# Patient Record
Sex: FEMALE | Race: WHITE | NOT HISPANIC OR LATINO | Employment: OTHER | URBAN - METROPOLITAN AREA
[De-identification: names, ages, dates, MRNs, and addresses within clinical notes are randomized per-mention and may not be internally consistent; named-entity substitution may affect disease eponyms.]

---

## 2019-02-14 ENCOUNTER — HOSPITAL ENCOUNTER (OUTPATIENT)
Dept: RADIOLOGY | Facility: HOSPITAL | Age: 69
Discharge: HOME | End: 2019-02-14
Attending: ANESTHESIOLOGY
Payer: MEDICARE

## 2019-02-14 ENCOUNTER — TRANSCRIBE ORDERS (OUTPATIENT)
Dept: ADMINISTRATIVE | Facility: HOSPITAL | Age: 69
End: 2019-02-14

## 2019-02-14 DIAGNOSIS — G89.29 CHRONIC LEFT SHOULDER PAIN: Primary | ICD-10-CM

## 2019-02-14 DIAGNOSIS — M25.512 CHRONIC LEFT SHOULDER PAIN: ICD-10-CM

## 2019-02-14 DIAGNOSIS — M25.512 CHRONIC LEFT SHOULDER PAIN: Primary | ICD-10-CM

## 2019-02-14 DIAGNOSIS — G89.29 CHRONIC LEFT SHOULDER PAIN: ICD-10-CM

## 2019-02-14 PROCEDURE — 73030 X-RAY EXAM OF SHOULDER: CPT

## 2019-10-07 ENCOUNTER — EVALUATION (OUTPATIENT)
Dept: PHYSICAL THERAPY | Facility: CLINIC | Age: 69
End: 2019-10-07
Payer: MEDICARE

## 2019-10-07 DIAGNOSIS — R29.3 POSTURE ABNORMALITY: ICD-10-CM

## 2019-10-07 DIAGNOSIS — M48.02 SPINAL STENOSIS IN CERVICAL REGION: Primary | ICD-10-CM

## 2019-10-07 DIAGNOSIS — M54.2 CERVICALGIA: ICD-10-CM

## 2019-10-07 PROCEDURE — 97014 ELECTRIC STIMULATION THERAPY: CPT | Performed by: PHYSICAL THERAPIST

## 2019-10-07 PROCEDURE — 97535 SELF CARE MNGMENT TRAINING: CPT | Performed by: PHYSICAL THERAPIST

## 2019-10-07 PROCEDURE — 97163 PT EVAL HIGH COMPLEX 45 MIN: CPT | Performed by: PHYSICAL THERAPIST

## 2019-10-07 NOTE — PROGRESS NOTES
PT Evaluation     Today's date: 10/7/2019  Patient name: Polo Hook  : 1950  MRN: [id number]  Referring provider: Mars Larson MD  Dx:   Encounter Diagnosis     ICD-10-CM    1  Spinal stenosis in cervical region M48 02    2  Cervicalgia M54 2    3  Posture abnormality R29 3        Start Time: 1400  Stop Time: 1500  Total time in clinic (min): 60 minutes    Assessment  Assessment details: Polo Hook was seen for an initial PT evaluation today  Patient is a 78 y o  female with diagnosis of cervicalgia and past medical history significant for left RTC repairx2, olecranon fx, bilateral TKA  High complexity evaluation  due to number of participation restrictions, functional outcome measure of 66% limitation, and unstable clinical presentation  Findings today show significant restriction of thoracic and cervical mobility with tightness of cervical and superior shoulder musculature, limited cervical range of motion, suboccipital tightness, deep cervical flexor weakness, postural abnormality,and pain impacting patient's overall abiltiy to perform ADLs pain free  Skilled PT indicated to treat at this time to address postural mechanics, musculature tightness, and general mobility/stability of cervical spine to improve patient's quality of life  Impairments: abnormal muscle tone, abnormal or restricted ROM, abnormal movement, activity intolerance, impaired physical strength, lacks appropriate home exercise program, pain with function, scapular dyskinesis, poor posture  and poor body mechanics    Goals  STG (4 weeks)  1  Patient will display good seated posture with decreased forward head and retracted shoulders for 2 consecutive sessions with 50% VC    2  Patient will have 2/10 cervical pain at rest    3  Improve cervical rotation by 5 degrees bilaterally for increased ability to turn head while driving  LTG (8 weeks)  1   Patient will be able to sit in chair without high back for 30 minutes without significant increased pain  2  Cervical range of motion in side bending will improve by 10 degrees for increased ability to perform ADLs and sleep comfortably  3  Patient will be independent with home exercise program for continued maintenance post PT DC  Plan  Plan details: Reassess in 4 weeks  Patient would benefit from: skilled physical therapy  Planned modality interventions: traction, unattended electrical stimulation, cryotherapy and thermotherapy: hydrocollator packs  Other planned modality interventions: Graston technique  Planned therapy interventions: manual therapy, neuromuscular re-education, therapeutic exercise, therapeutic activities and home exercise program  Frequency: 2-3x week  Plan of Care beginning date: 10/7/2019  Plan of Care expiration date: 2019  Treatment plan discussed with: patient        Subjective Evaluation    History of Present Illness  Date of onset: 10/7/2016  Mechanism of injury: Kerri Perez is a 78 y o  female who presents to outpatient Physical Therapy today with complaints of neck pain that travels down both arms to elbow  States she had shoulder surgery approximately 1 year ago and had c/o left elbow pain post surgery, when examined by DO Dx with referral from neck  Has been seeing pain management for both cervical and low back       Pain  Current pain ratin  At best pain ratin  At worst pain rating: 10  Location: center of cervical spine down arms to elbows (HA daily beginning in occiput region)  Quality: sharp and radiating  Relieving factors: relaxation  Progression: worsening    Social Support    Employment status: not working (retired)  Hand dominance: right      Diagnostic Tests  X-ray: abnormal  Treatments  Previous treatment: injection treatment (6 weeks ago, helped for 2 weeks)  Patient Goals  Patient goals for therapy: decreased pain  Patient goal: lift        Objective     Concurrent Complaints  Positive for headaches, tinnitus (chronic) and visual change  Palpation   Left   Hypertonic in the cervical paraspinals, levator scapulae, scalenes, suboccipitals and upper trapezius  Tenderness of the cervical paraspinals, levator scapulae, scalenes, suboccipitals and upper trapezius  Trigger point to levator scapulae and suboccipitals  Right   Hypertonic in the cervical paraspinals, levator scapulae, scalenes, suboccipitals and upper trapezius  Tenderness of the cervical paraspinals, levator scapulae, scalenes, suboccipitals and upper trapezius  Trigger point to levator scapulae and suboccipitals  Tenderness   Cervical Spine   Tenderness in the spinous process, left scapula, left transverse process, right scapula and right transverse process  Neurological Testing     Sensation   Cervical/Thoracic   Left   Intact: light touch    Right   Intact: light touch    Active Range of Motion   Cervical/Thoracic Spine       Cervical    Flexion: 30 degrees  with pain  Extension: 14 degrees     with pain  Left lateral flexion: 25 degrees      Right lateral flexion: 10 degrees      Left rotation: 19 degrees with pain  Right rotation: 32 degrees    with pain    Joint Play     Hypomobile: C1, C2, C3, C4, C5, C6, C7, T1, T2, T3, T4 and T5     Pain: C1, C2, C3, C4, C5, C6, C7, T1, T2, T3, T4 and T5     Strength/Myotome Testing     Left Wrist/Hand      (2nd hand position)     Trial 1: 34    Trial 2: 34    Trial 3: 32    Right Wrist/Hand      (2nd hand position)     Trial 1: 32    Trial 2: 31    Trial 3: 25    Tests   Cervical   Positive repeated extension, repeated flexion and vertical compression  Negative alar ligament test and VBI  Lumbar   Positive vertical compression   General Comments:      Cervical/Thoracic Comments  Posture: Increased thoracic kyphosis with forward head and rounded shoulders  FOTO: 34% function, 48% predicted function     Neuro Exam:     Headaches   Patient reports headaches: Yes         Flowsheet Rows Most Recent Value   PT/OT G-Codes   Current Score  34   Projected Score  48             Precautions: none noted       Re-evaluation:11/7    Cervical flowsheet    Manual  10/7                       SOR performed       Cervical distraction performed       1st rib mob        T/S PA mobs        C/S PA and side glide mobs                IASTM            Exercise Diary  10/7                               Doorway stretch        C/S AROM        Chin tucks        Shoulder rolls Reviewed HEP       scap retraction Reviewed HEP       MTP/LTP        Serratus punch        Prone row        Prone I's         Prone T's (thumbs up and down)        Standing YTI        Lat pull down                                                    Modalities 10/7       estim premod bilateral C/S 10 min with MH

## 2019-10-07 NOTE — LETTER
2019    Trevor Lund, [address]/[employer] [place] [address]    Patient: June Campuzano   YOB: 1950   Date of Visit: 10/7/2019     Encounter Diagnosis     ICD-10-CM    1  Spinal stenosis in cervical region M48 02    2  Cervicalgia M54 2    3  Posture abnormality R29 3        Dear Dr Lino Cabezas: Thank you for your recent referral of June Campuzano  Please review the attached evaluation summary from Sharon's recent visit  Please verify that you agree with the plan of care by signing the attached order  If you have any questions or concerns, please do not hesitate to call  I sincerely appreciate the opportunity to share in the care of one of your patients and hope to have another opportunity to work with you in the near future  Sincerely,    Army Felix PT      Referring Provider:      I certify that I have read the below Plan of Care and certify the need for these services furnished under this plan of treatment while under my care  Trevor Lund MD  [address] [place] [address]  VIA Facsimile: [phone]          PT Evaluation     Today's date: 10/7/2019  Patient name: June Campuzano  : 1950  MRN: [id number]  Referring provider: Juliocesar Valenzuela MD  Dx:   Encounter Diagnosis     ICD-10-CM    1  Spinal stenosis in cervical region M48 02    2  Cervicalgia M54 2    3  Posture abnormality R29 3        Start Time: 1400  Stop Time: 1500  Total time in clinic (min): 60 minutes    Assessment  Assessment details: June Campuzano was seen for an initial PT evaluation today  Patient is a 78 y o  female with diagnosis of cervicalgia and past medical history significant for left RTC repairx2, olecranon fx, bilateral TKA  High complexity evaluation  due to number of participation restrictions, functional outcome measure of 66% limitation, and unstable clinical presentation   Findings today show significant restriction of thoracic and cervical mobility with tightness of cervical and superior shoulder musculature, limited cervical range of motion, suboccipital tightness, deep cervical flexor weakness, postural abnormality,and pain impacting patient's overall abiltiy to perform ADLs pain free  Skilled PT indicated to treat at this time to address postural mechanics, musculature tightness, and general mobility/stability of cervical spine to improve patient's quality of life  Impairments: abnormal muscle tone, abnormal or restricted ROM, abnormal movement, activity intolerance, impaired physical strength, lacks appropriate home exercise program, pain with function, scapular dyskinesis, poor posture  and poor body mechanics    Goals  STG (4 weeks)  1  Patient will display good seated posture with decreased forward head and retracted shoulders for 2 consecutive sessions with 50% VC    2  Patient will have 2/10 cervical pain at rest    3  Improve cervical rotation by 5 degrees bilaterally for increased ability to turn head while driving  LTG (8 weeks)  1  Patient will be able to sit in chair without high back for 30 minutes without significant increased pain  2  Cervical range of motion in side bending will improve by 10 degrees for increased ability to perform ADLs and sleep comfortably  3  Patient will be independent with home exercise program for continued maintenance post PT DC  Plan  Plan details: Reassess in 4 weeks  Patient would benefit from: skilled physical therapy  Planned modality interventions: traction, unattended electrical stimulation, cryotherapy and thermotherapy: hydrocollator packs  Other planned modality interventions: Graston technique  Planned therapy interventions: manual therapy, neuromuscular re-education, therapeutic exercise, therapeutic activities and home exercise program  Frequency: 2-3x week    Plan of Care beginning date: 10/7/2019  Plan of Care expiration date: 2019  Treatment plan discussed with: patient        Subjective Evaluation    History of Present Illness  Date of onset: 10/7/2016  Mechanism of injury: Karely Vega is a 78 y o  female who presents to outpatient Physical Therapy today with complaints of neck pain that travels down both arms to elbow  States she had shoulder surgery approximately 1 year ago and had c/o left elbow pain post surgery, when examined by DO Dx with referral from neck  Has been seeing pain management for both cervical and low back  Pain  Current pain ratin  At best pain ratin  At worst pain rating: 10  Location: center of cervical spine down arms to elbows (HA daily beginning in occiput region)  Quality: sharp and radiating  Relieving factors: relaxation  Progression: worsening    Social Support    Employment status: not working (retired)  Hand dominance: right      Diagnostic Tests  X-ray: abnormal  Treatments  Previous treatment: injection treatment (6 weeks ago, helped for 2 weeks)  Patient Goals  Patient goals for therapy: decreased pain  Patient goal: lift        Objective     Concurrent Complaints  Positive for headaches, tinnitus (chronic) and visual change  Palpation   Left   Hypertonic in the cervical paraspinals, levator scapulae, scalenes, suboccipitals and upper trapezius  Tenderness of the cervical paraspinals, levator scapulae, scalenes, suboccipitals and upper trapezius  Trigger point to levator scapulae and suboccipitals  Right   Hypertonic in the cervical paraspinals, levator scapulae, scalenes, suboccipitals and upper trapezius  Tenderness of the cervical paraspinals, levator scapulae, scalenes, suboccipitals and upper trapezius  Trigger point to levator scapulae and suboccipitals  Tenderness   Cervical Spine   Tenderness in the spinous process, left scapula, left transverse process, right scapula and right transverse process       Neurological Testing     Sensation Cervical/Thoracic   Left   Intact: light touch    Right   Intact: light touch    Active Range of Motion   Cervical/Thoracic Spine       Cervical    Flexion: 30 degrees  with pain  Extension: 14 degrees     with pain  Left lateral flexion: 25 degrees      Right lateral flexion: 10 degrees      Left rotation: 19 degrees with pain  Right rotation: 32 degrees    with pain    Joint Play     Hypomobile: C1, C2, C3, C4, C5, C6, C7, T1, T2, T3, T4 and T5     Pain: C1, C2, C3, C4, C5, C6, C7, T1, T2, T3, T4 and T5     Strength/Myotome Testing     Left Wrist/Hand      (2nd hand position)     Trial 1: 34    Trial 2: 34    Trial 3: 32    Right Wrist/Hand      (2nd hand position)     Trial 1: 32    Trial 2: 31    Trial 3: 25    Tests   Cervical   Positive repeated extension, repeated flexion and vertical compression  Negative alar ligament test and VBI  Lumbar   Positive vertical compression   General Comments:      Cervical/Thoracic Comments  Posture: Increased thoracic kyphosis with forward head and rounded shoulders  FOTO: 34% function, 48% predicted function     Neuro Exam:     Headaches   Patient reports headaches: Yes         Flowsheet Rows      Most Recent Value   PT/OT G-Codes   Current Score  34   Projected Score  48             Precautions: none noted       Re-evaluation:11/7    Cervical flowsheet    Manual  10/7                       SOR performed       Cervical distraction performed       1st rib mob        T/S PA mobs        C/S PA and side glide mobs                IASTM            Exercise Diary  10/7                               Doorway stretch        C/S AROM        Chin tucks        Shoulder rolls Reviewed HEP       scap retraction Reviewed HEP       MTP/LTP        Serratus punch        Prone row        Prone I's         Prone T's (thumbs up and down)        Standing YTI        Lat pull down                                                    Modalities 10/7       estim premod bilateral C/S 10 min with DUARTE

## 2019-10-10 ENCOUNTER — OFFICE VISIT (OUTPATIENT)
Dept: PHYSICAL THERAPY | Facility: CLINIC | Age: 69
End: 2019-10-10
Payer: MEDICARE

## 2019-10-10 DIAGNOSIS — M48.02 SPINAL STENOSIS IN CERVICAL REGION: Primary | ICD-10-CM

## 2019-10-10 DIAGNOSIS — R29.3 POSTURE ABNORMALITY: ICD-10-CM

## 2019-10-10 DIAGNOSIS — M54.2 CERVICALGIA: ICD-10-CM

## 2019-10-10 PROCEDURE — 97110 THERAPEUTIC EXERCISES: CPT | Performed by: PHYSICAL THERAPIST

## 2019-10-10 PROCEDURE — 97535 SELF CARE MNGMENT TRAINING: CPT | Performed by: PHYSICAL THERAPIST

## 2019-10-10 PROCEDURE — 97140 MANUAL THERAPY 1/> REGIONS: CPT | Performed by: PHYSICAL THERAPIST

## 2019-10-10 NOTE — PROGRESS NOTES
Daily Note     Today's date: 10/10/2019  Patient name: Sedrick Guzmán  : 1950  MRN: [id number]  Referring provider: Conchis Perkins MD  Dx:   Encounter Diagnosis     ICD-10-CM    1  Spinal stenosis in cervical region M48 02    2  Cervicalgia M54 2    3  Posture abnormality R29 3                   Subjective: The patient notes 7/10 pain which originates at her midline lower cervical spine and radiates down both arms to the level of her elbows  The patient also complains left frontal headache today  The patient notes correlation between neck pain and headache pain intensity  Objective: See treatment diary below      Assessment: Patient demonstrates pain with grade 1-2 P/A glides to C5-C7 and unable to tolerate at this area  The patient demonstrates relief of pain/headaches with cervical traction and passive flexion/rotation at the cervical spine  The patient notes reduction of headache and pain to 3/10 at the end of the session  The patient will benefit from continued PT to decrease pain/headaches and improve function  Plan: Continue per plan of care  Progress treatment as tolerated  Precautions: none noted       Re-evaluation:    Cervical flowsheet    Manual  10/7 10/10/19                      SOR performed Performed 1x2min      Cervical distraction performed 8x:15      1st rib mob        T/S PA mobs  performed      C/S PA and side glide mobs  P/A peformed p! IASTM            Exercise Diary  10/7 10/10/19                              Doorway stretch        C/S AROM        Chin tucks with O P   10x:05      Shoulder rolls Reviewed HEP Mirror x15      scap retraction Reviewed HEP Mirror x15      MTP/LTP  Attempted( UT firing)      Serratus punch        Prone row        Prone I's         Prone T's (thumbs up and down)        Standing YTI        Lat pull down        Thoracic extension  10x:05      UT stretch B/L  3x:30      Levator Stretch B/L with O P    With self O P  3x:30 SCM stretch B/L  3x:30                  Modalities 10/7 10/10/19      estim premod bilateral C/S 10 min with                                The patient was given a new home exercise plan with written handout, pictures, and verbal instruction  The patient accepts and understands the new home activities

## 2019-10-14 ENCOUNTER — OFFICE VISIT (OUTPATIENT)
Dept: PHYSICAL THERAPY | Facility: CLINIC | Age: 69
End: 2019-10-14
Payer: MEDICARE

## 2019-10-14 DIAGNOSIS — M54.2 CERVICALGIA: ICD-10-CM

## 2019-10-14 DIAGNOSIS — R29.3 POSTURE ABNORMALITY: ICD-10-CM

## 2019-10-14 DIAGNOSIS — M48.02 SPINAL STENOSIS IN CERVICAL REGION: Primary | ICD-10-CM

## 2019-10-14 PROCEDURE — 97110 THERAPEUTIC EXERCISES: CPT | Performed by: PHYSICAL THERAPIST

## 2019-10-14 PROCEDURE — 97014 ELECTRIC STIMULATION THERAPY: CPT | Performed by: PHYSICAL THERAPIST

## 2019-10-14 PROCEDURE — 97140 MANUAL THERAPY 1/> REGIONS: CPT | Performed by: PHYSICAL THERAPIST

## 2019-10-16 ENCOUNTER — APPOINTMENT (OUTPATIENT)
Dept: PHYSICAL THERAPY | Facility: CLINIC | Age: 69
End: 2019-10-16
Payer: MEDICARE

## 2019-10-17 ENCOUNTER — APPOINTMENT (OUTPATIENT)
Dept: PHYSICAL THERAPY | Facility: CLINIC | Age: 69
End: 2019-10-17
Payer: MEDICARE

## 2019-10-22 ENCOUNTER — APPOINTMENT (OUTPATIENT)
Dept: PHYSICAL THERAPY | Facility: CLINIC | Age: 69
End: 2019-10-22
Payer: MEDICARE

## 2019-10-24 ENCOUNTER — APPOINTMENT (OUTPATIENT)
Dept: PHYSICAL THERAPY | Facility: CLINIC | Age: 69
End: 2019-10-24
Payer: MEDICARE

## 2020-08-21 ENCOUNTER — TRANSCRIBE ORDERS (OUTPATIENT)
Dept: ADMINISTRATIVE | Facility: HOSPITAL | Age: 70
End: 2020-08-21

## 2020-08-21 DIAGNOSIS — R10.816 EPIGASTRIC ABDOMINAL TENDERNESS, REBOUND TENDERNESS PRESENCE NOT SPECIFIED: ICD-10-CM

## 2020-08-21 DIAGNOSIS — K21.9 GASTROESOPHAGEAL REFLUX DISEASE WITHOUT ESOPHAGITIS: ICD-10-CM

## 2020-08-21 DIAGNOSIS — R13.10 DYSPHAGIA, UNSPECIFIED TYPE: Primary | ICD-10-CM

## 2020-08-26 ENCOUNTER — HOSPITAL ENCOUNTER (OUTPATIENT)
Dept: RADIOLOGY | Facility: HOSPITAL | Age: 70
Discharge: HOME | End: 2020-08-26
Attending: PHYSICIAN ASSISTANT
Payer: MEDICARE

## 2020-08-26 DIAGNOSIS — R10.816 EPIGASTRIC ABDOMINAL TENDERNESS, REBOUND TENDERNESS PRESENCE NOT SPECIFIED: ICD-10-CM

## 2020-08-26 DIAGNOSIS — R13.10 DYSPHAGIA, UNSPECIFIED TYPE: ICD-10-CM

## 2020-08-26 DIAGNOSIS — K21.9 GASTROESOPHAGEAL REFLUX DISEASE WITHOUT ESOPHAGITIS: ICD-10-CM

## 2020-08-26 PROCEDURE — 74220 X-RAY XM ESOPHAGUS 1CNTRST: CPT

## 2020-09-15 ENCOUNTER — ANESTHESIA EVENT (OUTPATIENT)
Dept: GASTROENTEROLOGY | Facility: HOSPITAL | Age: 70
End: 2020-09-15

## 2020-09-16 ENCOUNTER — HOSPITAL ENCOUNTER (OUTPATIENT)
Dept: GASTROENTEROLOGY | Facility: HOSPITAL | Age: 70
Setting detail: OUTPATIENT SURGERY
Discharge: HOME | End: 2020-09-16
Attending: INTERNAL MEDICINE
Payer: MEDICARE

## 2020-09-16 ENCOUNTER — ANESTHESIA (OUTPATIENT)
Dept: GASTROENTEROLOGY | Facility: HOSPITAL | Age: 70
End: 2020-09-16

## 2020-09-16 VITALS
WEIGHT: 208 LBS | SYSTOLIC BLOOD PRESSURE: 125 MMHG | TEMPERATURE: 97.1 F | OXYGEN SATURATION: 95 % | HEART RATE: 107 BPM | HEIGHT: 66 IN | DIASTOLIC BLOOD PRESSURE: 83 MMHG | BODY MASS INDEX: 33.43 KG/M2 | RESPIRATION RATE: 18 BRPM

## 2020-09-16 VITALS — HEART RATE: 114 BPM

## 2020-09-16 DIAGNOSIS — R13.10 DYSPHAGIA, UNSPECIFIED: ICD-10-CM

## 2020-09-16 DIAGNOSIS — R10.816 EPIGASTRIC ABDOMINAL TENDERNESS: ICD-10-CM

## 2020-09-16 PROCEDURE — 88305 TISSUE EXAM BY PATHOLOGIST: CPT | Performed by: PATHOLOGY

## 2020-09-16 PROCEDURE — C1726 CATH, BAL DIL, NON-VASCULAR: HCPCS

## 2020-09-16 RX ORDER — DIPHENOXYLATE HYDROCHLORIDE AND ATROPINE SULFATE 2.5; .025 MG/1; MG/1
1 TABLET ORAL DAILY
COMMUNITY

## 2020-09-16 RX ORDER — OMEPRAZOLE 20 MG/1
20 CAPSULE, DELAYED RELEASE ORAL DAILY
COMMUNITY

## 2020-09-16 RX ORDER — PROPOFOL 10 MG/ML
INJECTION, EMULSION INTRAVENOUS AS NEEDED
Status: DISCONTINUED | OUTPATIENT
Start: 2020-09-16 | End: 2020-09-16

## 2020-09-16 RX ORDER — TEMAZEPAM 7.5 MG/1
15 CAPSULE ORAL
COMMUNITY

## 2020-09-16 RX ORDER — LIDOCAINE HYDROCHLORIDE 20 MG/ML
INJECTION, SOLUTION EPIDURAL; INFILTRATION; INTRACAUDAL; PERINEURAL AS NEEDED
Status: DISCONTINUED | OUTPATIENT
Start: 2020-09-16 | End: 2020-09-16

## 2020-09-16 RX ORDER — AMLODIPINE BESYLATE 10 MG/1
10 TABLET ORAL DAILY
COMMUNITY

## 2020-09-16 RX ORDER — CETIRIZINE HYDROCHLORIDE 10 MG/1
10 TABLET ORAL DAILY
COMMUNITY

## 2020-09-16 RX ORDER — GABAPENTIN 600 MG/1
1200 TABLET ORAL
COMMUNITY
End: 2022-07-15

## 2020-09-16 RX ORDER — PRAVASTATIN SODIUM 40 MG
40 TABLET ORAL DAILY
COMMUNITY

## 2020-09-16 RX ORDER — VENLAFAXINE HYDROCHLORIDE 150 MG/1
150 CAPSULE, EXTENDED RELEASE ORAL DAILY
COMMUNITY

## 2020-09-16 RX ORDER — SODIUM CHLORIDE, SODIUM LACTATE, POTASSIUM CHLORIDE, CALCIUM CHLORIDE 600; 310; 30; 20 MG/100ML; MG/100ML; MG/100ML; MG/100ML
125 INJECTION, SOLUTION INTRAVENOUS CONTINUOUS
Status: DISCONTINUED | OUTPATIENT
Start: 2020-09-16 | End: 2020-09-20 | Stop reason: HOSPADM

## 2020-09-16 RX ADMIN — LIDOCAINE HYDROCHLORIDE 100 MG: 20 INJECTION, SOLUTION EPIDURAL; INFILTRATION; INTRACAUDAL; PERINEURAL at 11:37

## 2020-09-16 RX ADMIN — PROPOFOL 150 MG: 10 INJECTION, EMULSION INTRAVENOUS at 11:37

## 2020-09-16 RX ADMIN — PROPOFOL 20 MG: 10 INJECTION, EMULSION INTRAVENOUS at 11:45

## 2020-09-16 RX ADMIN — PROPOFOL 30 MG: 10 INJECTION, EMULSION INTRAVENOUS at 11:42

## 2020-09-16 RX ADMIN — SODIUM CHLORIDE, SODIUM LACTATE, POTASSIUM CHLORIDE, AND CALCIUM CHLORIDE 125 ML/HR: .6; .31; .03; .02 INJECTION, SOLUTION INTRAVENOUS at 11:13

## 2020-09-16 NOTE — ANESTHESIA PREPROCEDURE EVALUATION
Procedure:  EGD    Relevant Problems   No relevant active problems      HTN  HLD  Anxiety/Depression  GERD  Physical Exam    Airway    Mallampati score: I  TM Distance: >3 FB  Neck ROM: full     Dental       Cardiovascular      Pulmonary      Other Findings        Anesthesia Plan  ASA Score- 2     Anesthesia Type- IV sedation with anesthesia with ASA Monitors  Additional Monitors:   Airway Plan:           Plan Factors-Exercise tolerance (METS): >4 METS  Chart reviewed  Patient summary reviewed  Induction- intravenous  Postoperative Plan-     Informed Consent- Anesthetic plan and risks discussed with patient  I personally reviewed this patient with the CRNA  Discussed and agreed on the Anesthesia Plan with the CRNA  Nazario Haji

## 2020-09-16 NOTE — DISCHARGE INSTRUCTIONS
Upper Endoscopy   WHAT YOU NEED TO KNOW:   An upper endoscopy is also called an upper gastrointestinal (GI) endoscopy, or an esophagogastroduodenoscopy (EGD)  You may feel bloated, gassy, or have some abdominal discomfort after your procedure  Your throat may be sore for 24 to 36 hours  You may burp or pass gas from air that is still inside your body  DISCHARGE INSTRUCTIONS:   Call 911 if:   · You have sudden chest pain or trouble breathing  Seek care immediately if:   · You feel dizzy or faint  · You have trouble swallowing  · You have severe throat pain  · Your bowel movements are very dark or black  · Your abdomen is hard and firm and you have severe pain  · You vomit blood  Contact your healthcare provider if:   · You feel full or bloated and cannot burp or pass gas  · You have not had a bowel movement for 3 days after your procedure  · You have neck pain  · You have a fever or chills  · You have nausea or are vomiting  · You have a rash or hives  · You have questions or concerns about your endoscopy  Relieve a sore throat:  Suck on throat lozenges or crushed ice  Gargle with a small amount of warm salt water  Mix 1 teaspoon of salt and 1 cup of warm water to make salt water  Relieve gas and discomfort from bloating:  Lie on your right side with a heating pad on your abdomen  Take short walks to help pass gas  Eat small meals until bloating is relieved  Rest after your procedure:  Do not drive or make important decisions until the day after your procedure  Return to your normal activity as directed  You can usually return to work the day after your procedure  Follow up with your healthcare provider as directed:  Write down your questions so you remember to ask them during your visits  © 2017 [employer] Information is for End User's use only and may not be sold, redistributed or otherwise used for commercial purposes   All illustrations and images included in CareNotes® are the copyrighted property of A [employer] M [employer]  or [employer]  The above information is an  only  It is not intended as medical advice for individual conditions or treatments  Talk to your doctor, nurse or pharmacist before following any medical regimen to see if it is safe and effective for you  Esophageal Dilation   WHAT YOU NEED TO KNOW:   Esophageal dilation is a procedure to widen a narrow part of your esophagus  Your healthcare provider will use a dilator (inflatable balloon or another tool that expands) to make the area wider  He may also do an endoscopy before or during your esophageal dilation  During an endoscopy, your healthcare provider will use a scope to see inside your esophagus  DISCHARGE INSTRUCTIONS:   Medicines:   · Medicines  may be given to decrease stomach acid that can irritate your esophagus  · Take your medicine as directed  Contact your healthcare provider if you think your medicine is not helping or if you have side effects  Tell him if you are allergic to any medicine  Keep a list of the medicines, vitamins, and herbs you take  Include the amounts, and when and why you take them  Bring the list or the pill bottles to follow-up visits  Carry your medicine list with you in case of an emergency  Follow up with your healthcare provider as directed:  Write down your questions so you remember to ask them during your visits  Nutrition:  You may eat foods you normally eat  Chew your food well  Eat soft foods if you still have problems swallowing  Soft foods include applesauce, bananas, cooked cereal, cottage cheese, eggs, pudding, and yogurt  Ask for more information on what types of food to eat  Contact your healthcare provider if:   · You have a fever  · You feel very full or bloated  · You have more problems swallowing food  · You have nausea or are vomiting      · You have questions or concerns about your condition or care  Seek care immediately or call 911 if:   · You vomit blood  · You are not able to swallow any food  · You have a fast heartbeat, chest pain, or sudden trouble breathing  · Your abdomen suddenly becomes tender and hard  © 2017 [employer] Information is for End User's use only and may not be sold, redistributed or otherwise used for commercial purposes  All illustrations and images included in CareNotes® are the copyrighted property of [employer] M [employer]  or [employer]  The above information is an  only  It is not intended as medical advice for individual conditions or treatments  Talk to your doctor, nurse or pharmacist before following any medical regimen to see if it is safe and effective for you

## 2020-09-16 NOTE — ANESTHESIA POSTPROCEDURE EVALUATION
Post-Op Assessment Note    CV Status:  Stable  Pain Score: 0    Pain management: adequate     Mental Status:  Alert and awake   Hydration Status:  Euvolemic   PONV Controlled:  Controlled   Airway Patency:  Patent      Post Op Vitals Reviewed: Yes      Staff: CRNA         No complications documented      BP     Temp     Pulse    Resp      SpO2

## 2020-09-16 NOTE — INTERVAL H&P NOTE
H&P reviewed  After examining the patient I find no changes in the patients condition since the H&P had been written      Vitals:    09/16/20 1100   BP: (!) 197/84   Pulse: (!) 118   Resp: 16   Temp: (!) 97 °F (36 1 °C)   SpO2: 96%

## 2021-01-29 ENCOUNTER — OFFICE VISIT (OUTPATIENT)
Dept: OBGYN CLINIC | Facility: CLINIC | Age: 71
End: 2021-01-29
Payer: MEDICARE

## 2021-01-29 VITALS — WEIGHT: 208 LBS | BODY MASS INDEX: 35.51 KG/M2 | HEIGHT: 64 IN

## 2021-01-29 DIAGNOSIS — Z96.653 TOTAL KNEE REPLACEMENT STATUS, BILATERAL: Primary | ICD-10-CM

## 2021-01-29 PROCEDURE — 99213 OFFICE O/P EST LOW 20 MIN: CPT | Performed by: ORTHOPAEDIC SURGERY

## 2021-01-29 NOTE — PROGRESS NOTES
Assessment/Plan:    No problem-specific Assessment & Plan notes found for this encounter  Diagnoses and all orders for this visit:    Total knee replacement status, bilateral  -     XR knee 3 vw right non injury; Future  -     XR knee 3 vw left non injury; Future           the patient is doing quite well  Continue home exercise program   Continue stretching  Return back in 12 months for re-evaluation with new x-rays of bilateral knees-three views each    Subjective:      Patient ID: Anna Hernandez is a 69 y o  female  HPI     patient is approximately 10 years status post bilateral total knee replacement  She offers no complaints of pain  She denies any numbness or tingling  She denies any fever chills  She is very pleased with her results  She is walking in the office without any assistive devices and without an antalgic gait    The following portions of the patient's history were reviewed and updated as appropriate: allergies, current medications, past family history, past medical history, past social history, past surgical history and problem list     Review of Systems   Constitutional: Negative for chills, fever and unexpected weight change  HENT: Negative for hearing loss, nosebleeds and sore throat  Eyes: Negative for pain, redness and visual disturbance  Respiratory: Negative for cough, shortness of breath and wheezing  Cardiovascular: Negative for chest pain, palpitations and leg swelling  Gastrointestinal: Negative for abdominal pain, nausea and vomiting  Endocrine: Negative for polydipsia and polyuria  Genitourinary: Negative for dysuria and hematuria  Musculoskeletal: Negative for arthralgias, back pain, gait problem, joint swelling, myalgias, neck pain and neck stiffness  As noted in HPI   Skin: Negative for rash and wound  Neurological: Negative for dizziness, numbness and headaches     Psychiatric/Behavioral: Negative for decreased concentration and suicidal ideas  The patient is not nervous/anxious  Objective:      Ht 5' 4" (1 626 m)   Wt 94 3 kg (208 lb)   BMI 35 70 kg/m²          Physical Exam       bilateral lower extremities are neurovascular intact  Toes are pink and mobile  Compartments are soft  Incisions are clean, dry, intact  Negative Homans  Range of motion knees bilaterally are from 0-125 degrees  There is no joint tenderness  Patella is tracking quite well  Collateral ligament function intact  Quadriceps strength intact    Neurologically intact distally     x-rays show well-seated bilateral total knee replacements without any loosening the prosthesis

## 2021-03-10 DIAGNOSIS — Z23 ENCOUNTER FOR IMMUNIZATION: ICD-10-CM

## 2021-03-16 ENCOUNTER — IMMUNIZATIONS (OUTPATIENT)
Dept: FAMILY MEDICINE CLINIC | Facility: HOSPITAL | Age: 71
End: 2021-03-16

## 2021-03-16 DIAGNOSIS — Z23 ENCOUNTER FOR IMMUNIZATION: Primary | ICD-10-CM

## 2021-03-16 PROCEDURE — 91301 SARS-COV-2 / COVID-19 MRNA VACCINE (MODERNA) 100 MCG: CPT

## 2021-03-16 PROCEDURE — 0011A SARS-COV-2 / COVID-19 MRNA VACCINE (MODERNA) 100 MCG: CPT

## 2021-04-15 ENCOUNTER — IMMUNIZATIONS (OUTPATIENT)
Dept: FAMILY MEDICINE CLINIC | Facility: HOSPITAL | Age: 71
End: 2021-04-15

## 2021-04-15 DIAGNOSIS — Z23 ENCOUNTER FOR IMMUNIZATION: Primary | ICD-10-CM

## 2021-04-15 PROCEDURE — 91301 SARS-COV-2 / COVID-19 MRNA VACCINE (MODERNA) 100 MCG: CPT

## 2021-04-15 PROCEDURE — 0012A SARS-COV-2 / COVID-19 MRNA VACCINE (MODERNA) 100 MCG: CPT

## 2021-06-08 ENCOUNTER — APPOINTMENT (OUTPATIENT)
Dept: RADIOLOGY | Facility: CLINIC | Age: 71
End: 2021-06-08
Payer: MEDICARE

## 2021-06-08 ENCOUNTER — OFFICE VISIT (OUTPATIENT)
Dept: OBGYN CLINIC | Facility: CLINIC | Age: 71
End: 2021-06-08
Payer: MEDICARE

## 2021-06-08 VITALS
SYSTOLIC BLOOD PRESSURE: 146 MMHG | HEART RATE: 97 BPM | DIASTOLIC BLOOD PRESSURE: 82 MMHG | BODY MASS INDEX: 35.51 KG/M2 | HEIGHT: 64 IN | WEIGHT: 208 LBS

## 2021-06-08 DIAGNOSIS — M25.512 LEFT SHOULDER PAIN, UNSPECIFIED CHRONICITY: ICD-10-CM

## 2021-06-08 DIAGNOSIS — M25.512 LEFT SHOULDER PAIN, UNSPECIFIED CHRONICITY: Primary | ICD-10-CM

## 2021-06-08 DIAGNOSIS — M19.012 PRIMARY OSTEOARTHRITIS OF LEFT SHOULDER: ICD-10-CM

## 2021-06-08 PROCEDURE — 73030 X-RAY EXAM OF SHOULDER: CPT

## 2021-06-08 PROCEDURE — 20610 DRAIN/INJ JOINT/BURSA W/O US: CPT | Performed by: ORTHOPAEDIC SURGERY

## 2021-06-08 PROCEDURE — 99213 OFFICE O/P EST LOW 20 MIN: CPT | Performed by: ORTHOPAEDIC SURGERY

## 2021-06-08 RX ORDER — METHYLPREDNISOLONE ACETATE 40 MG/ML
2 INJECTION, SUSPENSION INTRA-ARTICULAR; INTRALESIONAL; INTRAMUSCULAR; SOFT TISSUE
Status: COMPLETED | OUTPATIENT
Start: 2021-06-08 | End: 2021-06-08

## 2021-06-08 RX ORDER — BUPIVACAINE HYDROCHLORIDE 2.5 MG/ML
4 INJECTION, SOLUTION INFILTRATION; PERINEURAL
Status: COMPLETED | OUTPATIENT
Start: 2021-06-08 | End: 2021-06-08

## 2021-06-08 RX ADMIN — METHYLPREDNISOLONE ACETATE 2 ML: 40 INJECTION, SUSPENSION INTRA-ARTICULAR; INTRALESIONAL; INTRAMUSCULAR; SOFT TISSUE at 08:48

## 2021-06-08 RX ADMIN — BUPIVACAINE HYDROCHLORIDE 4 ML: 2.5 INJECTION, SOLUTION INFILTRATION; PERINEURAL at 08:48

## 2021-06-08 NOTE — PROGRESS NOTES
Assessment/Plan:    No problem-specific Assessment & Plan notes found for this encounter  Diagnoses and all orders for this visit:    Left shoulder pain, unspecified chronicity  -     XR shoulder 2+ vw left; Future    Primary osteoarthritis of left shoulder           the glenohumeral joint was injected with Depo-Medrol and Marcaine  She tolerated procedure quite well  Return back in 6 weeks for re-evaluation  She will continue home exercise program   Physical  therapy was discussed but declined by the patient    Subjective:      Patient ID: Luz Caban is a 69 y o  female  HPI     the patient is having pain along her left shoulder  She is status post rotator cuff repair from a least 5 years ago  She does have trouble moving her shoulder  She denies any numbness or tingling  She denies any fever chills  Her main complaint is stiffness and then pain  She denies at times pain in her neck, but this pain is different  The following portions of the patient's history were reviewed and updated as appropriate: allergies, current medications, past family history, past medical history, past social history, past surgical history and problem list     Review of Systems   Constitutional: Negative for chills, fever and unexpected weight change  HENT: Negative for hearing loss, nosebleeds and sore throat  Eyes: Negative for pain, redness and visual disturbance  Respiratory: Negative for cough, shortness of breath and wheezing  Cardiovascular: Negative for chest pain, palpitations and leg swelling  Gastrointestinal: Negative for abdominal pain, nausea and vomiting  Endocrine: Negative for polydipsia and polyuria  Genitourinary: Negative for dysuria and hematuria  Musculoskeletal: Positive for arthralgias and myalgias  Negative for back pain, gait problem, joint swelling, neck pain and neck stiffness  As noted in HPI   Skin: Negative for rash and wound     Neurological: Negative for dizziness, numbness and headaches  Psychiatric/Behavioral: Negative for decreased concentration and suicidal ideas  The patient is not nervous/anxious  Objective:      /82   Pulse 97   Ht 5' 4" (1 626 m)   Wt 94 3 kg (208 lb)   BMI 35 70 kg/m²          Physical Exam            Neck was soft and supple  There is a negative axial compression test her neck  Left upper extremity is neurovascular intact  Fingers are pink and mobile  Compartments are soft  There is limited range of motion along her left shoulder with 60 degrees of flexion and abduction  Internal rotation to the sacrum  There is no signs of impingement  No signs of instability  Glenohumeral crepitation is present  Rotator cuff strength testing is grossly intact  X-ray show no fractures or dislocations  There is no riding humeral head  There is arthritic changes with osteophyte formation along the inferior humeral head and glenoid region  Large joint arthrocentesis: L glenohumeral  Universal Protocol:  Consent: Verbal consent obtained  Risks and benefits: risks, benefits and alternatives were discussed  Consent given by: patient  Time out: Immediately prior to procedure a "time out" was called to verify the correct patient, procedure, equipment, support staff and site/side marked as required  Patient understanding: patient states understanding of the procedure being performed  Test results: test results available and properly labeled  Site marked: the operative site was marked  Radiology Images displayed and confirmed   If images not available, report reviewed: imaging studies available  Patient identity confirmed: verbally with patient    Supporting Documentation  Indications: pain   Procedure Details  Location: shoulder - L glenohumeral  Needle size: 22 G  Ultrasound guidance: no  Approach: posterior  Medications administered: 4 mL bupivacaine 0 25 %; 2 mL methylPREDNISolone acetate 40 mg/mL    Patient tolerance: patient tolerated the procedure well with no immediate complications  Dressing:  Sterile dressing applied

## 2021-07-20 ENCOUNTER — OFFICE VISIT (OUTPATIENT)
Dept: OBGYN CLINIC | Facility: CLINIC | Age: 71
End: 2021-07-20
Payer: MEDICARE

## 2021-07-20 VITALS
SYSTOLIC BLOOD PRESSURE: 150 MMHG | HEIGHT: 64 IN | WEIGHT: 211 LBS | BODY MASS INDEX: 36.02 KG/M2 | DIASTOLIC BLOOD PRESSURE: 90 MMHG | HEART RATE: 108 BPM

## 2021-07-20 DIAGNOSIS — M75.22 BICIPITAL TENDINITIS OF LEFT SHOULDER: Primary | ICD-10-CM

## 2021-07-20 DIAGNOSIS — M19.012 PRIMARY OSTEOARTHRITIS OF LEFT SHOULDER: ICD-10-CM

## 2021-07-20 PROCEDURE — 20610 DRAIN/INJ JOINT/BURSA W/O US: CPT | Performed by: ORTHOPAEDIC SURGERY

## 2021-07-20 PROCEDURE — 99213 OFFICE O/P EST LOW 20 MIN: CPT | Performed by: ORTHOPAEDIC SURGERY

## 2021-07-20 RX ORDER — METHYLPREDNISOLONE ACETATE 40 MG/ML
2 INJECTION, SUSPENSION INTRA-ARTICULAR; INTRALESIONAL; INTRAMUSCULAR; SOFT TISSUE
Status: COMPLETED | OUTPATIENT
Start: 2021-07-20 | End: 2021-07-20

## 2021-07-20 RX ORDER — BUPIVACAINE HYDROCHLORIDE 2.5 MG/ML
4 INJECTION, SOLUTION INFILTRATION; PERINEURAL
Status: COMPLETED | OUTPATIENT
Start: 2021-07-20 | End: 2021-07-20

## 2021-07-20 RX ADMIN — METHYLPREDNISOLONE ACETATE 2 ML: 40 INJECTION, SUSPENSION INTRA-ARTICULAR; INTRALESIONAL; INTRAMUSCULAR; SOFT TISSUE at 08:24

## 2021-07-20 RX ADMIN — BUPIVACAINE HYDROCHLORIDE 4 ML: 2.5 INJECTION, SOLUTION INFILTRATION; PERINEURAL at 08:24

## 2021-07-20 NOTE — PROGRESS NOTES
Assessment/Plan:    No problem-specific Assessment & Plan notes found for this encounter  Diagnoses and all orders for this visit:    Bicipital tendinitis of left shoulder    Primary osteoarthritis of left shoulder           the biceps tendon sheath was injected with Depo-Medrol and Marcaine  She tolerated procedure quite well  Return back in 2 months for re-evaluation  The intra-articular injection that was performed on last visit seems to be helping  Subjective:      Patient ID: Zhanna Vázquez is a 66 y o  female  HPI      The patient has a history of degenerative joint disease of her left shoulder  She states the pain is decreased since the intra-articular injection from last visit  She is having more anterior pain  She denies any numbness or tingling  She denies any fever chills  She is pleased with her results  The following portions of the patient's history were reviewed and updated as appropriate: allergies, current medications, past family history, past medical history, past social history, past surgical history and problem list     Review of Systems   Constitutional: Negative for chills, fever and unexpected weight change  HENT: Negative for hearing loss, nosebleeds and sore throat  Eyes: Negative for pain, redness and visual disturbance  Respiratory: Negative for cough, shortness of breath and wheezing  Cardiovascular: Negative for chest pain, palpitations and leg swelling  Gastrointestinal: Negative for abdominal pain, nausea and vomiting  Endocrine: Negative for polydipsia and polyuria  Genitourinary: Negative for dysuria and hematuria  Musculoskeletal: Positive for arthralgias and myalgias  Negative for back pain, gait problem, joint swelling, neck pain and neck stiffness  As noted in HPI   Skin: Negative for rash and wound  Neurological: Negative for dizziness, numbness and headaches     Psychiatric/Behavioral: Negative for decreased concentration and suicidal ideas  The patient is not nervous/anxious  Objective:      /90   Pulse (!) 108   Ht 5' 4" (1 626 m)   Wt 95 7 kg (211 lb)   BMI 36 22 kg/m²          Physical Exam          Neck was soft and supple  There is negative axial compression test her neck  Left upper extremity is neurovascular intact  Fingers are pink and mobile  Compartments are soft  Range of motion is dramatically improved to 140° of forward flexion and abduction  Internal rotation to L3-L4  Minimal crepitations present  Most her pain today is isolated along the biceps tendon sheath  Rotator cuff strength testing was grossly intact  Neurologically intact distally    Large joint arthrocentesis  Universal Protocol:  Consent: Verbal consent obtained  Risks and benefits: risks, benefits and alternatives were discussed  Consent given by: patient  Time out: Immediately prior to procedure a "time out" was called to verify the correct patient, procedure, equipment, support staff and site/side marked as required  Patient understanding: patient states understanding of the procedure being performed  Test results: test results available and properly labeled  Site marked: the operative site was marked  Radiology Images displayed and confirmed  If images not available, report reviewed: imaging studies available  Patient identity confirmed: verbally with patient    Supporting Documentation  Indications: pain   Procedure Details  Location: shoulder - Shoulder joint: Left biceps tendon sheath    Preparation: Patient was prepped and draped in the usual sterile fashion  Needle size: 22 G  Ultrasound guidance: no  Approach: anterior  Medications administered: 4 mL bupivacaine 0 25 %; 2 mL methylPREDNISolone acetate 40 mg/mL    Patient tolerance: patient tolerated the procedure well with no immediate complications  Dressing:  Sterile dressing applied

## 2021-08-29 NOTE — PROGRESS NOTES
PT Evaluation     Today's date: 2021  Patient name: Kayla Jackson  : 1950  MRN: [id number]  Referring provider: LOTUS Subramanian  Dx:   Encounter Diagnosis     ICD-10-CM    1  Cervical radiculitis  M54 12    2  Poor posture  R29 3                   Assessment  Assessment details: Kayla Jackson is a 66 y o  female with a history of anxiety, arthritis, depression, GERD, HTN, hyperlipidemia, BMI>30 that presents for a moderate complexity physical therapy initial evaluation  The patient demonstrates signs and symptoms consistent with cervical radiculitis, poor posture  During the examination the patient demonstrated decreased strength, decreased ROM, gait dysfunction, and pain  The patient's impairments are causing the following functional limitations: Difficulty lifting/carrying objects heavier than 10 pounds, difficulty reaching behind back, difficulty reaching above head, difficulty looking over head, difficulty turning is head to drive, difficulty with house work ie ) vacuuming, difficulty lying supine, and difficulty donning/doffing a shirt  The patient's clinical presentation is evolving due to a number of participation restrictions, significant medial history, and functional limitation (FOTO 32% function)  The patient will benefit from skilled PT services to address impairments, work towards goals, and restore PLOF            Impairments: abnormal or restricted ROM, activity intolerance, impaired balance, impaired physical strength, lacks appropriate home exercise program, pain with function and poor posture   Functional limitations: Difficulty lifting/carrying objects heavier than 10 pounds, difficulty reaching behind back, difficulty reaching above head, difficulty looking over head, difficulty turning is head to drive, difficulty with house work ie ) vacuuming, difficulty lying supine, and difficulty donning/doffing a shirt  Symptom irritability: moderateUnderstanding of Dx/Px/POC: fair   Prognosis: fair    Goals  STG: Achieve in 4-6 weeks  1  Patient's cervical/head pain at worst is no greater than 3/10 to reduce sleep disturbances  2   Patient's cervical ROM improve to "minimal restriction" all motions tested to allow for function ROM with ADL's and driving  3   Patient's shoulder/cervical MMT improve by 1/2-1 muscle grade to allow for completion of over head activities without difficulty  LTG: Achieve in 6-12 weeks  1  Patient tolerate ADL's and house work  without neck pain > 2/10 to achieve their personal therapy goal   2   Patient's strength at cervical spine and shoulders improve to WNL to allow completion of leisure activities  3    Patient to be independent with home exercise plan at time of discharge  4   Patient's FOTO score improve to > 52% to indicate a return to normal function  Plan  Plan details: RE-ASSESS 1X/MONTH  Patient would benefit from: skilled physical therapy  Planned modality interventions: TENS, cryotherapy, thermotherapy: hydrocollator packs, ultrasound and traction  Other planned modality interventions: IAS  Planned therapy interventions: joint mobilization, manual therapy, massage, neuromuscular re-education, patient education, postural training, self care, strengthening, stretching, therapeutic activities, therapeutic exercise, home exercise program, abdominal trunk stabilization, activity modification, balance and body mechanics training  Frequency: 1-3x/wk  Duration in weeks: 12  Plan of Care beginning date: 8/31/2021  Plan of Care expiration date: 11/30/2021  Treatment plan discussed with: PTA and patient        Subjective Evaluation    History of Present Illness  Date of onset: 8/3/2021  Mechanism of injury: Jess Homans is a 66 y o  female that presents to outpatient physical therapy with complaints of B/L neck pain, B/L arm pain to the elbows, low back pain, and headaches    The patient reports onset of this pain 3-4 weeks ago with no MELO   The patient initially saw orthopedics who felt the pain was neck related which prompted the patient to return to pain management  The patient consulted with with pain management who changed the neurontin dose and referred the patient to outpatient PT  The patient notes almost all activity provokes her B/L neck and shoulder pain  The patient 's pain is less when sitting at rest   The patient's main goal for physical therapy is to not have pain in the neck and the arms so that she can perform ADL's without pain  Patient has had a Hx of neck pain but never pain radiating down the arms  Patient notes she is taking increased neurontin: 600mg in AM, 300 mg in afternoon, 600 mg at night          Recurrent probem    Pain  Current pain ratin  At best pain ratin  At worst pain ratin  Location: B/L c-spine and B/L UE to level of elbows  Quality: sharp  Relieving factors: rest  Aggravating factors: lifting, keyboarding and overhead activity  Progression: worsening    Social Support  Steps to enter house: yes  Stairs in house: yes   Lives in: multiple-level home  Lives with: spouse    Employment status: not working  Hand dominance: right    Treatments  Previous treatment: medication  Current treatment: physical therapy  Patient Goals  Patient goals for therapy: decreased pain, increased motion, increased strength, independence with ADLs/IADLs and return to sport/leisure activities  Patient goal: improve ADL's without pain        Objective     Concurrent Complaints  Positive for night pain, disturbed sleep and headaches (daily)   Negative for dizziness, faints, nausea/motion sickness, tinnitus, trouble swallowing, difficulty breathing, shortness of breath, respiratory pain, visual change, history of cancer, history of trauma and infection    Additional Special Questions  Patient instructed to contact her physician if night pain worsens    Static Posture     Head  Tilted left     Shoulders  Rounded  Thoracic Spine  Hyperkyphosis  Lumbar Spine   Flattened  Palpation   Left   Tenderness of the cervical paraspinals, deltoid, middle trapezius, pectoralis major, sternocleidomastoid and upper trapezius  Right   Tenderness of the cervical paraspinals, deltoid, middle trapezius, pectoralis major, sternocleidomastoid and upper trapezius  Additional Palpation Details  Diffuse muscle tenderness B/L    Tenderness     Left Shoulder   Tenderness in the clavicle  Right Shoulder  Tenderness in the clavicle       Neurological Testing     Sensation   Cervical/Thoracic   Left   Intact: light touch    Right   Intact: light touch    Reflexes   Left   Ortega's reflex: negative    Right   Ortega's reflex: negative    Active Range of Motion   Cervical/Thoracic Spine       Cervical  Subcranial protraction:   Restriction level: moderate  Subcranial retraction:   Restriction level: maximal  Flexion:  Restriction level: moderate  Extension:  Restriction level: maximal  Left lateral flexion:  Restriction level: maximal  Right lateral flexion:  Restriction level maximal  Left rotation:  Restriction level: maximal  Right rotation:  Restriction level: moderate    Strength/Myotome Testing   Cervical Spine   Neck extension: 4-  Neck flexion: 3+    Left   Interossei strength (t1): 5  Neck lateral flexion (C3): 4-    Right   Interossei strength (t1): 5  Neck lateral flexion (C3): 4-    Left Shoulder     Planes of Motion   Flexion: 4   External rotation at 0°: 4+   Internal rotation at 0°: 5     Right Shoulder     Planes of Motion   Flexion: 4   External rotation at 0°: 4+   Internal rotation at 0°: 5     Left Elbow   Flexion: 4+  Extension: 4+    Right Elbow   Flexion: 4+  Extension: 4+    Left Wrist/Hand   Wrist extension: 4+  Wrist flexion: 4+  Thumb extension: 5    Right Wrist/Hand   Wrist extension: 4+  Wrist flexion: 4+  Thumb extension: 5    Tests   Cervical   Negative Sharp-Flavia test  Left   Negative Spurling's Test A  Right   Negative Spurling's Test A  Left Shoulder   Positive ULTT1  Right Shoulder   Positive ULTT1  Additional Tests Details  FOTO: 32% ( predicted 52%)    Neuro Exam:     Headaches   Patient reports headaches: Yes (daily)  Precautions: DEPRESSION/ANXIETY  RE: 9/28    Manual   8/31       Massage B/L upper trapezius         Manual C-spine Traction  *      Sub Occipital Release  *      Seated chin tuck with clinician O P                    Therapeutic Exercise 8/31       UBE stand ALT  *      Nu-step   S=9                Bridges        Corner Pec stretch                        Cervical snag extension and rotation  *                      Levator Scapulae stretch B/L          B/L Upper Trap stretch        Sit T-spine Extensions  *      Supine Pectoralis Minor stretch  *              Neuro Re-Ed        Abdominal Hollow  *      Kegels  *      Chin Tucks 10x no O P   x5 clin O P   x5 self O P  Chin tuck lifts  *      Towel roll education/ sleep positioning Done AD       Quad DLS        MTP/LTP/ antirotation        Chin tuck + cervical EXT 10x       Posture correction  *      Prone chin Tuck        Therapeutic Activity        Crate lifts        Chair squats        Crate carry            Modalities 8/31       MHP/CP UT PRN                                   The patient was given a new home exercise plan with written handout, pictures, and verbal instruction    The patient accepts and understands the new home activities

## 2021-08-31 ENCOUNTER — EVALUATION (OUTPATIENT)
Dept: PHYSICAL THERAPY | Facility: CLINIC | Age: 71
End: 2021-08-31
Payer: MEDICARE

## 2021-08-31 DIAGNOSIS — M54.12 CERVICAL RADICULITIS: Primary | ICD-10-CM

## 2021-08-31 DIAGNOSIS — R29.3 POOR POSTURE: ICD-10-CM

## 2021-08-31 PROCEDURE — 97162 PT EVAL MOD COMPLEX 30 MIN: CPT | Performed by: PHYSICAL THERAPIST

## 2021-08-31 PROCEDURE — 97112 NEUROMUSCULAR REEDUCATION: CPT | Performed by: PHYSICAL THERAPIST

## 2021-08-31 NOTE — LETTER
2021    [place] [place] [address]    Patient: Obdulio Herzog   YOB: 1950   Date of Visit: 2021     Encounter Diagnosis     ICD-10-CM    1  Cervical radiculitis  M54 12    2  Poor posture  R29 3        Dear Dr Smita Prabhakarore: Thank you for your recent referral of Obdulio Herzog  Please review the attached evaluation summary from Sharon's recent visit  Please verify that you agree with the plan of care by signing the attached order  If you have any questions or concerns, please do not hesitate to call  I sincerely appreciate the opportunity to share in the care of one of your patients and hope to have another opportunity to work with you in the near future  Sincerely,    Sneha Correa, PT      Referring Provider:      I certify that I have read the below Plan of Care and certify the need for these services furnished under this plan of treatment while under my care  LOTUS Coon  [address] [place] [address]  Via Fax: [phone]          PT Evaluation     Today's date: 2021  Patient name: Obdulio Herzog  : 1950  MRN: [id number]  Referring provider: LOTUS Hess  Dx:   Encounter Diagnosis     ICD-10-CM    1  Cervical radiculitis  M54 12    2  Poor posture  R29 3                   Assessment  Assessment details: Obdulio Herzog is a 66 y o  female with a history of anxiety, arthritis, depression, GERD, HTN, hyperlipidemia, BMI>30 that presents for a moderate complexity physical therapy initial evaluation  The patient demonstrates signs and symptoms consistent with cervical radiculitis, poor posture  During the examination the patient demonstrated decreased strength, decreased ROM, gait dysfunction, and pain    The patient's impairments are causing the following functional limitations: Difficulty lifting/carrying objects heavier than 10 pounds, difficulty reaching behind back, difficulty reaching above head, difficulty looking over head, difficulty turning is head to drive, difficulty with house work ie ) vacuuming, difficulty lying supine, and difficulty donning/doffing a shirt  The patient's clinical presentation is evolving due to a number of participation restrictions, significant medial history, and functional limitation (FOTO 32% function)  The patient will benefit from skilled PT services to address impairments, work towards goals, and restore PLOF  Impairments: abnormal or restricted ROM, activity intolerance, impaired balance, impaired physical strength, lacks appropriate home exercise program, pain with function and poor posture   Functional limitations: Difficulty lifting/carrying objects heavier than 10 pounds, difficulty reaching behind back, difficulty reaching above head, difficulty looking over head, difficulty turning is head to drive, difficulty with house work ie ) vacuuming, difficulty lying supine, and difficulty donning/doffing a shirt  Symptom irritability: moderateUnderstanding of Dx/Px/POC: fair   Prognosis: fair    Goals  STG: Achieve in 4-6 weeks  1  Patient's cervical/head pain at worst is no greater than 3/10 to reduce sleep disturbances  2   Patient's cervical ROM improve to "minimal restriction" all motions tested to allow for function ROM with ADL's and driving  3   Patient's shoulder/cervical MMT improve by 1/2-1 muscle grade to allow for completion of over head activities without difficulty  LTG: Achieve in 6-12 weeks  1  Patient tolerate ADL's and house work  without neck pain > 2/10 to achieve their personal therapy goal   2   Patient's strength at cervical spine and shoulders improve to WNL to allow completion of leisure activities  3    Patient to be independent with home exercise plan at time of discharge  4   Patient's FOTO score improve to > 52% to indicate a return to normal function          Plan  Plan details: RE-ASSESS 1X/MONTH  Patient would benefit from: skilled physical therapy  Planned modality interventions: TENS, cryotherapy, thermotherapy: hydrocollator packs, ultrasound and traction  Other planned modality interventions: IASTM  Planned therapy interventions: joint mobilization, manual therapy, massage, neuromuscular re-education, patient education, postural training, self care, strengthening, stretching, therapeutic activities, therapeutic exercise, home exercise program, abdominal trunk stabilization, activity modification, balance and body mechanics training  Frequency: 1-3x/wk  Duration in weeks: 12  Plan of Care beginning date: 2021  Plan of Care expiration date: 2021  Treatment plan discussed with: PTA and patient        Subjective Evaluation    History of Present Illness  Date of onset: 8/3/2021  Mechanism of injury: Curt Roman is a 66 y o  female that presents to outpatient physical therapy with complaints of B/L neck pain, B/L arm pain to the elbows, low back pain, and headaches  The patient reports onset of this pain 3-4 weeks ago with no MELO  The patient initially saw orthopedics who felt the pain was neck related which prompted the patient to return to pain management  The patient consulted with with pain management who changed the neurontin dose and referred the patient to outpatient PT  The patient notes almost all activity provokes her B/L neck and shoulder pain  The patient 's pain is less when sitting at rest   The patient's main goal for physical therapy is to not have pain in the neck and the arms so that she can perform ADL's without pain  Patient has had a Hx of neck pain but never pain radiating down the arms     Patient notes she is taking increased neurontin: 600mg in AM, 300 mg in afternoon, 600 mg at night          Recurrent probem    Pain  Current pain ratin  At best pain ratin  At worst pain ratin  Location: B/L c-spine and B/L UE to level of elbows  Quality: sharp  Relieving factors: rest  Aggravating factors: lifting, keyboarding and overhead activity  Progression: worsening    Social Support  Steps to enter house: yes  Stairs in house: yes   Lives in: multiple-level home  Lives with: spouse    Employment status: not working  Hand dominance: right    Treatments  Previous treatment: medication  Current treatment: physical therapy  Patient Goals  Patient goals for therapy: decreased pain, increased motion, increased strength, independence with ADLs/IADLs and return to sport/leisure activities  Patient goal: improve ADL's without pain        Objective     Concurrent Complaints  Positive for night pain, disturbed sleep and headaches (daily)  Negative for dizziness, faints, nausea/motion sickness, tinnitus, trouble swallowing, difficulty breathing, shortness of breath, respiratory pain, visual change, history of cancer, history of trauma and infection    Additional Special Questions  Patient instructed to contact her physician if night pain worsens    Static Posture     Head  Tilted left  Shoulders  Rounded  Thoracic Spine  Hyperkyphosis  Lumbar Spine   Flattened  Palpation   Left   Tenderness of the cervical paraspinals, deltoid, middle trapezius, pectoralis major, sternocleidomastoid and upper trapezius  Right   Tenderness of the cervical paraspinals, deltoid, middle trapezius, pectoralis major, sternocleidomastoid and upper trapezius  Additional Palpation Details  Diffuse muscle tenderness B/L    Tenderness     Left Shoulder   Tenderness in the clavicle  Right Shoulder  Tenderness in the clavicle       Neurological Testing     Sensation   Cervical/Thoracic   Left   Intact: light touch    Right   Intact: light touch    Reflexes   Left   Ortega's reflex: negative    Right   Ortega's reflex: negative    Active Range of Motion   Cervical/Thoracic Spine       Cervical  Subcranial protraction:   Restriction level: moderate  Subcranial retraction:   Restriction level: maximal  Flexion:  Restriction level: moderate  Extension:  Restriction level: maximal  Left lateral flexion:  Restriction level: maximal  Right lateral flexion:  Restriction level maximal  Left rotation:  Restriction level: maximal  Right rotation:  Restriction level: moderate    Strength/Myotome Testing   Cervical Spine   Neck extension: 4-  Neck flexion: 3+    Left   Interossei strength (t1): 5  Neck lateral flexion (C3): 4-    Right   Interossei strength (t1): 5  Neck lateral flexion (C3): 4-    Left Shoulder     Planes of Motion   Flexion: 4   External rotation at 0°: 4+   Internal rotation at 0°: 5     Right Shoulder     Planes of Motion   Flexion: 4   External rotation at 0°: 4+   Internal rotation at 0°: 5     Left Elbow   Flexion: 4+  Extension: 4+    Right Elbow   Flexion: 4+  Extension: 4+    Left Wrist/Hand   Wrist extension: 4+  Wrist flexion: 4+  Thumb extension: 5    Right Wrist/Hand   Wrist extension: 4+  Wrist flexion: 4+  Thumb extension: 5    Tests   Cervical   Negative Sharp-Flavia test      Left   Negative Spurling's Test A  Right   Negative Spurling's Test A  Left Shoulder   Positive ULTT1  Right Shoulder   Positive ULTT1  Additional Tests Details  FOTO: 32% ( predicted 52%)    Neuro Exam:     Headaches   Patient reports headaches: Yes (daily)                  Precautions: DEPRESSION/ANXIETY  RE: 9/28    Manual   8/31       Massage B/L upper trapezius         Manual C-spine Traction  *      Sub Occipital Release  *      Seated chin tuck with clinician O P                    Therapeutic Exercise 8/31       UBE stand ALT  *      Nu-step   S=9                Bridges        Corner Pec stretch                        Cervical snag extension and rotation  *                      Levator Scapulae stretch B/L          B/L Upper Trap stretch        Sit T-spine Extensions  *      Supine Pectoralis Minor stretch  *              Neuro Re-Ed        Abdominal Hollow  *      Kegels  *      Chin Tucks 10x no O P   x5 clin O P   x5 self O P  Chin tuck lifts  *      Towel roll education/ sleep positioning Done AD       Quad DLS        MTP/LTP/ antirotation        Chin tuck + cervical EXT 10x       Posture correction  *      Prone chin Tuck        Therapeutic Activity        Crate lifts        Chair squats        Crate carry            Modalities 8/31       MHP/CP UT PRN                                   The patient was given a new home exercise plan with written handout, pictures, and verbal instruction    The patient accepts and understands the new home activities

## 2021-09-08 ENCOUNTER — OFFICE VISIT (OUTPATIENT)
Dept: PHYSICAL THERAPY | Facility: CLINIC | Age: 71
End: 2021-09-08
Payer: MEDICARE

## 2021-09-08 DIAGNOSIS — R29.3 POOR POSTURE: ICD-10-CM

## 2021-09-08 DIAGNOSIS — M54.12 CERVICAL RADICULITIS: Primary | ICD-10-CM

## 2021-09-08 PROCEDURE — 97140 MANUAL THERAPY 1/> REGIONS: CPT

## 2021-09-08 PROCEDURE — 97110 THERAPEUTIC EXERCISES: CPT

## 2021-09-08 PROCEDURE — 97112 NEUROMUSCULAR REEDUCATION: CPT

## 2021-09-08 NOTE — PROGRESS NOTES
Daily Note     Today's date: 2021  Patient name: Ivy Williamson  : 1950  MRN: [id number]  Referring provider: LOTUS Arguelles  Dx:   Encounter Diagnosis     ICD-10-CM    1  Cervical radiculitis  M54 12    2  Poor posture  R29 3        Start Time: 941          Subjective: Patient states she was feeling very good and over did it yesterday  She was lifting and carrying things which made her sore  She was not able to sleep well  She rated her pain a 9/10 in the neck and shoulders today  Objective: See treatment diary below    Patient's home exercise program updated to include additional exercises  Handout issued and explained with demonstration  Patient accepts exercises  Assessment: Tolerated treatment well  Patient would benefit from continued PT for stretching and strengthening  Patient was able to add exercises to her program  She liked the new manual part of the therapy  She felt better by the end of the session and rated her pain a 4/10  She seemed to understand all education with new exercises  Plan: Continue per plan of care  Progress treatment as tolerated           Precautions: DEPRESSION/ANXIETY  RE:     Manual         Massage B/L upper trapezius         Manual C-spine Traction  *:15x6      Sub Occipital Release  *2x 1 min      Seated chin tuck with clinician O P                    Therapeutic Exercise       UBE stand ALT  *90/70 x2 min      Nu-step   S=9                Bridges        Corner Pec stretch                        Cervical snag extension and rotation  *x10 ea                      Levator Scapulae stretch B/L          B/L Upper Trap stretch        Sit T-spine Extensions  *:05x10      Supine Pectoralis Minor stretch  *:30x3              Neuro Re-Ed        Abdominal Hollow  *:05x10      Kegels  *:05x10      Chin Tucks 10x no O P   x5 clin O P   x5 self O P  x10 no OP  x10 self OP      Chin tuck lifts  *:03x5      Towel roll education/ sleep positioning Done AD       Quad DLS        MTP/LTP/ antirotation        Chin tuck + cervical EXT 10x       Posture correction  *:10x10      Prone chin Tuck        Therapeutic Activity        Crate lifts        Chair squats        Crate carry            Modalities 8/31 9/8      MHP/CP UT PRN

## 2021-09-10 ENCOUNTER — OFFICE VISIT (OUTPATIENT)
Dept: PHYSICAL THERAPY | Facility: CLINIC | Age: 71
End: 2021-09-10
Payer: MEDICARE

## 2021-09-10 DIAGNOSIS — R29.3 POOR POSTURE: ICD-10-CM

## 2021-09-10 DIAGNOSIS — M54.12 CERVICAL RADICULITIS: Primary | ICD-10-CM

## 2021-09-10 PROCEDURE — 97110 THERAPEUTIC EXERCISES: CPT | Performed by: PHYSICAL THERAPIST

## 2021-09-10 PROCEDURE — 97140 MANUAL THERAPY 1/> REGIONS: CPT | Performed by: PHYSICAL THERAPIST

## 2021-09-10 PROCEDURE — 97112 NEUROMUSCULAR REEDUCATION: CPT | Performed by: PHYSICAL THERAPIST

## 2021-09-14 ENCOUNTER — OFFICE VISIT (OUTPATIENT)
Dept: PHYSICAL THERAPY | Facility: CLINIC | Age: 71
End: 2021-09-14
Payer: MEDICARE

## 2021-09-14 DIAGNOSIS — M54.12 CERVICAL RADICULITIS: Primary | ICD-10-CM

## 2021-09-14 DIAGNOSIS — R29.3 POOR POSTURE: ICD-10-CM

## 2021-09-14 PROCEDURE — 97140 MANUAL THERAPY 1/> REGIONS: CPT

## 2021-09-14 PROCEDURE — 97110 THERAPEUTIC EXERCISES: CPT

## 2021-09-14 PROCEDURE — 97112 NEUROMUSCULAR REEDUCATION: CPT

## 2021-09-14 NOTE — PROGRESS NOTES
Daily Note     Today's date: 2021  Patient name: Merlinda Bush  : 1950  MRN: [id number]  Referring provider: LOTUS Harden  Dx:   Encounter Diagnosis     ICD-10-CM    1  Cervical radiculitis  M54 12    2  Poor posture  R29 3        Start Time: 831          Subjective: Patient states her neck is starting to feels "pretty good"  Her pain is a 3/10  She noticed her low back has been bothering her more which is a 6/10  She reports doing her exercises at home  Objective: See treatment diary below    Patient's home exercise program updated to include additional exercises  Handout issued and explained with demonstration  Patient accepts exercises  Assessment: Tolerated treatment well  Patient would benefit from continued PT for stretching and strengthening  Patient was able to add exercises to her program with little difficulty and discomfort  Patient seemed to understand all education given on new exercises  Patient felt "pretty good"  Her back was better and less painful and her neck had no pain by the end of the session  Plan: Continue per plan of care  Potential discharge next visit         Precautions: DEPRESSION/ANXIETY  RE:     Manual   8/31 9/8 9/10 9/14    Massage B/L upper trapezius         Manual C-spine Traction  *:15x6 :15 x10 :15x6    Sub Occipital Release  *2x 1 min 2x 1 min 2x1 min    Seated chin tuck with clinician O P                    Therapeutic Exercise 8/31 9/8 9/10 9/14    UBE stand ALT  *90/70 x2 min 90/70 x 4 mins 90/70 x4 min    Nu-step   S=9                Bridges    *:03x10    Corner Pec stretch    *doorway :30x3                    Cervical snag extension and rotation  *x10 ea 10x ea x10 ea                    Levator Scapulae stretch B/L      *:30x3    B/L Upper Trap stretch    *:30x3    Sit T-spine Extensions  *:05x10 10x:05 x10    Supine Pectoralis Minor stretch  *:30x3 4x:30 :30x3            Neuro Re-Ed        Abdominal Hollow  *:05x10 x15 x15 Zainab  *:05x10 x15 x15    Chin Tucks 10x no O P   x5 clin O P   x5 self O P  x10 no OP  x10 self OP 10x no O P   x10 self O P  x10 O  P      Chin tuck lifts  *:03x5 x5 x5    Towel roll education/ sleep positioning Done AD       Quad DLS        MTP/LTP/ antirotation    *MTP/LTP  Red x10 ea    Chin tuck + cervical EXT 10x  10x x10    Posture correction  *:10x10 10x:10 :10x10    Prone chin Tuck        Therapeutic Activity        Crate lifts        Chair squats        Crate carry            Modalities 8/31 9/8 9/10 9/14    MHP/CP UT PRN

## 2021-09-16 ENCOUNTER — OFFICE VISIT (OUTPATIENT)
Dept: PHYSICAL THERAPY | Facility: CLINIC | Age: 71
End: 2021-09-16
Payer: MEDICARE

## 2021-09-16 DIAGNOSIS — M54.12 CERVICAL RADICULITIS: Primary | ICD-10-CM

## 2021-09-16 DIAGNOSIS — R29.3 POOR POSTURE: ICD-10-CM

## 2021-09-16 PROCEDURE — 97112 NEUROMUSCULAR REEDUCATION: CPT | Performed by: PHYSICAL THERAPIST

## 2021-09-16 PROCEDURE — 97140 MANUAL THERAPY 1/> REGIONS: CPT | Performed by: PHYSICAL THERAPIST

## 2021-09-16 PROCEDURE — 97110 THERAPEUTIC EXERCISES: CPT | Performed by: PHYSICAL THERAPIST

## 2021-09-16 NOTE — PROGRESS NOTES
Daily Note     Today's date: 2021  Patient name: Vipin Barker  : 1950  MRN: [id number]  Referring provider: LOTUS Valdez  Dx:   Encounter Diagnosis     ICD-10-CM    1  Cervical radiculitis  M54 12    2  Poor posture  R29 3                   Subjective: The patient rates her pain a 3/10 midline lower cervical      Objective: See treatment diary below      Assessment: The patient tolerated all activities well today  The patient needed verbal cues to perform the exercises properly  There were no complaints of increased pain or problems after the session today  We progressed her chin tuck + EXT exercises to add the wiggle today  The patient will benefit from continued skilled physical therapy to progress towards achieving patient centered goals  Plan: Continue per plan of care  Progress treatment as tolerated  Precautions: DEPRESSION/ANXIETY  RE:     Manual   9/8 9/10 9/14 9/16   Massage B/L upper trapezius        Manual C-spine Traction  *:15x6 :15 x10 :15x6 :15 x 6   Sub Occipital Release  *2x 1 min 2x 1 min 2x1 min 2x1min   Seated chin tuck with clinician O P        Gentle c-spine mobs     Grade 2-3 lateral done AD       Therapeutic Exercise  9/8 9/10 9/14 9/16   UBE stand ALT  *90/70 x2 min 90/70 x 4 mins 90/70 x4 min    Nu-step   S=10  A=11     L2 x 4 mins           Bridges    *:03x10    Corner Pec stretch    *doorway :30x3                    Cervical snag extension and rotation  *x10 ea 10x ea x10 ea 10xea                   Levator Scapulae stretch B/L DC   *:30x3 1x:30p! DC   B/L Upper Trap stretch    *:30x3 1x:30   Sit T-spine Extensions  *:05x10 10x:05 x10 x10   Supine Pectoralis Minor stretch  *:30x3 4x:30 :30x3 :30 x3           Neuro Re-Ed        Abdominal Hollow  *:05x10 x15 x15 x15   Kegels  *:05x10 x15 x15 x15   Chin Tucks  x10 no OP  x10 self OP 10x no O P   x10 self O P  x10 O  P  x10 no O P   x10 w O P     Chin tuck lifts  *:03x5 x5 x5 NV   Towel roll education/ sleep positioning        Quad DLS        MTP/LTP/ antirotation    *MTP/LTP  Red x10 ea MTP/LTP RED x15 ea   Chin tuck + cervical EXT   10x x10 x10  x10 w/ wiggle   Posture correction  *:10x10 10x:10 :10x10 :10 x10   Prone chin Tuck        Therapeutic Activity        Crate lifts        Chair squats        Crate carry            Modalities 8/31 9/8 9/10 9/14    MHP/CP UT PRN

## 2021-09-21 ENCOUNTER — OFFICE VISIT (OUTPATIENT)
Dept: PHYSICAL THERAPY | Facility: CLINIC | Age: 71
End: 2021-09-21
Payer: MEDICARE

## 2021-09-21 DIAGNOSIS — M54.12 CERVICAL RADICULITIS: Primary | ICD-10-CM

## 2021-09-21 DIAGNOSIS — R29.3 POOR POSTURE: ICD-10-CM

## 2021-09-21 PROCEDURE — 97110 THERAPEUTIC EXERCISES: CPT

## 2021-09-21 PROCEDURE — 97140 MANUAL THERAPY 1/> REGIONS: CPT

## 2021-09-21 PROCEDURE — 97112 NEUROMUSCULAR REEDUCATION: CPT

## 2021-09-21 NOTE — PROGRESS NOTES
Daily Note     Today's date: 2021  Patient name: Gilma Coleman  : 1950  MRN: [id number]  Referring provider: LOTUS Stanley  Dx:   Encounter Diagnosis     ICD-10-CM    1  Cervical radiculitis  M54 12    2  Poor posture  R29 3        Start Time: 4763          Subjective: Patient states she feels pretty good today  Her neck pain is only a 1-2/10  Objective: See treatment diary below      Assessment: Tolerated treatment well  Patient would benefit from continued PT for stretching and strengthening  Patient was able to increase some of her exercises without difficulty or pain  Patient needed a few verbal cues to perform her exercises correctly  Patient felt good by the end of the session and without pain  Plan: Continue per plan of care  Progress treatment as tolerated  Precautions: DEPRESSION/ANXIETY  RE:     Manual  9/21  9/10 9/14 9/16   Massage B/L upper trapezius        Manual C-spine Traction :15x6  :15 x10 :15x6 :15 x 6   Sub Occipital Release 2x1 min  2x 1 min 2x1 min 2x1min   Seated chin tuck with clinician O P        Gentle c-spine mobs     Grade 2-3 lateral done AD       Therapeutic Exercise 9/21  9/10 9/14 9/16   UBE stand ALT   90/70 x 4 mins 90/70 x4 min    Nu-step   S=10  A=11 L2 x5 min    L2 x 4 mins           Bridges    *:03x10    Corner Pec stretch    *doorway :30x3                    Cervical snag extension and rotation x10 ea  10x ea x10 ea 10xea                   Levator Scapulae stretch B/L DC   *:30x3 1x:30p! DC   B/L Upper Trap stretch :30x1   *:30x3 1x:30   Sit T-spine Extensions x10  10x:05 x10 x10   Supine Pectoralis Minor stretch :30x3  4x:30 :30x3 :30 x3           Neuro Re-Ed        Abdominal Hollow x15  x15 x15 x15   Kegels x15  x15 x15 x15   Chin Tucks x10 no O P   x10 w O P   10x no O P   x10 self O P  x10 O  P  x10 no O P   x10 w O P     Chin tuck lifts x5  x5 x5 NV   Towel roll education/ sleep positioning        Quad DLS        MTP/LTP/ antirotation MTP/LTP RED x20 ea   *MTP/LTP  Red x10 ea MTP/LTP RED x15 ea   Chin tuck + cervical EXT x10  x10 w/ wiggle  10x x10 x10  x10 w/ wiggle   Posture correction :10x10  10x:10 :10x10 :10 x10   Prone chin Tuck        Therapeutic Activity        Crate lifts        Chair squats        Crate carry            Modalities   9/10 9/14    MHP/CP UT

## 2021-09-23 ENCOUNTER — OFFICE VISIT (OUTPATIENT)
Dept: PHYSICAL THERAPY | Facility: CLINIC | Age: 71
End: 2021-09-23
Payer: MEDICARE

## 2021-09-23 DIAGNOSIS — M54.12 CERVICAL RADICULITIS: Primary | ICD-10-CM

## 2021-09-23 DIAGNOSIS — R29.3 POOR POSTURE: ICD-10-CM

## 2021-09-23 PROCEDURE — 97112 NEUROMUSCULAR REEDUCATION: CPT

## 2021-09-23 PROCEDURE — 97110 THERAPEUTIC EXERCISES: CPT

## 2021-09-23 PROCEDURE — 97140 MANUAL THERAPY 1/> REGIONS: CPT

## 2021-09-23 NOTE — PROGRESS NOTES
Daily Note     Today's date: 2021  Patient name: Mehreen Mcmanus  : 1950  MRN: [id number]  Referring provider: LOTUS Chavez  Dx:   Encounter Diagnosis     ICD-10-CM    1  Cervical radiculitis  M54 12    2  Poor posture  R29 3        Start Time:           Subjective: Patient states she fell yesterday because she tripped over a box  She is a little sore today in the left hip and 4/10 pain in the left side of neck  Since she fell on her left side, she knew she would be sore  She did not go to the ER and she did not think she needed to go since she is feeling better today and she did not hit her head during the fall  She feels, except for the fall, she is doing good with her exercises at home and is thinking she will be done with therapy after next visit  Objective: See treatment diary below    Encouraged patient to continue with home exercises to keep neck losser  Assessment: Tolerated treatment well  Patient would benefit from continued PT for stretching and strengthening  Patient performed her exercises with little discomfort and no difficulty  Patient felt better by the end of the session and rated her pain a 1/10 in the left cervical area  Plan: Continue per plan of care  Progress note during next visit  Potential discharge next visit         Precautions: DEPRESSION/ANXIETY  RE:     Manual     Massage B/L upper trapezius        Manual C-spine Traction :15x6 :15x6  :15x6 :15 x 6   Sub Occipital Release 2x1 min 2x 1 min  2x1 min 2x1min   Seated chin tuck with clinician O P        Gentle c-spine mobs     Grade 2-3 lateral done AD       Therapeutic Exercise    UBE stand ALT    90/70 x4 min    Nu-step   S=10  A=11 L2 x5 min L2 x8 min   L2 x 4 mins           Bridges    *:03x10    Corner Pec stretch    *doorway :30x3                    Cervical snag extension and rotation x10 ea x10 ea  x10 ea 10xea                   Levator Scapulae stretch B/L DC   *:30x3 1x:30p! DC   B/L Upper Trap stretch :30x1 :30x1  *:30x3 1x:30   Sit T-spine Extensions x10 x10  x10 x10   Supine Pectoralis Minor stretch :30x3 :30x3  :30x3 :30 x3           Neuro Re-Ed        Abdominal Hollow x15 x15  x15 x15   Kegels x15 x15  x15 x15   Chin Tucks x10 no O P   x10 w O P  x10 no O P   x10 w O P   x10 O  P  x10 no O P   x10 w O P     Chin tuck lifts x5 x3  x5 NV   Towel roll education/ sleep positioning        Quad DLS        MTP/LTP/ antirotation MTP/LTP RED x20 ea MTP/LTP RED x20 ea  *MTP/LTP  Red x10 ea MTP/LTP RED x15 ea   Chin tuck + cervical EXT x10  x10 w/ wiggle x10  x3 w/ wiggle  x10 x10  x10 w/ wiggle   Posture correction :10x10 :10x10  :10x10 :10 x10   Prone chin Tuck        Therapeutic Activity        Crate lifts        Chair squats        Crate carry            Modalities    9/14    MHP/CP UT

## 2021-09-26 NOTE — PROGRESS NOTES
PT Re-Evaluation  and PT Discharge    Today's date: 2021  Patient name: Nighat Butler  : 1950  MRN: [id number]  Referring provider: LOTUS Edwards  Dx:   Encounter Diagnosis     ICD-10-CM    1  Cervical radiculitis  M54 12    2  Poor posture  R29 3                   Assessment  Assessment details: Nighat Butler is a 66 y o  female with a history of anxiety, arthritis, depression, GERD, HTN, hyperlipidemia, BMI>30 that presents for a physical therapy RE evaluation/ DISCHARGE  The patient attended 8 sessions of physical therapy  The patient demonstrates signs and symptoms consistent with cervical radiculitis, poor posture  During the examination the patient demonstrated improved core/postural strength, improved cervical ROM, improved activity tolerance, and decreased cervical pain  The patient has made functional gains since starting therapy  The patient is now able to perform most ADL's and house hold chores without cervical pain  The patient notes her headache frequency is down to 1x/wk versus daily headaches  The patient will be discharged from formal PT to an independent HEP - she has achieved most of her therapy goals at this time  Symptom irritability: moderateUnderstanding of Dx/Px/POC: fair   Prognosis: fair    Goals  STG: Achieve in 4-6 weeks  1  Patient's cervical/head pain at worst is no greater than 3/10 to reduce sleep disturbances  MET   2  Patient's cervical ROM improve to "minimal restriction" all motions tested to allow for function ROM with ADL's and driving  PARTIALLY MET   3  Patient's shoulder/cervical MMT improve by 1/2-1 muscle grade to allow for completion of over head activities without difficulty  MET     LTG: Achieve in 6-12 weeks  1  Patient tolerate ADL's and house work  without neck pain > 2/10 to achieve their personal therapy goal  MET   2    Patient's strength at cervical spine and shoulders improve to WNL to allow completion of leisure activities  PARTIALLY MET   3  Patient to be independent with home exercise plan at time of discharge  MET   4  Patient's FOTO score improve to > 52% to indicate a return to normal function  MET         Plan  Plan details: D/C PT TO AN INDEPENDENT HEP - PATIENT ACHIEVED HER GOALS OF THERAPY  Treatment plan discussed with: PTA and patient        Subjective Evaluation    History of Present Illness  Date of onset: 8/3/2021  Mechanism of injury: SUBJECTIVE: 2021: The patient reports significant improvement at her cervical spine  She notes when doing too much work at once does increase her cervical pain but she is able to manage this with performing her exercises  INJURY HISTORY: Douglas Parisi is a 66 y o  female that presents to outpatient physical therapy with complaints of B/L neck pain, B/L arm pain to the elbows, low back pain, and headaches  The patient reports onset of this pain 3-4 weeks ago with no MELO  The patient initially saw orthopedics who felt the pain was neck related which prompted the patient to return to pain management  The patient consulted with with pain management who changed the neurontin dose and referred the patient to outpatient PT  The patient notes almost all activity provokes her B/L neck and shoulder pain  The patient 's pain is less when sitting at rest   The patient's main goal for physical therapy is to not have pain in the neck and the arms so that she can perform ADL's without pain  Patient has had a Hx of neck pain but never pain radiating down the arms     Patient notes she is taking increased neurontin: 600mg in AM, 300 mg in afternoon, 600 mg at night          Recurrent probem    Pain  Current pain ratin  At best pain ratin  At worst pain ratin  Location: B/L c-spine and B/L L>R UE to level of shoulders  Quality: sharp and dull ache  Relieving factors: rest  Aggravating factors: lifting and overhead activity  Progression: improved    Social Support  Steps to enter house: yes  Stairs in house: yes   Lives in: multiple-level home  Lives with: spouse    Employment status: not working  Hand dominance: right    Patient Goals  Patient goal: improve ADL's without pain (MET)        Objective     Concurrent Complaints  Positive for night pain, disturbed sleep and headaches (1x/wk)  Negative for dizziness, faints, nausea/motion sickness, tinnitus, trouble swallowing, difficulty breathing, shortness of breath, respiratory pain, visual change, history of cancer, history of trauma and infection    Additional Special Questions  Patient instructed to contact her physician if night pain worsens -   9/28/2021 update - patient is still having sleep difficulty but now not neck related    Static Posture     Shoulders  Rounded  Thoracic Spine  Hyperkyphosis  Lumbar Spine   Flattened  Postural Observations  Seated posture: good  Standing posture: good  Correction of posture: makes symptoms better        Palpation   Left   Tenderness of the deltoid  Additional Palpation Details  Markedly decreased TTP    Tenderness     Left Shoulder   No tenderness in the clavicle  Right Shoulder  No tenderness in the clavicle       Additional Tenderness Details  (+) L acromion tenderness    Neurological Testing     Sensation   Cervical/Thoracic   Left   Intact: light touch    Right   Intact: light touch    Reflexes   Left   Ortega's reflex: negative    Right   Ortega's reflex: negative    Active Range of Motion   Cervical/Thoracic Spine       Cervical  Subcranial protraction:   Restriction level: minimal  Subcranial retraction:   Restriction level: moderate  Flexion:  Restriction level: minimal  Extension:  Restriction level: moderate  Left lateral flexion:  Restriction level: moderate  Right lateral flexion:  Restriction level moderate  Left rotation:  Restriction level: minimal  Right rotation:  Restriction level: minimal    Additional Active Range of Motion Details  SIGNIFICANT IMPROVEMENT    Strength/Myotome Testing   Cervical Spine   Neck extension: 4  Neck flexion: 4    Left   Interossei strength (t1): 5  Neck lateral flexion (C3): 4    Right   Interossei strength (t1): 5  Neck lateral flexion (C3): 4    Left Shoulder     Planes of Motion   Flexion: 4   External rotation at 0°: 4+   Internal rotation at 0°: 5     Right Shoulder     Planes of Motion   Flexion: 4   External rotation at 0°: 4+   Internal rotation at 0°: 5     Left Elbow   Flexion: 4+  Extension: 4+    Right Elbow   Flexion: 4+  Extension: 4+    Left Wrist/Hand   Wrist extension: 4+  Wrist flexion: 4+  Thumb extension: 5    Right Wrist/Hand   Wrist extension: 4+  Wrist flexion: 4+  Thumb extension: 5    Additional Strength Details  IMPROVED    Tests   Cervical   Negative Sharp-Flavia test      Left   Negative Spurling's Test A  Right   Negative Spurling's Test A  Left Shoulder   Negative ULTT1  Right Shoulder   Negative ULTT1  Additional Tests Details  FOTO: 53% ( predicted 52%)    Neuro Exam:     Headaches   Patient reports headaches: Yes (1x/wk)  Precautions: DEPRESSION/ANXIETY  RE: 10/27    Manual  9/21 9/23 9/28 9/14 9/16   Massage B/L upper trapezius        Manual C-spine Traction :15x6 :15x6 :15 x 8 :15x6 :15 x 6   Sub Occipital Release 2x1 min 2x 1 min 3x1 min 2x1 min 2x1min   Seated chin tuck with clinician O P        Gentle c-spine mobs     Grade 2-3 lateral done AD       Therapeutic Exercise 9/21 9/23 9/28 9/14 9/16   UBE stand ALT    90/70 x4 min    Nu-step   S=10  A=11 L2 x5 min L2 x8 min L2 x 15 mins  L2 x 4 mins           Bridges    *:03x10    Corner Pec stretch    *doorway :30x3                    Cervical snag extension and rotation x10 ea x10 ea  x10 ea 10xea                   Levator Scapulae stretch B/L DC   *:30x3 1x:30p!  DC   B/L Upper Trap stretch :30x1 :30x1 reviewed *:30x3 1x:30   Sit T-spine Extensions x10 x10 reviewed x10 x10   Supine Pectoralis Minor stretch :30x3 :30x3  :30x3 :30 x3           Neuro Re-Ed        Abdominal Hollow x15 x15 reviewed x15 x15   Kegels x15 x15  x15 x15   Chin Tucks x10 no O P   x10 w O P  x10 no O P   x10 w O P  X5 NO O P   X5 W/ CLIN O P    x10 O  P  x10 no O P   x10 w O P  Chin tuck lifts x5 x3  x5 NV   Towel roll education/ sleep positioning        Quad DLS        MTP/LTP/ antirotation MTP/LTP RED x20 ea MTP/LTP RED x20 ea REVIEWED *MTP/LTP  Red x10 ea MTP/LTP RED x15 ea   Chin tuck + cervical EXT x10  x10 w/ wiggle x10  x3 w/ wiggle X10 x10 x10  x10 w/ wiggle   Posture correction :10x10 :10x10 reviewed :10x10 :10 x10   Prone chin Tuck        Therapeutic Activity        Crate lifts        Chair squats        Crate carry            Modalities    9/14    MHP/CP UT                                      The patient was given a new home exercise plan with written handout, pictures, and verbal instruction    The patient accepts and understands the new home activities

## 2021-09-28 ENCOUNTER — EVALUATION (OUTPATIENT)
Dept: PHYSICAL THERAPY | Facility: CLINIC | Age: 71
End: 2021-09-28
Payer: MEDICARE

## 2021-09-28 DIAGNOSIS — M54.12 CERVICAL RADICULITIS: Primary | ICD-10-CM

## 2021-09-28 DIAGNOSIS — R29.3 POOR POSTURE: ICD-10-CM

## 2021-09-28 PROCEDURE — 97110 THERAPEUTIC EXERCISES: CPT | Performed by: PHYSICAL THERAPIST

## 2021-09-28 PROCEDURE — 97112 NEUROMUSCULAR REEDUCATION: CPT | Performed by: PHYSICAL THERAPIST

## 2021-09-28 PROCEDURE — 97140 MANUAL THERAPY 1/> REGIONS: CPT | Performed by: PHYSICAL THERAPIST

## 2021-10-12 ENCOUNTER — EVALUATION (OUTPATIENT)
Dept: PHYSICAL THERAPY | Facility: CLINIC | Age: 71
End: 2021-10-12
Payer: MEDICARE

## 2021-10-12 DIAGNOSIS — M54.50 CHRONIC LOW BACK PAIN, UNSPECIFIED BACK PAIN LATERALITY, UNSPECIFIED WHETHER SCIATICA PRESENT: Primary | ICD-10-CM

## 2021-10-12 DIAGNOSIS — M51.36 DDD (DEGENERATIVE DISC DISEASE), LUMBAR: ICD-10-CM

## 2021-10-12 DIAGNOSIS — G89.29 CHRONIC LOW BACK PAIN, UNSPECIFIED BACK PAIN LATERALITY, UNSPECIFIED WHETHER SCIATICA PRESENT: Primary | ICD-10-CM

## 2021-10-12 PROCEDURE — 97162 PT EVAL MOD COMPLEX 30 MIN: CPT | Performed by: PHYSICAL THERAPIST

## 2021-10-12 PROCEDURE — 97110 THERAPEUTIC EXERCISES: CPT | Performed by: PHYSICAL THERAPIST

## 2021-10-13 ENCOUNTER — HOSPITAL ENCOUNTER (OUTPATIENT)
Dept: MRI IMAGING | Facility: HOSPITAL | Age: 71
Discharge: HOME | End: 2021-10-13
Attending: ANESTHESIOLOGY
Payer: MEDICARE

## 2021-10-13 DIAGNOSIS — M54.50 LOW BACK PAIN: ICD-10-CM

## 2021-10-13 PROCEDURE — G1004 CDSM NDSC: HCPCS

## 2021-10-13 PROCEDURE — 72148 MRI LUMBAR SPINE W/O DYE: CPT

## 2021-10-14 ENCOUNTER — OFFICE VISIT (OUTPATIENT)
Dept: PHYSICAL THERAPY | Facility: CLINIC | Age: 71
End: 2021-10-14
Payer: MEDICARE

## 2021-10-14 DIAGNOSIS — M51.36 DDD (DEGENERATIVE DISC DISEASE), LUMBAR: ICD-10-CM

## 2021-10-14 DIAGNOSIS — M54.12 CERVICAL RADICULITIS: ICD-10-CM

## 2021-10-14 DIAGNOSIS — G89.29 CHRONIC LOW BACK PAIN, UNSPECIFIED BACK PAIN LATERALITY, UNSPECIFIED WHETHER SCIATICA PRESENT: Primary | ICD-10-CM

## 2021-10-14 DIAGNOSIS — R29.3 POOR POSTURE: ICD-10-CM

## 2021-10-14 DIAGNOSIS — M54.50 CHRONIC LOW BACK PAIN, UNSPECIFIED BACK PAIN LATERALITY, UNSPECIFIED WHETHER SCIATICA PRESENT: Primary | ICD-10-CM

## 2021-10-14 PROCEDURE — 97112 NEUROMUSCULAR REEDUCATION: CPT

## 2021-10-14 PROCEDURE — 97110 THERAPEUTIC EXERCISES: CPT

## 2021-10-20 ENCOUNTER — OFFICE VISIT (OUTPATIENT)
Dept: PHYSICAL THERAPY | Facility: CLINIC | Age: 71
End: 2021-10-20
Payer: MEDICARE

## 2021-10-20 DIAGNOSIS — M51.36 DDD (DEGENERATIVE DISC DISEASE), LUMBAR: ICD-10-CM

## 2021-10-20 DIAGNOSIS — G89.29 CHRONIC LOW BACK PAIN, UNSPECIFIED BACK PAIN LATERALITY, UNSPECIFIED WHETHER SCIATICA PRESENT: Primary | ICD-10-CM

## 2021-10-20 DIAGNOSIS — M54.50 CHRONIC LOW BACK PAIN, UNSPECIFIED BACK PAIN LATERALITY, UNSPECIFIED WHETHER SCIATICA PRESENT: Primary | ICD-10-CM

## 2021-10-20 PROCEDURE — 97112 NEUROMUSCULAR REEDUCATION: CPT | Performed by: PHYSICAL THERAPIST

## 2021-10-20 PROCEDURE — 97110 THERAPEUTIC EXERCISES: CPT | Performed by: PHYSICAL THERAPIST

## 2021-10-22 ENCOUNTER — OFFICE VISIT (OUTPATIENT)
Dept: PHYSICAL THERAPY | Facility: CLINIC | Age: 71
End: 2021-10-22
Payer: MEDICARE

## 2021-10-22 DIAGNOSIS — M54.50 CHRONIC LOW BACK PAIN, UNSPECIFIED BACK PAIN LATERALITY, UNSPECIFIED WHETHER SCIATICA PRESENT: Primary | ICD-10-CM

## 2021-10-22 DIAGNOSIS — M54.12 CERVICAL RADICULITIS: ICD-10-CM

## 2021-10-22 DIAGNOSIS — G89.29 CHRONIC LOW BACK PAIN, UNSPECIFIED BACK PAIN LATERALITY, UNSPECIFIED WHETHER SCIATICA PRESENT: Primary | ICD-10-CM

## 2021-10-22 DIAGNOSIS — R29.3 POOR POSTURE: ICD-10-CM

## 2021-10-22 DIAGNOSIS — M51.36 DDD (DEGENERATIVE DISC DISEASE), LUMBAR: ICD-10-CM

## 2021-10-22 PROCEDURE — 97112 NEUROMUSCULAR REEDUCATION: CPT

## 2021-10-22 PROCEDURE — 97530 THERAPEUTIC ACTIVITIES: CPT

## 2021-10-22 PROCEDURE — 97110 THERAPEUTIC EXERCISES: CPT

## 2021-10-26 ENCOUNTER — OFFICE VISIT (OUTPATIENT)
Dept: OBGYN CLINIC | Facility: CLINIC | Age: 71
End: 2021-10-26
Payer: MEDICARE

## 2021-10-26 VITALS
HEIGHT: 64 IN | WEIGHT: 211 LBS | BODY MASS INDEX: 36.02 KG/M2 | DIASTOLIC BLOOD PRESSURE: 80 MMHG | HEART RATE: 108 BPM | SYSTOLIC BLOOD PRESSURE: 130 MMHG

## 2021-10-26 DIAGNOSIS — M19.012 PRIMARY OSTEOARTHRITIS OF LEFT SHOULDER: Primary | ICD-10-CM

## 2021-10-26 PROCEDURE — 99213 OFFICE O/P EST LOW 20 MIN: CPT | Performed by: ORTHOPAEDIC SURGERY

## 2021-10-26 RX ORDER — TEMAZEPAM 15 MG/1
CAPSULE ORAL
COMMUNITY
Start: 2021-09-20 | End: 2022-07-15

## 2021-10-26 RX ORDER — OMEPRAZOLE 40 MG/1
CAPSULE, DELAYED RELEASE ORAL
COMMUNITY
Start: 2021-09-09 | End: 2022-07-15

## 2021-10-26 RX ORDER — GABAPENTIN 300 MG/1
CAPSULE ORAL
COMMUNITY
Start: 2021-09-30 | End: 2022-07-15

## 2021-10-27 ENCOUNTER — OFFICE VISIT (OUTPATIENT)
Dept: PHYSICAL THERAPY | Facility: CLINIC | Age: 71
End: 2021-10-27
Payer: MEDICARE

## 2021-10-27 DIAGNOSIS — M54.50 CHRONIC LOW BACK PAIN, UNSPECIFIED BACK PAIN LATERALITY, UNSPECIFIED WHETHER SCIATICA PRESENT: Primary | ICD-10-CM

## 2021-10-27 DIAGNOSIS — R29.3 POOR POSTURE: ICD-10-CM

## 2021-10-27 DIAGNOSIS — M54.12 CERVICAL RADICULITIS: ICD-10-CM

## 2021-10-27 DIAGNOSIS — M51.36 DDD (DEGENERATIVE DISC DISEASE), LUMBAR: ICD-10-CM

## 2021-10-27 DIAGNOSIS — G89.29 CHRONIC LOW BACK PAIN, UNSPECIFIED BACK PAIN LATERALITY, UNSPECIFIED WHETHER SCIATICA PRESENT: Primary | ICD-10-CM

## 2021-10-27 PROCEDURE — 97112 NEUROMUSCULAR REEDUCATION: CPT

## 2021-10-27 PROCEDURE — 97530 THERAPEUTIC ACTIVITIES: CPT

## 2021-10-27 PROCEDURE — 97110 THERAPEUTIC EXERCISES: CPT

## 2021-10-29 ENCOUNTER — OFFICE VISIT (OUTPATIENT)
Dept: PHYSICAL THERAPY | Facility: CLINIC | Age: 71
End: 2021-10-29
Payer: MEDICARE

## 2021-10-29 DIAGNOSIS — M51.36 DDD (DEGENERATIVE DISC DISEASE), LUMBAR: ICD-10-CM

## 2021-10-29 DIAGNOSIS — M54.50 CHRONIC LOW BACK PAIN, UNSPECIFIED BACK PAIN LATERALITY, UNSPECIFIED WHETHER SCIATICA PRESENT: Primary | ICD-10-CM

## 2021-10-29 DIAGNOSIS — G89.29 CHRONIC LOW BACK PAIN, UNSPECIFIED BACK PAIN LATERALITY, UNSPECIFIED WHETHER SCIATICA PRESENT: Primary | ICD-10-CM

## 2021-10-29 DIAGNOSIS — R29.3 POOR POSTURE: ICD-10-CM

## 2021-10-29 DIAGNOSIS — M54.12 CERVICAL RADICULITIS: ICD-10-CM

## 2021-10-29 PROCEDURE — 97116 GAIT TRAINING THERAPY: CPT

## 2021-10-29 PROCEDURE — 97110 THERAPEUTIC EXERCISES: CPT

## 2021-10-29 PROCEDURE — 97112 NEUROMUSCULAR REEDUCATION: CPT

## 2021-11-03 ENCOUNTER — OFFICE VISIT (OUTPATIENT)
Dept: PHYSICAL THERAPY | Facility: CLINIC | Age: 71
End: 2021-11-03
Payer: MEDICARE

## 2021-11-03 DIAGNOSIS — M54.50 CHRONIC LOW BACK PAIN, UNSPECIFIED BACK PAIN LATERALITY, UNSPECIFIED WHETHER SCIATICA PRESENT: Primary | ICD-10-CM

## 2021-11-03 DIAGNOSIS — G89.29 CHRONIC LOW BACK PAIN, UNSPECIFIED BACK PAIN LATERALITY, UNSPECIFIED WHETHER SCIATICA PRESENT: Primary | ICD-10-CM

## 2021-11-03 DIAGNOSIS — M51.36 DDD (DEGENERATIVE DISC DISEASE), LUMBAR: ICD-10-CM

## 2021-11-03 DIAGNOSIS — R29.3 POOR POSTURE: ICD-10-CM

## 2021-11-03 DIAGNOSIS — M54.12 CERVICAL RADICULITIS: ICD-10-CM

## 2021-11-03 PROCEDURE — 97110 THERAPEUTIC EXERCISES: CPT

## 2021-11-03 PROCEDURE — 97116 GAIT TRAINING THERAPY: CPT

## 2021-11-03 PROCEDURE — 97112 NEUROMUSCULAR REEDUCATION: CPT

## 2021-11-05 ENCOUNTER — OFFICE VISIT (OUTPATIENT)
Dept: PHYSICAL THERAPY | Facility: CLINIC | Age: 71
End: 2021-11-05
Payer: MEDICARE

## 2021-11-05 DIAGNOSIS — G89.29 CHRONIC LOW BACK PAIN, UNSPECIFIED BACK PAIN LATERALITY, UNSPECIFIED WHETHER SCIATICA PRESENT: Primary | ICD-10-CM

## 2021-11-05 DIAGNOSIS — M54.50 CHRONIC LOW BACK PAIN, UNSPECIFIED BACK PAIN LATERALITY, UNSPECIFIED WHETHER SCIATICA PRESENT: Primary | ICD-10-CM

## 2021-11-05 DIAGNOSIS — M51.36 DDD (DEGENERATIVE DISC DISEASE), LUMBAR: ICD-10-CM

## 2021-11-05 PROCEDURE — 97112 NEUROMUSCULAR REEDUCATION: CPT | Performed by: PHYSICAL THERAPIST

## 2021-11-05 PROCEDURE — 97110 THERAPEUTIC EXERCISES: CPT | Performed by: PHYSICAL THERAPIST

## 2021-11-10 ENCOUNTER — EVALUATION (OUTPATIENT)
Dept: PHYSICAL THERAPY | Facility: CLINIC | Age: 71
End: 2021-11-10
Payer: MEDICARE

## 2021-11-10 DIAGNOSIS — M51.36 DDD (DEGENERATIVE DISC DISEASE), LUMBAR: ICD-10-CM

## 2021-11-10 DIAGNOSIS — M54.50 CHRONIC LOW BACK PAIN, UNSPECIFIED BACK PAIN LATERALITY, UNSPECIFIED WHETHER SCIATICA PRESENT: Primary | ICD-10-CM

## 2021-11-10 DIAGNOSIS — G89.29 CHRONIC LOW BACK PAIN, UNSPECIFIED BACK PAIN LATERALITY, UNSPECIFIED WHETHER SCIATICA PRESENT: Primary | ICD-10-CM

## 2021-11-10 PROCEDURE — 97112 NEUROMUSCULAR REEDUCATION: CPT | Performed by: PHYSICAL THERAPIST

## 2021-11-10 PROCEDURE — 97110 THERAPEUTIC EXERCISES: CPT | Performed by: PHYSICAL THERAPIST

## 2021-11-12 ENCOUNTER — EVALUATION (OUTPATIENT)
Dept: PHYSICAL THERAPY | Facility: CLINIC | Age: 71
End: 2021-11-12
Payer: MEDICARE

## 2021-11-12 ENCOUNTER — APPOINTMENT (OUTPATIENT)
Dept: PHYSICAL THERAPY | Facility: CLINIC | Age: 71
End: 2021-11-12
Payer: MEDICARE

## 2021-11-12 DIAGNOSIS — M62.81 MUSCLE WEAKNESS: ICD-10-CM

## 2021-11-12 DIAGNOSIS — M25.512 CHRONIC LEFT SHOULDER PAIN: Primary | ICD-10-CM

## 2021-11-12 DIAGNOSIS — G89.29 CHRONIC LEFT SHOULDER PAIN: Primary | ICD-10-CM

## 2021-11-12 PROCEDURE — 97110 THERAPEUTIC EXERCISES: CPT | Performed by: PHYSICAL THERAPIST

## 2021-11-12 PROCEDURE — 97162 PT EVAL MOD COMPLEX 30 MIN: CPT | Performed by: PHYSICAL THERAPIST

## 2021-11-17 ENCOUNTER — OFFICE VISIT (OUTPATIENT)
Dept: PHYSICAL THERAPY | Facility: CLINIC | Age: 71
End: 2021-11-17
Payer: MEDICARE

## 2021-11-17 DIAGNOSIS — M62.81 MUSCLE WEAKNESS: ICD-10-CM

## 2021-11-17 DIAGNOSIS — M25.512 CHRONIC LEFT SHOULDER PAIN: Primary | ICD-10-CM

## 2021-11-17 DIAGNOSIS — G89.29 CHRONIC LEFT SHOULDER PAIN: Primary | ICD-10-CM

## 2021-11-17 PROCEDURE — 97140 MANUAL THERAPY 1/> REGIONS: CPT | Performed by: PHYSICAL THERAPIST

## 2021-11-17 PROCEDURE — 97110 THERAPEUTIC EXERCISES: CPT | Performed by: PHYSICAL THERAPIST

## 2021-11-17 PROCEDURE — 97112 NEUROMUSCULAR REEDUCATION: CPT | Performed by: PHYSICAL THERAPIST

## 2021-11-19 ENCOUNTER — OFFICE VISIT (OUTPATIENT)
Dept: PHYSICAL THERAPY | Facility: CLINIC | Age: 71
End: 2021-11-19
Payer: MEDICARE

## 2021-11-19 DIAGNOSIS — M25.512 CHRONIC LEFT SHOULDER PAIN: Primary | ICD-10-CM

## 2021-11-19 DIAGNOSIS — M62.81 MUSCLE WEAKNESS: ICD-10-CM

## 2021-11-19 DIAGNOSIS — G89.29 CHRONIC LEFT SHOULDER PAIN: Primary | ICD-10-CM

## 2021-11-19 PROCEDURE — 97140 MANUAL THERAPY 1/> REGIONS: CPT | Performed by: PHYSICAL THERAPIST

## 2021-11-19 PROCEDURE — 97110 THERAPEUTIC EXERCISES: CPT | Performed by: PHYSICAL THERAPIST

## 2021-11-19 PROCEDURE — 97112 NEUROMUSCULAR REEDUCATION: CPT | Performed by: PHYSICAL THERAPIST

## 2021-11-24 ENCOUNTER — OFFICE VISIT (OUTPATIENT)
Dept: PHYSICAL THERAPY | Facility: CLINIC | Age: 71
End: 2021-11-24
Payer: MEDICARE

## 2021-11-24 DIAGNOSIS — G89.29 CHRONIC LEFT SHOULDER PAIN: Primary | ICD-10-CM

## 2021-11-24 DIAGNOSIS — M62.81 MUSCLE WEAKNESS: ICD-10-CM

## 2021-11-24 DIAGNOSIS — M25.512 CHRONIC LEFT SHOULDER PAIN: Primary | ICD-10-CM

## 2021-11-24 PROCEDURE — 97110 THERAPEUTIC EXERCISES: CPT

## 2021-11-24 PROCEDURE — 97112 NEUROMUSCULAR REEDUCATION: CPT

## 2021-11-24 PROCEDURE — 97140 MANUAL THERAPY 1/> REGIONS: CPT

## 2021-11-26 ENCOUNTER — OFFICE VISIT (OUTPATIENT)
Dept: PHYSICAL THERAPY | Facility: CLINIC | Age: 71
End: 2021-11-26
Payer: MEDICARE

## 2021-11-26 DIAGNOSIS — M62.81 MUSCLE WEAKNESS: ICD-10-CM

## 2021-11-26 DIAGNOSIS — M25.512 CHRONIC LEFT SHOULDER PAIN: Primary | ICD-10-CM

## 2021-11-26 DIAGNOSIS — G89.29 CHRONIC LEFT SHOULDER PAIN: Primary | ICD-10-CM

## 2021-11-26 PROCEDURE — 97112 NEUROMUSCULAR REEDUCATION: CPT

## 2021-11-26 PROCEDURE — 97140 MANUAL THERAPY 1/> REGIONS: CPT

## 2021-11-26 PROCEDURE — 97110 THERAPEUTIC EXERCISES: CPT

## 2021-12-01 ENCOUNTER — OFFICE VISIT (OUTPATIENT)
Dept: PHYSICAL THERAPY | Facility: CLINIC | Age: 71
End: 2021-12-01
Payer: MEDICARE

## 2021-12-01 DIAGNOSIS — M25.512 CHRONIC LEFT SHOULDER PAIN: Primary | ICD-10-CM

## 2021-12-01 DIAGNOSIS — G89.29 CHRONIC LEFT SHOULDER PAIN: Primary | ICD-10-CM

## 2021-12-01 DIAGNOSIS — M62.81 MUSCLE WEAKNESS: ICD-10-CM

## 2021-12-01 PROCEDURE — 97110 THERAPEUTIC EXERCISES: CPT

## 2021-12-01 PROCEDURE — 97112 NEUROMUSCULAR REEDUCATION: CPT

## 2021-12-03 ENCOUNTER — APPOINTMENT (OUTPATIENT)
Dept: PHYSICAL THERAPY | Facility: CLINIC | Age: 71
End: 2021-12-03
Payer: MEDICARE

## 2021-12-08 ENCOUNTER — EVALUATION (OUTPATIENT)
Dept: PHYSICAL THERAPY | Facility: CLINIC | Age: 71
End: 2021-12-08
Payer: MEDICARE

## 2021-12-08 DIAGNOSIS — G89.29 CHRONIC LEFT SHOULDER PAIN: Primary | ICD-10-CM

## 2021-12-08 DIAGNOSIS — M62.81 MUSCLE WEAKNESS: ICD-10-CM

## 2021-12-08 DIAGNOSIS — M25.512 CHRONIC LEFT SHOULDER PAIN: Primary | ICD-10-CM

## 2021-12-08 PROCEDURE — 97110 THERAPEUTIC EXERCISES: CPT | Performed by: PHYSICAL THERAPIST

## 2021-12-10 ENCOUNTER — APPOINTMENT (OUTPATIENT)
Dept: PHYSICAL THERAPY | Facility: CLINIC | Age: 71
End: 2021-12-10
Payer: MEDICARE

## 2021-12-28 ENCOUNTER — IMMUNIZATIONS (OUTPATIENT)
Dept: FAMILY MEDICINE CLINIC | Facility: HOSPITAL | Age: 71
End: 2021-12-28

## 2021-12-28 DIAGNOSIS — Z23 ENCOUNTER FOR IMMUNIZATION: Primary | ICD-10-CM

## 2021-12-28 PROCEDURE — 0064A COVID-19 MODERNA VACC 0.25 ML BOOSTER: CPT

## 2021-12-28 PROCEDURE — 91306 COVID-19 MODERNA VACC 0.25 ML BOOSTER: CPT

## 2022-01-14 ENCOUNTER — OFFICE VISIT (OUTPATIENT)
Dept: OBGYN CLINIC | Facility: CLINIC | Age: 72
End: 2022-01-14
Payer: MEDICARE

## 2022-01-14 VITALS
HEART RATE: 111 BPM | HEIGHT: 64 IN | SYSTOLIC BLOOD PRESSURE: 136 MMHG | BODY MASS INDEX: 36.02 KG/M2 | WEIGHT: 211 LBS | DIASTOLIC BLOOD PRESSURE: 83 MMHG

## 2022-01-14 DIAGNOSIS — Z98.890 HISTORY OF OPEN REDUCTION AND INTERNAL FIXATION (ORIF) PROCEDURE: ICD-10-CM

## 2022-01-14 DIAGNOSIS — M77.11 LATERAL EPICONDYLITIS OF RIGHT ELBOW: ICD-10-CM

## 2022-01-14 DIAGNOSIS — M77.01 MEDIAL EPICONDYLITIS OF RIGHT ELBOW: Primary | ICD-10-CM

## 2022-01-14 PROCEDURE — 99213 OFFICE O/P EST LOW 20 MIN: CPT | Performed by: ORTHOPAEDIC SURGERY

## 2022-01-14 PROCEDURE — 20551 NJX 1 TENDON ORIGIN/INSJ: CPT | Performed by: ORTHOPAEDIC SURGERY

## 2022-01-14 RX ORDER — BUPIVACAINE HYDROCHLORIDE 2.5 MG/ML
1 INJECTION, SOLUTION EPIDURAL; INFILTRATION; INTRACAUDAL
Status: COMPLETED | OUTPATIENT
Start: 2022-01-14 | End: 2022-01-14

## 2022-01-14 RX ORDER — BETAMETHASONE SODIUM PHOSPHATE AND BETAMETHASONE ACETATE 3; 3 MG/ML; MG/ML
6 INJECTION, SUSPENSION INTRA-ARTICULAR; INTRALESIONAL; INTRAMUSCULAR; SOFT TISSUE
Status: COMPLETED | OUTPATIENT
Start: 2022-01-14 | End: 2022-01-14

## 2022-01-14 RX ADMIN — BUPIVACAINE HYDROCHLORIDE 1 ML: 2.5 INJECTION, SOLUTION EPIDURAL; INFILTRATION; INTRACAUDAL at 08:45

## 2022-01-14 RX ADMIN — BETAMETHASONE SODIUM PHOSPHATE AND BETAMETHASONE ACETATE 6 MG: 3; 3 INJECTION, SUSPENSION INTRA-ARTICULAR; INTRALESIONAL; INTRAMUSCULAR; SOFT TISSUE at 08:45

## 2022-01-14 NOTE — PROGRESS NOTES
Assessment:   Diagnosis ICD-10-CM Associated Orders   1  Pain in right elbow  M25 521 XR elbow 3+ vw right       Plan:  · Explained to the patient that she has a physical examination consistent with medial lateral epicondylitis of the right elbow  The path anatomy and natural history of this diagnosis was explained to the patient in detail the office  · Patient was provided with cortisone injections into her medial and lateral epicondyles today in the office  She tolerated these injections well  They are documented appropriately below  To do next visit:  Return in about 6 weeks (around 2/25/2022) for Recheck of right elbow  The above stated was discussed in layman's terms and the patient expressed understanding  All questions were answered to the patient's satisfaction  Under aseptic technique, the medial and lateral epicondyles were both injected with Celestone and Marcaine  She tolerated both procedures quite well  Return back in 6 weeks for re-evaluation  If her condition changes, she will not hesitate to let us know  Physical examination shows point tenderness along these regions  There is no major tenderness along the hardware region  X-rays performed showed the hardware to be in anatomic alignment without any loosening or any evidence of any posttraumatic arthritis    Scribe Attestation    I,:  Gabbi Bran am acting as a scribe while in the presence of the attending physician :       I,:  Og Sanchez, DO personally performed the services described in this documentation    as scribed in my presence :             Subjective:   Erin Russo is a 66 y o  female who presents today due to increasing right elbow pain  Patient has a history of an ORIF due to an olecranon fracture about 7 years ago  Today, patient is noting both medial and lateral pain about the elbow with intermittent numbness and tingling radiating to her hand and all of her fingers   Pain is worse with repetitive lifting and carrying  No fevers or chills  Review of systems negative unless otherwise specified in HPI  Review of Systems   Constitutional: Negative for chills, fever and unexpected weight change  HENT: Negative for hearing loss, nosebleeds and sore throat  Eyes: Negative for pain, redness and visual disturbance  Respiratory: Negative for cough, shortness of breath and wheezing  Cardiovascular: Negative for chest pain, palpitations and leg swelling  Gastrointestinal: Negative for abdominal distention, nausea and vomiting  Endocrine: Negative for polydipsia and polyuria  Genitourinary: Negative for dysuria and hematuria  Skin: Negative for rash and wound  Neurological: Positive for numbness  Negative for dizziness and headaches  Psychiatric/Behavioral: Negative for decreased concentration and suicidal ideas  Past Medical History:   Diagnosis Date    Anxiety     Arthritis     Depression     GERD (gastroesophageal reflux disease)     Hiatal hernia     Hyperlipidemia     Hypertension        Past Surgical History:   Procedure Laterality Date    APPENDECTOMY      CHOLECYSTECTOMY      FRACTURE SURGERY Right     arm with plate and pins    HAND SURGERY Bilateral     HYSTERECTOMY      JOINT REPLACEMENT Bilateral     SHOULDER ARTHROSCOPY Left        History reviewed  No pertinent family history  Social History     Occupational History    Not on file   Tobacco Use    Smoking status: Former Smoker     Types: Cigarettes    Smokeless tobacco: Never Used   Vaping Use    Vaping Use: Never used   Substance and Sexual Activity    Alcohol use:  Yes     Alcohol/week: 1 0 standard drink     Types: 1 Glasses of wine per week    Drug use: Never    Sexual activity: Not on file         Current Outpatient Medications:     amLODIPine (NORVASC) 10 mg tablet, Take 10 mg by mouth daily, Disp: , Rfl:     cetirizine (ZyrTEC) 10 mg tablet, Take 10 mg by mouth daily, Disp: , Rfl:    gabapentin (NEURONTIN) 300 mg capsule, , Disp: , Rfl:     gabapentin (NEURONTIN) 600 MG tablet, Take 600 mg by mouth daily at bedtime, Disp: , Rfl:     multivitamin (THERAGRAN) TABS, Take 1 tablet by mouth daily, Disp: , Rfl:     omeprazole (PriLOSEC) 20 mg delayed release capsule, Take 20 mg by mouth daily 2 in am 2 in pm, Disp: , Rfl:     pravastatin (PRAVACHOL) 40 mg tablet, Take 40 mg by mouth daily One at bedtime, Disp: , Rfl:     temazepam (RESTORIL) 15 mg capsule, , Disp: , Rfl:     venlafaxine (EFFEXOR-XR) 150 mg 24 hr capsule, Take 150 mg by mouth daily, Disp: , Rfl:     omeprazole (PriLOSEC) 40 MG capsule, , Disp: , Rfl:     temazepam (RESTORIL) 7 5 mg capsule, Take 15 mg by mouth daily at bedtime as needed for sleep (Patient not taking: Reported on 10/26/2021), Disp: , Rfl:     No Known Allergies         Vitals:    01/14/22 0821   BP: 136/83   Pulse: (!) 111       Objective:                    Right Elbow Exam     Tenderness   The patient is experiencing tenderness in the lateral epicondyle and medial epicondyle  Range of Motion   The patient has normal right elbow ROM  Muscle Strength   Pronation:  5/5     Tests   Varus: negative  Valgus: negative    Other   Erythema: absent  Sensation: normal  Pulse: present            Diagnostics, reviewed and taken today if performed as documented: The attending physician has personally reviewed the pertinent films in PACS and interpretation is as follows:    X Ray Right Elbow 1/14/2022: Intact orthopedic hardware from previous ORIF without evidence of loosening  No other acute osseous abnormalities     Procedures, if performed today:    Hand/upper extremity injection: R elbow  Universal Protocol:  Consent: Verbal consent obtained    Risks and benefits: risks, benefits and alternatives were discussed  Consent given by: patient    Supporting Documentation  Indications: diagnostic, pain and therapeutic   Procedure Details  Condition:lateral epicondylitis Site: R elbow   Preparation: Patient was prepped and draped in the usual sterile fashion  Needle size: 25 G  Ultrasound guidance: no  Approach: lateral  Medications administered: 1 mL bupivacaine (PF) 0 25 %; 6 mg betamethasone acetate-betamethasone sodium phosphate 6 (3-3) mg/mL    Patient tolerance: patient tolerated the procedure well with no immediate complications  Dressing:  Sterile dressing applied     Hand/upper extremity injection: R elbow  Universal Protocol:  Consent: Verbal consent obtained  Risks and benefits: risks, benefits and alternatives were discussed  Consent given by: patient    Supporting Documentation  Indications: diagnostic, pain and therapeutic   Procedure Details  Condition:medial epicondylitis Site: R elbow   Preparation: Patient was prepped and draped in the usual sterile fashion  Needle size: 25 G  Ultrasound guidance: no  Approach: medial  Medications administered: 1 mL bupivacaine (PF) 0 25 %; 6 mg betamethasone acetate-betamethasone sodium phosphate 6 (3-3) mg/mL    Patient tolerance: patient tolerated the procedure well with no immediate complications  Dressing:  Sterile dressing applied           Portions of the record may have been created with voice recognition software  Occasional wrong word or "sound a like" substitutions may have occurred due to the inherent limitations of voice recognition software  Read the chart carefully and recognize, using context, where substitutions have occurred

## 2022-01-28 ENCOUNTER — OFFICE VISIT (OUTPATIENT)
Dept: OBGYN CLINIC | Facility: CLINIC | Age: 72
End: 2022-01-28
Payer: MEDICARE

## 2022-01-28 VITALS
DIASTOLIC BLOOD PRESSURE: 85 MMHG | SYSTOLIC BLOOD PRESSURE: 127 MMHG | HEIGHT: 64 IN | BODY MASS INDEX: 36.02 KG/M2 | WEIGHT: 211 LBS | HEART RATE: 103 BPM

## 2022-01-28 DIAGNOSIS — Z96.653 HX OF TOTAL KNEE REPLACEMENT, BILATERAL: Primary | ICD-10-CM

## 2022-01-28 PROCEDURE — 99213 OFFICE O/P EST LOW 20 MIN: CPT | Performed by: ORTHOPAEDIC SURGERY

## 2022-01-28 NOTE — PROGRESS NOTES
Assessment/Plan:   Diagnoses and all orders for this visit:    Hx of total knee replacement, bilateral  -     XR knee 3 vw left non injury; Future  -     XR knee 3 vw right non injury; Future    Discussed with patient that today's physical exam is essentially benign  Her x-rays demonstrate stable total knee replacements  She can continue activities as tolerated without limitations or restrictions  Should any questions or concerns arise, she can contact the office to schedule appointment  Otherwise she will be seen in 1 year for her annual evaluation and repeat x-rays of the bilateral knees  Patient expresses understanding and is in agreement with treatment plan  The patient is doing quite well from her bilateral total knee replacements  Physical exam shows full strength full motion  Incisions are clean, dry, intact  Negative Homans  X-rays show a well-seated bilateral total knee replacements without any loosening  The patient will continue home exercise program   Continue stretching  Follow up in 1 year for re-evaluation with new x-rays of bilateral knees test three views each  If her condition changes, she will not hesitate to let us know    Subjective:   Patient ID: Jasiel Vera Gary  1950     HPI  Patient is a 66 y o  female who presents for annual evaluation of bilateral knee replacement of 10+ years  Patient states that she is very satisfied with the function of her knees  She denies any pain, or recent bruising, swelling, numbness, tingling, feelings of instability, or mechanical symptoms       The following portions of the patient's history were reviewed and updated as appropriate:  Past medical history, past surgical history, Family history, social history, current medications and allergies    Past Medical History:   Diagnosis Date    Anxiety     Arthritis     Depression     GERD (gastroesophageal reflux disease)     Hiatal hernia     Hyperlipidemia     Hypertension        Past Surgical History:   Procedure Laterality Date    APPENDECTOMY      CHOLECYSTECTOMY      FRACTURE SURGERY Right     arm with plate and pins    HAND SURGERY Bilateral     HYSTERECTOMY      JOINT REPLACEMENT Bilateral     SHOULDER ARTHROSCOPY Left        History reviewed  No pertinent family history  Social History     Socioeconomic History    Marital status: /Civil Union     Spouse name: None    Number of children: None    Years of education: None    Highest education level: None   Occupational History    None   Tobacco Use    Smoking status: Former Smoker     Types: Cigarettes    Smokeless tobacco: Never Used   Vaping Use    Vaping Use: Never used   Substance and Sexual Activity    Alcohol use:  Yes     Alcohol/week: 1 0 standard drink     Types: 1 Glasses of wine per week    Drug use: Never    Sexual activity: None   Other Topics Concern    None   Social History Narrative    None     Social Determinants of Health     Financial Resource Strain: Not on file   Food Insecurity: Not on file   Transportation Needs: Not on file   Physical Activity: Not on file   Stress: Not on file   Social Connections: Not on file   Intimate Partner Violence: Not on file   Housing Stability: Not on file         Current Outpatient Medications:     amLODIPine (NORVASC) 10 mg tablet, Take 10 mg by mouth daily, Disp: , Rfl:     cetirizine (ZyrTEC) 10 mg tablet, Take 10 mg by mouth daily, Disp: , Rfl:     gabapentin (NEURONTIN) 300 mg capsule, , Disp: , Rfl:     gabapentin (NEURONTIN) 600 MG tablet, Take 600 mg by mouth daily at bedtime, Disp: , Rfl:     multivitamin (THERAGRAN) TABS, Take 1 tablet by mouth daily, Disp: , Rfl:     omeprazole (PriLOSEC) 20 mg delayed release capsule, Take 20 mg by mouth daily 2 in am 2 in pm, Disp: , Rfl:     pravastatin (PRAVACHOL) 40 mg tablet, Take 40 mg by mouth daily One at bedtime, Disp: , Rfl:     temazepam (RESTORIL) 15 mg capsule, , Disp: , Rfl:     venlafaxine (EFFEXOR-XR) 150 mg 24 hr capsule, Take 150 mg by mouth daily, Disp: , Rfl:     omeprazole (PriLOSEC) 40 MG capsule, , Disp: , Rfl:     temazepam (RESTORIL) 7 5 mg capsule, Take 15 mg by mouth daily at bedtime as needed for sleep (Patient not taking: Reported on 10/26/2021), Disp: , Rfl:     No Known Allergies    Review of Systems   Constitutional: Negative for chills, fever and unexpected weight change  HENT: Negative for hearing loss, nosebleeds and sore throat  Eyes: Negative for pain, redness and visual disturbance  Respiratory: Negative for cough, shortness of breath and wheezing  Cardiovascular: Negative for chest pain, palpitations and leg swelling  Gastrointestinal: Negative for abdominal pain, nausea and vomiting  Endocrine: Negative for polydipsia and polyuria  Genitourinary: Negative for dysuria and hematuria  Musculoskeletal:        As noted in HPI   Skin: Negative for rash and wound  Neurological: Negative for dizziness, numbness and headaches  Psychiatric/Behavioral: Negative for decreased concentration and suicidal ideas  The patient is not nervous/anxious  Objective:  /85 (BP Location: Left arm, Patient Position: Sitting, Cuff Size: Standard)   Pulse 103   Ht 5' 4" (1 626 m)   Wt 95 7 kg (211 lb)   BMI 36 22 kg/m²     Ortho Exam  Bilateral knees -   Weight-bearing status:  Full WBAT  Incision(s):  Well he  Skin is warm and dry with no signs of erythema, ecchymosis, or infection  No soft tissue swelling or effusion  ROM:  Full  Strength:  5/5 throughout  Calf compartments are soft  Knees are stable to varus and valgus stress  - Alexus's sign  2+ TP and DP pulses with brisk capillary refill to the toes  Sural, saphenous, tibial, superficial and deep peroneal motor and sensory distributions intact  Sensation light touch intact distally    Physical Exam  Vitals and nursing note reviewed  Constitutional:       General: She is not in acute distress  Appearance: She is well-developed  HENT:      Head: Normocephalic and atraumatic  Eyes:      Conjunctiva/sclera: Conjunctivae normal    Cardiovascular:      Rate and Rhythm: Normal rate  Pulmonary:      Effort: Pulmonary effort is normal    Musculoskeletal:      Cervical back: Neck supple  Skin:     General: Skin is warm and dry  Capillary Refill: Capillary refill takes less than 2 seconds  Neurological:      Mental Status: She is alert and oriented to person, place, and time     Psychiatric:         Mood and Affect: Mood normal          Behavior: Behavior normal         Diagnostic Test Review:  Attending Physician has personally reviewed pertinent imaging in PACS, impression is as follows:    Review of radiographic series taken 1/28/2022 of the bilateral shows well-seated total knee prostheses with well-maintained alignment and no signs of loosening    Procedures   No procedures performed this visit    Scribe Attestation    I,:  Fredi Corado am acting as a scribe while in the presence of the attending physician :       I,:  Haile Burks DO personally performed the services described in this documentation    as scribed in my presence :

## 2022-03-04 ENCOUNTER — OFFICE VISIT (OUTPATIENT)
Dept: OBGYN CLINIC | Facility: CLINIC | Age: 72
End: 2022-03-04
Payer: MEDICARE

## 2022-03-04 VITALS — WEIGHT: 211 LBS | BODY MASS INDEX: 36.02 KG/M2 | HEIGHT: 64 IN

## 2022-03-04 DIAGNOSIS — M77.11 LATERAL EPICONDYLITIS OF RIGHT ELBOW: Primary | ICD-10-CM

## 2022-03-04 PROCEDURE — 20605 DRAIN/INJ JOINT/BURSA W/O US: CPT | Performed by: PHYSICIAN ASSISTANT

## 2022-03-04 PROCEDURE — 99213 OFFICE O/P EST LOW 20 MIN: CPT | Performed by: PHYSICIAN ASSISTANT

## 2022-03-04 RX ORDER — BETAMETHASONE SODIUM PHOSPHATE AND BETAMETHASONE ACETATE 3; 3 MG/ML; MG/ML
6 INJECTION, SUSPENSION INTRA-ARTICULAR; INTRALESIONAL; INTRAMUSCULAR; SOFT TISSUE
Status: COMPLETED | OUTPATIENT
Start: 2022-03-04 | End: 2022-03-04

## 2022-03-04 RX ORDER — BUPIVACAINE HYDROCHLORIDE 2.5 MG/ML
1 INJECTION, SOLUTION INFILTRATION; PERINEURAL
Status: COMPLETED | OUTPATIENT
Start: 2022-03-04 | End: 2022-03-04

## 2022-03-04 RX ADMIN — BETAMETHASONE SODIUM PHOSPHATE AND BETAMETHASONE ACETATE 6 MG: 3; 3 INJECTION, SUSPENSION INTRA-ARTICULAR; INTRALESIONAL; INTRAMUSCULAR; SOFT TISSUE at 09:26

## 2022-03-04 RX ADMIN — BUPIVACAINE HYDROCHLORIDE 1 ML: 2.5 INJECTION, SOLUTION INFILTRATION; PERINEURAL at 09:26

## 2022-03-04 NOTE — PROGRESS NOTES
ASSESSMENT/PLAN:    Diagnoses and all orders for this visit:    Lateral epicondylitis of right elbow    Other orders  -     Medium joint arthrocentesis        The patient's right lateral epicondyle was injected with Celestone and Marcaine  She tolerated the injection quite well  She will follow up with our office in 3 months  The patient is acceptable to this plan  Return in about 3 months (around 6/4/2022)  Under aseptic technique, the lateral epicondyle was injected with Celestone and Marcaine  She tolerated procedure quite well  Return back in 3 months evaluation  If her condition changes, she will not hesitate to let us know      _____________________________________________________  CHIEF COMPLAINT:  Chief Complaint   Patient presents with    Right Elbow - Follow-up         SUBJECTIVE:  Yimi Bang is a 66 y o  female who presents to our office complaining of lateral elbow pain  The patient has a history of lateral and medial epicondylitis of her right elbow  Last visit she was given a corticosteroid injection at both sites  She denies any significant symptoms along the medial aspect of her elbow today, however, she still complaining of lateral discomfort  She denies any numbness or tingling  She denies any fever or chills        The following portions of the patient's history were reviewed and updated as appropriate: allergies, current medications, past family history, past medical history, past social history, past surgical history and problem list     PAST MEDICAL HISTORY:  Past Medical History:   Diagnosis Date    Anxiety     Arthritis     Depression     GERD (gastroesophageal reflux disease)     Hiatal hernia     Hyperlipidemia     Hypertension        PAST SURGICAL HISTORY:  Past Surgical History:   Procedure Laterality Date    APPENDECTOMY      CHOLECYSTECTOMY      FRACTURE SURGERY Right     arm with plate and pins    HAND SURGERY Bilateral     HYSTERECTOMY      JOINT REPLACEMENT Bilateral     SHOULDER ARTHROSCOPY Left        FAMILY HISTORY:  History reviewed  No pertinent family history  SOCIAL HISTORY:  Social History     Tobacco Use    Smoking status: Former Smoker     Types: Cigarettes    Smokeless tobacco: Never Used   Vaping Use    Vaping Use: Never used   Substance Use Topics    Alcohol use: Yes     Alcohol/week: 1 0 standard drink     Types: 1 Glasses of wine per week    Drug use: Never       MEDICATIONS:    Current Outpatient Medications:     amLODIPine (NORVASC) 10 mg tablet, Take 10 mg by mouth daily, Disp: , Rfl:     cetirizine (ZyrTEC) 10 mg tablet, Take 10 mg by mouth daily, Disp: , Rfl:     gabapentin (NEURONTIN) 600 MG tablet, Take 600 mg by mouth daily at bedtime, Disp: , Rfl:     multivitamin (THERAGRAN) TABS, Take 1 tablet by mouth daily, Disp: , Rfl:     omeprazole (PriLOSEC) 20 mg delayed release capsule, Take 20 mg by mouth daily 2 in am 2 in pm, Disp: , Rfl:     pravastatin (PRAVACHOL) 40 mg tablet, Take 40 mg by mouth daily One at bedtime, Disp: , Rfl:     temazepam (RESTORIL) 15 mg capsule, , Disp: , Rfl:     temazepam (RESTORIL) 7 5 mg capsule, Take 15 mg by mouth daily at bedtime as needed for sleep  , Disp: , Rfl:     venlafaxine (EFFEXOR-XR) 150 mg 24 hr capsule, Take 150 mg by mouth daily, Disp: , Rfl:     gabapentin (NEURONTIN) 300 mg capsule, , Disp: , Rfl:     omeprazole (PriLOSEC) 40 MG capsule,   (Patient not taking: Reported on 3/4/2022 ), Disp: , Rfl:     ALLERGIES:  No Known Allergies    ROS:  Review of Systems     Constitutional: Negative for fatigue, fever or loss of appetite  HENT: Negative  Respiratory: Negative for shortness of breath, dyspnea  Cardiovascular: Negative for chest pain/tightness  Gastrointestinal: Negative for abdominal pain, N/V  Endocrine: Negative for cold/heat intolerance, unexplained weight loss/gain  Genitourinary: Negative for flank pain, dysuria, hematuria     Musculoskeletal: Positive for arthralgia   Skin: Negative for rash  Neurological: Negative for numbness or tingling  Psychiatric/Behavioral: Negative for agitation  _____________________________________________________  PHYSICAL EXAMINATION:    Height 5' 4" (1 626 m), weight 95 7 kg (211 lb)  Constitutional: Oriented to person, place, and time  Appears well-developed and well-nourished  No distress  HENT:   Head: Normocephalic  Eyes: Conjunctivae are normal  Right eye exhibits no discharge  Left eye exhibits no discharge  No scleral icterus  Cardiovascular: Normal rate  Pulmonary/Chest: Effort normal    Neurological: Alert and oriented to person, place, and time  Skin: Skin is warm and dry  No rash noted  Not diaphoretic  No erythema  No pallor  Psychiatric: Normal mood and affect  Behavior is normal  Judgment and thought content normal       MUSCULOSKELETAL EXAMINATION:   Physical Exam  Ortho Exam    Right upper extremity is neurovascularly intact  Fingers are pink and mobile  Compartments are soft  There is tenderness to palpation along the lateral epicondyle  Elbow range of motion 0-130 degrees  Rotation was 90° in each direction  Tenderness with extension and supination of the right wrist and forearm   Brisk cap refill  Sensation intact    Objective:  BP Readings from Last 1 Encounters:   01/28/22 127/85      Wt Readings from Last 1 Encounters:   03/04/22 95 7 kg (211 lb)        BMI:   Estimated body mass index is 36 22 kg/m² as calculated from the following:    Height as of this encounter: 5' 4" (1 626 m)  Weight as of this encounter: 95 7 kg (211 lb)        PROCEDURES PERFORMED:  Medium joint arthrocentesis: R elbow  Universal Protocol:  Risks and benefits: risks, benefits and alternatives were discussed  Consent given by: patient  Patient understanding: patient states understanding of the procedure being performed  Site marked: the operative site was marked  Supporting Documentation  Indications: pain Procedure Details  Location: elbow - R elbow  Preparation: Patient was prepped and draped in the usual sterile fashion  Needle size: 27 G  Ultrasound guidance: no  Approach: anterolateral  Medications administered: 1 mL bupivacaine 0 25 %; 6 mg betamethasone acetate-betamethasone sodium phosphate 6 (3-3) mg/mL    Patient tolerance: patient tolerated the procedure well with no immediate complications  Dressing:  Sterile dressing applied            Cale Curtis PA-C

## 2022-05-10 ENCOUNTER — OFFICE VISIT (OUTPATIENT)
Dept: OBGYN CLINIC | Facility: CLINIC | Age: 72
End: 2022-05-10
Payer: MEDICARE

## 2022-05-10 VITALS
SYSTOLIC BLOOD PRESSURE: 144 MMHG | BODY MASS INDEX: 36.02 KG/M2 | WEIGHT: 211 LBS | HEIGHT: 64 IN | DIASTOLIC BLOOD PRESSURE: 87 MMHG | HEART RATE: 120 BPM

## 2022-05-10 DIAGNOSIS — M19.041 PRIMARY OSTEOARTHRITIS OF RIGHT HAND: Primary | ICD-10-CM

## 2022-05-10 PROCEDURE — 99213 OFFICE O/P EST LOW 20 MIN: CPT | Performed by: ORTHOPAEDIC SURGERY

## 2022-05-10 NOTE — PROGRESS NOTES
ASSESSMENT/PLAN:    Diagnoses and all orders for this visit:    Primary osteoarthritis of right hand  -     Ambulatory Referral to Rheumatology; Future      The patient has degenerative changes along her entire right hand and fingers  Possible treatment options were discussed with the patient  She would like to be referred to Rheumatology  A formal referral was placed  She will follow up with our office as needed  The patient is acceptable to this plan  Return if symptoms worsen or fail to improve  The patient has arthritic changes along the IP joints of her bilateral hands  At this point, we will refer her to Rheumatology for further evaluation  I look forward to hearing back once this is obtained      _____________________________________________________  CHIEF COMPLAINT:  Chief Complaint   Patient presents with    Right Hand - Pain         SUBJECTIVE:  Santi Mehta is a 66 y o  female who presents to our office complaining of right hand pain with decreased range of motion  The patient states it is virtually impossible for her to make a fist   She states her symptoms have been worsening  She denies any numbness or tingling  She denies any fever or chills  She denies any new falls or trauma        The following portions of the patient's history were reviewed and updated as appropriate: allergies, current medications, past family history, past medical history, past social history, past surgical history and problem list     PAST MEDICAL HISTORY:  Past Medical History:   Diagnosis Date    Anxiety     Arthritis     Depression     GERD (gastroesophageal reflux disease)     Hiatal hernia     Hyperlipidemia     Hypertension        PAST SURGICAL HISTORY:  Past Surgical History:   Procedure Laterality Date    APPENDECTOMY      CHOLECYSTECTOMY      FRACTURE SURGERY Right     arm with plate and pins    HAND SURGERY Bilateral     HYSTERECTOMY      JOINT REPLACEMENT Bilateral     SHOULDER ARTHROSCOPY Left        FAMILY HISTORY:  History reviewed  No pertinent family history  SOCIAL HISTORY:  Social History     Tobacco Use    Smoking status: Former Smoker     Types: Cigarettes    Smokeless tobacco: Never Used   Vaping Use    Vaping Use: Never used   Substance Use Topics    Alcohol use: Yes     Alcohol/week: 1 0 standard drink     Types: 1 Glasses of wine per week    Drug use: Never       MEDICATIONS:    Current Outpatient Medications:     amLODIPine (NORVASC) 10 mg tablet, Take 10 mg by mouth daily, Disp: , Rfl:     cetirizine (ZyrTEC) 10 mg tablet, Take 10 mg by mouth daily, Disp: , Rfl:     gabapentin (NEURONTIN) 300 mg capsule, , Disp: , Rfl:     gabapentin (NEURONTIN) 600 MG tablet, Take 600 mg by mouth daily at bedtime, Disp: , Rfl:     multivitamin (THERAGRAN) TABS, Take 1 tablet by mouth daily, Disp: , Rfl:     omeprazole (PriLOSEC) 20 mg delayed release capsule, Take 20 mg by mouth daily 2 in am 2 in pm, Disp: , Rfl:     omeprazole (PriLOSEC) 40 MG capsule,   (Patient not taking: Reported on 3/4/2022 ), Disp: , Rfl:     pravastatin (PRAVACHOL) 40 mg tablet, Take 40 mg by mouth daily One at bedtime, Disp: , Rfl:     temazepam (RESTORIL) 15 mg capsule, , Disp: , Rfl:     temazepam (RESTORIL) 7 5 mg capsule, Take 15 mg by mouth daily at bedtime as needed for sleep  , Disp: , Rfl:     venlafaxine (EFFEXOR-XR) 150 mg 24 hr capsule, Take 150 mg by mouth daily, Disp: , Rfl:     ALLERGIES:  No Known Allergies    ROS:  Review of Systems     Constitutional: Negative for fatigue, fever or loss of appetite  HENT: Negative  Respiratory: Negative for shortness of breath, dyspnea  Cardiovascular: Negative for chest pain/tightness  Gastrointestinal: Negative for abdominal pain, N/V  Endocrine: Negative for cold/heat intolerance, unexplained weight loss/gain  Genitourinary: Negative for flank pain, dysuria, hematuria     Musculoskeletal: Positive for arthralgia   Skin: Negative for rash  Neurological: Negative for numbness or tingling  Psychiatric/Behavioral: Negative for agitation  _____________________________________________________  PHYSICAL EXAMINATION:    Blood pressure 144/87, pulse (!) 120, height 5' 4" (1 626 m), weight 95 7 kg (211 lb)  Constitutional: Oriented to person, place, and time  Appears well-developed and well-nourished  No distress  HENT:   Head: Normocephalic  Eyes: Conjunctivae are normal  Right eye exhibits no discharge  Left eye exhibits no discharge  No scleral icterus  Cardiovascular: Normal rate  Pulmonary/Chest: Effort normal    Neurological: Alert and oriented to person, place, and time  Skin: Skin is warm and dry  No rash noted  Not diaphoretic  No erythema  No pallor  Psychiatric: Normal mood and affect  Behavior is normal  Judgment and thought content normal       MUSCULOSKELETAL EXAMINATION:   Physical Exam  Ortho Exam    Right upper extremity is neurovascularly intact  Fingers are pink and mobile  Compartments are soft  There are degenerative changes along all fingers  Negative Tinel's  Negative Phalen's  Brisk cap refill  Sensation intact  No warmth, erythema or ecchymosis  Range of motion of the fingers are significantly limited    Objective:  BP Readings from Last 1 Encounters:   05/10/22 144/87      Wt Readings from Last 1 Encounters:   05/10/22 95 7 kg (211 lb)        BMI:   Estimated body mass index is 36 22 kg/m² as calculated from the following:    Height as of this encounter: 5' 4" (1 626 m)  Weight as of this encounter: 95 7 kg (211 lb)         Scribe Attestation    I,:  Herminio Avendano PA-C am acting as a scribe while in the presence of the attending physician :       I,:  Shalini Lang, DO personally performed the services described in this documentation    as scribed in my presence :

## 2022-06-15 ENCOUNTER — TELEPHONE (OUTPATIENT)
Dept: OBGYN CLINIC | Facility: CLINIC | Age: 72
End: 2022-06-15

## 2022-06-15 NOTE — TELEPHONE ENCOUNTER
MARY to reschedule her 7/8 appointment since Dr  will not be in the office  There are available appointment in [address] [place] [address] with Federica Baldwin on 7/12

## 2022-06-20 ENCOUNTER — CONSULT (OUTPATIENT)
Dept: PAIN MEDICINE | Facility: CLINIC | Age: 72
End: 2022-06-20
Payer: MEDICARE

## 2022-06-20 ENCOUNTER — APPOINTMENT (OUTPATIENT)
Dept: RADIOLOGY | Facility: CLINIC | Age: 72
End: 2022-06-20
Payer: MEDICARE

## 2022-06-20 VITALS
DIASTOLIC BLOOD PRESSURE: 75 MMHG | WEIGHT: 208 LBS | SYSTOLIC BLOOD PRESSURE: 112 MMHG | BODY MASS INDEX: 35.51 KG/M2 | HEART RATE: 106 BPM | HEIGHT: 64 IN

## 2022-06-20 DIAGNOSIS — M48.061 SPINAL STENOSIS OF LUMBAR REGION WITHOUT NEUROGENIC CLAUDICATION: ICD-10-CM

## 2022-06-20 DIAGNOSIS — M51.36 LUMBAR DEGENERATIVE DISC DISEASE: ICD-10-CM

## 2022-06-20 DIAGNOSIS — M54.12 CERVICAL RADICULOPATHY: ICD-10-CM

## 2022-06-20 DIAGNOSIS — M06.9 RHEUMATOID ARTHRITIS INVOLVING BOTH HANDS, UNSPECIFIED WHETHER RHEUMATOID FACTOR PRESENT (HCC): Primary | ICD-10-CM

## 2022-06-20 DIAGNOSIS — M47.812 CERVICAL SPONDYLOSIS: ICD-10-CM

## 2022-06-20 DIAGNOSIS — M47.816 LUMBAR SPONDYLOSIS: ICD-10-CM

## 2022-06-20 PROBLEM — M51.369 LUMBAR DEGENERATIVE DISC DISEASE: Status: ACTIVE | Noted: 2022-06-20

## 2022-06-20 PROCEDURE — 99204 OFFICE O/P NEW MOD 45 MIN: CPT | Performed by: ANESTHESIOLOGY

## 2022-06-20 PROCEDURE — 72050 X-RAY EXAM NECK SPINE 4/5VWS: CPT

## 2022-06-20 NOTE — PATIENT INSTRUCTIONS
Patient will research locations that provide either:  1  [address] [place] [address]   2  [facility]    What is the epidural space? The covering over the nerve roots in the spine is call the dura  The space surrounding this dura is call the epidural space  This space is commonly used to deliver medications to the spine  Nerves travel through the epidural space before they travel down into your legs  The nerves leave the spine from small nerve holes, foramen  Inflammation of these nerve roots may cause pain in your neck regions including shoulders and arms, and lower back regions including hip, buttocks and legs  These nerve roots may become inflamed due to irritation from a damaged disc or from contact with the bone spur  What is an epidural steroid injection and why that helps? An epidural steroid injection places anti-inflammatory medicine into the epidural space to stop nerve root inflammation, therefore hopefully reducing hip, buttock and leg pain  By stopping or limiting nerve root inflammation we may be able to reduce your pain  The epidural injection may assist the injury to heal by reducing inflammation  Although not always helpful, it usually reduces pain within 3 to 7 days and can take up to 2 weeks for full affect  It may provide permanent relief or provide a period of relief that will allow other treatments like physical therapy to be more effective  What happens during the procedure? While laying face down on an x-ray table your skin will be well cleaned  The physician will numb a small area of skin in your back which may sting for a few seconds  Next, the physician will use x-ray guidance to direct a small needle into the epidural space  He will then inject contrast dye to confirm that the medicine spreads to the affected nerve root in the epidural space   After this, the physician will inject the combination of numbing medicine and anti-inflammatory steroid  What happens after the procedure? A dressing may be applied to the injection site  Your will remain in the procedure suite for about 15 to 20 minutes and the nurse will monitor blood pressure and pulse  The nurse will review your discharge instructions and you will be able to go home  You may experience numbness or weakness to the affected limb for few hours after the procedure  If this happens do not walk without assistance  Your physician may refer you to a physical therapist or chiropractor immediately afterwards while the numbing medicine is effective and over the next two weeks while the cortisone is working  It may be beneficial to repeat the procedure in about 2 to 4 weeks  A series of treatments off improved more helpful than a single injection

## 2022-06-20 NOTE — PROGRESS NOTES
Assessment:  1  Rheumatoid arthritis involving both hands, unspecified whether rheumatoid factor present ([facility] [address] )    2  Cervical spondylosis    3  Cervical radiculopathy    4  Lumbar spondylosis    5  Lumbar degenerative disc disease    6  Spinal stenosis of lumbar region without neurogenic claudication        Plan:  Patient is a 25-year-old female complaints of neck pain, midback pain, low back pain with chronic pain syndrome secondary to cervical spondylosis, lumbar spondylosis, cervical degenerative disc disease, lumbar degenerative disc disease presents to office for initial consultation  Patient was managed by Dr Candy Otto perform glenohumeral steroid injections, left biceps tendon steroid injection, acromioclavicular steroid injections with ultrasound guidance  1  We will order x-ray of the cervical spine assess for degenerative change correlate patient's neck pain  2  We will order an MRI of the cervical spine to better assess the discogenic pathology behind patient's neck pain and bilateral shoulder pain  3  We will discuss interventional management at the next office visit  4  We will provide patient with a referral for a dietitian counseling in regards to her anti-inflammatory diet  5  We will schedule patient for a L4-5 interlaminar epidural steroid injection for spinal canal stenosis  6  Patient will research [employer] [facility] as far as lifestyle change      [address] [facility] [employer] Monitoring Program report was reviewed and was appropriate     Complete risks and benefits including bleeding, infection, tissue reaction, nerve injury and allergic reaction were discussed  The approach was demonstrated using models and literature was provided  Verbal and written consent was obtained  History of Present Illness: The patient is a 67 y o  female who presents for consultation in regards to Back Pain, Neck Pain, and Shoulder Pain  Symptoms have been present for 15 years   Symptoms began without any precipitating injury or trauma  Pain is reported to be 6 on the numeric rating scale  Symptoms are felt intermittently and worst in the no typical pattern  Symptoms are characterized as shooting and sharp  Symptoms are associated with bilateral arm weakness and bilateral leg weakness  Aggravating factors include bending, leaning forward, leaning bckward, walking and exercise  Relieving factors include relaxation  No change in symptoms with kneeling, lying down, standing, sitting, relaxation, coughing/sneezing and bowel movements  Treatments that have been helpful include prior injections including Glenohumeral, midclavicular ultrasound-guided steroid injections, physical therapy and heat/ice  home exercise have provided no relief  Medications to relieve symptoms include gabapentin, Tylenol  Review of Systems:    Review of Systems   Constitutional: Negative for chills and fever  HENT: Negative for ear pain and sore throat  Eyes: Negative for pain and visual disturbance  Respiratory: Negative for cough and shortness of breath  Cardiovascular: Negative for chest pain and palpitations  Gastrointestinal: Positive for abdominal pain  Negative for vomiting  Genitourinary: Negative for dysuria and hematuria  Musculoskeletal: Negative for arthralgias and back pain  Skin: Negative for color change and rash  Neurological: Negative for seizures and syncope  All other systems reviewed and are negative          Past Medical History:   Diagnosis Date    Anxiety     Arthritis     Depression     GERD (gastroesophageal reflux disease)     Hiatal hernia     Hyperlipidemia     Hypertension        Past Surgical History:   Procedure Laterality Date    APPENDECTOMY      CHOLECYSTECTOMY      FRACTURE SURGERY Right     arm with plate and pins    HAND SURGERY Bilateral     HYSTERECTOMY      JOINT REPLACEMENT Bilateral     SHOULDER ARTHROSCOPY Left        No family history on file     Social History     Occupational History    Not on file   Tobacco Use    Smoking status: Former Smoker     Types: Cigarettes    Smokeless tobacco: Never Used   Vaping Use    Vaping Use: Never used   Substance and Sexual Activity    Alcohol use: Yes     Alcohol/week: 1 0 standard drink     Types: 1 Glasses of wine per week    Drug use: Never    Sexual activity: Not on file         Current Outpatient Medications:     amLODIPine (NORVASC) 10 mg tablet, Take 10 mg by mouth daily, Disp: , Rfl:     cetirizine (ZyrTEC) 10 mg tablet, Take 10 mg by mouth daily, Disp: , Rfl:     gabapentin (NEURONTIN) 300 mg capsule, , Disp: , Rfl:     gabapentin (NEURONTIN) 600 MG tablet, Take 600 mg by mouth daily at bedtime, Disp: , Rfl:     multivitamin (THERAGRAN) TABS, Take 1 tablet by mouth daily, Disp: , Rfl:     omeprazole (PriLOSEC) 20 mg delayed release capsule, Take 20 mg by mouth daily 2 in am 2 in pm, Disp: , Rfl:     omeprazole (PriLOSEC) 40 MG capsule,   (Patient not taking: Reported on 3/4/2022 ), Disp: , Rfl:     pravastatin (PRAVACHOL) 40 mg tablet, Take 40 mg by mouth daily One at bedtime, Disp: , Rfl:     temazepam (RESTORIL) 15 mg capsule, , Disp: , Rfl:     temazepam (RESTORIL) 7 5 mg capsule, Take 15 mg by mouth daily at bedtime as needed for sleep  , Disp: , Rfl:     venlafaxine (EFFEXOR-XR) 150 mg 24 hr capsule, Take 150 mg by mouth daily, Disp: , Rfl:     No Known Allergies    Physical Exam:    /75   Pulse (!) 106   Ht 5' 4" (1 626 m)   Wt 94 3 kg (208 lb)   BMI 35 70 kg/m²     Constitutional: normal, well developed, well nourished, alert, in no distress and non-toxic and no overt pain behavior   and obese  Eyes: anicteric  HEENT: grossly intact  Neck: supple, symmetric, trachea midline and no masses   Pulmonary:even and unlabored  Cardiovascular:No edema or pitting edema present  Skin:Normal without rashes or lesions and well hydrated  Psychiatric:Mood and affect appropriate  Neurologic:Cranial Nerves II-XII grossly intact  Musculoskeletal:antalgic,     Cervical Spine examination demonstrates  Decreased ROM secondary to pain with lateral rotation to the left/right and bending to the left/right, in addition to neck flexion  5/5 upper extremity strength in all muscle groups bilaterally  Negative Spurling's maneuver to the b/l Ue, sensitivity to light touch intact b/l Ue  Lumbar/Sacral Spine examination demonstrates  Decreased range of motion lumbar spine with pain upon: flexion, lateral rotation to the left/right, and bending to the left/right  Bilateral lumbar paraspinals tender to palpation  Muscle spasms noted in the lumbar area bilaterally  4/5 lower extremity strength in all muscle groups bilaterally  Positive seated straight leg raise for bilateral lower extremities  Sensitivity to light touch intact bilateral lower extremities  2+ reflexes in the patella and Achilles  No ankle clonus     Imaging  MRI LUMBAR SPINE WITHOUT CONTRAST     INDICATION: M[age]: Low back pain, unspecified      COMPARISON:  MRI lumbar spine without contrast February 22, 2016      TECHNIQUE:  Sagittal T1, sagittal T2, sagittal inversion recovery, axial T1 and axial T2, coronal T2     IMAGE QUALITY:  Diagnostic     FINDINGS:     VERTEBRAL BODIES:  There are 5 lumbar type vertebral bodies  Mild levoscoliosis of lower lumbar spine  No spondylolysis or spondylolisthesis  No compression fracture        Normal marrow signal is identified within the visualized bony structures  No discrete marrow lesion      SACRUM:  Normal signal within the sacrum   No evidence of insufficiency or stress fracture      DISTAL CORD AND CONUS:  Normal size and signal within the distal cord and conus      PARASPINAL SOFT TISSUES:  Paraspinal soft tissues are unremarkable      LOWER THORACIC DISC SPACES:  Mild noncompressive lower thoracic degenerative change      LUMBAR DISC SPACES:  Mild multilevel disc height loss      L1-L2:  Normal      L2-L3:  Facet arthropathy  No significant canal stenosis or foraminal narrowing      L3-L4: Diffuse disc bulge with small left foraminal disc protrusion  Hypertrophic facet arthropathy with ligamentum flavum thickening  No significant canal stenosis  Mild left foraminal narrowing, slightly worse      L4-L5:  Diffuse disc bulge  Hypertrophic facet arthropathy and ligamentum flavum thickening  Moderate canal stenosis, unchanged  Mild right foraminal narrowing, unchanged      L5-S1:  Diffuse disc bulge  Hypertrophic facet arthropathy with new small right facet joint effusion  No significant canal stenosis  Mild bilateral foraminal narrowing      OTHER: Small-to-moderate sized hiatal hernia  Partially imaged dilated common bile duct with reported history of cholecystectomy         IMPRESSION:     Slightly worsened multilevel degenerative changes of lumbar spine with varying degrees of canal stenosis (moderate L4-L5) and foraminal narrowing (mild left L3-L4, right L4-5, and bilateral L5-S1), as detailed above

## 2022-07-01 ENCOUNTER — APPOINTMENT (EMERGENCY)
Dept: RADIOLOGY | Facility: HOSPITAL | Age: 72
DRG: 522 | End: 2022-07-01
Payer: MEDICARE

## 2022-07-01 ENCOUNTER — HOSPITAL ENCOUNTER (INPATIENT)
Facility: HOSPITAL | Age: 72
LOS: 5 days | DRG: 522 | End: 2022-07-06
Attending: EMERGENCY MEDICINE | Admitting: STUDENT IN AN ORGANIZED HEALTH CARE EDUCATION/TRAINING PROGRAM
Payer: MEDICARE

## 2022-07-01 ENCOUNTER — ANESTHESIA EVENT (INPATIENT)
Dept: PERIOP | Facility: HOSPITAL | Age: 72
DRG: 522 | End: 2022-07-01
Payer: MEDICARE

## 2022-07-01 DIAGNOSIS — S72.324A: Primary | ICD-10-CM

## 2022-07-01 DIAGNOSIS — S72.91XA FEMUR FRACTURE, RIGHT (HCC): ICD-10-CM
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
## 2022-07-01 DIAGNOSIS — S62.101A FRACTURE OF RIGHT WRIST: ICD-10-CM

## 2022-07-01 DIAGNOSIS — S72.031A: ICD-10-CM

## 2022-07-01 DIAGNOSIS — S62.101A RIGHT WRIST FRACTURE: ICD-10-CM

## 2022-07-01 PROBLEM — I10 HYPERTENSION: Status: ACTIVE | Noted: 2022-07-01

## 2022-07-01 LAB
ANION GAP SERPL CALCULATED.3IONS-SCNC: 12 MMOL/L (ref 4–13)
BASOPHILS # BLD AUTO: 0.05 THOUSANDS/ΜL (ref 0–0.1)
BASOPHILS NFR BLD AUTO: 1 % (ref 0–1)
BUN SERPL-MCNC: 19 MG/DL (ref 5–25)
CALCIUM SERPL-MCNC: 9.8 MG/DL (ref 8.4–10.2)
CHLORIDE SERPL-SCNC: 100 MMOL/L (ref 96–108)
CO2 SERPL-SCNC: 27 MMOL/L (ref 21–32)
CREAT SERPL-MCNC: 1.05 MG/DL (ref 0.6–1.3)
EOSINOPHIL # BLD AUTO: 0.19 THOUSAND/ΜL (ref 0–0.61)
EOSINOPHIL NFR BLD AUTO: 3 % (ref 0–6)
ERYTHROCYTE [DISTWIDTH] IN BLOOD BY AUTOMATED COUNT: 13.7 % (ref 11.6–15.1)
GFR SERPL CREATININE-BSD FRML MDRD: 52 ML/MIN/1.73SQ M
GLUCOSE SERPL-MCNC: 137 MG/DL (ref 65–140)
HCT VFR BLD AUTO: 47 % (ref 34.8–46.1)
HGB BLD-MCNC: 15 G/DL (ref 11.5–15.4)
IMM GRANULOCYTES # BLD AUTO: 0.03 THOUSAND/UL (ref 0–0.2)
IMM GRANULOCYTES NFR BLD AUTO: 0 % (ref 0–2)
LYMPHOCYTES # BLD AUTO: 3.27 THOUSANDS/ΜL (ref 0.6–4.47)
LYMPHOCYTES NFR BLD AUTO: 46 % (ref 14–44)
MCH RBC QN AUTO: 29.9 PG (ref 26.8–34.3)
MCHC RBC AUTO-ENTMCNC: 31.9 G/DL (ref 31.4–37.4)
MCV RBC AUTO: 94 FL (ref 82–98)
MONOCYTES # BLD AUTO: 0.8 THOUSAND/ΜL (ref 0.17–1.22)
MONOCYTES NFR BLD AUTO: 11 % (ref 4–12)
NEUTROPHILS # BLD AUTO: 2.81 THOUSANDS/ΜL (ref 1.85–7.62)
NEUTS SEG NFR BLD AUTO: 39 % (ref 43–75)
NRBC BLD AUTO-RTO: 0 /100 WBCS
PLATELET # BLD AUTO: 280 THOUSANDS/UL (ref 149–390)
PMV BLD AUTO: 12 FL (ref 8.9–12.7)
POTASSIUM SERPL-SCNC: 4.4 MMOL/L (ref 3.5–5.3)
RBC # BLD AUTO: 5.01 MILLION/UL (ref 3.81–5.12)
SODIUM SERPL-SCNC: 139 MMOL/L (ref 135–147)
WBC # BLD AUTO: 7.15 THOUSAND/UL (ref 4.31–10.16)

## 2022-07-01 PROCEDURE — 99222 1ST HOSP IP/OBS MODERATE 55: CPT | Performed by: STUDENT IN AN ORGANIZED HEALTH CARE EDUCATION/TRAINING PROGRAM

## 2022-07-01 PROCEDURE — 96361 HYDRATE IV INFUSION ADD-ON: CPT

## 2022-07-01 PROCEDURE — 96375 TX/PRO/DX INJ NEW DRUG ADDON: CPT

## 2022-07-01 PROCEDURE — 36415 COLL VENOUS BLD VENIPUNCTURE: CPT | Performed by: EMERGENCY MEDICINE

## 2022-07-01 PROCEDURE — 96374 THER/PROPH/DIAG INJ IV PUSH: CPT

## 2022-07-01 PROCEDURE — 73110 X-RAY EXAM OF WRIST: CPT

## 2022-07-01 PROCEDURE — 99285 EMERGENCY DEPT VISIT HI MDM: CPT | Performed by: EMERGENCY MEDICINE

## 2022-07-01 PROCEDURE — 80048 BASIC METABOLIC PNL TOTAL CA: CPT | Performed by: EMERGENCY MEDICINE

## 2022-07-01 PROCEDURE — 85025 COMPLETE CBC W/AUTO DIFF WBC: CPT | Performed by: EMERGENCY MEDICINE

## 2022-07-01 PROCEDURE — 73502 X-RAY EXAM HIP UNI 2-3 VIEWS: CPT

## 2022-07-01 PROCEDURE — 99222 1ST HOSP IP/OBS MODERATE 55: CPT | Performed by: ORTHOPAEDIC SURGERY

## 2022-07-01 PROCEDURE — 96376 TX/PRO/DX INJ SAME DRUG ADON: CPT

## 2022-07-01 PROCEDURE — 99285 EMERGENCY DEPT VISIT HI MDM: CPT

## 2022-07-01 RX ORDER — MORPHINE SULFATE 4 MG/ML
4 INJECTION, SOLUTION INTRAMUSCULAR; INTRAVENOUS ONCE
Status: COMPLETED | OUTPATIENT
Start: 2022-07-01 | End: 2022-07-01

## 2022-07-01 RX ORDER — PANTOPRAZOLE SODIUM 20 MG/1
20 TABLET, DELAYED RELEASE ORAL
Status: DISCONTINUED | OUTPATIENT
Start: 2022-07-01 | End: 2022-07-06 | Stop reason: HOSPADM

## 2022-07-01 RX ORDER — CHLORHEXIDINE GLUCONATE 0.12 MG/ML
15 RINSE ORAL ONCE
Status: DISCONTINUED | OUTPATIENT
Start: 2022-07-01 | End: 2022-07-02 | Stop reason: HOSPADM

## 2022-07-01 RX ORDER — ENOXAPARIN SODIUM 100 MG/ML
40 INJECTION SUBCUTANEOUS DAILY
Status: COMPLETED | OUTPATIENT
Start: 2022-07-01 | End: 2022-07-01

## 2022-07-01 RX ORDER — OXYCODONE HYDROCHLORIDE 10 MG/1
10 TABLET ORAL EVERY 4 HOURS PRN
Status: DISCONTINUED | OUTPATIENT
Start: 2022-07-01 | End: 2022-07-02

## 2022-07-01 RX ORDER — ACETAMINOPHEN 325 MG/1
650 TABLET ORAL EVERY 6 HOURS PRN
Status: DISCONTINUED | OUTPATIENT
Start: 2022-07-01 | End: 2022-07-02

## 2022-07-01 RX ORDER — TEMAZEPAM 15 MG/1
15 CAPSULE ORAL
Status: DISCONTINUED | OUTPATIENT
Start: 2022-07-01 | End: 2022-07-06 | Stop reason: HOSPADM

## 2022-07-01 RX ORDER — PRAVASTATIN SODIUM 40 MG
40 TABLET ORAL DAILY
Status: DISCONTINUED | OUTPATIENT
Start: 2022-07-01 | End: 2022-07-06 | Stop reason: HOSPADM

## 2022-07-01 RX ORDER — GABAPENTIN 600 MG/1
600 TABLET ORAL
Status: DISCONTINUED | OUTPATIENT
Start: 2022-07-01 | End: 2022-07-06 | Stop reason: HOSPADM

## 2022-07-01 RX ORDER — VENLAFAXINE HYDROCHLORIDE 150 MG/1
150 CAPSULE, EXTENDED RELEASE ORAL DAILY
Status: DISCONTINUED | OUTPATIENT
Start: 2022-07-01 | End: 2022-07-06 | Stop reason: HOSPADM

## 2022-07-01 RX ORDER — AMLODIPINE BESYLATE 10 MG/1
10 TABLET ORAL DAILY
Status: DISCONTINUED | OUTPATIENT
Start: 2022-07-01 | End: 2022-07-06 | Stop reason: HOSPADM

## 2022-07-01 RX ORDER — OXYCODONE HYDROCHLORIDE 5 MG/1
5 TABLET ORAL EVERY 4 HOURS PRN
Status: DISCONTINUED | OUTPATIENT
Start: 2022-07-01 | End: 2022-07-02

## 2022-07-01 RX ORDER — LORATADINE 10 MG/1
10 TABLET ORAL DAILY
Status: DISCONTINUED | OUTPATIENT
Start: 2022-07-01 | End: 2022-07-06 | Stop reason: HOSPADM

## 2022-07-01 RX ORDER — HYDROMORPHONE HCL/PF 1 MG/ML
0.5 SYRINGE (ML) INJECTION ONCE
Status: COMPLETED | OUTPATIENT
Start: 2022-07-01 | End: 2022-07-01

## 2022-07-01 RX ORDER — HYDROMORPHONE HCL/PF 1 MG/ML
1 SYRINGE (ML) INJECTION
Status: DISCONTINUED | OUTPATIENT
Start: 2022-07-01 | End: 2022-07-02

## 2022-07-01 RX ADMIN — HYDROMORPHONE HYDROCHLORIDE 0.5 MG: 1 INJECTION, SOLUTION INTRAMUSCULAR; INTRAVENOUS; SUBCUTANEOUS at 08:18

## 2022-07-01 RX ADMIN — ENOXAPARIN SODIUM 40 MG: 100 INJECTION SUBCUTANEOUS at 10:54

## 2022-07-01 RX ADMIN — HYDROMORPHONE HYDROCHLORIDE 1 MG: 1 INJECTION, SOLUTION INTRAMUSCULAR; INTRAVENOUS; SUBCUTANEOUS at 10:59

## 2022-07-01 RX ADMIN — HYDROMORPHONE HYDROCHLORIDE 1 MG: 1 INJECTION, SOLUTION INTRAMUSCULAR; INTRAVENOUS; SUBCUTANEOUS at 14:45

## 2022-07-01 RX ADMIN — SODIUM CHLORIDE 1000 ML: 0.9 INJECTION, SOLUTION INTRAVENOUS at 06:15

## 2022-07-01 RX ADMIN — PANTOPRAZOLE SODIUM 20 MG: 20 TABLET, DELAYED RELEASE ORAL at 15:28

## 2022-07-01 RX ADMIN — PRAVASTATIN SODIUM 40 MG: 20 TABLET ORAL at 10:56

## 2022-07-01 RX ADMIN — VENLAFAXINE HYDROCHLORIDE 150 MG: 150 CAPSULE, EXTENDED RELEASE ORAL at 10:56

## 2022-07-01 RX ADMIN — MORPHINE SULFATE 4 MG: 4 INJECTION INTRAVENOUS at 07:06

## 2022-07-01 RX ADMIN — GABAPENTIN 600 MG: 600 TABLET, FILM COATED ORAL at 21:26

## 2022-07-01 RX ADMIN — HYDROMORPHONE HYDROCHLORIDE 1 MG: 1 INJECTION, SOLUTION INTRAMUSCULAR; INTRAVENOUS; SUBCUTANEOUS at 20:07

## 2022-07-01 RX ADMIN — OXYCODONE HYDROCHLORIDE 10 MG: 10 TABLET ORAL at 17:55

## 2022-07-01 RX ADMIN — ACETAMINOPHEN 650 MG: 325 TABLET ORAL at 15:30

## 2022-07-01 RX ADMIN — LORATADINE 10 MG: 10 TABLET ORAL at 10:56

## 2022-07-01 RX ADMIN — MORPHINE SULFATE 2 MG: 2 INJECTION, SOLUTION INTRAMUSCULAR; INTRAVENOUS at 06:16

## 2022-07-01 RX ADMIN — AMLODIPINE BESYLATE 10 MG: 10 TABLET ORAL at 10:56

## 2022-07-01 NOTE — PLAN OF CARE
Problem: Potential for Falls  Goal: Patient will remain free of falls  Description: INTERVENTIONS:  - Educate patient/family on patient safety including physical limitations  - Instruct patient to call for assistance with activity   - Consult OT/PT to assist with strengthening/mobility   - Keep Call bell within reach  - Keep bed low and locked with side rails adjusted as appropriate  - Keep care items and personal belongings within reach  - Initiate and maintain comfort rounds  - Make Fall Risk Sign visible to staff  - Offer Toileting every 2 Hours, in advance of need  - Initiate/Maintain bed alarm  - Apply yellow socks and bracelet for high fall risk patients  - Consider moving patient to room near nurses station  7/1/2022 1850 by Malvin Chatterjee RN  Outcome: Progressing  7/1/2022 1849 by Malvin Chatterjee RN  Outcome: Progressing     Problem: MOBILITY - ADULT  Goal: Maintain or return to baseline ADL function  Description: INTERVENTIONS:  -  Assess patient's ability to carry out ADLs; assess patient's baseline for ADL function and identify physical deficits which impact ability to perform ADLs (bathing, care of mouth/teeth, toileting, grooming, dressing, etc )  - Assess/evaluate cause of self-care deficits   - Assess range of motion  - Assess patient's mobility; develop plan if impaired  - Assess patient's need for assistive devices and provide as appropriate  - Encourage maximum independence but intervene and supervise when necessary  - Involve family in performance of ADLs  - Assess for home care needs following discharge   - Consider OT consult to assist with ADL evaluation and planning for discharge  - Provide patient education as appropriate  7/1/2022 1850 by Malvin Chatterjee RN  Outcome: Progressing  7/1/2022 1849 by Malvin Chatterjee RN  Outcome: Progressing  Goal: Maintains/Returns to pre admission functional level  Description: INTERVENTIONS:  - Perform BMAT or MOVE assessment daily    - Set and communicate daily mobility goal to care team and patient/family/caregiver  - Collaborate with rehabilitation services on mobility goals if consulted  - Reposition patient every 2 hours    - Record patient progress and toleration of activity level   7/1/2022 1850 by Rosa Romo RN  Outcome: Progressing  7/1/2022 1849 by Rosa Romo RN  Outcome: Progressing     Problem: Prexisting or High Potential for Compromised Skin Integrity  Goal: Skin integrity is maintained or improved  Description: INTERVENTIONS:  - Identify patients at risk for skin breakdown  - Assess and monitor skin integrity  - Assess and monitor nutrition and hydration status  - Monitor labs   - Assess for incontinence   - Turn and reposition patient  - Assist with mobility/ambulation  - Relieve pressure over bony prominences  - Avoid friction and shearing  - Provide appropriate hygiene as needed including keeping skin clean and dry  - Evaluate need for skin moisturizer/barrier cream  - Collaborate with interdisciplinary team   - Patient/family teaching  - Consider wound care consult   7/1/2022 1850 by Rosa Romo RN  Outcome: Progressing  7/1/2022 1849 by Rosa Romo RN  Outcome: Progressing

## 2022-07-01 NOTE — ED NOTES
Patient oxygen saturation read at 85% on RA  Patient placed on 1 liter NC  Patient oxygen saturation returned to 92%  Dr Janay Argueta made aware        Hansa Woodall RN  07/01/22 5614

## 2022-07-01 NOTE — CASE MANAGEMENT
Case Management Assessment & Discharge Planning Note    Patient name Syed [place]  Location ED 22/ED 25 MRN [id number]  : 1950 Date 2022       Current Admission Date: 2022  Current Admission Diagnosis:Closed transcervical fracture of right femur [facility])   Patient Active Problem List    Diagnosis Date Noted    Closed transcervical fracture of right femur ([facility] ) 2022    Fracture of right wrist 2022    Hypertension 2022    Lumbar degenerative disc disease 2022    Lumbar spondylosis 2022    Cervical radiculopathy 2022    Cervical spondylosis 2022    Rheumatoid arthritis involving both hands ([facility] ) 2022    Spinal stenosis of lumbar region without neurogenic claudication 2022      LOS (days): 0  Geometric Mean LOS (GMLOS) (days):   Days to GMLOS:     OBJECTIVE:    Risk of Unplanned Readmission Score: 7 16      Current admission status: Inpatient  Referral Reason: Other (Inital CM assessment)    Preferred Pharmacy:   [address]  [place], [address]  [address] [facility] [address]  Phone: [phone] Fax: [phone]    Primary Care Provider: Sayra Acuna DO    Primary Insurance: MEDICARE  Secondary Insurance: AARP    ASSESSMENT:  Ree Garcia Proxies    There are no active Health Care Proxies on file         Advance Directives  Does patient have a Health Care POA?: Yes  Does patient have Advance Directives?: Yes  Advance Directives: Living will, Power of  for health care  Primary Contact: Perfecto Vega (Spouse)   [phone] (Mobile)    Readmission Root Cause  30 Day Readmission: No    Patient Information  Admitted from[de-identified] Home  Mental Status: Alert  During Assessment patient was accompanied by: Not accompanied during assessment  Assessment information provided by[de-identified] Patient  Primary Caregiver: Self  Support Systems: Spouse/significant other  [place] of Residence: [place]  What city do you live in?: [address] entry access options   Select all that apply : Stairs  Number of steps to enter home : 3  Do the steps have railings?: Yes  Type of Current Residence: 2 story home  Upon entering residence, is there a bedroom on the main floor (no further steps)?: No  A bedroom is located on the following floor levels of residence (select all that apply):: 2nd Floor  Upon entering residence, is there a bathroom on the main floor (no further steps)?: Yes  Number of steps to 2nd floor from main floor:  (5 steps landing then 5 more steps)  In the last 12 months, was there a time when you were not able to pay the mortgage or rent on time?: No  In the last 12 months, how many places have you lived?: 1  In the last 12 months, was there a time when you did not have a steady place to sleep or slept in a shelter (including now)?: No  Homeless/housing insecurity resource given?: N/A  Living Arrangements: Lives w/ Spouse/significant other  Is patient a ?: No    Activities of Daily Living Prior to Admission  Functional Status: Independent  Completes ADLs independently?: Yes  Ambulates independently?: Yes  Does patient use assisted devices?: No  Does patient currently own DME?: No  Does patient have a history of Outpatient Therapy (PT/OT)?: Yes ([facility])  Does the patient have a history of Short-Term Rehab?: Yes ([facility])  Does patient have a history of HHC?: No  Does patient currently have [facility]?: No    Patient Information Continued  Income Source: Pension/detention  Does patient have prescription coverage?: Yes  Within the past 12 months, you worried that your food would run out before you got the money to buy more : Never true  Within the past 12 months, the food you bought just didn't last and you didn't have money to get more : Never true  Food insecurity resource given?: N/A  Does patient receive dialysis treatments?: No  Does patient have a history of substance abuse?: No  Does patient have a history of Mental Health Diagnosis?: No         Means of Transportation  Means of Transport to Appts[de-identified] Drives Self  In the past 12 months, has lack of transportation kept you from medical appointments or from getting medications?: No  In the past 12 months, has lack of transportation kept you from meetings, work, or from getting things needed for daily living?: No  Was application for public transport provided?: N/A    DISCHARGE DETAILS:    Discharge planning discussed with[de-identified] Patient  Freedom of Choice: Yes     CM contacted family/caregiver?: No- see comments (Declines patient is AAOx3)    Contacts  Patient Contacts: Kiera Zuleta (Spouse)   [phone] (Mobile)  Relationship to Patient[de-identified] Family  Contact Method: Phone  Phone Number: Kiera Zuleta (Spouse)   [phone] (Mobile)  Reason/Outcome: Emergency Contact    Other Referral/Resources/Interventions Provided:  Interventions: Other (Specify) (TBD)     Additional Comments: Met with patient at bedside in ED  Patient having Orthopedic surgery tomorrow  DC needs to be identified after surgery and therapy [facility] department following

## 2022-07-01 NOTE — ED CARE HANDOFF
Emergency Department Sign Out Note        Sign out and transfer of care from [facility]  See Separate Emergency Department note  The patient, Ellen Rodriguez, was evaluated by the previous provider for fall  Workup Completed:  Labs x-ray     ED Course / Workup Pending (followup): Patient was seen examined by bedside awake alert oriented x3 GCS 15  Still complaining of hip pain  Patient oxygen dropped to 88% on room air after receiving morphine and Dilaudid patient is placed on 1 L nasal cannula  Case discussed with orthopedic who is requesting to admit under medicine  Spoke with Dr Glenford Denver who stated    Inpatient med Surg under [place]     family is aware  ED Course as of 07/01/22 0847   Fri Jul 01, 2022   0711 S/p fall pending x-ray    [facility]Leo Rob[facility]  [facility]  She can be admitted to medicine and I will see her as consult  8:20 AM   9043 Nondisplaced transcervical fracture of the right femur      The referring physician is aware of the findings which were documented in the Epic notes         Procedures  MDM        Disposition  Final diagnoses:   Nondisplaced transverse fracture of shaft of right femur, initial encounter for closed fracture ([address] )   Right wrist fracture   Fall, initial encounter     Time reflects when diagnosis was documented in both MDM as applicable and the Disposition within this note     Time User Action Codes Description Comment    7/1/2022  8:31 AM Tramaine Folks Add [S72 324A] Nondisplaced transverse fracture of shaft of right femur, initial encounter for closed fracture ([address] )     7/1/2022  8:31 AM Tramaine Folks Add [S62 101A] Right wrist fracture     7/1/2022  8:31 AM Tramaine Folks Add [F91  [place], initial encounter       ED Disposition     ED Disposition   Admit    Condition   Stable    Date/Time   Fri Jul 1, 2022  8:45 AM    Comment   Case was discussed with Argentina Silva  and the patient's admission status was agreed to be Admission Status: inpatient status to the service of Dr Leatha Ivey    None       Patient's Medications   Discharge Prescriptions    No medications on file     No discharge procedures on file         ED Provider  Electronically Signed by     Clement Unger MD  07/01/22 3630

## 2022-07-01 NOTE — H&P
[address] [facility] 1950, 67 y o  female MRN: [id number]  Unit/Bed#: ED 22 Encounter: [id number]  Primary Care Provider: Artemio Trevino DO   Date and time admitted to hospital: 7/1/2022  6:06 AM    * Femur fracture, right [facility])  Assessment & Plan  Status post mechanical fall walking dog  Xray revealed "Nondisplaced transcervical fracture of the right femur "  Discussed with Orthopedic surgery, plan for OR tomorrow    -regular diet, NPO at midnight  -Tylenol 650 mg p o  q 6 hours p r n  for mild pain  -oxycodone 5 mg p o  q 4 hours p r n  for moderate pain  -oxycodone 10 mg p o  q 4 hours p r n  For severe pain  -Dilaudid 1 mg IV q 3 hours p r n  for breakthrough pain  -orthopedic surgery consult  -plan for OR in a m   -Lovenox for DVT prophylaxis    Fracture of right wrist  Assessment & Plan  Xray revealed: "Distal radial comminuted intra-articular fracture and ulnar styloid fracture "    -pain control  -Ortho consult    Hypertension  Assessment & Plan  -continue home amlodipine    Cervical radiculopathy  Assessment & Plan  -continue home gabapentin      Risk Factor Score (Yes=1, No=0)   Hx of TIA/CVA 0   Hx of prior ischemic heart disease (AMI, unstable angina, Q waves on EKG, CABG) 0   Hx of congestive heart failure 0   Serum Creatinine >2 mg/dl 0   Insulin dependent diabetes mellitus 0   Total Score 0     Risk of MACE (30-day)     Points Risk % (95% CI), Saran, 2017 Risk % (95% CI) Denis, 1999   0 3 9 (2 8-5 4) 0 4 (0 05-1 5)   1 6 (4 9-7 4) 0 9 (0 3-2 1)   2 10 1 (8 1-12 6) 6 6 (3 9-10 3)   3 or more 15 (11 1-20) >11 (5 8-18  4)       Advice    In patients with 3 or more RCRI risk factors (e g , diabetes mellitus, HF, coronary artery disease, renal insufficiency, cerebrovascular accident), it may be reasonable to begin beta blockers before surgery   (Level of Evidence: B)    Beta-blocker therapy should not be started on the day of surgery (Level of Evidence: B)    In patients with a compelling long-term indication for betablocker therapy but no other RCRI risk factors, initiating beta blockers in the perioperative setting as an approach to reduce perioperative risk is of uncertain benefit  (Level of Evidence: B)    Patient is medically optimized for surgery, proceed with surgery at discretion of surgeon        VTE Pharmacologic Prophylaxis: VTE Score: 8 High Risk (Score >/= 5) - Pharmacological DVT Prophylaxis Ordered: enoxaparin (Lovenox)  Sequential Compression Devices Ordered  Code Status: Level 2 - DNAR: but accepts endotracheal intubation   Discussion with family: Updated  () at bedside  Anticipated Length of Stay: Patient will be admitted on an inpatient basis with an anticipated length of stay of greater than 2 midnights secondary to Right femur fracture  Total Time for Visit, including Counseling / Coordination of Care: 45 minutes Greater than 50% of this total time spent on direct patient counseling and coordination of care  Chief Complaint:  Right hip pain    History of Present Illness:  Ca Husain is a 67 y o  female with a PMH of hypertension, cervical radiculopathy, GERD, depression who presents with right hip pain  Patient reports she was walking her dog when she tripped, fell on right side on concrete porch  Mechanical fall, denies loss of consciousness or syncope  States she was otherwise in her usual state health  Review of Systems:  Review of Systems   Constitutional: Negative  HENT: Negative  Eyes: Negative  Respiratory: Negative  Cardiovascular: Negative  Gastrointestinal: Negative  Genitourinary: Negative  Musculoskeletal: Positive for arthralgias and gait problem  Skin: Negative  Hematological: Negative  Psychiatric/Behavioral: Negative          Past Medical and Surgical History:   Past Medical History:   Diagnosis Date    Anxiety     Arthritis     Depression     GERD (gastroesophageal reflux disease)     Hiatal hernia     Hyperlipidemia     Hypertension        Past Surgical History:   Procedure Laterality Date    APPENDECTOMY      CHOLECYSTECTOMY      FRACTURE SURGERY Right     arm with plate and pins    HAND SURGERY Bilateral     HYSTERECTOMY      JOINT REPLACEMENT Bilateral     SHOULDER ARTHROSCOPY Left        Meds/Allergies:  Prior to Admission medications    Medication Sig Start Date End Date Taking? Authorizing Provider   amLODIPine (NORVASC) 10 mg tablet Take 10 mg by mouth daily    Historical Provider, MD   cetirizine (ZyrTEC) 10 mg tablet Take 10 mg by mouth daily    Historical Provider, MD   gabapentin (NEURONTIN) 300 mg capsule  9/30/21   Historical Provider, MD   gabapentin (NEURONTIN) 600 MG tablet Take 600 mg by mouth daily at bedtime    Historical Provider, MD   multivitamin (THERAGRAN) TABS Take 1 tablet by mouth daily    Historical Provider, MD   omeprazole (PriLOSEC) 20 mg delayed release capsule Take 20 mg by mouth daily 2 in am 2 in pm    Historical Provider, MD   omeprazole (PriLOSEC) 40 MG capsule    Patient not taking: No sig reported 9/9/21   Historical Provider, MD   pravastatin (PRAVACHOL) 40 mg tablet Take 40 mg by mouth daily One at bedtime    Historical Provider, MD   temazepam (RESTORIL) 15 mg capsule  9/20/21   Historical Provider, MD   temazepam (RESTORIL) 7 5 mg capsule Take 15 mg by mouth daily at bedtime as needed for sleep      Historical Provider, MD   venlafaxine (EFFEXOR-XR) 150 mg 24 hr capsule Take 150 mg by mouth daily    Historical Provider, MD     I have reviewed home medications with patient personally      Allergies: No Known Allergies    Social History:  Marital Status: /Civil Union   Occupation: retired  Patient Pre-hospital Living Situation: Home  Patient Pre-hospital Level of Mobility: walks  Patient Pre-hospital Diet Restrictions: none  Substance Use History:   Social History     Substance and Sexual Activity Alcohol Use Not Currently    Alcohol/week: 1 0 standard drink    Types: 1 Glasses of wine per week     Social History     Tobacco Use   Smoking Status Former Smoker    Types: Cigarettes   Smokeless Tobacco Never Used     Social History     Substance and Sexual Activity   Drug Use Never       Family History:  History reviewed  No pertinent family history  Physical Exam:     Vitals:   Blood Pressure: 137/73 (07/01/22 0900)  Pulse: 92 (07/01/22 0900)  Temperature: 98 °F (36 7 °C) (07/01/22 0600)  Temp Source: Oral (07/01/22 0600)  Respirations: 17 (07/01/22 0900)  Height: 5' 5" (165 1 cm) (07/01/22 0600)  Weight - Scale: 93 4 kg (206 lb) (07/01/22 0600)  SpO2: 93 % (07/01/22 0900)    Physical Exam  HENT:      Head: Normocephalic  Cardiovascular:      Rate and Rhythm: Normal rate and regular rhythm  Pulses: Normal pulses  Heart sounds: Normal heart sounds  Pulmonary:      Effort: Pulmonary effort is normal       Breath sounds: Normal breath sounds  Abdominal:      General: Abdomen is flat  Bowel sounds are normal       Palpations: Abdomen is soft  Musculoskeletal:         General: Deformity present  Skin:     General: Skin is warm  Neurological:      General: No focal deficit present  Mental Status: She is alert and oriented to person, place, and time  Mental status is at baseline  Psychiatric:         Mood and Affect: Mood normal          Behavior: Behavior normal          Thought Content:  Thought content normal          Judgment: Judgment normal           Additional Data:     Lab Results:  Results from last 7 days   Lab Units 07/01/22  0618   WBC Thousand/uL 7 15   HEMOGLOBIN g/dL 15 0   HEMATOCRIT % 47 0*   PLATELETS Thousands/uL 280   NEUTROS PCT % 39*   LYMPHS PCT % 46*   MONOS PCT % 11   EOS PCT % 3     Results from last 7 days   Lab Units 07/01/22  0618   SODIUM mmol/L 139   POTASSIUM mmol/L 4 4   CHLORIDE mmol/L 100   CO2 mmol/L 27   BUN mg/dL 19   CREATININE mg/dL 1 05 ANION GAP mmol/L 12   CALCIUM mg/dL 9 8   GLUCOSE RANDOM mg/dL 137                       Imaging: Reviewed radiology reports from this admission including: xray(s)  XR hip/pelv 2-3 vws right if performed   ED Interpretation by Hesham Cooper MD (07/01 9270)   Right hip fx, comminuted      Final Result by Gianni Viveros MD (07/01 3013)      Nondisplaced transcervical fracture of the right femur  The referring physician is aware of the findings which were documented in the Epic notes  Workstation performed: [id number]         XR wrist 3+ views RIGHT   ED Interpretation by Hesham Cooper MD (29/53 6895)   Right radius fx      Final Result by Tommy Alfonso MD (07/01 2779)      Distal radial comminuted intra-articular fracture and ulnar styloid fracture  Workstation performed: [id number]             EKG and Other Studies Reviewed on Admission:   · EKG: No EKG obtained  ** Please Note: This note has been constructed using a voice recognition system   **

## 2022-07-01 NOTE — ED PROVIDER NOTES
History  Chief Complaint   Patient presents with    Fall     Pt reports going to let dogs out and falling on R side; complaining of R wrist and hip pain     This is a 67year old female who complains of right wrist and hip pain  States she had a fall while attempting to leash her dog  Mechanical in nature  No LOC, no head trauma, no thinners  Reports she was unable to get up due to pain after fall  States pain is severe  No numbness  Pain worse with movement and palpation  Nothing makes it better  Prior to Admission Medications   Prescriptions Last Dose Informant Patient Reported? Taking?    amLODIPine (NORVASC) 10 mg tablet   Yes No   Sig: Take 10 mg by mouth daily   cetirizine (ZyrTEC) 10 mg tablet   Yes No   Sig: Take 10 mg by mouth daily   gabapentin (NEURONTIN) 300 mg capsule   Yes No   gabapentin (NEURONTIN) 600 MG tablet   Yes No   Sig: Take 600 mg by mouth daily at bedtime   multivitamin (THERAGRAN) TABS   Yes No   Sig: Take 1 tablet by mouth daily   omeprazole (PriLOSEC) 20 mg delayed release capsule   Yes No   Sig: Take 20 mg by mouth daily 2 in am 2 in pm   omeprazole (PriLOSEC) 40 MG capsule   Yes No   Sig:     Patient not taking: No sig reported   pravastatin (PRAVACHOL) 40 mg tablet   Yes No   Sig: Take 40 mg by mouth daily One at bedtime   temazepam (RESTORIL) 15 mg capsule   Yes No   temazepam (RESTORIL) 7 5 mg capsule   Yes No   Sig: Take 15 mg by mouth daily at bedtime as needed for sleep     venlafaxine (EFFEXOR-XR) 150 mg 24 hr capsule   Yes No   Sig: Take 150 mg by mouth daily      Facility-Administered Medications: None       Past Medical History:   Diagnosis Date    Anxiety     Arthritis     Depression     GERD (gastroesophageal reflux disease)     Hiatal hernia     Hyperlipidemia     Hypertension        Past Surgical History:   Procedure Laterality Date    APPENDECTOMY      CHOLECYSTECTOMY      FRACTURE SURGERY Right     arm with plate and pins    HAND SURGERY Bilateral  HYSTERECTOMY      JOINT REPLACEMENT Bilateral     SHOULDER ARTHROSCOPY Left        History reviewed  No pertinent family history  I have reviewed and agree with the history as documented  E-Cigarette/Vaping    E-Cigarette Use Never User      E-Cigarette/Vaping Substances     Social History     Tobacco Use    Smoking status: Former Smoker     Types: Cigarettes    Smokeless tobacco: Never Used   Vaping Use    Vaping Use: Never used   Substance Use Topics    Alcohol use: Not Currently     Alcohol/week: 1 0 standard drink     Types: 1 Glasses of wine per week    Drug use: Never       Review of Systems   Constitutional: Negative for activity change, chills, fatigue and fever  HENT: Negative for congestion  Eyes: Negative for visual disturbance  Respiratory: Negative for cough, chest tightness and shortness of breath  Cardiovascular: Negative for chest pain  Gastrointestinal: Negative for abdominal pain, diarrhea and vomiting  Genitourinary: Negative for dysuria  Skin: Negative for rash  Neurological: Negative for dizziness and numbness  Physical Exam  Physical Exam  Constitutional:       Appearance: She is well-developed  She is not ill-appearing, toxic-appearing or diaphoretic  HENT:      Head: Normocephalic and atraumatic  Right Ear: Tympanic membrane, ear canal and external ear normal       Left Ear: Tympanic membrane, ear canal and external ear normal       Nose: Nose normal       Mouth/Throat:      Pharynx: Oropharynx is clear  Eyes:      Conjunctiva/sclera: Conjunctivae normal       Pupils: Pupils are equal, round, and reactive to light  Cardiovascular:      Rate and Rhythm: Normal rate and regular rhythm  Heart sounds: Normal heart sounds  Pulmonary:      Effort: Pulmonary effort is normal  No respiratory distress  Breath sounds: Normal breath sounds  Abdominal:      General: Bowel sounds are normal  There is no distension        Palpations: Abdomen is soft  Tenderness: There is no abdominal tenderness  There is no right CVA tenderness, left CVA tenderness, guarding or rebound  Musculoskeletal:         General: Tenderness, deformity and signs of injury present  Normal range of motion  Cervical back: Normal range of motion and neck supple  Comments: Findings consistent with right hip and wrist fractures  Skin:     General: Skin is warm and dry  Capillary Refill: Capillary refill takes less than 2 seconds  Neurological:      Mental Status: She is alert and oriented to person, place, and time     Psychiatric:         Behavior: Behavior normal          Vital Signs  ED Triage Vitals [07/01/22 0600]   Temperature Pulse Respirations Blood Pressure SpO2   98 °F (36 7 °C) 83 22 (!) 177/85 99 %      Temp Source Heart Rate Source Patient Position - Orthostatic VS BP Location FiO2 (%)   Oral Monitor Lying Left arm --      Pain Score       10 - Worst Possible Pain           Vitals:    07/01/22 0600   BP: (!) 177/85   Pulse: 83   Patient Position - Orthostatic VS: Lying         Visual Acuity  Visual Acuity    Flowsheet Row Most Recent Value   L Pupil Size (mm) 3   R Pupil Size (mm) 3          ED Medications  Medications   sodium chloride 0 9 % bolus 1,000 mL (1,000 mL Intravenous New Bag 7/1/22 0615)   morphine injection 2 mg (2 mg Intravenous Given 7/1/22 0616)       Diagnostic Studies  Results Reviewed     Procedure Component Value Units Date/Time    CBC and differential [[id number]] Collected: 07/01/22 0618    Lab Status: No result Specimen: Blood from Arm, Left     Basic metabolic panel [[id number]] Collected: 07/01/22 0618    Lab Status: No result Specimen: Blood from Arm, Left                  XR hip/pelv 2-3 vws right if performed    (Results Pending)   XR wrist 3+ views RIGHT    (Results Pending)              Procedures  Procedures         ED Course                               SBIRT 20yo+    Flowsheet Row Most Recent Value   SBIRT (23 yo +) In order to provide better care to our patients, we are screening all of our patients for alcohol and drug use  Would it be okay to ask you these screening questions? Yes Filed at: 07/01/2022 1515   Initial Alcohol Screen: US AUDIT-C     1  How often do you have a drink containing alcohol? 0 Filed at: 07/01/2022 0603   2  How many drinks containing alcohol do you have on a typical day you are drinking? 0 Filed at: 07/01/2022 0603   3a  Male UNDER 65: How often do you have five or more drinks on one occasion? 0 Filed at: 07/01/2022 0603   3b  FEMALE Any Age, or MALE 65+: How often do you have 4 or more drinks on one occassion? 0 Filed at: 07/01/2022 0603   Audit-C Score 0 Filed at: 07/01/2022 2905   PHYLLIS: How many times in the past year have you    Used an illegal drug or used a prescription medication for non-medical reasons? Never Filed at: 07/01/2022 0603                    MDM  Number of Diagnoses or Management Options  Diagnosis management comments: This is a 67year old female with fall  Suspect hip fracture  Awaiting XR results  Stable at the time of sign out  Amount and/or Complexity of Data Reviewed  Clinical lab tests: ordered  Tests in the radiology section of CPT®: ordered        Disposition  Final diagnoses:   None     ED Disposition     None      Follow-up Information    None         Patient's Medications   Discharge Prescriptions    No medications on file       No discharge procedures on file      PDMP Review       Value Time User    PDMP Reviewed  Yes 6/20/2022  7:11 AM Pierce Álvarez MD          ED Provider  Electronically Signed by           Jenna Valdovinos MD  07/01/22 1747

## 2022-07-01 NOTE — ED NOTES
Arawick placed for patient comfort  Patient resting comfortably with call bell within reach        Jesús Gonzalez RN  07/01/22 1154

## 2022-07-01 NOTE — ED NOTES
Attempted to reach pts , Laurann Cabot, at [phone]  No answer, RN left message        Lesly Caldwell RN  07/01/22 4265

## 2022-07-01 NOTE — CONSULTS
Consultation - Orthopedics   Chema Vega 67 y o  female MRN: [id number]  Unit/Bed#: ED 22 Encounter: [id number]      Assessment/Plan     Assessment:    Distal radial comminuted intra-articular fracture with ulnar styloid fracture  Transcervical right femur fracture  Plan:    The patient will need to undergo surgical intervention  The plan is for a right hip hemiarthroplasty  NPO after midnight  With regards to her right wrist, the plan is either a closed reduction or insight to immobilization  Dr Peyton Steen will discuss with the patient this evening  We will order a Velcro wrist brace for now  The patient is acceptable to this plan  History of Present Illness   Physician Requesting Consult: Bianca Dorsey DO  Reason for Consult / Principal Problem:  Right Hip and wrist fractures    HPI: Roland Dos Santos is a 67y o  year old female who presented to the emergency department yesterday complaining of right hip and wrist pain  The patient stated she was trying to let her dog out when she tripped and fell on her right side  She states she fell onto concrete  She had immediate pain  She was taken to the emergency department where x-rays of her right hip were consistent with a transcervical right femur fracture  X-rays of her right wrist were consistent with distal radial comminuted intra-articular fracture with also an ulnar styloid fracture  She still complains of right wrist and hip pain  She denies any numbness or tingling  She denies any fever or chills  Inpatient consult to Orthopedic Surgery  Consult performed by: Natalie Thompson PA-C  Consult ordered by: Bianca Dorsey DO          Review of Systems   Constitutional: Negative for chills, fever and unexpected weight change  HENT: Negative for nosebleeds and sore throat  Eyes: Negative for pain, redness and visual disturbance  Respiratory: Negative for cough, shortness of breath and wheezing      Cardiovascular: Negative for chest pain, palpitations and leg swelling  Gastrointestinal: Negative for abdominal pain, nausea and vomiting  Endocrine: Negative for polydipsia and polyuria  Genitourinary: Negative for dysuria and hematuria  Musculoskeletal: Positive for arthralgias, joint swelling and myalgias  As noted in HPI   Skin: Positive for wound  Negative for rash  Neurological: Negative for dizziness, numbness and headaches  Psychiatric/Behavioral: Negative for decreased concentration and suicidal ideas  The patient is not nervous/anxious  Historical Information   Past Medical History:   Diagnosis Date    Anxiety     Arthritis     Depression     GERD (gastroesophageal reflux disease)     Hiatal hernia     Hyperlipidemia     Hypertension      Past Surgical History:   Procedure Laterality Date    APPENDECTOMY      CHOLECYSTECTOMY      FRACTURE SURGERY Right     arm with plate and pins    HAND SURGERY Bilateral     HYSTERECTOMY      JOINT REPLACEMENT Bilateral     SHOULDER ARTHROSCOPY Left      Social History   Social History     Substance and Sexual Activity   Alcohol Use Not Currently    Alcohol/week: 1 0 standard drink    Types: 1 Glasses of wine per week     Social History     Substance and Sexual Activity   Drug Use Never     E-Cigarette/Vaping    E-Cigarette Use Never User      E-Cigarette/Vaping Substances     Social History     Tobacco Use   Smoking Status Former Smoker    Types: Cigarettes   Smokeless Tobacco Never Used     Family History: non-contributory    Meds/Allergies   all current active meds have been reviewed  No Known Allergies    Objective   Vitals: Blood pressure 137/73, pulse 92, temperature 98 °F (36 7 °C), temperature source Oral, resp  rate 17, height 5' 5" (1 651 m), weight 93 4 kg (206 lb), SpO2 93 %  ,Body mass index is 34 28 kg/m²        Intake/Output Summary (Last 24 hours) at 7/1/2022 1203  Last data filed at 7/1/2022 0815  Gross per 24 hour   Intake 1000 ml   Output -- Net 1000 ml     I/O last 24 hours: In: 1000 [IV Piggyback:1000]  Out: -     Invasive Devices  Report    Peripheral Intravenous Line  Duration           Peripheral IV 07/01/22 Distal;Left;Ventral (anterior) Forearm <1 day                Physical Exam  Ortho Exam  Right upper extremity is neurovascularly intact  Fingers are pink and mobile  Compartments are soft  There is a deformity present along the wrist  Tenderness to palpation along fracture site  No ligament or tendon dysfunction  Abrasions along the right elbow with slight effusion of olecranon bursa  Brisk cap refill  Sensation intact  Range of motion of the wrist limited to pain    Right lower extremity is neurovascularly intact  Toes are pink mobile  Compartments are soft  The leg is externally rotated  Tenderness to palpation along the hip and groin  Brisk cap refill  Sensation intact  Good quad tone    Physical Exam   Constitutional: Appears well-developed and well-nourished  No distress  HENT:   Head: Normocephalic  Eyes: Conjunctivae are normal  Right eye exhibits no discharge  Left eye exhibits no discharge  No scleral icterus  Cardiovascular: Normal rate  Pulmonary/Chest: Effort normal    Neurological: Alert and oriented to person, place, and time  Skin: Skin is warm and dry  No rash noted  The patient is not diaphoretic  No erythema  No pallor  Psychiatric: Normal mood and affect  Behavior is normal  Judgment and thought content normal      Lab Results: I have personally reviewed pertinent lab results  Imaging Studies: X-rays of her right hip were consistent with a transcervical right femur fracture  X-rays of her right wrist were consistent with distal radial comminuted intra-articular fracture with also an ulnar styloid fracture  EKG, Pathology, and Other Studies: I have personally reviewed pertinent reports      VTE Prophylaxis: Sequential compression device (Venodyne)     Code Status: Level 2 - DNAR: but accepts endotracheal intubation  Advance Directive and Living Will:      Power of :    POLST:      Counseling / Coordination of Care  Total floor / unit time spent today 30 minutes  Greater than 50% of total time was spent with the patient and / or family counseling and / or coordination of care

## 2022-07-01 NOTE — ASSESSMENT & PLAN NOTE
Status post mechanical fall walking dog  Xray revealed "Nondisplaced transcervical fracture of the right femur "  Discussed with Orthopedic surgery, plan for OR tomorrow    -regular diet, NPO at midnight  -Tylenol 650 mg p o  q 6 hours p r n  for mild pain  -oxycodone 5 mg p o  q 4 hours p r n  for moderate pain  -oxycodone 10 mg p o  q 4 hours p r n   For severe pain  -Dilaudid 1 mg IV q 3 hours p r n  for breakthrough pain  -orthopedic surgery consult  -plan for OR in a m   -Lovenox for DVT prophylaxis

## 2022-07-02 ENCOUNTER — APPOINTMENT (INPATIENT)
Dept: RADIOLOGY | Facility: HOSPITAL | Age: 72
DRG: 522 | End: 2022-07-02
Payer: MEDICARE

## 2022-07-02 ENCOUNTER — ANESTHESIA (INPATIENT)
Dept: PERIOP | Facility: HOSPITAL | Age: 72
DRG: 522 | End: 2022-07-02
Payer: MEDICARE

## 2022-07-02 PROBLEM — D72.823 LEUKEMOID REACTION: Status: ACTIVE | Noted: 2022-07-02

## 2022-07-02 LAB
ALBUMIN SERPL BCP-MCNC: 4.3 G/DL (ref 3.5–5)
ALP SERPL-CCNC: 34 U/L (ref 34–104)
ALT SERPL W P-5'-P-CCNC: 35 U/L (ref 7–52)
ANION GAP SERPL CALCULATED.3IONS-SCNC: 9 MMOL/L (ref 4–13)
AST SERPL W P-5'-P-CCNC: 35 U/L (ref 13–39)
BILIRUB SERPL-MCNC: 0.55 MG/DL (ref 0.2–1)
BUN SERPL-MCNC: 20 MG/DL (ref 5–25)
CALCIUM SERPL-MCNC: 9 MG/DL (ref 8.4–10.2)
CHLORIDE SERPL-SCNC: 102 MMOL/L (ref 96–108)
CO2 SERPL-SCNC: 25 MMOL/L (ref 21–32)
CREAT SERPL-MCNC: 1.04 MG/DL (ref 0.6–1.3)
ERYTHROCYTE [DISTWIDTH] IN BLOOD BY AUTOMATED COUNT: 13.9 % (ref 11.6–15.1)
ERYTHROCYTE [DISTWIDTH] IN BLOOD BY AUTOMATED COUNT: 13.9 % (ref 11.6–15.1)
GFR SERPL CREATININE-BSD FRML MDRD: 53 ML/MIN/1.73SQ M
GLUCOSE SERPL-MCNC: 122 MG/DL (ref 65–140)
HCT VFR BLD AUTO: 42.3 % (ref 34.8–46.1)
HCT VFR BLD AUTO: 45.3 % (ref 34.8–46.1)
HCV AB SER QL: NORMAL
HGB BLD-MCNC: 13.2 G/DL (ref 11.5–15.4)
HGB BLD-MCNC: 14.2 G/DL (ref 11.5–15.4)
MCH RBC QN AUTO: 29.9 PG (ref 26.8–34.3)
MCH RBC QN AUTO: 30.3 PG (ref 26.8–34.3)
MCHC RBC AUTO-ENTMCNC: 31.2 G/DL (ref 31.4–37.4)
MCHC RBC AUTO-ENTMCNC: 31.3 G/DL (ref 31.4–37.4)
MCV RBC AUTO: 95 FL (ref 82–98)
MCV RBC AUTO: 97 FL (ref 82–98)
PLATELET # BLD AUTO: 262 THOUSANDS/UL (ref 149–390)
PLATELET # BLD AUTO: 275 THOUSANDS/UL (ref 149–390)
PMV BLD AUTO: 11.9 FL (ref 8.9–12.7)
PMV BLD AUTO: 11.9 FL (ref 8.9–12.7)
POTASSIUM SERPL-SCNC: 4 MMOL/L (ref 3.5–5.3)
PROT SERPL-MCNC: 6.9 G/DL (ref 6.4–8.4)
RBC # BLD AUTO: 4.35 MILLION/UL (ref 3.81–5.12)
RBC # BLD AUTO: 4.75 MILLION/UL (ref 3.81–5.12)
SODIUM SERPL-SCNC: 136 MMOL/L (ref 135–147)
WBC # BLD AUTO: 17.13 THOUSAND/UL (ref 4.31–10.16)
WBC # BLD AUTO: 9.4 THOUSAND/UL (ref 4.31–10.16)

## 2022-07-02 PROCEDURE — C1776 JOINT DEVICE (IMPLANTABLE): HCPCS | Performed by: ORTHOPAEDIC SURGERY

## 2022-07-02 PROCEDURE — 73501 X-RAY EXAM HIP UNI 1 VIEW: CPT

## 2022-07-02 PROCEDURE — 99232 SBSQ HOSP IP/OBS MODERATE 35: CPT | Performed by: HOSPITALIST

## 2022-07-02 PROCEDURE — 0PSHXZZ REPOSITION RIGHT RADIUS, EXTERNAL APPROACH: ICD-10-PCS | Performed by: INTERNAL MEDICINE

## 2022-07-02 PROCEDURE — 85027 COMPLETE CBC AUTOMATED: CPT | Performed by: PHYSICIAN ASSISTANT

## 2022-07-02 PROCEDURE — C1713 ANCHOR/SCREW BN/BN,TIS/BN: HCPCS | Performed by: ORTHOPAEDIC SURGERY

## 2022-07-02 PROCEDURE — 25605 CLTX DST RDL FX/EPHYS SEP W/: CPT | Performed by: PHYSICIAN ASSISTANT

## 2022-07-02 PROCEDURE — 80053 COMPREHEN METABOLIC PANEL: CPT | Performed by: STUDENT IN AN ORGANIZED HEALTH CARE EDUCATION/TRAINING PROGRAM

## 2022-07-02 PROCEDURE — 0SRR019 REPLACEMENT OF RIGHT HIP JOINT, FEMORAL SURFACE WITH METAL SYNTHETIC SUBSTITUTE, CEMENTED, OPEN APPROACH: ICD-10-PCS | Performed by: INTERNAL MEDICINE

## 2022-07-02 PROCEDURE — 27236 TREAT THIGH FRACTURE: CPT | Performed by: PHYSICIAN ASSISTANT

## 2022-07-02 PROCEDURE — 86803 HEPATITIS C AB TEST: CPT | Performed by: STUDENT IN AN ORGANIZED HEALTH CARE EDUCATION/TRAINING PROGRAM

## 2022-07-02 PROCEDURE — 27236 TREAT THIGH FRACTURE: CPT | Performed by: ORTHOPAEDIC SURGERY

## 2022-07-02 PROCEDURE — 73100 X-RAY EXAM OF WRIST: CPT

## 2022-07-02 PROCEDURE — 25605 CLTX DST RDL FX/EPHYS SEP W/: CPT | Performed by: ORTHOPAEDIC SURGERY

## 2022-07-02 PROCEDURE — 85027 COMPLETE CBC AUTOMATED: CPT | Performed by: STUDENT IN AN ORGANIZED HEALTH CARE EDUCATION/TRAINING PROGRAM

## 2022-07-02 PROCEDURE — 0PSKXZZ REPOSITION RIGHT ULNA, EXTERNAL APPROACH: ICD-10-PCS | Performed by: INTERNAL MEDICINE

## 2022-07-02 DEVICE — MODULAR CATHCART FRACTURE HEAD HIP BALL 45MM OD +5MM: Type: IMPLANTABLE DEVICE | Site: HIP | Status: FUNCTIONAL

## 2022-07-02 DEVICE — SMARTSET HIGH PERFORMANCE MV MEDIUM VISCOSITY BONE CEMENT 40G
Type: IMPLANTABLE DEVICE | Site: HIP | Status: FUNCTIONAL
Brand: SMARTSET

## 2022-07-02 DEVICE — MODULAR CATHCART TAPERED SPACER 12/14 TAPER +5MM: Type: IMPLANTABLE DEVICE | Site: HIP | Status: FUNCTIONAL

## 2022-07-02 DEVICE — SUMMIT FEMORAL STEM 12/14 TAPER CEMENTED SIZE 3 HI 108MM
Type: IMPLANTABLE DEVICE | Site: HIP | Status: FUNCTIONAL
Brand: SUMMIT

## 2022-07-02 RX ORDER — PHENYLEPHRINE HYDROCHLORIDE 10 MG/ML
INJECTION INTRAVENOUS AS NEEDED
Status: DISCONTINUED | OUTPATIENT
Start: 2022-07-02 | End: 2022-07-02

## 2022-07-02 RX ORDER — ONDANSETRON 2 MG/ML
4 INJECTION INTRAMUSCULAR; INTRAVENOUS EVERY 6 HOURS PRN
Status: DISCONTINUED | OUTPATIENT
Start: 2022-07-02 | End: 2022-07-06 | Stop reason: HOSPADM

## 2022-07-02 RX ORDER — NEOSTIGMINE METHYLSULFATE 1 MG/ML
INJECTION INTRAVENOUS AS NEEDED
Status: DISCONTINUED | OUTPATIENT
Start: 2022-07-02 | End: 2022-07-02

## 2022-07-02 RX ORDER — GLYCOPYRROLATE 0.2 MG/ML
INJECTION INTRAMUSCULAR; INTRAVENOUS AS NEEDED
Status: DISCONTINUED | OUTPATIENT
Start: 2022-07-02 | End: 2022-07-02

## 2022-07-02 RX ORDER — EPHEDRINE SULFATE 50 MG/ML
INJECTION INTRAVENOUS AS NEEDED
Status: DISCONTINUED | OUTPATIENT
Start: 2022-07-02 | End: 2022-07-02

## 2022-07-02 RX ORDER — ENOXAPARIN SODIUM 100 MG/ML
40 INJECTION SUBCUTANEOUS DAILY
Status: DISCONTINUED | OUTPATIENT
Start: 2022-07-02 | End: 2022-07-06 | Stop reason: HOSPADM

## 2022-07-02 RX ORDER — CEFAZOLIN SODIUM 2 G/50ML
2000 SOLUTION INTRAVENOUS ONCE
Status: DISCONTINUED | OUTPATIENT
Start: 2022-07-02 | End: 2022-07-02

## 2022-07-02 RX ORDER — ROCURONIUM BROMIDE 10 MG/ML
INJECTION, SOLUTION INTRAVENOUS AS NEEDED
Status: DISCONTINUED | OUTPATIENT
Start: 2022-07-02 | End: 2022-07-02

## 2022-07-02 RX ORDER — LIDOCAINE HYDROCHLORIDE 10 MG/ML
INJECTION, SOLUTION EPIDURAL; INFILTRATION; INTRACAUDAL; PERINEURAL AS NEEDED
Status: DISCONTINUED | OUTPATIENT
Start: 2022-07-02 | End: 2022-07-02

## 2022-07-02 RX ORDER — SODIUM CHLORIDE, SODIUM LACTATE, POTASSIUM CHLORIDE, CALCIUM CHLORIDE 600; 310; 30; 20 MG/100ML; MG/100ML; MG/100ML; MG/100ML
INJECTION, SOLUTION INTRAVENOUS CONTINUOUS PRN
Status: DISCONTINUED | OUTPATIENT
Start: 2022-07-02 | End: 2022-07-02

## 2022-07-02 RX ORDER — DOCUSATE SODIUM 100 MG/1
100 CAPSULE, LIQUID FILLED ORAL 2 TIMES DAILY
Status: DISCONTINUED | OUTPATIENT
Start: 2022-07-02 | End: 2022-07-06 | Stop reason: HOSPADM

## 2022-07-02 RX ORDER — CEFAZOLIN SODIUM 1 G/3ML
INJECTION, POWDER, FOR SOLUTION INTRAMUSCULAR; INTRAVENOUS AS NEEDED
Status: DISCONTINUED | OUTPATIENT
Start: 2022-07-02 | End: 2022-07-02

## 2022-07-02 RX ORDER — BUPIVACAINE HYDROCHLORIDE AND EPINEPHRINE 5; 5 MG/ML; UG/ML
INJECTION, SOLUTION EPIDURAL; INTRACAUDAL; PERINEURAL AS NEEDED
Status: DISCONTINUED | OUTPATIENT
Start: 2022-07-02 | End: 2022-07-02 | Stop reason: HOSPADM

## 2022-07-02 RX ORDER — CHLORHEXIDINE GLUCONATE 4 G/100ML
SOLUTION TOPICAL DAILY PRN
Status: DISCONTINUED | OUTPATIENT
Start: 2022-07-02 | End: 2022-07-02 | Stop reason: HOSPADM

## 2022-07-02 RX ORDER — MORPHINE SULFATE 10 MG/ML
2 INJECTION, SOLUTION INTRAMUSCULAR; INTRAVENOUS EVERY 6 HOURS PRN
Status: DISCONTINUED | OUTPATIENT
Start: 2022-07-02 | End: 2022-07-06 | Stop reason: HOSPADM

## 2022-07-02 RX ORDER — FENTANYL CITRATE 50 UG/ML
INJECTION, SOLUTION INTRAMUSCULAR; INTRAVENOUS AS NEEDED
Status: DISCONTINUED | OUTPATIENT
Start: 2022-07-02 | End: 2022-07-02

## 2022-07-02 RX ORDER — SODIUM CHLORIDE, SODIUM LACTATE, POTASSIUM CHLORIDE, CALCIUM CHLORIDE 600; 310; 30; 20 MG/100ML; MG/100ML; MG/100ML; MG/100ML
125 INJECTION, SOLUTION INTRAVENOUS CONTINUOUS
Status: DISCONTINUED | OUTPATIENT
Start: 2022-07-02 | End: 2022-07-02

## 2022-07-02 RX ORDER — PROPOFOL 10 MG/ML
INJECTION, EMULSION INTRAVENOUS AS NEEDED
Status: DISCONTINUED | OUTPATIENT
Start: 2022-07-02 | End: 2022-07-02

## 2022-07-02 RX ORDER — CEFAZOLIN SODIUM 2 G/50ML
2000 SOLUTION INTRAVENOUS EVERY 8 HOURS
Status: COMPLETED | OUTPATIENT
Start: 2022-07-02 | End: 2022-07-02

## 2022-07-02 RX ORDER — ALBUTEROL SULFATE 2.5 MG/3ML
2.5 SOLUTION RESPIRATORY (INHALATION) ONCE AS NEEDED
Status: DISCONTINUED | OUTPATIENT
Start: 2022-07-02 | End: 2022-07-02 | Stop reason: HOSPADM

## 2022-07-02 RX ORDER — ASCORBIC ACID 500 MG
500 TABLET ORAL 2 TIMES DAILY
Status: DISCONTINUED | OUTPATIENT
Start: 2022-07-02 | End: 2022-07-06 | Stop reason: HOSPADM

## 2022-07-02 RX ORDER — DEXAMETHASONE SODIUM PHOSPHATE 10 MG/ML
INJECTION, SOLUTION INTRAMUSCULAR; INTRAVENOUS AS NEEDED
Status: DISCONTINUED | OUTPATIENT
Start: 2022-07-02 | End: 2022-07-02

## 2022-07-02 RX ORDER — MAGNESIUM HYDROXIDE/ALUMINUM HYDROXICE/SIMETHICONE 120; 1200; 1200 MG/30ML; MG/30ML; MG/30ML
30 SUSPENSION ORAL EVERY 6 HOURS PRN
Status: DISCONTINUED | OUTPATIENT
Start: 2022-07-02 | End: 2022-07-06 | Stop reason: HOSPADM

## 2022-07-02 RX ORDER — ONDANSETRON 2 MG/ML
INJECTION INTRAMUSCULAR; INTRAVENOUS AS NEEDED
Status: DISCONTINUED | OUTPATIENT
Start: 2022-07-02 | End: 2022-07-02

## 2022-07-02 RX ORDER — ONDANSETRON 2 MG/ML
4 INJECTION INTRAMUSCULAR; INTRAVENOUS ONCE AS NEEDED
Status: DISCONTINUED | OUTPATIENT
Start: 2022-07-02 | End: 2022-07-02 | Stop reason: HOSPADM

## 2022-07-02 RX ORDER — HYDROMORPHONE HCL/PF 1 MG/ML
0.5 SYRINGE (ML) INJECTION
Status: DISCONTINUED | OUTPATIENT
Start: 2022-07-02 | End: 2022-07-02 | Stop reason: HOSPADM

## 2022-07-02 RX ORDER — HYDROMORPHONE HCL/PF 1 MG/ML
0.5 SYRINGE (ML) INJECTION EVERY 2 HOUR PRN
Status: ACTIVE | OUTPATIENT
Start: 2022-07-02 | End: 2022-07-04

## 2022-07-02 RX ORDER — OXYCODONE HYDROCHLORIDE 5 MG/1
5 TABLET ORAL EVERY 6 HOURS PRN
Status: DISCONTINUED | OUTPATIENT
Start: 2022-07-02 | End: 2022-07-06 | Stop reason: HOSPADM

## 2022-07-02 RX ORDER — ACETAMINOPHEN 325 MG/1
650 TABLET ORAL EVERY 6 HOURS PRN
Status: DISCONTINUED | OUTPATIENT
Start: 2022-07-02 | End: 2022-07-06 | Stop reason: HOSPADM

## 2022-07-02 RX ORDER — TRANEXAMIC ACID 100 MG/ML
INJECTION, SOLUTION INTRAVENOUS AS NEEDED
Status: DISCONTINUED | OUTPATIENT
Start: 2022-07-02 | End: 2022-07-02

## 2022-07-02 RX ORDER — CEFAZOLIN SODIUM 1 G/50ML
1000 SOLUTION INTRAVENOUS EVERY 8 HOURS
Status: DISCONTINUED | OUTPATIENT
Start: 2022-07-02 | End: 2022-07-02

## 2022-07-02 RX ORDER — SODIUM CHLORIDE 9 MG/ML
INJECTION, SOLUTION INTRAVENOUS AS NEEDED
Status: DISCONTINUED | OUTPATIENT
Start: 2022-07-02 | End: 2022-07-02 | Stop reason: HOSPADM

## 2022-07-02 RX ORDER — FENTANYL CITRATE/PF 50 MCG/ML
25 SYRINGE (ML) INJECTION
Status: DISCONTINUED | OUTPATIENT
Start: 2022-07-02 | End: 2022-07-02 | Stop reason: HOSPADM

## 2022-07-02 RX ADMIN — CEFAZOLIN 2000 MG: 1 INJECTION, POWDER, FOR SOLUTION INTRAMUSCULAR; INTRAVENOUS at 08:15

## 2022-07-02 RX ADMIN — ONDANSETRON 4 MG: 2 INJECTION INTRAMUSCULAR; INTRAVENOUS at 08:30

## 2022-07-02 RX ADMIN — Medication 1 TABLET: at 13:16

## 2022-07-02 RX ADMIN — AMLODIPINE BESYLATE 10 MG: 10 TABLET ORAL at 13:16

## 2022-07-02 RX ADMIN — GLYCOPYRROLATE 0.4 MG: 0.2 INJECTION, SOLUTION INTRAMUSCULAR; INTRAVENOUS at 10:09

## 2022-07-02 RX ADMIN — DEXAMETHASONE SODIUM PHOSPHATE 10 MG: 10 INJECTION INTRAMUSCULAR; INTRAVENOUS at 08:30

## 2022-07-02 RX ADMIN — PHENYLEPHRINE HYDROCHLORIDE 100 MCG: 10 INJECTION INTRAVENOUS at 09:53

## 2022-07-02 RX ADMIN — SODIUM CHLORIDE, SODIUM LACTATE, POTASSIUM CHLORIDE, AND CALCIUM CHLORIDE: .6; .31; .03; .02 INJECTION, SOLUTION INTRAVENOUS at 08:00

## 2022-07-02 RX ADMIN — EPHEDRINE SULFATE 10 MG: 50 INJECTION, SOLUTION INTRAVENOUS at 08:23

## 2022-07-02 RX ADMIN — NEOSTIGMINE METHYLSULFATE 3 MG: 1 INJECTION INTRAVENOUS at 08:38

## 2022-07-02 RX ADMIN — ENOXAPARIN SODIUM 40 MG: 100 INJECTION SUBCUTANEOUS at 21:24

## 2022-07-02 RX ADMIN — PROPOFOL 50 MG: 10 INJECTION, EMULSION INTRAVENOUS at 10:08

## 2022-07-02 RX ADMIN — OXYCODONE HYDROCHLORIDE AND ACETAMINOPHEN 500 MG: 500 TABLET ORAL at 13:16

## 2022-07-02 RX ADMIN — SODIUM CHLORIDE, SODIUM LACTATE, POTASSIUM CHLORIDE, AND CALCIUM CHLORIDE 125 ML/HR: .6; .31; .03; .02 INJECTION, SOLUTION INTRAVENOUS at 11:23

## 2022-07-02 RX ADMIN — PANTOPRAZOLE SODIUM 20 MG: 20 TABLET, DELAYED RELEASE ORAL at 17:42

## 2022-07-02 RX ADMIN — EPHEDRINE SULFATE 10 MG: 50 INJECTION, SOLUTION INTRAVENOUS at 09:45

## 2022-07-02 RX ADMIN — EPHEDRINE SULFATE 10 MG: 50 INJECTION, SOLUTION INTRAVENOUS at 09:48

## 2022-07-02 RX ADMIN — HYDROMORPHONE HYDROCHLORIDE 1 MG: 1 INJECTION, SOLUTION INTRAMUSCULAR; INTRAVENOUS; SUBCUTANEOUS at 05:59

## 2022-07-02 RX ADMIN — PROPOFOL 150 MG: 10 INJECTION, EMULSION INTRAVENOUS at 08:09

## 2022-07-02 RX ADMIN — ACETAMINOPHEN 650 MG: 325 TABLET ORAL at 14:11

## 2022-07-02 RX ADMIN — CEFAZOLIN SODIUM 2000 MG: 2 SOLUTION INTRAVENOUS at 23:14

## 2022-07-02 RX ADMIN — DOCUSATE SODIUM 100 MG: 100 CAPSULE, LIQUID FILLED ORAL at 13:16

## 2022-07-02 RX ADMIN — LORATADINE 10 MG: 10 TABLET ORAL at 13:16

## 2022-07-02 RX ADMIN — OXYCODONE HYDROCHLORIDE 10 MG: 10 TABLET ORAL at 00:51

## 2022-07-02 RX ADMIN — MORPHINE SULFATE 2 MG: 10 INJECTION INTRAVENOUS at 20:41

## 2022-07-02 RX ADMIN — OXYCODONE HYDROCHLORIDE AND ACETAMINOPHEN 500 MG: 500 TABLET ORAL at 17:42

## 2022-07-02 RX ADMIN — EPHEDRINE SULFATE 10 MG: 50 INJECTION, SOLUTION INTRAVENOUS at 08:14

## 2022-07-02 RX ADMIN — FENTANYL CITRATE 50 MCG: 50 INJECTION, SOLUTION INTRAMUSCULAR; INTRAVENOUS at 08:06

## 2022-07-02 RX ADMIN — VENLAFAXINE HYDROCHLORIDE 150 MG: 150 CAPSULE, EXTENDED RELEASE ORAL at 13:16

## 2022-07-02 RX ADMIN — LIDOCAINE HYDROCHLORIDE 20 MG: 10 INJECTION, SOLUTION EPIDURAL; INFILTRATION; INTRACAUDAL; PERINEURAL at 08:09

## 2022-07-02 RX ADMIN — HYDROMORPHONE HYDROCHLORIDE 0.5 MG: 1 INJECTION, SOLUTION INTRAMUSCULAR; INTRAVENOUS; SUBCUTANEOUS at 08:57

## 2022-07-02 RX ADMIN — GABAPENTIN 600 MG: 600 TABLET, FILM COATED ORAL at 21:24

## 2022-07-02 RX ADMIN — CEFAZOLIN SODIUM 2000 MG: 2 SOLUTION INTRAVENOUS at 17:42

## 2022-07-02 RX ADMIN — ROCURONIUM BROMIDE 50 MG: 50 INJECTION, SOLUTION INTRAVENOUS at 08:09

## 2022-07-02 RX ADMIN — FENTANYL CITRATE 50 MCG: 50 INJECTION, SOLUTION INTRAMUSCULAR; INTRAVENOUS at 08:47

## 2022-07-02 RX ADMIN — DOCUSATE SODIUM 100 MG: 100 CAPSULE, LIQUID FILLED ORAL at 17:42

## 2022-07-02 RX ADMIN — PRAVASTATIN SODIUM 40 MG: 20 TABLET ORAL at 13:16

## 2022-07-02 RX ADMIN — TRANEXAMIC ACID 1 G: 100 INJECTION INTRAVENOUS at 08:14

## 2022-07-02 NOTE — PROGRESS NOTES
[address]  Progress Note Olu Kline 1950, 67 y o  female MRN: [id number]  Unit/Bed#: -01 Encounter: [id number]  Primary Care Provider: Dorina Willard DO   Date and time admitted to hospital: 7/1/2022  6:06 AM    * Closed transcervical fracture of right femur ([address] )  Assessment & Plan  · Status post a mechanical fall prior to arrival  · Status post an orthopedic surgical evaluation  · Patient is postop day 0 status post right total hip hemiarthroplasty  · Continued postop management as per Orthopedic surgery  · Continue Tylenol for mild pain, Percocet for moderate pain, and or IV Dilaudid or IV morphine for severe pain  · Lovenox for DVT prophylaxis  · PT OT evaluation  · Hopeful discharge planning soon in the next 48-72 hours depending on the PT OT evaluation results    Fracture of right wrist  Assessment & Plan  · Secondary to mechanical fall as experienced prior to arrival  · Xray revealed: "Distal radial comminuted intra-articular fracture and ulnar styloid fracture "  · Status post an orthopedic surgical evaluation  · Patient is postop day 0-status post- closed reduction with splinting right wrist (Right)  ·  Continued management as per Orthopedic surgery        Hypertension  Assessment & Plan  · Blood pressure is controlled  · Continue amlodipine    Leukemoid reaction  Assessment & Plan  · Most likely reactive  · No signs of infection  · Will follow-up with repeat CBC testing in the a m  Cervical radiculopathy  Assessment & Plan  · -continue home gabapentin        VTE Prophylaxis:  Enoxaparin (Lovenox)    Patient Centered Rounds: I have performed bedside rounds with nursing staff today      Discussions with Specialists or Other Care Team Provider:  Orthopedic surgery, case management, nursing  Education and Discussions with Family / Patient:  Patient was brought up to par with the plan of care for today, no family members were available since they role on vacation in [address] as per the patient    Current Length of Stay: 1 day(s)    Current Patient Status: Inpatient   Certification Statement: The patient will continue to require additional inpatient hospital stay due to Need for continued postop management    Discharge Plan:  Hopeful discharge planning in 48-72 hours    Code Status: Level 2 - DNAR: but accepts endotracheal intubation    Subjective:   Patient seen and examined, resting in bed, no new complaints, no distress, patient is surprised that she is remarkably pain-free at this time    Objective:     Vitals:   Temp (24hrs), Av 1 °F (36 7 °C), Min:97 7 °F (36 5 °C), Max:98 6 °F (37 °C)    Temp:  [97 7 °F (36 5 °C)-98 6 °F (37 °C)] 98 °F (36 7 °C)  HR:  [] 98  Resp:  [16-20] 20  BP: (106-151)/(52-92) 114/75  SpO2:  [90 %-94 %] 90 %  Body mass index is 35 04 kg/m²  Input and Output Summary (last 24 hours): Intake/Output Summary (Last 24 hours) at 2022 1355  Last data filed at 2022 1023  Gross per 24 hour   Intake 920 ml   Output 550 ml   Net 370 ml       Physical Exam:   Physical Exam  Constitutional:       General: She is not in acute distress  Appearance: Normal appearance  She is normal weight  She is not ill-appearing  HENT:      Head: Normocephalic and atraumatic  Nose: Nose normal       Mouth/Throat:      Mouth: Mucous membranes are moist    Eyes:      Extraocular Movements: Extraocular movements intact  Pupils: Pupils are equal, round, and reactive to light  Cardiovascular:      Rate and Rhythm: Normal rate and regular rhythm  Pulses: Normal pulses  Heart sounds: Normal heart sounds  No murmur heard  No friction rub  No gallop  Pulmonary:      Effort: Pulmonary effort is normal  No respiratory distress  Breath sounds: Normal breath sounds  No wheezing, rhonchi or rales  Abdominal:      General: There is no distension  Palpations: Abdomen is soft  There is no mass  Tenderness:  There is no abdominal tenderness  Hernia: No hernia is present  Musculoskeletal:         General: No swelling or tenderness  Normal range of motion  Cervical back: Normal range of motion and neck supple  No rigidity  Right lower leg: No edema  Left lower leg: No edema  Comments: Right hip dressings are in place, dry, and intact  Right wrist dressing and splint are in place   Skin:     General: Skin is warm  Capillary Refill: Capillary refill takes less than 2 seconds  Findings: No erythema or rash  Neurological:      General: No focal deficit present  Mental Status: She is alert and oriented to person, place, and time  Mental status is at baseline  Cranial Nerves: No cranial nerve deficit  Motor: No weakness  Psychiatric:         Mood and Affect: Mood normal          Behavior: Behavior normal          Additional Data:     Labs:    Results from last 7 days   Lab Units 07/02/22  1118 07/02/22  0435 07/01/22  0618   WBC Thousand/uL 17 13*   < > 7 15   HEMOGLOBIN g/dL 13 2   < > 15 0   HEMATOCRIT % 42 3   < > 47 0*   PLATELETS Thousands/uL 275   < > 280   NEUTROS PCT %  --   --  39*   LYMPHS PCT %  --   --  46*   MONOS PCT %  --   --  11   EOS PCT %  --   --  3    < > = values in this interval not displayed  Results from last 7 days   Lab Units 07/02/22  0435   SODIUM mmol/L 136   POTASSIUM mmol/L 4 0   CHLORIDE mmol/L 102   CO2 mmol/L 25   BUN mg/dL 20   CREATININE mg/dL 1 04   CALCIUM mg/dL 9 0   ALK PHOS U/L 34   ALT U/L 35   AST U/L 35                   * I Have Reviewed All Lab Data Listed Above  * Additional Pertinent Lab Tests Reviewed:  [address] Admission  Reviewed    Imaging:  Imaging Reports Reviewed Today Include:  None    Recent Cultures (last 7 days):           Last 24 Hours Medication List:   Current Facility-Administered Medications   Medication Dose Route Frequency Provider Last Rate    acetaminophen  650 mg Oral Q6H PRN Clearance Mel REJI Hernandez      aluminum-magnesium hydroxide-simethicone  30 mL Oral Q6H PRN Luis Payment REJI Del Toro      amLODIPine  10 mg Oral Daily Orene Pean, DO      ascorbic acid  500 mg Oral BID [place] Payment REJI Del Toro      cefazolin  2,000 mg Intravenous [address] [place], [place]      docusate sodium  100 mg Oral BID [place] Payment [place]      enoxaparin  40 mg Subcutaneous Daily Luis Payment [place], [place]      gabapentin  600 mg Oral HS Orene Pean, DO      HYDROmorphone  0 5 mg Intravenous Q2H PRN [place] Payment [place], [place]      lactated ringers  1,000 mL Intravenous Once PRN Luis Payment REJI Hernandez      And    lactated ringers  1,000 mL Intravenous Once PRN Miranda Carey PA-C      lactated ringers  125 mL/hr Intravenous Continuous [place] Payment [place], [place] 125 mL/hr (07/02/22 1123)    loratadine  10 mg Oral Daily Vipin Pean, DO      morphine injection  2 mg Intravenous Q6H PRN Miranda Carey PA-C      multivitamin-minerals  1 tablet Oral Daily Luis Payment [place], [place]      ondansetron  4 mg Intravenous Q6H PRN [place] Payment REJI Del Toro      oxyCODONE  5 mg Oral Q6H PRN [place] Payment REJI Hernandez      pantoprazole  20 mg Oral BID AC Orene Pean, DO      pravastatin  40 mg Oral Daily Orene Pean, DO      sodium chloride  1,000 mL Intravenous Once PRN Miranda Carey PA-C      And    sodium chloride  1,000 mL Intravenous Once PRN Miranda Carey PA-C      temazepam  15 mg Oral HS PRN Orene Pean, DO      venlafaxine  150 mg Oral Daily Orene Pean, DO          Today, Patient Was Seen By: Dave Santoyo MD    ** Please Note: Dictation voice to text software may have been used in the creation of this document   **

## 2022-07-02 NOTE — PLAN OF CARE
Problem: Potential for Falls  Goal: Patient will remain free of falls  Description: INTERVENTIONS:  - Educate patient/family on patient safety including physical limitations  - Instruct patient to call for assistance with activity   - Consult OT/PT to assist with strengthening/mobility   - Keep Call bell within reach  - Keep bed low and locked with side rails adjusted as appropriate  - Keep care items and personal belongings within reach  - Initiate and maintain comfort rounds  - Make Fall Risk Sign visible to staff  - Offer Toileting every 2 Hours, in advance of need  - Initiate/Maintain bed alarm  - Apply yellow socks and bracelet for high fall risk patients  - Consider moving patient to room near nurses station  Outcome: Progressing     Problem: MOBILITY - ADULT  Goal: Maintain or return to baseline ADL function  Description: INTERVENTIONS:  -  Assess patient's ability to carry out ADLs; assess patient's baseline for ADL function and identify physical deficits which impact ability to perform ADLs (bathing, care of mouth/teeth, toileting, grooming, dressing, etc )  - Assess/evaluate cause of self-care deficits   - Assess range of motion  - Assess patient's mobility; develop plan if impaired  - Assess patient's need for assistive devices and provide as appropriate  - Encourage maximum independence but intervene and supervise when necessary  - Involve family in performance of ADLs  - Assess for home care needs following discharge   - Consider OT consult to assist with ADL evaluation and planning for discharge  - Provide patient education as appropriate  Outcome: Progressing  Goal: Maintains/Returns to pre admission functional level  Description: INTERVENTIONS:  - Perform BMAT or MOVE assessment daily    - Set and communicate daily mobility goal to care team and patient/family/caregiver  - Collaborate with rehabilitation services on mobility goals if consulted  - Reposition patient every 2 hours    - Record patient progress and toleration of activity level   Outcome: Progressing     Problem: Prexisting or High Potential for Compromised Skin Integrity  Goal: Skin integrity is maintained or improved  Description: INTERVENTIONS:  - Identify patients at risk for skin breakdown  - Assess and monitor skin integrity  - Assess and monitor nutrition and hydration status  - Monitor labs   - Assess for incontinence   - Turn and reposition patient  - Assist with mobility/ambulation  - Relieve pressure over bony prominences  - Avoid friction and shearing  - Provide appropriate hygiene as needed including keeping skin clean and dry  - Evaluate need for skin moisturizer/barrier cream  - Collaborate with interdisciplinary team   - Patient/family teaching  - Consider wound care consult   Outcome: Progressing     Problem: PAIN - ADULT  Goal: Verbalizes/displays adequate comfort level or baseline comfort level  Description: Interventions:  - Encourage patient to monitor pain and request assistance  - Assess pain using appropriate pain scale  - Administer analgesics based on type and severity of pain and evaluate response  - Implement non-pharmacological measures as appropriate and evaluate response  - Consider cultural and social influences on pain and pain management  - Notify physician/advanced practitioner if interventions unsuccessful or patient reports new pain  Outcome: Progressing     Problem: DISCHARGE PLANNING  Goal: Discharge to home or other facility with appropriate resources  Description: INTERVENTIONS:  - Identify barriers to discharge w/patient and caregiver  - Arrange for needed discharge resources and transportation as appropriate  - Identify discharge learning needs (meds, wound care, etc )  - Refer to Case Management Department for coordinating discharge planning if the patient needs post-hospital services based on physician/advanced practitioner order or complex needs related to functional status, cognitive ability, or social support system  Outcome: Progressing     Problem: Knowledge Deficit  Goal: Patient/family/caregiver demonstrates understanding of disease process, treatment plan, medications, and discharge instructions  Description: Complete learning assessment and assess knowledge base    Interventions:  - Provide teaching at level of understanding  - Provide teaching via preferred learning methods  Outcome: Progressing     Problem: MUSCULOSKELETAL - ADULT  Goal: Maintain or return mobility to safest level of function  Description: INTERVENTIONS:  - Assess patient's ability to carry out ADLs; assess patient's baseline for ADL function and identify physical deficits which impact ability to perform ADLs (bathing, care of mouth/teeth, toileting, grooming, dressing, etc )  - Assess/evaluate cause of self-care deficits   - Assess range of motion  - Assess patient's mobility  - Assess patient's need for assistive devices and provide as appropriate  - Encourage maximum independence but intervene and supervise when necessary  - Involve family in performance of ADLs  - Assess for home care needs following discharge   - Consider OT consult to assist with ADL evaluation and planning for discharge  - Provide patient education as appropriate  Outcome: Progressing  Goal: Maintain proper alignment of affected body part  Description: INTERVENTIONS:  - Support, maintain and protect limb and body alignment  - Provide patient/ family with appropriate education  Outcome: Progressing

## 2022-07-02 NOTE — ASSESSMENT & PLAN NOTE
· Secondary to mechanical fall as experienced prior to arrival  · Xray revealed: "Distal radial comminuted intra-articular fracture and ulnar styloid fracture "  · Status post an orthopedic surgical evaluation  · Patient is postop day 0-status post- closed reduction with splinting right wrist (Right)  ·  Continued management as per Orthopedic surgery

## 2022-07-02 NOTE — OP NOTE
OPERATIVE REPORT  PATIENT NAME: Carola Vega    :  1950  MRN: [id number]  Pt Location: [facility] OR ROOM 02    SURGERY DATE: 2022    Surgeon(s) and Role:     * Heidi Castaneda - Primary     * [employer] - Assisting    Preop Diagnosis:  Displaced transcervical fracture right femur; fracture distal right radius and ulna    Post-Op Diagnosis Codes:  Displaced transcervical fracture right femur; fracture distal right radius and ulna    Procedure(s) (LRB):  HEMIARTHROPLASTY HIP (BIPOLAR), closed reduction with splinting right wrist (Right)    Fluids:  800 cc    Estimated Blood Loss:   200 mL    Urine:  350 cc    Drains:  Urethral Catheter Latex 16 Fr  (Active)   Number of days: 0       Anesthesia Type:   General with supplemental local utilizing 20 cc of 0 5% Marcaine with epinephrine    Operative Indications:  Nondisplaced transverse fracture of shaft of right femur, initial encounter for closed fracture [facility]) Aylin Umana is a 79-year-old female who fell on 2022  She was trying to change a dog when she lost her balance fell to her right side injuring her right hip and right wrist   She was seen in the emergency room at [facility], x-rays demonstrated a fracture of the right hip and of the distal right radius and ulna  She was admitted to the medical service  Orthopedic consultation was requested  The patient was seen by myself  The risks, benefits, options and alternatives of treatment were discussed and it was elected to proceed with hemiarthroplasty of her right hip as well as a closed reduction and splinting of her right wrist     Operative Findings: At the time of the procedure, a hemiarthroplasty was performed utilizing a size 3 high offset [place] femoral stem, a +5 mm neck adapter and a 45 mm endo femoral head    The hip was stable and wide range of motion with internal rotation beyond 60° in multiple positions and leg lengths were approximately equal   The hip could be extended about 10°  Complications:   None    Procedure and Technique:  Steve Daugherty was taken to the operating room and administered a general anesthetic  She was then positioned in the lateral position, right hip up and secured with the hip holding device  A [place] catheter was placed prior to positioning  The right lower extremity was then prepped and draped in standard fashion  A preoperative time-out was performed and preoperative antibiotics administered  A posterolateral approach to the hip was performed in standard fashion  Sharp dissection was carried down through the skin and subcutaneous tissues  Bleeding was controlled with cautery  Soft tissues were dissected down to the fascia and then the subcutaneous fat was elevated off the fascia  The fascia was then divided in line with the incision and the gluteus muscle fibers split bluntly  Bleeding was controlled with cautery  The gluteus minimus was elevated and then the external rotators divided at their femoral insertion  Capsule was divided and then the femoral head extracted from the acetabulum  This measured 45 mm  A 45 mm trial head was placed in the acetabulum and seemed to fit nicely  The osteotomy guide was utilized to perform osteotomy of the femoral neck  The box osteotome was used to access the intramedullary canal followed by the canal finer, the lateralizing Reamer and then sequential broaching to a size 3  This fit nicely within the canal and a trial reduction was performed  A high offset neck with +5 mm adapter and 45 mm endo femoral head fit well, provided good stability and stable range of motion  Trial components were removed  The acetabulum was cleaned of any debris and Ray-Des sponges were placed in the acetabulum  Cement was prepared and then cement placed into the femoral canal after placement of a cement restrictor  The femoral stem was then placed, position and excess cement removed while the cement cured  Once cement was cured, a trial reduction was again performed with good stability noted utilizing the +5 mm neck adapter and the 45 mm endo femoral head  The trial femoral head and adapter were removed, the prosthetic 5 mm adapter and 45 mm endo femoral head impacted on the stem trunnion and the hip reduced  The hip could be extended 10°  Internal rotation beyond 60° in multiple positions was attainable without instability  Leg lengths were approximately equal     The wound was irrigated with saline solution  Prior to placement of the Endo femoral head, local anesthetic was used to infiltrate the pericapsular tissues and the subcutaneous tissues  The fascia was approximated with 0  Vicryl  Subcutaneous tissues were approximated with 2-0 Vicryl after the wound was again irrigated  The skin was closed with staples  Dressings were applied with a Mepilex  An abduction pillow was placed and the patient then moved to a supine position  A closed reduction of the distal radius and ulna was performed followed by application of a sugar-tong splint which was secured with Webril and Ace bandages  The patient was awakened from the general anesthetic and transferred to the recovery room in stable and satisfactory postoperative condition       I was present for the entire procedure, A qualified resident physician was not available and A physician assistant was required during the procedure for retraction tissue handling,dissection and suturing    Patient Disposition:  PACU       SIGNATURE: Ilan Perea  DATE: July 2, 2022  TIME: 10:17 AM

## 2022-07-02 NOTE — ANESTHESIA POSTPROCEDURE EVALUATION
Post-Op Assessment Note    CV Status:  Stable  Pain Score: 0    Pain management: adequate     Mental Status:  Alert   Hydration Status:  Stable   PONV Controlled:  Controlled   Airway Patency:  Patent      Post Op Vitals Reviewed: Yes      Staff: CRNA         No complications documented      BP   130/62   Temp   97 7   Pulse  102   Resp   20   SpO2   93

## 2022-07-02 NOTE — ASSESSMENT & PLAN NOTE
· Most likely reactive  · No signs of infection  · Will follow-up with repeat CBC testing in the a m

## 2022-07-02 NOTE — ANESTHESIA PREPROCEDURE EVALUATION
Procedure:  HEMIARTHROPLASTY HIP (BIPOLAR) (Right Hip)    HTN - amlodipine   HLD - pravastatin    Denies the following: CP/SOB with exertion, asthma, COPD, ELIO, stroke/TIA, seizure      Relevant Problems   CARDIO   (+) Hypertension      MUSCULOSKELETAL   (+) Cervical spondylosis   (+) Lumbar degenerative disc disease   (+) Lumbar spondylosis   (+) Rheumatoid arthritis involving both hands (HCC)        Physical Exam    Airway    Mallampati score: II  TM Distance: >3 FB  Neck ROM: full     Dental       Cardiovascular      Pulmonary      Other Findings        Anesthesia Plan  ASA Score- 2 Emergent    Anesthesia Type- general with ASA Monitors  Additional Monitors:   Airway Plan: ETT  Plan Factors-Exercise tolerance (METS): >4 METS  Chart reviewed  Existing labs reviewed  Patient summary reviewed  Patient is not a current smoker  Obstructive sleep apnea risk education given perioperatively  Induction- intravenous  Postoperative Plan-     Informed Consent- Anesthetic plan and risks discussed with patient  I personally reviewed this patient with the CRNA  Discussed and agreed on the Anesthesia Plan with the CRNA  Karrie Nissen

## 2022-07-02 NOTE — ASSESSMENT & PLAN NOTE
· Status post a mechanical fall prior to arrival  · Status post an orthopedic surgical evaluation  · Patient is postop day 0 status post right total hip hemiarthroplasty  · Continued postop management as per Orthopedic surgery  · Continue Tylenol for mild pain, Percocet for moderate pain, and or IV Dilaudid or IV morphine for severe pain  · Lovenox for DVT prophylaxis  · PT OT evaluation  · Hopeful discharge planning soon in the next 48-72 hours depending on the PT OT evaluation results

## 2022-07-03 PROBLEM — D62 ACUTE BLOOD LOSS ANEMIA: Status: ACTIVE | Noted: 2022-07-03

## 2022-07-03 LAB
ANION GAP SERPL CALCULATED.3IONS-SCNC: 7 MMOL/L (ref 4–13)
BASOPHILS # BLD AUTO: 0.01 THOUSANDS/ΜL (ref 0–0.1)
BASOPHILS NFR BLD AUTO: 0 % (ref 0–1)
BUN SERPL-MCNC: 26 MG/DL (ref 5–25)
CALCIUM SERPL-MCNC: 8.9 MG/DL (ref 8.4–10.2)
CHLORIDE SERPL-SCNC: 101 MMOL/L (ref 96–108)
CO2 SERPL-SCNC: 29 MMOL/L (ref 21–32)
CREAT SERPL-MCNC: 1.26 MG/DL (ref 0.6–1.3)
EOSINOPHIL # BLD AUTO: 0 THOUSAND/ΜL (ref 0–0.61)
EOSINOPHIL NFR BLD AUTO: 0 % (ref 0–6)
ERYTHROCYTE [DISTWIDTH] IN BLOOD BY AUTOMATED COUNT: 14 % (ref 11.6–15.1)
GFR SERPL CREATININE-BSD FRML MDRD: 42 ML/MIN/1.73SQ M
GLUCOSE SERPL-MCNC: 149 MG/DL (ref 65–140)
HCT VFR BLD AUTO: 37.8 % (ref 34.8–46.1)
HGB BLD-MCNC: 11.6 G/DL (ref 11.5–15.4)
IMM GRANULOCYTES # BLD AUTO: 0.06 THOUSAND/UL (ref 0–0.2)
IMM GRANULOCYTES NFR BLD AUTO: 0 % (ref 0–2)
LYMPHOCYTES # BLD AUTO: 1.22 THOUSANDS/ΜL (ref 0.6–4.47)
LYMPHOCYTES NFR BLD AUTO: 8 % (ref 14–44)
MCH RBC QN AUTO: 29.7 PG (ref 26.8–34.3)
MCHC RBC AUTO-ENTMCNC: 30.7 G/DL (ref 31.4–37.4)
MCV RBC AUTO: 97 FL (ref 82–98)
MONOCYTES # BLD AUTO: 1.27 THOUSAND/ΜL (ref 0.17–1.22)
MONOCYTES NFR BLD AUTO: 9 % (ref 4–12)
NEUTROPHILS # BLD AUTO: 12.1 THOUSANDS/ΜL (ref 1.85–7.62)
NEUTS SEG NFR BLD AUTO: 83 % (ref 43–75)
NRBC BLD AUTO-RTO: 0 /100 WBCS
PLATELET # BLD AUTO: 229 THOUSANDS/UL (ref 149–390)
PMV BLD AUTO: 12.2 FL (ref 8.9–12.7)
POTASSIUM SERPL-SCNC: 4.9 MMOL/L (ref 3.5–5.3)
RBC # BLD AUTO: 3.91 MILLION/UL (ref 3.81–5.12)
SODIUM SERPL-SCNC: 137 MMOL/L (ref 135–147)
WBC # BLD AUTO: 14.66 THOUSAND/UL (ref 4.31–10.16)

## 2022-07-03 PROCEDURE — 99232 SBSQ HOSP IP/OBS MODERATE 35: CPT | Performed by: HOSPITALIST

## 2022-07-03 PROCEDURE — 80048 BASIC METABOLIC PNL TOTAL CA: CPT | Performed by: PHYSICIAN ASSISTANT

## 2022-07-03 PROCEDURE — 97110 THERAPEUTIC EXERCISES: CPT

## 2022-07-03 PROCEDURE — 97163 PT EVAL HIGH COMPLEX 45 MIN: CPT

## 2022-07-03 PROCEDURE — 99024 POSTOP FOLLOW-UP VISIT: CPT | Performed by: ORTHOPAEDIC SURGERY

## 2022-07-03 PROCEDURE — 85025 COMPLETE CBC W/AUTO DIFF WBC: CPT | Performed by: HOSPITALIST

## 2022-07-03 RX ORDER — FERROUS SULFATE 325(65) MG
325 TABLET ORAL
Status: DISCONTINUED | OUTPATIENT
Start: 2022-07-04 | End: 2022-07-06 | Stop reason: HOSPADM

## 2022-07-03 RX ADMIN — PANTOPRAZOLE SODIUM 20 MG: 20 TABLET, DELAYED RELEASE ORAL at 17:41

## 2022-07-03 RX ADMIN — MORPHINE SULFATE 2 MG: 10 INJECTION INTRAVENOUS at 17:44

## 2022-07-03 RX ADMIN — VENLAFAXINE HYDROCHLORIDE 150 MG: 150 CAPSULE, EXTENDED RELEASE ORAL at 08:00

## 2022-07-03 RX ADMIN — PANTOPRAZOLE SODIUM 20 MG: 20 TABLET, DELAYED RELEASE ORAL at 07:59

## 2022-07-03 RX ADMIN — ENOXAPARIN SODIUM 40 MG: 100 INJECTION SUBCUTANEOUS at 21:50

## 2022-07-03 RX ADMIN — OXYCODONE HYDROCHLORIDE AND ACETAMINOPHEN 500 MG: 500 TABLET ORAL at 08:00

## 2022-07-03 RX ADMIN — OXYCODONE HYDROCHLORIDE AND ACETAMINOPHEN 500 MG: 500 TABLET ORAL at 17:41

## 2022-07-03 RX ADMIN — AMLODIPINE BESYLATE 10 MG: 10 TABLET ORAL at 08:00

## 2022-07-03 RX ADMIN — OXYCODONE HYDROCHLORIDE 5 MG: 5 TABLET ORAL at 21:50

## 2022-07-03 RX ADMIN — DOCUSATE SODIUM 100 MG: 100 CAPSULE, LIQUID FILLED ORAL at 17:41

## 2022-07-03 RX ADMIN — PRAVASTATIN SODIUM 40 MG: 20 TABLET ORAL at 08:00

## 2022-07-03 RX ADMIN — Medication 1 TABLET: at 08:00

## 2022-07-03 RX ADMIN — DOCUSATE SODIUM 100 MG: 100 CAPSULE, LIQUID FILLED ORAL at 08:00

## 2022-07-03 RX ADMIN — OXYCODONE HYDROCHLORIDE 5 MG: 5 TABLET ORAL at 07:59

## 2022-07-03 RX ADMIN — GABAPENTIN 600 MG: 600 TABLET, FILM COATED ORAL at 21:50

## 2022-07-03 RX ADMIN — LORATADINE 10 MG: 10 TABLET ORAL at 08:00

## 2022-07-03 NOTE — PROGRESS NOTES
[address]  Progress Note Sushma Barber 1950, 67 y o  female MRN: [id number]  Unit/Bed#: -01 Encounter: [id number]  Primary Care Provider: Yoni Leyva DO   Date and time admitted to hospital: 7/1/2022  6:06 AM    * Closed transcervical fracture of right femur ([address] )  Assessment & Plan  · Status post a mechanical fall prior to arrival, Status post an orthopedic surgical evaluation  · Patient is postop day 1 status post right total hip hemiarthroplasty  · Continued postop management as per Orthopedic surgery  · Continue Tylenol for mild pain, Percocet for moderate pain, and or IV Dilaudid or IV morphine for severe pain  · Lovenox for DVT prophylaxis  · Patient is medically stable for discharge  · Patient needs post acute rehabilitation services  · Case management is working on placement    Fracture of right wrist  Assessment & Plan  · Secondary to mechanical fall as experienced prior to arrival  · Xray revealed: "Distal radial comminuted intra-articular fracture and ulnar styloid fracture "  · Status post an orthopedic surgical evaluation  · Patient is postop day 1-status post- closed reduction with splinting right wrist (Right)  · Awaiting placement        Hypertension  Assessment & Plan  · Blood pressure is controlled  · Continue amlodipine    Leukemoid reaction  Assessment & Plan  · Most likely reactive  · No signs of infection  · Improved    Cervical radiculopathy  Assessment & Plan  · -continue home gabapentin    Acute blood loss anemia  Assessment & Plan  · Arrival H&H 15 0/47, drop noted to 11 6/37 6  · Most likely post op acute blood loss anemia  · No indication for transfusion at this time  · Patient remains hemodynamically stable        VTE Prophylaxis:  Enoxaparin (Lovenox)    Patient Centered Rounds: I have performed bedside rounds with nursing staff today      Discussions with Specialists or Other Care Team Provider:  Orthopedic surgery, case management, nursing  Education and Discussions with Family / Patient:  Patient's son Janeen Gibbs was brought up to par at phone #[phone] at 11:30 a m  Current Length of Stay: 2 day(s)    Current Patient Status: Inpatient   Certification Statement: The patient will continue to require additional inpatient hospital stay due to The need for placement    Discharge Plan:  Medically stable for discharge, case management is working on placement    Code Status: Level 2 - DNAR: but accepts endotracheal intubation    Subjective:   Patient seen and examined, looks and feels well, is very happy that she is out of bed, and in a recliner  She reports that her wrist bothers her more than her hip and she is surprised by that  Objective:     Vitals:   Temp (24hrs), Av 3 °F (36 8 °C), Min:97 6 °F (36 4 °C), Max:99 6 °F (37 6 °C)    Temp:  [97 6 °F (36 4 °C)-99 6 °F (37 6 °C)] 97 9 °F (36 6 °C)  HR:  [] 91  Resp:  [17-20] 18  BP: ()/(65-87) 137/87  SpO2:  [90 %-95 %] 95 %  Body mass index is 36 03 kg/m²  Input and Output Summary (last 24 hours): Intake/Output Summary (Last 24 hours) at 7/3/2022 1130  Last data filed at 7/3/2022 0601  Gross per 24 hour   Intake 90 ml   Output 600 ml   Net -510 ml       Physical Exam:   Physical Exam  Constitutional:       General: She is not in acute distress  Appearance: Normal appearance  She is normal weight  She is not ill-appearing  HENT:      Head: Normocephalic and atraumatic  Nose: Nose normal       Mouth/Throat:      Mouth: Mucous membranes are moist    Eyes:      Extraocular Movements: Extraocular movements intact  Pupils: Pupils are equal, round, and reactive to light  Cardiovascular:      Rate and Rhythm: Normal rate and regular rhythm  Pulses: Normal pulses  Heart sounds: Normal heart sounds  No murmur heard  No friction rub  No gallop  Pulmonary:      Effort: Pulmonary effort is normal  No respiratory distress        Breath sounds: Normal breath sounds  No wheezing, rhonchi or rales  Abdominal:      General: There is no distension  Palpations: Abdomen is soft  There is no mass  Tenderness: There is no abdominal tenderness  Hernia: No hernia is present  Musculoskeletal:         General: No swelling or tenderness  Normal range of motion  Cervical back: Normal range of motion and neck supple  No rigidity  Right lower leg: No edema  Left lower leg: No edema  Comments: Right hip dressing in place, right wrist splint in place   Skin:     General: Skin is warm  Capillary Refill: Capillary refill takes less than 2 seconds  Findings: No erythema or rash  Neurological:      General: No focal deficit present  Mental Status: She is alert and oriented to person, place, and time  Mental status is at baseline  Cranial Nerves: No cranial nerve deficit  Motor: No weakness  Psychiatric:         Mood and Affect: Mood normal          Behavior: Behavior normal          Additional Data:     Labs:    Results from last 7 days   Lab Units 07/03/22  0449   WBC Thousand/uL 14 66*   HEMOGLOBIN g/dL 11 6   HEMATOCRIT % 37 8   PLATELETS Thousands/uL 229   NEUTROS PCT % 83*   LYMPHS PCT % 8*   MONOS PCT % 9   EOS PCT % 0     Results from last 7 days   Lab Units 07/03/22  0449 07/02/22  0435   SODIUM mmol/L 137 136   POTASSIUM mmol/L 4 9 4 0   CHLORIDE mmol/L 101 102   CO2 mmol/L 29 25   BUN mg/dL 26* 20   CREATININE mg/dL 1 26 1 04   CALCIUM mg/dL 8 9 9 0   ALK PHOS U/L  --  34   ALT U/L  --  35   AST U/L  --  35                   * I Have Reviewed All Lab Data Listed Above  * Additional Pertinent Lab Tests Reviewed:  [facility]  Reviewed    Imaging:  Imaging Reports Reviewed Today Include:  None    Recent Cultures (last 7 days):           Last 24 Hours Medication List:   Current Facility-Administered Medications   Medication Dose Route Frequency Provider Last Rate    acetaminophen  650 mg Oral Q6H PRN [facility] REJI Hernandez      aluminum-magnesium hydroxide-simethicone  30 mL Oral Q6H PRN [facility] PAAMIE      amLODIPine  10 mg Oral Daily Wilma Chelsea, DO      ascorbic acid  500 mg Oral BID [facility] PAHernandez      docusate sodium  100 mg Oral BID [facility] PAAMIE      enoxaparin  40 mg Subcutaneous Daily [place], [place]      gabapentin  600 mg Oral HS Wilma Chelsea, DO      HYDROmorphone  0 5 mg Intravenous Q2H PRN [place], [place]      loratadine  10 mg Oral Daily Wilma Chelsea, DO      morphine injection  2 mg Intravenous Q6H PRN [place], [place]      multivitamin-minerals  1 tablet Oral Daily [place], [place]      ondansetron  4 mg Intravenous Q6H PRN [place], [place]      oxyCODONE  5 mg Oral Q6H PRN [place], [place]      pantoprazole  20 mg Oral BID AC Wilma Chelsea, DO      pravastatin  40 mg Oral Daily Wilma Chelsea, [address] [place] [address] Avenue      temazepam  15 mg Oral HS PRN Wilma Chelsea, DO      venlafaxine  150 mg Oral Daily Wilma Chelsea, DO          Today, Patient Was Seen By: Wandy Barfield MD    ** Please Note: Dictation voice to text software may have been used in the creation of this document   **

## 2022-07-03 NOTE — ASSESSMENT & PLAN NOTE
· Secondary to mechanical fall as experienced prior to arrival  · Xray revealed: "Distal radial comminuted intra-articular fracture and ulnar styloid fracture "  · Status post an orthopedic surgical evaluation  · Patient is postop day 1-status post- closed reduction with splinting right wrist (Right)  · Awaiting placement

## 2022-07-03 NOTE — ASSESSMENT & PLAN NOTE
· Arrival H&H 15 0/47, drop noted to 11 6/37 6  · Most likely post op acute blood loss anemia  · No indication for transfusion at this time  · Patient remains hemodynamically stable

## 2022-07-03 NOTE — PHYSICAL THERAPY NOTE
Physical Therapy Evaluation     Patient's Name: Malissa Ardon    Admitting Diagnosis  Wrist pain [M25 539]  Hip pain [M25 559]  Right wrist fracture [S62 101A]  Femur fracture, right ([facility] [address] ) Tanika Espinoza  Fall, initial encounter [W19  XXXA]  Closed transcervical fracture of right femur, initial encounter ([facility] [address] ) [S72 031A]  Nondisplaced transverse fracture of shaft of right femur, initial encounter for closed fracture ([facility] [address] ) [S72 324A]    Problem List  Patient Active Problem List   Diagnosis    Lumbar degenerative disc disease    Lumbar spondylosis    Cervical radiculopathy    Cervical spondylosis    Rheumatoid arthritis involving both hands ([facility] [address] )    Spinal stenosis of lumbar region without neurogenic claudication    Closed transcervical fracture of right femur ([facility] [address] )    Fracture of right wrist    Hypertension    Leukemoid reaction    Acute blood loss anemia       Past Medical History  Past Medical History:   Diagnosis Date    Anxiety     Arthritis     Depression     GERD (gastroesophageal reflux disease)     Hiatal hernia     Hyperlipidemia     Hypertension        Past Surgical History  Past Surgical History:   Procedure Laterality Date    APPENDECTOMY      CHOLECYSTECTOMY      FRACTURE SURGERY Right     arm with plate and pins    HAND SURGERY Bilateral     HYSTERECTOMY      JOINT REPLACEMENT Bilateral     SHOULDER ARTHROSCOPY Left         07/03/22 0800   PT Last Visit   PT Visit Date 07/03/22   Note Type   Note type Evaluation   Pain Assessment   Pain Assessment Tool 0-10   Pain Score 3   Pain Location/Orientation Orientation: Right;Location: Hip   Pain Radiating Towards into R leg   Pain Onset/Description Frequency: Constant/Continuous; Onset: Ongoing; Descriptor: Aching  (Previously to therapy pain was an 8 and shooting down the leg )   Hospital Pain Intervention(s) Medication (See MAR); Cold applied;Repositioned; Environmental changes; Ambulation/increased activity  (Marianne SCHULTZ, provided pain meds prior to PT) Restrictions/Precautions   Weight Bearing Precautions Per Order (S)  Yes   RUE Weight Bearing Per Order (S)  NWB  (through wrist and hand)   LLE Weight Bearing Per Order (S)  WBAT   Braces or Orthoses   (ABductor pillow wedge)   Other Precautions O2;Fall Risk;Pain;WBS;Chair Alarm  (R GINA, surgical site skin integrity)   Home Living   Type of [address] [place] [address] Two level;Bed/bath upstairs;Stairs to enter with rails  (JOANNE 2 with bilateral railing  Stairs to bed and bath 10 with a railing)   Bathroom Shower/Tub Tub/shower unit   [employer] Raised   Bathroom Equipment Grab bars in shower;Grab bars around toilet; Shower chair;Commode  (does not use the commode)   Bathroom Accessibility Accessible   Home Equipment Walker;Cane  (no AD at baseline)   Prior Function   Level of [place] Independent with ADLs and functional mobility   Lives With Spouse   Receives Help From Family   ADL Assistance Independent   IADLs Independent   Falls in the last 6 months 1 to 4  (1 fall that brought her in)   Vocational Retired   Comments + drive   Cognition   Overall Cognitive Status WFL   Arousal/Participation Responsive   Orientation Level Oriented X4   Memory Within functional limits   Following Commands Follows all commands and directions without difficulty   Subjective   Subjective Pt  reasoning for admittance, "was dog sitting and fell on the cement porch trying to leash dogs "   RLE Assessment   RLE Assessment X  (Pt  abided by weight bearing precautions and GINA precautions  Accepted 50% of weight on R during transfers and ambulation)   LLE Assessment   LLE Assessment WFL   Vision-Basic Assessment   Current Vision Wears glasses all the time   Coordination   Movements are Fluid and Coordinated 0   Coordination and Movement Description Pts  movements were bradykinetic out of fear of movement and anticipation of pain at the beginning of session     Light Touch   RLE Light Touch Grossly intact  ("RA in fingers")   LLE Light Touch Grossly intact   Bed Mobility   Supine to Sit 3  Moderate assistance   Additional items Assist x 2;HOB elevated; Bedrails; Increased time required;LE management;Verbal cues   Additional Comments Pt  seated in recliner at the end of the session  Pt  reported that moving supine to sit was painful and required R LE managment  Because pt  R wrist and hand are NWB and casted; therefore, needed assitance for UE movement  During movement pt  was given verbal cues to remind them of precautions and for body mechanics  Transfers   Sit to Stand 4  Minimal assistance   Additional items Assist x 2; Increased time required;Verbal cues; Bedrails  (RW with platform)   Stand to Sit   (CGA)   Additional items Assist x 1; Armrests; Verbal cues  (RW qith platform)   Additional Comments Pt  received verbal cues as a reminder of precautions  Pt  instructed to put R UE into platform and push up with the L UE  Ambulation/Elevation   Gait pattern Decreased R stance; Antalgic; Step to   Gait Assistance   (CGA)   Additional items Assist x 1;Verbal cues   Assistive Device Platform walker  (Platform on R side)   Distance 25 feet   Ambulation/Elevation Additional Comments Pt  reminded of weight bearing precautions and instructed to weight shift before ambulation  Pt  taught 3 point modified gait pattern leading with involved LE  Pt  received verbal cues for ambulatory direction  Balance   Static Sitting Fair +   Dynamic Sitting Fair +   Static Standing Fair   Dynamic Standing Fair   Ambulatory Fair   Endurance Deficit   Endurance Deficit Yes   Endurance Deficit Description Post operative weakness  First time out of bed following surgical interventions  Activity Tolerance   Activity Tolerance Patient limited by pain   Nurse Made Aware MARION Bowie   Assessment   Prognosis Good   Problem List Decreased strength;Decreased coordination;Pain;Orthopedic restrictions; Obesity; Decreased mobility; Impaired balance;Decreased skin integrity Assessment Pt  admitted to hospital on 7/1/22 for closed transcervical fracture of right femur subsequent R GINA  Pt  evaluated by PT on 7/3/22 to assess functional mobility  Pt  was lying supine with HOB elevated at the beginning of the session  Pt  was cognitively oriented and responsive to evaluation questions  Pain scale on a 0-10 was a 3 in the pts  R leg, prior to medication was an 8 and shooting done the leg  During bed mobility pt  required moderate assistance x2  During transfers pt  required minimal assistance x2  Once standing patient influened to weight shift and reported putting 50% of weight into involved LE  Pt  ambulated 25 feet with R platform walker  Gait pattern during ambulation was decreased R leg stance, antalgic, and step to  During ambulation pt  tolerated movement well, demonstrated good posture, Fair ambulatory balance, and implemented gait pattern and AD usage well  After ambulation pt  Received treatment  At the end of the session pt  seated in recliner with SCDs and chair alarm on  Pt  will benefit from skilled physical therapy to help increase B LE strength, improve balance, improve bed mobility, increase ambulation distance and endurance, preserve skin integrity, promote proper healing and receive education on precautions  Goals   Patient Goals to get out of bed and decrease discomfort   LTG Expiration Date 07/13/22   Long Term Goal #1 1) Pt  Will increase gross R LE strength to [facility] to be able to improve ambulation endurance, complete weight bearing activities, and decrease fall risk  2) Pt  Will increase ambulation to 150 ft RW Supervision to be able walk within home environment and community  3) Pt  Will improve ambulatory balance to Good to be able to complete head turns, attend to external stimuli, and reach out of GINO  4) Pt   Will improve bed mobility to minimal assistance to be able to move out of bed and initiate transfers, decrease burden of care, preserve skin integrity, and maintain precautions  5) Pt  Will increase weight acceptance on R LE to 75% of weight to promote proper healing  6) Pt  Will utilize HEP 2x throughout the day to promote proper healing and return to PLOF  Plan   Treatment/Interventions Functional transfer training;LE strengthening/ROM; Therapeutic exercise; Endurance training;Bed mobility;Gait training;Spoke to nursing   PT Frequency Other (Comment)  (7x/week)   Recommendation   PT Discharge Recommendation (S)  Post acute rehabilitation services   Equipment Recommended (S)  Rodriguez Barb[place] Package Recommended   (Platform walker)   Yarelis Jackson 435   Turning in Bed Without Bedrails 3   Lying on Back to Sitting on Edge of Flat Bed 2   Moving Bed to Chair 3   Standing Up From Chair 3   Walk in Room 3   Climb 3-5 Stairs 2   Basic Mobility Inpatient Raw Score 16   Basic Mobility Standardized Score 38 32   Highest Level Of Mobility   -HLM Goal 5: Stand one or more mins   JH-HLM Achieved 7: Walk 25 feet or more   Additional Treatment Session   Start Time 0850   End Time 0914   Treatment Assessment In addition to PT evaluation pt  treated for functional mobility on 7/3/22  Pt  received 24 minutes of therapeutic exercise  Exercise were performed seated in the recliner and AROM  Exercises included 1 set of 10 reps of glute sets, 20 reps of marches up to 90 degrees, 20 reps of abduction and adduction to midline, 20 reps of long arch quads, and 20 reps of ankle pumps  Pt  was given an at HEP completed during session  Pt  tolerated treatment well and will benefit from continuous physical therapy  Equipment Use Platform walker   Exercises   Glute Sets Sitting;10 reps;AROM; Bilateral  (6 second holds)   Hip Flexion Sitting;20 reps;AROM; Bilateral  (within precautions)   Hip Abduction Sitting;20 reps;AROM; Bilateral  (within precautions)   Hip Adduction Sitting;20 reps;AROM; Bilateral  (within precautions)   Knee AROM [place] reps;AROM; Bilateral   Ankle Pumps Sitting;20 reps;AROM; Bilateral   End of Consult   Patient Position at End of Consult Bed/Chair alarm activated  (Pt  seated in recliner with SCDs on)     History: R GINA, R wrist fx, RA w/in both hands, spinal stenosis, HTN, anemia, lumbar and cervical spondylosis  Body System Impairments: R UE precautions, R GINA precautions, WBAT, decreased sensation in hands due to RA predisposing other joints to sensation changes  Clinical Presentation: pain, increased risk of integumentary changes, increased fall risk, decreased activity tolerance, platform walker usage  Based off pts  clinical presentation this is a high complexity case        Mariah Obrien, SPT

## 2022-07-03 NOTE — PROGRESS NOTES
Orthopedics   Osbaldo Vega 67 y o  female MRN: [id number]  Unit/Bed#: -01      Subjective:  67 y  o female post operative day 1 right hip hemiarthroplasty and closed reduction and splinting of right wrist distal radius intra-articular fracture  Pt doing well  Pain controlled  Patient reports her wrist hurts slightly more than her hip  She did well in therapy  She is feeling good  Denies chest pain calf pain shortness of breath      Labs:  0   Lab Value Date/Time    HCT 37 8 07/03/2022 0449    HCT 42 3 07/02/2022 1118    HCT 45 3 07/02/2022 0435    HGB 11 6 07/03/2022 0449    HGB 13 2 07/02/2022 1118    HGB 14 2 07/02/2022 0435    WBC 14 66 (H) 07/03/2022 0449    WBC 17 13 (H) 07/02/2022 1118    WBC 9 40 07/02/2022 0435     Meds:    Current Facility-Administered Medications:     acetaminophen (TYLENOL) tablet 650 mg, 650 mg, Oral, Q6H PRN, Tiffanie Hernandez PA-C, 650 mg at 07/02/22 1411    aluminum-magnesium hydroxide-simethicone (MYLANTA) oral suspension 30 mL, 30 mL, Oral, Q6H PRN, Tiffanie Hernandez PA-C    amLODIPine (NORVASC) tablet 10 mg, 10 mg, Oral, Daily, Jo Ann Basil, DO, 10 mg at 07/03/22 0800    ascorbic acid (VITAMIN C) tablet 500 mg, 500 mg, Oral, BID, Tiffanie Hernandez PA-C, 500 mg at 07/03/22 0800    docusate sodium (COLACE) capsule 100 mg, 100 mg, Oral, BID, Tiffanie Hernandez PA-C, 100 mg at 07/03/22 0800    enoxaparin (LOVENOX) subcutaneous injection 40 mg, 40 mg, Subcutaneous, Daily, Tiffanie Del Toro PA-C, 40 mg at 07/02/22 2124    gabapentin (NEURONTIN) tablet 600 mg, 600 mg, Oral, HS, Jo Ann Basil, DO, 600 mg at 07/02/22 2124    HYDROmorphone (DILAUDID) injection 0 5 mg, 0 5 mg, Intravenous, Q2H PRN, Tiffanie Hernandez PA-C    loratadine (CLARITIN) tablet 10 mg, 10 mg, Oral, Daily, Jo Ann Espinosa DO, 10 mg at 07/03/22 0800    morphine injection 2 mg, 2 mg, Intravenous, Q6H PRN, Armand Echavarria PA-C, 2 mg at 07/02/22 2041    multivitamin-minerals (CENTRUM) tablet 1 tablet, 1 tablet, Oral, Daily, [address] [place], [place], 1 tablet at 07/03/22 0800    ondansetron (ZOFRAN) injection 4 mg, 4 mg, Intravenous, Q6H PRN, Alvino Silver PA-C    oxyCODONE (ROXICODONE) IR tablet 5 mg, 5 mg, Oral, Q6H PRN, [address] PA-C, 5 mg at 07/03/22 0759    pantoprazole (PROTONIX) EC tablet 20 mg, 20 mg, Oral, BID AC, [place], DO, 20 mg at 07/03/22 0759    pravastatin (PRAVACHOL) tablet 40 mg, 40 mg, Oral, Daily, [place], DO, 40 mg at 07/03/22 0800    temazepam (RESTORIL) capsule 15 mg, 15 mg, Oral, HS PRN, [place], DO    venlafaxine (EFFEXOR-XR) 24 hr capsule 150 mg, 150 mg, Oral, Daily, [place], DO, 150 mg at 07/03/22 0800    Blood Culture:   No results found for: BLOODCX    Wound Culture:   No results found for: WOUNDCULT    Ins and Outs:  I/O last 24 hours: In: 890 [P O :90; I V :800]  Out: 1150 [Urine:950; Blood:200]    Physical:  Vitals:    07/03/22 0641   BP: 137/87   Pulse: 91   Resp: 18   Temp:    SpO2: 95%     right lower extremity  · Dressing clean dry intact  Thigh soft nontender  · Sensation intact L2-S1  · Motor intact to knee flexion/extension, EHL/FHL  · 2+ DP pulse  Negative Homans sign  Right upper extremity:  Splint in place  Sensation intact to all fingers  Capillary refill brisk  Able to oppose thumb to index finger only  Assessment: 67 y  o female post operative day 1 right hip hemiarthroplasty and closed reduction and splinting of right wrist distal radius intra-articular fracture  Plan:  · Up and out of bed  · Weightbearing as tolerated right lower extremity  Nonweightbearing right wrist   Elevation right wrist   Ice and elevate wrist and ice right hip  · PT/OT  · DVT prophylaxis Lovenox  · Analgesics p r n    · Dispo: Ortho will follow  · Will continue to assess for acute blood loss anemia    Alvino Silver PA-C

## 2022-07-03 NOTE — PLAN OF CARE
Problem: PHYSICAL THERAPY ADULT  Goal: Performs mobility at highest level of function for planned discharge setting  See evaluation for individualized goals  Description: Treatment/Interventions: Functional transfer training, LE strengthening/ROM, Therapeutic exercise, Endurance training, Bed mobility, Gait training, Spoke to nursing  Equipment Recommended: [place]       See flowsheet documentation for full assessment, interventions and recommendations  Note: Prognosis: Good  Problem List: Decreased strength, Decreased coordination, Pain, Orthopedic restrictions, Obesity, Decreased mobility, Impaired balance, Decreased skin integrity  Assessment: Pt  admitted to hospital on 7/1/22 for closed transcervical fracture of right femur subsequent R GINA  Pt  evaluated by PT on 7/3/22 to assess functional mobility  Pt  was lying supine with HOB elevated at the beginning of the session  Pt  was cognitively oriented and responsive to evaluation questions  Pain scale on a 0-10 was a 3 in the pts  R leg, prior to medication was an 8 and shooting done the leg  During bed mobility pt  required moderate assistance x2  During transfers pt  required minimal assistance x2  Once standing patient influened to weight shift and reported putting 50% of weight into involved LE  Pt  ambulated 25 feet with R platform walker  Gait pattern during ambulation was decreased R leg stance, antalgic, and step to  During ambulation pt  tolerated movement well, demonstrated good posture, Fair ambulatory balance, and implemented gait pattern and AD usage well  After ambulation pt  Received treatment  At the end of the session pt  seated in recliner with SCDs and chair alarm on  Pt  will benefit from skilled physical therapy to help increase B LE strength, improve balance, improve bed mobility, increase ambulation distance and endurance, preserve skin integrity, promote proper healing and receive education on precautions             PT Discharge Recommendation: (S) Post acute rehabilitation services          See flowsheet documentation for full assessment

## 2022-07-03 NOTE — ASSESSMENT & PLAN NOTE
· Status post a mechanical fall prior to arrival, Status post an orthopedic surgical evaluation  · Patient is postop day 1 status post right total hip hemiarthroplasty  · Continued postop management as per Orthopedic surgery  · Continue Tylenol for mild pain, Percocet for moderate pain, and or IV Dilaudid or IV morphine for severe pain  · Lovenox for DVT prophylaxis  · Patient is medically stable for discharge  · Patient needs post acute rehabilitation services  · Case management is working on placement

## 2022-07-04 ENCOUNTER — TELEPHONE (OUTPATIENT)
Dept: OTHER | Facility: OTHER | Age: 72
End: 2022-07-04

## 2022-07-04 LAB
ANION GAP SERPL CALCULATED.3IONS-SCNC: 9 MMOL/L (ref 4–13)
BUN SERPL-MCNC: 24 MG/DL (ref 5–25)
CALCIUM SERPL-MCNC: 9 MG/DL (ref 8.4–10.2)
CHLORIDE SERPL-SCNC: 101 MMOL/L (ref 96–108)
CO2 SERPL-SCNC: 27 MMOL/L (ref 21–32)
CREAT SERPL-MCNC: 0.85 MG/DL (ref 0.6–1.3)
GFR SERPL CREATININE-BSD FRML MDRD: 68 ML/MIN/1.73SQ M
GLUCOSE SERPL-MCNC: 125 MG/DL (ref 65–140)
POTASSIUM SERPL-SCNC: 3.9 MMOL/L (ref 3.5–5.3)
SODIUM SERPL-SCNC: 137 MMOL/L (ref 135–147)

## 2022-07-04 PROCEDURE — 80048 BASIC METABOLIC PNL TOTAL CA: CPT | Performed by: PHYSICIAN ASSISTANT

## 2022-07-04 PROCEDURE — 97116 GAIT TRAINING THERAPY: CPT

## 2022-07-04 PROCEDURE — 99232 SBSQ HOSP IP/OBS MODERATE 35: CPT | Performed by: INTERNAL MEDICINE

## 2022-07-04 PROCEDURE — 99024 POSTOP FOLLOW-UP VISIT: CPT | Performed by: PHYSICIAN ASSISTANT

## 2022-07-04 PROCEDURE — 97167 OT EVAL HIGH COMPLEX 60 MIN: CPT

## 2022-07-04 PROCEDURE — 97530 THERAPEUTIC ACTIVITIES: CPT

## 2022-07-04 PROCEDURE — 97110 THERAPEUTIC EXERCISES: CPT

## 2022-07-04 RX ADMIN — PRAVASTATIN SODIUM 40 MG: 20 TABLET ORAL at 08:48

## 2022-07-04 RX ADMIN — ENOXAPARIN SODIUM 40 MG: 100 INJECTION SUBCUTANEOUS at 21:55

## 2022-07-04 RX ADMIN — MORPHINE SULFATE 2 MG: 10 INJECTION INTRAVENOUS at 19:31

## 2022-07-04 RX ADMIN — PANTOPRAZOLE SODIUM 20 MG: 20 TABLET, DELAYED RELEASE ORAL at 07:37

## 2022-07-04 RX ADMIN — DOCUSATE SODIUM 100 MG: 100 CAPSULE, LIQUID FILLED ORAL at 17:29

## 2022-07-04 RX ADMIN — DOCUSATE SODIUM 100 MG: 100 CAPSULE, LIQUID FILLED ORAL at 08:48

## 2022-07-04 RX ADMIN — VENLAFAXINE HYDROCHLORIDE 150 MG: 150 CAPSULE, EXTENDED RELEASE ORAL at 08:49

## 2022-07-04 RX ADMIN — GABAPENTIN 600 MG: 600 TABLET, FILM COATED ORAL at 21:55

## 2022-07-04 RX ADMIN — PANTOPRAZOLE SODIUM 20 MG: 20 TABLET, DELAYED RELEASE ORAL at 16:14

## 2022-07-04 RX ADMIN — FERROUS SULFATE TAB 325 MG (65 MG ELEMENTAL FE) 325 MG: 325 (65 FE) TAB at 07:37

## 2022-07-04 RX ADMIN — OXYCODONE HYDROCHLORIDE AND ACETAMINOPHEN 500 MG: 500 TABLET ORAL at 08:48

## 2022-07-04 RX ADMIN — Medication 1 TABLET: at 08:48

## 2022-07-04 RX ADMIN — OXYCODONE HYDROCHLORIDE AND ACETAMINOPHEN 500 MG: 500 TABLET ORAL at 17:29

## 2022-07-04 RX ADMIN — AMLODIPINE BESYLATE 10 MG: 10 TABLET ORAL at 08:48

## 2022-07-04 RX ADMIN — LORATADINE 10 MG: 10 TABLET ORAL at 08:48

## 2022-07-04 NOTE — PROGRESS NOTES
[address]  Progress Note Lynda Ryan 1950, 67 y o  female MRN: [id number]  Unit/Bed#: -01 Encounter: [id number]  Primary Care Provider: Clelia Homans, DO   Date and time admitted to hospital: 7/1/2022  6:06 AM    * Closed transcervical fracture of right femur [facility])  Assessment & Plan  · Patient with mechanical fall prior to admission  · Surgical repair of right hip on 07/02/2022  · Continued postop management as per Orthopedic surgery  · Continue Tylenol for mild pain, Percocet for moderate pain, and or IV Dilaudid or IV morphine for severe pain  · Lovenox for DVT prophylaxis  · Patient is medically stable for discharge  · Patient needs post acute rehabilitation services  · Case management is working on placement    Fracture of right wrist  Assessment & Plan  · Secondary to mechanical fall as experienced prior to arrival  · Xray revealed: "Distal radial comminuted intra-articular fracture and ulnar styloid fracture "  · Status post an orthopedic surgical evaluation  · Patient is postop day 2-status post- closed reduction with splinting right wrist (Right)  · Awaiting placement    Acute blood loss anemia  Assessment & Plan  · Arrival H&H 15 0/47, drop noted to 11 6/37 6  · Most likely post op acute blood loss anemia  · No indication for transfusion at this time  · Patient remains hemodynamically stable    Leukemoid reaction  Assessment & Plan  · Most likely reactive  · No signs of infection  · Improving    Hypertension  Assessment & Plan  · Blood pressure is controlled  · Continue amlodipine    Cervical radiculopathy  Assessment & Plan  · continue home gabapentin    VTE Prophylaxis:  Enoxaparin (Lovenox)    Patient Centered Rounds: I have performed bedside rounds with nursing staff today      Discussions with Specialists or Other Care Team Provider: yes  Education and Discussions with Family / Patient:  Patient states that she will update her  herself    Current Length of Stay: 3 day(s)    Current Patient Status: Inpatient   Certification Statement: The patient will continue to require additional inpatient hospital stay due to Hip fracture    Discharge Plan:  Case management working on discharge plan    Code Status: Level 2 - DNAR: but accepts endotracheal intubation    Subjective:   No overnight events noted    Objective:     Vitals:   Temp (24hrs), Av 7 °F (37 1 °C), Min:97 7 °F (36 5 °C), Max:99 4 °F (37 4 °C)    Temp:  [97 7 °F (36 5 °C)-99 4 °F (37 4 °C)] 98 9 °F (37 2 °C)  HR:  [] 108  Resp:  [18-20] 18  BP: (124-136)/(82-92) 124/92  SpO2:  [91 %-95 %] 95 %  Body mass index is 35 95 kg/m²  Input and Output Summary (last 24 hours): Intake/Output Summary (Last 24 hours) at 2022 1333  Last data filed at 2022 0900  Gross per 24 hour   Intake 720 ml   Output --   Net 720 ml       Physical Exam:   Physical Exam  Constitutional:       General: She is not in acute distress  Appearance: She is obese  HENT:      Head: Normocephalic and atraumatic  Nose: Nose normal       Mouth/Throat:      Mouth: Mucous membranes are moist    Eyes:      Extraocular Movements: Extraocular movements intact  Conjunctiva/sclera: Conjunctivae normal    Cardiovascular:      Rate and Rhythm: Normal rate and regular rhythm  Pulmonary:      Effort: Pulmonary effort is normal  No respiratory distress  Abdominal:      Palpations: Abdomen is soft  Tenderness: There is no abdominal tenderness  Musculoskeletal:         General: Normal range of motion  Cervical back: Normal range of motion and neck supple  Comments: Generalized weakness  Decreased range of motion right hip   Skin:     General: Skin is warm and dry  Neurological:      General: No focal deficit present  Mental Status: She is alert  Mental status is at baseline  Cranial Nerves: No cranial nerve deficit     Psychiatric:         Mood and Affect: Mood normal          Behavior: Behavior normal          Additional Data:     Labs:    Results from last 7 days   Lab Units 07/03/22  0449   WBC Thousand/uL 14 66*   HEMOGLOBIN g/dL 11 6   HEMATOCRIT % 37 8   PLATELETS Thousands/uL 229   NEUTROS PCT % 83*   LYMPHS PCT % 8*   MONOS PCT % 9   EOS PCT % 0     Results from last 7 days   Lab Units 07/04/22  0448 07/03/22  0449 07/02/22  0435   SODIUM mmol/L 137   < > 136   POTASSIUM mmol/L 3 9   < > 4 0   CHLORIDE mmol/L 101   < > 102   CO2 mmol/L 27   < > 25   BUN mg/dL 24   < > 20   CREATININE mg/dL 0 85   < > 1 04   CALCIUM mg/dL 9 0   < > 9 0   ALK PHOS U/L  --   --  34   ALT U/L  --   --  35   AST U/L  --   --  35    < > = values in this interval not displayed  * I Have Reviewed All Lab Data Listed Above  * Additional Pertinent Lab Tests Reviewed:  [facility]  [facility]    Imaging:  Imaging Reports Reviewed Today Include:  No new imaging    Recent Cultures (last 7 days):           Last 24 Hours Medication List:   Current Facility-Administered Medications   Medication Dose Route Frequency Provider Last Rate    acetaminophen  650 mg Oral Q6H PRN Sd Hernandez PA-C      aluminum-magnesium hydroxide-simethicone  30 mL Oral Q6H PRN Shwetha Huang PA-C      amLODIPine  10 mg Oral Daily Danney Poplin, DO      ascorbic acid  500 mg Oral BID Sd Hernandez PA-C      docusate sodium  100 mg Oral BID Sd Del Toro PA-C      enoxaparin  40 mg Subcutaneous Daily Sd [place], [place]      ferrous sulfate  325 mg Oral Daily With Breakfast Sd [place]mo [place]      gabapentin  600 mg Oral HS Danney Poplin, DO      loratadine  10 mg Oral Daily Danney Poplin, DO      morphine injection  2 mg Intravenous Q6H PRN Shwetha Huang PA-C      multivitamin-minerals  1 tablet Oral Daily Sd Winnmo [place]      ondansetron  4 mg Intravenous Q6H PRN Sd Del Toro PA-C      oxyCODONE  5 mg Oral Q6H PRN Shwetha Huang PA-C      pantoprazole  20 mg Oral BID AC Pender Ermelinda, DO      pravastatin  40 mg Oral Daily Pender Ermelinda, DO      temazepam  15 mg Oral HS PRN Pender Ermelinda, DO      venlafaxine  150 mg Oral Daily Pender Ermelinda, DO          Today, Patient Was Seen By: Dee Dee Burger MD    ** Please Note: Dictation voice to text software may have been used in the creation of this document   **

## 2022-07-04 NOTE — PHYSICAL THERAPY NOTE
Physical Therapy Treatment Session Note    Patient's Name: Marlene Reese    Admitting Diagnosis  Wrist pain [M25 539]  Hip pain [M25 559]  Right wrist fracture [S62 101A]  Femur fracture, right ([facility] ) Rodolph Muskrat  Fall, initial encounter [W19  XXXA]  Closed transcervical fracture of right femur, initial encounter [facility] ) Delisa Dense  Nondisplaced transverse fracture of shaft of right femur, initial encounter for closed fracture ([facility] ) [S72 324A]    Problem List  Patient Active Problem List   Diagnosis    Lumbar degenerative disc disease    Lumbar spondylosis    Cervical radiculopathy    Cervical spondylosis    Rheumatoid arthritis involving both hands ([facility] )    Spinal stenosis of lumbar region without neurogenic claudication    Closed transcervical fracture of right femur ([facility] )    Fracture of right wrist    Hypertension    Leukemoid reaction    Acute blood loss anemia       Past Medical History  Past Medical History:   Diagnosis Date    Anxiety     Arthritis     Depression     GERD (gastroesophageal reflux disease)     Hiatal hernia     Hyperlipidemia     Hypertension        Past Surgical History  Past Surgical History:   Procedure Laterality Date    APPENDECTOMY      CHOLECYSTECTOMY      FRACTURE SURGERY Right     arm with plate and pins    HAND SURGERY Bilateral     HYSTERECTOMY      JOINT REPLACEMENT Bilateral     SHOULDER ARTHROSCOPY Left         07/04/22 0916   PT Last Visit   PT Visit Date 07/04/22   Note Type   Note Type Treatment   Pain Assessment   Pain Assessment Tool 0-10  (none at rest, increased with activity)   Pain Score 6   Pain Location/Orientation Orientation: Right;Location: Hip   Pain Onset/Description Onset: Ongoing;Frequency: Constant/Continuous; Descriptor: Aching   Effect of Pain on Daily Activities yes   Patient's Stated Pain Goal No pain   Hospital Pain Intervention(s) Medication (See MAR); Repositioned; Ambulation/increased activity; Emotional support; Environmental changes   Multiple Pain Sites No Precautions   Total Hip Replacement Internal rotation; Flexion;ADduction  (ADduction past midline, flexion past 90 degrees)   Restrictions/Precautions   Weight Bearing Precautions Per Order Yes   RUE Weight Bearing Per Order (S)  NWB  (platform walker usage acceptable as per ortho, NWB throught hand and wrist)   RLE Weight Bearing Per Order (S)  WBAT   Braces or Orthoses Other (Comment)  (ABductor pillow wedge)   Other Precautions Chair Alarm;WBS;THR;Fall Risk  (surgical site R hip)   General   Chart Reviewed Yes   Additional Pertinent History Tiffanie OT present for co-treatment due to medical complexity, required skilled interventions of 2 clinicians for care delivery  Response to Previous Treatment Patient with no complaints from previous session  Family/Caregiver Present No   Cognition   Overall Cognitive Status WFL   Arousal/Participation Alert; Responsive; Cooperative   Attention Within functional limits   Orientation Level Oriented X4   Memory Decreased recall of precautions  (recalled 1 of 3 GINA precautions)   Following Commands Follows all commands and directions without difficulty   Comments Pt  agreeable to PT tx session,pleasant  Subjective   Subjective "feel pretty good"   Bed Mobility   Supine to Sit   (DNT pt  was out of bed on recliner upon arrival)   Sit to Supine 4  Minimal assistance   Additional items Assist x 1;HOB elevated; Bedrails; Increased time required;Verbal cues;LE management  (tactile assistance RLE)   Transfers   Sit to Stand   (CGA)   Additional items Assist x 1; Armrests; Increased time required;Verbal cues  (armrest L)   Stand to Sit   (CGA)   Additional items Assist x 1; Armrests; Increased time required;Verbal cues   Stand pivot   (CGA)   Additional items Assist x 1; Increased time required;Verbal cues  (verbal cues for hip IR avoidance)   Additional Comments Verbal cues for proper body mechanics, hand placement L armrest/R platform with transitional movements     Ambulation/Elevation Gait pattern Antalgic;Decreased R stance; Short stride; Step to   Gait Assistance   (CGA)   Additional items Assist x 1;Verbal cues; Tactile cues   Assistive Device Platform walker  (R platform)   Distance 40 feet x 2   Stair Management Assistance Not tested   Ambulation/Elevation Additional Comments Verbal cues for proper patterning platform [id number]-RLE-LLE  Verbal cues for RLE clearance with directional changes  Balance   Static Sitting Good   Dynamic Sitting Fair +   Static Standing Fair +   Dynamic Standing Fair +   Ambulatory Fair   Endurance Deficit   Endurance Deficit Yes   Activity Tolerance   Activity Tolerance Patient limited by pain   Nurse Made Aware Yes, nursing staff was informed of tx session outcome  Exercises   Glute Sets Sitting;AROM; Bilateral;20 reps   Hip Flexion Sitting;AROM; Bilateral;20 reps   Hip Abduction Sitting;AROM; Bilateral;20 reps   Hip Adduction Sitting;20 reps;AROM; Bilateral   Knee AROM Long Arc Quad Sitting;20 reps;AROM; Bilateral   Ankle Pumps Sitting;20 reps;AROM; Bilateral   Balance training  Standing weightshifting B x 20 reps w/CGA and platform RW, B LAT foot taps x 20 reps with CGA of 1 and platform RW  Assessment   Prognosis Good   Problem List Decreased strength;Decreased endurance; Impaired balance;Decreased mobility;Pain;Orthopedic restrictions;Decreased skin integrity   Assessment Pt seen for PT treatment session this date with interventions consisting of gait training to reduce the risk of medical complications w/ emphasis on improving pt's ability to ambulate level surfaces x 40 feet x 2 with CGA provided by therapist with R platform RW, Therapeutic exercise to increase BLE stability  w/WB tasks consisting of: AROM 20 reps B LE in sitting position and therapeutic activity to reduce fall risk consisting of training: sit<>stand transfers, static standing tolerance for 3 minutes w/ B UE support, vc and tactile cues for static standing posture faciliation, stand pivot transfers towards B direction and GINA precautions POST approach  Pt agreeable to PT treatment session upon arrival, pt found supine in bed w/ HOB elevated, A&O x 4  In comparison to previous session, pt with improvements in ambulatory distance, balance  Post session: pt returned back to recliner, chair alarm engaged, all needs in reach and RN notified of session findings/recommendations  Continue to recommend post acute rehabilitation services at time of d/c in order to maximize pt's functional independence and safety w/ mobility  Pt continues to be functioning below baseline level, and remains limited 2* factors listed above and including weakness, pain, impaired balance, gait deviations,WB restrictions  PT will continue to see pt during current hospitalization in order to address the deficits listed above and provide interventions consistent w/ POC in effort to achieve LTGs  Goals   Patient Goals to get to rehab   LTG Expiration Date 07/13/22   Long Term Goal #1 LTGs remain appropriate   PT Treatment Day 2   Plan   Treatment/Interventions LE strengthening/ROM; Functional transfer training;Elevations; Therapeutic exercise; Endurance training;Patient/family training;Equipment eval/education;Gait training;Spoke to nursing;OT   Progress Improving as expected   PT Frequency Other (Comment)  (7x/wk)   Recommendation   PT Discharge Recommendation Post acute rehabilitation services  (maintained recommendation)   Equipment Recommended [facility] Recommended Wheeled walker  (w/R platform)   Additional Comments Upon conclusion, pt  was resting out of bed on recliner w/all needs within reach     AM-PAC Basic Mobility Inpatient   Turning in Bed Without Bedrails 3   Lying on Back to Sitting on Edge of Flat Bed 3   Moving Bed to Chair 3   Standing Up From Chair 3   Walk in Room 3   Climb 3-5 Stairs 2   Basic Mobility Inpatient Raw Score 17   Basic Mobility Standardized Score 39 67   Highest Level Of Mobility   -[facility] Goal 5: Stand one or more mins   JH-HLM Achieved 7: Walk 25 feet or more   Education   Education Provided Mobility training;Precautions for total hip arthroplasty (GINA); Assistive device   Patient Demonstrates acceptance/verbal understanding     Treatment Time: [id number]    Zahra Rhodes, PT

## 2022-07-04 NOTE — ASSESSMENT & PLAN NOTE
· Patient with mechanical fall prior to admission  · Surgical repair of right hip on 07/02/2022  · Continued postop management as per Orthopedic surgery  · Continue Tylenol for mild pain, Percocet for moderate pain, and or IV Dilaudid or IV morphine for severe pain  · Lovenox for DVT prophylaxis  · Patient is medically stable for discharge  · Patient needs post acute rehabilitation services  · Case management is working on placement

## 2022-07-04 NOTE — DISCHARGE INSTRUCTIONS
WBAT RLE with platform walker  NWB on right wrist  Ice to right hip and wrist as needed for pain/swelling  Elevate right wrist/hand  Follow posterior hip precautions for 6 weeks  Follow up with Dr Shanice Schultz and call to schedule appointment  Lovenox for blood clot prevention while hospitalized/rehab  At home may use Aspirin 325 mg table 2x per day

## 2022-07-04 NOTE — PLAN OF CARE
Problem: PHYSICAL THERAPY ADULT  Goal: Performs mobility at highest level of function for planned discharge setting  See evaluation for individualized goals  Description: Treatment/Interventions: Functional transfer training, LE strengthening/ROM, Therapeutic exercise, Endurance training, Bed mobility, Gait training, Spoke to nursing  Equipment Recommended: Abhinav Matt       See flowsheet documentation for full assessment, interventions and recommendations  Outcome: Progressing  Note: Prognosis: Good  Problem List: Decreased strength, Decreased endurance, Impaired balance, Decreased mobility, Pain, Orthopedic restrictions, Decreased skin integrity  Assessment: Pt seen for PT treatment session this date with interventions consisting of gait training to reduce the risk of medical complications w/ emphasis on improving pt's ability to ambulate level surfaces x 40 feet x 2 with CGA provided by therapist with R platform RW, Therapeutic exercise to increase BLE stability  w/WB tasks consisting of: AROM 20 reps B LE in sitting position and therapeutic activity to reduce fall risk consisting of training: sit<>stand transfers, static standing tolerance for 3 minutes w/ B UE support, vc and tactile cues for static standing posture faciliation, stand pivot transfers towards B direction and GINA precautions POST approach  Pt agreeable to PT treatment session upon arrival, pt found supine in bed w/ HOB elevated, A&O x 4  In comparison to previous session, pt with improvements in ambulatory distance, balance  Post session: pt returned back to recliner, chair alarm engaged, all needs in reach and RN notified of session findings/recommendations  Continue to recommend post acute rehabilitation services at time of d/c in order to maximize pt's functional independence and safety w/ mobility   Pt continues to be functioning below baseline level, and remains limited 2* factors listed above and including weakness, pain, impaired balance, gait deviations,WB restrictions  PT will continue to see pt during current hospitalization in order to address the deficits listed above and provide interventions consistent w/ POC in effort to achieve LTGs  PT Discharge Recommendation: Post acute rehabilitation services (maintained recommendation)          See flowsheet documentation for full assessment

## 2022-07-04 NOTE — OCCUPATIONAL THERAPY NOTE
Occupational Therapy Evaluation      Jody Vega    7/4/2022    Patient Active Problem List   Diagnosis    Lumbar degenerative disc disease    Lumbar spondylosis    Cervical radiculopathy    Cervical spondylosis    Rheumatoid arthritis involving both hands ([address] )    Spinal stenosis of lumbar region without neurogenic claudication    Closed transcervical fracture of right femur (HCC)    Fracture of right wrist    Hypertension    Leukemoid reaction    Acute blood loss anemia       Past Medical History:   Diagnosis Date    Anxiety     Arthritis     Depression     GERD (gastroesophageal reflux disease)     Hiatal hernia     Hyperlipidemia     Hypertension        Past Surgical History:   Procedure Laterality Date    APPENDECTOMY      CHOLECYSTECTOMY      FRACTURE SURGERY Right     arm with plate and pins    HAND SURGERY Bilateral     HYSTERECTOMY      JOINT REPLACEMENT Bilateral     SHOULDER ARTHROSCOPY Left         07/04/22 0917   OT Last Visit   OT Visit Date 07/04/22   Note Type   Note type Evaluation   Restrictions/Precautions   Weight Bearing Precautions Per Order Yes   RUE Weight Bearing Per Order (S)  NWB  (platform walker usage acceptable as per ortho, NWB throught hand and wrist)   RLE Weight Bearing Per Order (S)  WBAT   Braces or Orthoses Other (Comment)  (ABductor pillow wedge)   Other Precautions Chair Alarm; Bed Alarm;WBS;THR;Fall Risk;Multiple lines;Pain   Pain Assessment   Pain Assessment Tool 0-10   Pain Score 6   Pain Location/Orientation Orientation: Right;Location: Hip   Pain Onset/Description Onset: Ongoing;Frequency: Constant/Continuous  (0/10 at rest, 6/10 with mobility)   Effect of Pain on Daily Activities limits standing tolerance, functional mobility, ADL independence   Hospital Pain Intervention(s) Medication (See MAR); Repositioned; Ambulation/increased activity; Emotional support;Cold applied   Multiple Pain Sites No   Home Living   Type of [address] [place] [address] Two level;Bed/bath upstairs;Stairs to enter with rails  (2 JOANNE c B HR, 10 steps to bed/bath with HR)   Bathroom Shower/Tub Tub/shower unit   [employer] Raised   Bathroom Equipment Grab bars in shower;Grab bars around toilet; Shower chair  (BSC not used at [facility])   [address] Walker;Cane  (no AD used at baseline)   Prior Function   Level of [place] Independent with ADLs and functional mobility   Lives With [place] Help From Family   ADL Assistance Independent   IADLs Independent   Falls in the last 6 months 1 to 4  (1 fall leading to current injury/admission, denies others)   Vocational Retired   Comments (+) driving  pt reports being fully (I) prior to current injury   Lifestyle   Autonomy PTA, pt reports being (I) with ADL/IADLs  Lives with spouse in 2 story home with 2 JOANNE  (-) AD, (+ driving   Reciprocal Relationships spouse, family   Service to Others retired   ADL   Eating Assistance 5  Supervision/Setup   Grooming Assistance 5  Supervision/Setup   [address] 5  Supervision/Setup   LB [address] 3  Moderate Assistance   [address] 5  Supervision/Setup    [address] 3  Moderate Assistance   [address]  3  Moderate Assistance   Functional Assistance 5  Supervision/Setup   Additional Comments Pt limited by orthopedic restrictions, decreased activity tolerance/standing tolerance for self care tasks, pain   Bed Mobility   Additional Comments Pt seated OOB in recliner upon arrival/end of session   Transfers   Sit to Stand   (CGA)   Additional items Assist x 1; Increased time required;Verbal cues  (L armrest)   Stand to Sit   (CGA)   Additional items Assist x 1; Increased time required;Verbal cues  (L arm rest)   Additional Comments VC for proper hand placements and body mechanics during transfers, platform RW used   Pt demonstrates appropriate adherence to NWB status on RUE   Functional Mobility   Functional Mobility 5 Supervision   Additional Comments Functional mobility household distance within hallway, no LOB, min s/s of exertion  Increased timer required for mobility with initial VC for RW and ambulation pattern  Initial 'step to' gait progressing to 'step through' pattern  Cues during directional changes for IR avoidance   Additional items Rolling walker   Balance   Static Sitting Good   Dynamic Sitting Fair +   Static Standing Fair +   Dynamic Standing Fair +   Activity Tolerance   Activity Tolerance Patient limited by pain   Medical Staff [place] coordination with PT   Nurse Made Aware RN staff made ware of outcomes  RUE Assessment   RUE Assessment WFL  (MMT grossly 4+/5 based on functional assessment)   LUE Assessment   LUE Assessment WFL  (MMT grossly 4+/5 based on functional assessment)   Hand Function   Gross Motor Coordination Functional   Fine Motor Coordination Functional   Sensation   Light Touch No apparent deficits  (denies)   Vision-Basic Assessment   Current Vision Wears glasses all the time   Cognition   Overall Cognitive Status [facility] [address]   Arousal/Participation Alert; Responsive; Cooperative   Attention Within functional limits   Orientation Level Oriented X4   Memory Decreased recall of precautions  (recalled 1 of 3 GINA precautions (previously instructed by PT))   Following Commands Follows all commands and directions without difficulty   Comments Pt agreeable to OT session, pleasant  Assessment   Limitation Decreased ADL status; Decreased UE strength;Decreased Safe judgement during ADL;Decreased endurance;Decreased self-care trans;Decreased high-level ADLs   Prognosis Good   Assessment Patient is a 67 y o  female seen for OT evaluation at [place] [address] following admission on 7/1/2022  s/p Closed transcervical fracture of right femur ([facility] )   Comorbidities impacting functional performance include: cervical radiculopathy, R distal radius fracture, HTN, leukemoid reaction, acute blood loss anemia, lumbar degenerative disc disease, RA in both hands  Patient presents with active orders for OT eval and treat and up and OOB as tolerated   Performed at least 2 patient identifiers during session including name and wristband  PTA, pt reports being (I) with ADL/IADLs  Lives with spouse in 2 story home with 2 JOANNE  (-) AD, (+) driving  Upon initial evaluation, patient requires supervision/set up for UB ADLs, supervision/set up for LB ADLs, and CGA/supervision for transfers and functional mobility within room and bathroom with the use of platform RW  Based on functional eval, patient presents A&Ox4 with intact  attention, intact  safety awareness, and intact  short term and long term memory  Occupational performance is affected by the following deficits: orthopedic restrictions,  decreased muscular strength , decreased standing tolerance for self care tasks , decreased dynamic balance impacting functional reach, decreased activity tolerance  and (+) pain  Based on these findings, functional performance deficits, and assessment findings, pt has been identified as a high complexity evaluation  Personal factors impacting performance at the time of evaluation include: decreased (+) Hx of falls , steps to enter home and FOS to second floor  Patient would benefit from OT services to maximize level of functional independence and safety in self-care and transfers  Occupational areas to address include:bathing/showering, toileting, dressing , personal hygiene/grooming , bed mobility , functional mobility, transfer to all surfaces, safety procedures , fall prevention  and energy conservation   From OT standpoint, recommendation at time of D/C would be post-acute rehabilitation   Goals   Patient Goals to continue walking around, to attend STR   Plan   Treatment Interventions ADL retraining;Functional transfer training; Endurance training;UE strengthening/ROM; Patient/family training;Equipment evaluation/education; Energy conservation  (long handled AE training)   Goal Expiration Date 07/14/22   OT Treatment Day 0   OT Frequency 3-5x/wk   Recommendation   OT Discharge Recommendation Post acute rehabilitation services  (pt agreeable)   Equipment Recommended Hip Kit ($)  (to be distributed if patient is NOT D/Cing to STR)   OT - OK to Discharge Yes  (once medically clear)   Additional Comments  Pt resting OOB in recliner at end of evaluation with PT  present for continued session   Additional Comments 2 The patient's raw score on the AM-PAC Daily Activity inpatient short form is 15, standardized score is 34 69, less than 39 4  Patients at this level are likely to benefit from discharge to post-acute rehabilitation services  Please refer to the recommendation of the Occupational Therapist for safe discharge planning  AM-PAC Daily Activity Inpatient   Lower Body Dressing 2   Bathing 2   Toileting 2   Upper Body Dressing 3   Grooming 3   Eating 3   Daily Activity Raw Score 15   Daily Activity Standardized Score (Calc for Raw Score >=11) 34 69   AM-[facility] Applied Cognition Inpatient   Following a Speech/Presentation 4   Understanding Ordinary Conversation 4   Taking Medications 4   Remembering Where Things Are Placed or Put Away 4   Remembering List of 4-5 Errands 4   Taking Care of Complicated Tasks 4   Applied Cognition Raw Score 24   Applied Cognition Standardized Score 62 21   Pt will complete UB ADLs Independent  with use of AE as needed for increased ADL independence within 10 days  Pt will complete LB ADLs Min A  with use of AE as needed for increased ADL independence within 10 days  Pt will complete toileting Min A  with use of DME for increased ADL independence within 10 days  Pt will demonstrate proper body mechanics to complete transfers and functional mobility with Mod independent  and use of AD for increased safety and functional mobility within 10 days       Pt will demonstrate standing tolerance of 7 with Mod independent  and use of AD for increased endurance and activity tolerance within 10 days  Pt will demonstrate OOB sitting tolerance of 2-4 hr/day for increased activity tolerance and engagement within 10 days  Pt will participate in 10 min of BUE therapeutic exercise to increase strength, ROM, and endurance during ADL/IADLs within 10 days  Pt will demonstrate proper body mechanics and fall prevention strategies during 100% of tx sessions for increased safety awareness during ADL/IADLs    Pt will verbalize and demonstrate understanding of post-op movement precautions and WB status in 100% of tx sessions for increased safety and functional mobility  Pt will verbalize and demonstrate understanding in use of compensatory techniques and use of long handled AE for increased safety and independence during LB ADL tasks        Jayla Kraft, OT

## 2022-07-04 NOTE — TELEPHONE ENCOUNTER
Pt spouse Sendy Glaser called to cancel procedure for 7/5, states wife fell and broke her hip, since she will probably need physical therapy she will call to reschedule

## 2022-07-04 NOTE — PROGRESS NOTES
Orthopedics   Felice Vega 67 y o  female MRN: [id number]  Unit/Bed#: -01      Subjective:  67 y  o female post operative day 2 right hip hemiarthroplasty and closed reduction/splinting right wrist  Pt doing well  Pain controlled  Can use to note that her wrist hurts more than her hip  She ambulated well with therapy  Patient notes occasional tingling of the right hand fingers      Labs:  0   Lab Value Date/Time    HCT 37 8 07/03/2022 0449    HCT 42 3 07/02/2022 1118    HCT 45 3 07/02/2022 0435    HGB 11 6 07/03/2022 0449    HGB 13 2 07/02/2022 1118    HGB 14 2 07/02/2022 0435    WBC 14 66 (H) 07/03/2022 0449    WBC 17 13 (H) 07/02/2022 1118    WBC 9 40 07/02/2022 0435     Meds:    Current Facility-Administered Medications:     acetaminophen (TYLENOL) tablet 650 mg, 650 mg, Oral, Q6H PRN, Elle Hernandez PA-C, 650 mg at 07/02/22 1411    aluminum-magnesium hydroxide-simethicone (MYLANTA) oral suspension 30 mL, 30 mL, Oral, Q6H PRN, Elle Hernandez PA-C    amLODIPine (NORVASC) tablet 10 mg, 10 mg, Oral, Daily, John Daniel DO, 10 mg at 07/04/22 0848    ascorbic acid (VITAMIN C) tablet 500 mg, 500 mg, Oral, BID, Elle Hernandez PA-C, 500 mg at 07/04/22 0848    docusate sodium (COLACE) capsule 100 mg, 100 mg, Oral, BID, Mohamud Baca PA-C, 100 mg at 07/04/22 0848    enoxaparin (LOVENOX) subcutaneous injection 40 mg, 40 mg, Subcutaneous, Daily, Elle Del Toro PA-C, 40 mg at 07/03/22 2150    ferrous sulfate tablet 325 mg, 325 mg, Oral, Daily With Breakfast, Mohamud Baca PA-C, 325 mg at 07/04/22 8710    gabapentin (NEURONTIN) tablet 600 mg, 600 mg, Oral, HS, John Daniel DO, 600 mg at 07/03/22 2150    loratadine (CLARITIN) tablet 10 mg, 10 mg, Oral, Daily, John Daniel DO, 10 mg at 07/04/22 0848    morphine injection 2 mg, 2 mg, Intravenous, Q6H PRN, Elle Hernandez PA-C, 2 mg at 07/03/22 1744    multivitamin-minerals (CENTRUM) tablet 1 tablet, 1 tablet, Oral, Daily, Magdalene Carpenter Srinath Das PA-C, 1 tablet at 07/04/22 0848    ondansetron [facility]) injection 4 mg, 4 mg, Intravenous, Q6H PRN, Richi James PA-C    oxyCODONE (ROXICODONE) IR tablet 5 mg, 5 mg, Oral, Q6H PRN, Megan Hernandez PA-C, 5 mg at 07/03/22 2150    pantoprazole (PROTONIX) EC tablet 20 mg, 20 mg, Oral, BID AC, Karma Ruddle, DO, 20 mg at 07/04/22 0737    pravastatin (PRAVACHOL) tablet 40 mg, 40 mg, Oral, Daily, Karma Ruddle, DO, 40 mg at 07/04/22 0848    temazepam (RESTORIL) capsule 15 mg, 15 mg, Oral, HS PRN, Karma Ruddle, DO    venlafaxine (EFFEXOR-XR) 24 hr capsule 150 mg, 150 mg, Oral, Daily, Karma Ruddle, DO, 150 mg at 07/04/22 0849    Blood Culture:   No results found for: BLOODCX    Wound Culture:   No results found for: WOUNDCULT    Ins and Outs:  I/O last 24 hours: In: 720 [P O :720]  Out: -     Physical:  Vitals:    07/04/22 0724   BP: 124/92   Pulse: (!) 108   Resp: 18   Temp: 98 9 °F (37 2 °C)   SpO2: 95%     right lower extremity  · Dressing clean dry intact  Thigh soft minimal tenderness  · Sensation intact L2-S1  · Motor intact to knee flexion/extension, EHL/FHL  · 2+ DP pulse  No calf pain negative Homans sign  Right upper extremity:  Ace wraps loosened  Patient able to oppose her thumb to index finger only  Gross sensation intact to all fingers with good capillary refill  No elbow or shoulder pain  Assessment: 67 y  o female post operative day 2 right hip hemiarthroplasty and closed reduction splinting of right wrist fracture  Plan:  · Up and out of bed with PT/OT  · Weightbearing as tolerated right lower extremity, NWB on right wrist   May use platform walker    · DVT prophylaxis Lovenox 40 SQ daily  · Analgesics prn  · Dispo: Ortho will follow  · Will continue to assess for acute blood loss anemia    Richi James PA-C

## 2022-07-04 NOTE — PLAN OF CARE
Problem: OCCUPATIONAL THERAPY ADULT  Goal: Performs self-care activities at highest level of function for planned discharge setting  See evaluation for individualized goals  Description: Treatment Interventions: ADL retraining, Functional transfer training, Endurance training, UE strengthening/ROM, Patient/family training, Equipment evaluation/education, Energy conservation (long handled AE training)  Equipment Recommended: Hip Kit ($) (to be distributed if patient is NOT D/Cing to STR)       See flowsheet documentation for full assessment, interventions and recommendations  Note: Limitation: Decreased ADL status, Decreased UE strength, Decreased Safe judgement during ADL, Decreased endurance, Decreased self-care trans, Decreased high-level ADLs  Prognosis: Good  Assessment: Patient is a 67 y o  female seen for OT evaluation at [address] [place] [address] following admission on 7/1/2022  s/p Closed transcervical fracture of right femur ([address] )  Comorbidities impacting functional performance include: cervical radiculopathy, R distal radius fracture, HTN, leukemoid reaction, acute blood loss anemia, lumbar degenerative disc disease, RA in both hands  Patient presents with active orders for OT eval and treat and up and OOB as tolerated   Performed at least 2 patient identifiers during session including name and wristband  PTA, pt reports being (I) with ADL/IADLs  Lives with spouse in 2 story home with 2 JOANNE  (-) AD, (+) driving  Upon initial evaluation, patient requires supervision/set up for UB ADLs, supervision/set up for LB ADLs, and CGA/supervision for transfers and functional mobility within room and bathroom with the use of platform RW  Based on functional eval, patient presents A&Ox4 with intact  attention, intact  safety awareness, and intact  short term and long term memory   Occupational performance is affected by the following deficits: orthopedic restrictions,  decreased muscular strength , decreased standing tolerance for self care tasks , decreased dynamic balance impacting functional reach, decreased activity tolerance  and (+) pain  Based on these findings, functional performance deficits, and assessment findings, pt has been identified as a high complexity evaluation  Personal factors impacting performance at the time of evaluation include: decreased (+) Hx of falls , steps to enter home and FOS to second floor  Patient would benefit from OT services to maximize level of functional independence and safety in self-care and transfers  Occupational areas to address include:bathing/showering, toileting, dressing , personal hygiene/grooming , bed mobility , functional mobility, transfer to all surfaces, safety procedures , fall prevention  and energy conservation   From OT standpoint, recommendation at time of D/C would be post-acute rehabilitation        OT Discharge Recommendation: Post acute rehabilitation services (pt agreeable)  OT - OK to Discharge: Yes (once medically clear)     Lay Mcgowan, OT

## 2022-07-04 NOTE — ASSESSMENT & PLAN NOTE
· Secondary to mechanical fall as experienced prior to arrival  · Xray revealed: "Distal radial comminuted intra-articular fracture and ulnar styloid fracture "  · Status post an orthopedic surgical evaluation  · Patient is postop day 2-status post- closed reduction with splinting right wrist (Right)  · Awaiting placement

## 2022-07-05 ENCOUNTER — HOSPITAL ENCOUNTER (OUTPATIENT)
Dept: RADIOLOGY | Facility: HOSPITAL | Age: 72
Discharge: HOME | End: 2022-07-05

## 2022-07-05 LAB
ANION GAP SERPL CALCULATED.3IONS-SCNC: 7 MMOL/L (ref 4–13)
BASOPHILS # BLD AUTO: 0.04 THOUSANDS/ΜL (ref 0–0.1)
BASOPHILS NFR BLD AUTO: 0 % (ref 0–1)
BUN SERPL-MCNC: 21 MG/DL (ref 5–25)
CALCIUM SERPL-MCNC: 9.1 MG/DL (ref 8.4–10.2)
CHLORIDE SERPL-SCNC: 101 MMOL/L (ref 96–108)
CO2 SERPL-SCNC: 30 MMOL/L (ref 21–32)
CREAT SERPL-MCNC: 0.83 MG/DL (ref 0.6–1.3)
EOSINOPHIL # BLD AUTO: 0.1 THOUSAND/ΜL (ref 0–0.61)
EOSINOPHIL NFR BLD AUTO: 1 % (ref 0–6)
ERYTHROCYTE [DISTWIDTH] IN BLOOD BY AUTOMATED COUNT: 14 % (ref 11.6–15.1)
FLUAV RNA RESP QL NAA+PROBE: NEGATIVE
FLUBV RNA RESP QL NAA+PROBE: NEGATIVE
GFR SERPL CREATININE-BSD FRML MDRD: 70 ML/MIN/1.73SQ M
GLUCOSE SERPL-MCNC: 120 MG/DL (ref 65–140)
HCT VFR BLD AUTO: 36.1 % (ref 34.8–46.1)
HGB BLD-MCNC: 11.2 G/DL (ref 11.5–15.4)
IMM GRANULOCYTES # BLD AUTO: 0.03 THOUSAND/UL (ref 0–0.2)
IMM GRANULOCYTES NFR BLD AUTO: 0 % (ref 0–2)
LYMPHOCYTES # BLD AUTO: 2.95 THOUSANDS/ΜL (ref 0.6–4.47)
LYMPHOCYTES NFR BLD AUTO: 27 % (ref 14–44)
MCH RBC QN AUTO: 29.5 PG (ref 26.8–34.3)
MCHC RBC AUTO-ENTMCNC: 31 G/DL (ref 31.4–37.4)
MCV RBC AUTO: 95 FL (ref 82–98)
MONOCYTES # BLD AUTO: 1 THOUSAND/ΜL (ref 0.17–1.22)
MONOCYTES NFR BLD AUTO: 9 % (ref 4–12)
NEUTROPHILS # BLD AUTO: 6.86 THOUSANDS/ΜL (ref 1.85–7.62)
NEUTS SEG NFR BLD AUTO: 63 % (ref 43–75)
NRBC BLD AUTO-RTO: 0 /100 WBCS
PLATELET # BLD AUTO: 242 THOUSANDS/UL (ref 149–390)
PMV BLD AUTO: 12.3 FL (ref 8.9–12.7)
POTASSIUM SERPL-SCNC: 4 MMOL/L (ref 3.5–5.3)
RBC # BLD AUTO: 3.8 MILLION/UL (ref 3.81–5.12)
RSV RNA RESP QL NAA+PROBE: NEGATIVE
SARS-COV-2 RNA RESP QL NAA+PROBE: NEGATIVE
SODIUM SERPL-SCNC: 138 MMOL/L (ref 135–147)
WBC # BLD AUTO: 10.98 THOUSAND/UL (ref 4.31–10.16)

## 2022-07-05 PROCEDURE — 99024 POSTOP FOLLOW-UP VISIT: CPT | Performed by: PHYSICIAN ASSISTANT

## 2022-07-05 PROCEDURE — 97530 THERAPEUTIC ACTIVITIES: CPT

## 2022-07-05 PROCEDURE — 0241U HB NFCT DS VIR RESP RNA 4 TRGT: CPT | Performed by: INTERNAL MEDICINE

## 2022-07-05 PROCEDURE — 99232 SBSQ HOSP IP/OBS MODERATE 35: CPT | Performed by: INTERNAL MEDICINE

## 2022-07-05 PROCEDURE — 80048 BASIC METABOLIC PNL TOTAL CA: CPT | Performed by: PHYSICIAN ASSISTANT

## 2022-07-05 PROCEDURE — 97535 SELF CARE MNGMENT TRAINING: CPT

## 2022-07-05 PROCEDURE — 85025 COMPLETE CBC W/AUTO DIFF WBC: CPT | Performed by: PHYSICIAN ASSISTANT

## 2022-07-05 PROCEDURE — NC001 PR NO CHARGE

## 2022-07-05 PROCEDURE — 97110 THERAPEUTIC EXERCISES: CPT

## 2022-07-05 PROCEDURE — 97116 GAIT TRAINING THERAPY: CPT

## 2022-07-05 RX ORDER — SENNOSIDES 8.6 MG
1 TABLET ORAL
Status: DISCONTINUED | OUTPATIENT
Start: 2022-07-05 | End: 2022-07-06 | Stop reason: HOSPADM

## 2022-07-05 RX ADMIN — AMLODIPINE BESYLATE 10 MG: 10 TABLET ORAL at 08:39

## 2022-07-05 RX ADMIN — VENLAFAXINE HYDROCHLORIDE 150 MG: 150 CAPSULE, EXTENDED RELEASE ORAL at 08:38

## 2022-07-05 RX ADMIN — GABAPENTIN 600 MG: 600 TABLET, FILM COATED ORAL at 21:12

## 2022-07-05 RX ADMIN — PRAVASTATIN SODIUM 40 MG: 20 TABLET ORAL at 08:38

## 2022-07-05 RX ADMIN — PANTOPRAZOLE SODIUM 20 MG: 20 TABLET, DELAYED RELEASE ORAL at 16:49

## 2022-07-05 RX ADMIN — DOCUSATE SODIUM 100 MG: 100 CAPSULE, LIQUID FILLED ORAL at 08:38

## 2022-07-05 RX ADMIN — FERROUS SULFATE TAB 325 MG (65 MG ELEMENTAL FE) 325 MG: 325 (65 FE) TAB at 07:15

## 2022-07-05 RX ADMIN — OXYCODONE HYDROCHLORIDE AND ACETAMINOPHEN 500 MG: 500 TABLET ORAL at 08:38

## 2022-07-05 RX ADMIN — OXYCODONE HYDROCHLORIDE 5 MG: 5 TABLET ORAL at 16:49

## 2022-07-05 RX ADMIN — ENOXAPARIN SODIUM 40 MG: 100 INJECTION SUBCUTANEOUS at 21:12

## 2022-07-05 RX ADMIN — LORATADINE 10 MG: 10 TABLET ORAL at 08:39

## 2022-07-05 RX ADMIN — SENNOSIDES 8.6 MG: 8.6 TABLET, FILM COATED ORAL at 21:12

## 2022-07-05 RX ADMIN — OXYCODONE HYDROCHLORIDE AND ACETAMINOPHEN 500 MG: 500 TABLET ORAL at 17:03

## 2022-07-05 RX ADMIN — SENNOSIDES 8.6 MG: 8.6 TABLET, FILM COATED ORAL at 11:03

## 2022-07-05 RX ADMIN — PANTOPRAZOLE SODIUM 20 MG: 20 TABLET, DELAYED RELEASE ORAL at 07:15

## 2022-07-05 RX ADMIN — Medication 1 TABLET: at 08:39

## 2022-07-05 RX ADMIN — DOCUSATE SODIUM 100 MG: 100 CAPSULE, LIQUID FILLED ORAL at 17:03

## 2022-07-05 NOTE — PHYSICAL THERAPY NOTE
PHYSICAL THERAPY TREATMENT NOTE  NAME:  Larae Bernheim A Ravert  DATE: 07/05/22    Length Of Stay: 4  Performed at least 2 patient identifiers during session: Name and ID bracelet    TREATMENT FLOWSHEET:    07/05/22 1025   PT Last Visit   PT Visit Date 07/05/22   Note Type   Note Type Treatment   Pain Assessment   Pain Assessment Tool 0-10   Pain Score 7   Pain Location/Orientation Orientation: Right;Location: Hip  (with mobility)   Pain Onset/Description Onset: Ongoing;Frequency: Intermittent   Patient's Stated Pain Goal No pain   Hospital Pain Intervention(s) Cold applied; Ambulation/increased activity;Repositioned   Precautions   Total Hip Replacement Internal rotation; Flexion;ADduction  (ADduction past midline, flexion past 90 degrees)   Restrictions/Precautions   Weight Bearing Precautions Per Order Yes   RUE Weight Bearing Per Order (S)  NWB   RLE Weight Bearing Per Order (S)  WBAT   Braces or Orthoses Other (Comment)  (ABD wedge)   Other Precautions Chair Alarm; Bed Alarm; Fall Risk;Pain;WBS;THR   General   Chart Reviewed Yes   Response to Previous Treatment Patient with no complaints from previous session  Family/Caregiver Present No   Cognition   Overall Cognitive Status WFL   Arousal/Participation Alert; Cooperative   Attention Within functional limits   Orientation Level Oriented X4   Memory Decreased recall of precautions   Following Commands Follows all commands and directions without difficulty   Comments Precautions reviewed with patient due to not being able to report all 3 THP  Subjective   Subjective "I have no pain at all at rest, not until I move "   Bed Mobility   Sit to Supine 4  Minimal assistance   Additional items Assist x 1;HOB elevated; Bedrails; Increased time required;Verbal cues;LE management   Additional Comments Pt  was able to maintain sitting balmace at EOB for a brief rest period follwing ambulaiton without LOB  Transfers   Sit to Stand   (CG)   Additional items Assist x 1; Increased time required;Verbal cues;Armrests   Stand to Sit   (CG)   Additional items Assist x 1;Bedrails; Increased time required;Verbal cues  (cued for proper hand placement during transfers and to slide RLE forward prior to sitting)   Stand pivot   (CG)   Additional items Assist x 1;Bedrails; Increased time required;Verbal cues  (RW with platform attachment for RUE)   Toilet transfer   (CG)   Additional items Assist x 1; Increased time required;Verbal cues;Standard toilet   Additional Comments CGAx1 and RW with  RUE platform required for clothing management and hygiene needs  Ambulation/Elevation   Gait pattern Improper Weight shift;Decreased foot clearance;Decreased R stance; Antalgic; Excessively slow; Short stride; Step to   Gait Assistance   (CG)   Additional items Assist x 1;Verbal cues   Assistive Device R platform;Rolling walker   Distance 25 ftx2   Ambulation/Elevation Additional Comments verbal cues for proper hand placement and gait sequence with AD   Balance   Static Sitting Good   Dynamic Sitting Fair +   Static Standing Fair +   Dynamic Standing Fair +   Ambulatory Fair   Endurance Deficit   Endurance Deficit Yes   Activity Tolerance   Activity Tolerance Patient limited by pain; Patient limited by fatigue   Nurse Made Aware yes   Exercises   Quad Sets Sitting;10 reps;AROM; Bilateral   Heelslides Sitting;10 reps;AROM; Bilateral   Glute Sets Sitting;10 reps;AROM; Bilateral   Hip Flexion Sitting;10 reps;AROM; Bilateral   Hip Abduction Sitting;10 reps;AROM; Bilateral   Hip Adduction Sitting;10 reps;AROM; Bilateral   Knee AROM Long Arc Quad Sitting;10 reps;AROM; Bilateral   Ankle Pumps Sitting;10 reps;AROM; Bilateral   Marching Sitting;10 reps;AROM; Bilateral   Assessment   Prognosis Good   Problem List Decreased strength;Decreased range of motion;Decreased endurance; Impaired balance;Decreased mobility;Pain;Orthopedic restrictions;Decreased skin integrity   Goals   Patient Goals to go to ARU   PT Treatment Day 3   Plan Treatment/Interventions Functional transfer training;LE strengthening/ROM; Elevations; Therapeutic exercise; Endurance training;Patient/family training;Equipment eval/education; Bed mobility;Gait training;Spoke to nursing   Progress Slow progress, decreased activity tolerance   PT Frequency Other (Comment)  (7x/wk)   Recommendation   PT Discharge Recommendation Post acute rehabilitation services   AM-PAC Basic Mobility Inpatient   Turning in Bed Without Bedrails 3   Lying on Back to Sitting on Edge of Flat Bed 3   Moving Bed to Chair 3   Standing Up From Chair 3   Walk in Room 3   Climb 3-5 Stairs 2   Basic Mobility Inpatient Raw Score 17   Basic Mobility Standardized Score 39 67   Highest Level Of Mobility   -[facility] Goal 5: Stand one or more mins   JH-HLM Achieved 7: Walk 25 feet or more       The patient's AM-PAC Basic Mobility Inpatient Short Form Raw Score is 17, Standardized Score is 39 67  A standardized score less than 42 9 suggests the patient may benefit from discharge to post-acute rehabilitation services  Please also refer to the recommendation of the Physical Therapist for safe discharge planning  Pt seen for PT treatment session this date with interventions consisting of seated TE, transfer training, toilet transfers, bed mobility tasks, and education provided as needed for safety and direction to improve functional  mobility and activity tolerance  Pt agreeable to PT treatment session upon arrival, pt found sitting OOB in recliner   At end of session, pt left in bed per patient request, positioned for comfort with ABD wedge in place, SCD's applied, bed alarm activated, RUE elevated on pillow, and all needs in reach  In comparison to previous session, pt with improvements in amount of pain at rest   Continue to recommend STR at time of d/c in order to maximize pt's functional independence and safety w/ mobility  Pt continues to be functioning below baseline level   PT will continue to see pt while here in order to address the deficits listed above and provide interventions consistent w/ POC in effort to achieve STGs      Carolyn Garcia PTA

## 2022-07-05 NOTE — CASE MANAGEMENT
Case Management Discharge Planning Note    Patient name Nika Bautista  Location /-91 MRN [id number]  : 1950 Date 2022       Current Admission Date: 2022  Current Admission Diagnosis:Closed transcervical fracture of right femur [facility])   Patient Active Problem List    Diagnosis Date Noted    Acute blood loss anemia 2022    Leukemoid reaction 2022    Closed transcervical fracture of right femur ([facility] [address] ) 2022    Fracture of right wrist 2022    Hypertension 2022    Lumbar degenerative disc disease 2022    Lumbar spondylosis 2022    Cervical radiculopathy 2022    Cervical spondylosis 2022    Rheumatoid arthritis involving both hands ([facility] ) 2022    Spinal stenosis of lumbar region without neurogenic claudication 2022      LOS (days): 4  Geometric Mean LOS (GMLOS) (days): 4 10  Days to GMLOS:0 1     OBJECTIVE:  Risk of Unplanned Readmission Score: 9 69         Current admission status: Inpatient   Preferred Pharmacy:   [address] [facility] [address]  [place], [address]  [facility] [address]  Phone: [phone] Fax: [phone]    Primary Care Provider: Natan Brunner DO    Primary Insurance: MEDICARE  Secondary Insurance: AARP    DISCHARGE DETAILS:    Discharge planning discussed with[de-identified] patient  Freedom of Choice: Yes  Comments - Freedom of Choice: STR recommended  CM met with pt at bedside to discuss same  pt agreeable  STR list provided and pt chose [facility], [place], [address] and [facility]  Referrals sent as discussed  Await determination          Treatment Team Recommendation: Short Term Rehab  Discharge Destination Plan[de-identified] Short Term Rehab  Transport at Discharge : Wheelchair Daivd Buster

## 2022-07-05 NOTE — PLAN OF CARE
Problem: OCCUPATIONAL THERAPY ADULT  Goal: Performs self-care activities at highest level of function for planned discharge setting  See evaluation for individualized goals  Description: Treatment Interventions: ADL retraining, Functional transfer training, Endurance training, UE strengthening/ROM, Patient/family training, Equipment evaluation/education, Energy conservation (long handled AE training)  Equipment Recommended: Hip Kit ($) (to be distributed if patient is NOT D/Cing to STR)       See flowsheet documentation for full assessment, interventions and recommendations  Outcome: Progressing  Note: Limitation: Decreased ADL status, Decreased UE strength, Decreased Safe judgement during ADL, Decreased endurance, Decreased self-care trans, Decreased high-level ADLs  Prognosis: Good  Assessment: Patient participated in Skilled OT session this date with interventions consisting of ADL re training with the use of correct body mechnaics, safety awareness and fall prevention techniques, one handed dressing technique,  long handle equipment and therapeutic exercise to: increase functional use of BUEs, increase BUE muscle strength    Patient agreeable to OT treatment session, upon arrival patient was found seated in bed (back supported)  Patient requiring verbal cues for safety, verbal cues for correct technique, one step directives and frequent rest periods, occasional re-direction, and self-care assistance as noted in flow sheet  Patient continues to be functioning below baseline level, occupational performance remains limited secondary to factors listed above and increased risk for falls and injury  From OT standpoint, recommendation at time of d/c would be post-acute rehabilitation services  Patient to benefit from continued Occupational Therapy treatment while in the hospital to address deficits as defined above and maximize level of functional independence with ADLs and functional mobility       OT Discharge Recommendation: Post acute rehabilitation services  OT - OK to Discharge: Yes (once medically clear)     DIGNA Bliss/JD

## 2022-07-05 NOTE — OCCUPATIONAL THERAPY NOTE
07/05/22 1213   OT Last Visit   OT Visit Date 07/05/22   Note Type   Note Type Treatment   Restrictions/Precautions   Weight Bearing Precautions Per Order Yes   RUE Weight Bearing Per Order NWB   RLE Weight Bearing Per Order WBAT   Braces or Orthoses   (abductor wedge pillow)   Other Precautions Bed Alarm;WBS;THR;Fall Risk;Pain  (*patient able to name just 1 of THR precautions at beginning of session - verbalized understanding of all 3 post re-education)   General   Response to Previous Treatment Patient with no complaints from previous session   Family/Caregiver Present  present for majority of session  (he will seek out appropriate clothing for easiest dressing)   Lifestyle   Intrinsic Gratification "I'm in [employer] and I'm in charge of the [employer]"; just wants to get back to doing her regular housework  Pain Assessment   Pain Assessment Tool 0-10   Pain Score   ("it's manageable - I don't like to take pain medicine until night, so I can sleep")   ADL   Where Assessed   (patient was already bathed this date)   Equipment Provided   (provided instruction and functional demos  in [facility] - patient attentive to and interested in using same to promote Indep )   LB Bathing Comments educated in (with demos ) of compensatory techniques Per UE and THR restrictions   LB Dressing Comments educated in (with demos ) of compensatory techniques Per UE and THR restrictions   Therapeutic Excerise-Strength   UE Strength Yes   Left Upper Extremity-Strength   L Shoulder Flexion; Horizontal ABduction; Other (Comment)  (pro/re-traction)   L Elbow Elbow flexion;Elbow extension  (in forward and reverse;  also: supin/pron-ation)   L Weights/Reps/Sets 15 reps shoulders and 20 reps elbows   Cognition   Overall Cognitive Status WFL   Arousal/Participation Alert; Cooperative   Attention Within functional limits   Orientation Level Oriented X4   Following Commands Follows all commands and directions without difficulty Activity Tolerance   Activity Tolerance Patient tolerated treatment well  (session of 'light' activity)   Assessment   Assessment Patient participated in Skilled OT session this date with interventions consisting of ADL re training with the use of correct body mechnaics, safety awareness and fall prevention techniques, one handed dressing technique,  long handle equipment and therapeutic exercise to: increase functional use of BUEs, increase BUE muscle strength    Patient agreeable to OT treatment session, upon arrival patient was found seated in bed (back supported)  Patient requiring verbal cues for safety, verbal cues for correct technique, one step directives and frequent rest periods, occasional re-direction, and self-care assistance as noted in flow sheet  Patient continues to be functioning below baseline level, occupational performance remains limited secondary to factors listed above and increased risk for falls and injury  From OT standpoint, recommendation at time of d/c would be post-acute rehabilitation services  Patient to benefit from continued Occupational Therapy treatment while in the hospital to address deficits as defined above and maximize level of functional independence with ADLs and functional mobility  Plan   Treatment Interventions ADL retraining;UE strengthening/ROM; Patient/family training;Equipment evaluation/education; Activityengagement; Compensatory technique education   Goal Expiration Date 07/14/22   OT Treatment Day 1   Recommendation   OT Discharge Recommendation Post acute rehabilitation services   Additional Comments 2 The patient's raw score on the AM-PAC Daily Activity inpatient short form is 15, standardized score is 34 69, less than 39 4  Patients at this level are likely to benefit from discharge to post-acute rehabilitation services  Please refer to the recommendation of the Occupational Therapist for safe discharge planning     AM-PAC Daily Activity Inpatient   Lower Body Dressing 2   Bathing 2   Toileting 2   Upper Body Dressing 3   Grooming 3   Eating 3   Daily Activity Raw Score 15   Daily Activity Standardized Score (Calc for Raw Score >=11) 34 69   AM-PAC Applied Cognition Inpatient   Following a Speech/Presentation 4   Understanding Ordinary Conversation 4   Taking Medications 4   Remembering Where Things Are Placed or Put Away 4   Remembering List of 4-5 Errands 4   Taking Care of Complicated Tasks 4   Applied Cognition Raw Score 24   Applied Cognition Standardized Score 62 21     DIGNA Terrazas/L

## 2022-07-05 NOTE — CASE MANAGEMENT
Case Management Discharge Planning Note    Patient name Marlene Sánchezhal  Location /-72 MRN [id number]  : 1950 Date 2022       Current Admission Date: 2022  Current Admission Diagnosis:Closed transcervical fracture of right femur [facility])   Patient Active Problem List    Diagnosis Date Noted    Acute blood loss anemia 2022    Leukemoid reaction 2022    Closed transcervical fracture of right femur ([facility] [address] ) 2022    Fracture of right wrist 2022    Hypertension 2022    Lumbar degenerative disc disease 2022    Lumbar spondylosis 2022    Cervical radiculopathy 2022    Cervical spondylosis 2022    Rheumatoid arthritis involving both hands ([facility] ) 2022    Spinal stenosis of lumbar region without neurogenic claudication 2022      LOS (days): 4  Geometric Mean LOS (GMLOS) (days): 4 10  Days to GMLOS:-0 2     OBJECTIVE:  Risk of Unplanned Readmission Score: 9 83         Current admission status: Inpatient   Preferred Pharmacy:   [address] [facility] [address]  [place], [address] [facility] [address]  Phone: [phone] Fax: [phone]    Primary Care Provider: Mercedez Horowitz DO    Primary Insurance: MEDICARE  Secondary Insurance: AARP    DISCHARGE DETAILS:    Other Referral/Resources/Interventions Provided:  Interventions: Acute Rehab  Referral Comments: Pt has been accepted to [facility] for STR  Pt and spouse aware and agreeable  Plan for d/c to [facility]   Dr Edita Vinson aware and in agreement  He ordered COVID test  0900 w/c Delowenn Jeronimo transport requested Pt, Dr Edita Vinson and [facility] aware

## 2022-07-05 NOTE — ASSESSMENT & PLAN NOTE
· Slight drop in hemoglobin level from admission  · No signs of acute bleeding  · Will continue to monitor H&H as needed

## 2022-07-05 NOTE — PROGRESS NOTES
PHYSICAL MEDICINE AND REHABILITATION   PREADMISSION ASSESSMENT     Projected [facility] Diagnoses:  Impairment of mobility, safety and Activities of Daily Living (ADLs) due to Orthopedic Disorders:  08 4  Major Multiple Fractures  Etiologic: Closed transcervical fracture of right femur and Distal radial comminuted intra-articular fracture with ulnar styloid fracture  Date of Onset: 7/1   Date of surgery: 7/2/22    PATIENT INFORMATION  Name: J Luis Thompson Phone #: [phone] (home)   Address: [place] [address]  YOB: 1950 Age: 67 y o  SS#   Marital Status: /Civil Union  Ethnicity: White  Employment Status: retired  Extended Emergency Contact Information  Primary Emergency Contact: GaryPerfecto  Mobile Phone: [phone]  Relation: Spouse  Secondary Emergency Contact: Perfecto Vega  Mobile Phone: [phone]  Relation: Son  Advance Directive: Level 2 DNAR but accepts endotracheal intubation (no ACP docs)    INSURANCE/COVERAGE:     Primary Payor: MEDICARE / Plan: MEDICARE A AND B / Product Type: Medicare A & B Fee for Service /   Steven Troncoso  ID# [id number]   Payer Contact:  Payer Contact:   Contact Phone:  Contact Phone:     MEDICARE #: [id number]  Medicare Days: 60/30/60  Medical Record #: [id number]    REFERRAL SOURCE:   Referring provider: Zach Bunch, *  Referring facility: [address] [place] [address]  Room: [facility]/[place] [address]  PCP: Lucien Aly DO PCP phone number: [phone]    MEDICAL INFORMATION  HPI: Pt is a 67year old female with a PMH of hypertension, cervical radiculopathy, GERD, depression who presented with right hip pain  Patient reported she was walking her dog when she tripped, fell on right side on concrete porch  Xray revealed Nondisplaced transcervical fracture of the right femur and Distal radial comminuted intra-articular fracture with ulnar styloid fracture   Ortho consulted  Pt is s/p right total hip hemiarthroplasty and s/p closed reduction with splinting right wrist (Right) on 7/2  Pt is weight-bearing as tolerated right lower extremity with hip precautions  Patient should remain nonweightbearing of right wrist   May use platform for walker per Ortho  PT and OT have been consulted and are recommending post-acute rehab services  Patient's case has been reviewed with [facility] medical director, patient meets medical criteria for acute rehab and has demonstrated the ability to tolerate three or more hours of therapy per day  Patient is medically stable and ready for discharge to [facility]  Past Medical History:   Past Surgical History: Allergies:     Past Medical History:   Diagnosis Date    Anxiety     Arthritis     Depression     GERD (gastroesophageal reflux disease)     Hiatal hernia     Hyperlipidemia     Hypertension     Past Surgical History:   Procedure Laterality Date    APPENDECTOMY      CHOLECYSTECTOMY      FRACTURE SURGERY Right     arm with plate and pins    HAND SURGERY Bilateral     HYSTERECTOMY      JOINT REPLACEMENT Bilateral     SHOULDER ARTHROSCOPY Left      No Known Allergies      Medical/functional conditions requiring inpatient rehabilitation: Closed transcervical fracture of right femur, Fracture of right wrist, Decreased mobility, Decreased self care, Decreased strength/endurance    Risk for medical/clinical complications: Risk for falls, Risk for skin breakdown secondary to decreased mobility, Risk for hyper/hypotensive episodes, risk for drop in H&H due to post op acute blood loss anemia    Comorbidities: Fracture of right wrist, Acute blood loss anemia, Leukemoid reaction, HTN, Cervical radiculopathy, HLD, GERD    Surgeries in the last 100 days: s/p right total hip hemiarthroplasty and s/p closed reduction with splinting right wrist (Right) on 7/2        CURRENT VITAL SIGNS:   Temp:  [98 3 °F (36 8 °C)-98 7 °F (37 1 °C)] 98 7 °F (37 1 °C)  HR:  [] 93  Resp:  [18-20] 20  BP: (113-138)/(78-86) 113/86   Intake/Output Summary (Last 24 hours) at 7/5/2022 1620  Last data filed at 7/5/2022 0700  Gross per 24 hour   Intake 345 ml   Output --   Net 345 ml        LABORATORY RESULTS:      Lab Results   Component Value Date    HGB 11 2 (L) 07/05/2022    HCT 36 1 07/05/2022    WBC 10 98 (H) 07/05/2022     Lab Results   Component Value Date    BUN 21 07/05/2022    K 4 0 07/05/2022     07/05/2022    CREATININE 0 83 07/05/2022     No results found for: PROTIME, INR     DIAGNOSTIC STUDIES:  XR wrist 2 vw right    Result Date: 7/3/2022  Impression: Casted comminuted, intra-articular distal radius fracture and displaced ulna styloid fracture with stable alignment  Workstation performed: [id number]     XR wrist 3+ views RIGHT    Result Date: 7/1/2022  Impression: Distal radial comminuted intra-articular fracture and ulnar styloid fracture  Workstation performed: [id number]     XR hip/pelv 1 vw right if performed    Result Date: 7/2/2022  Impression: Right hip replacement Workstation performed: [id number]     XR hip/pelv 2-3 vws right if performed    Result Date: 7/1/2022  Impression: Nondisplaced transcervical fracture of the right femur  The referring physician is aware of the findings which were documented in the Epic notes   Workstation performed: [id number]       PRECAUTIONS/SPECIAL NEEDS:  Total Hip Precautions, Weight Bearing Precautions:  NWB R UE and WBAT L LE, Splints/Braces: R UE, Edema Management, Safety Concerns, Pain Management, Requires O2: 2 L/min and Language Preference: English    MEDICATIONS:     Current Facility-Administered Medications:     acetaminophen (TYLENOL) tablet 650 mg, 650 mg, Oral, Q6H PRN, Rema Band Seyler, PA-C, 650 mg at 07/02/22 1411    aluminum-magnesium hydroxide-simethicone (MYLANTA) oral suspension 30 mL, 30 mL, Oral, Q6H PRN, Rema Band Seyler, PA-C    amLODIPine (NORVASC) tablet 10 mg, 10 mg, Oral, Daily, Shaniqua Levy [facility], DO, 10 mg at 07/05/22 3216    ascorbic acid (VITAMIN C) tablet 500 mg, 500 mg, Oral, BID, Manual Martha Piñar, PA-C, 500 mg at 07/05/22 8255    docusate sodium (COLACE) capsule 100 mg, 100 mg, Oral, BID, Manual Gavel Seyler, PA-C, 100 mg at 07/05/22 5173    enoxaparin (LOVENOX) subcutaneous injection 40 mg, 40 mg, Subcutaneous, Daily, Manual Noaml Seyler, PA-C, 40 mg at 07/04/22 2155    ferrous sulfate tablet 325 mg, 325 mg, Oral, Daily With Breakfast, Manual Martha Hernandez, PA-C, 325 mg at 07/05/22 0715    gabapentin (NEURONTIN) tablet 600 mg, 600 mg, Oral, HS, Carmencita Sanes, DO, 600 mg at 07/04/22 2155    loratadine (CLARITIN) tablet 10 mg, 10 mg, Oral, Daily, Carmencita Sanes, DO, 10 mg at 07/05/22 4889    morphine injection 2 mg, 2 mg, Intravenous, Q6H PRN, Manual Martha Sotoylejanet, PA-C, 2 mg at 07/04/22 1931    multivitamin-minerals (CENTRUM) tablet 1 tablet, 1 tablet, Oral, Daily, Adryan Omer PA-C, 1 tablet at 07/05/22 0839    ondansetron (ZOFRAN) injection 4 mg, 4 mg, Intravenous, Q6H PRN, Adryan Omer PA-C    oxyCODONE (ROXICODONE) IR tablet 5 mg, 5 mg, Oral, Q6H PRN, Manual Martha Hernandez PA-C, 5 mg at 07/03/22 2150    pantoprazole (PROTONIX) EC tablet 20 mg, 20 mg, Oral, BID AC, Carmencita Sanes, DO, 20 mg at 07/05/22 0715    pravastatin (PRAVACHOL) tablet 40 mg, 40 mg, Oral, Daily, Carmencita Sanes, DO, 40 mg at 07/05/22 9063    senna (SENOKOT) tablet 8 6 mg, 1 tablet, Oral, HS, Juan Gallardo MD, 8 6 mg at 07/05/22 1103    temazepam (RESTORIL) capsule 15 mg, 15 mg, Oral, HS PRN, Carmencita Dominguez DO    venlafaxine (EFFEXOR-XR) 24 hr capsule 150 mg, 150 mg, Oral, Daily, Carmencita Dominguez DO, 150 mg at 07/05/22 1104    SKIN INTEGRITY:   R hip    PRIOR LEVEL OF FUNCTION:  She lives in a(n) single family home  Rita Pena is  and lives with their spouse  Self Care: Independent, Indoor Mobility: Independent, Stairs (in/outdoor):  Independent and Cognition: Independent    FALLS IN THE LAST 6 MONTHS: 1    HOME ENVIRONMENT:  The living area:Two level;Bed/bath upstairs;Stairs to enter with rails  (JOANNE 2 with bilateral railing  Stairs to bed and bath 10 with a railing)   The patient will not have 24 hour supervision/physical assistance available upon discharge  PREVIOUS DME:  Equipment in home (previous DME): Shower Chair, Grab Bars, Rolling Walker and Single [employer]    FUNCTIONAL STATUS:  Physical Therapy Occupational Therapy Speech Therapy   07/05/22 1025    PT Last Visit   PT Visit Date 07/05/22   Note Type   Note Type Treatment   Pain Assessment   Pain Assessment Tool 0-10   Pain Score 7   Pain Location/Orientation Orientation: Right;Location: Hip  (with mobility)   Pain Onset/Description Onset: Ongoing;Frequency: Intermittent   Patient's Stated Pain Goal No pain   Hospital Pain Intervention(s) Cold applied; Ambulation/increased activity;Repositioned   Precautions   Total Hip Replacement Internal rotation; Flexion;ADduction  (ADduction past midline, flexion past 90 degrees)   Restrictions/Precautions   Weight Bearing Precautions Per Order Yes   RUE Weight Bearing Per Order (S)  NWB   RLE Weight Bearing Per Order (S)  WBAT   Braces or Orthoses Other (Comment)  (ABD wedge)   Other Precautions Chair Alarm; Bed Alarm; Fall Risk;Pain;WBS;THR   General   Chart Reviewed Yes   Response to Previous Treatment Patient with no complaints from previous session  Family/Caregiver Present No   Cognition   Overall Cognitive Status WFL   Arousal/Participation Alert; Cooperative   Attention Within functional limits   Orientation Level Oriented X4   Memory Decreased recall of precautions   Following Commands Follows all commands and directions without difficulty   Comments Precautions reviewed with patient due to not being able to report all 3 THP     Subjective   Subjective "I have no pain at all at rest, not until I move "   Bed Mobility   Sit to Supine 4  Minimal assistance   Additional items Assist x 1;HOB elevated; Bedrails; Increased time required;Verbal cues;LE management   Additional Comments Pt  was able to maintain sitting balmace at EOB for a brief rest period follwing ambulaiton without LOB  Transfers   Sit to Stand    (CG)   Additional items Assist x 1; Increased time required;Verbal cues;Armrests   Stand to Sit    (CG)   Additional items Assist x 1;Bedrails; Increased time required;Verbal cues  (cued for proper hand placement during transfers and to slide RLE forward prior to sitting)   Stand pivot    (CG)   Additional items Assist x 1;Bedrails; Increased time required;Verbal cues  (RW with platform attachment for RUE)   Toilet transfer    (CG)   Additional items Assist x 1; Increased time required;Verbal cues;Standard toilet   Additional Comments CGAx1 and RW with  RUE platform required for clothing management and hygiene needs  Ambulation/Elevation   Gait pattern Improper Weight shift;Decreased foot clearance;Decreased R stance; Antalgic; Excessively slow; Short stride; Step to   Gait Assistance    (CG)   Additional items Assist x 1;Verbal cues   Assistive Device R platform;Rolling walker   Distance 25 ftx2   Ambulation/Elevation Additional Comments verbal cues for proper hand placement and gait sequence with AD   Balance   Static Sitting Good   Dynamic Sitting Fair +   Static Standing Fair +   Dynamic Standing Fair +   Ambulatory Fair   Endurance Deficit   Endurance Deficit Yes   Activity Tolerance   Activity Tolerance Patient limited by pain; Patient limited by fatigue   Nurse Made Aware yes   Exercises   Quad Sets Sitting;10 reps;AROM; Bilateral   Heelslides Sitting;10 reps;AROM; Bilateral   Glute Sets Sitting;10 reps;AROM; Bilateral   Hip Flexion Sitting;10 reps;AROM; Bilateral   Hip Abduction Sitting;10 reps;AROM; Bilateral   Hip Adduction Sitting;10 reps;AROM; Bilateral   Knee AROM Long Arc Quad Sitting;10 reps;AROM; Bilateral   Ankle Pumps Sitting;10 reps;AROM; Bilateral   Marching Sitting;10 reps;AROM; Bilateral Assessment   Pt seen for PT treatment session this date with interventions consisting of seated TE, transfer training, toilet transfers, bed mobility tasks, and education provided as needed for safety and direction to improve functional  mobility and activity tolerance  Pt agreeable to PT treatment session upon arrival, pt found sitting OOB in recliner   At end of session, pt left in bed per patient request, positioned for comfort with ABD wedge in place, SCD's applied, bed alarm activated, RUE elevated on pillow, and all needs in reach  In comparison to previous session, pt with improvements in amount of pain at rest   Continue to recommend STR at time of d/c in order to maximize pt's functional independence and safety w/ mobility  Pt continues to be functioning below baseline level  PT will continue to see pt while here in order to address the deficits listed above and provide interventions consistent w/ POC in effort to achieve STGs  07/05/22 1213    OT Last Visit   OT Visit Date 07/05/22   Note Type   Note Type Treatment   Restrictions/Precautions   Weight Bearing Precautions Per Order Yes   RUE Weight Bearing Per Order NWB   RLE Weight Bearing Per Order WBAT   Braces or Orthoses    (abductor wedge pillow)   Other Precautions Bed Alarm;WBS;THR;Fall Risk;Pain  (*patient able to name just 1 of THR precautions at beginning of session - verbalized understanding of all 3 post re-education)   General   Response to Previous Treatment Patient with no complaints from previous session   Family/Caregiver Present  present for majority of session  (he will seek out appropriate clothing for easiest dressing)   Lifestyle   Intrinsic Gratification "I'm in [employer] and I'm in charge of the [employer]"; just wants to get back to doing her regular housework     Pain Assessment   Pain Assessment Tool 0-10   Pain Score    ("it's manageable - I don't like to take pain medicine until night, so I can sleep")   ADL Where Assessed    (patient was already bathed this date)   Equipment Provided    (provided instruction and functional demos  in [facility] - patient attentive to and interested in using same to promote Indep )   LB Bathing Comments educated in (with demos ) of compensatory techniques Per UE and THR restrictions   LB Dressing Comments educated in (with demos ) of compensatory techniques Per UE and THR restrictions   Therapeutic Excerise-Strength   UE Strength Yes   Left Upper Extremity-Strength   L Shoulder Flexion; Horizontal ABduction; Other (Comment)  (pro/re-traction)   L Elbow Elbow flexion;Elbow extension  (in forward and reverse;  also: supin/pron-ation)   L Weights/Reps/Sets 15 reps shoulders and 20 reps elbows   Cognition   Overall Cognitive Status WFL   Arousal/Participation Alert; Cooperative   Attention Within functional limits   Orientation Level Oriented X4   Following Commands Follows all commands and directions without difficulty   Activity Tolerance   Activity Tolerance Patient tolerated treatment well  (session of 'light' activity)   Assessment   Assessment Patient participated in Skilled OT session this date with interventions consisting of ADL re training with the use of correct body mechnaics, safety awareness and fall prevention techniques, one handed dressing technique,  long handle equipment and therapeutic exercise to: increase functional use of BUEs, increase BUE muscle strength    Patient agreeable to OT treatment session, upon arrival patient was found seated in bed (back supported)  Patient requiring verbal cues for safety, verbal cues for correct technique, one step directives and frequent rest periods, occasional re-direction, and self-care assistance as noted in flow sheet  Patient continues to be functioning below baseline level, occupational performance remains limited secondary to factors listed above and increased risk for falls and injury     From OT standpoint, recommendation at time of d/c would be post-acute rehabilitation services  Patient to benefit from continued Occupational Therapy treatment while in the hospital to address deficits as defined above and maximize level of functional independence with ADLs and functional mobility  CARE SCORES:  Self Care:  Eatin: Supervision or touching  assistance  Oral hygiene: 04: Supervision or touching  assistance  Toilet hygiene: 03: Partial/moderate assistance  Shower/bathing self: 03: Partial/moderate assistance  Upper body dressin: Supervision or touching  assistance  Lower body dressin: Partial/moderate assistance  Putting on/taking off footwear: 09: Not applicable  Transfers:  Roll left and right: 04: Supervision or touching  assistance  Sit to lyin: Partial/moderate assistance  Lying to sitting on side of bed: 03: Partial/moderate assistance  Sit to stand: 04: Supervision or touching  assistance  Chair/bed to chair transfer: 04: Supervision or touching  assistance  Toilet transfer: 04: Supervision or touching  assistance  Mobility:  Walk 10 ft: 04: Supervision or touching  assistance  Walk 50 ft with two turns: 09: Not applicable  Walk 083LS: 09: Not applicable    CURRENT GAP IN FUNCTION  Prior to Admission: Functional Status: Patient was independent with mobility/ambulation, transfers, ADL's, IADL's  Estimated length of stay: 10 to 14 days    Anticipated Post-Discharge Disposition/Treatment  Disposition: Return to previous home/apartment  Outpatient Services: Physical Therapy (PT) and Occupational Therapy (OT)    BARRIERS TO DISCHARGE  Weakness, Pain, Balance Difficulty, Fatigue, Home Accessibility, Caregiver Accessibility and Equipment Needs    INTERVENTIONS FOR DISCHARGE  Functional transfer training; LE strengthening/ROM; Elevations; Therapeutic exercise; Endurance training; Patient/family training; Equipment eval/education; Bed mobility; Gait training; ADL retraining; UE strengthening/ROM;  Activity engagement; Compensatory technique education    REQUIRED THERAPY:  Patient will require PT and OT 90 minutes each per day, five days per week to achieve rehab goals  REQUIRED FUNCTIONAL AND MEDICAL MANAGEMENT FOR INPATIENT REHABILITATION:  Skin:  Monitor skin for breakdown secondary to decreased mobility, Pain Management: Overall pain is moderately controlled, Deep Vein Thrombosis (DVT) Prophylaxis:  SCD's while in bed and Lovenox, further internal medicine management of additional medical conditions while on ARC, PT/OT intervention, patient/family education and training, and any needed consults PRN  RECOMMENDED LEVEL OF CARE:    Pt is a 67year old female with a PMH of hypertension, cervical radiculopathy, GERD, depression who presented with right hip pain  Patient reported she was walking her dog when she tripped, fell on right side on concrete porch  Xray revealed Nondisplaced transcervical fracture of the right femur and Distal radial comminuted intra-articular fracture with ulnar styloid fracture  Ortho consulted  Pt is s/p right total hip hemiarthroplasty and s/p closed reduction with splinting right wrist (Right) on 7/2  Pt is NWB on R UE and WBAT on L LE  Pt was independent PTA with all transfers, amb, ADLs, and IADLs  She lives with her spouse in a [place] with 3 JOANNE  Pt is now functioning below baseline needing Mod A for LB ADLs and CG/Min A for transfers/amb with PFRW  Pt would benefit from [facility] admission to have close medical management while participating in 3 hours of therapy per day that will include physical and occupational therapy  Physical and occupational therapists will address functional mobility deficits and assist patient in improving their strength, endurance, ROM, and self care  Pt will have 24/7 nursing care to monitor routine vitals, I/Os, skin integrity, and overall condition   The rehab nursing staff will follow therapy recommendations to have 24 hour follow through during non-therapy hours  PM&R to maximize function and provide medical oversight  The MD will monitor patient co-morbidities while on the unit as well as order any additional tests, labs, and consults needed  [facility] specialized interdisciplinary team will meet weekly to discuss patient overall medical status and rehab goals in preparation for D/C home  Inpatient acute rehab is recommended for patient to maximize overall strength, endurance, self care, and mobility for a safe and timely transition back home

## 2022-07-05 NOTE — PROGRESS NOTES
Progress Note - Orthopedics   Natan Vega 67 y o  female MRN: [id number]  Unit/Bed#: -01 Encounter: [id number]    Assessment:  POD 3 status post hemiarthroplasty right hip and closed reduction of right wrist    Plan:  Continue physical therapy and occupational therapy weight-bearing as tolerated right lower extremity with hip precautions  Patient should remain nonweightbearing of right wrist   May use platform for walker  DC planning  Continue Lovenox for DVT prophylaxis    Weight bearing:  Weight-bearing as tolerated right lower extremity with hip precautions  Nonweightbearing right wrist    VTE Pharmacologic Prophylaxis: Enoxaparin (Lovenox)  VTE Mechanical Prophylaxis: sequential compression device    Subjective: The patient is resting comfortably in bed eating breakfast   She states her pain is improving  Vitals: Blood pressure 131/78, pulse 101, temperature 98 4 °F (36 9 °C), resp  rate 20, height 5' 5" (1 651 m), weight 94 6 kg (208 lb 8 9 oz), SpO2 91 %  ,Body mass index is 34 71 kg/m²        Intake/Output Summary (Last 24 hours) at 7/5/2022 0835  Last data filed at 7/5/2022 0700  Gross per 24 hour   Intake 765 ml   Output --   Net 765 ml       Invasive Devices  Report    Peripheral Intravenous Line  Duration           Peripheral IV 07/01/22 Distal;Left;Ventral (anterior) Forearm 4 days                Physical Exam:    Right upper extremity is neurovascularly intact  Fingers are pink and mobile  Compartments are soft  Splint is in place  Brisk cap refill  Sensation intact    Right lower extremity is neurovascular intact  Toes are pink and mobile  Compartments are soft  Dressing is clean, dry and intact good dorsiflexion and plantar flexion of ankle  Negative Homans  Good quad tone  Brisk cap refill  Sensation intact    Lab, Imaging and other studies:   CBC:   Lab Results   Component Value Date    WBC 10 98 (H) 07/05/2022    HGB 11 2 (L) 07/05/2022    HCT 36 1 07/05/2022    MCV 95 07/05/2022  07/05/2022    MCH 29 5 07/05/2022    MCHC 31 0 (L) 07/05/2022    RDW 14 0 07/05/2022    MPV 12 3 07/05/2022    NRBC 0 07/05/2022     CMP:   Lab Results   Component Value Date    SODIUM 138 07/05/2022     07/05/2022    CO2 30 07/05/2022    BUN 21 07/05/2022    CREATININE 0 83 07/05/2022    CALCIUM 9 1 07/05/2022    EGFR 70 07/05/2022

## 2022-07-06 ENCOUNTER — HOSPITAL ENCOUNTER (INPATIENT)
Facility: HOSPITAL | Age: 72
LOS: 9 days | Discharge: HOME HEALTH | DRG: 561 | End: 2022-07-15
Attending: FAMILY MEDICINE | Admitting: FAMILY MEDICINE
Payer: MEDICARE

## 2022-07-06 VITALS
HEIGHT: 65 IN | BODY MASS INDEX: 34.75 KG/M2 | DIASTOLIC BLOOD PRESSURE: 74 MMHG | OXYGEN SATURATION: 94 % | HEART RATE: 96 BPM | SYSTOLIC BLOOD PRESSURE: 146 MMHG | TEMPERATURE: 98.7 F | RESPIRATION RATE: 18 BRPM | WEIGHT: 208.56 LBS

## 2022-07-06 DIAGNOSIS — S72.031D CLOSED TRANSCERVICAL FRACTURE OF RIGHT FEMUR WITH ROUTINE HEALING, SUBSEQUENT ENCOUNTER: ICD-10-CM

## 2022-07-06 DIAGNOSIS — S62.101D CLOSED FRACTURE OF RIGHT WRIST WITH ROUTINE HEALING, SUBSEQUENT ENCOUNTER: Primary | ICD-10-CM

## 2022-07-06 DIAGNOSIS — D62 ACUTE BLOOD LOSS ANEMIA: ICD-10-CM

## 2022-07-06 LAB
ERYTHROCYTE [DISTWIDTH] IN BLOOD BY AUTOMATED COUNT: 13.8 % (ref 11.6–15.1)
HCT VFR BLD AUTO: 34.1 % (ref 34.8–46.1)
HGB BLD-MCNC: 11 G/DL (ref 11.5–15.4)
MCH RBC QN AUTO: 30.3 PG (ref 26.8–34.3)
MCHC RBC AUTO-ENTMCNC: 32.3 G/DL (ref 31.4–37.4)
MCV RBC AUTO: 94 FL (ref 82–98)
PLATELET # BLD AUTO: 267 THOUSANDS/UL (ref 149–390)
PMV BLD AUTO: 11.8 FL (ref 8.9–12.7)
RBC # BLD AUTO: 3.63 MILLION/UL (ref 3.81–5.12)
WBC # BLD AUTO: 9.64 THOUSAND/UL (ref 4.31–10.16)

## 2022-07-06 PROCEDURE — 85027 COMPLETE CBC AUTOMATED: CPT | Performed by: INTERNAL MEDICINE

## 2022-07-06 PROCEDURE — 97537 COMMUNITY/WORK REINTEGRATION: CPT

## 2022-07-06 PROCEDURE — 97116 GAIT TRAINING THERAPY: CPT

## 2022-07-06 PROCEDURE — 97161 PT EVAL LOW COMPLEX 20 MIN: CPT

## 2022-07-06 PROCEDURE — 99239 HOSP IP/OBS DSCHRG MGMT >30: CPT | Performed by: INTERNAL MEDICINE

## 2022-07-06 PROCEDURE — 97530 THERAPEUTIC ACTIVITIES: CPT

## 2022-07-06 PROCEDURE — 99222 1ST HOSP IP/OBS MODERATE 55: CPT | Performed by: FAMILY MEDICINE

## 2022-07-06 RX ORDER — MAGNESIUM HYDROXIDE/ALUMINUM HYDROXICE/SIMETHICONE 120; 1200; 1200 MG/30ML; MG/30ML; MG/30ML
30 SUSPENSION ORAL EVERY 6 HOURS PRN
Status: CANCELLED | OUTPATIENT
Start: 2022-07-06

## 2022-07-06 RX ORDER — OXYCODONE HYDROCHLORIDE 5 MG/1
5 TABLET ORAL EVERY 6 HOURS PRN
Status: CANCELLED | OUTPATIENT
Start: 2022-07-06

## 2022-07-06 RX ORDER — MAGNESIUM HYDROXIDE/ALUMINUM HYDROXICE/SIMETHICONE 120; 1200; 1200 MG/30ML; MG/30ML; MG/30ML
30 SUSPENSION ORAL EVERY 6 HOURS PRN
Status: DISCONTINUED | OUTPATIENT
Start: 2022-07-06 | End: 2022-07-15 | Stop reason: HOSPADM

## 2022-07-06 RX ORDER — DOCUSATE SODIUM 100 MG/1
100 CAPSULE, LIQUID FILLED ORAL 2 TIMES DAILY
Status: DISCONTINUED | OUTPATIENT
Start: 2022-07-06 | End: 2022-07-15 | Stop reason: HOSPADM

## 2022-07-06 RX ORDER — LORATADINE 10 MG/1
10 TABLET ORAL DAILY
Status: CANCELLED | OUTPATIENT
Start: 2022-07-06

## 2022-07-06 RX ORDER — ENOXAPARIN SODIUM 100 MG/ML
40 INJECTION SUBCUTANEOUS DAILY
Status: DISCONTINUED | OUTPATIENT
Start: 2022-07-06 | End: 2022-07-15 | Stop reason: HOSPADM

## 2022-07-06 RX ORDER — SENNOSIDES 8.6 MG
1 TABLET ORAL
Status: CANCELLED | OUTPATIENT
Start: 2022-07-06

## 2022-07-06 RX ORDER — DOCUSATE SODIUM 100 MG/1
100 CAPSULE, LIQUID FILLED ORAL 2 TIMES DAILY
Status: CANCELLED | OUTPATIENT
Start: 2022-07-06

## 2022-07-06 RX ORDER — LORATADINE 10 MG/1
10 TABLET ORAL DAILY
Status: DISCONTINUED | OUTPATIENT
Start: 2022-07-07 | End: 2022-07-15 | Stop reason: HOSPADM

## 2022-07-06 RX ORDER — AMLODIPINE BESYLATE 10 MG/1
10 TABLET ORAL DAILY
Status: DISCONTINUED | OUTPATIENT
Start: 2022-07-07 | End: 2022-07-15 | Stop reason: HOSPADM

## 2022-07-06 RX ORDER — ACETAMINOPHEN 325 MG/1
650 TABLET ORAL EVERY 6 HOURS PRN
Status: DISCONTINUED | OUTPATIENT
Start: 2022-07-06 | End: 2022-07-15 | Stop reason: HOSPADM

## 2022-07-06 RX ORDER — ACETAMINOPHEN 325 MG/1
650 TABLET ORAL EVERY 6 HOURS PRN
Status: CANCELLED | OUTPATIENT
Start: 2022-07-06

## 2022-07-06 RX ORDER — TEMAZEPAM 15 MG/1
15 CAPSULE ORAL
Status: DISCONTINUED | OUTPATIENT
Start: 2022-07-06 | End: 2022-07-15 | Stop reason: HOSPADM

## 2022-07-06 RX ORDER — PRAVASTATIN SODIUM 40 MG
40 TABLET ORAL DAILY
Status: CANCELLED | OUTPATIENT
Start: 2022-07-06

## 2022-07-06 RX ORDER — ENOXAPARIN SODIUM 100 MG/ML
40 INJECTION SUBCUTANEOUS DAILY
Status: CANCELLED | OUTPATIENT
Start: 2022-07-06

## 2022-07-06 RX ORDER — VENLAFAXINE HYDROCHLORIDE 150 MG/1
150 CAPSULE, EXTENDED RELEASE ORAL DAILY
Status: CANCELLED | OUTPATIENT
Start: 2022-07-06

## 2022-07-06 RX ORDER — SENNOSIDES 8.6 MG
1 TABLET ORAL
Status: DISCONTINUED | OUTPATIENT
Start: 2022-07-06 | End: 2022-07-15 | Stop reason: HOSPADM

## 2022-07-06 RX ORDER — GABAPENTIN 600 MG/1
600 TABLET ORAL
Status: DISCONTINUED | OUTPATIENT
Start: 2022-07-06 | End: 2022-07-15 | Stop reason: HOSPADM

## 2022-07-06 RX ORDER — PANTOPRAZOLE SODIUM 20 MG/1
20 TABLET, DELAYED RELEASE ORAL
Status: CANCELLED | OUTPATIENT
Start: 2022-07-06

## 2022-07-06 RX ORDER — PANTOPRAZOLE SODIUM 20 MG/1
20 TABLET, DELAYED RELEASE ORAL
Status: DISCONTINUED | OUTPATIENT
Start: 2022-07-06 | End: 2022-07-15 | Stop reason: HOSPADM

## 2022-07-06 RX ORDER — VENLAFAXINE HYDROCHLORIDE 150 MG/1
150 CAPSULE, EXTENDED RELEASE ORAL DAILY
Status: DISCONTINUED | OUTPATIENT
Start: 2022-07-07 | End: 2022-07-15 | Stop reason: HOSPADM

## 2022-07-06 RX ORDER — GABAPENTIN 600 MG/1
600 TABLET ORAL
Status: CANCELLED | OUTPATIENT
Start: 2022-07-06

## 2022-07-06 RX ORDER — TEMAZEPAM 15 MG/1
15 CAPSULE ORAL
Status: CANCELLED | OUTPATIENT
Start: 2022-07-06

## 2022-07-06 RX ORDER — FERROUS SULFATE 325(65) MG
325 TABLET ORAL
Status: DISCONTINUED | OUTPATIENT
Start: 2022-07-07 | End: 2022-07-15 | Stop reason: HOSPADM

## 2022-07-06 RX ORDER — FERROUS SULFATE 325(65) MG
325 TABLET ORAL
Status: CANCELLED | OUTPATIENT
Start: 2022-07-07

## 2022-07-06 RX ORDER — ONDANSETRON 4 MG/1
4 TABLET, ORALLY DISINTEGRATING ORAL EVERY 6 HOURS PRN
Status: DISCONTINUED | OUTPATIENT
Start: 2022-07-06 | End: 2022-07-15 | Stop reason: HOSPADM

## 2022-07-06 RX ORDER — ASCORBIC ACID 500 MG
500 TABLET ORAL 2 TIMES DAILY
Status: CANCELLED | OUTPATIENT
Start: 2022-07-06

## 2022-07-06 RX ORDER — PRAVASTATIN SODIUM 40 MG
40 TABLET ORAL DAILY
Status: DISCONTINUED | OUTPATIENT
Start: 2022-07-07 | End: 2022-07-15 | Stop reason: HOSPADM

## 2022-07-06 RX ORDER — ASCORBIC ACID 500 MG
500 TABLET ORAL 2 TIMES DAILY
Status: DISCONTINUED | OUTPATIENT
Start: 2022-07-06 | End: 2022-07-15 | Stop reason: HOSPADM

## 2022-07-06 RX ORDER — AMLODIPINE BESYLATE 10 MG/1
10 TABLET ORAL DAILY
Status: CANCELLED | OUTPATIENT
Start: 2022-07-06

## 2022-07-06 RX ORDER — OXYCODONE HYDROCHLORIDE 5 MG/1
5 TABLET ORAL EVERY 6 HOURS PRN
Status: DISCONTINUED | OUTPATIENT
Start: 2022-07-06 | End: 2022-07-15 | Stop reason: HOSPADM

## 2022-07-06 RX ADMIN — PRAVASTATIN SODIUM 40 MG: 20 TABLET ORAL at 09:13

## 2022-07-06 RX ADMIN — SENNOSIDES 8.6 MG: 8.6 TABLET, FILM COATED ORAL at 21:23

## 2022-07-06 RX ADMIN — OXYCODONE HYDROCHLORIDE AND ACETAMINOPHEN 500 MG: 500 TABLET ORAL at 17:48

## 2022-07-06 RX ADMIN — ACETAMINOPHEN 650 MG: 325 TABLET ORAL at 21:53

## 2022-07-06 RX ADMIN — VENLAFAXINE HYDROCHLORIDE 150 MG: 150 CAPSULE, EXTENDED RELEASE ORAL at 09:13

## 2022-07-06 RX ADMIN — LORATADINE 10 MG: 10 TABLET ORAL at 09:13

## 2022-07-06 RX ADMIN — Medication 1 TABLET: at 09:14

## 2022-07-06 RX ADMIN — AMLODIPINE BESYLATE 10 MG: 10 TABLET ORAL at 09:13

## 2022-07-06 RX ADMIN — PANTOPRAZOLE SODIUM 20 MG: 20 TABLET, DELAYED RELEASE ORAL at 09:14

## 2022-07-06 RX ADMIN — PANTOPRAZOLE SODIUM 20 MG: 20 TABLET, DELAYED RELEASE ORAL at 15:16

## 2022-07-06 RX ADMIN — OXYCODONE HYDROCHLORIDE 5 MG: 5 TABLET ORAL at 17:47

## 2022-07-06 RX ADMIN — DOCUSATE SODIUM 100 MG: 100 CAPSULE, LIQUID FILLED ORAL at 09:13

## 2022-07-06 RX ADMIN — GABAPENTIN 600 MG: 600 TABLET, FILM COATED ORAL at 21:23

## 2022-07-06 RX ADMIN — ENOXAPARIN SODIUM 40 MG: 100 INJECTION SUBCUTANEOUS at 21:23

## 2022-07-06 RX ADMIN — DOCUSATE SODIUM 100 MG: 100 CAPSULE, LIQUID FILLED ORAL at 17:47

## 2022-07-06 RX ADMIN — FERROUS SULFATE TAB 325 MG (65 MG ELEMENTAL FE) 325 MG: 325 (65 FE) TAB at 09:14

## 2022-07-06 RX ADMIN — OXYCODONE HYDROCHLORIDE AND ACETAMINOPHEN 500 MG: 500 TABLET ORAL at 09:14

## 2022-07-06 NOTE — DISCHARGE SUMMARY
Ezra 44  Discharge- [facility] 1950, 67 y o  female MRN: [id number]  Unit/Bed#: -01 Encounter: [id number]  Primary Care Provider: Annia Fleming DO   Date and time admitted to hospital: 7/1/2022  6:06 AM    * Closed transcervical fracture of right femur [facility])  Assessment & Plan  · Patient with mechanical fall prior to admission  · Surgical repair of right hip on 07/02/2022  · Continued postop management as per Orthopedic surgery  · Continue Tylenol for mild pain, Percocet for moderate pain, and or IV Dilaudid or IV morphine for severe pain  · Lovenox for DVT prophylaxis  · Patient is medically stable for discharge  · Patient needs post acute rehabilitation services  · Case management has arranged for discharge to acute rehab    Fracture of right wrist  Assessment & Plan  · Secondary to mechanical fall as experienced prior to arrival  · Xray revealed: "Distal radial comminuted intra-articular fracture and ulnar styloid fracture "  · Status post an orthopedic surgical evaluation  · Patient had closed reduction and splinting in the OR  · Outpatient orthopedic follow-up    Acute blood loss anemia  Assessment & Plan  · Slight drop in hemoglobin level from admission  · No signs of acute bleeding  · H&H stable    Leukemoid reaction  Assessment & Plan  · Most likely reactive  · No signs of infection  · Improved    Hypertension  Assessment & Plan  · Blood pressure is controlled  · Continue amlodipine    Cervical radiculopathy  Assessment & Plan  · continue home gabapentin      Discharging Physician / Practitioner: Kirstin Haile MD  PCP: Annia Fleming DO  Admission Date:   Admission Orders (From admission, onward)     Ordered        07/01/22 0845  INPATIENT ADMISSION  Once                      Discharge Date: 07/06/22    Medical Problems             Resolved Problems  Date Reviewed: 7/1/2022   None                 Consultations During [facility] [place]:  · Orthopedic    Procedures Performed:   · Hemiarthroplasty right hip, closed reduction with splinting right wrist    Significant Findings / Test Results:   · Hip fracture as well as wrist fracture    Incidental Findings:   · None     Test Results Pending at Discharge (will require follow up): · None     Outpatient Tests Requested:  · Routine labs with PCP as outpatient    Complications:     None    Reason for Admission:  Hip fracture    Hospital Course:     Renetta Case is a 67 y o  female patient who originally presented to the hospital on 7/1/2022 due to fall with hip and wrist fracture  Patient was seen by orthopedic team   Patient was taken to the OR on 07/02/2022 for surgical repair of both hip and wrist   Patient doing well postoperatively  PT recommending rehab  Case management has arranged for patient be transitioned to acute rehab unit  Patient will need outpatient orthopedic follow-up  Lovenox for anticoagulation  Please see above list of diagnoses and related plan for additional information  Condition at Discharge: stable     Discharge Day Visit / Exam:     Subjective:  No complaints at this time    Vitals: Blood Pressure: 136/86 (07/05/22 2225)  Pulse: 99 (07/05/22 2225)  Temperature: 98 3 °F (36 8 °C) (07/05/22 2225)  Temp Source: Temporal (07/02/22 1406)  Respirations: 20 (07/05/22 2225)  Height: 5' 5" (165 1 cm) (07/01/22 0600)  Weight - Scale: 94 6 kg (208 lb 8 9 oz) (patient first time on standing scale reweigthed x2) (07/05/22 0600)  SpO2: 94 % (07/05/22 2225)     Exam:   Physical Exam  Constitutional:       General: She is not in acute distress  Appearance: She is obese  HENT:      Head: Normocephalic and atraumatic  Nose: Nose normal       Mouth/Throat:      Mouth: Mucous membranes are moist    Eyes:      Extraocular Movements: Extraocular movements intact        Conjunctiva/sclera: Conjunctivae normal    Cardiovascular:      Rate and Rhythm: Normal rate and regular rhythm  Pulmonary:      Effort: Pulmonary effort is normal  No respiratory distress  Abdominal:      Palpations: Abdomen is soft  Tenderness: There is no abdominal tenderness  Musculoskeletal:         General: Normal range of motion  Cervical back: Normal range of motion and neck supple  Comments: Right wrist with splint in place  Decreased range of motion right hip   Skin:     General: Skin is warm and dry  Neurological:      General: No focal deficit present  Mental Status: She is alert  Cranial Nerves: No cranial nerve deficit  Psychiatric:         Mood and Affect: Mood normal          Behavior: Behavior normal          Discussion with Family:  Offered to call family however patient states that she will update them herself    Discharge instructions/Information to patient and family:   See after visit summary for information provided to patient and family  Provisions for Follow-Up Care:  See after visit summary for information related to follow-up care and any pertinent home health orders  Disposition:     Acute Rehab at [facility] Readmission:    Patient will be readmitted to acute rehab unit     Discharge Statement:  I spent 35 minutes discharging the patient  This time was spent on the day of discharge  I had direct contact with the patient on the day of discharge  Greater than 50% of the total time was spent examining patient, answering all patient questions, arranging and discussing plan of care with patient as well as directly providing post-discharge instructions  Additional time then spent on discharge activities  Discharge Medications:  See after visit summary for reconciled discharge medications provided to patient and family        ** Please Note: This note has been constructed using a voice recognition system **

## 2022-07-06 NOTE — ASSESSMENT & PLAN NOTE
· Secondary to mechanical fall as experienced prior to arrival  · Xray revealed: "Distal radial comminuted intra-articular fracture and ulnar styloid fracture "  · Status post an orthopedic surgical evaluation  · Patient had closed reduction and splinting in the OR  · Outpatient orthopedic follow-up

## 2022-07-06 NOTE — PROGRESS NOTES
07/06/22 1401   Patient Data   Rehab Impairment R femur fx sp hemiarthroplasy; R wrist fx s/p closed reduction with splint   Support System   Name Klaus Molina   Relationship spouse   Health Status "good"   Able to provide 24 hour supervision No  ( works part time)   Able to provide physical help? Yes   Home Setup   Type of Home Multi Level   Method of Entry Stairs;Hand Rail Bilateral  (far apart)   Number of Stairs 4   Number of Stairs in Home   (8 landing 3)   In Home Hand Rail   (L handrail on 8 steps; no handrail on 3 steps)   Available Equipment Roller Walker; Bedside Commode; Shower Chair   Prior Level of Function   Indoor-Mobility (Ambulation) 3  Independent - Patient completed the activities by him/herself, with or without an assistive device, with no assistance from a helper  Stairs 3  Independent - Patient completed the activities by him/herself, with or without an assistive device, with no assistance from a helper  Prior Device Used Z   None of the above   Restrictions/Precautions   Precautions Fall Risk;THR   RUE Weight Bearing Per Order NWB  (can use platform)   RLE Weight Bearing Per Order WBAT   ROM Restrictions   (THPs RLE)   Pain Assessment   Pain Assessment Tool 0-10   Pain Score 4   Pain Location/Orientation Orientation: Right;Location: Hip  (R wrist)   Roll Left and Right   Type of Assistance Needed Incidental touching   Comment cg using bed rail; pillow between legs to maintain THPs   Roll Left and Right CARE Score 4   Sit to Lying   Type of Assistance Needed Physical assistance   Physical Assistance Level 26%-50%   Comment MIN A   Sit to Lying CARE Score 3   Lying to Sitting on Side of Bed   Type of Assistance Needed Incidental touching   Comment cg   Lying to Sitting on Side of Bed CARE Score 4   Sit to Stand   Type of Assistance Needed Incidental touching   Comment cg   Sit to Stand CARE Score 4   Picking Up Object   Reason if not Attempted Safety concerns   Picking Up Object CARE Score 80   Car Transfer   Reason if not Attempted Environmental limitations   Car Transfer CARE Score 10   Ambulation   Primary Mode of Locomotion Prior to Admission Walk   Distance Walked (feet) 97 ft  (92' 97')   Assist Device Platform;Roller Walker   Gait Pattern Antalgic; Inconsistant Dolores; Slow Dolores;Decreased foot clearance;Narrow GINO   Limitations Noted In Balance; Endurance; Safety;Strength   Provided Assistance with: Balance   Walk Assist Level Contact Guard   Findings level and unlevel surfaces   Walk 10 Feet   Type of Assistance Needed Incidental touching   Comment cg   Walk 10 Feet CARE Score 4   Walk 50 Feet with Two Turns   Type of Assistance Needed Incidental touching   Comment cg   Walk 50 Feet with Two Turns CARE Score 4   Walk 150 Feet   Reason if not Attempted Safety concerns   Walk 150 Feet CARE Score 88   Walking 10 Feet on Uneven Surfaces   Type of Assistance Needed Incidental touching   Comment cg   Walking 10 Feet on Uneven Surfaces CARE Score 4   Wheelchair mobility   Findings not applicable   Wheel 50 Feet with Two Turns   Reason if not Attempted Activity not applicable   Wheel 50 Feet with Two Turns CARE Score 9   Wheel 150 Feet   Reason if not Attempted Activity not applicable   Wheel 604 Feet CARE Score 9   Curb or Single Stair   Style negotiated Single stair   Type of Assistance Needed Incidental touching   Comment cg   1 Step (Curb) CARE Score 4   4 Steps   Type of Assistance Needed Incidental touching   Comment cg   4 Steps CARE Score 4   12 Steps   Reason if not Attempted Safety concerns   12 Steps CARE Score 88   Stairs   Type Stairs   # of Steps 4   Weight Bearing Precautions WBAT   Assist Devices Single Rail   Findings cg with cues   Comprehension   Assist Devices Glasses   QI: Comprehension 4  Undestands: Clear comprehension without cues or repetitions   Comprehension (FIM) 6 - Understands complex/abstract but requires  glasses for visual comp   Expression   QI: Expression 4   Express complex messages without difficulty and with speech that is clear and easy to [place]   Expression (FIM) 7 - Expresses complex/abstract ideas in a reasonable time w/o devices or helper  Social Interaction   Social Interaction (FIM) 6 - Interacts appropriately with others BUT requires medication for control   Problem Solving   Problem solving (FIM) 6 - Solves complex problems BUT requires extra time   Memory   Memory (FIM) 7 - Remembers daily routines   RLE Assessment   RLE Assessment   (decreased hip; knee and ankle WFL)   Strength RLE   R Hip Flexion 2/5   R Hip Extension 3-/5   R Hip ABduction 3-/5   R Hip ADduction 3-/5   R Knee Flexion 3/5   R Knee Extension 3/5   R Ankle Dorsiflexion 3+/5   R Ankle Plantar Flexion 3+/5   LLE Assessment   LLE Assessment   (ROM WFL)   Strength LLE   L Hip Flexion 4+/5   L Hip Extension 4+/5   L Hip ABduction 4+/5   L Hip ADduction 4+/5   L Knee Flexion 5/5   L Knee Extension 5/5   L Ankle Dorsiflexion 5/5   L Ankle Plantar Flexion 5/5   Coordination   Movements are Fluid and Coordinated 1   Sensation   Light Touch No apparent deficits   Propioception No apparent deficits   Cognition   Overall Cognitive Status WFL   Arousal/Participation Alert; Responsive; Cooperative   Attention Within functional limits   Orientation Level Oriented X4   Memory Within functional limits   Following Commands Follows all commands and directions without difficulty   Discharge Information   Vocational Plan Retired/not working   Patient's Discharge Plan return home   Patient's Rehab Expectations "get stronger"   Barriers to Discharge Home Decreased Strength;Pain; Safety Considerations   Impressions Patient seen for IE  Presents, following hospitalizaton for R femur fx s/p hemiarthroplasty and R wrist fx s/p closed reduction, with decreased ROM/strength, decreased balance and safety, decreased endurance, NWB RUE (except using platform RW), and pain; all affecting functional mobility    Patient would benefit from continued inpatient [facility] PT to increase function, safety, and increased independence in prep for safe d/c to home     PT Therapy Minutes   PT Time In 1401   PT Time Out 1455   PT Total Time (minutes) 54   PT Mode of treatment - Individual (minutes) 54   PT Mode of treatment - Concurrent (minutes) 0   PT Mode of treatment - Group (minutes) 0   PT Mode of treatment - Co-treat (minutes) 0   PT Mode of Treatment - Total time(minutes) 54 minutes   PT Cumulative Minutes 54   Cumulative Minutes   Cumulative therapy minutes 54

## 2022-07-06 NOTE — CASE MANAGEMENT
Case Management Discharge Planning Note    Patient name Lissette Pagan  Location /-30 MRN [id number]  : 1950 Date 2022       Current Admission Date: 2022  Current Admission Diagnosis:Closed transcervical fracture of right femur [facility])   Patient Active Problem List    Diagnosis Date Noted    Acute blood loss anemia 2022    Leukemoid reaction 2022    Closed transcervical fracture of right femur ([facility] [address] ) 2022    Fracture of right wrist 2022    Hypertension 2022    Lumbar degenerative disc disease 2022    Lumbar spondylosis 2022    Cervical radiculopathy 2022    Cervical spondylosis 2022    Rheumatoid arthritis involving both hands ([facility] ) 2022    Spinal stenosis of lumbar region without neurogenic claudication 2022      LOS (days): 5  Geometric Mean LOS (GMLOS) (days): 4 10  Days to GMLOS:-0 9     OBJECTIVE:  Risk of Unplanned Readmission Score: 9 96         Current admission status: Inpatient   Preferred Pharmacy:   [address]  [place], [address]  [facility] [address]  Phone: [phone] Fax: [phone]    Primary Care Provider: Artemio Trevino DO    Primary Insurance: MEDICARE  Secondary Insurance: AARP    DISCHARGE DETAILS:    Discharge planning discussed with[de-identified] Patient  Freedom of Choice: Yes  Comments - Freedom of Choice: Pt for d/c to [facility] for STR today  Transport at [facility]  Pt and spouse aware and in agreement  Pt nurse Janett Golden aware  COVID test from  is negative  Bailey Calderon from [facility] aware and will d/c  [facility] aware of ETA and in agreement  Contacts  Patient Contacts: Landon Toney (Spouse)   [phone] (Mobile)  Relationship to Patient[de-identified] Family  Contact Method:  In Person  Reason/Outcome: Discharge Planning      Treatment Team Recommendation: Short Term Rehab  Discharge Destination Plan[de-identified] Acute Rehab  Transport at Discharge : Wheelchair Lucita Filter Transported by [employer] and Unit #):  Radha w/c Agata Rustam of Transport (Date): 07/06/22  ETA of Transport (Time): Ugo Linder Name, [address] : [facility] Acute Rehab Unit  Receiving Facility/Agency Phone Number: [phone]

## 2022-07-06 NOTE — NURSING NOTE
Admitted to [facility] after tripped and fell at home when walking dog   7/2 had right hemiarthroplasty and closed reduciton with splinting of right wrist   Edema of fingers right hand, states of numbness and tingling of fingers prior to fall  Right hand pink warm to touch  Silver dressing to right hip clean, dry and intact  NV check stable to same  Oriented to room and surroundings and therapy schedules  Evaluated by PT  In no distress   visiting  NWB to right wrist, ambulates WBAT /RW to bathroom without difficulty  See rehab scores  Care plan initiated and education initiated with medications and safety  [place]bell within reach

## 2022-07-06 NOTE — PLAN OF CARE
Problem: Prexisting or High Potential for Compromised Skin Integrity  Goal: Skin integrity is maintained or improved  Description: INTERVENTIONS:  - Identify patients at risk for skin breakdown  - Assess and monitor skin integrity  - Assess and monitor nutrition and hydration status  - Monitor labs   - Assess for incontinence   - Turn and reposition patient  - Assist with mobility/ambulation  - Relieve pressure over bony prominences  - Avoid friction and shearing  - Provide appropriate hygiene as needed including keeping skin clean and dry  - Evaluate need for skin moisturizer/barrier cream  - Collaborate with interdisciplinary team   - Patient/family teaching  - Consider wound care consult   Outcome: Progressing     Problem: PAIN - ADULT  Goal: Verbalizes/displays adequate comfort level or baseline comfort level  Description: Interventions:  - Encourage patient to monitor pain and request assistance  - Assess pain using appropriate pain scale  - Administer analgesics based on type and severity of pain and evaluate response  - Implement non-pharmacological measures as appropriate and evaluate response  - Consider cultural and social influences on pain and pain management  - Notify physician/advanced practitioner if interventions unsuccessful or patient reports new pain  Outcome: Progressing     Problem: DISCHARGE PLANNING  Goal: Discharge to home or other facility with appropriate resources  Description: INTERVENTIONS:  - Identify barriers to discharge w/patient and caregiver  - Arrange for needed discharge resources and transportation as appropriate  - Identify discharge learning needs (meds, wound care, etc )  - Arrange for interpretive services to assist at discharge as needed  - Refer to Case Management Department for coordinating discharge planning if the patient needs post-hospital services based on physician/advanced practitioner order or complex needs related to functional status, cognitive ability, or social support system  Outcome: Progressing

## 2022-07-06 NOTE — ASSESSMENT & PLAN NOTE
· Patient with mechanical fall prior to admission  · Surgical repair of right hip on 07/02/2022  · Continued postop management as per Orthopedic surgery  · Continue Tylenol for mild pain, Percocet for moderate pain, and or IV Dilaudid or IV morphine for severe pain  · Lovenox for DVT prophylaxis  · Patient is medically stable for discharge  · Patient needs post acute rehabilitation services  · Case management has arranged for discharge to acute rehab

## 2022-07-07 LAB
ALBUMIN SERPL BCP-MCNC: 3.4 G/DL (ref 3.5–5)
ALP SERPL-CCNC: 48 U/L (ref 34–104)
ALT SERPL W P-5'-P-CCNC: 44 U/L (ref 7–52)
ANION GAP SERPL CALCULATED.3IONS-SCNC: 9 MMOL/L (ref 4–13)
AST SERPL W P-5'-P-CCNC: 49 U/L (ref 13–39)
BASOPHILS # BLD AUTO: 0.05 THOUSANDS/ΜL (ref 0–0.1)
BASOPHILS NFR BLD AUTO: 1 % (ref 0–1)
BILIRUB SERPL-MCNC: 0.62 MG/DL (ref 0.2–1)
BUN SERPL-MCNC: 21 MG/DL (ref 5–25)
CALCIUM ALBUM COR SERPL-MCNC: 9.5 MG/DL (ref 8.3–10.1)
CALCIUM SERPL-MCNC: 9 MG/DL (ref 8.4–10.2)
CHLORIDE SERPL-SCNC: 100 MMOL/L (ref 96–108)
CO2 SERPL-SCNC: 30 MMOL/L (ref 21–32)
CREAT SERPL-MCNC: 0.83 MG/DL (ref 0.6–1.3)
EOSINOPHIL # BLD AUTO: 0.25 THOUSAND/ΜL (ref 0–0.61)
EOSINOPHIL NFR BLD AUTO: 3 % (ref 0–6)
ERYTHROCYTE [DISTWIDTH] IN BLOOD BY AUTOMATED COUNT: 13.7 % (ref 11.6–15.1)
GFR SERPL CREATININE-BSD FRML MDRD: 70 ML/MIN/1.73SQ M
GLUCOSE P FAST SERPL-MCNC: 114 MG/DL (ref 65–99)
GLUCOSE SERPL-MCNC: 114 MG/DL (ref 65–140)
HCT VFR BLD AUTO: 35.4 % (ref 34.8–46.1)
HGB BLD-MCNC: 11 G/DL (ref 11.5–15.4)
IMM GRANULOCYTES # BLD AUTO: 0.02 THOUSAND/UL (ref 0–0.2)
IMM GRANULOCYTES NFR BLD AUTO: 0 % (ref 0–2)
LYMPHOCYTES # BLD AUTO: 2.8 THOUSANDS/ΜL (ref 0.6–4.47)
LYMPHOCYTES NFR BLD AUTO: 32 % (ref 14–44)
MCH RBC QN AUTO: 29.4 PG (ref 26.8–34.3)
MCHC RBC AUTO-ENTMCNC: 31.1 G/DL (ref 31.4–37.4)
MCV RBC AUTO: 95 FL (ref 82–98)
MONOCYTES # BLD AUTO: 0.79 THOUSAND/ΜL (ref 0.17–1.22)
MONOCYTES NFR BLD AUTO: 9 % (ref 4–12)
NEUTROPHILS # BLD AUTO: 4.79 THOUSANDS/ΜL (ref 1.85–7.62)
NEUTS SEG NFR BLD AUTO: 55 % (ref 43–75)
NRBC BLD AUTO-RTO: 0 /100 WBCS
PLATELET # BLD AUTO: 316 THOUSANDS/UL (ref 149–390)
PMV BLD AUTO: 11.9 FL (ref 8.9–12.7)
POTASSIUM SERPL-SCNC: 3.5 MMOL/L (ref 3.5–5.3)
PROT SERPL-MCNC: 6.5 G/DL (ref 6.4–8.4)
RBC # BLD AUTO: 3.74 MILLION/UL (ref 3.81–5.12)
SODIUM SERPL-SCNC: 139 MMOL/L (ref 135–147)
WBC # BLD AUTO: 8.7 THOUSAND/UL (ref 4.31–10.16)

## 2022-07-07 PROCEDURE — 97535 SELF CARE MNGMENT TRAINING: CPT

## 2022-07-07 PROCEDURE — 99232 SBSQ HOSP IP/OBS MODERATE 35: CPT | Performed by: FAMILY MEDICINE

## 2022-07-07 PROCEDURE — 97112 NEUROMUSCULAR REEDUCATION: CPT | Performed by: PHYSICAL THERAPIST

## 2022-07-07 PROCEDURE — 97530 THERAPEUTIC ACTIVITIES: CPT | Performed by: PHYSICAL THERAPIST

## 2022-07-07 PROCEDURE — 97530 THERAPEUTIC ACTIVITIES: CPT

## 2022-07-07 PROCEDURE — 92610 EVALUATE SWALLOWING FUNCTION: CPT | Performed by: NURSE PRACTITIONER

## 2022-07-07 PROCEDURE — 80053 COMPREHEN METABOLIC PANEL: CPT | Performed by: PHYSICAL MEDICINE & REHABILITATION

## 2022-07-07 PROCEDURE — 85025 COMPLETE CBC W/AUTO DIFF WBC: CPT | Performed by: PHYSICAL MEDICINE & REHABILITATION

## 2022-07-07 PROCEDURE — 97110 THERAPEUTIC EXERCISES: CPT | Performed by: PHYSICAL THERAPIST

## 2022-07-07 PROCEDURE — 97116 GAIT TRAINING THERAPY: CPT | Performed by: PHYSICAL THERAPIST

## 2022-07-07 PROCEDURE — 97166 OT EVAL MOD COMPLEX 45 MIN: CPT

## 2022-07-07 RX ADMIN — VENLAFAXINE HYDROCHLORIDE 150 MG: 150 CAPSULE, EXTENDED RELEASE ORAL at 08:51

## 2022-07-07 RX ADMIN — PANTOPRAZOLE SODIUM 20 MG: 20 TABLET, DELAYED RELEASE ORAL at 16:32

## 2022-07-07 RX ADMIN — ACETAMINOPHEN 650 MG: 325 TABLET ORAL at 21:30

## 2022-07-07 RX ADMIN — PANTOPRAZOLE SODIUM 20 MG: 20 TABLET, DELAYED RELEASE ORAL at 07:39

## 2022-07-07 RX ADMIN — FERROUS SULFATE TAB 325 MG (65 MG ELEMENTAL FE) 325 MG: 325 (65 FE) TAB at 07:39

## 2022-07-07 RX ADMIN — DOCUSATE SODIUM 100 MG: 100 CAPSULE, LIQUID FILLED ORAL at 17:43

## 2022-07-07 RX ADMIN — DOCUSATE SODIUM 100 MG: 100 CAPSULE, LIQUID FILLED ORAL at 08:52

## 2022-07-07 RX ADMIN — ACETAMINOPHEN 650 MG: 325 TABLET ORAL at 09:32

## 2022-07-07 RX ADMIN — LORATADINE 10 MG: 10 TABLET ORAL at 08:52

## 2022-07-07 RX ADMIN — Medication 1 TABLET: at 08:51

## 2022-07-07 RX ADMIN — AMLODIPINE BESYLATE 10 MG: 10 TABLET ORAL at 08:51

## 2022-07-07 RX ADMIN — SENNOSIDES 8.6 MG: 8.6 TABLET, FILM COATED ORAL at 21:31

## 2022-07-07 RX ADMIN — TEMAZEPAM 15 MG: 15 CAPSULE ORAL at 21:31

## 2022-07-07 RX ADMIN — ENOXAPARIN SODIUM 40 MG: 100 INJECTION SUBCUTANEOUS at 21:30

## 2022-07-07 RX ADMIN — GABAPENTIN 600 MG: 600 TABLET, FILM COATED ORAL at 21:31

## 2022-07-07 RX ADMIN — OXYCODONE HYDROCHLORIDE AND ACETAMINOPHEN 500 MG: 500 TABLET ORAL at 17:43

## 2022-07-07 RX ADMIN — OXYCODONE HYDROCHLORIDE AND ACETAMINOPHEN 500 MG: 500 TABLET ORAL at 08:52

## 2022-07-07 RX ADMIN — OXYCODONE HYDROCHLORIDE 5 MG: 5 TABLET ORAL at 10:42

## 2022-07-07 RX ADMIN — PRAVASTATIN SODIUM 40 MG: 40 TABLET ORAL at 08:54

## 2022-07-07 NOTE — NURSING NOTE
See MAR for prn pain meds given for post op pain right hip  NV check stable to right leg  States of tingling and numbness of fingers right hand  Splint and DDI to RUE  Tolerating therapy  Expresses understanding of all meds given  Safety maintained at all times  Supervision with ambulation and transfers/RW

## 2022-07-07 NOTE — NUTRITION
22 1508   Biochemical Data,Medical Tests, and Procedures   Biochemical Data/Medical Tests/Procedures Lab values reviewed; Meds reviewed   Labs (Comment)  B, AST:49, alb:3 4, Hgb:11 0   Meds (Comment) norvasc, Vit C, colace, lovenox, FeSO4, neurontin, zofran, centrum, protonix, pravachol, restoril   Speech Therapy Recommendations (Comment) Patient reports some dysphagia however was evaluated by ST, regular diet remains appropriate  She states she has had her throat stretched ~2 years ago and is due to have is stretched again after discharge from facility  Patient states she alternates solids and liquids when she eats  Nutrition-Focused Physical Exam   Nutrition-Focused Physical Exam Findings RN skin assessment reviewed  (wound right hip noted)   Nutrition-Focused Physical Exam Findings trace BLE edema; LBM , hemorrhoids present  Medical-Related Concerns anxiety, depression, GERD, HLD, HTN   Adequacy of Intake   Nutrition Modality PO   Feeding Route   PO Independent   Current PO Intake   Current Diet Order Regular diet thin liquids   Current Meal Intake 50-75%;%   Estimated calorie intake compared to estimated need Anticipate nutrient needs will be meet  PES Statement   Problem Behavioral-Environmental   Physical Activity and Function (2) Self-feeding difficulty NB-2 6   Related to Other (Comment)  (right arm in cast (right hand dominant))   As evidenced by: Per patient/family interview   Recommendations/Interventions   Malnutrition/BMI Present No  (does not meet criteria)   Summary Regular diet thin liquids  Meal completions %  Patient reports her appetite is getting better  She states she resides with her  at home  She states she does not follow a diet plan  She reports she does the cooking  She states she consumes 3 meals per day  #; 5/10/#; 3/4/#; #; 10/13/#; weight stable  Trace BLE edema noted  Wound right hip   Right arm in cast  She states she has been feeding herself using her left hand  Discussed ordering finger foods to make self feeding easier  Interventions/Recommendations Continue current diet order   Education Assessment   Education Education not indicated at this time   Patient Nutrition Goals   Goal Adequate hydration; Adequate intake   Goal Status Initiated   Timeframe to complete goal by next f/u   Nutrition Complexity Risk   Nutrition complexity level Low risk   Follow up date 07/15/22

## 2022-07-07 NOTE — CASE MANAGEMENT
Initial assessment & orientation to [facility]C with Pt & phone contact with Pt's spouse  Pt resides with her spouse in a multi-story home, 4 steps in with bilat HR, but they are far apart  11 steps inside the home (8+3)  Pt's spouse works PT & she will not have 24 hour assistance  She does have multiple support persons including a son & grandchildren in the area  She has a RW, BSC & SC, but was completely independent PTA  Discussed role of team members & reviewed [address] with Pt & Pt's spouse, who expressed understanding & agreement  SW will continue to monitor & assist as needed with [address]  IMM reviewed, signed & submitted for scanning

## 2022-07-07 NOTE — PROGRESS NOTES
07/07/22 1238   Patient Data   Rehab Impairment Impairment of mobility, safety and Activities of Daily Living (ADLs) due to Orthopedic Disorders   Etiologic Diagnosis closed transcervical fx of R femur; R wrist fx   Home Setup   Type of Home Multi Level  (plus basement and attic, only goes to basement for laundry)   Method of Entry Stairs;Hand Rail Bilateral   Number of Stairs 4   Number of Stairs in Home 11  (8 + landing + 3; same set up to basement)   In Home Hand Rail Left  (partway up, stops approx 5th step)   Second Floor Bathroom Tub; Shower;Combo;Grab Bars   Second Floor Bathroom Accessibility Shower chair;Raised toilet seat   First Floor Setup Available Yes  (sleeps on second floor in regular bed, sometimes sleeps on first foor in sofa bed)   Home Modifications Necessary?   (lives with )   Home Modification Comment son lives in [facility], daughter lives in [place]; three grandkids, one great grandchild on the way; sister in [place] [facility], ex-DIL in [facility]   Available Equipment Bedside Commode;Roller Walker;[employer]; Shower Chair   Prior IADL Participation   Money Management   ( takes care of money)   Meal Preparation Full Participation   Laundry Full Participation   Home Cleaning Full Participation   Prior Level of Function   Self-Care 3  Independent - Patient completed the activities by him/herself, with or without an assistive device, with no assistance from a helper  Functional Cognition 3  Independent - Patient completed the activities by him/herself, with or without an assistive device, with no assistance from a helper  Prior Assistance Needed for Money Management   Prior Device Used Z   None of the above   Falls in the Last Year   Number of falls in the past 12 months 1   Type of Injury Associated with Fall Major injury  (current hospitalization)   Patient Preference   Nickname (Patient Preference) Lise   Patient Normally Wakes at [address] (WDL) WDL   Restrictions/Precautions   Precautions Fall Risk;THR   RUE Weight Bearing Per Order NWB   RLE Weight Bearing Per Order WBAT   ROM Restrictions   (THP RLE)   Pain Assessment   Pain Assessment Tool 0-10   Pain Score No Pain  (at rest)   Oral Hygiene   Comment pt reported having already completed   Grooming   Able To Initiate Tasks; Wash/Dry Face   Limitation Noted In Safety;Strength   Tub/Shower Transfer   Reason Not Assessed Sponge Bath   Shower/Bathe Self   Type of Assistance Needed Physical assistance   Physical Assistance Level 25% or less   Comment assist for bilat lower LE   Shower/Bathe Self CARE Score 3   Bathing   Assessed Bath Style Sponge Bath   Anticipated D/C Bath Style Shower;Sponge Bath   Able to [place] No   Able to Wash/Rinse/Dry (body part) Left Arm;Right Arm;L Upper Leg;R Upper Leg;Chest;Abdomen;Perineal Area; Buttocks   Limitations Noted in Balance; Endurance;ROM;Safety;Strength   Positioning Seated;Standing   Findings  pt given long handled sponge after eval for future bathing   Dressing/Undressing Clothing   Remove UB Clothes   (hospital rob)   Verta Bimler   ([facility] rob)   Type of Assistance Needed Physical assistance   Physical Assistance Level 25% or less   Comment assist to don sleeve off RUE cast   Upper Body Dressing CARE Score 3   Comment pt declined donning underwear or pants at this time   Reason if not Attempted Refused to perform   Lower Body Dressing CARE Score 7   Limitations Noted In Balance; Endurance; Safety;Strength;ROM   Positioning Supported Sit   Findings pt given reacher and sock aide after eval for future ADL use   Putting On/Taking Off Footwear   Type of Assistance Needed Physical assistance   Physical Assistance Level 76% or more   Comment able to present bilat feet to OT to doff/don socks   Putting On/Taking Off Footwear CARE Score 2   Toileting Hygiene   Type of Assistance Needed Incidental touching   Comment CG   Chuck Camacho 83 Score 4   Toilet Transfer   Surface Assessed Standard Toilet   Transfer Technique Standard   Limitations Noted In Balance; Endurance;ROM;Safety;LE Strength   Adaptive Equipment Grab Bar;Walker  (PFRW)   Type of Assistance Needed Physical assistance   Physical Assistance Level 25% or less   Toilet Transfer CARE Score 3   Toileting   Able to Pull Clothing   (pt not wearing LB clothing at the time)   Manage Hygiene Bladder   Limitations Noted In Balance;ROM;Safety;LE Strength   Adaptive Equipment Grab Bar   Sit to Stand   Type of Assistance Needed Incidental touching   Comment CG   Sit to Stand CARE Score 4   Picking Up Object   Reason if not Attempted Safety concerns   Picking Up Object CARE Score 88   Comprehension   QI: Comprehension 4  Undestands: Clear comprehension without cues or repetitions   Comprehension (FIM) 7 - Understands complex/abstract conversation in a reasonable time w/o devices or helper  Expression   QI: Expression 4  Express complex messages without difficulty and with speech that is clear and easy to [place]   Expression (FIM) 7 - Expresses complex/abstract ideas in a reasonable time w/o devices or helper  Social Interaction   Social Interaction (FIM) 7 - Interacts appropriately without assistive device, medication or helper   Problem Solving   Problem solving (FIM) 6 - Solves complex problems BUT requires extra time   Memory   Memory (FIM) 6 - Recognizes with extra time   RUE Assessment   RUE Assessment   (shoulder AROM WFL; MMT not tested thorughout UE 2* fx)   LUE Assessment   LUE Assessment WFL   Coordination   Movements are Fluid and Coordinated 0   Coordination and Movement Description RUE in cast which impedes fine motor movement, pt also with NWB status affecting some bilat coordination tasks   Sensation   Light Touch Partial deficits in the RUE  (tingling in R fingers)   Propioception No apparent deficits   Cognition   Overall Cognitive Status WFL   Arousal/Participation Alert; Responsive; Cooperative Attention Within functional limits   Orientation Level Oriented X4   Memory Within functional limits   Following Commands Follows all commands and directions without difficulty   Comments Pt former housekeeping at [employer], 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 that  Enjoys being with grandkids and family, coloring on iPad tablet, doing crossword puzzles, watching TV, listening to music, going out to eat with /in the community, going to [employer] and meetings   Vision   Vision Comments glasses   Therapeutic Exercise   Therapeutic Exercise/Activity after ADL, completed: functional reacher activity with LUE retrieving clothespins from floor and placing in basket on table; 3m30s dynamic standing to hang and remove clothespins from rods; R thumb and digit 2 pinch  activity placing wooden pegs into pegboard with assist from L hand to retrieve pegs   Discharge Information   Patient's Discharge Plan Pt would like to return home with    Patient's Rehab Expectations "be able to take care of myself and do my work at home as much as I possibly can do"   Impressions Pt seen today for OT IE  Received sitting in recliner  ADL session completed; current LOF and details listed in respective sections  Barriers to d/c include NWB RUE s/p wrist fx and closed reduction, THP's R hip s/p arthroplasty, decreased balance, decreased strength thorughout but especially R side, mild pain at fx/sx sites, and decreased activity tolerance; all affect independence in self care and functional transfers   Pt would benefit from continued skilled OT services in order to address listed barriers and prepare for safe d/c    OT Therapy Minutes   OT Time In 1238   OT Time Out 1436   OT Total Time (minutes) 118   OT Mode of treatment - Individual (minutes) 87   OT Mode of treatment - Concurrent (minutes) 31

## 2022-07-07 NOTE — PROGRESS NOTES
[facility] DAILY TREATMENT NOTE      07/07/22 0815   Patient Data   Rehab Impairment History of Present Illness Chart Review :    "67year old female with a PMH of hypertension, cervical radiculopathy, GERD, depression who presented with right hip pain   Patient reported she was walking her dog when she tripped, fell on right side on concrete porch  Yonis Jasper revealed Nondisplaced transcervical fracture of the right femur and Distal radial comminuted intra-articular fracture with ulnar styloid fracture  Ortho consulted  Pt is s/p right total hip hemiarthroplasty and s/p closed reduction with splinting right wrist (Right) on 7/2  Pt is weight-bearing as tolerated right lower extremity with hip precautions   Patient should remain nonweightbearing of right wrist   May use platform for walker per Ortho    PT and OT have been consulted and are recommending post-acute rehab services    Patient's case has been reviewed with [facility] medical director, patient meets medical criteria for acute rehab and has demonstrated the ability to tolerate three or more hours of therapy per day"   Support System   Name feliberto   Relationship spouse   Eating Assessment   Bedside Swallow Results   (no history)   VBS Study Results   (no history)   Current Diet Regular; Thin   Intake Mode PO     Speech/Language/Cognition Assessmetn   Treatment Assessment No cognitive-linguistic difficulties reported by patient or observed by staff  No further evaluation warranted at this time  Swallow Information   Current Symptoms/Concerns   (hx of esophageal dysphagia)   Current Diet Regular; Thin liquid   Baseline Diet Regular; Thin liquids   Consistencies Assessed and Performance   Materials Admnistered Regular/Solid; Thin liquid   Materials Adminstered Comment Pt reporting history of esophageal dysphagia, patient with history of Schatzki ring reports dilitation ~2 years ago  Pt with increased symptoms of food "sticking" in her esophagus ~6 months ago   Today reports tolerating regular solids/thin liquids without difficulty  There were no s s of aspiration  Her next GI appt is in September  Reviewed strategies with patient today and she completed teach back and verbalized her understanding  Will f/u x1 to ensure diet tolerance and review strategies as needed  Recommendations   Recommendations Consider oral diet  (f/u x1 to ensure diet tolerance and review strategies)   Diet Solid Recommendation Regular consistency   Diet Liquid Recommendation Thin liquid   Recommended Form of Meds As desired; As tolerated   General Precautions Upright as possible for all oral intake;Remain upright for 45 mins after meals; Minimize distractions; Other (Comment)  (reflux precautions)   Compensatory Swallowing Strategies   (small bites/sip, slow rate, alternate bites/sips, chew well )      Oral Hygiene   Type of Assistance Needed Set-up / clean-up   Physical Assistance Level No physical assistance   Oral Hygiene CARE Score 5   Cognition   Overall Cognitive Status WFL   Orientation Level Oriented X4   Discharge Information   Vocational Plan Retired/not working   Patient's Discharge Plan return home   Patient's Rehab Expectations "get stronger"   Barriers to Discharge Home Decreased Strength   SLP Therapy Minutes   SLP Time In 0815   SLP Time Out 0900   SLP Total Time (minutes) 45   SLP Mode of treatment - Individual (minutes) 45   SLP Mode of treatment - Concurrent (minutes) 0   SLP Mode of treatment - Group (minutes) 0   SLP Mode of treatment - Co-treat (minutes) 0   SLP Mode of Treatment - Total time(minutes) 45 minutes   SLP Cumulative Minutes 45   Cumulative Minutes   Cumulative therapy minutes 99

## 2022-07-07 NOTE — PROGRESS NOTES
07/07/22 0900   Pain Assessment   Pain Score 4  (Simultaneous filing  User may not have seen previous data )   Pain Location/Orientation Orientation: Right;Location: Hip   Restrictions/Precautions   RUE Weight Bearing Per Order NWB   RLE Weight Bearing Per Order WBAT   LLE Weight Bearing Per Order WBAT   ROM Restrictions   (Right LE posterior THPs)   Braces or Orthoses   (ABd pillow wedge)   Cognition   Orientation Level Oriented X4   Subjective   Subjective Reports she slept well     Roll Left and Right   Type of Assistance Needed Independent   Physical Assistance Level No physical assistance   Comment VCs to not use right UE   Roll Left and Right CARE Score 6   Sit to Lying   Type of Assistance Needed Incidental touching   Physical Assistance Level 25% or less   Comment Assist with right LE onto bed   Sit to Lying CARE Score 3   Lying to Sitting on Side of Bed   Type of Assistance Needed Supervision;Verbal cues   Physical Assistance Level No physical assistance   Comment Bed rails in use   Lying to Sitting on Side of Bed CARE Score 4   Sit to Stand   Type of Assistance Needed Incidental touching   Physical Assistance Level No physical assistance   Comment CGA, Right platform RW   Sit to Stand CARE Score 4   Bed-Chair Transfer   Type of Assistance Needed Incidental touching   Physical Assistance Level No physical assistance   Comment CGA, right platform RW   Chair/Bed-to-Chair Transfer CARE Score 4   Transfer Bed/Chair/Wheelchair   Findings Review of THPs, able to recall 2/3 independently, 3/3 with VCs   Car Transfer   Reason if not Attempted Environmental limitations   Car Transfer CARE Score 10   Walk 10 Feet   Type of Assistance Needed Supervision   Physical Assistance Level No physical assistance   Comment CGA, platform R - RW in room, 11 ft   Walk 10 Feet CARE Score 4   Walk 50 Feet with Two Turns   Type of Assistance Needed Supervision   Physical Assistance Level No physical assistance   Comment CGA, right platform RW, 85 ft   Walk 50 Feet with Two Turns CARE Score 4   Walk 150 Feet   Type of Assistance Needed Supervision   Physical Assistance Level No physical assistance   Comment CGA, right platform RW, 227 ft   Walk 150 Feet CARE Score 4   Ambulation   Does the patient walk? 2  Yes   Primary Mode of Locomotion Prior to Admission Walk   Wheel 50 Feet with Two Turns   Reason if not Attempted Activity not applicable   Wheel 50 Feet with Two Turns CARE Score 9   Wheel 150 Feet   Reason if not Attempted Activity not applicable   Wheel 916 Feet CARE Score 9   4 Steps   Type of Assistance Needed Supervision;Verbal cues   Physical Assistance Level No physical assistance   Comment CGA, left UE only   4 Steps CARE Score 4   12 Steps   Comment Unable to perform 2/2 pain   Picking Up Object   Comment Standing with right platform RW, reacher in left UE  Static stand, firm floor  Therapeutic Interventions   Strengthening Seated LE TE   Balance Standing and reach   Assessment   Treatment Assessment Patient tolerated session well  Progressing steadily, pain limited  Meds issued by nsg  Patient continues with LE weakness, remains motivated  Cont per POC  Problem List Decreased strength;Decreased range of motion;Decreased endurance; Impaired balance;Decreased mobility;Pain;Orthopedic restrictions;Decreased skin integrity   PT Barriers   Physical Impairment Decreased strength;Decreased range of motion;Decreased endurance; Impaired balance;Decreased mobility; Decreased coordination;Decreased safety awareness;Orthopedic restrictions   Functional Limitation Car transfers; Ramp negotiation;Stair negotiation;Standing;Transfers; Walking   Plan   Treatment/Interventions Functional transfer training;LE strengthening/ROM; Elevations; Therapeutic exercise; Endurance training;Patient/family training;Equipment eval/education; Bed mobility;Gait training   Progress Progressing toward goals   PT Therapy Minutes   PT Time In 0900   PT Time Out 1030 PT Total Time (minutes) 90   PT Mode of treatment - Individual (minutes) 90   PT Mode of treatment - Concurrent (minutes) 0   PT Mode of treatment - Group (minutes) 0   PT Mode of treatment - Co-treat (minutes) 0   PT Mode of Treatment - Total time(minutes) 90 minutes   PT Cumulative Minutes 144     Seated LE TE:  3x10, B/L LEs, 1 5#  March - withing THPs  LAQ  HR  TR  Hip ABds - red unilaterally  Hip ADd - jordan    Patient remains OOB in recliner chair, all needs in reach  Alarm in place and activated  Encouraged use of call bell, patient verbalizes understanding

## 2022-07-07 NOTE — PROGRESS NOTES
Physical Medicine and Rehabilitation Progress Note  Natan Vega 67 y o  female MRN: [id number]  Unit/Bed#: -01 Encounter: [id number]    HPI: 67year old female with a PMH of hypertension, cervical radiculopathy, GERD, depression who presented with right hip pain   Patient reported she was walking her dog when she tripped, fell on right side on concrete porch  Jordana Gallop revealed Nondisplaced transcervical fracture of the right femur and Distal radial comminuted intra-articular fracture with ulnar styloid fracture  Ortho consulted  Pt is s/p right total hip hemiarthroplasty and s/p closed reduction with splinting right wrist (Right) on 7/2  Pt is weight-bearing as tolerated right lower extremity with hip precautions   Patient should remain nonweightbearing of right wrist   May use platform for walker per Ortho    PT and OT have been consulted and are recommending post-acute rehab services    Patient's case has been reviewed with [facility] medical director, patient meets medical criteria for acute rehab and has demonstrated the ability to tolerate three or more hours of therapy per day      Subjective:  No complaints, mild pain when ambulating    ROS: A 10 point ROS was performed; negative except as noted above         Assessment/Plan:    Orthopedic Disorders:  08 4  Major Multiple Fractures  Etiologic: Closed transcervical fracture of right femur and Distal radial comminuted intra-articular fracture with ulnar styloid fracture  Date of Onset: 7/1   Date of surgery: 7/2/22  acute comprehensive interdisciplinary inpatient rehabilitation to include intensive skilled therapies (PT, OT, ST) as outlined with oversight and management by rehabilitation physician as well as inpatient rehab level nursing, case management and weekly interdisciplinary team meetings       Hypertension consult the hospitalist     GERD continue PPI     Will put on a bowel regimen              Scheduled Meds:  Current Facility-Administered Medications   Medication Dose Route Frequency Provider Last Rate    acetaminophen  650 mg Oral Q6H PRN Mohsen Primus, PA-C      aluminum-magnesium hydroxide-simethicone  30 mL Oral Q6H PRN Mohsen Primus, PA-C      amLODIPine  10 mg Oral Daily Mohsen Primus, PA-C      ascorbic acid  500 mg Oral BID Mohsen Primus, PA-C      docusate sodium  100 mg Oral BID Mohsen Primus, PA-C      enoxaparin  40 mg Subcutaneous Daily Mohsen Primus, PA-C      ferrous sulfate  325 mg Oral Daily With Breakfast Mohsen Primus, PA-C      gabapentin  600 mg Oral HS Mohsen Primus, PA-C      loratadine  10 mg Oral Daily Mohsen Primus, PA-C      multivitamin-minerals  1 tablet Oral Daily Mohsen Primus, PA-C      ondansetron  4 mg Oral Q6H PRN Mohsen Primus, PA-C      oxyCODONE  5 mg Oral Q6H PRN Mohsen Primus, PA-C      pantoprazole  20 mg Oral BID AC Mohsen Primus, PA-C      pravastatin  40 mg Oral Daily Mohsen Primus, PA-C      senna  1 tablet Oral HS Mohsen Primus, PA-C      temazepam  15 mg Oral HS PRN Mohsen Primus, PA-C      venlafaxine  150 mg Oral Daily Mohsen Primus, PA-C         Objective:    Functional Update:  Mobility:  Gait pattern Improper Weight shift;Decreased foot clearance;Decreased R stance; Antalgic; Excessively slow; Short stride; Step to   Gait Assistance    (CG)   Additional items Assist x 1;Verbal cues   Assistive Device R platform;Rolling walker   Distance 25 ftx2   Ambulation/Elevation Additional Comments verbal cues for proper hand placement and gait sequence with AD        Transfers:   Sit to Stand    (CG)   Additional items Assist x 1; Increased time required;Verbal cues;Armrests   Stand to Sit    (CG)   Additional items Assist x 1;Bedrails; Increased time required;Verbal cues  (cued for proper hand placement during transfers and to slide RLE forward prior to sitting)   Stand pivot    (CG)   Additional items Assist x 1;Bedrails; Increased time required;Verbal cues  (RW with platform attachment for RUE)   Toilet transfer    (CG)   Additional items Assist x 1; Increased time required;Verbal cues;Standard toilet   Additional Comments CGAx1 and RW with  RUE platform required for clothing management and hygiene needs  ADLs:  Where Assessed    (patient was already bathed this date)   Equipment Provided    (provided instruction and functional demos  in [place] - patient attentive to and interested in using same to promote Indep )   LB Bathing Comments educated in (with demos ) of compensatory techniques Per UE and THR restrictions   LB Dressing Comments educated in (with demos ) of compensatory techniques Per UE and THR restrictions                Physical Exam:  Temp:  [97 °F (36 1 °C)-98 7 °F (37 1 °C)] 97 °F (36 1 °C)  HR:  [84-94] 84  Resp:  [13-20] 13  BP: (126-136)/(66-77) 136/77  SpO2:  [94 %-96 %] 94 %    General:   alert, no apparent distress, cooperative and comfortable  HEENT:  Head: Normocephalic, no lesions, without obvious abnormality  Eye: Normal external eye, conjunctiva, lidsc cornea  Ears: Normal external ears  Nose: Normal external nose, mucus membranes  CARDIAC:  regular rate and rhythm, S1, S2 normal, no murmur, click, rub or gallop  LUNGS:  no abnormal respiratory pattern, no retractions noted, non-labored breathing   ABDOMEN:  soft, non-tender, non-distended  EXTREMITIES:  extremities normal, warm and well-perfused; no cyanosis, clubbing, or edema has a cast on her right arm  NEURO:  clear speech, following all commands, oriented There are no focal neurological deficits  PSYCH:  Alert and oriented, appropriate affect    INCISION:  C/D/I          Diagnostic Studies:   No orders to display       Laboratory: Labs reviewed  Results from last 7 days   Lab Units 07/07/22  0533 07/06/22  0451 07/05/22  0454   HEMOGLOBIN g/dL 11 0* 11 0* 11 2*   HEMATOCRIT % 35 4 34 1* 36 1   WBC Thousand/uL 8 70 9 64 10 98*     Results from last 7 days   Lab Units 07/07/22  0533 07/05/22  0454 07/04/22  0448 07/03/22  0449 07/02/22  0435   BUN mg/dL 21 21 24   < > 20   SODIUM mmol/L 139 138 137   < > 136   POTASSIUM mmol/L 3 5 4 0 3 9   < > 4 0   CHLORIDE mmol/L 100 101 101   < > 102   CREATININE mg/dL 0 83 0 83 0 85   < > 1 04   AST U/L 49*  --   --   --  35   ALT U/L 44  --   --   --  35    < > = values in this interval not displayed  ** Please Note: Fluency Direct voice to text software may have been used in the creation of this document   **

## 2022-07-07 NOTE — PLAN OF CARE
Problem: PAIN - ADULT  Goal: Verbalizes/displays adequate comfort level or baseline comfort level  Description: Interventions:  - Encourage patient to monitor pain and request assistance  - Assess pain using appropriate pain scale  - Administer analgesics based on type and severity of pain and evaluate response  - Implement non-pharmacological measures as appropriate and evaluate response  - Consider cultural and social influences on pain and pain management  - Notify physician/advanced practitioner if interventions unsuccessful or patient reports new pain  Outcome: Progressing     Problem: DISCHARGE PLANNING  Goal: Discharge to home or other facility with appropriate resources  Description: INTERVENTIONS:  - Identify barriers to discharge w/patient and caregiver  - Arrange for needed discharge resources and transportation as appropriate  - Identify discharge learning needs (meds, wound care, etc )  - Arrange for interpretive services to assist at discharge as needed  - Refer to Case Management Department for coordinating discharge planning if the patient needs post-hospital services based on physician/advanced practitioner order or complex needs related to functional status, cognitive ability, or social support system  Outcome: Progressing     Problem: DISCHARGE PLANNING  Goal: Discharge to home or other facility with appropriate resources  Description: INTERVENTIONS:  - Identify barriers to discharge w/patient and caregiver  - Arrange for needed discharge resources and transportation as appropriate  - Identify discharge learning needs (meds, wound care, etc )  - Arrange for interpretive services to assist at discharge as needed  - Refer to Case Management Department for coordinating discharge planning if the patient needs post-hospital services based on physician/advanced practitioner order or complex needs related to functional status, cognitive ability, or social support system  Outcome: Progressing

## 2022-07-07 NOTE — TREATMENT PLAN
Individualized Plan of [facility] LALY Vega 67 y o  female MRN: [id number]  Unit/Bed#: -01 Encounter: [id number]     PATIENT INFORMATION  ADMISSION DATE: 7/6/2022 10:41 AM RAND CATEGORY: Impairment of mobility, safety and Activities of Daily Living (ADLs) due to Orthopedic Disorders:  08 4  Major Multiple Fractures  Etiologic: Closed transcervical fracture of right femur and Distal radial comminuted intra-articular fracture with ulnar styloid fracture  Date of Onset: 7/1   Date of surgery: 7/2/22   ADMISSION DIAGNOSIS: No admission diagnoses are documented for this encounter  EXPECTED LOS: 10-14 days     MEDICAL/FUNCTIONAL PROGNOSIS  Based on my assessment of the patient's medical conditions and current functional status, the prognosis for attaining medical and functional goals or the IRF stay is:  Good    Medical Goals: Patient will be medically stable for discharge to [facility] upon completion of rehab program    [address]: [facility] [address]:   Outpatient Therapy Services: PT and OT            DISCIPLINE SPECIFIC PLANS:  Required Disciplines & Services:  PT and OT        REQUIRED THERAPY:  Therapy Hours per Day Days per Week Total Days   Physical Therapy 1 5 5   Occupational Therapy 1 5 5   Speech/Language Therapy 1 5 5   NOTE: Additional therapy time(s) may be added as appropriate to meet patient needs and to achieve functional goals      Patient will either participate in above therapy regimen or participate in 900 minutes of therapy within 7 day week consisting of PT and OT due to the following medical procedure/condition: Impairment of mobility, safety and Activities of Daily Living (ADLs) due to Orthopedic Disorders:  08 4  Major Multiple Fractures  Etiologic: Closed transcervical fracture of right femur and Distal radial comminuted intra-articular fracture with ulnar styloid fracture  Date of Onset: 7/1   Date of surgery: 7/2/22    ANTICIPATED FUNCTIONAL OUTCOMES:  ADL:     Bladder/Bowel: Patient will be independent with bladder/bowel management with least restrictive device upon completion of rehab program   Transfers: Patient will be independent with transfers with least restrictive device upon completion of rehab program   Locomotion: Patient will be independent with locomotion with least restrictive device upon completion of rehab program   Cognitive:       DISCHARGE PLANNING NEEDS  Equipment needs: Discharge needs to be reviewed with team      REHAB ANTICIPATED PARTICIPATION RESTRICTIONS:  Rquires Assist with ADLS

## 2022-07-08 PROCEDURE — 97110 THERAPEUTIC EXERCISES: CPT

## 2022-07-08 PROCEDURE — 99232 SBSQ HOSP IP/OBS MODERATE 35: CPT | Performed by: FAMILY MEDICINE

## 2022-07-08 PROCEDURE — 97537 COMMUNITY/WORK REINTEGRATION: CPT

## 2022-07-08 PROCEDURE — 97535 SELF CARE MNGMENT TRAINING: CPT

## 2022-07-08 PROCEDURE — 97530 THERAPEUTIC ACTIVITIES: CPT

## 2022-07-08 PROCEDURE — 97116 GAIT TRAINING THERAPY: CPT

## 2022-07-08 RX ADMIN — DOCUSATE SODIUM 100 MG: 100 CAPSULE, LIQUID FILLED ORAL at 08:01

## 2022-07-08 RX ADMIN — PANTOPRAZOLE SODIUM 20 MG: 20 TABLET, DELAYED RELEASE ORAL at 17:00

## 2022-07-08 RX ADMIN — LORATADINE 10 MG: 10 TABLET ORAL at 08:01

## 2022-07-08 RX ADMIN — ACETAMINOPHEN 650 MG: 325 TABLET ORAL at 10:33

## 2022-07-08 RX ADMIN — OXYCODONE HYDROCHLORIDE AND ACETAMINOPHEN 500 MG: 500 TABLET ORAL at 17:06

## 2022-07-08 RX ADMIN — PRAVASTATIN SODIUM 40 MG: 40 TABLET ORAL at 08:01

## 2022-07-08 RX ADMIN — SENNOSIDES 8.6 MG: 8.6 TABLET, FILM COATED ORAL at 21:19

## 2022-07-08 RX ADMIN — VENLAFAXINE HYDROCHLORIDE 150 MG: 150 CAPSULE, EXTENDED RELEASE ORAL at 08:02

## 2022-07-08 RX ADMIN — AMLODIPINE BESYLATE 10 MG: 10 TABLET ORAL at 08:01

## 2022-07-08 RX ADMIN — Medication 1 TABLET: at 08:02

## 2022-07-08 RX ADMIN — GABAPENTIN 600 MG: 600 TABLET, FILM COATED ORAL at 21:19

## 2022-07-08 RX ADMIN — ACETAMINOPHEN 650 MG: 325 TABLET ORAL at 21:19

## 2022-07-08 RX ADMIN — OXYCODONE HYDROCHLORIDE AND ACETAMINOPHEN 500 MG: 500 TABLET ORAL at 08:01

## 2022-07-08 RX ADMIN — PANTOPRAZOLE SODIUM 20 MG: 20 TABLET, DELAYED RELEASE ORAL at 06:11

## 2022-07-08 RX ADMIN — TEMAZEPAM 15 MG: 15 CAPSULE ORAL at 21:19

## 2022-07-08 RX ADMIN — FERROUS SULFATE TAB 325 MG (65 MG ELEMENTAL FE) 325 MG: 325 (65 FE) TAB at 08:01

## 2022-07-08 RX ADMIN — ENOXAPARIN SODIUM 40 MG: 100 INJECTION SUBCUTANEOUS at 21:19

## 2022-07-08 RX ADMIN — DOCUSATE SODIUM 100 MG: 100 CAPSULE, LIQUID FILLED ORAL at 17:06

## 2022-07-08 NOTE — PLAN OF CARE
Problem: Prexisting or High Potential for Compromised Skin Integrity  Goal: Skin integrity is maintained or improved  Description: INTERVENTIONS:  - Identify patients at risk for skin breakdown  - Assess and monitor skin integrity  - Assess and monitor nutrition and hydration status  - Monitor labs   - Assess for incontinence   - Turn and reposition patient  - Assist with mobility/ambulation  - Relieve pressure over bony prominences  - Avoid friction and shearing  - Provide appropriate hygiene as needed including keeping skin clean and dry  - Evaluate need for skin moisturizer/barrier cream  - Collaborate with interdisciplinary team   - Patient/family teaching  - Consider wound care consult   Outcome: Progressing     Problem: Potential for Falls  Goal: Patient will remain free of falls  Description: INTERVENTIONS:  - Educate patient/family on patient safety including physical limitations  - Instruct patient to call for assistance with activity   - Consult OT/PT to assist with strengthening/mobility   - Keep Call bell within reach  - Keep bed low and locked with side rails adjusted as appropriate  - Keep care items and personal belongings within reach  - Initiate and maintain comfort rounds  - Make Fall Risk Sign visible to staff  - Offer Toileting every 2 Hours, in advance of need  - Initiate/Maintain bed/chair alarm  - Obtain necessary fall risk management equipment: antislip socks    - Apply yellow socks and bracelet for high fall risk patients  - Consider moving patient to room near nurses station  Outcome: Progressing

## 2022-07-08 NOTE — CONSULTS
Consultation - Internal Medicine    J[place] Vega 67 y o  female MRN: [id number]  Unit/Bed#: -01 Encounter: [id number]      A/P:  1  Hypertension: continue to monitor and adjust medication as needed   2  Depression: appears controlled on current out patient regimen; moods are appropriate   3  Insomnia: continue current therapy is stable   4  Constipation: have encouraged adequate fluid intake and have added colace bid   5  Right wrist fracture: continue to manage pain effectively; pt/ot as per Physiatry   6  Right femur fracture: continue to manage pain effectively; pt/ot as per Physiatry          Thank you for allowing us to participate in the care of your patient  Please feel free to contact us regarding the care of this patient, or any other questions/concerns that may be applicable  Patient Active Problem List   Diagnosis    Lumbar degenerative disc disease    Lumbar spondylosis    Cervical radiculopathy    Cervical spondylosis    Rheumatoid arthritis involving both hands ([address] )    Spinal stenosis of lumbar region without neurogenic claudication    Closed transcervical fracture of right femur ([address] )    Fracture of right wrist    Hypertension    Leukemoid reaction    Acute blood loss anemia       History of Present Illness   Physician Requesting Consult: Jayla Lewis MD  Reason for Consult / Principal Problem: Medical Management   Hx and PE limited by:   HPI: Adia Levin is a 67y o  year old female who presented to the [facility] ED on 7/1/2022 due to mechanical fall with hip and wrist fracture  Patient was seen by orthopedic team and was taken to the OR on 07/02/2022 for surgical repair of both hip and wrist  She did well postoperatively  As per Ortho lovenox to be continued at this time  PT recommending rehab and she was transferred to the [facility] for STR     History obtained from chart review and the patient    Constitutional ROS- Denies fatigue, fever, chills, night sweats, weight changes  HEENT ROS- Denies history of eye surgeries, glaucoma, headaches or history of trauma  Endocrine ROS- No history diabetes mellitus or thyroid disease  Cardiovascular ROS- Denies chest pain, palpitation, dyspnea exertion, orthopnea, claudication  Pulmonary ROS- Denies history of COPD, asthma  Denies cough, hemoptysis, shortness of breath  GI ROS- Denies abdominal pain, diarrhea, nausea, swallowing problems, vomiting, constipation, blood in stools, fecal incontinence  Hematological ROS- Denies history of easy bruising, blood clots, bleeding or blood transfusions  Genitourinary ROS- Denies recent hematuria, pyuria, flank pain, change in urinary stream, decreased urinary output, increased urinary frequency, nocturia, foamy urine, or urinary incontinence  Lymphatic ROS- Denies lymphadenopathy  Musculoskeletal ROS- Denies history of gout, muscle weakness Positive right hip and wrist pain   Dermatological ROS- Denies rash, wounds, ulcers, itching, jaundice  Psychiatric ROS- Denies anxiety, hallucinations, disorientation  Positive history of depression   Neurological ROS- No stroke or TIA symptoms  Denies dizziness, paresthesias, history of stroke, history of peripheral neuropathy  Historical Information   Past Medical History:   Diagnosis Date    Anxiety     Arthritis     Depression     GERD (gastroesophageal reflux disease)     Hiatal hernia     Hyperlipidemia     Hypertension      Past Surgical History:   Procedure Laterality Date    APPENDECTOMY      CHOLECYSTECTOMY      FRACTURE SURGERY Right     arm with plate and pins    HAND SURGERY Bilateral     HYSTERECTOMY      JOINT REPLACEMENT Bilateral     MI PARTIAL HIP REPLACEMENT Right 7/2/2022    Procedure: HEMIARTHROPLASTY HIP (BIPOLAR), closed reduction with splinting right wrist;  Surgeon: Carlos Humphrey;   [facility];  Service: Orthopedics    SHOULDER ARTHROSCOPY Left      Social History   Social History Substance and Sexual Activity   Alcohol Use Not Currently    Alcohol/week: 1 0 standard drink    Types: 1 Glasses of wine per week     Social History     Substance and Sexual Activity   Drug Use Never     Social History     Tobacco Use   Smoking Status Former Smoker    Types: Cigarettes   Smokeless Tobacco Never Used     No family history on file      Meds/Allergies   all current active meds have been reviewed, current meds:   Current Facility-Administered Medications   Medication Dose Route Frequency    acetaminophen (TYLENOL) tablet 650 mg  650 mg Oral Q6H PRN    aluminum-magnesium hydroxide-simethicone (MYLANTA) oral suspension 30 mL  30 mL Oral Q6H PRN    amLODIPine (NORVASC) tablet 10 mg  10 mg Oral Daily    ascorbic acid (VITAMIN C) tablet 500 mg  500 mg Oral BID    docusate sodium (COLACE) capsule 100 mg  100 mg Oral BID    enoxaparin (LOVENOX) subcutaneous injection 40 mg  40 mg Subcutaneous Daily    ferrous sulfate tablet 325 mg  325 mg Oral Daily With Breakfast    gabapentin (NEURONTIN) tablet 600 mg  600 mg Oral HS    loratadine (CLARITIN) tablet 10 mg  10 mg Oral Daily    multivitamin-minerals (CENTRUM) tablet 1 tablet  1 tablet Oral Daily    ondansetron (ZOFRAN-ODT) dispersible tablet 4 mg  4 mg Oral Q6H PRN    oxyCODONE (ROXICODONE) IR tablet 5 mg  5 mg Oral Q6H PRN    pantoprazole (PROTONIX) EC tablet 20 mg  20 mg Oral BID AC    pravastatin (PRAVACHOL) tablet 40 mg  40 mg Oral Daily    senna (SENOKOT) tablet 8 6 mg  1 tablet Oral HS    temazepam (RESTORIL) capsule 15 mg  15 mg Oral HS PRN    venlafaxine (EFFEXOR-XR) 24 hr capsule 150 mg  150 mg Oral Daily    and PTA meds:    Medications Prior to Admission   Medication    amLODIPine (NORVASC) 10 mg tablet    cetirizine (ZyrTEC) 10 mg tablet    gabapentin (NEURONTIN) 300 mg capsule    gabapentin (NEURONTIN) 600 MG tablet    multivitamin (THERAGRAN) TABS    omeprazole (PriLOSEC) 20 mg delayed release capsule    omeprazole (PriLOSEC) 40 MG capsule    pravastatin (PRAVACHOL) 40 mg tablet    temazepam (RESTORIL) 15 mg capsule    temazepam (RESTORIL) 7 5 mg capsule    venlafaxine (EFFEXOR-XR) 150 mg 24 hr capsule         No Known Allergies    Objective     Intake/Output Summary (Last 24 hours) at 7/8/2022 1546  Last data filed at 7/8/2022 1236  Gross per 24 hour   Intake 600 ml   Output --   Net 600 ml       Invasive Devices:        Physical Exam  Vitals and nursing note reviewed  Constitutional:       General: She is not in acute distress  Appearance: Normal appearance  She is normal weight  She is not ill-appearing  HENT:      Head: Normocephalic and atraumatic  Right Ear: External ear normal       Left Ear: External ear normal       Nose: Nose normal  No congestion or rhinorrhea  Mouth/Throat:      Mouth: Mucous membranes are moist       Pharynx: Oropharynx is clear  No oropharyngeal exudate or posterior oropharyngeal erythema  Eyes:      Extraocular Movements: Extraocular movements intact  Conjunctiva/sclera: Conjunctivae normal       Pupils: Pupils are equal, round, and reactive to light  Cardiovascular:      Rate and Rhythm: Normal rate and regular rhythm  Pulses: Normal pulses  Heart sounds: Normal heart sounds  Pulmonary:      Effort: Pulmonary effort is normal       Breath sounds: Normal breath sounds  No wheezing, rhonchi or rales  Abdominal:      General: Bowel sounds are normal  There is no distension  Palpations: Abdomen is soft  Tenderness: There is no abdominal tenderness  Musculoskeletal:      Cervical back: Normal range of motion and neck supple  No rigidity or tenderness  Right lower leg: No edema  Left lower leg: No edema  Comments: Positive right upper arm in hard splint  WBAT to the right hip able to use walker with platform    Skin:     General: Skin is warm and dry  Capillary Refill: Capillary refill takes less than 2 seconds  Neurological:      General: No focal deficit present  Mental Status: She is alert and oriented to person, place, and time  Psychiatric:         Mood and Affect: Mood normal          Behavior: Behavior normal          Thought Content: Thought content normal          Judgment: Judgment normal            I/O last 3 completed shifts: In: 520 [P O :520]  Out: -     Vitals:    07/08/22 0801   BP: 122/80   Pulse: 58   Resp: 17   Temp: 98 1 °F (36 7 °C)   SpO2: 92%         Current Weight: Weight - Scale: 96 1 kg (211 lb 13 8 oz)  First Weight: Weight - Scale: 96 1 kg (211 lb 13 8 oz)    Lab Results:  I have personally reviewed pertinent labs  CBC: No results found for: WBC, HGB, HCT, MCV, PLT, ADJUSTEDWBC, MCH, MCHC, RDW, MPV, NRBC  CMP: No results found for: NA, K, CL, CO2, ANIONGAP, BUN, CREATININE, GLUCOSE, CALCIUM, AST, ALT, ALKPHOS, PROT, BILITOT, EGFR  Phosphorus: No results found for: PHOS  Magnesium: No results found for: MG  Urinalysis: No results found for: COLORU, CLARITYU, SPECGRAV, PHUR, LEUKOCYTESUR, NITRITE, PROTEINUA, GLUCOSEU, KETONESU, BILIRUBINUR, BLOODU  Ionized Calcium: No results found for: CAION  Coagulation: No results found for: PT, INR, APTT  Troponin: No results found for: TROPONINI  ABG: No results found for: PHART, YCT0FKG, PO2ART, UPB2EOC, K0YMXACT, BEART, SOURCE    Results from last 7 days   Lab Units 07/07/22  0533 07/05/22  0454 07/04/22  0448 07/03/22  0449 07/02/22  0435   POTASSIUM mmol/L 3 5 4 0 3 9   < > 4 0   CHLORIDE mmol/L 100 101 101   < > 102   CO2 mmol/L 30 30 27   < > 25   BUN mg/dL 21 21 24   < > 20   CREATININE mg/dL 0 83 0 83 0 85   < > 1 04   CALCIUM mg/dL 9 0 9 1 9 0   < > 9 0   ALK PHOS U/L 48  --   --   --  34   ALT U/L 44  --   --   --  35   AST U/L 49*  --   --   --  35    < > = values in this interval not displayed  Radiology review:  No results found        EKG, Pathology, and Other Studies: I have reviewed pertinent studies personally Counseling / Coordination of Care  Total ADDITIONAL floor / unit time spent today 35 minutes  Greater than 50% of total time was spent with the patient and / or family counseling and / or coordination of care  Chidi Beasley      This consultation note was produced in part using a dictation device which may document imprecise wording from author's original intent

## 2022-07-08 NOTE — PROGRESS NOTES
07/08/22 0900   Pain Assessment   Pain Assessment Tool 0-10   Pain Score 3   Pain Location/Orientation Orientation: Right;Location: Hip   Restrictions/Precautions   Precautions Fall Risk;Limb alert;Pain;THR   RUE Weight Bearing Per Order NWB  (hand, able to WB through elbow on platform)   RLE Weight Bearing Per Order WBAT   ROM Restrictions   (THPs)   Braces or Orthoses Splint  (RUE)   Oral Hygiene   Type of Assistance Needed Set-up / clean-up   Oral Hygiene CARE Score 5   Grooming   Able To Initiate Tasks;Comb/Brush Hair;Wash/Dry Face;Brush/Clean Teeth;Wash/Dry Hands   Limitation Noted In Safety;Strength   Findings setup w/c level at the sink   Shower/Bathe Self   Type of Assistance Needed Physical assistance   Physical Assistance Level 25% or less   Shower/Bathe Self CARE Score 3   Bathing   Assessed Bath Style Sponge Bath   Anticipated D/C Bath Style Shower;Sponge Bath   Able to Micheal Yehuda No   Able to [employer] Temperature No   Able to Wash/Rinse/Dry (body part) Left Arm;Right Arm;L Upper Leg;R Upper Leg;L Lower Leg/Foot;R Lower Leg/Foot;Chest;Abdomen;Perineal Area   Limitations Noted in Endurance;Problem Solving;ROM;Safety  (THPs)   Positioning Seated;Standing   Adaptive Equipment Longhand Reacher;Longhand Sponge   Upper Body Dressing   Type of Assistance Needed Supervision   Upper Body Dressing CARE Score 4   Lower Body Dressing   Type of Assistance Needed Physical assistance   Physical Assistance Level 26%-50%   Comment reacher   Lower Body Dressing CARE Score 3   Putting On/Taking Off Footwear   Type of Assistance Needed Physical assistance   Physical Assistance Level 25% or less   Comment reacher and sock aide   Putting On/Taking Off Footwear CARE Score 3   Dressing/Undressing Clothing   Remove UB Clothes Pullover Shirt   Don UB Clothes Pullover Shirt   Remove LB Clothes Undergarment;Socks   Don LB Clothes Pants; Undergarment;Socks   Limitations Noted In Balance; Endurance;Problem Solving; Safety;Strength;ROM  (THPs)   Adaptive Equipment Reacher;Sock Aide   Positioning Supported Sit;Standing   Sit to Stand   Type of Assistance Needed Incidental touching   Sit to Stand CARE Score 4   Bed-Chair Transfer   Type of Assistance Needed Incidental touching   Chair/Bed-to-Chair Transfer CARE Score 4   Exercise Tools   Exercise Tools Yes   Other Exercise Tool 1 gripper with pegs L hand following retrieval of pegs from the floor using the reacher in LUE   Other Exercise Tool 2 card match reaching with LUE and holding cards with deck supported on abdomen with RUE   Cognition   Overall Cognitive Status WFL   Orientation Level Oriented X4   Additional Activities   Additional Activities Other (Comment)   Additional Activities Comments fxl mobility with PFRW short distances in room CGA   Other Comments   Assessment Pt participates in 30 minutes concurrent treatment focusing on ADLs and theract to increase strength and activity toelrance with similar goals as another patient   Assessment   Treatment Assessment Pt presents resting the recliner agreeable to OT session including ADLs, transfers/ mobility and LUE therex  Pt toelrates session without complaints and is making gains towards goals however continues to requrie assist and supervision due to decerased balance, safety, endurance and genralized strength/ ROM with NWB RUE and WBAT with THPs RLE  Pt will benefit from continued skilled OT services to increase independence with daily tasks  Problem List Decreased strength;Decreased range of motion;Decreased endurance; Impaired balance;Decreased safety awareness;Orthopedic restrictions;Pain  (NWB RUE and WBAT with THPs RLE)   Plan   Treatment/Interventions ADL retraining;Functional transfer training; Therapeutic exercise; Endurance training;Patient/family training;Equipment eval/education; Compensatory technique education  (NWB RUE and WBAT with THPs RLE)   Progress Progressing toward goals   OT Therapy Minutes OT Time In 0900   OT Time Out 1030   OT Total Time (minutes) 90   OT Mode of treatment - Individual (minutes) 60   OT Mode of treatment - Concurrent (minutes) 30   Therapy Time missed   Time missed?  No

## 2022-07-08 NOTE — PROGRESS NOTES
Physical Medicine and Rehabilitation Progress Note  Tiffanie Vega 67 y o  female MRN: [id number]  Unit/Bed#: -01 Encounter: [id number]    HPI: 67year old female with a PMH of hypertension, cervical radiculopathy, GERD, depression who presented with right hip pain   Patient reported she was walking her dog when she tripped, fell on right side on concrete porch  Wendi Antu revealed Nondisplaced transcervical fracture of the right femur and Distal radial comminuted intra-articular fracture with ulnar styloid fracture  Ortho consulted  Pt is s/p right total hip hemiarthroplasty and s/p closed reduction with splinting right wrist (Right) on 7/2  Pt is weight-bearing as tolerated right lower extremity with hip precautions   Patient should remain nonweightbearing of right wrist   May use platform for walker per Ortho    PT and OT have been consulted and are recommending post-acute rehab services    Patient's case has been reviewed with [facility] medical director, patient meets medical criteria for acute rehab and has demonstrated the ability to tolerate three or more hours of therapy per day      Subjective:  No complaints, mild pain when ambulating    ROS: A 10 point ROS was performed; negative except as noted above         Assessment/Plan:    Orthopedic Disorders:  08 4  Major Multiple Fractures  Etiologic: Closed transcervical fracture of right femur and Distal radial comminuted intra-articular fracture with ulnar styloid fracture  Date of Onset: 7/1   Date of surgery: 7/2/22  acute comprehensive interdisciplinary inpatient rehabilitation to include intensive skilled therapies (PT, OT, ST) as outlined with oversight and management by rehabilitation physician as well as inpatient rehab level nursing, case management and weekly interdisciplinary team meetings       Hypertension      GERD continue PPI     Will put on a bowel regimen          Scheduled Meds:  Current Facility-Administered Medications   Medication Dose Route Frequency Provider Last Rate    acetaminophen  650 mg Oral Q6H PRN Easton Mckeon, REJI      aluminum-magnesium hydroxide-simethicone  30 mL Oral Q6H PRN Easton Mckeon, REJI      amLODIPine  10 mg Oral Daily Easton Mckeon, REJI      ascorbic acid  500 mg Oral BID Easton Mckeon, REJI      docusate sodium  100 mg Oral BID Easton Mckeon, REJI      enoxaparin  40 mg Subcutaneous Daily Easton Mckeon, REJI      ferrous sulfate  325 mg Oral Daily With Breakfast Easton Mckeon PA-C      gabapentin  600 mg Oral HS Easton Mckeon PA-C      loratadine  10 mg Oral Daily Easton Mckeon, REJI      multivitamin-minerals  1 tablet Oral Daily Easton Mckeon, REJI      ondansetron  4 mg Oral Q6H PRN Easton Mckeon, REJI      oxyCODONE  5 mg Oral Q6H PRN Easton Mckeon, REJI      pantoprazole  20 mg Oral BID AC Easton Mckeon PA-C      pravastatin  40 mg Oral Daily Easton Mckeon, REJI      senna  1 tablet Oral HS Easton Mckeon, REJI      temazepam  15 mg Oral HS PRN Easton Mckeon, REJI      venlafaxine  150 mg Oral Daily Easton Mckeon, REJI         Objective:    Functional Update:  Mobility:  Gait pattern Improper Weight shift;Decreased foot clearance;Decreased R stance; Antalgic; Excessively slow; Short stride; Step to   Gait Assistance    (CG)   Additional items Assist x 1;Verbal cues   Assistive Device R platform;Rolling walker   Distance 25 ftx2   Ambulation/Elevation Additional Comments verbal cues for proper hand placement and gait sequence with AD        Transfers:   Sit to Stand    (CG)   Additional items Assist x 1; Increased time required;Verbal cues;Armrests   Stand to Sit    (CG)   Additional items Assist x 1;Bedrails; Increased time required;Verbal cues  (cued for proper hand placement during transfers and to slide RLE forward prior to sitting)   Stand pivot    (CG)   Additional items Assist x 1;Bedrails; Increased time required;Verbal cues  (RW with platform attachment for RUE)   Toilet transfer    (CG)   Additional items Assist x 1; Increased time required;Verbal cues;Standard toilet   Additional Comments CGAx1 and RW with  RUE platform required for clothing management and hygiene needs  ADLs:  Where Assessed    (patient was already bathed this date)   Equipment Provided    (provided instruction and functional demos  in [place] - patient attentive to and interested in using same to promote Indep )   LB Bathing Comments educated in (with demos ) of compensatory techniques Per UE and THR restrictions   LB Dressing Comments educated in (with demos ) of compensatory techniques Per UE and THR restrictions                Physical Exam:  Temp:  [98 1 °F (36 7 °C)-98 2 °F (36 8 °C)] 98 1 °F (36 7 °C)  HR:  [56-58] 58  Resp:  [16-17] 17  BP: (122-132)/(56-80) 122/80  SpO2:  [92 %-97 %] 92 %    General:   alert, no apparent distress, cooperative and comfortable  HEENT:  Head: Normocephalic, no lesions, without obvious abnormality  Eye: Normal external eye, conjunctiva, lidsc cornea  Ears: Normal external ears  Nose: Normal external nose, mucus membranes  CARDIAC:  regular rate and rhythm, S1, S2 normal, no murmur, click, rub or gallop  LUNGS:  no abnormal respiratory pattern, no retractions noted, non-labored breathing   ABDOMEN:  soft, non-tender, non-distended  EXTREMITIES:  extremities normal, warm and well-perfused; no cyanosis, clubbing, or edema has a cast on her right arm  NEURO:  clear speech, following all commands, oriented There are no focal neurological deficits  PSYCH:  Alert and oriented, appropriate affect    INCISION:  C/D/I          Diagnostic Studies:   No orders to display       Laboratory: Labs reviewed  Results from last 7 days   Lab Units 07/07/22 0533 07/06/22  0451 07/05/22  0454   HEMOGLOBIN g/dL 11 0* 11 0* 11 2*   HEMATOCRIT % 35 4 34 1* 36 1   WBC Thousand/uL 8 70 9 64 10 98*     Results from last 7 days   Lab Units 07/07/22 0533 07/05/22  0454 07/04/22  0448 07/03/22  0449 07/02/22  0435   BUN mg/dL 21 21 24   < > 20   SODIUM mmol/L 139 138 137   < > 136   POTASSIUM mmol/L 3 5 4 0 3 9   < > 4 0   CHLORIDE mmol/L 100 101 101   < > 102   CREATININE mg/dL 0 83 0 83 0 85   < > 1 04   AST U/L 49*  --   --   --  35   ALT U/L 44  --   --   --  35    < > = values in this interval not displayed  ** Please Note: Fluency Direct voice to text software may have been used in the creation of this document   ** 6

## 2022-07-08 NOTE — NURSING NOTE
Silver surgical dressing removed, stapled incision cleansed, ABD aplied  Pt tolerated well  Pt maintained NWB status to R wrist  Pt currently visiting w/ virgie at bedside  No s/s of distress  Call bell in reach

## 2022-07-08 NOTE — PROGRESS NOTES
07/08/22 1030   Pain Assessment   Pain Assessment Tool 0-10   Pain Score 5   Pain Location/Orientation Orientation: Right;Location: Hip   Restrictions/Precautions   Precautions Fall Risk;Pain;THR   RUE Weight Bearing Per Order NWB  (may use platform RW for gait and transfers)   RLE Weight Bearing Per Order WBAT   ROM Restrictions   (THPs)   Cognition   Overall Cognitive Status WFL   Orientation Level Oriented X4   Sit to Stand   Type of Assistance Needed Incidental touching   Comment cg   Sit to Stand CARE Score 4   Bed-Chair Transfer   Type of Assistance Needed Incidental touching   Comment cg   Chair/Bed-to-Chair Transfer CARE Score 4   Walk 10 Feet   Type of Assistance Needed Incidental touching;Supervision   Comment cg/S   Walk 10 Feet CARE Score 4   Walk 50 Feet with Two Turns   Type of Assistance Needed Incidental touching;Supervision   Comment cg/S   Walk 50 Feet with Two Turns CARE Score 4   Walk 150 Feet   Type of Assistance Needed Incidental touching;Supervision   Comment cg/S   Walk 150 Feet CARE Score 4   Walking 10 Feet on Uneven Surfaces   Type of Assistance Needed Incidental touching;Supervision   Comment cg/S   Walking 10 Feet on Uneven Surfaces CARE Score 4   Ambulation   Does the patient walk? 2  Yes   Primary Mode of Locomotion Prior to Admission Walk   Distance Walked (feet) 163 ft  (270' 97')   Assist Device Platform;Roller Walker   Gait Pattern Antalgic; Inconsistant Dolores; Slow [place]dlores;Decreased foot clearance   Limitations Noted In Balance; Endurance; Safety;Strength   Provided Assistance with: Balance   Walk Assist Level Contact Guard;Supervision   Findings level and unlevel surfaces   Curb or Single Stair   Style negotiated Single stair   Type of Assistance Needed Incidental touching   Comment cg   1 Step (Curb) CARE Score 4   4 Steps   Type of Assistance Needed Incidental touching   Comment cg   4 Steps CARE Score 4   Stairs   Type Stairs   # of Steps 6   Weight Bearing Precautions WBAT  (RLE)   Assist Devices Single Rail   Findings cg   Therapeutic Interventions   Balance gait and transfer training   Other stair training   Assessment   Treatment Assessment Patient agreeable to therapy session  Pain in R hip at 5/10  Occasional cues needed for general safety as well as THPs  Completed gait and transfer training focusing on sequence and technique for improved balance and safety with functional mobility using platform RW  Patient able to negotiate up and down steps with single rail and cues for safety  PT Barriers   Physical Impairment Decreased strength;Decreased range of motion;Decreased endurance; Impaired balance;Decreased mobility; Decreased safety awareness;Orthopedic restrictions;Pain   Functional Limitation Walking;Transfers;Standing;Stair negotiation   Plan   Treatment/Interventions Functional transfer training;Gait training;Elevations   Progress Progressing toward goals   PT Therapy Minutes   PT Time In 1030   PT Time Out 1058   PT Total Time (minutes) 28   PT Mode of treatment - Individual (minutes) 28   PT Mode of treatment - Concurrent (minutes) 0   PT Mode of treatment - Group (minutes) 0   PT Mode of treatment - Co-treat (minutes) 0   PT Mode of Treatment - Total time(minutes) 28 minutes   PT Cumulative Minutes 243   Therapy Time missed   Time missed?  No

## 2022-07-08 NOTE — NURSING NOTE
Pt ambulated to BR with platform walker NWB to RUE and WBAT to RLE CG  Pt denies pain this morning, states tylenol helped HS  Educated pt of importance of medicating prior to therapy  OT scheduled for 0900, pt declined to get washed this morning states would like to do late this afternoon  Pt slept in bed overnight and went into reclining chair this morning  SCDs in place, items within reach  Will continue to monitor, alarm intact

## 2022-07-08 NOTE — PROGRESS NOTES
07/08/22 0647   Pain Assessment   Pain Assessment Tool 0-10   Pain Score 3   Pain Location/Orientation Orientation: Right;Location: Hip   Pain Onset/Description Descriptor: Aching   Restrictions/Precautions   Precautions Fall Risk;THR   RUE Weight Bearing Per Order NWB  (may use PFRW for transfers and gait)   RLE Weight Bearing Per Order WBAT   ROM Restrictions   (THPs RLE)   Cognition   Overall Cognitive Status WFL   Arousal/Participation Alert; Responsive; Cooperative   Attention Within functional limits   Orientation Level Oriented X4   Memory Within functional limits   Following Commands Follows all commands and directions without difficulty   Sit to Stand   Type of Assistance Needed Incidental touching   Comment cg   Sit to Stand CARE Score 4   Therapeutic Interventions   Strengthening seated, supine, standing ther ex   Balance transfer training   Assessment   Treatment Assessment Patient agreeable to therapy session  Reports "achy" pain in R hip at 3/10  Occasional cues needed for THPs RLE  Completed ther ex for general LE strengthening while maintaining THPs RLE; transfer training focusing on sequence and technique for improved balance and safety with functional transfers  Will see patient later today for more functional mobility training uisng PFRW  PT Barriers   Physical Impairment Decreased strength;Decreased range of motion;Decreased endurance; Impaired balance;Decreased mobility; Decreased safety awareness;Orthopedic restrictions;Pain   Functional Limitation Walking;Transfers;Standing;Stair negotiation   Plan   Treatment/Interventions Functional transfer training;LE strengthening/ROM; Therapeutic exercise   Progress Progressing toward goals   PT Therapy Minutes   PT Time In 0647   PT Time Out 0758   PT Total Time (minutes) 71   PT Mode of treatment - Individual (minutes) 71   PT Mode of treatment - Concurrent (minutes) 0   PT Mode of treatment - Group (minutes) 0   PT Mode of treatment - Co-treat (minutes) 0   PT Mode of Treatment - Total time(minutes) 71 minutes   PT Cumulative Minutes 215   Therapy Time missed   Time missed?  No

## 2022-07-09 PROCEDURE — 97537 COMMUNITY/WORK REINTEGRATION: CPT

## 2022-07-09 PROCEDURE — 97530 THERAPEUTIC ACTIVITIES: CPT

## 2022-07-09 PROCEDURE — 97116 GAIT TRAINING THERAPY: CPT

## 2022-07-09 PROCEDURE — 97110 THERAPEUTIC EXERCISES: CPT

## 2022-07-09 PROCEDURE — 97535 SELF CARE MNGMENT TRAINING: CPT

## 2022-07-09 RX ADMIN — OXYCODONE HYDROCHLORIDE AND ACETAMINOPHEN 500 MG: 500 TABLET ORAL at 08:37

## 2022-07-09 RX ADMIN — PRAVASTATIN SODIUM 40 MG: 40 TABLET ORAL at 08:37

## 2022-07-09 RX ADMIN — GABAPENTIN 600 MG: 600 TABLET, FILM COATED ORAL at 21:02

## 2022-07-09 RX ADMIN — Medication 1 TABLET: at 08:37

## 2022-07-09 RX ADMIN — DOCUSATE SODIUM 100 MG: 100 CAPSULE, LIQUID FILLED ORAL at 17:14

## 2022-07-09 RX ADMIN — OXYCODONE HYDROCHLORIDE 5 MG: 5 TABLET ORAL at 13:07

## 2022-07-09 RX ADMIN — VENLAFAXINE HYDROCHLORIDE 150 MG: 150 CAPSULE, EXTENDED RELEASE ORAL at 08:37

## 2022-07-09 RX ADMIN — FERROUS SULFATE TAB 325 MG (65 MG ELEMENTAL FE) 325 MG: 325 (65 FE) TAB at 08:37

## 2022-07-09 RX ADMIN — PANTOPRAZOLE SODIUM 20 MG: 20 TABLET, DELAYED RELEASE ORAL at 06:00

## 2022-07-09 RX ADMIN — ENOXAPARIN SODIUM 40 MG: 100 INJECTION SUBCUTANEOUS at 21:02

## 2022-07-09 RX ADMIN — DOCUSATE SODIUM 100 MG: 100 CAPSULE, LIQUID FILLED ORAL at 08:37

## 2022-07-09 RX ADMIN — LORATADINE 10 MG: 10 TABLET ORAL at 08:37

## 2022-07-09 RX ADMIN — TEMAZEPAM 15 MG: 15 CAPSULE ORAL at 23:38

## 2022-07-09 RX ADMIN — AMLODIPINE BESYLATE 10 MG: 10 TABLET ORAL at 08:37

## 2022-07-09 RX ADMIN — ACETAMINOPHEN 650 MG: 325 TABLET ORAL at 20:53

## 2022-07-09 RX ADMIN — OXYCODONE HYDROCHLORIDE AND ACETAMINOPHEN 500 MG: 500 TABLET ORAL at 17:14

## 2022-07-09 RX ADMIN — PANTOPRAZOLE SODIUM 20 MG: 20 TABLET, DELAYED RELEASE ORAL at 17:14

## 2022-07-09 NOTE — PROGRESS NOTES
07/09/22 1031   Pain Assessment   Pain Assessment Tool 0-10   Pain Score 2   Pain Location/Orientation Orientation: Right;Location: Hip   Restrictions/Precautions   Precautions Fall Risk;Pain;THR   Weight Bearing Restrictions Yes   RUE Weight Bearing Per Order NWB   RLE Weight Bearing Per Order WBAT   ROM Restrictions   (THPs R LE)   Grooming   Able To Wash/Dry Face;Wash/Dry Hands   Limitation Noted In Safety;Strength   Shower/Bathe Self   Type of Assistance Needed Physical assistance   Physical Assistance Level 25% or less   Comment Up to Min A for B/L LE   Shower/Bathe Self CARE Score 3   Bathing   Assessed Bath Style Sponge Bath   Anticipated D/C Bath Style Shower;Sponge Bath   Able to [place] No   Able to Wash/Rinse/Dry (body part) Left Arm;Right Arm;L Upper Leg;R Upper Leg;Abdomen; Chest;Perineal Area  (Use of long handled sponge to wash distal LE)   Limitations Noted in Balance; Coordination; Endurance;Problem Solving;ROM;Safety;Strength   Positioning Seated;Standing   Adaptive Equipment Longhand Sponge;Longhand Reacher   Upper Body Dressing   Type of Assistance Needed Physical assistance   Physical Assistance Level 25% or less   Comment Up to Min A thread UE through sleeve   Upper Body Dressing CARE Score 3   Lower Body Dressing   Type of Assistance Needed Physical assistance   Physical Assistance Level 26%-50%   Lower Body Dressing CARE Score 3   Putting On/Taking Off Footwear   Type of Assistance Needed Physical assistance   Physical Assistance Level 25% or less   Comment Sock aide to don socks   Putting On/Taking Off Footwear CARE Score 3   Dressing/Undressing Clothing   Remove UB Clothes Pullover Shirt   Don UB Clothes Pullover Shirt   Remove LB Clothes Socks;Pants   Don LB Clothes Pants;Socks   Limitations Noted In Balance; Coordination; Endurance;Problem Solving; Safety;Strength;ROM   Adaptive Equipment Reacher;Sock Aide   Positioning Supported Sit;Standing   Lying to Sitting on Side of Bed   Type of Assistance Needed Physical assistance   Physical Assistance Level 26%-50%   Lying to Sitting on Side of Bed CARE Score 3   Sit to Stand   Type of Assistance Needed Supervision;Verbal cues   Sit to Stand CARE Score 4   Exercise Tools   Exercise Tools Yes   Hand Gripper L hand, 5# digiflex, 30x   Other Exercise Tool 1 L UE all available planes, 2# DB, 3x10   Additional Activities   Additional Activities Other (Comment)   Additional Activities Comments Pt completed functional mobiltiy within Pts room and hallways with use of PFRW, Up to CGA for safety; In seated position, Pt utilized reacher in L UE to obtain pegs from floor level and place in indiacted spaces on table surface; In supported standing position, Pt utilized L UE to match cards on slanted board, Pt with ability to maintain standing position for up to 5 minutes 30 seconds in one trial   Assessment   Treatment Assessment Pt recieved supine and agreeable to participation in Ot tx session  Pt completed UE with up to [address] with up to Min/Mod A; Education reviewed for use of AE to support increased safety and independence  Pt with good recall of THP  Good particiaption in UE resistive exercises and standing tolerence tasks to promote improved endurance and ability to complete ADL routines with increased safety and indepedence  Pt would benefit from continues skilled OT serivices to support increased strength, endurnace, ADL performance, use of AE and safety to help facility safe discharge  Plan   Treatment/Interventions ADL retraining;Functional transfer training; Therapeutic exercise; Endurance training;Equipment eval/education; Compensatory technique education   Progress Progressing toward goals   OT Therapy Minutes   OT Time In 1031   OT Time Out 1203   OT Total Time (minutes) 92   OT Mode of treatment - Individual (minutes) 92

## 2022-07-09 NOTE — NURSING NOTE
Pt awake, ambulated to BR CG with platform walker  Dressing to right hip CDI, no complaints of pain this morning  SCDs intact overnight, turns and repos self during night  NWB to RUE maintains, neurovascularly intact  Voices no concerns  Will continue to monitor and follow plan of care  Bed alarm intact for safety

## 2022-07-09 NOTE — PROGRESS NOTES
07/09/22 0645   Pain Assessment   Pain Assessment Tool 0-10   Pain Score 4   Pain Location/Orientation Orientation: Right;Location: Hip   Restrictions/Precautions   Precautions Fall Risk;Pain;THR   RUE Weight Bearing Per Order NWB   RLE Weight Bearing Per Order WBAT   LLE Weight Bearing Per Order WBAT   ROM Restrictions   (THPs)   Cognition   Overall Cognitive Status WFL   Orientation Level Oriented X4   Sit to Stand   Type of Assistance Needed Supervision   Sit to Stand CARE Score 4   Bed-Chair Transfer   Type of Assistance Needed Supervision   Chair/Bed-to-Chair Transfer CARE Score 4   Walk 10 Feet   Type of Assistance Needed Supervision   Walk 10 Feet CARE Score 4   Walk 50 Feet with Two Turns   Type of Assistance Needed Supervision   Walk 50 Feet with Two Turns CARE Score 4   Walk 150 Feet   Type of Assistance Needed Supervision   Walk 150 Feet CARE Score 4   Walking 10 Feet on Uneven Surfaces   Type of Assistance Needed Supervision   Walking 10 Feet on Uneven Surfaces CARE Score 4   Ambulation   Does the patient walk? 2  Yes   Primary Mode of Locomotion Prior to Admission Walk   Distance Walked (feet) 196 ft  (017' 192')   Assist Device Platform;Roller Walker   Gait Pattern Antalgic; Inconsistant Dolores; Slow Dolores;Decreased foot clearance   Limitations Noted In Balance; Endurance; Safety;Strength   Walk Assist Level Supervision   Findings level and unlevel surfaces   Curb or Single Stair   Style negotiated Single stair   Type of Assistance Needed Incidental touching   Comment    1 Step (Curb) CARE Score 4   4 Steps   Type of Assistance Needed Incidental touching   Comment    4 Steps CARE Score 4   12 Steps   Type of Assistance Needed Incidental touching   Comment    12 Steps CARE Score 4   Stairs   Type Stairs   # of Steps 12   Weight Bearing Precautions WBAT   Assist Devices Single Rail   Findings up steps forward; down steps backwards   Therapeutic Interventions   Strengthening seated, supine, standing the ex   Balance gait and transfer training   Other stair training   Assessment   Treatment Assessment Patient agreeable to therapy session  Pain in R hip 4/10  Trialed patient going down steps backwards with positive results  Good recall of THPs  Completed ther ex for general LE strengthening while maintaining THPs; gait and transfer training focusing on sequence and technique for improved balance and safety with functional mobility using PFRW  Patient able to negotiate up and down steps with single rail and cues for sequence  PT Barriers   Physical Impairment Decreased strength;Decreased range of motion;Decreased mobility; Impaired balance;Decreased endurance;Decreased safety awareness;Orthopedic restrictions;Pain   Functional Limitation Walking;Transfers;Standing;Stair negotiation   Plan   Treatment/Interventions Functional transfer training;LE strengthening/ROM; Elevations; Therapeutic exercise;Gait training   Progress Progressing toward goals   PT Therapy Minutes   PT Time In 0645   PT Time Out 0816   PT Total Time (minutes) 91   PT Mode of treatment - Individual (minutes) 78   PT Mode of treatment - Concurrent (minutes) 13   PT Mode of treatment - Group (minutes) 0   PT Mode of treatment - Co-treat (minutes) 0   PT Mode of Treatment - Total time(minutes) 91 minutes   PT Cumulative Minutes 334   Therapy Time missed   Time missed?  No

## 2022-07-09 NOTE — PROGRESS NOTES
07/09/22 1250   Pain Assessment   Pain Assessment Tool 0-10   Pain Score 4   Pain Location/Orientation Orientation: Right;Location: Hip   Restrictions/Precautions   Precautions Fall Risk;Pain;THR   RUE Weight Bearing Per Order NWB   RLE Weight Bearing Per Order WBAT   LLE Weight Bearing Per Order WBAT   ROM Restrictions   (THPs RLE)   Cognition   Overall Cognitive Status WFL   Orientation Level Oriented X4   Lying to Sitting on Side of Bed   Type of Assistance Needed Physical assistance   Physical Assistance Level 26%-50%   Comment MIN A RLE   Lying to Sitting on Side of Bed CARE Score 3   Sit to Stand   Type of Assistance Needed Supervision   Sit to Stand CARE Score 4   Bed-Chair Transfer   Type of Assistance Needed Supervision   Chair/Bed-to-Chair Transfer CARE Score 4   Walk 10 Feet   Type of Assistance Needed Supervision   Walk 10 Feet CARE Score 4   Walk 50 Feet with Two Turns   Type of Assistance Needed Supervision   Walk 50 Feet with Two Turns CARE Score 4   Ambulation   Does the patient walk? 2  Yes   Primary Mode of Locomotion Prior to Admission Walk   Distance Walked (feet) 97 ft  (97' 95')   Assist Device Platform;Roller Walker   Gait Pattern Antalgic; Inconsistant Dolores; Slow Dolores;Decreased foot clearance   Limitations Noted In Balance; Endurance; Safety;Strength   Walk Assist Level Supervision   Findings level surfaces   Therapeutic Interventions   Strengthening standing hip planes   Balance gait and transfer training   Assessment   Treatment Assessment Patient tolerated therapy session well  Pain in R hip 4/10  Good recall of THPs RLE  Completed ther ex for general LE strengthening while maintaining THPs; gait and transfer training focusing on sequence and technique for improved balance and safety with functional mobility using walker  Plan   Treatment/Interventions Functional transfer training;LE strengthening/ROM; Therapeutic exercise; Bed mobility;Gait training   Progress Progressing toward goals   PT Therapy Minutes   PT Time In 1250   PT Time Out 1330   PT Total Time (minutes) 40   PT Mode of treatment - Individual (minutes) 40   PT Mode of treatment - Concurrent (minutes) 0   PT Mode of treatment - Group (minutes) 0   PT Mode of treatment - Co-treat (minutes) 0   PT Mode of Treatment - Total time(minutes) 40 minutes   PT Cumulative Minutes 374   Therapy Time missed   Time missed?  No

## 2022-07-10 PROCEDURE — 92526 ORAL FUNCTION THERAPY: CPT | Performed by: NURSE PRACTITIONER

## 2022-07-10 RX ADMIN — PANTOPRAZOLE SODIUM 20 MG: 20 TABLET, DELAYED RELEASE ORAL at 08:45

## 2022-07-10 RX ADMIN — ENOXAPARIN SODIUM 40 MG: 100 INJECTION SUBCUTANEOUS at 21:09

## 2022-07-10 RX ADMIN — GABAPENTIN 600 MG: 600 TABLET, FILM COATED ORAL at 21:09

## 2022-07-10 RX ADMIN — LORATADINE 10 MG: 10 TABLET ORAL at 08:45

## 2022-07-10 RX ADMIN — FERROUS SULFATE TAB 325 MG (65 MG ELEMENTAL FE) 325 MG: 325 (65 FE) TAB at 08:44

## 2022-07-10 RX ADMIN — VENLAFAXINE HYDROCHLORIDE 150 MG: 150 CAPSULE, EXTENDED RELEASE ORAL at 08:44

## 2022-07-10 RX ADMIN — OXYCODONE HYDROCHLORIDE AND ACETAMINOPHEN 500 MG: 500 TABLET ORAL at 17:19

## 2022-07-10 RX ADMIN — TEMAZEPAM 15 MG: 15 CAPSULE ORAL at 21:09

## 2022-07-10 RX ADMIN — OXYCODONE HYDROCHLORIDE 5 MG: 5 TABLET ORAL at 21:09

## 2022-07-10 RX ADMIN — PANTOPRAZOLE SODIUM 20 MG: 20 TABLET, DELAYED RELEASE ORAL at 17:19

## 2022-07-10 RX ADMIN — PRAVASTATIN SODIUM 40 MG: 40 TABLET ORAL at 08:44

## 2022-07-10 RX ADMIN — OXYCODONE HYDROCHLORIDE AND ACETAMINOPHEN 500 MG: 500 TABLET ORAL at 08:44

## 2022-07-10 RX ADMIN — AMLODIPINE BESYLATE 10 MG: 10 TABLET ORAL at 08:44

## 2022-07-10 RX ADMIN — Medication 1 TABLET: at 08:44

## 2022-07-10 NOTE — PROGRESS NOTES
[facility]  [facility]      07/10/22 0818   Pain Assessment   Pain Assessment Tool 0-10   Pain Score 2   Pain Location/Orientation Orientation: Right;Location: Hip   Restrictions/Precautions   Precautions Bed/chair alarms; Fall Risk;Pain   Recommendations   Diet Solid Recommendation Regular consistency   Diet Liquid Recommendation Thin liquid   Recommended Form of Meds As desired; As tolerated   General Precautions Upright as possible for all oral intake;Remain upright for 45 mins after meals; Minimize distractions  (reflux precautions)   Compensatory Swallowing Strategies   (small bites/sips, alternate bites/sips)   Eating   Type of Assistance Needed Independent   Physical Assistance Level No physical assistance   Eating CARE Score 6   Swallow Assessment   Swallow Treatment Assessment Pt reports tolerating diet utilizing strategies without any difficulty  She was able to verbally recall all strategies and reports all are helpful  She also reports taking a liquid wash prior to any PO or meds which is helpful is well  No further therapy indicated at this time  Pt tolerating diet with strategies in place without any difficulty or s s of aspiration  Please reconsult as needed   No further therapy warranted at this time during admission or following discharge   SLP Therapy Minutes   SLP Time In 0818   SLP Time Out 0840   SLP Total Time (minutes) 22   SLP Mode of treatment - Individual (minutes) 22   SLP Mode of treatment - Concurrent (minutes) 0   SLP Mode of treatment - Group (minutes) 0   SLP Mode of treatment - Co-treat (minutes) 0   SLP Mode of Treatment - Total time(minutes) 22 minutes   SLP Cumulative Minutes 67

## 2022-07-11 PROCEDURE — 97530 THERAPEUTIC ACTIVITIES: CPT

## 2022-07-11 PROCEDURE — 99232 SBSQ HOSP IP/OBS MODERATE 35: CPT | Performed by: FAMILY MEDICINE

## 2022-07-11 PROCEDURE — 97110 THERAPEUTIC EXERCISES: CPT

## 2022-07-11 PROCEDURE — 97116 GAIT TRAINING THERAPY: CPT

## 2022-07-11 PROCEDURE — 97537 COMMUNITY/WORK REINTEGRATION: CPT

## 2022-07-11 PROCEDURE — 97535 SELF CARE MNGMENT TRAINING: CPT

## 2022-07-11 RX ADMIN — Medication 1 TABLET: at 09:12

## 2022-07-11 RX ADMIN — PANTOPRAZOLE SODIUM 20 MG: 20 TABLET, DELAYED RELEASE ORAL at 17:00

## 2022-07-11 RX ADMIN — AMLODIPINE BESYLATE 10 MG: 10 TABLET ORAL at 09:12

## 2022-07-11 RX ADMIN — DOCUSATE SODIUM 100 MG: 100 CAPSULE, LIQUID FILLED ORAL at 17:08

## 2022-07-11 RX ADMIN — FERROUS SULFATE TAB 325 MG (65 MG ELEMENTAL FE) 325 MG: 325 (65 FE) TAB at 07:50

## 2022-07-11 RX ADMIN — SENNOSIDES 8.6 MG: 8.6 TABLET, FILM COATED ORAL at 21:06

## 2022-07-11 RX ADMIN — OXYCODONE HYDROCHLORIDE 5 MG: 5 TABLET ORAL at 21:06

## 2022-07-11 RX ADMIN — PRAVASTATIN SODIUM 40 MG: 40 TABLET ORAL at 09:12

## 2022-07-11 RX ADMIN — TEMAZEPAM 15 MG: 15 CAPSULE ORAL at 21:06

## 2022-07-11 RX ADMIN — ACETAMINOPHEN 650 MG: 325 TABLET ORAL at 12:11

## 2022-07-11 RX ADMIN — ENOXAPARIN SODIUM 40 MG: 100 INJECTION SUBCUTANEOUS at 21:05

## 2022-07-11 RX ADMIN — PANTOPRAZOLE SODIUM 20 MG: 20 TABLET, DELAYED RELEASE ORAL at 07:50

## 2022-07-11 RX ADMIN — OXYCODONE HYDROCHLORIDE AND ACETAMINOPHEN 500 MG: 500 TABLET ORAL at 09:12

## 2022-07-11 RX ADMIN — OXYCODONE HYDROCHLORIDE 5 MG: 5 TABLET ORAL at 09:12

## 2022-07-11 RX ADMIN — GABAPENTIN 600 MG: 600 TABLET, FILM COATED ORAL at 21:06

## 2022-07-11 RX ADMIN — DOCUSATE SODIUM 100 MG: 100 CAPSULE, LIQUID FILLED ORAL at 09:12

## 2022-07-11 RX ADMIN — LORATADINE 10 MG: 10 TABLET ORAL at 09:12

## 2022-07-11 RX ADMIN — VENLAFAXINE HYDROCHLORIDE 150 MG: 150 CAPSULE, EXTENDED RELEASE ORAL at 09:12

## 2022-07-11 RX ADMIN — OXYCODONE HYDROCHLORIDE AND ACETAMINOPHEN 500 MG: 500 TABLET ORAL at 17:08

## 2022-07-11 NOTE — PCC OCCUPATIONAL THERAPY
7/13/22: Pt's current LOF listed above  Barriers to d/c include decreased strength throughout but especially RLE and RUE, THP's on RLE and NWB on RUE, decreased balance, and decreased activity tolerance; all affect independence in self care and functional transfers  Pt participating in ADL training, therapeutic exercises and activities, functional mobility/transfers, and activity tolerance in order to progress towards  Pt would benefit from continued skilled OT services in order to address listed barriers and prepare for safe d/c

## 2022-07-11 NOTE — PROGRESS NOTES
07/11/22 1003   Pain Assessment   Pain Assessment Tool 0-10   Pain Score No Pain   Restrictions/Precautions   Precautions Fall Risk;THR   RUE Weight Bearing Per Order NWB   RLE Weight Bearing Per Order WBAT   RLE ROM Restriction   (THP)   Grooming   Able To Initiate Tasks; Wash/Dry Face;Wash/Dry Hands   Limitation Noted In Coordination; Safety;Strength   Shower/Bathe Self   Type of Assistance Needed Incidental touching; Adaptive equipment   Comment CG   Shower/Bathe Self CARE Score 4   Bathing   Assessed Bath Style Sponge Bath   Anticipated D/C Bath Style Shower;Sponge Bath   Able to [place] No   Able to Wash/Rinse/Dry (body part) Left Arm;Right Arm;L Upper Leg;R Upper Leg;L Lower Leg/Foot;R Lower Leg/Foot;Chest;Abdomen;Perineal Area; Buttocks   Limitations Noted in Balance; Endurance;ROM;Safety;Strength   Positioning Seated;Standing   Adaptive Equipment Longhand Sponge   Upper Body Dressing   Type of Assistance Needed Supervision   Physical Assistance Level No physical assistance   Upper Body Dressing CARE Score 4   Lower Body Dressing   Type of Assistance Needed Physical assistance; Adaptive equipment   Physical Assistance Level 25% or less   Comment assist to don pants over bilat feet with reacher   Lower Body Dressing CARE Score 3   Putting On/Taking Off Footwear   Type of Assistance Needed Supervision; Adaptive equipment   Physical Assistance Level No physical assistance   Putting On/Taking Off Footwear CARE Score 4   Dressing/Undressing Clothing   Remove UB Clothes Pullover Shirt   Don UB Clothes Pullover Shirt   Remove LB Clothes Pants;Socks   Don LB Clothes Pants;Socks   Limitations Noted In Balance; Endurance; Safety;Strength;ROM   Adaptive Equipment Reacher;Sock Aide   Positioning Sit Edge Of Bed   Lying to Sitting on Side of Bed   Type of Assistance Needed Physical assistance   Physical Assistance Level 25% or less   Comment assist to manipulate RLE   Lying to Sitting on Side of Bed CARE Score 3   Sit to Stand   Type of Assistance Needed Incidental touching   Comment CG   Sit to Stand CARE Score 4   Toileting Hygiene   Type of Assistance Needed Supervision   Physical Assistance Level No physical assistance   Toileting Hygiene CARE Score 4   Toileting   Able to [address] down yes, up yes  Manage Hygiene Bladder   Limitations Noted In Balance;ROM;Safety;LE Strength   Adaptive Equipment Grab Bar   Toilet Transfer   Type of Assistance Needed Incidental touching   Comment CG   Toilet Transfer CARE Score 4   Toilet Transfer   Surface Assessed Standard Toilet   Transfer Technique Standard   Limitations Noted In Balance; Endurance;ROM;Safety;LE Strength   Adaptive Equipment Grab Bar;Walker  (PFRW)   Functional Standing Tolerance   Time 7m35s during popsicle stick activity   Cognition   Overall Cognitive Status WFL   Arousal/Participation Alert; Responsive; Cooperative   Attention Within functional limits   Orientation Level Oriented X4   Memory Within functional limits   Following Commands Follows all commands and directions without difficulty   Assessment   Treatment Assessment Pt agreeable to OT session this AM  Received lying supine in bed  ADL session completed; current LOF and details listed in respective sections  Pt is overall Armando LB dressing, CG toilet transfer and bathing, supervision UB dressing and grooming; functional transfers overall Armando/CG  Pt used AE for LB tasks effectively, requires occasional physical assist to manipulate reacher and will require more practice to most effectively use  After ADL, completed standing tolerance exercise with good tolerance and dynamic standing balance  Pt maintained NWB RLE and hip precautions well thorughout session without need for cues from OT  Pt is motivated to return home and is an active and eager therapy participant   Pt would benefit from continued skilled OT services in order to maximize independence in self care, functional mobility/transfers, and activity tolerance  Prognosis Good   Problem List Decreased strength;Decreased range of motion;Decreased endurance; Impaired balance;Decreased safety awareness;Orthopedic restrictions   Plan   Treatment/Interventions ADL retraining;Functional transfer training;LE strengthening/ROM; Therapeutic exercise; Endurance training;Patient/family training;Equipment eval/education; Compensatory technique education;OT   Progress Progressing toward goals   OT Therapy Minutes   OT Time In 1003   OT Time Out 1059   OT Total Time (minutes) 56   OT Mode of treatment - Individual (minutes) 56

## 2022-07-11 NOTE — PLAN OF CARE

## 2022-07-11 NOTE — PROGRESS NOTES
07/11/22 1400   Pain Assessment   Pain Assessment Tool 0-10   Pain Score 4   Pain Location/Orientation Orientation: Right;Location: Hip   Restrictions/Precautions   Precautions Fall Risk;Pain;THR   RUE Weight Bearing Per Order NWB   RLE Weight Bearing Per Order WBAT   RLE ROM Restriction   (THPs)   Cognition   Overall Cognitive Status WFL   Orientation Level Oriented X4   Sit to Stand   Type of Assistance Needed Supervision   Comment S/MOD I   Sit to Stand CARE Score 4   Bed-Chair Transfer   Type of Assistance Needed Supervision   Comment S/MOD I   Chair/Bed-to-Chair Transfer CARE Score 4   Walk 10 Feet   Type of Assistance Needed Supervision   Comment S/MOD I   Walk 10 Feet CARE Score 4   Walk 50 Feet with Two Turns   Type of Assistance Needed Supervision   Comment S/MOD I   Walk 50 Feet with Two Turns CARE Score 4   Walk 150 Feet   Type of Assistance Needed Supervision   Comment S/MOD I   Walk 150 Feet CARE Score 4   Ambulation   Does the patient walk? 2  Yes   Primary Mode of Locomotion Prior to Admission Walk   Distance Walked (feet) 198 ft   Assist Device Platform;Roller Walker   Gait Pattern Antalgic; Inconsistant Dolores; Slow Dolores;Decreased foot clearance   Limitations Noted In Safety; Endurance;Strength   Walk Assist Level Supervision   Findings level surfaces   Toilet Transfer   Type of Assistance Needed Supervision   Comment S/MOD I   Toilet Transfer CARE Score 4   Therapeutic Interventions   Strengthening seated ther ex   Balance gait and transfer training   Assessment   Treatment Assessment Patient agreeable to therapy session  Pain 4/10 in R hip  Good recall of THPs RLE  Completed ther ex for general LE strengthening; gait and transfer training focusing on sequence and technique for improved balance and safety with functional mobility using walker  PT Barriers   Physical Impairment Decreased strength;Decreased range of motion;Decreased endurance;Decreased mobility; Decreased safety awareness;Orthopedic restrictions;Pain   Functional Limitation Walking;Transfers;Standing;Stair negotiation   Plan   Treatment/Interventions Functional transfer training;LE strengthening/ROM; Therapeutic exercise;Gait training   Progress Progressing toward goals   PT Therapy Minutes   PT Time In 1400   PT Time Out 1430   PT Total Time (minutes) 30   PT Mode of treatment - Individual (minutes) 30   PT Mode of treatment - Concurrent (minutes) 0   PT Mode of treatment - Group (minutes) 0   PT Mode of treatment - Co-treat (minutes) 0   PT Mode of Treatment - Total time(minutes) 30 minutes   PT Cumulative Minutes 461   Therapy Time missed   Time missed?  No

## 2022-07-11 NOTE — PROGRESS NOTES
07/11/22 1300   Pain Assessment   Pain Assessment Tool 0-10   Pain Score No Pain   Restrictions/Precautions   Precautions Fall Risk;THR   RUE Weight Bearing Per Order NWB   RLE Weight Bearing Per Order WBAT   RLE ROM Restriction   (THP)   Sit to Stand   Type of Assistance Needed Supervision   Physical Assistance Level No physical assistance   Sit to Stand CARE Score 4   Therapeutic Exercise - ROM   UE-ROM Yes   ROM- Right Upper Extremities   RUE ROM Comment towel slides x20 all planes of motion, AROM with LUE and PROM RUE to maintain NWB status   ROM - Left Upper Extremities    LUE ROM Comment towel slides x20 all planes of motion, AROM with LUE and PROM RUE to maintain NWB status   Therapeutic Excerise-Strength   UE Strength Yes   Left Upper Extremity-Strength   LUE Strength Comment 2x15 using 1# dumbbell: elbow flexion/extension, protraction/retraction, internal/external rotation, pronation/supination, shoulder flexion/extension, shoulder press   Coordination   Fine Motor matched and folded socks with bilat hands, pt able to use thumb and second digit on R hand to pinch and help fold, reported mild increase in R wrist pain that subsided at end of exercise   Cognition   Overall Cognitive Status [facility]   Arousal/Participation Alert; Responsive; Cooperative   Attention Within functional limits   Orientation Level Oriented X4   Memory Within functional limits   Following Commands Follows all commands and directions without difficulty   Assessment   Treatment Assessment Pt agreeable to OT sudeep this PM  Received sitting in recliner chair  Functional mobility to OT room completed with PFRM and supervision, then completed ROM/strength and fine motor activities with good tolerance and rest breaks taken as needed  Pt reported mild pain in R wrist but no pain in R hip, reported took pain medicine prior to session  Pt continues to be an active participant in therapy and is motivated to return home   Functional mobility to PT room completed at end of session  Pt would benefit from continued skilled OT services in order to maximize functional mobility/transfers, ROM/strength, and activity tolerance  Prognosis Good   Problem List Decreased strength;Decreased range of motion;Decreased endurance; Impaired balance;Decreased safety awareness;Orthopedic restrictions   Plan   Treatment/Interventions ADL retraining;Functional transfer training;LE strengthening/ROM; Therapeutic exercise; Endurance training;Patient/family training;Equipment eval/education; Compensatory technique education;OT   Progress Progressing toward goals   OT Therapy Minutes   OT Time In 1300   OT Time Out 1358   OT Total Time (minutes) 58   OT Mode of treatment - Individual (minutes) 20   OT Mode of treatment - Concurrent (minutes) 38

## 2022-07-11 NOTE — PROGRESS NOTES
07/11/22 1103   Pain Assessment   Pain Assessment Tool 0-10   Pain Score No Pain   Restrictions/Precautions   Precautions Fall Risk;Pain;THR   RUE Weight Bearing Per Order NWB   RLE Weight Bearing Per Order WBAT   LLE Weight Bearing Per Order WBAT   ROM Restrictions   (THPs)   Cognition   Overall Cognitive Status WFL   Orientation Level Oriented X4   Sit to Stand   Type of Assistance Needed Supervision   Comment S/MOD I   Sit to Stand CARE Score 4   Bed-Chair Transfer   Type of Assistance Needed Supervision   Comment S/MOD I   Chair/Bed-to-Chair Transfer CARE Score 4   Walk 10 Feet   Type of Assistance Needed Supervision   Comment S/MOD I   Walk 10 Feet CARE Score 4   Walk 50 Feet with Two Turns   Type of Assistance Needed Supervision   Comment S/MOD I   Walk 50 Feet with Two Turns CARE Score 4   Walk 150 Feet   Type of Assistance Needed Supervision   Comment S/MOD I   Walk 150 Feet CARE Score 4   Walking 10 Feet on Uneven Surfaces   Type of Assistance Needed Supervision   Walking 10 Feet on Uneven Surfaces CARE Score 4   Ambulation   Does the patient walk? 2  Yes   Primary Mode of Locomotion Prior to Admission Walk   Distance Walked (feet) 255 ft  (951' 157' 98')   Assist Device Platform;Roller Walker   Gait Pattern Antalgic; Inconsistant Dolores; Slow Dolores;Decreased foot clearance   Limitations Noted In Endurance;Balance; Safety;Strength   Walk Assist Level Supervision;Modified Independent   Findings S/MOD I level; S unlevel surfaces   Curb or Single Stair   Style negotiated Single stair   Type of Assistance Needed Supervision;Verbal cues   1 Step (Curb) CARE Score 4   4 Steps   Type of Assistance Needed Supervision;Verbal cues   4 Steps CARE Score 4   12 Steps   Type of Assistance Needed Supervision;Verbal cues   12 Steps CARE Score 4   Stairs   Type Stairs   # of Steps 12   Weight Bearing Precautions WBAT   Assist Devices Single Rail   Findings S going up forward and down backwards   Therapeutic Interventions Strengthening standing ther ex   Balance gait and transfer training   Other stair training   Assessment   Treatment Assessment Patient agreeable to therapy session  No pain reported  Good recall of THPs  Trialed patient's bed mobility using leg  with positive results  Completed ther ex for general LE strengthening while maintaining THPs; gait and transfer training focusing on sequence and technique for improved balance and safety with functional mobility using PFRW  Patient able to negotiate up and down steps with supervision going up forward and down backwards  PT Barriers   Physical Impairment Decreased strength;Decreased range of motion;Decreased endurance; Impaired balance;Decreased mobility; Decreased safety awareness;Orthopedic restrictions;Pain   Functional Limitation Walking;Transfers;Standing;Stair negotiation   Plan   Treatment/Interventions Functional transfer training;LE strengthening/ROM; Elevations; Therapeutic exercise; Bed mobility;Gait training   Progress Progressing toward goals   PT Therapy Minutes   PT Time In 1103   PT Time Out 1200   PT Total Time (minutes) 57   PT Mode of treatment - Individual (minutes) 57   PT Mode of treatment - Concurrent (minutes) 0   PT Mode of treatment - Group (minutes) 0   PT Mode of treatment - Co-treat (minutes) 0   PT Mode of Treatment - Total time(minutes) 57 minutes   PT Cumulative Minutes 431   Therapy Time missed   Time missed?  No

## 2022-07-11 NOTE — PROGRESS NOTES
Physical Medicine and Rehabilitation Progress Note  Chema Vega 67 y o  female MRN: [id number]  Unit/Bed#: [facility] 219-01 Encounter: [id number]    HPI: 67year old female with a PMH of hypertension, cervical radiculopathy, GERD, depression who presented with right hip pain   Patient reported she was walking her dog when she tripped, fell on right side on concrete porch  Alysa Morgans revealed Nondisplaced transcervical fracture of the right femur and Distal radial comminuted intra-articular fracture with ulnar styloid fracture  Ortho consulted  Pt is s/p right total hip hemiarthroplasty and s/p closed reduction with splinting right wrist (Right) on 7/2  Pt is weight-bearing as tolerated right lower extremity with hip precautions   Patient should remain nonweightbearing of right wrist   May use platform for walker per Ortho    PT and OT have been consulted and are recommending post-acute rehab services    Patient's case has been reviewed with [facility] medical director, patient meets medical criteria for acute rehab and has demonstrated the ability to tolerate three or more hours of therapy per day      Subjective:  No complaints, mild pain when ambulating feels she is making progress with therapy    ROS: A 10 point ROS was performed; negative except as noted above         Assessment/Plan:    Orthopedic Disorders:  08 4  Major Multiple Fractures  Etiologic: Closed transcervical fracture of right femur and Distal radial comminuted intra-articular fracture with ulnar styloid fracture  Date of Onset: 7/1   Date of surgery: 7/2/22  acute comprehensive interdisciplinary inpatient rehabilitation to include intensive skilled therapies (PT, OT, ST) as outlined with oversight and management by rehabilitation physician as well as inpatient rehab level nursing, case management and weekly interdisciplinary team meetings       Hypertension      GERD continue PPI     Will put on a bowel regimen          Scheduled Meds:  Current Facility-Administered Medications   Medication Dose Route Frequency Provider Last Rate    acetaminophen  650 mg Oral Q6H PRN Mohsen Primus, PA-C      aluminum-magnesium hydroxide-simethicone  30 mL Oral Q6H PRN Mohsen Primus, PA-C      amLODIPine  10 mg Oral Daily Mohsen Primus, PA-C      ascorbic acid  500 mg Oral BID Mohsen Primus, PA-C      docusate sodium  100 mg Oral BID Mohsen Primus, PA-C      enoxaparin  40 mg Subcutaneous Daily Mohsen Primus, PA-C      ferrous sulfate  325 mg Oral Daily With Breakfast Mohsen Primus, PA-C      gabapentin  600 mg Oral HS Mohsen Primus, PA-C      loratadine  10 mg Oral Daily Mohsen Primus, PA-C      multivitamin-minerals  1 tablet Oral Daily Mohsen Primus, PA-C      ondansetron  4 mg Oral Q6H PRN Mohsen Primus, PA-C      oxyCODONE  5 mg Oral Q6H PRN Mohsen Primus, PA-C      pantoprazole  20 mg Oral BID AC Mohsen Primus, PA-C      pravastatin  40 mg Oral Daily Mohsen Primus, PA-C      senna  1 tablet Oral HS Mohsen Primus, PA-C      temazepam  15 mg Oral HS PRN Mohsen Primus, PA-C      venlafaxine  150 mg Oral Daily Mohsen Primus, PA-C         Objective:    Functional Update:  Mobility:  Gait pattern Improper Weight shift;Decreased foot clearance;Decreased R stance; Antalgic; Excessively slow; Short stride; Step to   Gait Assistance    (CG)   Additional items Assist x 1;Verbal cues   Assistive Device R platform;Rolling walker   Distance 25 ftx2   Ambulation/Elevation Additional Comments verbal cues for proper hand placement and gait sequence with AD        Transfers:   Sit to Stand    (CG)   Additional items Assist x 1; Increased time required;Verbal cues;Armrests   Stand to Sit    (CG)   Additional items Assist x 1;Bedrails; Increased time required;Verbal cues  (cued for proper hand placement during transfers and to slide RLE forward prior to sitting)   Stand pivot    (CG)   Additional items Assist x 1;Bedrails; Increased time required;Verbal cues  (RW with platform attachment for RUE)   Toilet transfer    (CG)   Additional items Assist x 1; Increased time required;Verbal cues;Standard toilet   Additional Comments CGAx1 and RW with  RUE platform required for clothing management and hygiene needs  ADLs:  Where Assessed    (patient was already bathed this date)   Equipment Provided    (provided instruction and functional demos  in [place] - patient attentive to and interested in using same to promote Indep )   LB Bathing Comments educated in (with demos ) of compensatory techniques Per UE and THR restrictions   LB Dressing Comments educated in (with demos ) of compensatory techniques Per UE and THR restrictions                Physical Exam:  Temp:  [97 4 °F (36 3 °C)-98 7 °F (37 1 °C)] 98 7 °F (37 1 °C)  HR:  [80-89] 80  Resp:  [18] 18  BP: (130-139)/(69-80) 139/79  SpO2:  [96 %-99 %] 99 %    General:   alert, no apparent distress, cooperative and comfortable  HEENT:  Head: Normocephalic, no lesions, without obvious abnormality  Eye: Normal external eye, conjunctiva, lidsc cornea  Ears: Normal external ears  Nose: Normal external nose, mucus membranes  CARDIAC:  regular rate and rhythm, S1, S2 normal, no murmur, click, rub or gallop  LUNGS:  no abnormal respiratory pattern, no retractions noted, non-labored breathing   ABDOMEN:  soft, non-tender, non-distended  EXTREMITIES:  extremities normal, warm and well-perfused; no cyanosis, clubbing, or edema has a cast on her right arm  NEURO:  clear speech, following all commands, oriented There are no focal neurological deficits  PSYCH:  Alert and oriented, appropriate affect    INCISION:  C/D/I          Diagnostic Studies:   No orders to display       Laboratory: Labs reviewed  Results from last 7 days   Lab Units 07/07/22  0533 07/06/22  0451 07/05/22  0454   HEMOGLOBIN g/dL 11 0* 11 0* 11 2*   HEMATOCRIT % 35 4 34 1* 36 1   WBC [place]ousand/uL 8 70 9 64 10 98* Results from last 7 days   Lab Units 07/07/22  0533 07/05/22  0454   BUN mg/dL 21 21   SODIUM mmol/L 139 138   POTASSIUM mmol/L 3 5 4 0   CHLORIDE mmol/L 100 101   CREATININE mg/dL 0 83 0 83   AST U/L 49*  --    ALT U/L 44  --             ** Please Note: Fluency Direct voice to text software may have been used in the creation of this document   **

## 2022-07-12 PROCEDURE — 99232 SBSQ HOSP IP/OBS MODERATE 35: CPT | Performed by: FAMILY MEDICINE

## 2022-07-12 PROCEDURE — 97530 THERAPEUTIC ACTIVITIES: CPT

## 2022-07-12 PROCEDURE — 97116 GAIT TRAINING THERAPY: CPT

## 2022-07-12 PROCEDURE — 97535 SELF CARE MNGMENT TRAINING: CPT

## 2022-07-12 PROCEDURE — 97110 THERAPEUTIC EXERCISES: CPT

## 2022-07-12 PROCEDURE — 97537 COMMUNITY/WORK REINTEGRATION: CPT

## 2022-07-12 RX ADMIN — PANTOPRAZOLE SODIUM 20 MG: 20 TABLET, DELAYED RELEASE ORAL at 16:54

## 2022-07-12 RX ADMIN — OXYCODONE HYDROCHLORIDE 5 MG: 5 TABLET ORAL at 19:31

## 2022-07-12 RX ADMIN — VENLAFAXINE HYDROCHLORIDE 150 MG: 150 CAPSULE, EXTENDED RELEASE ORAL at 08:18

## 2022-07-12 RX ADMIN — PANTOPRAZOLE SODIUM 20 MG: 20 TABLET, DELAYED RELEASE ORAL at 06:02

## 2022-07-12 RX ADMIN — FERROUS SULFATE TAB 325 MG (65 MG ELEMENTAL FE) 325 MG: 325 (65 FE) TAB at 08:18

## 2022-07-12 RX ADMIN — OXYCODONE HYDROCHLORIDE AND ACETAMINOPHEN 500 MG: 500 TABLET ORAL at 17:17

## 2022-07-12 RX ADMIN — DOCUSATE SODIUM 100 MG: 100 CAPSULE, LIQUID FILLED ORAL at 08:18

## 2022-07-12 RX ADMIN — TEMAZEPAM 15 MG: 15 CAPSULE ORAL at 21:17

## 2022-07-12 RX ADMIN — Medication 1 TABLET: at 08:18

## 2022-07-12 RX ADMIN — OXYCODONE HYDROCHLORIDE 5 MG: 5 TABLET ORAL at 07:38

## 2022-07-12 RX ADMIN — DOCUSATE SODIUM 100 MG: 100 CAPSULE, LIQUID FILLED ORAL at 17:17

## 2022-07-12 RX ADMIN — GABAPENTIN 600 MG: 600 TABLET, FILM COATED ORAL at 21:17

## 2022-07-12 RX ADMIN — LORATADINE 10 MG: 10 TABLET ORAL at 08:18

## 2022-07-12 RX ADMIN — ENOXAPARIN SODIUM 40 MG: 100 INJECTION SUBCUTANEOUS at 21:18

## 2022-07-12 RX ADMIN — AMLODIPINE BESYLATE 10 MG: 10 TABLET ORAL at 08:18

## 2022-07-12 RX ADMIN — PRAVASTATIN SODIUM 40 MG: 40 TABLET ORAL at 08:18

## 2022-07-12 RX ADMIN — OXYCODONE HYDROCHLORIDE AND ACETAMINOPHEN 500 MG: 500 TABLET ORAL at 08:18

## 2022-07-12 NOTE — PROGRESS NOTES
07/12/22 0653   Pain Assessment   Pain Assessment Tool 0-10   Pain Score 6   Pain Location/Orientation Orientation: Right;Location: Hip   Restrictions/Precautions   Precautions Fall Risk;Pain;THR   RUE Weight Bearing Per Order NWB  (can weight bear through elbow using PFRW)   RLE Weight Bearing Per Order WBAT   LLE Weight Bearing Per Order WBAT   RLE ROM Restriction   (THP)   Cognition   Overall Cognitive Status WFL   Arousal/Participation Alert; Responsive; Cooperative   Attention Within functional limits   Orientation Level Oriented X4   Memory Within functional limits   Following Commands Follows all commands and directions without difficulty   Lying to Sitting on Side of Bed   Type of Assistance Needed Supervision   Comment with leg    Lying to Sitting on Side of Bed CARE Score 4   Sit to Stand   Type of Assistance Needed Independent   Sit to Stand CARE Score 6   Bed-Chair Transfer   Type of Assistance Needed Independent   Chair/Bed-to-Chair Transfer CARE Score 6   Walk 10 Feet   Type of Assistance Needed Supervision   Comment S/MOD I   Walk 10 Feet CARE Score 4   Walk 50 Feet with Two Turns   Type of Assistance Needed Supervision   Comment S/MOD I   Walk 50 Feet with Two Turns CARE Score 4   Walk 150 Feet   Type of Assistance Needed Supervision   Comment S/MOD I   Walk 150 Feet CARE Score 4   Walking 10 Feet on Uneven Surfaces   Type of Assistance Needed Supervision   Walking 10 Feet on Uneven Surfaces CARE Score 4   Ambulation   Does the patient walk? 2  Yes   Primary Mode of Locomotion Prior to Admission Walk   Distance Walked (feet) 257 ft  (257' 97')   Assist Device Platform;Roller Walker   Gait Pattern Antalgic; Inconsistant Dolores; Slow Dolores;Decreased foot clearance   Limitations Noted In Endurance;Strength   Walk Assist Level Supervision;Modified Independent   Findings level surfaces and unlevel surfaces   Toilet Transfer   Type of Assistance Needed Independent   Toilet Transfer CARE Score 6 Therapeutic Interventions   Strengthening supine and seated ther ex   Balance gait and transfer training   Assessment   Family/Caregiver Present Patient agreeable to therapy session  Pain 6/10 R hip  Patient reports leg  is working good to get in and out of bed  Good recall of THPs  Completed ther ex for general LE strengthening; gait and transfer training focusing on sequence, technique and maintaining THPs for improved balance and safety with functional mobility using walker  PT Barriers   Physical Impairment Decreased strength;Decreased range of motion;Decreased endurance;Decreased mobility;Orthopedic restrictions;Pain   Functional Limitation Walking;Standing;Transfers;Stair negotiation   Plan   Treatment/Interventions Functional transfer training;LE strengthening/ROM; Therapeutic exercise; Bed mobility;Gait training   Progress Progressing toward goals   PT Therapy Minutes   PT Time In 0653   PT Time Out 0758   PT Total Time (minutes) 65   PT Mode of treatment - Individual (minutes) 65   PT Mode of treatment - Concurrent (minutes) 0   PT Mode of treatment - Group (minutes) 0   PT Mode of treatment - Co-treat (minutes) 0   PT Mode of Treatment - Total time(minutes) 65 minutes   PT Cumulative Minutes 526   Therapy Time missed   Time missed?  No

## 2022-07-12 NOTE — PROGRESS NOTES
Physical Medicine and Rehabilitation Progress Note  Miguel Vega 67 y o  female MRN: [id number]  Unit/Bed#: -01 Encounter: [id number]    HPI: 67year old female with a PMH of hypertension, cervical radiculopathy, GERD, depression who presented with right hip pain   Patient reported she was walking her dog when she tripped, fell on right side on concrete porch  Johanna Schrader revealed Nondisplaced transcervical fracture of the right femur and Distal radial comminuted intra-articular fracture with ulnar styloid fracture  Ortho consulted  Pt is s/p right total hip hemiarthroplasty and s/p closed reduction with splinting right wrist (Right) on 7/2  Pt is weight-bearing as tolerated right lower extremity with hip precautions   Patient should remain nonweightbearing of right wrist   May use platform for walker per Ortho    PT and OT have been consulted and are recommending post-acute rehab services    Patient's case has been reviewed with [facility] medical director, patient meets medical criteria for acute rehab and has demonstrated the ability to tolerate three or more hours of therapy per day      Subjective:  No complaints, mild pain when ambulating feels she is making progress with therapy    ROS: A 10 point ROS was performed; negative except as noted above         Assessment/Plan:    Orthopedic Disorders:  08 4  Major Multiple Fractures  Etiologic: Closed transcervical fracture of right femur and Distal radial comminuted intra-articular fracture with ulnar styloid fracture  Date of Onset: 7/1   Date of surgery: 7/2/22  acute comprehensive interdisciplinary inpatient rehabilitation to include intensive skilled therapies (PT, OT, ST) as outlined with oversight and management by rehabilitation physician as well as inpatient rehab level nursing, case management and weekly interdisciplinary team meetings       Hypertension      GERD continue PPI     Will put on a bowel regimen          Scheduled Meds:  Current Facility-Administered Medications   Medication Dose Route Frequency Provider Last Rate    acetaminophen  650 mg Oral Q6H PRN Easton Mckeon, REJI      aluminum-magnesium hydroxide-simethicone  30 mL Oral Q6H PRN Easton Mckeon, REJI      amLODIPine  10 mg Oral Daily Easton Mckeon, REJI      ascorbic acid  500 mg Oral BID Easton Mckeon, REJI      docusate sodium  100 mg Oral BID Easton Mckeon, REJI      enoxaparin  40 mg Subcutaneous Daily Easton Mckeon, REJI      ferrous sulfate  325 mg Oral Daily With Breakfast Easton Mckeon PA-C      gabapentin  600 mg Oral HS Easton Mckeon PA-C      loratadine  10 mg Oral Daily Easton Mckeon, REJI      multivitamin-minerals  1 tablet Oral Daily Easton Mckeon PA-C      ondansetron  4 mg Oral Q6H PRN Easton Mckeon, REJI      oxyCODONE  5 mg Oral Q6H PRN Easton Mckeon, REJI      pantoprazole  20 mg Oral BID AC Easton Mckeon PA-C      pravastatin  40 mg Oral Daily Easton Mckeon PA-C      senna  1 tablet Oral HS Easton Mckeon PA-C      temazepam  15 mg Oral HS PRN Easton Mckeon, REJI      venlafaxine  150 mg Oral Daily Easton Mckeon PA-C         Objective:    Functional Update:  Mobility:  Gait pattern Improper Weight shift;Decreased foot clearance;Decreased R stance; Antalgic; Excessively slow; Short stride; Step to   Gait Assistance    (CG)   Additional items Assist x 1;Verbal cues   Assistive Device R platform;Rolling walker   Distance 25 ftx2   Ambulation/Elevation Additional Comments verbal cues for proper hand placement and gait sequence with AD        Transfers:   Sit to Stand    (CG)   Additional items Assist x 1; Increased time required;Verbal cues;Armrests   Stand to Sit    (CG)   Additional items Assist x 1;Bedrails; Increased time required;Verbal cues  (cued for proper hand placement during transfers and to slide RLE forward prior to sitting)   Stand pivot    (CG)   Additional items Assist x 1;Bedrails; Increased time required;Verbal cues  (RW with platform attachment for RUE)   Toilet transfer    (CG)   Additional items Assist x 1; Increased time required;Verbal cues;Standard toilet   Additional Comments CGAx1 and RW with  RUE platform required for clothing management and hygiene needs  ADLs:  Where Assessed    (patient was already bathed this date)   Equipment Provided    (provided instruction and functional demos  in [place] - patient attentive to and interested in using same to promote Indep )   LB Bathing Comments educated in (with demos ) of compensatory techniques Per UE and THR restrictions   LB Dressing Comments educated in (with demos ) of compensatory techniques Per UE and THR restrictions                Physical Exam:  Temp:  [97 3 °F (36 3 °C)-98 4 °F (36 9 °C)] 97 3 °F (36 3 °C)  HR:  [75-85] 75  Resp:  [17-18] 18  BP: (122-125)/(58-59) 122/58  SpO2:  [93 %-94 %] 94 %    General:   alert, no apparent distress, cooperative and comfortable  HEENT:  Head: Normocephalic, no lesions, without obvious abnormality  Eye: Normal external eye, conjunctiva, lidsc cornea  Ears: Normal external ears  Nose: Normal external nose, mucus membranes  CARDIAC:  regular rate and rhythm, S1, S2 normal, no murmur, click, rub or gallop  LUNGS:  no abnormal respiratory pattern, no retractions noted, non-labored breathing   ABDOMEN:  soft, non-tender, non-distended  EXTREMITIES:  extremities normal, warm and well-perfused; no cyanosis, clubbing, or edema has a cast on her right arm  NEURO:  clear speech, following all commands, oriented There are no focal neurological deficits  PSYCH:  Alert and oriented, appropriate affect    INCISION:  C/D/I          Diagnostic Studies:   No orders to display       Laboratory: Labs reviewed  Results from last 7 days   Lab Units 07/07/22  0533 07/06/22  0451   HEMOGLOBIN g/dL 11 0* 11 0*   HEMATOCRIT % 35 4 34 1*   WBC [place]/uL 8 70 9 64     Results from last 7 days   Lab Units 07/07/22  0533   BUN mg/dL 21   SODIUM mmol/L 139   POTASSIUM mmol/L 3 5   CHLORIDE mmol/L 100   CREATININE mg/dL 0 83   AST U/L 49*   ALT U/L 44            ** Please Note: Fluency Direct voice to text software may have been used in the creation of this document   **

## 2022-07-12 NOTE — PROGRESS NOTES
07/12/22 0900   Pain Assessment   Pain Assessment Tool 0-10   Pain Score 3   Pain Location/Orientation Orientation: Right;Location: Hip   Restrictions/Precautions   Precautions Fall Risk;THR   RUE Weight Bearing Per Order NWB   RLE Weight Bearing Per Order WBAT   Grooming   Able To Initiate Tasks; Wash/Dry Face   Limitation Noted In Coordination; Safety;Strength   Shower/Bathe Self   Type of Assistance Needed Supervision; Adaptive equipment   Physical Assistance Level No physical assistance   Comment pt reported able to wash LUE with RUE covered in plastic bag, OT offered a more thorough wash and pt agreeable   Shower/Bathe Self CARE Score 4   Bathing   Assessed Bath Style Shower   Anticipated D/C Bath Style Shower;Sponge Bath   Able to [place] No   Able to [employer] Temperature Yes   Able to Wash/Rinse/Dry (body part) Left Arm;Right Arm;L Upper Leg;R Upper Leg;L Lower Leg/Foot;R Lower Leg/Foot;Chest;Abdomen;Perineal Area; Buttocks   Limitations Noted in Balance; Endurance;ROM;Safety;Strength   Positioning Seated;Standing   Adaptive Equipment Longhand Sponge   Tub/Shower Transfer   Limitations Noted In Balance; Endurance;ROM;Safety;LE Strength   Adaptive Equipment Grab Bars;Transfer Bench   Assessed Tub/shower combo   Findings Armando to lift RLE into tub   Upper Body Dressing   Type of Assistance Needed Set-up / clean-up   Physical Assistance Level No physical assistance   Upper Body Dressing CARE Score 5   Lower Body Dressing   Type of Assistance Needed Adaptive equipment;Physical assistance   Physical Assistance Level 25% or less   Comment assist to pull pant legs over bilat feet   Lower Body Dressing CARE Score 3   Putting On/Taking Off Footwear   Type of Assistance Needed Supervision; Adaptive equipment   Physical Assistance Level No physical assistance   Putting On/Taking Off Footwear CARE Score 4   Dressing/Undressing Clothing   Remove UB Clothes Pullover Shirt   Don UB Clothes Pullover Shirt   Remove LB Clothes Pants;Socks   Don LB Clothes Pants;Socks   Limitations Noted In Balance; Endurance; Safety;ROM;Strength   Adaptive Equipment Reacher;Sock Aide   Positioning Supported Sit   Sit to Stand   Type of Assistance Needed Independent   Physical Assistance Level No physical assistance   Sit to Stand CARE Score 6   Coordination   Fine Motor tied and untied knots in black tubing, L hand primarily used but R hand pinched tube, pt reported mild pain in R wrist   Cognition   Overall Cognitive Status [facility]   Arousal/Participation Alert; Responsive; Cooperative   Attention Within functional limits   Orientation Level Oriented X4   Memory Within functional limits   Following Commands Follows all commands and directions without difficulty   Additional Activities   Additional Activities Comments activity tolerance: games of Qeexo with OT, pt held cards with R hand and played game with L hand and reported no increase in R wrist pain   Assessment   Treatment Assessment Pt agreeable to OT session this AM  Received sitting in recliner  ADL session completed; current LOF and details listed in respective sections  Pt is overall Armando LB dressing, supervision bathing and UB dressing, supervision/mod I for functional transfers with the exception of Armando for shower-tub transfer  Pt maintained THP's without cueing from OT and also maintained NWB RUE  Pt completed functional mobility to and from shower room with Michelle Pineda 34 and supervision  After ADL, pt completed fine motor and activity tolerance activities  Pt reported mild pain in R wrist that subsided upon rest  Pt remains an eager and active participant in therapy and is motivated to return home  Pt would benefit from continued skilled OT services in order to maximize independence in self care, functional mobiltiy/transfers, and activity tolerance  Prognosis Good   Problem List Decreased strength;Decreased range of motion;Decreased endurance; Impaired balance;Decreased safety awareness;Orthopedic restrictions   Plan   Treatment/Interventions ADL retraining;Functional transfer training;LE strengthening/ROM; Therapeutic exercise; Endurance training;Patient/family training;Equipment eval/education; Compensatory technique education;OT   Progress Progressing toward goals   OT Therapy Minutes   OT Time In 0900   OT Time Out 1030   OT Total Time (minutes) 90   OT Mode of treatment - Individual (minutes) 90

## 2022-07-12 NOTE — PCC SPEECH THERAPY
7/13    Recommendations   Diet Solid Recommendation Regular consistency   Diet Liquid Recommendation Thin liquid   Recommended Form of Meds As desired; As tolerated   General Precautions Upright as possible for all oral intake;Remain upright for 45 mins after meals; Minimize distractions  (reflux precautions)   Compensatory Swallowing Strategies    (small bites/sips, alternate bites/sips)   Eating   Type of Assistance Needed Independent   Physical Assistance Level No physical assistance   Eating CARE Score 6   Swallow Assessment   Swallow Treatment Assessment Pt reports tolerating diet utilizing strategies without any difficulty  She was able to verbally recall all strategies and reports all are helpful  She also reports taking a liquid wash prior to any PO or meds which is helpful is well  No further therapy indicated at this time  Pt tolerating diet with strategies in place without any difficulty or s s of aspiration  Please reconsult as needed   No further therapy warranted at this time during admission or following discharge

## 2022-07-12 NOTE — PCC PHYSICAL THERAPY
07/12/2022:  Patient participating in therapy and making positive gains  Patient S bed mobility with leg , MOD I transfers with walker, S/MOD I ambulation up to 257' level and unlevel surfaces with walker, S negotiation of 12 steps with 1 handrail going up forward and down backwards  Patient remains limited by decreased ROM/strength, decreased balance and safety, decreased endurance, THPs RLE, and pain  Patient would benefit from continued inpatient [facility] PT to increase function, safety, and increased independence in prep for safe d/c to home

## 2022-07-12 NOTE — NURSING NOTE
Patient ambulates contact guard with platform walker in room  Medicated once for pain in PT with relief noted  Ace dressing remains to right wrist  Incision to right hip AXEL  Educated on importance of repositioning self in bed  Will continue to monitor and follow plan of care

## 2022-07-12 NOTE — PROGRESS NOTES
07/12/22 0653   Pain Assessment   Pain Assessment Tool 0-10   Pain Score 6   Pain Location/Orientation Orientation: Right;Location: Hip   Restrictions/Precautions   Precautions Fall Risk;Pain;THR   RUE Weight Bearing Per Order NWB  (can weight bear through elbow using PFRW)   RLE Weight Bearing Per Order WBAT   LLE Weight Bearing Per Order WBAT   RLE ROM Restriction   (THP)   Cognition   Overall Cognitive Status WFL   Arousal/Participation Alert; Responsive; Cooperative   Attention Within functional limits   Orientation Level Oriented X4   Memory Within functional limits   Following Commands Follows all commands and directions without difficulty   Lying to Sitting on Side of Bed   Type of Assistance Needed Supervision   Comment with leg    Lying to Sitting on Side of Bed CARE Score 4   Sit to Stand   Type of Assistance Needed Independent   Sit to Stand CARE Score 6   Bed-Chair Transfer   Type of Assistance Needed Independent   Chair/Bed-to-Chair Transfer CARE Score 6   Walk 10 Feet   Type of Assistance Needed Supervision   Comment S/MOD I   Walk 10 Feet CARE Score 4   Walk 50 Feet with Two Turns   Type of Assistance Needed Supervision   Comment S/MOD I   Walk 50 Feet with Two Turns CARE Score 4   Walk 150 Feet   Type of Assistance Needed Supervision   Comment S/MOD I   Walk 150 Feet CARE Score 4   Walking 10 Feet on Uneven Surfaces   Type of Assistance Needed Supervision   Walking 10 Feet on Uneven Surfaces CARE Score 4   Ambulation   Does the patient walk? 2  Yes   Primary Mode of Locomotion Prior to Admission Walk   Distance Walked (feet) 257 ft  (257')   Assist Device Platform;Roller Walker   Gait Pattern Antalgic; Inconsistant Dolores; Slow Dolores;Decreased foot clearance   Limitations Noted In Endurance;Strength   Walk Assist Level Supervision;Modified Independent   Findings level surfaces and unlevel surfaces   Toilet Transfer   Type of Assistance Needed Independent   Toilet Transfer CARE Score 6 Therapeutic Interventions   Strengthening supine and seated ther ex   Balance gait and transfer training   Assessment   Family/Caregiver Present Patient agreeable to therapy session  Pain 6/10 R hip  Patient reports leg  is working good to get in and out of bed  Good recall of THPs  Completed ther ex for general LE strengthening; gait and transfer training focusing on sequence, technique and maintaining THPs for improved balance and safety with functional mobility using walker  PT Barriers   Physical Impairment Decreased strength;Decreased range of motion;Decreased endurance;Decreased mobility;Orthopedic restrictions;Pain   Functional Limitation Walking;Standing;Transfers;Stair negotiation   Plan   Treatment/Interventions Functional transfer training;LE strengthening/ROM; Therapeutic exercise; Bed mobility;Gait training   Progress Progressing toward goals   PT Therapy Minutes   PT Time In 0653   PT Time Out 0758   PT Total Time (minutes) 65   PT Mode of treatment - Individual (minutes) 65   PT Mode of treatment - Concurrent (minutes) 0   PT Mode of treatment - Group (minutes) 0   PT Mode of treatment - Co-treat (minutes) 0   PT Mode of Treatment - Total time(minutes) 65 minutes   PT Cumulative Minutes 526   Therapy Time missed   Time missed?  No

## 2022-07-12 NOTE — PLAN OF CARE
Problem: PAIN - ADULT  Goal: Verbalizes/displays adequate comfort level or baseline comfort level  Description: Interventions:  - Encourage patient to monitor pain and request assistance  - Assess pain using appropriate pain scale  - Administer analgesics based on type and severity of pain and evaluate response  - Implement non-pharmacological measures as appropriate and evaluate response  - Consider cultural and social influences on pain and pain management  - Notify physician/advanced practitioner if interventions unsuccessful or patient reports new pain  Outcome: Progressing     Problem: INFECTION - ADULT  Goal: Absence or prevention of progression during hospitalization  Description: INTERVENTIONS:  - Assess and monitor for signs and symptoms of infection  - Monitor lab/diagnostic results  - Monitor all insertion sites, i e  indwelling lines, tubes, and drains  - Monitor endotracheal if appropriate and nasal secretions for changes in amount and color  - [place] appropriate cooling/warming therapies per order  - Administer medications as ordered  - Instruct and encourage patient and family to use good hand hygiene technique  - Identify and instruct in appropriate isolation precautions for identified infection/condition  Outcome: Progressing     Problem: DISCHARGE PLANNING  Goal: Discharge to home or other facility with appropriate resources  Description: INTERVENTIONS:  - Identify barriers to discharge w/patient and caregiver  - Arrange for needed discharge resources and transportation as appropriate  - Identify discharge learning needs (meds, wound care, etc )  - Arrange for interpretive services to assist at discharge as needed  - Refer to Case Management Department for coordinating discharge planning if the patient needs post-hospital services based on physician/advanced practitioner order or complex needs related to functional status, cognitive ability, or social support system  Outcome: Progressing

## 2022-07-12 NOTE — PCC NURSING
7/11/22 Admit to [place] 7/6/22 s/p R hip hemiarthroplasty and R wrist fx  Alert and oriented  Lungs clear/decreased on RA  HR regular  Trace edema to bilateral legs  Splint intact to R wrist  Pt is NWB to RUE  Staples clean, dry intact and AXEL on R hip  Continent of bowel and bladder  Pt takes prn oxycodone for pain management  Pt has been free from falls while in [facility]

## 2022-07-12 NOTE — PLAN OF CARE
Problem: Potential for Falls  Goal: Patient will remain free of falls  Description: INTERVENTIONS:  - Educate patient/family on patient safety including physical limitations  - Instruct patient to call for assistance with activity   - Consult OT/PT to assist with strengthening/mobility   - Keep Call bell within reach  - Keep bed low and locked with side rails adjusted as appropriate  - Keep care items and personal belongings within reach  - Initiate and maintain comfort rounds  - Make Fall Risk Sign visible to staff  - Offer Toileting every 2 Hours, in advance of need  - Initiate/Maintain bed/chair alarm  - Obtain necessary fall risk management equipment: bed/chair alarm  - Apply yellow socks and bracelet for high fall risk patients  - Consider moving patient to room near nurses station  Outcome: Progressing     Problem: INFECTION - ADULT  Goal: Absence or prevention of progression during hospitalization  Description: INTERVENTIONS:  - Assess and monitor for signs and symptoms of infection  - Monitor lab/diagnostic results  - Monitor all insertion sites, i e  indwelling lines, tubes, and drains  - Monitor endotracheal if appropriate and nasal secretions for changes in amount and color  - [place] appropriate cooling/warming therapies per order  - Administer medications as ordered  - Instruct and encourage patient and family to use good hand hygiene technique  - Identify and instruct in appropriate isolation precautions for identified infection/condition  Outcome: Progressing

## 2022-07-12 NOTE — PROGRESS NOTES
07/12/22 1130   Pain Assessment   Pain Assessment Tool 0-10   Pain Score 5   Pain Location/Orientation Orientation: Right;Location: Hip   Restrictions/Precautions   Precautions Fall Risk;Pain;THR   RUE Weight Bearing Per Order NWB   RLE Weight Bearing Per Order WBAT   LLE Weight Bearing Per Order WBAT   RLE ROM Restriction   (THPs)   Cognition   Overall Cognitive Status WFL   Orientation Level Oriented X4   Sit to Stand   Type of Assistance Needed Independent   Sit to Stand CARE Score 6   Bed-Chair Transfer   Type of Assistance Needed Independent   Chair/Bed-to-Chair Transfer CARE Score 6   Walk 10 Feet   Type of Assistance Needed Supervision   Comment S/MOD I   Walk 10 Feet CARE Score 4   Walk 50 Feet with Two Turns   Type of Assistance Needed Supervision   Comment S/MOD I   Walk 50 Feet with Two Turns CARE Score 4   Walking 10 Feet on Uneven Surfaces   Type of Assistance Needed Supervision   Comment S/MOD I   Walking 10 Feet on Uneven Surfaces CARE Score 4   Ambulation   Does the patient walk? 2  Yes   Primary Mode of Locomotion Prior to Admission Walk   Distance Walked (feet) 144 ft  (123' 92')   Assist Device Platform;Roller Walker   Gait Pattern Antalgic; Inconsistant Dolores; Slow Dolores;Decreased foot clearance   Limitations Noted In Endurance; Safety;Strength   Walk Assist Level Supervision;Modified Independent   Findings level and unlevel surfaces   Curb or Single Stair   Style negotiated Single stair   Type of Assistance Needed Supervision   1 Step (Curb) CARE Score 4   4 Steps   Type of Assistance Needed Supervision   4 Steps CARE Score 4   12 Steps   Type of Assistance Needed Supervision   12 Steps CARE Score 4   Stairs   Type Stairs; Ramp   # of Steps 12   Weight Bearing Precautions WBAT   Assist Devices Single Rail  ([facility]  [address])   Findings S/Mod I steps; S ramp with PFRW   Therapeutic Interventions   Balance gait and transfer training   Other stairs and ramp   Assessment   Treatment Assessment Patient agreeable to therapy session  Pain in R hip 5/10  Good recall of THPs  Completed gait and transfer training focusing on sequence and technique for improved balance and safety with functional mobility using PFRW  Patient able to negotiate up and down steps with single rail (up forward and down backwards), and up and down ramp with PFRW  Patient appropriate for concurrent therapy to increase motivation and encouragement among pts with similar deficits while completing therapy session  PT Barriers   Physical Impairment Decreased strength;Decreased range of motion;Decreased endurance;Decreased mobility; Decreased safety awareness;Orthopedic restrictions;Pain   Functional Limitation Walking;Transfers;Standing;Stair negotiation   Plan   Treatment/Interventions Functional transfer training;Elevations;Gait training   Progress Progressing toward goals   PT Therapy Minutes   PT Time In 1130   PT Time Out 1200   PT Total Time (minutes) 30   PT Mode of treatment - Individual (minutes) 0   PT Mode of treatment - Concurrent (minutes) 30   PT Mode of treatment - Group (minutes) 0   PT Mode of treatment - Co-treat (minutes) 0   PT Mode of Treatment - Total time(minutes) 30 minutes   PT Cumulative Minutes 556   Therapy Time missed   Time missed?  No

## 2022-07-13 PROCEDURE — 97537 COMMUNITY/WORK REINTEGRATION: CPT

## 2022-07-13 PROCEDURE — 97110 THERAPEUTIC EXERCISES: CPT

## 2022-07-13 PROCEDURE — 97530 THERAPEUTIC ACTIVITIES: CPT

## 2022-07-13 PROCEDURE — 99233 SBSQ HOSP IP/OBS HIGH 50: CPT | Performed by: FAMILY MEDICINE

## 2022-07-13 PROCEDURE — 97535 SELF CARE MNGMENT TRAINING: CPT

## 2022-07-13 PROCEDURE — 97116 GAIT TRAINING THERAPY: CPT

## 2022-07-13 RX ADMIN — AMLODIPINE BESYLATE 10 MG: 10 TABLET ORAL at 08:57

## 2022-07-13 RX ADMIN — PRAVASTATIN SODIUM 40 MG: 40 TABLET ORAL at 08:57

## 2022-07-13 RX ADMIN — TEMAZEPAM 15 MG: 15 CAPSULE ORAL at 21:03

## 2022-07-13 RX ADMIN — ENOXAPARIN SODIUM 40 MG: 100 INJECTION SUBCUTANEOUS at 21:04

## 2022-07-13 RX ADMIN — OXYCODONE HYDROCHLORIDE AND ACETAMINOPHEN 500 MG: 500 TABLET ORAL at 17:20

## 2022-07-13 RX ADMIN — LORATADINE 10 MG: 10 TABLET ORAL at 08:57

## 2022-07-13 RX ADMIN — FERROUS SULFATE TAB 325 MG (65 MG ELEMENTAL FE) 325 MG: 325 (65 FE) TAB at 08:59

## 2022-07-13 RX ADMIN — VENLAFAXINE HYDROCHLORIDE 150 MG: 150 CAPSULE, EXTENDED RELEASE ORAL at 08:57

## 2022-07-13 RX ADMIN — PANTOPRAZOLE SODIUM 20 MG: 20 TABLET, DELAYED RELEASE ORAL at 07:49

## 2022-07-13 RX ADMIN — OXYCODONE HYDROCHLORIDE 5 MG: 5 TABLET ORAL at 06:03

## 2022-07-13 RX ADMIN — GABAPENTIN 600 MG: 600 TABLET, FILM COATED ORAL at 21:02

## 2022-07-13 RX ADMIN — Medication 1 TABLET: at 08:57

## 2022-07-13 RX ADMIN — OXYCODONE HYDROCHLORIDE 5 MG: 5 TABLET ORAL at 21:03

## 2022-07-13 RX ADMIN — PANTOPRAZOLE SODIUM 20 MG: 20 TABLET, DELAYED RELEASE ORAL at 16:33

## 2022-07-13 RX ADMIN — OXYCODONE HYDROCHLORIDE AND ACETAMINOPHEN 500 MG: 500 TABLET ORAL at 08:57

## 2022-07-13 NOTE — PLAN OF CARE
Problem: PAIN - ADULT  Goal: Verbalizes/displays adequate comfort level or baseline comfort level  Description: Interventions:  - Encourage patient to monitor pain and request assistance  - Assess pain using appropriate pain scale  - Administer analgesics based on type and severity of pain and evaluate response  - Implement non-pharmacological measures as appropriate and evaluate response  - Consider cultural and social influences on pain and pain management  - Notify physician/advanced practitioner if interventions unsuccessful or patient reports new pain  Outcome: Progressing     Problem: MOBILITY - ADULT  Goal: Maintain or return to baseline ADL function  Description: INTERVENTIONS:  -  Assess patient's ability to carry out ADLs; assess patient's baseline for ADL function and identify physical deficits which impact ability to perform ADLs (bathing, care of mouth/teeth, toileting, grooming, dressing, etc )  - Assess/evaluate cause of self-care deficits   - Assess range of motion  - Assess patient's mobility; develop plan if impaired  - Assess patient's need for assistive devices and provide as appropriate  - Encourage maximum independence but intervene and supervise when necessary  - Involve family in performance of ADLs  - Assess for home care needs following discharge   - Consider OT consult to assist with ADL evaluation and planning for discharge  - Provide patient education as appropriate  Outcome: Progressing  Goal: Maintains/Returns to pre admission functional level  Description: INTERVENTIONS:  - Perform BMAT or MOVE assessment daily    - Set and communicate daily mobility goal to care team and patient/family/caregiver  - Collaborate with rehabilitation services on mobility goals if consulted  - Reposition patient every 2 hours    - Dangle patient 3 times a day  - Stand patient 3 times a day  - Ambulate patient 3 times a day  - Out of bed to chair 3 times a day   - Out of bed for meals 3 times a day  - Out of bed for toileting  - Record patient progress and toleration of activity level   Outcome: Progressing

## 2022-07-13 NOTE — PROGRESS NOTES
07/13/22 0641   Pain Assessment   Pain Assessment Tool 0-10   Pain Score 5   Pain Location/Orientation Orientation: Right;Location: Hip   Restrictions/Precautions   Precautions Fall Risk;Pain;THR   RUE Weight Bearing Per Order NWB   RLE Weight Bearing Per Order WBAT   LLE Weight Bearing Per Order WBAT   RLE ROM Restriction   (THPs)   Cognition   Overall Cognitive Status WFL   Orientation Level Oriented X4   Sit to Lying   Type of Assistance Needed Independent   Comment with legl    Sit to Lying CARE Score 6   Lying to Sitting on Side of Bed   Type of Assistance Needed Independent   Comment with leg    Lying to Sitting on Side of Bed CARE Score 6   Sit to Stand   Type of Assistance Needed Independent   Sit to Stand CARE Score 6   Bed-Chair Transfer   Type of Assistance Needed Independent   Chair/Bed-to-Chair Transfer CARE Score 6   Walk 10 Feet   Type of Assistance Needed Independent   Walk 10 Feet CARE Score 6   Walk 50 Feet with Two Turns   Type of Assistance Needed Independent   Walk 50 Feet with Two Turns CARE Score 6   Walk 150 Feet   Type of Assistance Needed Independent   Walk 150 Feet CARE Score 6   Walking 10 Feet on Uneven Surfaces   Type of Assistance Needed Independent   Walking 10 Feet on Uneven Surfaces CARE Score 6   Ambulation   Does the patient walk? 2  Yes   Primary Mode of Locomotion Prior to Admission Walk   Distance Walked (feet) 272 ft  (272' [address]  [facility] Platform;Roller Walker   Gait Pattern Antalgic; Inconsistant Dolores; Slow Dolores;Decreased foot clearance   Limitations Noted In Strength;Speed   Walk Assist Level Modified Independent   Findings level and unlevel surfaces   Curb or Single Stair   Style negotiated Single stair   Type of Assistance Needed Independent   1 Step (Curb) CARE Score 6   4 Steps   Type of Assistance Needed Independent   4 Steps CARE Score 6   12 Steps   Type of Assistance Needed Independent   12 Steps CARE Score 6   Stairs   Type Stairs   # of Steps 12   Weight Bearing Precautions WBAT   Assist Devices Single Rail   Findings mod I going up forward and down backwards   Toilet Transfer   Type of Assistance Needed Independent   Toilet Transfer CARE Score 6   Therapeutic Interventions   Strengthening supine and standing ther ex   Balance gait and transfer training   Other stair training   Assessment   Treatment Assessment Patient agreeable to therapy session  Pain in R hip 5/10  Good recall of THPs RLE  Patient MOD I for all mobility using PFRW  Completed ther ex for general LE strengthening while maintaining THPs; gait and transfer training focusing on sequence and technique for improved balance and safety with functional mobility using walker  Patient able to negotiate up and down steps with single rail  Patient made MOD I in room  Nursing aware  PT Barriers   Physical Impairment Decreased strength;Decreased range of motion;Decreased mobility;Orthopedic restrictions;Pain   Functional Limitation Walking;Transfers;Standing;Stair negotiation   Plan   Treatment/Interventions Functional transfer training;LE strengthening/ROM; Elevations; Therapeutic exercise; Bed mobility;Gait training   Progress Progressing toward goals   PT Therapy Minutes   PT Time In 0641   PT Time Out 0813   PT Total Time (minutes) 92   PT Mode of treatment - Individual (minutes) 92   PT Mode of treatment - Concurrent (minutes) 0   PT Mode of treatment - Group (minutes) 0   PT Mode of treatment - Co-treat (minutes) 0   PT Mode of Treatment - Total time(minutes) 92 minutes   PT Cumulative Minutes 648   Therapy Time missed   Time missed?  No

## 2022-07-13 NOTE — NURSING NOTE
Pt resting in bed  CG with RW  Cont  Medicated for pain x2 this shift  Hip incision CDI  Will continue to monitor

## 2022-07-13 NOTE — PROGRESS NOTES
07/13/22 0900   Pain Assessment   Pain Assessment Tool 0-10   Pain Score 2   Pain Location/Orientation Orientation: Right;Location: Hip   Restrictions/Precautions   Precautions Fall Risk;THR   RUE Weight Bearing Per Order NWB   RLE Weight Bearing Per Order WBAT   RLE ROM Restriction   (THP)   Grooming   Able To Initiate Tasks; Wash/Dry Face;Brush/Clean Teeth   Limitation Noted In Safety;Strength   Shower/Bathe Self   Type of Assistance Needed Set-up / clean-up   Physical Assistance Level No physical assistance   Shower/Bathe Self CARE Score 5   Bathing   Assessed Bath Style Shower   Anticipated D/C Bath Style Shower;Sponge Bath   Able to Micheal Yehuda Yes   Able to [employer] Temperature Yes   Able to Wash/Rinse/Dry (body part) Left Arm;Right Arm;L Upper Leg;R Upper Leg;L Lower Leg/Foot;R Lower Leg/Foot;Chest;Abdomen;Perineal Area; Buttocks   Limitations Noted in Balance;ROM;Safety;Strength   Positioning Seated;Standing   Adaptive Equipment Longhand Sponge; Tub Bench; Shower [employer]   Type of Assistance Needed Independent   Physical Assistance Level No physical assistance   Upper Body Dressing CARE Score 6   Lower Body Dressing   Type of Assistance Needed Supervision   Physical Assistance Level No physical assistance   Comment increased time to problem solve untangling pants when donning with reacher   Lower Body Dressing CARE Score 4   Putting On/Taking Off Footwear   Type of Assistance Needed Supervision   Physical Assistance Level No physical assistance   Comment used one handed sock aide   Putting On/Taking Off Footwear CARE Score 4   Dressing/Undressing [facility] [address] [place] [address]   Remove LB Clothes Pants;Socks   Don LB Clothes Pants;Socks   Limitations Noted In Balance; Coordination; Safety;Strength;ROM   Adaptive Equipment Reacher;Sock Aide   Positioning Supported Sit   Sit to Stand   Type of Assistance Needed Independent Physical Assistance Level No physical assistance   Sit to Stand CARE Score 6   Functional Standing Tolerance   Time 5m and then 1m15s during key matching activity   Therapeutic Excerise-Strength   UE Strength Yes   Left Upper Extremity-Strength   LUE Strength Comment x30 using 2# dumbbell: elbow flexion/extension, protraction/retraction, internal/external rotation, pronation/supination, shoulder flexion/extension, shoulder press   Cognition   Overall Cognitive Status WFL   Arousal/Participation Alert; Responsive; Cooperative   Attention Within functional limits   Orientation Level Oriented X4   Memory Within functional limits   Following Commands Follows all commands and directions without difficulty   Assessment   Treatment Assessment Pt agreeable to OT session this AM  Received sitting in recliner  ADL session completed; current LOF and details listed in respective sections  Pt is overall set up/mod I for all ADL tasks and functional transfers with the use of AE for LB ADL tasks and PFRW for mobility and transfers  Pt adheres to NWB RUE and THP's on RLE effectively without cues from OT  Slightly increased time required for ADL tasks 2* cast on RUE impacting coordination  After ADL, completed standing tolerance and UE strength exercises with good tolerance and rest breaks taken as needed  Pt is a motivated and active participant in therapy and is eager to d/c home soon  Pt would benefit from continued skilled OT services in order to maximize independence in self care, functional mobility/transfers, ROM/strength, and activity tolerance  Prognosis Good   Problem List Decreased strength;Decreased range of motion;Decreased endurance; Impaired balance;Decreased safety awareness;Orthopedic restrictions   Plan   Treatment/Interventions ADL retraining;LE strengthening/ROM; Functional transfer training; Therapeutic exercise; Endurance training;Patient/family training;Equipment eval/education; Compensatory technique education;OT   Progress Progressing toward goals   OT Therapy Minutes   OT Time In 0900   OT Time Out 1030   OT Total Time (minutes) 90   OT Mode of treatment - Individual (minutes) 60   OT Mode of treatment - Concurrent (minutes) 30

## 2022-07-13 NOTE — NURSING NOTE
Patient MOD I in room with platform walker  Incision to hip clean, dry, and AXEL  Ace wrap splint maintained to right wrist  Patient voices no complaints of pain  Educated on safety with being MOD I  SCDs not applied  Will continue to monitor and follow plan of care

## 2022-07-13 NOTE — TEAM CONFERENCE
Acute RehabilitationTeam Conference Note  Date: 7/13/2022   Time: 10:39 AM       Patient Name:  Kristen Jackson       Medical Record Number: [id number]   YOB: 1950  Sex: Female          Room/Bed:  [facility] 219/[facility] 219-01  Payor Info:  Payor: MEDICARE / Plan: MEDICARE A AND B / Product Type: Medicare A & B Fee for Service /      Admitting Diagnosis: No admission diagnoses are documented for this encounter  Admit Date/Time:  7/6/2022 10:41 AM  Admission Comments: No comment available     Primary Diagnosis:  Closed transcervical fracture of right femur ([facility])  Principal Problem: Closed transcervical fracture of right femur [facility])    Patient Active Problem List    Diagnosis Date Noted    Acute blood loss anemia 07/03/2022    Leukemoid reaction 07/02/2022    Closed transcervical fracture of right femur ([facility] [address] ) 07/01/2022    Fracture of right wrist 07/01/2022    Hypertension 07/01/2022    Lumbar degenerative disc disease 06/20/2022    Lumbar spondylosis 06/20/2022    Cervical radiculopathy 06/20/2022    Cervical spondylosis 06/20/2022    Rheumatoid arthritis involving both hands ([facility] [address] ) 06/20/2022    Spinal stenosis of lumbar region without neurogenic claudication 06/20/2022       Physical Therapy:    Weight Bearing Status: Weight Bearing as Tolerated  Transfers: Independent  Bed Mobility: Supervision  Amulation Distance (ft): 257 feet  Ambulation: Supervision, Independent  Assistive Device for Ambulation:  ([address]le [address]ined[address] 34)  Number of Stairs: 12  Assistive Device for Stairs: [address]: Supervision  Discharge Recommendations: Home with:  [address] with[de-identified] Family Support, First Floor Setup, Home Physical Therapy, Outpatient Physical Therapy    07/12/2022:  Patient participating in therapy and making positive gains    Patient S bed mobility with leg , MOD I transfers with walker, S/MOD I ambulation up to 257' level and unlevel surfaces with walker, S negotiation of 12 steps with 1 handrail going up forward and down backwards  Patient remains limited by decreased ROM/strength, decreased balance and safety, decreased endurance, THPs RLE, and pain  Patient would benefit from continued inpatient [facility] PT to increase function, safety, and increased independence in prep for safe d/c to home  Occupational Therapy:  Eating: Independent  Grooming: Supervision  Bathing: Incidental Touching  Bathing: Incidental Touching  Upper Body Dressing: Supervision  Lower Body Dressing: Minimal Assistance  Toileting: Incidental Touching, Supervision  Tub/Shower Transfer:  (TBA)  Toilet Transfer: Incidental Touching  Cognition: Within Defined Limits  Orientation: Person, Place, Time, Situation  Discharge Recommendations: Home with:  [address] with[de-identified] Family Support, First Floor Setup, Home Occupational Therapy       7/13/22: Pt's current LOF listed above  Barriers to d/c include decreased strength throughout but especially RLE and RUE, THP's on RLE and NWB on RUE, decreased balance, and decreased activity tolerance; all affect independence in self care and functional transfers  Pt participating in ADL training, therapeutic exercises and activities, functional mobility/transfers, and activity tolerance in order to progress towards  Pt would benefit from continued skilled OT services in order to address listed barriers and prepare for safe d/c       Speech Therapy:  Mode of Communication: Verbal  Cognition: Within Defined Limits     Orientation: Person, Place, Time, Situation  Swallowing: Exceptions to WNL  Swallowing: Esophageal Dysphagia  Diet Recommendations: Regular Diet, Thin  Discharge Recommendations: Home with:  [address] with[de-identified] Family Support (further ST services not indicated at this time)  7/13    Recommendations   Diet Solid Recommendation Regular consistency   Diet Liquid Recommendation Thin liquid   Recommended Form of Meds As desired; As tolerated   General Precautions Upright as possible for all oral intake;Remain upright for 45 mins after meals; Minimize distractions  (reflux precautions)   Compensatory Swallowing Strategies    (small bites/sips, alternate bites/sips)   Eating   Type of Assistance Needed Independent   Physical Assistance Level No physical assistance   Eating CARE Score 6   Swallow Assessment   Swallow Treatment Assessment Pt reports tolerating diet utilizing strategies without any difficulty  She was able to verbally recall all strategies and reports all are helpful  She also reports taking a liquid wash prior to any PO or meds which is helpful is well  No further therapy indicated at this time  Pt tolerating diet with strategies in place without any difficulty or s s of aspiration  Please reconsult as needed  No further therapy warranted at this time during admission or following discharge       Nursing Notes:  Appetite: Good  Diet Type: Regular/House                      Diet Patient/Family Education Complete: Yes    Type of Wound (LDA):  (healing incision right hip)                    Type of Wound Patient/Family Education: Yes  Bladder: 5 - Supervision     Bladder Patient/Family Education: Yes  Bowel: 5 - Supervision     Bowel Patient/Family Education: Yes  Pain Location/Orientation: Orientation: Right, Location: Hip  Pain Score: 6                       Hospital Pain Intervention(s): Medication (See MAR)  Pain Patient/Family Education: Yes       7/11/22 Admit to [facility] 7/6/22 s/p R hip hemiarthroplasty and R wrist fx  Alert and oriented  Lungs clear/decreased on RA  HR regular  Trace edema to bilateral legs  Splint intact to R wrist  Pt is NWB to RUE  Staples clean, dry intact and AXEL on R hip  Continent of bowel and bladder  Pt takes prn oxycodone for pain management  Pt has been free from falls while in [facility]   LC      Case Management:     Discharge Planning  Living Arrangements: Lives w/ Spouse/significant other  Support Systems: Spouse/significant other  Assistance Needed: possible home care  Type of Current Residence: Private residence  [address]: No  Initial assessment & orientation to [facility] with Pt & phone contact with Pt's spouse  Pt resides with her spouse in a multi-story home, 4 steps in with bilat HR, but they are far apart  11 steps inside the home (8+3)  Pt's spouse works PT & she will not have 24 hour assistance  She does have multiple support persons including a son & grandchildren in the area  She has a RW, BSC & SC, but was completely independent PTA  Discussed role of team members & reviewed [address] with Pt & Pt's spouse, who expressed understanding & agreement  SW will continue to monitor & assist as needed with [address]  IMM reviewed, signed & submitted for scanning  Is the patient actively participating in therapies? yes  List any modifications to the treatment plan: na    Barriers Interventions   HTN, GERD Medical management and oversight   Esophageal dysphagia ST strategies, tolerating reg and thin   Right hip incision Local care   THP Cues, ADL, transfer, gait training   Decreased ROM and strength Therapeutic exercise, therapeutic activity   NWB right UE Cues, ADL, transfer, gait training     Is the patient making expected progress toward goals?  yes  List any update or changes to goals: na    Medical Goals: Patient will be medically stable for discharge to [facility] upon completion of rehab program    Weekly Team Goals:   Rehab Team Goals  ADL Team Goal: Patient will be independent with ADLs with least restrictive device upon completion of rehab program  Bowel/Bladder Team Goal: Patient will be independent with bladder/bowel management with least restrictive device upon completion of rehab program  Transfer Team Goal: Patient will be independent with transfers with least restrictive device upon completion of rehab program  Locomotion Team Goal: Patient will be independent with locomotion with least restrictive device upon completion of rehab program     Mod I bed mobility, transfers, and mobility  Mod I self care    Health and wellness: to be able to return home and garden, complete homemaking tasks    Discussion: Plan for return home with spouse with [facility] for PT and OT with transition to outpatient     Anticipated Discharge Date:  July 15, 2022

## 2022-07-13 NOTE — PROGRESS NOTES
Physical Medicine and Rehabilitation Progress Note  Chema Vega 67 y o  female MRN: [id number]  Unit/Bed#: -01 Encounter: [id number]    HPI: 67year old female with a PMH of hypertension, cervical radiculopathy, GERD, depression who presented with right hip pain   Patient reported she was walking her dog when she tripped, fell on right side on concrete porch  Alysa Morgans revealed Nondisplaced transcervical fracture of the right femur and Distal radial comminuted intra-articular fracture with ulnar styloid fracture  Ortho consulted  Pt is s/p right total hip hemiarthroplasty and s/p closed reduction with splinting right wrist (Right) on 7/2  Pt is weight-bearing as tolerated right lower extremity with hip precautions   Patient should remain nonweightbearing of right wrist   May use platform for walker per Ortho    PT and OT have been consulted and are recommending post-acute rehab services    Patient's case has been reviewed with [facility] medical director, patient meets medical criteria for acute rehab and has demonstrated the ability to tolerate three or more hours of therapy per day      Subjective:  No complaints,    ROS: A 10 point ROS was performed; negative except as noted above         Assessment/Plan:    Orthopedic Disorders:  08 4  Major Multiple Fractures  Etiologic: Closed transcervical fracture of right femur and Distal radial comminuted intra-articular fracture with ulnar styloid fracture  Date of Onset: 7/1   Date of surgery: 7/2/22  acute comprehensive interdisciplinary inpatient rehabilitation to include intensive skilled therapies (PT, OT, ST) as outlined with oversight and management by rehabilitation physician as well as inpatient rehab level nursing, case management and weekly interdisciplinary team meetings       Hypertension      GERD continue PPI      on a bowel regimen    Discharge plan for 7/15      Scheduled Meds:  Current Facility-Administered Medications   Medication Dose Route Frequency Provider Last Rate    acetaminophen  650 mg Oral Q6H PRN Jose Ramos, PAAMIE      aluminum-magnesium hydroxide-simethicone  30 mL Oral Q6H PRN Jose Ramos, PA-BRANDI      amLODIPine  10 mg Oral Daily Jose Ramos, PA-C      ascorbic acid  500 mg Oral BID Jose Ramos, PA-C      docusate sodium  100 mg Oral BID Jose Ramos, PA-C      enoxaparin  40 mg Subcutaneous Daily Clarita Rojas, PA-C      ferrous sulfate  325 mg Oral Daily With Breakfast Jose Ramos, PA-BRANDI      gabapentin  600 mg Oral HS Jose Ramos, PA-C      loratadine  10 mg Oral Daily Jose Ramos, PA-C      multivitamin-minerals  1 tablet Oral Daily Jose Davise, PA-BRANDI      ondansetron  4 mg Oral Q6H PRN Jose Ramos, PA-C      oxyCODONE  5 mg Oral Q6H PRN Jose Ramos, PA-C      pantoprazole  20 mg Oral BID AC Jose Ramos, PA-BRANDI      pravastatin  40 mg Oral Daily Jose Ramos, PA-BRANDI      senna  1 tablet Oral HS Jose Ramos, PA-C      temazepam  15 mg Oral HS PRN Jose Ramos, PA-C      venlafaxine  150 mg Oral Daily Jose Ramos, REJI         Objective:    Functional Update:    Physical Therapy:     Weight Bearing Status: Weight Bearing as Tolerated  Transfers: Independent  Bed Mobility: Supervision  Amulation Distance (ft): 257 feet  Ambulation: Supervision, Independent  Assistive Device for Ambulation:  (PFRW)  Number of Stairs: 12  Assistive Device for Stairs: Lehft Hand Rail  Stair Assistance: Supervision  Discharge Recommendations: Home with:  [address] [facility] with[de-identified] Family Support, First Floor Setup, Home Physical Therapy, Outpatient Physical Therapy  Occupational Therapy:  Eating: Independent  Grooming: Supervision  Bathing: Incidental Touching  Bathing: Incidental Touching  Upper Body Dressing: Supervision  Lower Body Dressing: Minimal Assistance  Toileting: Incidental Touching, Supervision  Tub/Shower Transfer:  (TBA)  Toilet Transfer: Incidental Touching  Cognition: Within Defined Limits  Orientation: Person, Place, Time, Situation  Discharge Recommendations: Home with:  [address] with[de-identified] Family Support, First Floor Setup, Home Occupational Therapy    Speech Therapy:  Mode of Communication: Verbal  Cognition: Within Defined Limits  Orientation: Person, Place, Time, Situation  Swallowing: Exceptions to WNL  Swallowing: Esophageal Dysphagia  Diet Recommendations: Regular Diet, Thin  Discharge Recommendations: Home with:  [address] with[de-identified] Family Support (further ST services not indicated at this time)          Physical Exam:  Temp:  [97 9 °F (36 6 °C)-98 1 °F (36 7 °C)] 98 1 °F (36 7 °C)  HR:  [85-87] 85  Resp:  [18] 18  BP: (120-130)/(73-77) 130/77  SpO2:  [95 %-97 %] 97 %    General:   alert, no apparent distress, cooperative and comfortable  HEENT:  Head: Normocephalic, no lesions, without obvious abnormality  Eye: Normal external eye, conjunctiva, lidsc cornea  Ears: Normal external ears  Nose: Normal external nose, mucus membranes  CARDIAC:  regular rate and rhythm, S1, S2 normal, no murmur, click, rub or gallop  LUNGS:  no abnormal respiratory pattern, no retractions noted, non-labored breathing   ABDOMEN:  soft, non-tender, non-distended  EXTREMITIES:  extremities normal, warm and well-perfused; no cyanosis, clubbing, or edema has a cast on her right arm  NEURO:  clear speech, following all commands, oriented There are no focal neurological deficits  PSYCH:  Alert and oriented, appropriate affect  INCISION:  C/D/I    This patient was discussed by the Interdisciplinary Team in weekly case conference today  The care of the patient was extensively discussed with all care providers and an appropriate rehabilitation plan was formulated unique for this patient  Barriers were identified preventing progression of therapy and appropriate interventions were discussed with each discipline   Please see the team note for input from all disciplines regarding barriers, intervention, and discharge planning  [ x ] Total time spent: 35 Mins, and greater than 50% of this time was spent counseling/coordinating care        Diagnostic Studies:   No orders to display       Laboratory: Labs reviewed  Results from last 7 days   Lab Units 07/07/22  0533   HEMOGLOBIN g/dL 11 0*   HEMATOCRIT % 35 4   WBC Thousand/uL 8 70     Results from last 7 days   Lab Units 07/07/22  0533   BUN mg/dL 21   SODIUM mmol/L 139   POTASSIUM mmol/L 3 5   CHLORIDE mmol/L 100   CREATININE mg/dL 0 83   AST U/L 49*   ALT U/L 44            ** Please Note: Fluency Direct voice to text software may have been used in the creation of this document   **

## 2022-07-13 NOTE — PLAN OF CARE
Problem: Potential for Falls  Goal: Patient will remain free of falls  Description: INTERVENTIONS:  - Educate patient/family on patient safety including physical limitations  - Instruct patient to call for assistance with activity   - Consult OT/PT to assist with strengthening/mobility   - Keep Call bell within reach  - Keep bed low and locked with side rails adjusted as appropriate  - Keep care items and personal belongings within reach  - Initiate and maintain comfort rounds  - Make Fall Risk Sign visible to staff  - Offer Toileting every 2 Hours, in advance of need  - Initiate/Maintain bed/chair alarm  - Obtain necessary fall risk management equipment: bed/chair alarm  - Apply yellow socks and bracelet for high fall risk patients  - Consider moving patient to room near nurses station  Outcome: Progressing     Problem: PAIN - ADULT  Goal: Verbalizes/displays adequate comfort level or baseline comfort level  Description: Interventions:  - Encourage patient to monitor pain and request assistance  - Assess pain using appropriate pain scale  - Administer analgesics based on type and severity of pain and evaluate response  - Implement non-pharmacological measures as appropriate and evaluate response  - Consider cultural and social influences on pain and pain management  - Notify physician/advanced practitioner if interventions unsuccessful or patient reports new pain  Outcome: Progressing

## 2022-07-14 ENCOUNTER — HOME HEALTH ADMISSION (OUTPATIENT)
Dept: HOME HEALTH SERVICES | Facility: HOME HEALTHCARE | Age: 72
End: 2022-07-14

## 2022-07-14 PROCEDURE — 97535 SELF CARE MNGMENT TRAINING: CPT

## 2022-07-14 PROCEDURE — 97116 GAIT TRAINING THERAPY: CPT

## 2022-07-14 PROCEDURE — NC001 PR NO CHARGE: Performed by: FAMILY MEDICINE

## 2022-07-14 PROCEDURE — 99239 HOSP IP/OBS DSCHRG MGMT >30: CPT | Performed by: FAMILY MEDICINE

## 2022-07-14 PROCEDURE — 97110 THERAPEUTIC EXERCISES: CPT

## 2022-07-14 PROCEDURE — 97530 THERAPEUTIC ACTIVITIES: CPT

## 2022-07-14 PROCEDURE — 97537 COMMUNITY/WORK REINTEGRATION: CPT

## 2022-07-14 RX ADMIN — Medication 1 TABLET: at 09:12

## 2022-07-14 RX ADMIN — SENNOSIDES 8.6 MG: 8.6 TABLET, FILM COATED ORAL at 21:04

## 2022-07-14 RX ADMIN — OXYCODONE HYDROCHLORIDE 5 MG: 5 TABLET ORAL at 21:03

## 2022-07-14 RX ADMIN — LORATADINE 10 MG: 10 TABLET ORAL at 09:13

## 2022-07-14 RX ADMIN — OXYCODONE HYDROCHLORIDE AND ACETAMINOPHEN 500 MG: 500 TABLET ORAL at 09:12

## 2022-07-14 RX ADMIN — OXYCODONE HYDROCHLORIDE 5 MG: 5 TABLET ORAL at 05:43

## 2022-07-14 RX ADMIN — PANTOPRAZOLE SODIUM 20 MG: 20 TABLET, DELAYED RELEASE ORAL at 09:13

## 2022-07-14 RX ADMIN — ENOXAPARIN SODIUM 40 MG: 100 INJECTION SUBCUTANEOUS at 21:05

## 2022-07-14 RX ADMIN — GABAPENTIN 600 MG: 600 TABLET, FILM COATED ORAL at 21:04

## 2022-07-14 RX ADMIN — FERROUS SULFATE TAB 325 MG (65 MG ELEMENTAL FE) 325 MG: 325 (65 FE) TAB at 09:12

## 2022-07-14 RX ADMIN — TEMAZEPAM 15 MG: 15 CAPSULE ORAL at 21:03

## 2022-07-14 RX ADMIN — VENLAFAXINE HYDROCHLORIDE 150 MG: 150 CAPSULE, EXTENDED RELEASE ORAL at 09:12

## 2022-07-14 RX ADMIN — DOCUSATE SODIUM 100 MG: 100 CAPSULE, LIQUID FILLED ORAL at 09:13

## 2022-07-14 RX ADMIN — AMLODIPINE BESYLATE 10 MG: 10 TABLET ORAL at 09:12

## 2022-07-14 RX ADMIN — PRAVASTATIN SODIUM 40 MG: 40 TABLET ORAL at 09:13

## 2022-07-14 RX ADMIN — PANTOPRAZOLE SODIUM 20 MG: 20 TABLET, DELAYED RELEASE ORAL at 17:19

## 2022-07-14 RX ADMIN — OXYCODONE HYDROCHLORIDE AND ACETAMINOPHEN 500 MG: 500 TABLET ORAL at 17:19

## 2022-07-14 RX ADMIN — DOCUSATE SODIUM 100 MG: 100 CAPSULE, LIQUID FILLED ORAL at 17:19

## 2022-07-14 NOTE — PROGRESS NOTES
Physical Medicine and Rehabilitation Progress Note  Felice Vega 67 y o  female MRN: [id number]  Unit/Bed#: -01 Encounter: [id number]    HPI: 67year old female with a PMH of hypertension, cervical radiculopathy, GERD, depression who presented with right hip pain   Patient reported she was walking her dog when she tripped, fell on right side on concrete porch  Mary Mulligan revealed Nondisplaced transcervical fracture of the right femur and Distal radial comminuted intra-articular fracture with ulnar styloid fracture  Ortho consulted  Pt is s/p right total hip hemiarthroplasty and s/p closed reduction with splinting right wrist (Right) on 7/2  Pt is weight-bearing as tolerated right lower extremity with hip precautions   Patient should remain nonweightbearing of right wrist   May use platform for walker per Ortho    PT and OT have been consulted and are recommending post-acute rehab services    Patient's case has been reviewed with [facility] medical director, patient meets medical criteria for acute rehab and has demonstrated the ability to tolerate three or more hours of therapy per day      Subjective:  No complaints,    ROS: A 10 point ROS was performed; negative except as noted above         Assessment/Plan:    Orthopedic Disorders:  08 4  Major Multiple Fractures  Etiologic: Closed transcervical fracture of right femur and Distal radial comminuted intra-articular fracture with ulnar styloid fracture  Date of Onset: 7/1   Date of surgery: 7/2/22  acute comprehensive interdisciplinary inpatient rehabilitation to include intensive skilled therapies (PT, OT, ST) as outlined with oversight and management by rehabilitation physician as well as inpatient rehab level nursing, case management and weekly interdisciplinary team meetings       Hypertension      GERD continue PPI      on a bowel regimen    Discharge plan for 7/15      Scheduled Meds:  Current Facility-Administered Medications   Medication Dose Route Frequency Provider Last Rate    acetaminophen  650 mg Oral Q6H PRN Parr [place], PA-C      aluminum-magnesium hydroxide-simethicone  30 mL Oral Q6H PRN Parr [place], PA-C      amLODIPine  10 mg Oral Daily Parr [place], PA-C      ascorbic acid  500 mg Oral BID Parr [place], PA-C      docusate sodium  100 mg Oral BID Parr [place], PA-C      enoxaparin  40 mg Subcutaneous Daily Clarita Rojas, PA-C      ferrous sulfate  325 mg Oral Daily With Breakfast Parr [place], PA-C      gabapentin  600 mg Oral HS Parr [place], PA-C      loratadine  10 mg Oral Daily Parr [place], PA-C      multivitamin-minerals  1 tablet Oral Daily Parr [place], PA-C      ondansetron  4 mg Oral Q6H PRN Parr [place], PA-C      oxyCODONE  5 mg Oral Q6H PRN Parr [place], PA-C      pantoprazole  20 mg Oral BID AC Parr [place], PA-C      pravastatin  40 mg Oral Daily Parr [place], PA-C      senna  1 tablet Oral HS Parr [place], PA-C      temazepam  15 mg Oral HS PRN Parr [place], PA-C      venlafaxine  150 mg Oral Daily Parr [place], PA-C         Objective:    Functional Update:    Physical Therapy:     Weight Bearing Status: Weight Bearing as Tolerated  Transfers: Independent  Bed Mobility: Supervision  Amulation Distance (ft): 257 feet  Ambulation: Supervision, Independent  Assistive Device for Ambulation:  (PFRW)  Number of Stairs: 12  Assistive Device for Stairs: Lehft Hand Rail  Stair Assistance: Supervision  Discharge Recommendations: Home with:  [address] [facility] [address] with[de-identified] Family Support, First Floor Setup, Home Physical Therapy, Outpatient Physical Therapy  Occupational Therapy:  Eating: Independent  Grooming: Supervision  Bathing: Incidental Touching  Bathing: Incidental Touching  Upper Body Dressing: Supervision  Lower Body Dressing: Minimal Assistance  Toileting: Incidental Touching, Supervision  Tub/Shower Transfer:  (TBA)  Toilet Transfer: Incidental Touching  Cognition: Within Defined Limits  Orientation: Person, Place, Time, Situation  Discharge Recommendations: Home with:  [address] with[de-identified] Family Support, First Floor Setup, Home Occupational Therapy    Speech Therapy:  Mode of Communication: Verbal  Cognition: Within Defined Limits  Orientation: Person, Place, Time, Situation  Swallowing: Exceptions to WNL  Swallowing: Esophageal Dysphagia  Diet Recommendations: Regular Diet, Thin  Discharge Recommendations: Home with:  [address] with[de-identified] Family Support (further ST services not indicated at this time)          Physical Exam:  Temp:  [97 9 °F (36 6 °C)-98 9 °F (37 2 °C)] 97 9 °F (36 6 °C)  HR:  [87-88] 87  Resp:  [16-18] 16  BP: (123-131)/(57-61) 123/57  SpO2:  [93 %-97 %] 97 %    General:   alert, no apparent distress, cooperative and comfortable  HEENT:  Head: Normocephalic, no lesions, without obvious abnormality  Eye: Normal external eye, conjunctiva, lidsc cornea  Ears: Normal external ears  Nose: Normal external nose, mucus membranes  CARDIAC:  regular rate and rhythm, S1, S2 normal, no murmur, click, rub or gallop  LUNGS:  no abnormal respiratory pattern, no retractions noted, non-labored breathing   ABDOMEN:  soft, non-tender, non-distended  EXTREMITIES:  extremities normal, warm and well-perfused; no cyanosis, clubbing, or edema has a cast on her right arm  NEURO:  clear speech, following all commands, oriented There are no focal neurological deficits  PSYCH:  Alert and oriented, appropriate affect  INCISION:  C/D/I        [ x ] Total time spent: 35 Mins, and greater than 50% of this time was spent counseling/coordinating care        Diagnostic Studies:   No orders to display       Laboratory: Labs reviewed        Invalid input(s): PLTCT        Invalid input(s): CA         ** Please Note: Fluency Direct voice to text software may have been used in the creation of this document   **

## 2022-07-14 NOTE — PROGRESS NOTES
07/14/22 0642   Pain Assessment   Pain Assessment Tool 0-10   Pain Score No Pain   Restrictions/Precautions   Precautions THR   RUE Weight Bearing Per Order NWB   RLE Weight Bearing Per Order WBAT   LLE Weight Bearing Per Order WBAT   RLE ROM Restriction   (THPs)   Cognition   Overall Cognitive Status WFL   Arousal/Participation Alert; Responsive; Cooperative   Attention Within functional limits   Orientation Level Oriented X4   Memory Within functional limits   Following Commands Follows all commands and directions without difficulty   Roll Left and Right   Type of Assistance Needed Independent   Roll Left and Right CARE Score 6   Sit to Lying   Type of Assistance Needed Independent   Comment with leg    Sit to Lying CARE Score 6   Lying to Sitting on Side of Bed   Type of Assistance Needed Independent   Comment with leg    Lying to Sitting on Side of Bed CARE Score 6   Sit to Stand   Type of Assistance Needed Independent   Sit to Stand CARE Score 6   Bed-Chair Transfer   Type of Assistance Needed Independent   Chair/Bed-to-Chair Transfer CARE Score 6   Walk 10 Feet   Type of Assistance Needed Independent   Walk 10 Feet CARE Score 6   Walk 50 Feet with Two Turns   Type of Assistance Needed Independent   Walk 50 Feet with Two Turns CARE Score 6   Walk 150 Feet   Type of Assistance Needed Independent   Walk 150 Feet CARE Score 6   Walking 10 Feet on Uneven Surfaces   Type of Assistance Needed Independent   Walking 10 Feet on Uneven Surfaces CARE Score 6   Ambulation   Does the patient walk? 2  Yes   Primary Mode of Locomotion Prior to Admission Walk   Distance Walked (feet) 260 ft  (122' 121' 178')   Assist Device Platform;Roller Walker   Gait Pattern Inconsistant Dolores; Slow Dolores;Decreased foot clearance   Limitations Noted In Endurance;Strength   Walk Assist Level Modified Independent   Findings level and unlevel surfaces   Wheel 50 Feet with Two Turns   Reason if not Attempted Activity not applicable   Wheel 50 Feet with Two Turns CARE Score 9   Wheel 150 Feet   Reason if not Attempted Activity not applicable   Wheel 237 Feet CARE Score 9   Wheelchair mobility   Does the patient use a wheelchair? 0  No   Curb or Single Stair   Style negotiated Single stair   Type of Assistance Needed Independent   1 Step (Curb) CARE Score 6   4 Steps   Type of Assistance Needed Independent   4 Steps CARE Score 6   12 Steps   Type of Assistance Needed Independent   12 Steps CARE Score 6   Stairs   Type Stairs; Ramp   # of Steps 12   Weight Bearing Precautions WBAT   Assist Devices Single Rail;Platform;Roller Walker   Findings mod I going up forward and down backwards; MOD I ramp with PFRW   Therapeutic Interventions   Strengthening supine and standing ther ex   Balance gait and transfer training   Other stair training   Assessment   Treatment Assessment Patient agreeable to therapy session  No reports of pain  Patient able to recall and follow THPs RLE  Patient MOD I for all functional mobility using PFRW  Completed all ther ex for general LE strengthening while maintaining THPs; gait and transfer training focusing on sequence and technique for improved balance and safety with functional mobility using walker  Patient able to negotiate up and down steps with single rail  Patient ready for d/c tomorrow  PT Barriers   Physical Impairment Decreased strength;Decreased range of motion;Decreased endurance;Orthopedic restrictions   Functional Limitation Walking;Transfers;Standing;Stair negotiation;Ramp negotiation   Plan   Treatment/Interventions Functional transfer training;LE strengthening/ROM; Elevations; Therapeutic exercise; Bed mobility;Gait training   Progress Progressing toward goals   PT Therapy Minutes   PT Time In 0642   PT Time Out 0814   PT Total Time (minutes) 92   PT Mode of treatment - Individual (minutes) 92   PT Mode of treatment - Concurrent (minutes) 0   PT Mode of treatment - Group (minutes) 0   PT Mode of treatment - Co-treat (minutes) 0   PT Mode of Treatment - Total time(minutes) 92 minutes   PT Cumulative Minutes 740   Therapy Time missed   Time missed?  No

## 2022-07-14 NOTE — CASE MANAGEMENT
Tx team recommendations & DC planning & DCI reviewed with patient & spouse, who expressed understanding & agreement  Pt being [address] home tomorrow at 11 AM, being transported by family via car, which tx team has determined to be a safe mode of transport  [facility] appts indicated on DC instructions, to be scheduled by Pt per scheduling preferences  HHC arranged with [facility] (PT, OT), per Pt preferences from choice list provided  The Pt's current course of Tx & post DC goals of care have been shared with Post-acute care service providers  [facility] IMM reviewed & signed

## 2022-07-14 NOTE — PROGRESS NOTES
07/14/22 1030   Pain Assessment   Pain Assessment Tool 0-10   Pain Score No Pain   Restrictions/Precautions   Precautions Fall Risk;THR   RUE Weight Bearing Per Order NWB   RLE Weight Bearing Per Order WBAT   RLE ROM Restriction   (THP)   Grooming   Able To Initiate Tasks; Acquire Items; Wash/Dry Face   Limitation Noted In Safety   Shower/Bathe Self   Type of Assistance Needed Independent; Adaptive equipment   Physical Assistance Level No physical assistance   Shower/Bathe Self CARE Score 6   Bathing   Assessed Bath Style Shower   Anticipated D/C Bath Style Shower;Sponge Bath   Able to [place] Yes   Able to [employer] Temperature Yes   Able to Wash/Rinse/Dry (body part) Left Arm;Right Arm;L Upper Leg;R Upper Leg;L Lower Leg/Foot;R Lower Leg/Foot;Chest;Abdomen;Perineal Area; Buttocks   Limitations Noted in Balance; Safety;ROM   Positioning Seated;Standing   Adaptive Equipment Longhand Sponge; Tub Bench; Shower [employer]   Limitations Noted In Balance; Safety;ROM;LE Strength   Adaptive Equipment Grab Bars;Transfer Bench   Assessed Tub/shower combo   Findings supervisoin   Upper Body Dressing   Type of Assistance Needed Independent   Physical Assistance Level No physical assistance   Upper Body Dressing CARE Score 6   Lower Body Dressing   Type of Assistance Needed Independent; Adaptive equipment   Physical Assistance Level No physical assistance   Lower Body Dressing CARE Score 6   Putting On/Taking Off Footwear   Type of Assistance Needed Independent; Adaptive equipment   Physical Assistance Level No physical assistance   Putting On/Taking Off Footwear CARE Score 6   Dressing/Undressing Clothing   Remove UB Clothes Pullover Shirt   Don UB Clothes Pullover Shirt   Remove LB Clothes Pants;Socks   Don LB Clothes Pants;Socks   Limitations Noted In Balance; Safety;ROM   Adaptive Equipment Reacher;Sock Aide   Positioning Supported Sit   Sit to Stand   Type of Assistance Needed Independent   Physical Assistance Level No physical assistance   Sit to Stand CARE Score 6   Therapeutic Excerise-Strength   UE Strength Yes   Left Upper Extremity-Strength   LUE Strength Comment x30 using 2# dumbbell: elbow flexion/extension, protraction/retraction, internal/external rotation, pronation/supination, shoulder fleixon/extension, shoulder press; x30 using 1# dumbbell: ABD/ADD   Coordination   Fine Motor completed mary pegboard with R thumb and digit 2 pinch , pt reported fatigue at end of exercises but reported she felt as though this was a good exercise to keep her digits mobile while in cast   Cognition   Overall Cognitive Status [facility]   Arousal/Participation Alert; Responsive; Cooperative   Attention Within functional limits   Orientation Level Oriented X4   Memory Within functional limits   Following Commands Follows all commands and directions without difficulty   Assessment   Treatment Assessment Pt agreeable to OT session this AM  Received sitting in recliner chair  ADL session completed; current LOF and details listed in respective sections  Pt is overall mod I for ADL tasks and functional transfers with use of AE as needed for LB tasks to maintain THP's  Functional mobility and transfers are mod I as well with PFRM with the exception of supervision for shower transfer  After ADL, completed UE strength and fine motor activities with good tolerance and rest breaks taken as needed  Pt remains an eager and active participant in therapy and is ready to d/c home tomorrow  Final OT session to be completed tomorrow  Prognosis Good   Problem List Decreased strength;Decreased range of motion;Decreased endurance; Impaired balance;Decreased safety awareness;Orthopedic restrictions   Plan   Treatment/Interventions ADL retraining;Functional transfer training;LE strengthening/ROM; Therapeutic exercise; Endurance training;Patient/family training;Equipment eval/education; Compensatory technique education;OT Progress Progressing toward goals   OT Therapy Minutes   OT Time In 1030   OT Time Out 1202   OT Total Time (minutes) 92   OT Mode of treatment - Individual (minutes) 30   OT Mode of treatment - Concurrent (minutes) 62

## 2022-07-14 NOTE — NURSING NOTE
Pt with c/o of pain to R hip  Medicated x2 this shift  Hip incision CDI  Maintained Mod I in room w/o difficulty  Will continue same plan of care

## 2022-07-14 NOTE — DISCHARGE INSTRUCTIONS
Hip Fracture   AMBULATORY CARE:   A hip fracture  is a break in the upper part of your femur (thigh bone)  Common signs and symptoms:   Pain in your upper thigh, groin, or buttock    Pain when you flex or rotate your hip    Trouble placing weight on your leg and walking    One leg looks shorter than the other    Treatment  may include any of the following:  Medicines  may be given to prevent or treat pain  You may also need blood thinners to prevent a blood clot  Surgery  is usually needed  The type of surgery you need depends on the type of fracture you have  The broken parts of your femur may be put back together with metal hardware  All or part of your hip joint may need to be replaced  Call your local emergency number (911 in the [address] [facility] [facility]) if:   Your leg feels warm, tender, and painful  It may look swollen and red  You feel lightheaded, short of breath, and have chest pain  You cough up blood  Seek care immediately if:   You have severe pain, even after you take pain medicine  Your legs are numb  You cannot move your leg or foot  Call your doctor or bone specialist if:   You have a fever  You have a blister or open sore  You have a sore that is red, swollen, or draining pus  You have increased pain, numbness, tingling, or leg swelling  You have worsening function or deformity  You have questions or concerns about your condition or care  Prevent falls: The following can help you prevent another hip fracture or complications of your hip fracture:  Get regular exercise  Include exercises that strengthen your legs and improve your balance  Ask about the best exercise plan for you  Talk to your healthcare provider about all of the medicines you take  Some medicines can cause dizziness or drowsiness and increase your risk for falls  Have your vision checked regularly  Your vision may worsen over time and increase your risk for falls      Use a walking device, such as a cane or walker, if you have trouble keeping your balance  Make your home safe:      Improve the lighting in your home so that you can see where you are walking better  Add grab bars to the inside and outside of your tub or shower and next to the toilet  Add railings to both sides of your stairways  Remove throw rugs and other objects that can cause you to trip and fall  Manage a hip fracture:   Eat foods that are high in calcium and vitamin D  Your healthcare provider may tell you to eat more dairy products, such as milk and cheese, for calcium  Spinach, salmon, and dried beans are also good sources of calcium  Cereal, bread, and orange juice may be fortified with vitamin D  You also get vitamin D from exposure to sunlight  Your healthcare provider may also suggest a calcium or vitamin D supplement  Do not take supplements unless directed  Rest as directed  You may need a brace or pillow between your legs while your fracture heals  Go to physical therapy as directed  A physical therapist will teach you exercises to help improve movement and strength, and to decrease pain during recovery  Follow up with your doctor or bone specialist as directed: You will need to return for more x-rays  Your healthcare provider may also want to check you for osteoporosis  Osteoporosis is a condition that causes bones to become brittle and break easily after a fall  You will need treatment if you develop osteoporosis  Write down your questions so you remember to ask them during your visits  © Copyright [employer] 2022 Information is for End User's use only and may not be sold, redistributed or otherwise used for commercial purposes  All illustrations and images included in CareNotes® are the copyrighted property of A D A M , Inc  or Sonny Augustine  The above information is an  only  It is not intended as medical advice for individual conditions or treatments   Talk to your doctor, nurse or pharmacist before following any medical regimen to see if it is safe and effective for you

## 2022-07-15 ENCOUNTER — HOME CARE VISIT (OUTPATIENT)
Dept: HOME HEALTH SERVICES | Facility: HOME HEALTHCARE | Age: 72
End: 2022-07-15

## 2022-07-15 VITALS
WEIGHT: 216.27 LBS | BODY MASS INDEX: 36.03 KG/M2 | OXYGEN SATURATION: 96 % | SYSTOLIC BLOOD PRESSURE: 134 MMHG | HEART RATE: 84 BPM | DIASTOLIC BLOOD PRESSURE: 76 MMHG | TEMPERATURE: 96.9 F | RESPIRATION RATE: 18 BRPM | HEIGHT: 65 IN

## 2022-07-15 PROCEDURE — 97530 THERAPEUTIC ACTIVITIES: CPT

## 2022-07-15 PROCEDURE — 97535 SELF CARE MNGMENT TRAINING: CPT

## 2022-07-15 PROCEDURE — 97110 THERAPEUTIC EXERCISES: CPT

## 2022-07-15 RX ORDER — OXYCODONE HYDROCHLORIDE 5 MG/1
5 TABLET ORAL EVERY 6 HOURS PRN
Qty: 20 TABLET | Refills: 0 | Status: SHIPPED | OUTPATIENT
Start: 2022-07-15 | End: 2022-07-25

## 2022-07-15 RX ORDER — ACETAMINOPHEN 325 MG/1
650 TABLET ORAL EVERY 6 HOURS PRN
Refills: 0
Start: 2022-07-15

## 2022-07-15 RX ORDER — GABAPENTIN 600 MG/1
600 TABLET ORAL
Qty: 30 TABLET | Refills: 0 | Status: SHIPPED | OUTPATIENT
Start: 2022-07-15

## 2022-07-15 RX ORDER — ASPIRIN 325 MG
650 TABLET, DELAYED RELEASE (ENTERIC COATED) ORAL DAILY
Qty: 30 TABLET | Refills: 0 | Status: SHIPPED | OUTPATIENT
Start: 2022-07-15

## 2022-07-15 RX ORDER — FERROUS SULFATE 325(65) MG
325 TABLET ORAL
Refills: 0
Start: 2022-07-15

## 2022-07-15 RX ORDER — SENNOSIDES 8.6 MG
8.6 TABLET ORAL
Refills: 0
Start: 2022-07-15

## 2022-07-15 RX ADMIN — LORATADINE 10 MG: 10 TABLET ORAL at 09:14

## 2022-07-15 RX ADMIN — VENLAFAXINE HYDROCHLORIDE 150 MG: 150 CAPSULE, EXTENDED RELEASE ORAL at 09:11

## 2022-07-15 RX ADMIN — PRAVASTATIN SODIUM 40 MG: 40 TABLET ORAL at 09:11

## 2022-07-15 RX ADMIN — FERROUS SULFATE TAB 325 MG (65 MG ELEMENTAL FE) 325 MG: 325 (65 FE) TAB at 07:26

## 2022-07-15 RX ADMIN — AMLODIPINE BESYLATE 10 MG: 10 TABLET ORAL at 09:11

## 2022-07-15 RX ADMIN — PANTOPRAZOLE SODIUM 20 MG: 20 TABLET, DELAYED RELEASE ORAL at 07:26

## 2022-07-15 RX ADMIN — Medication 1 TABLET: at 09:11

## 2022-07-15 RX ADMIN — OXYCODONE HYDROCHLORIDE AND ACETAMINOPHEN 500 MG: 500 TABLET ORAL at 09:12

## 2022-07-15 NOTE — NURSING NOTE
Pt mod I w/o difficulty  Medicated for pain to R hip with relief  Ace wrap intact to R wrist  Pt is for D/C today

## 2022-07-15 NOTE — CASE MANAGEMENT
DCI reviewed with patient & spouse, who expressed understanding & agreement  Pt being [address] home today at 11 AM, being transported by family via car, which tx team has determined to be a safe mode of transport  [facility] appts indicated on DC instructions, to be scheduled by Pt per scheduling preferences  [facility] ([facility], [facility]), per Pt preferences from choice list provided  The Pt's current course of Tx & post DC goals of care have been shared with Post-acute care service providers  FU IMM reviewed & signed

## 2022-07-15 NOTE — NURSING NOTE
Discharge instructions reviewed with pt and pts  and understood  All belongings taken with pt along with abductor pillow, platform walker  Team had discussed best transport home would be car driven by pts spouse  Discharged home in satisfactory condition

## 2022-07-15 NOTE — PHYSICAL THERAPY NOTE
PT DISCHARGE SUMMARY:      Patient has met max benefit for inpatient [facility] PT  Patient MOD I bed mobility using leg , MOD I all transfers with walker, MOD I ambulation up to 272' level and unlevel surfaces with walker, MOD I 12 steps with 1 handrail going up forward and down backwards  Patient remains limited by decreased ROM/strength, decreased balance and safety, NWB RUE, THPs RLE, and pain  For d/c to home with continued PT services 07/15/2022  Patient safe to transport home via car

## 2022-07-15 NOTE — NUTRITION
07/15/22 0909   Biochemical Data,Medical Tests, and Procedures   Biochemical Data/Medical Tests/Procedures Lab values reviewed; Meds reviewed   Labs (Comment) No new labs   Meds (Comment) norvasc, Vit C, colace, lovenox, FeSO4, neurontin, centrum, zofran, protonix, pravachol, senna, venlafaxine   Nutrition-Focused Physical Exam   Nutrition-Focused Physical Exam Findings RN skin assessment reviewed  (wound right hip)   Medical-Related Concerns PMH reviewed   Adequacy of Intake   Nutrition Modality PO   Current PO Intake   Current Diet Order Regular diet thin liquids   Current Meal Intake %   Estimated calorie intake compared to estimated need Nutrient needs met   PES Statement   Problem Continue previous diagnosis   Recommendations/Interventions   Malnutrition/BMI Present No  (does not meet criteria)   Summary Regular diet thin liquids  Meal completions 100%  No supplements  7/11 216#, BMI=35 99; 5# weight gain since admission 7/6 211#  Medications reviewed  No new labs  Weak right hand  noted  Nonpitting RUE edema, trace BLE edema  Wound right hip noted  LBM 7/13  Patient is for discharge today  Interventions/Recommendations Continue current diet order   Education Assessment   Education Education not indicated at this time   Patient Nutrition Goals   Goal Adequate hydration; Adequate intake   Goal Status Met;Extended   Timeframe to complete goal by d/c   Nutrition Complexity Risk   Nutrition complexity level Low risk   Follow up date 07/25/22

## 2022-07-15 NOTE — CASE COMMUNICATION
Patient agreeable to be seen by PT for [facility] on monday, 7 18  New Start of care date will be 7 18     Thank you

## 2022-07-15 NOTE — PROGRESS NOTES
07/15/22 0909   Pain Assessment   Pain Assessment Tool 0-10   Pain Score No Pain   Restrictions/Precautions   Precautions Fall Risk;THR   RUE Weight Bearing Per Order NWB   RLE Weight Bearing Per Order WBAT   RLE ROM Restriction   (THP)   Oral Hygiene   Type of Assistance Needed Independent   Physical Assistance Level No physical assistance   Comment completed prior to OT session   Oral Hygiene CARE Score 6   Shower/Bathe Self   Type of Assistance Needed Independent; Adaptive equipment   Physical Assistance Level No physical assistance   Shower/Bathe Self CARE Score 6   Bathing   Assessed Bath Style Shower   Anticipated D/C Bath Style Shower;Sponge Bath   Able to [place] Yes   Able to [employer] Temperature Yes   Able to Wash/Rinse/Dry (body part) Left Arm;Right Arm;L Upper Leg;R Upper Leg;L Lower Leg/Foot;R Lower Leg/Foot;Chest;Abdomen;Perineal Area; Buttocks   Limitations Noted in Balance;ROM;Safety;Strength   Positioning Seated;Standing   Adaptive Equipment Longhand Sponge; Tub Bench; Shower [employer]   Limitations Noted In ROM; Safety   Adaptive Equipment Grab Bars;Transfer Bench   Assessed Tub/shower combo   Findings supervision   Upper Body Dressing   Type of Assistance Needed Independent   Physical Assistance Level No physical assistance   Upper Body Dressing CARE Score 6   Lower Body Dressing   Type of Assistance Needed Independent; Adaptive equipment   Physical Assistance Level No physical assistance   Comment problem solved how to fix twisted pants without assist from OT and by using reacher   Lower Body Dressing CARE Score 6   Putting On/Taking Off Footwear   Type of Assistance Needed Independent; Adaptive equipment   Physical Assistance Level No physical assistance   Putting On/Taking Off Footwear CARE Score 6   Dressing/Undressing Clothing   Remove UB Clothes Pullover [address]   Remove LB Clothes Pants;Socks   [address] Pants;Socks   Limitations Noted In Balance; Safety;ROM   Adaptive Equipment Reacher;Sock Aide   Positioning Supported Sit   Findings pt donned shoes with long handled shoe horn for the first time, mod I level   Lying to Sitting on Side of Bed   Type of Assistance Needed Independent   Physical Assistance Level No physical assistance   Lying to Sitting on Side of Bed CARE Score 6   Sit to Stand   Type of Assistance Needed Independent   Physical Assistance Level No physical assistance   Sit to Stand CARE Score 6   Toileting Hygiene   Type of Assistance Needed Independent   Physical Assistance Level No physical assistance   Comment completed prior to session   Toileting Hygiene CARE Score 6   Toilet Transfer   Type of Assistance Needed Independent   Physical Assistance Level No physical assistance   Comment completed prior to session   Toilet Transfer CARE Score 6   Health Management   Health Management discussed med management with pt, who reported she will have her  gather pill materials and open containers and she will fill her weekly containers herself   Therapeutic Excerise-Strength   UE Strength Yes   Left Upper Extremity-Strength   L Weights/Reps/Sets pt given HEP with green theraband and written instructions for home use, practiced with OT and demonstrated good carryover of techniques   LUE Strength Comment x15 using green theraband: elbow flexion/extension, shoulder shrug, ABD/ADD, shoulder flexion/extension, internal/external rotation   Cognition   Overall Cognitive Status [facility]   Arousal/Participation Alert; Responsive; Cooperative   Attention Within functional limits   Orientation Level Oriented X4   Memory Within functional limits   Following Commands Follows all commands and directions without difficulty   Additional Activities   Additional Activities Comments activity tolerance: games of LimeadezEarlyShares eights with OT, good tolerance with no c/o increased pain, pt used L hand only for card manipulation Assessment   Treatment Assessment Pt agreeable to OT session this AM  Received lying supine in bed  ADL session completed; current LOF and details listed in respective sections  Pt is safely mod I for ADL tasks and functional transfers, with the exception of supervision for tub/shower transfer  Pt uses AE effectively for LB tasks and problem solved without cues from OT when encoutering problems  Functional mobility with PFRW to and from shower room was mod I, then to OT room after shower to complete UE strengthening and activity tolerance  Pt given HEP with green theraband for LUE only and demonstrated good understanding of techniques  Pt reported no further concerns to OT regarding ADL and IADL tasks; lengthy discussion held about med management, kitchen mobility, home functional mobility, and cleaning/laundry, during which pt reported she feels comfortable with her levels of assist needed and her  will assist as needed  Pt cleared to d/c from ARU to home with family assist and [facility] later this AM    Recommendation   Discharge Summary Pt cleared for d/c from ARU to home with family assist and [facility]  Pt met ADL and functional transfers goals of mod I with AE to maintain THP's and PFRW to maintain NWB RUE  Pt participated in ADL training, therapeutic exercises and activities, functional mobility/transfers, and activity tolerance in order to progress towards goals  DME is in place at home     OT Therapy Minutes   OT Time In 0909   OT Time Out 1036   OT Total Time (minutes) 87   OT Mode of treatment - Individual (minutes) 87

## 2022-07-15 NOTE — PLAN OF CARE
Problem: Prexisting or High Potential for Compromised Skin Integrity  Goal: Skin integrity is maintained or improved  Description: INTERVENTIONS:  - Identify patients at risk for skin breakdown  - Assess and monitor skin integrity  - Assess and monitor nutrition and hydration status  - Monitor labs   - Assess for incontinence   - Turn and reposition patient  - Assist with mobility/ambulation  - Relieve pressure over bony prominences  - Avoid friction and shearing  - Provide appropriate hygiene as needed including keeping skin clean and dry  - Evaluate need for skin moisturizer/barrier cream  - Collaborate with interdisciplinary team   - Patient/family teaching  - Consider wound care consult   Outcome: Adequate for Discharge     Problem: PAIN - ADULT  Goal: Verbalizes/displays adequate comfort level or baseline comfort level  Description: Interventions:  - Encourage patient to monitor pain and request assistance  - Assess pain using appropriate pain scale  - Administer analgesics based on type and severity of pain and evaluate response  - Implement non-pharmacological measures as appropriate and evaluate response  - Consider cultural and social influences on pain and pain management  - Notify physician/advanced practitioner if interventions unsuccessful or patient reports new pain  7/15/2022 1021 by Kathya Haywood RN  Outcome: Adequate for Discharge  7/15/2022 1013 by Kathya Haywood RN  Outcome: Progressing     Problem: INFECTION - ADULT  Goal: Absence or prevention of progression during hospitalization  Description: INTERVENTIONS:  - Assess and monitor for signs and symptoms of infection  - Monitor lab/diagnostic results  - Monitor all insertion sites, i e  indwelling lines, tubes, and drains  - Monitor endotracheal if appropriate and nasal secretions for changes in amount and color  - [place] appropriate cooling/warming therapies per order  - Administer medications as ordered  - Instruct and encourage patient and family to use good hand hygiene technique  - Identify and instruct in appropriate isolation precautions for identified infection/condition  Outcome: Adequate for Discharge     Problem: SAFETY ADULT  Goal: Patient will remain free of falls  Description: INTERVENTIONS:  - Educate patient/family on patient safety including physical limitations  - Instruct patient to call for assistance with activity   - Consult OT/PT to assist with strengthening/mobility   - Keep Call bell within reach  - Keep bed low and locked with side rails adjusted as appropriate  - Keep care items and personal belongings within reach  - Initiate and maintain comfort rounds  - Make Fall Risk Sign visible to staf  - Apply yellow socks and bracelet for high fall risk patients  - Consider moving patient to room near nurses station  Outcome: Adequate for Discharge  Goal: Maintain or return to baseline ADL function  Description: INTERVENTIONS:  -  Assess patient's ability to carry out ADLs; assess patient's baseline for ADL function and identify physical deficits which impact ability to perform ADLs (bathing, care of mouth/teeth, toileting, grooming, dressing, etc )  - Assess/evaluate cause of self-care deficits   - Assess range of motion  - Assess patient's mobility; develop plan if impaired  - Assess patient's need for assistive devices and provide as appropriate  - Encourage maximum independence but intervene and supervise when necessary  - Involve family in performance of ADLs  - Assess for home care needs following discharge   - Consider OT consult to assist with ADL evaluation and planning for discharge  - Provide patient education as appropriate  Outcome: Adequate for Discharge  Goal: Maintains/Returns to pre admission functional level  Description: INTERVENTIONS:  - Perform BMAT or MOVE assessment daily    - Set and communicate daily mobility goal to care team and patient/family/caregiver     - Collaborate with rehabilitation services on mobility goals if consulted  - Out of bed for toileting  - Record patient progress and toleration of activity level   Outcome: Adequate for Discharge     Problem: SAFETY ADULT  Goal: Maintain or return to baseline ADL function  Description: INTERVENTIONS:  -  Assess patient's ability to carry out ADLs; assess patient's baseline for ADL function and identify physical deficits which impact ability to perform ADLs (bathing, care of mouth/teeth, toileting, grooming, dressing, etc )  - Assess/evaluate cause of self-care deficits   - Assess range of motion  - Assess patient's mobility; develop plan if impaired  - Assess patient's need for assistive devices and provide as appropriate  - Encourage maximum independence but intervene and supervise when necessary  - Involve family in performance of ADLs  - Assess for home care needs following discharge   - Consider OT consult to assist with ADL evaluation and planning for discharge  - Provide patient education as appropriate  Outcome: Adequate for Discharge     Problem: DISCHARGE PLANNING  Goal: Discharge to home or other facility with appropriate resources  Description: INTERVENTIONS:  - Identify barriers to discharge w/patient and caregiver  - Arrange for needed discharge resources and transportation as appropriate  - Identify discharge learning needs (meds, wound care, etc )  - Arrange for interpretive services to assist at discharge as needed  - Refer to Case Management Department for coordinating discharge planning if the patient needs post-hospital services based on physician/advanced practitioner order or complex needs related to functional status, cognitive ability, or social support system  7/15/2022 1021 by Baby Litten, RN  Outcome: Adequate for Discharge  7/15/2022 1013 by Baby Litten, RN  Outcome: Progressing     Problem: MOBILITY - ADULT  Goal: Maintain or return to baseline ADL function  Description: INTERVENTIONS:  -  Assess patient's ability to carry out ADLs; assess patient's baseline for ADL function and identify physical deficits which impact ability to perform ADLs (bathing, care of mouth/teeth, toileting, grooming, dressing, etc )  - Assess/evaluate cause of self-care deficits   - Assess range of motion  - Assess patient's mobility; develop plan if impaired  - Assess patient's need for assistive devices and provide as appropriate  - Encourage maximum independence but intervene and supervise when necessary  - Involve family in performance of ADLs  - Assess for home care needs following discharge   - Consider OT consult to assist with ADL evaluation and planning for discharge  - Provide patient education as appropriate  Outcome: Adequate for Discharge  Goal: Maintains/Returns to pre admission functional level  Description: INTERVENTIONS:  - Perform BMAT or MOVE assessment daily    - Set and communicate daily mobility goal to care team and patient/family/caregiver  - Collaborate with rehabilitation services on mobility goals if consulted  - Out of bed for toileting  - Record patient progress and toleration of activity level   Outcome: Adequate for Discharge     Problem: Nutrition/Hydration-ADULT  Goal: Nutrient/Hydration intake appropriate for improving, restoring or maintaining nutritional needs  Description: Monitor and assess patient's nutrition/hydration status for malnutrition  Collaborate with interdisciplinary team and initiate plan and interventions as ordered  Monitor patient's weight and dietary intake as ordered or per policy  Utilize nutrition screening tool and intervene as necessary  Determine patient's food preferences and provide high-protein, high-caloric foods as appropriate       INTERVENTIONS:  - Monitor oral intake, urinary output, labs, and treatment plans  - Assess nutrition and hydration status and recommend course of action  - Evaluate amount of meals eaten  - Assist patient with eating if necessary   - Allow adequate time for meals  - Recommend/ encourage appropriate diets, oral nutritional supplements, and vitamin/mineral supplements  - Order, calculate, and assess calorie counts as needed  - Recommend, monitor, and adjust tube feedings and TPN/PPN based on assessed needs  - Assess need for intravenous fluids  - Provide specific nutrition/hydration education as appropriate  - Include patient/family/caregiver in decisions related to nutrition  7/15/2022 1021 by Teja Mcdonough RN  Outcome: Adequate for Discharge  7/15/2022 1013 by Teja Mcdonough RN  Outcome: Progressing

## 2022-07-18 ENCOUNTER — TELEPHONE (OUTPATIENT)
Dept: OBGYN CLINIC | Facility: HOSPITAL | Age: 72
End: 2022-07-18

## 2022-07-18 ENCOUNTER — HOME CARE VISIT (OUTPATIENT)
Dept: HOME HEALTH SERVICES | Facility: HOME HEALTHCARE | Age: 72
End: 2022-07-18

## 2022-07-18 DIAGNOSIS — Z96.641 HISTORY OF HEMIARTHROPLASTY OF RIGHT HIP: Primary | ICD-10-CM

## 2022-07-18 NOTE — TELEPHONE ENCOUNTER
Patient discharged [facility]hab [facility]/15 [facility] states patient does not want Home Health and would like out patient instead and fas sent [phone] for the RX  She is able to walk in out of house

## 2022-07-18 NOTE — CASE COMMUNICATION
Patient is declining [facility] services  Would perfer to go to outpatient therapy  [facility] at [phone]  Thankyou  Any questions, please contact me  Thank you

## 2022-07-19 NOTE — PROGRESS NOTES
PT Evaluation     Today's date: 2022  Patient name: James Mills  : 1950  MRN: [id number]  Referring provider: Lexi Herrera DO  Dx:   Encounter Diagnosis     ICD-10-CM    1  Closed transcervical fracture of right femur with routine healing, subsequent encounter  S72 031D    2  Status post hip hemiarthroplasty  Z96 649    3  Gait abnormality  R26 9                   Assessment  Assessment details: James Mills is a 67 y o  female with a history of anxiety, arthritis, depression, GERD, HTN, hyperlipidemia, BMI>30 that presents for a high complexity physical therapy initial evaluation  The patient demonstrates signs and symptoms consistent with R hip femur Fx s/p R hip hemiarthroplasty; gait abnormality ( patient also has R wrist Fx but is not seeking Rx for this)  During the examination the patient demonstrated decreased R LE strength, decreased R hip ROM, gait dysfunction, and R hip pain  The patient's impairments are causing the following functional limitations: difficulty with prolonged standing, prolonged walking, difficulty bending/stooping, difficulty lifting/carrying objects, walking on unlevel surfaces, difficulty squatting/kneeling, difficulty stair-climbing, and difficulty transferring from low surfaces  The patient's clinical presentation is unstable due to a number of participation restrictions, significant medial history, and functional limitation (FOTO 16% function)  The patient will benefit from skilled PT services to address impairments, work towards goals, and restore PLOF      Impairments: abnormal gait, abnormal or restricted ROM, activity intolerance, impaired balance, impaired physical strength, lacks appropriate home exercise program and pain with function  Functional limitations:  difficulty with prolonged standing, prolonged walking, difficulty bending/stooping, difficulty lifting/carrying objects, walking on unlevel surfaces, difficulty squatting/kneeling, difficulty stair-climbing, and difficulty transferring from low surfaces  Symptom irritability: moderateBarriers to therapy: R radius/ulna FX  Understanding of Dx/Px/POC: good   Prognosis: good    Goals  STG: Achieve in 4-6 weeks  1  Patient's R hip pain at worst less than 2/10 to allow for proper gait  2   Patient's R hip ROM improve by 10-25 degrees all planes to improve squatting  3   R LE MMT improve to > 4+/5 all motions tested to improve ADL/recreational activities  4   Timed up and go score improve to less than 14 seconds to indicate improved balance/decreased falls risk    LTG:  Achieve in 6-12 weeks  1  Patient's hip FOTO score improve to > 60% to indicate a return to normal functioning  2   Patient achieve personal goal of walking and functioning normally without a RW  3  Patient to achieve independence with home exercise plan  Plan  Plan details: RE-ASSESS 1X/MONTH  Patient would benefit from: skilled physical therapy  Planned modality interventions: cryotherapy and thermotherapy: hydrocollator packs  Other planned modality interventions: IASTM  Planned therapy interventions: manual therapy, massage, neuromuscular re-education, patient education, postural training, self care, strengthening, stretching, therapeutic activities, therapeutic exercise, home exercise program, abdominal trunk stabilization, balance, balance/weight bearing training and gait training  Frequency: 1-3x/wk  Duration in weeks: 12  Plan of Care beginning date: 7/20/2022  Plan of Care expiration date: 10/19/2022  Treatment plan discussed with: PTA and patient        Subjective Evaluation    History of Present Illness  Date of onset: 7/1/2022  Date of surgery: 7/2/2022  Mechanism of injury: surgery  Mechanism of injury: Victor M Ewing is a 67 y o  female that presents to outpatient physical therapy with complaints of difficultly walking/fuctioning    The patient experienced a displaced transcervical fracture right femur; fracture distal right radius and ulna from a mechanical fall while leashing her dog  The patient received a R hemiarthroplasty at her R hip and closed reduction of the R wrist done by Dr Christophe Arreaga  The patient reports difficulty with ambulating up/down the steps, difficulty with prolonged walking/standing, unable to lift/carry items, and difficulty with ADL's  The patient does feel unsteady with walking and does have a fear of falling  The patient received acute rehab after her hospital stay and deferred home care therapy to come to outpatient PT  The patient's main goal for physical therapy is to get rid of the walker and walk normally/ function normally  Pain  Current pain ratin  At best pain ratin  At worst pain ratin  Location: R lateral hip  Quality: dull ache  Relieving factors: rest  Aggravating factors: standing, walking, stair climbing and lifting  Progression: no change    Social Support  Steps to enter house: yes  Stairs in house: yes   Lives in: multiple-level home  Lives with: spouse    Employment status: not working  Hand dominance: right    Treatments  Previous treatment: home therapy  Current treatment: physical therapy  Discharged from (in last 30 days): inpatient hospitalization, skilled nursing facility and home health care  Patient Goals  Patient goals for therapy: decreased pain, improved balance, increased motion, increased strength, independence with ADLs/IADLs and return to sport/leisure activities  Patient goal: get rid of RW and function normally        Objective     Observations     Right Hip  Positive for incision  Additional Observation Details  Patient to have staples removed next week  Patient's incision is clean/dry - staples intact  Reviewed GINA precautions      Neurological Testing     Sensation     Hip   Left Hip   Intact: light touch and hot/cold discrimination    Right Hip   Intact: light touch and hot/cold discrimination    Active Range of Motion   Left Hip   Flexion: 75 degrees   Extension: 0 degrees   Abduction: 35 degrees   External rotation (90/90): 30 degrees   Internal rotation (90/90): 22 degrees     Right Hip   Flexion: 55 degrees   Extension: -5 degrees   Abduction: 5 degrees   External rotation (90/90): 5 degrees   Internal rotation (90/90): 10 degrees     Strength/Myotome Testing     Left Hip   Planes of Motion   Flexion: 4+  Extension: 4+  Abduction: 4+  Adduction: 4+  External rotation: 4+  Internal rotation: 4+    Right Hip   Planes of Motion   Flexion: 3+  Extension: 3  Abduction: 3+  Adduction: 3+  External rotation: 3-  Internal rotation: 3-    Tests     Additional Tests Details  FOTO: 16% ( predicted 50%)    Gait impairments: Patient ambulates with RW w/ R platform for R wrist WBAT B/L LE    The patient demonstrates slow gait speed, R lateral lean, unequal step lengths, and decreased heel-toe rollover R LE      TU seconds wit RW   30 SSTS: 6 stands             Precautions: R TOTAL HIP / R WRIST FRACTURE  Re-evaluation:      Hip Specialty Daily Treatment Diary     Manual                 Hamstring stretch  *      Prone Quad Stretch                Groin stretch  *          Therapeutic Exercise        Recumbent Bike S=        Nu step S=  *      Stand hip ABD  Hip EXT  *      LAQ x15               Heel Slide flex/abd x15 ea flex/abd       Supine ball squeeze  *      Clamshell pillow b/t legs  *      Bridges        SLR  *                                      Standing lumbar extensions *       Total Gym Squat        ER seated  x10               Gait Training        Sidestepping  *      Hurdles OBO        Hurdles weave        Heel-toe ambulation        Mirror amb  * RW      Neuro Re-Ed        Steam boats        Core brace  *      Gluteal sets x15       QS x15       CSMi weight shift/squat  *Weight shifts      Tandem balance  *      Foam balance        Alt step taps  *      Therapeutic Activity        Sit to stand transfers        Squats x6 Step Ups fwd/side  *4"      Up/down steps            Modalities        CP knee/hip

## 2022-07-20 ENCOUNTER — EVALUATION (OUTPATIENT)
Dept: PHYSICAL THERAPY | Facility: CLINIC | Age: 72
End: 2022-07-20
Payer: MEDICARE

## 2022-07-20 DIAGNOSIS — Z96.649 STATUS POST HIP HEMIARTHROPLASTY: ICD-10-CM

## 2022-07-20 DIAGNOSIS — R26.9 GAIT ABNORMALITY: ICD-10-CM

## 2022-07-20 DIAGNOSIS — S72.031D CLOSED TRANSCERVICAL FRACTURE OF RIGHT FEMUR WITH ROUTINE HEALING, SUBSEQUENT ENCOUNTER: Primary | ICD-10-CM

## 2022-07-20 PROCEDURE — 97163 PT EVAL HIGH COMPLEX 45 MIN: CPT | Performed by: PHYSICAL THERAPIST

## 2022-07-20 PROCEDURE — 97112 NEUROMUSCULAR REEDUCATION: CPT | Performed by: PHYSICAL THERAPIST

## 2022-07-20 PROCEDURE — 97110 THERAPEUTIC EXERCISES: CPT | Performed by: PHYSICAL THERAPIST

## 2022-07-27 ENCOUNTER — OFFICE VISIT (OUTPATIENT)
Dept: PHYSICAL THERAPY | Facility: CLINIC | Age: 72
End: 2022-07-27
Payer: MEDICARE

## 2022-07-27 DIAGNOSIS — Z96.649 STATUS POST HIP HEMIARTHROPLASTY: ICD-10-CM

## 2022-07-27 DIAGNOSIS — S72.031D CLOSED TRANSCERVICAL FRACTURE OF RIGHT FEMUR WITH ROUTINE HEALING, SUBSEQUENT ENCOUNTER: Primary | ICD-10-CM

## 2022-07-27 DIAGNOSIS — R26.9 GAIT ABNORMALITY: ICD-10-CM

## 2022-07-27 PROCEDURE — 97110 THERAPEUTIC EXERCISES: CPT | Performed by: PHYSICAL THERAPIST

## 2022-07-27 PROCEDURE — 97140 MANUAL THERAPY 1/> REGIONS: CPT | Performed by: PHYSICAL THERAPIST

## 2022-07-27 PROCEDURE — 97112 NEUROMUSCULAR REEDUCATION: CPT | Performed by: PHYSICAL THERAPIST

## 2022-07-27 NOTE — PROGRESS NOTES
Daily Note     Today's date: 2022  Patient name: Jordan Hernandez  : 1950  MRN: [id number]  Referring provider: Maribel Mims DO  Dx:   Encounter Diagnosis     ICD-10-CM    1  Closed transcervical fracture of right femur with routine healing, subsequent encounter  S72 031D    2  Status post hip hemiarthroplasty  Z96 649    3  Gait abnormality  R26 9                   Subjective: The patient reports feeling 3/10 pain at her right lateral hip today  The patient reports she was able to walk in her kitchen without her RW to put dishes away  Objective: See treatment diary below      Assessment: The patient tolerated all activities well today  The patient needed verbal and manual cues for proper posture and technique to perform the exercises properly  There were no complaints of increased pain or problems after the session today  The patient will benefit from continued skilled physical therapy to progress towards achieving patient centered goals  Plan: Continue per plan of care  Progress treatment as tolerated         Precautions: R TOTAL HIP / R WRIST FRACTURE  Re-evaluation:    [place] [facility]       Hip Specialty Daily Treatment Diary     Manual        Hip flexion / ER  5x:15      Hamstring stretch  *3x:30      Prone Quad Stretch                Groin stretch  *3x:30          Therapeutic Exercise       Recumbent Bike S=        Nu step S= 10  *L3 x7 mins      Stand hip ABD  Hip EXT  *      LAQ x15               Heel Slide flex/abd x15 ea flex/abd x15 ea      Supine ball squeeze  *x15      Clamshell pillow b/t legs  *x15      Bridges        SLR  *x10                                      Standing lumbar extensions * x10      Total Gym Squat        ER seated  x10 x15              Gait Training        Sidestepping  *      Hurdles OBO        Hurdles weave        Heel-toe ambulation        Mirror amb  * RW      Neuro Re-Ed        Steam boats        Core brace  *NV      Gluteal sets x15 x15      QS x15 x15      CSMi weight shift/squat  *Weight shifts      Tandem balance  *2x:30      Foam balance        Alt step taps  *6" x10      Therapeutic Activity        Sit to stand transfers        Squats x6       Step Ups fwd/side  *4"NV      Up/down steps            Modalities        CP knee/hip

## 2022-07-29 ENCOUNTER — OFFICE VISIT (OUTPATIENT)
Dept: PHYSICAL THERAPY | Facility: CLINIC | Age: 72
End: 2022-07-29
Payer: MEDICARE

## 2022-07-29 ENCOUNTER — OFFICE VISIT (OUTPATIENT)
Dept: OBGYN CLINIC | Facility: CLINIC | Age: 72
End: 2022-07-29

## 2022-07-29 VITALS
HEART RATE: 83 BPM | BODY MASS INDEX: 35.99 KG/M2 | SYSTOLIC BLOOD PRESSURE: 107 MMHG | WEIGHT: 216 LBS | HEIGHT: 65 IN | DIASTOLIC BLOOD PRESSURE: 73 MMHG

## 2022-07-29 DIAGNOSIS — R26.9 GAIT ABNORMALITY: ICD-10-CM

## 2022-07-29 DIAGNOSIS — Z96.649 STATUS POST HIP HEMIARTHROPLASTY: ICD-10-CM

## 2022-07-29 DIAGNOSIS — S72.031D CLOSED TRANSCERVICAL FRACTURE OF RIGHT FEMUR WITH ROUTINE HEALING, SUBSEQUENT ENCOUNTER: Primary | ICD-10-CM

## 2022-07-29 DIAGNOSIS — S72.031A: Primary | ICD-10-CM

## 2022-07-29 DIAGNOSIS — S62.101D CLOSED FRACTURE OF RIGHT WRIST WITH ROUTINE HEALING, SUBSEQUENT ENCOUNTER: ICD-10-CM

## 2022-07-29 PROCEDURE — 97112 NEUROMUSCULAR REEDUCATION: CPT

## 2022-07-29 PROCEDURE — 97110 THERAPEUTIC EXERCISES: CPT

## 2022-07-29 PROCEDURE — 97140 MANUAL THERAPY 1/> REGIONS: CPT

## 2022-07-29 PROCEDURE — 99024 POSTOP FOLLOW-UP VISIT: CPT | Performed by: ORTHOPAEDIC SURGERY

## 2022-07-29 NOTE — PROGRESS NOTES
Daily Note     Today's date: 2022  Patient name: Jovanni Rock  : 1950  MRN: [id number]  Referring provider: Brandon Oconnor DO  Dx:   Encounter Diagnosis     ICD-10-CM    1  Closed transcervical fracture of right femur with routine healing, subsequent encounter  S72 031D    2  Status post hip hemiarthroplasty  Z96 649    3  Gait abnormality  R26 9        Start Time: 739          Subjective: Patient sees the MD today after therapy  She is to have her staples out of hip [place]  She is hoping to get her arm half cast off  Objective: See treatment diary below      Assessment: Tolerated treatment well  Patient would benefit from continued PT for stretching and strengthening  Patient was able to add exercises to her program with little difficulty and no pain  Patient try's to park walker and walk to areas  Encouraged patient to bring her cane next visit so walking education cane be performed  She seemed to understand all education given during session  Patient felt good leaving department  Plan: Continue per plan of care  Progress treatment as tolerated         Precautions: R TOTAL HIP / R WRIST FRACTURE  [facility]       Hip Specialty Daily Treatment Diary     Manual       Hip flexion / ER  5x:15 5x:15     Hamstring stretch  *3x:30 3x:30     Prone Quad Stretch                Groin stretch  *3x:30 3x:30         Therapeutic Exercise      Recumbent Bike S=        Nu step S= 10  *L3 x7 mins L3 x10 min     Stand hip ABD  Hip EXT  *      LAQ x15               Heel Slide flex/abd x15 ea flex/abd x15 ea x15 ea     Supine ball squeeze  *x15 x15     Clamshell pillow b/t legs  *x15      Bridges        SLR  *x10      Ham curls   *x10                             Standing lumbar extensions * x10 x10     Total Gym Squat        ER seated  x10 x15              Gait Training        Sidestepping  * * 10ft x4     Hurdles OBO        Hurdles weave Heel-toe ambulation        Mirror amb  * RW x5 min     Neuro Re-Ed        Steam boats        Core brace  *NV *:05x10     Gluteal sets x15 x15 x15     QS x15 x15 x15     CSMi weight shift/squat  *Weight shifts Weight shifts     Tandem balance  *2x:30 2x :30     Foam balance        Alt step taps  *6" x10 6" x10     Therapeutic Activity        Sit to stand transfers        Squats x6       Step Ups fwd/side  *4"NV 4" x10 ea     Up/down steps            Modalities        CP knee/hip

## 2022-07-29 NOTE — PROGRESS NOTES
ASSESSMENT/PLAN:    Diagnoses and all orders for this visit:    Closed transcervical fracture of right femur, initial encounter ([facility] )  -     XR hip/pelv 2-3 vws right if performed; Future    Closed fracture of right wrist with routine healing, subsequent encounter  -     XR wrist 2 vw right; Future  -     Cock Up Wrist Splint      It was discussed with the patient that she is to continue with therapy for her right hemiarthroplasty  She can continue to use a walker with a platform extension for her right distal radius fracture  She was given a cock-up wrist brace to wear for support  We will hold off on physical therapy at this time to allow the fracture to continue to heal in the wrist   She will follow-up in 4 weeks for re-evaluation and x-rays of the right wrist       Return in about 4 weeks (around 8/26/2022)  Functionally, the patient is doing much better  The wrist fracture starting to heal   She was converted to a Velcro wrist splint  There is however radial shortening  Would recommend continuation of therapy for the hip  No range of motion of therapy for the wrist yet  Return back in 1 month evaluation with new x-rays of right wrist-two views  At this point, therapist can work on the right shoulder and elbow  The patient was instructed to start making a fist to prevent any fibrosis  _____________________________________________________  CHIEF COMPLAINT:  Chief Complaint   Patient presents with    Right Wrist - Post-op    Right Hip - Post-op         SUBJECTIVE:  Syed Vivas is a 67 y o  female who presents to our office complaining of right hip pain  The patient recently had a fall on 06/30/2022 and broke her right hip and right distal radius  She had surgery on 07/02/2022 with a right hip hemiarthroplasty and closed reduction and splinting of the right wrist   This was performed by Dr Garth Bowles  She states that she is doing well overall    She does note some pain into her wrist  She states she has minimal discomfort at this time in her hip  She denies any numbness or tingling  The following portions of the patient's history were reviewed and updated as appropriate: allergies, current medications, past family history, past medical history, past social history, past surgical history and problem list     PAST MEDICAL HISTORY:  Past Medical History:   Diagnosis Date    Anxiety     Arthritis     Depression     GERD (gastroesophageal reflux disease)     Hiatal hernia     Hyperlipidemia     Hypertension        PAST SURGICAL HISTORY:  Past Surgical History:   Procedure Laterality Date    APPENDECTOMY      CHOLECYSTECTOMY      FRACTURE SURGERY Right     arm with plate and pins    HAND SURGERY Bilateral     HYSTERECTOMY      JOINT REPLACEMENT Bilateral     MS PARTIAL HIP REPLACEMENT Right 7/2/2022    Procedure: HEMIARTHROPLASTY HIP (BIPOLAR), closed reduction with splinting right wrist;  Surgeon: Beny Donnelly; Location: [facility] OR;  Service: Orthopedics    SHOULDER ARTHROSCOPY Left        FAMILY HISTORY:  History reviewed  No pertinent family history      SOCIAL HISTORY:  Social History     Tobacco Use    Smoking status: Former Smoker     Types: Cigarettes    Smokeless tobacco: Never Used   Vaping Use    Vaping Use: Never used   Substance Use Topics    Alcohol use: Not Currently     Alcohol/week: 1 0 standard drink     Types: 1 Glasses of wine per week    Drug use: Never       MEDICATIONS:    Current Outpatient Medications:     acetaminophen (TYLENOL) 325 mg tablet, Take 2 tablets (650 mg total) by mouth every 6 (six) hours as needed for mild pain, Disp: , Rfl: 0    amLODIPine (NORVASC) 10 mg tablet, Take 10 mg by mouth daily, Disp: , Rfl:     cetirizine (ZyrTEC) 10 mg tablet, Take 10 mg by mouth daily, Disp: , Rfl:     ferrous sulfate 325 (65 Fe) mg tablet, Take 1 tablet (325 mg total) by mouth daily with breakfast, Disp: , Rfl: 0    gabapentin (NEURONTIN) 600 MG tablet, Take 1 tablet (600 mg total) by mouth daily at bedtime, Disp: 30 tablet, Rfl: 0    multivitamin (THERAGRAN) TABS, Take 1 tablet by mouth daily, Disp: , Rfl:     omeprazole (PriLOSEC) 20 mg delayed release capsule, Take 20 mg by mouth daily 1 in am 1 in pm, Disp: , Rfl:     pravastatin (PRAVACHOL) 40 mg tablet, Take 40 mg by mouth daily One at bedtime, Disp: , Rfl:     temazepam (RESTORIL) 7 5 mg capsule, Take 15 mg by mouth daily at bedtime as needed for sleep  , Disp: , Rfl:     venlafaxine (EFFEXOR-XR) 150 mg 24 hr capsule, Take 150 mg by mouth daily, Disp: , Rfl:     aspirin (ECOTRIN) 325 mg EC tablet, Take 2 tablets (650 mg total) by mouth daily Take with food, Disp: 30 tablet, Rfl: 0    senna (SENOKOT) 8 6 mg, Take 1 tablet (8 6 mg total) by mouth daily at bedtime, Disp: , Rfl: 0    ALLERGIES:  No Known Allergies    ROS:  Review of Systems     Constitutional: Negative for fatigue, fever or loss of appetite  HENT: Negative  Respiratory: Negative for shortness of breath, dyspnea  Cardiovascular: Negative for chest pain/tightness  Gastrointestinal: Negative for abdominal pain, N/V  Endocrine: Negative for cold/heat intolerance, unexplained weight loss/gain  Genitourinary: Negative for flank pain, dysuria, hematuria  Musculoskeletal: Positive for arthralgia   Skin: Negative for rash  Neurological: Negative for numbness or tingling  Psychiatric/Behavioral: Negative for agitation  _____________________________________________________  PHYSICAL EXAMINATION:    Blood pressure 107/73, pulse 83, height 5' 5" (1 651 m), weight 98 kg (216 lb)  Constitutional: Oriented to person, place, and time  Appears well-developed and well-nourished  No distress  HENT:   Head: Normocephalic  Eyes: Conjunctivae are normal  Right eye exhibits no discharge  Left eye exhibits no discharge  No scleral icterus  Cardiovascular: Normal rate      Pulmonary/Chest: Effort normal    Neurological: Alert and oriented to person, place, and time  Skin: Skin is warm and dry  No rash noted  Not diaphoretic  No erythema  No pallor  Psychiatric: Normal mood and affect  Behavior is normal  Judgment and thought content normal       MUSCULOSKELETAL EXAMINATION:   Physical Exam  Ortho Exam    Right hip   No erythema edema or ecchymosis noted  Incision is healing well without signs or symptoms of infection, staples were removed today without complication   She has good range of motion of the hip with forward flexion, abduction, internal and external rotation  Patient is neurovascular intact    Right wrist   Mild swelling noted over the wrist, no erythema or ecchymosis noted   Tenderness to palpation over the fracture site  Range of motion of the wrist was deferred   She is able to make a full composite fist   Patient is neurovascular intact    Objective:  BP Readings from Last 1 Encounters:   07/29/22 107/73      Wt Readings from Last 1 Encounters:   07/29/22 98 kg (216 lb)        BMI:   Estimated body mass index is 35 94 kg/m² as calculated from the following:    Height as of this encounter: 5' 5" (1 651 m)  Weight as of this encounter: 98 kg (216 lb)      Radiology:  X-rays were performed today of the right wrist and the right hip:  X-rays of the right hip demonstrated hardware in appropriate alignment with no evidence of loosening or lucency    X-rays of the right wrist demonstrated distal radius fracture with now ulnar positive variance, comminution noted within the distal radius fracture    PROCEDURES PERFORMED:  Procedures  No additional procedures were performed at today's visit    Scribe Attestation    I,:  Al Cunningham PA-C am acting as a scribe while in the presence of the attending physician :       I,:  Roosevelt Blount DO personally performed the services described in this documentation    as scribed in my presence :

## 2022-08-01 ENCOUNTER — OFFICE VISIT (OUTPATIENT)
Dept: PHYSICAL THERAPY | Facility: CLINIC | Age: 72
End: 2022-08-01
Payer: MEDICARE

## 2022-08-01 DIAGNOSIS — R26.9 GAIT ABNORMALITY: ICD-10-CM

## 2022-08-01 DIAGNOSIS — S72.031D CLOSED TRANSCERVICAL FRACTURE OF RIGHT FEMUR WITH ROUTINE HEALING, SUBSEQUENT ENCOUNTER: Primary | ICD-10-CM

## 2022-08-01 DIAGNOSIS — Z96.649 STATUS POST HIP HEMIARTHROPLASTY: ICD-10-CM

## 2022-08-01 PROCEDURE — 97112 NEUROMUSCULAR REEDUCATION: CPT

## 2022-08-01 PROCEDURE — 97140 MANUAL THERAPY 1/> REGIONS: CPT

## 2022-08-01 PROCEDURE — 97110 THERAPEUTIC EXERCISES: CPT

## 2022-08-01 NOTE — PROGRESS NOTES
Daily Note     Today's date: 2022  Patient name: Teresa Paris  : 1950  MRN: [id number]  Referring provider: Bonnee Mohs, DO  Dx:   Encounter Diagnosis     ICD-10-CM    1  Closed transcervical fracture of right femur with routine healing, subsequent encounter  S72 031D    2  Status post hip hemiarthroplasty  Z96 649    3  Gait abnormality  R26 9        Start Time: 0735          Subjective: Patient states she is stiff today  She was cleaning her bathroom over the weekend and she did so much at once that she irriated her hip  Today her pain is a 4/10 in the groin area of right hip  She states she started walking with a cane over the weekend  Objective: See treatment diary below    Patient's cane was too high and cane was adjusted to her correct height  She was educated on hip healing and how it effects the hip motion with activity  Assessment: Tolerated treatment well  Patient would benefit from continued PT for stretching and strengthening  Patient performed her exercises with little difficulty and discomfort  She felt better as she stretched the right hip  She seemed to understand all education given during session  Patient felt better when she left department  Plan: Continue per plan of care  Progress treatment as tolerated         Precautions: R TOTAL HIP / R WRIST FRACTURE  Re-evaluation:    [facility]       Hip Specialty Daily Treatment Diary     Manual      Hip flexion / ER  5x:15 5x:15 5x :15    Hamstring stretch  *3x:30 3x:30 :30x3    Prone Quad Stretch                Groin stretch  *3x:30 3x:30 :30x3        Therapeutic Exercise     Recumbent Bike S=        Nu step S= 10  *L3 x7 mins L3 x10 min L3 x10 min    Stand hip ABD  Hip EXT  *      LAQ x15               Heel Slide flex/abd x15 ea flex/abd x15 ea x15 ea x10ea    Supine ball squeeze  *x15 x15 x10    Clamshell pillow b/t legs  *x15      Bridges        SLR  *x10      Ham curls *x10 x10                            Standing lumbar extensions * x10 x10 x10    Total Gym Squat        ER seated  x10 x15              Gait Training        Sidestepping  * * 10ft x4 10 ft x4    Hurdles OBO        Hurdles weave        Heel-toe ambulation        Mirror amb  * RW x5 min Walk w/ cane in gym    Neuro Re-Ed        Steam boats        Core brace  *NV *:05x10 Stand x10    Gluteal sets x15 x15 x15 x10 stand    QS x15 x15 x15 x15    CSMi weight shift/squat  *Weight shifts Weight shifts Weight shifts x2 min    Tandem balance  *2x:30 2x :30 B/L 2x :30 B/L    Foam balance        Alt step taps  *6" x10 6" x10 6" x10 ea    Therapeutic Activity        Sit to stand transfers        Squats x6       Step Ups fwd/side  *4"NV 4" x10 ea 4" x10 ea    Up/down steps            Modalities        CP knee/hip

## 2022-08-03 ENCOUNTER — OFFICE VISIT (OUTPATIENT)
Dept: PHYSICAL THERAPY | Facility: CLINIC | Age: 72
End: 2022-08-03
Payer: MEDICARE

## 2022-08-03 DIAGNOSIS — S72.031D CLOSED TRANSCERVICAL FRACTURE OF RIGHT FEMUR WITH ROUTINE HEALING, SUBSEQUENT ENCOUNTER: Primary | ICD-10-CM

## 2022-08-03 DIAGNOSIS — R26.9 GAIT ABNORMALITY: ICD-10-CM

## 2022-08-03 DIAGNOSIS — Z96.649 STATUS POST HIP HEMIARTHROPLASTY: ICD-10-CM

## 2022-08-03 PROCEDURE — 97530 THERAPEUTIC ACTIVITIES: CPT

## 2022-08-03 PROCEDURE — 97140 MANUAL THERAPY 1/> REGIONS: CPT

## 2022-08-03 PROCEDURE — 97110 THERAPEUTIC EXERCISES: CPT

## 2022-08-03 PROCEDURE — 97112 NEUROMUSCULAR REEDUCATION: CPT

## 2022-08-03 NOTE — PROGRESS NOTES
Daily Note     Today's date: 8/3/2022  Patient name: Hanna Clemente  : 1950  MRN: [id number]  Referring provider: Pura Kaur DO  Dx:   Encounter Diagnosis     ICD-10-CM    1  Closed transcervical fracture of right femur with routine healing, subsequent encounter  S72 031D    2  Status post hip hemiarthroplasty  Z96 649    3  Gait abnormality  R26 9        Start Time: 730          Subjective: No changes since last session  Pain 2/10 today  She states she is learning how to pace herself with home activities until she heals more  Objective: See treatment diary below    Patient's home exercise program updated to include additional exercises  Handout issued and explained with demonstration  Patient accepted new exercises  Assessment: Tolerated treatment well  Patient would benefit from continued PT for stretching and strengthening  Patient was able to add exercises to her program with little difficulty and no increase of pain  She seemed to understand all education on new exercises  Patient felt tired, but good by the end of the session  Plan: Continue per plan of care  Progress treatment as tolerated         Precautions: R TOTAL HIP / R WRIST FRACTURE  Re-evaluation:    [facility]       Hip Specialty Daily Treatment Diary     Manual   8/3   Hip flexion / ER  5x:15 5x:15 5x :15 5x :15   Hamstring stretch  *3x:30 3x:30 :30x3 :30x3   Prone Quad Stretch                Groin stretch  *3x:30 3x:30 :30x3 :30x3       Therapeutic Exercise  8/3   Recumbent Bike S=        Nu step S= 10  *L3 x7 mins L3 x10 min L3 x10 min L3 x10 min   Stand hip ABD  Hip EXT  *   * x10ea   LAQ x15               Heel Slide flex/abd x15 ea flex/abd x15 ea x15 ea x10ea x10ea   Supine ball squeeze  *x15 x15 x10 x10   Clamshell pillow b/t legs  *x15   x10   Bridges     *x10   SLR  *x10   *AAROM x5   Ham curls   *x10 x10 x10   Calf stretch     *:30x3   Hamstring stretch     *:30x3 Standing lumbar extensions * x10 x10 x10 x10   Total Gym Squat        ER seated  x10 x15              Gait Training        Sidestepping  * * 10ft x4 10 ft x4 10ft x4   Hurdles OBO        Hurdles weave     Cone  25ft x2   Heel-toe ambulation        Mirror amb  * RW x5 min Walk w/ cane in gym    Neuro Re-Ed        Steam boats        Core brace  *NV *:05x10 Stand x10 HEP   Gluteal sets x15 x15 x15 x10 stand HEP   QS x15 x15 x15 x15 x10   CSMi weight shift/squat  *Weight shifts Weight shifts Weight shifts x2 min    Tandem balance  *2x:30 2x :30 B/L 2x :30 B/L :30x2 B/L   Foam balance     *:30 x2 EO/EC   Alt step taps  *6" x10 6" x10 6" x10 ea 6" x10 ea   Therapeutic Activity        Sit to stand transfers     *x5   Squats x6       Step Ups fwd/side  *4"NV 4" x10 ea 4" x10 ea 4" x10 ea   Up/down steps            Modalities        CP knee/hip                            Access Code: [email or web address]  URL: [email or web address]  Date: 08/03/2022  Prepared by: Tamra Rodriguez    Exercises  · Supine Quadricep Sets - 2 x daily - 6 x weekly - 2 sets - 10 reps - 5 hold  · Supine Gluteal Sets - 2 x daily - 6 x weekly - 2 sets - 10 reps - 5 hold  · Supine Heel Slide - 2 x daily - 6 x weekly - 2 sets - 10 reps - 5 hold  · Supine Hip Abduction - 2 x daily - 6 x weekly - 2 sets - 10 reps - 5 hold  · Seated Ankle Pumps - 3 x daily - 7 x weekly - 3 sets - 10 reps  · Seated Long Arc Quad - 2 x daily - 7 x weekly - 2 sets - 10 reps - 5 hold  · Seated Hip External Rotation AROM - 2 x daily - 7 x weekly - 2 sets - 10 reps  · Supine Hip Adduction Isometric with Ball - 2 x daily - 7 x weekly - 2 sets - 10 reps - 5 hold  · Clam with Pillow Between Knees - 2 x daily - 7 x weekly - 2 sets - 10 reps - 5 hold  · Active Straight Leg Raise with Quad Set - 2 x daily - 7 x weekly - 2 sets - 10 reps - 2 hold  · Standing Tandem Balance with Counter Support - 2 x daily - 7 x weekly - 1 sets - 2 reps - 30 hold  · Standing Lumbar Extension with Counter - 3 x daily - 7 x weekly - 1 sets - 10 reps  · Alternating Step Taps with Counter Support - 2 x daily - 7 x weekly - 1 sets - 10 reps  · Supine Bridge - 2 x daily - 7 x weekly - 1 sets - 10 reps - 3-5 sec hold  · Supine Straight Leg Raises - 2 x daily - 7 x weekly - 1 sets - 10 reps  · Standing Hip Abduction with Counter Support - 2 x daily - 7 x weekly - 1 sets - 10 reps - 3 sec hold  · Standing Hip Extension with Counter Support - 2 x daily - 7 x weekly - 1 sets - 10 reps - 3 sec hold  · Seated Calf Stretch with Strap - 2 x daily - 7 x weekly - 1 sets - 3 reps - 30 sec hold  · Standing Hamstring Stretch with Step - 2 x daily - 7 x weekly - 1 sets - 3 reps - 30 sec hold

## 2022-08-08 ENCOUNTER — OFFICE VISIT (OUTPATIENT)
Dept: PHYSICAL THERAPY | Facility: CLINIC | Age: 72
End: 2022-08-08
Payer: MEDICARE

## 2022-08-08 DIAGNOSIS — R26.9 GAIT ABNORMALITY: ICD-10-CM

## 2022-08-08 DIAGNOSIS — Z96.649 STATUS POST HIP HEMIARTHROPLASTY: ICD-10-CM

## 2022-08-08 DIAGNOSIS — S72.031D CLOSED TRANSCERVICAL FRACTURE OF RIGHT FEMUR WITH ROUTINE HEALING, SUBSEQUENT ENCOUNTER: Primary | ICD-10-CM

## 2022-08-08 PROCEDURE — 97112 NEUROMUSCULAR REEDUCATION: CPT | Performed by: PHYSICAL THERAPIST

## 2022-08-08 PROCEDURE — 97140 MANUAL THERAPY 1/> REGIONS: CPT | Performed by: PHYSICAL THERAPIST

## 2022-08-08 PROCEDURE — 97110 THERAPEUTIC EXERCISES: CPT | Performed by: PHYSICAL THERAPIST

## 2022-08-08 NOTE — PROGRESS NOTES
Daily Note     Today's date: 2022  Patient name: Syed Vivas  : 1950  MRN: [id number]  Referring provider: Keyon Smith DO  Dx:   Encounter Diagnosis     ICD-10-CM    1  Closed transcervical fracture of right femur with routine healing, subsequent encounter  S72 031D    2  Status post hip hemiarthroplasty  Z96 649    3  Gait abnormality  R26 9                   Subjective: The patient is now walking with a SPC in the community and waling without any AD in the house  The patient reports feeling an " ache" at her right lateral leg where her staples were - 2/10 in intensity  Objective: See treatment diary below      Assessment: The patient tolerated all activities well today  The patient needed verbal and manual cues for proper posture and technique to perform the exercises properly  There were no complaints of increased pain or problems after the session today  The patient will benefit from continued skilled physical therapy to progress towards achieving patient centered goals  Plan: Continue per plan of care  Progress treatment as tolerated         Precautions: R TOTAL HIP / R WRIST FRACTURE  Re-evaluation:    [facility]       Hip Specialty Daily Treatment Diary     Manual  3   Hip flexion / ER 5x:15 5x:15 5x:15 5x :15 5x :15   Hamstring stretch 3x:30 *3x:30 3x:30 :30x3 :30x3   Prone Quad Stretch                Groin stretch 3x:30 *3x:30 3x:30 :30x3 :30x3       Therapeutic Exercise  83   Recumbent Bike S=        Nu step S= 10 L3 x 10 mins *L3 x7 mins L3 x10 min L3 x10 min L3 x10 min   Stand hip ABD  Hip EXT  *   * x10ea   LAQ                Heel Slide flex/abd x10 ea x15 ea x15 ea x10ea x10ea   Supine ball squeeze  *x15 x15 x10 x10   Clamshell  No pillow x15 *x15   x10   Bridges x15    *x10   SLR x10 *x10   *AAROM x5   Ham curls   *x10 x10 x10   Calf stretch     *:30x3   Hamstring stretch     *:30x3           Standing lumbar extensions x10 x10 x10 x10 x10   Total Gym Squat        ER seated   x15              Gait Training        Sidestepping  * * 10ft x4 10 ft x4 10ft x4   Hurdles OBO        Hurdles weave     Cone  25ft x2           Mirror amb  * RW x5 min Walk w/ cane in gym    Neuro Re-Ed        Steam boats        Core brace  *NV *:05x10 Stand x10 HEP   Gluteal sets  x15 x15 x10 stand HEP   QS  x15 x15 x15 x10   CSMi weight shift/squat Squats "+" x10 *Weight shifts Weight shifts Weight shifts x2 min    Tandem balance :30x2 B/L *2x:30 2x :30 B/L 2x :30 B/L :30x2 B/L   Foam balance EO/EC 2x:30    *:30 x2 EO/EC   Tandem walk // bar 10ft x 4       Alt step taps 6" x10 ea *6" x10 6" x10 6" x10 ea 6" x10 ea   Therapeutic Activity        Sit to stand transfers x10    *x5   Squats        Step Ups fwd/side 6" x10 ea *4"NV 4" x10 ea 4" x10 ea 4" x10 ea   Up/down steps            Modalities        CP knee/hip                            Access Code: [email or web address]  URL: [email or web address]  Date: 08/03/2022  Prepared by: Mavis Najjar Frohnheiser    Exercises  · Supine Quadricep Sets - 2 x daily - 6 x weekly - 2 sets - 10 reps - 5 hold  · Supine Gluteal Sets - 2 x daily - 6 x weekly - 2 sets - 10 reps - 5 hold  · Supine Heel Slide - 2 x daily - 6 x weekly - 2 sets - 10 reps - 5 hold  · Supine Hip Abduction - 2 x daily - 6 x weekly - 2 sets - 10 reps - 5 hold  · Seated Ankle Pumps - 3 x daily - 7 x weekly - 3 sets - 10 reps  · Seated Long Arc Quad - 2 x daily - 7 x weekly - 2 sets - 10 reps - 5 hold  · Seated Hip External Rotation AROM - 2 x daily - 7 x weekly - 2 sets - 10 reps  · Supine Hip Adduction Isometric with Ball - 2 x daily - 7 x weekly - 2 sets - 10 reps - 5 hold  · Clam with Pillow Between Knees - 2 x daily - 7 x weekly - 2 sets - 10 reps - 5 hold  · Active Straight Leg Raise with Quad Set - 2 x daily - 7 x weekly - 2 sets - 10 reps - 2 hold  · Standing Tandem Balance with Counter Support - 2 x daily - 7 x weekly - 1 sets - 2 reps - 30 hold  · Standing Lumbar Extension with Counter - 3 x daily - 7 x weekly - 1 sets - 10 reps  · Alternating Step Taps with Counter Support - 2 x daily - 7 x weekly - 1 sets - 10 reps  · Supine Bridge - 2 x daily - 7 x weekly - 1 sets - 10 reps - 3-5 sec hold  · Supine Straight Leg Raises - 2 x daily - 7 x weekly - 1 sets - 10 reps  · Standing Hip Abduction with Counter Support - 2 x daily - 7 x weekly - 1 sets - 10 reps - 3 sec hold  · Standing Hip Extension with Counter Support - 2 x daily - 7 x weekly - 1 sets - 10 reps - 3 sec hold  · Seated Calf Stretch with Strap - 2 x daily - 7 x weekly - 1 sets - 3 reps - 30 sec hold  · Standing Hamstring Stretch with Step - 2 x daily - 7 x weekly - 1 sets - 3 reps - 30 sec hold

## 2022-08-10 ENCOUNTER — OFFICE VISIT (OUTPATIENT)
Dept: PHYSICAL THERAPY | Facility: CLINIC | Age: 72
End: 2022-08-10
Payer: MEDICARE

## 2022-08-10 DIAGNOSIS — Z96.649 STATUS POST HIP HEMIARTHROPLASTY: ICD-10-CM

## 2022-08-10 DIAGNOSIS — S72.031D CLOSED TRANSCERVICAL FRACTURE OF RIGHT FEMUR WITH ROUTINE HEALING, SUBSEQUENT ENCOUNTER: Primary | ICD-10-CM

## 2022-08-10 DIAGNOSIS — R26.9 GAIT ABNORMALITY: ICD-10-CM

## 2022-08-10 PROCEDURE — 97112 NEUROMUSCULAR REEDUCATION: CPT | Performed by: PHYSICAL THERAPIST

## 2022-08-10 PROCEDURE — 97140 MANUAL THERAPY 1/> REGIONS: CPT | Performed by: PHYSICAL THERAPIST

## 2022-08-10 PROCEDURE — 97110 THERAPEUTIC EXERCISES: CPT | Performed by: PHYSICAL THERAPIST

## 2022-08-10 NOTE — PROGRESS NOTES
Daily Note     Today's date: 8/10/2022  Patient name: Victor M Ewing  : 1950  MRN: [id number]  Referring provider: Tad Max DO  Dx:   Encounter Diagnosis     ICD-10-CM    1  Closed transcervical fracture of right femur with routine healing, subsequent encounter  S72 031D    2  Status post hip hemiarthroplasty  Z96 649    3  Gait abnormality  R26 9                   Subjective: The patient denies feeling any pain this AM   The patient reports no issues after last session  Objective: See treatment diary below      Assessment: The patient tolerated therapy well today with no complaints of pain increase or problems throughout the session  The patient is ambulating in the gym without any assistive devices but exhibits a R LE limp  The patient was given verbal cues to correct her posture/gait mechanics to promote normal gait  The patient will benefit from continued PT to achieve her goals of therapy  Plan: Continue per plan of care  Progress treatment as tolerated         Precautions: R TOTAL HIP / R WRIST FRACTURE  Re-evaluation:    [facility]       Hip Specialty Daily Treatment Diary     Manual  8/8 8/10  8/1 8/3   Hip flexion / ER 5x:15 5x:15  5x :15 5x :15   Hamstring stretch 3x:30 3x:30  :30x3 :30x3   Prone Quad Stretch                Groin stretch 3x:30 3x:30  :30x3 :30x3       Therapeutic Exercise 8/8 8/10  8/1 8/3   Recumbent Bike S=        Nu step S= 10 L3 x 10 mins L4 x 10 mins  L3 x10 min L3 x10 min   Stand hip ABD  Hip EXT     * x10ea   LAQ                Heel Slide flex/abd x10 ea x10 ea  x10ea x10ea   Supine ball squeeze    x10 x10   Clamshell  No pillow x15 x20 no pillow   x10   Bridges x15 x15   *x10   SLR x10 x15   *AAROM x5   Ham curls    x10 x10   Calf stretch     *:30x3   Hamstring stretch     *:30x3           Standing lumbar extensions x10 x10  x10 x10   Total Gym Squat        ER seated   Yellow x10              Gait Training        Sidestepping    10 ft x4 10ft x4 Hurdles OBO        Hurdles weave     Cone  25ft x2           Mirror amb    Walk w/ cane in gym    Neuro Re-Ed        [employer]        Core brace    Stand x10 HEP   Gluteal sets    x10 stand HEP   QS    x15 x10   CSMi weight shift/squat Squats "+" x10 Squats "+" x15  Weight shifts x2 min    Tandem balance :30x2 B/L :30x1  2x :30 B/L :30x2 B/L   Foam balance EO/EC 2x:30 EO/EC 2x:30   *:30 x2 EO/EC   Tandem walk // bar 10ft x 4 10ft x 6      Alt step taps 6" x10 ea 8" x15  6" x10 ea 6" x10 ea   Therapeutic Activity        Sit to stand transfers x10 x10   *x5   Squats        Step Ups fwd/side 6" x10 ea 6" x15  4" x10 ea 4" x10 ea   Up/down steps            Modalities        CP knee/hip                            Access Code: [email or web address]  URL: [email or web address]  Date: 08/03/2022  Prepared by: Js Brown Frohnheiser    Exercises  · Supine Quadricep Sets - 2 x daily - 6 x weekly - 2 sets - 10 reps - 5 hold  · Supine Gluteal Sets - 2 x daily - 6 x weekly - 2 sets - 10 reps - 5 hold  · Supine Heel Slide - 2 x daily - 6 x weekly - 2 sets - 10 reps - 5 hold  · Supine Hip Abduction - 2 x daily - 6 x weekly - 2 sets - 10 reps - 5 hold  · Seated Ankle Pumps - 3 x daily - 7 x weekly - 3 sets - 10 reps  · Seated Long Arc Quad - 2 x daily - 7 x weekly - 2 sets - 10 reps - 5 hold  · Seated Hip External Rotation AROM - 2 x daily - 7 x weekly - 2 sets - 10 reps  · Supine Hip Adduction Isometric with Ball - 2 x daily - 7 x weekly - 2 sets - 10 reps - 5 hold  · Clam with Pillow Between Knees - 2 x daily - 7 x weekly - 2 sets - 10 reps - 5 hold  · Active Straight Leg Raise with Quad Set - 2 x daily - 7 x weekly - 2 sets - 10 reps - 2 hold  · Standing Tandem Balance with Counter Support - 2 x daily - 7 x weekly - 1 sets - 2 reps - 30 hold  · Standing Lumbar Extension with Counter - 3 x daily - 7 x weekly - 1 sets - 10 reps  · Alternating Step Taps with Counter Support - 2 x daily - 7 x weekly - 1 sets - 10 reps  · Supine Bridge - 2 x daily - 7 x weekly - 1 sets - 10 reps - 3-5 sec hold  · Supine Straight Leg Raises - 2 x daily - 7 x weekly - 1 sets - 10 reps  · Standing Hip Abduction with Counter Support - 2 x daily - 7 x weekly - 1 sets - 10 reps - 3 sec hold  · Standing Hip Extension with Counter Support - 2 x daily - 7 x weekly - 1 sets - 10 reps - 3 sec hold  · Seated Calf Stretch with Strap - 2 x daily - 7 x weekly - 1 sets - 3 reps - 30 sec hold  · Standing Hamstring Stretch with Step - 2 x daily - 7 x weekly - 1 sets - 3 reps - 30 sec hold

## 2022-08-15 ENCOUNTER — OFFICE VISIT (OUTPATIENT)
Dept: PHYSICAL THERAPY | Facility: CLINIC | Age: 72
End: 2022-08-15
Payer: MEDICARE

## 2022-08-15 DIAGNOSIS — R26.9 GAIT ABNORMALITY: ICD-10-CM

## 2022-08-15 DIAGNOSIS — Z96.649 STATUS POST HIP HEMIARTHROPLASTY: ICD-10-CM

## 2022-08-15 DIAGNOSIS — S72.031D CLOSED TRANSCERVICAL FRACTURE OF RIGHT FEMUR WITH ROUTINE HEALING, SUBSEQUENT ENCOUNTER: Primary | ICD-10-CM

## 2022-08-15 PROCEDURE — 97112 NEUROMUSCULAR REEDUCATION: CPT | Performed by: PHYSICAL THERAPIST

## 2022-08-15 PROCEDURE — 97140 MANUAL THERAPY 1/> REGIONS: CPT | Performed by: PHYSICAL THERAPIST

## 2022-08-15 PROCEDURE — 97110 THERAPEUTIC EXERCISES: CPT | Performed by: PHYSICAL THERAPIST

## 2022-08-15 NOTE — PROGRESS NOTES
Daily Note     Today's date: 8/15/2022  Patient name: Maverick Canela  : 1950  MRN: [id number]  Referring provider: Alexia Klein DO  Dx:   Encounter Diagnosis     ICD-10-CM    1  Closed transcervical fracture of right femur with routine healing, subsequent encounter  S72 031D    2  Status post hip hemiarthroplasty  Z96 649    3  Gait abnormality  R26 9                   Subjective: The patient reports onset of R anterior/lateral hip pain yesterday after sitting unsupported on a picnic bench for 3 hours yesterday  The patient notes the pain is a 6/10 pain at her right anterior lateral hip  Objective: See treatment diary below      Assessment: The patient demonstrated a L lateral shift with standing and walking with the SPC  The patient was educated about her posture, the lumbar spine being a source of pain, and the importance of posture correction / performance of repeated extension  The patient accepts and understands the information  The patient reports feeling decreased pain and feels her gait is improved compared to the start of today's session  The patient will benefit from continued PT to achieve her goals of therapy  Plan: Continue per plan of care  Progress note during next visit  Progress treatment as tolerated         Precautions: R TOTAL HIP / R WRIST FRACTURE  Re-evaluation:    [facility]       Hip Specialty Daily Treatment Diary     Manual  8/8 8/10 8/15  8/3   Hip flexion / ER 5x:15 5x:15 5x:15 ea  5x :15   Hamstring stretch 3x:30 3x:30 3x:30  :30x3   Prone Quad Stretch   5x:15             Groin stretch 3x:30 3x:30 3x:30  :30x3       Therapeutic Exercise 8/8 8/10 8/15  8/3   Recumbent Bike S=        Nu step towel roll S= 10 L3 x 10 mins L4 x 10 mins L4 x10 mins  L3 x10 min   Stand hip ABD  Hip EXT     * x10ea   LAQ                Heel Slide flex/abd x10 ea x10 ea ABD x15  x10ea   Supine ball squeeze     x10   Clamshell  No pillow x15 x20 no pillow x20  x10   Bridges x15 x15 x15  *x10   SLR x10 x15 EXT x10  *AAROM x5   Ham curls     x10   Calf stretch     *:30x3   Hamstring stretch     *:30x3   Prone on elbow   x10     Standing lumbar extensions x10 x10 x20  x10   Total Gym Squat        ER seated   Yellow x10              Gait Training        Sidestepping     10ft x4   Hurdles OBO        Hurdles weave     Cone  25ft x2           Mirror amb        Neuro Re-Ed        Steam boats        Core brace     HEP   Towel roll / posture education   Done AD     Gluteal sets     HEP   QS     x10   CSMi weight shift/squat Squats "+" x10 Squats "+" x15      Tandem balance :30x2 B/L :30x1 :30x1  :30x2 B/L   Foam balance EO/EC 2x:30 EO/EC 2x:30 EO/EC 2x:30  *:30 x2 EO/EC   Tandem walk // bar 10ft x 4 10ft x 6 10ft x6     Alt step taps 6" x10 ea 8" x15 2H x8" x15  6" x10 ea   Therapeutic Activity        Sit to stand transfers x10 x10   *x5   Squats        Step Ups fwd/side 6" x10 ea 6" x15   4" x10 ea   Up/down steps            Modalities        CP knee/hip                            Access Code: [email or web address]  URL: [email or web address]  Date: 08/03/2022  Prepared by: Bogdan Rodriguez    Exercises  · Supine Quadricep Sets - 2 x daily - 6 x weekly - 2 sets - 10 reps - 5 hold  · Supine Gluteal Sets - 2 x daily - 6 x weekly - 2 sets - 10 reps - 5 hold  · Supine Heel Slide - 2 x daily - 6 x weekly - 2 sets - 10 reps - 5 hold  · Supine Hip Abduction - 2 x daily - 6 x weekly - 2 sets - 10 reps - 5 hold  · Seated Ankle Pumps - 3 x daily - 7 x weekly - 3 sets - 10 reps  · Seated Long Arc Quad - 2 x daily - 7 x weekly - 2 sets - 10 reps - 5 hold  · Seated Hip External Rotation AROM - 2 x daily - 7 x weekly - 2 sets - 10 reps  · Supine Hip Adduction Isometric with Ball - 2 x daily - 7 x weekly - 2 sets - 10 reps - 5 hold  · Clam with Pillow Between Knees - 2 x daily - 7 x weekly - 2 sets - 10 reps - 5 hold  · Active Straight Leg Raise with Quad Set - 2 x daily - 7 x weekly - 2 sets - 10 reps - 2 hold  · Standing Tandem Balance with Counter Support - 2 x daily - 7 x weekly - 1 sets - 2 reps - 30 hold  · Standing Lumbar Extension with Counter - 3 x daily - 7 x weekly - 1 sets - 10 reps  · Alternating Step Taps with Counter Support - 2 x daily - 7 x weekly - 1 sets - 10 reps  · Supine Bridge - 2 x daily - 7 x weekly - 1 sets - 10 reps - 3-5 sec hold  · Supine Straight Leg Raises - 2 x daily - 7 x weekly - 1 sets - 10 reps  · Standing Hip Abduction with Counter Support - 2 x daily - 7 x weekly - 1 sets - 10 reps - 3 sec hold  · Standing Hip Extension with Counter Support - 2 x daily - 7 x weekly - 1 sets - 10 reps - 3 sec hold  · Seated Calf Stretch with Strap - 2 x daily - 7 x weekly - 1 sets - 3 reps - 30 sec hold  · Standing Hamstring Stretch with Step - 2 x daily - 7 x weekly - 1 sets - 3 reps - 30 sec hold

## 2022-08-16 NOTE — PROGRESS NOTES
PT Re-Evaluation     Today's date: 2022  Patient name: Syed Vivas  : 1950  MRN: [id number]  Referring provider: Keyon Smith DO  Dx:   Encounter Diagnosis     ICD-10-CM    1  Closed transcervical fracture of right femur with routine healing, subsequent encounter  S72 031D    2  Status post hip hemiarthroplasty  Z96 649    3  Gait abnormality  R26 9                   Assessment  Assessment details: Syed Vivas is a 67 y o  female with a history of anxiety, arthritis, depression, GERD, HTN, hyperlipidemia, BMI>30 that presents for a physical therapy RE evaluation  The patient has attended 9 sessions of skilled physical therapy  The patient demonstrates signs and symptoms consistent with R hip femur Fx s/p R hip hemiarthroplasty; gait abnormality ( patient also has R wrist Fx but is not seeking Rx for this)  During the examination the patient demonstrated improved but decreased R LE strength, improved but decreased R hip ROM, improved but impaired gait, and decreased R hip pain  The patient has made functional gains since starting therapy  The patient is now able to ambulate with SPC in community  The patient is able to do light cleaning tasks at home without difficulty  The patient continues to have difficulty with sleeping/lying in bed, heavy activities, prolonged standing/walking, and ambulating up/down the steps  The patient will benefit from continued skilled PT to address impairments, work towards goals, and restore patient PLOF      Impairments: abnormal gait, abnormal or restricted ROM, activity intolerance, impaired balance, impaired physical strength, lacks appropriate home exercise program and pain with function  Functional limitations:  difficulty with prolonged standing, prolonged walking, difficulty bending/stooping, difficulty lifting/carrying objects, walking on unlevel surfaces, difficulty squatting/kneeling, difficulty stair-climbing, and difficulty transferring from low surfaces  Symptom irritability: lowBarriers to therapy: R radius/ulna FX  Understanding of Dx/Px/POC: good   Prognosis: good    Goals  STG: Achieve in 4-6 weeks  1  Patient's R hip pain at worst less than 2/10 to allow for proper gait  PROGRESSING 8/17  2  Patient's R hip ROM improve by 10-25 degrees all planes to improve squatting  PROGRESSING 8/17  3  R LE MMT improve to > 4+/5 all motions tested to improve ADL/recreational activities  PROGRESSING  4  Timed up and go score improve to less than 14 seconds to indicate improved balance/decreased falls risk  PROGRESSING 8/17    LTG:  Achieve in 6-12 weeks  1  Patient's hip FOTO score improve to > 60% to indicate a return to normal functioning  PROGRESSING 8/17  2  Patient achieve personal goal of walking and functioning normally without a RW  PROGRESSING 8/17  3  Patient to achieve independence with home exercise plan  PROGRESSING 8/17      Plan  Plan details: RE-ASSESS 1X/MONTH  Patient would benefit from: skilled physical therapy  Planned modality interventions: cryotherapy and thermotherapy: hydrocollator packs  Other planned modality interventions: IASTM  Planned therapy interventions: manual therapy, massage, neuromuscular re-education, patient education, postural training, self care, strengthening, stretching, therapeutic activities, therapeutic exercise, home exercise program, abdominal trunk stabilization, balance, balance/weight bearing training and gait training  Frequency: 1-3x/wk  Duration in weeks: 12  Plan of Care beginning date: 7/20/2022  Plan of Care expiration date: 10/19/2022  Treatment plan discussed with: PTA and patient        Subjective Evaluation    History of Present Illness  Date of onset: 7/1/2022  Date of surgery: 7/2/2022  Mechanism of injury: surgery  Mechanism of injury: SUBJECTIVE: 8/17/2022: The patient reports an improvement in  activity tolerance, R LE strength, and LE ROM since starting therapy    The patient is now able to walk with [facility] in the community versus the walker  The patient is now back to driving which she was unable to do previously  The patient is able to tolerate light cleaning at home  The patient continues to have difficulty with lying in bed - she is still sleeping in the recliner  The patient is unable to walk in the community without any AD  The patient is still going 1 step at a time when ambulating up/down the steps  The patient will benefit from continued PT focused on achieving patient specific goals  INJURY HISTORY: Alfonso De Paz is a 67 y o  female that presents to outpatient physical therapy with complaints of difficultly walking/fuctioning  The patient experienced a displaced transcervical fracture right femur; fracture distal right radius and ulna from a mechanical fall while leashing her dog  The patient received a R hemiarthroplasty at her R hip and closed reduction of the R wrist done by Dr Sandoval Alderson  The patient reports difficulty with ambulating up/down the steps, difficulty with prolonged walking/standing, unable to lift/carry items, and difficulty with ADL's  The patient does feel unsteady with walking and does have a fear of falling  The patient received acute rehab after her hospital stay and deferred home care therapy to come to outpatient PT  The patient's main goal for physical therapy is to get rid of the walker and walk normally/ function normally       Pain  Current pain ratin  At best pain ratin  At worst pain ratin  Location: R lateral hip  Quality: dull ache  Relieving factors: rest  Aggravating factors: standing, walking, stair climbing and lifting  Progression: improved    Social Support  Steps to enter house: yes  Stairs in house: yes   Lives in: multiple-level home  Lives with: spouse    Employment status: not working  Hand dominance: right    Treatments  Previous treatment: home therapy  Current treatment: physical therapy  Discharged from (in last 30 days): inpatient hospitalization, skilled nursing facility and home health care  Patient Goals  Patient goals for therapy: decreased pain, improved balance, increased motion, increased strength, independence with ADLs/IADLs and return to sport/leisure activities  Patient goal: get rid of RW and function normally ( progressing)        Objective     Observations     Right Hip  Positive for incision  Additional Observation Details  Patient's incisions are healed    Neurological Testing     Sensation     Hip   Left Hip   Intact: light touch and hot/cold discrimination    Right Hip   Intact: light touch and hot/cold discrimination    Active Range of Motion   Left Hip   Flexion: 75 degrees   Extension: 0 degrees   Abduction: 35 degrees   External rotation (90/90): 30 degrees   Internal rotation (90/90): 22 degrees     Right Hip   Flexion: 70 degrees   Extension: 0 degrees   Abduction: 15 degrees   External rotation (90/90): 13 degrees   Internal rotation (90/90): 20 degrees     Additional Active Range of Motion Details  R hip  Improved ER : 8 deg, IR: 10 deg, ABD: 10 deg, Flex: 15 deg, EXT: 5 deg     Strength/Myotome Testing     Left Hip   Planes of Motion   Flexion: 4+  Extension: 4+  Abduction: 4+  Adduction: 4+  External rotation: 4+  Internal rotation: 4+    Right Hip   Planes of Motion   Flexion: 4-  Extension: 3+  Abduction: 4-  Adduction: 4  External rotation: 3  Internal rotation: 3+    Additional Strength Details  IMPROVED    Tests     Additional Tests Details  FOTO: 43% ( predicted 50%) ( improved 27%)    Gait impairments: Patient ambulates with SPC WBAT B/L LE  The patient demonstrates slow gait speed, improved trunk posture, unequal step lengths, and improved but decreased heel-toe rollover R LE  The patient is guarded       TU seconds with SPC ( improved 10 seconds )  30 SSTS: 9 stands ( improved 3 stands             Precautions: R TOTAL HIP / R WRIST FRACTURE    Re-evaluation: 9/14  [facility]       Hip Specialty Daily Treatment Diary     Manual  8/8 8/10 8/15 8/17 8/3   Hip flexion / ER 5x:15 5x:15 5x:15 ea 5x:15 5x :15   Hamstring stretch 3x:30 3x:30 3x:30 3x:30 :30x3   Prone Quad Stretch   5x:15 5x15            Groin stretch 3x:30 3x:30 3x:30 3x:30 :30x3       Therapeutic Exercise 8/8 8/10 8/15 8/17 8/3   Recumbent Bike S=        Nu step towel roll S= 10 L3 x 10 mins L4 x 10 mins L4 x10 mins L4 x 10 mins L3 x10 min   Stand hip ABD  Hip EXT     * x10ea   LAQ                Heel Slide flex/abd x10 ea x10 ea ABD x15 x15 x10ea   Supine ball squeeze     x10   Clamshell  No pillow x15 x20 no pillow x20 reviewed x10   Bridges x15 x15 x15 x15 *x10   SLR x10 x15 EXT x10 EXT x10 B/L *AAROM x5   Ham curls     x10   Calf stretch     *:30x3   Hamstring stretch     *:30x3   Prone on elbow   x10 x10    Standing lumbar extensions x10 x10 x20 x20 x10   Total Gym Squat    * NV    ER seated   Yellow x10  Yellow x15            Gait Training        Sidestepping     10ft x4   Hurdles OBO        Hurdles weave     [employer]  25ft x2           Mirror amb        Neuro Re-Ed        Steam boats        Core brace     HEP   Towel roll / posture education   Done AD         * SPC    Mat walk         CSMi weight shift/squat Squats "+" x10 Squats "+" x15      Tandem balance :30x2 B/L :30x1 :30x1 :30x1 :30x2 B/L   Foam balance EO/EC 2x:30 EO/EC 2x:30 EO/EC 2x:30 EO/EC 2x:30 *:30 x2 EO/EC   Tandem walk // bar 10ft x 4 10ft x 6 10ft x6 10ft x 6    Alt step taps 6" x10 ea 8" x15 2H x8" x15 8" x15 6" x10 ea   Therapeutic Activity        Hurdles     *    Hurdles ambulation    *    Sit to stand transfers x10 x10  x9 *x5   Squats        Step Ups fwd/side 6" x10 ea 6" x15  * 4" x10 ea   Up/down steps    *

## 2022-08-17 ENCOUNTER — EVALUATION (OUTPATIENT)
Dept: PHYSICAL THERAPY | Facility: CLINIC | Age: 72
End: 2022-08-17
Payer: MEDICARE

## 2022-08-17 DIAGNOSIS — Z96.649 STATUS POST HIP HEMIARTHROPLASTY: ICD-10-CM

## 2022-08-17 DIAGNOSIS — S72.031D CLOSED TRANSCERVICAL FRACTURE OF RIGHT FEMUR WITH ROUTINE HEALING, SUBSEQUENT ENCOUNTER: Primary | ICD-10-CM

## 2022-08-17 DIAGNOSIS — R26.9 GAIT ABNORMALITY: ICD-10-CM

## 2022-08-17 PROCEDURE — 97112 NEUROMUSCULAR REEDUCATION: CPT | Performed by: PHYSICAL THERAPIST

## 2022-08-17 PROCEDURE — 97140 MANUAL THERAPY 1/> REGIONS: CPT | Performed by: PHYSICAL THERAPIST

## 2022-08-17 PROCEDURE — 97110 THERAPEUTIC EXERCISES: CPT | Performed by: PHYSICAL THERAPIST

## 2022-08-22 ENCOUNTER — OFFICE VISIT (OUTPATIENT)
Dept: PHYSICAL THERAPY | Facility: CLINIC | Age: 72
End: 2022-08-22
Payer: MEDICARE

## 2022-08-22 DIAGNOSIS — Z96.649 STATUS POST HIP HEMIARTHROPLASTY: ICD-10-CM

## 2022-08-22 DIAGNOSIS — R26.9 GAIT ABNORMALITY: ICD-10-CM

## 2022-08-22 DIAGNOSIS — S72.031D CLOSED TRANSCERVICAL FRACTURE OF RIGHT FEMUR WITH ROUTINE HEALING, SUBSEQUENT ENCOUNTER: Primary | ICD-10-CM

## 2022-08-22 PROCEDURE — 97140 MANUAL THERAPY 1/> REGIONS: CPT | Performed by: PHYSICAL THERAPIST

## 2022-08-22 PROCEDURE — 97530 THERAPEUTIC ACTIVITIES: CPT | Performed by: PHYSICAL THERAPIST

## 2022-08-22 PROCEDURE — 97110 THERAPEUTIC EXERCISES: CPT | Performed by: PHYSICAL THERAPIST

## 2022-08-22 NOTE — PROGRESS NOTES
Daily Note     Today's date: 2022  Patient name: J Luis Thompson  : 1950  MRN: [id number]  Referring provider: Mikal Medeiros DO  Dx:   Encounter Diagnosis     ICD-10-CM    1  Closed transcervical fracture of right femur with routine healing, subsequent encounter  S72 031D    2  Status post hip hemiarthroplasty  Z96 649    3  Gait abnormality  R26 9                   Subjective: The patient reports feeling an " ache" at her L hip rated a 2/10 this morning  The patient tripped slightly walking to the Nu step and she reports not sleeping well last night  Objective: See treatment diary below  Vitals: 137/82 mm HG   HR: 94 bpm       Assessment: The patient tolerated the treatment well with mild complaints of L hip ache during the session  The patient was able to tolerate progressing some of the exercises today  The patient did well with ambulating up/down the steps without the cane today  The patient did not complain of increased pain at the end of today's treatment  The patient will benefit from continued PT to achieve her goals  Plan: Continue per plan of care  Progress treatment as tolerated         Precautions: R TOTAL HIP / R WRIST FRACTURE    [facility]       [facility] Daily Treatment Diary     Manual  8/8 8/10 8/15 8/17 8/22   Hip flexion / ER 5x:15 5x:15 5x:15 ea 5x:15 5x:15   Hamstring stretch 3x:30 3x:30 3x:30 3x:30 3x:30   Prone Quad Stretch   5x:15 5x15 5x:15           Groin stretch 3x:30 3x:30 3x:30 3x:30 3x:30       Therapeutic Exercise 8/8 8/10 8/15 8/17 8/22   Recumbent Bike S=        Nu step towel roll S= 10 L3 x 10 mins L4 x 10 mins L4 x10 mins L4 x 10 mins L4 x 10 mins   Stand hip ABD  Hip EXT        LAQ                Heel Slide flex/abd x10 ea x10 ea ABD x15 x15 x15 ea   Supine ball squeeze        Clamshell  No pillow x15 x20 no pillow x20 reviewed RED x15   Bridges x15 x15 x15 x15    SLR x10 x15 EXT x10 EXT x10 B/L EXT x15   Ham curls Calf stretch        Hamstring stretch        Prone on elbow   x10 x10 x10   Standing lumbar extensions x10 x10 x20 x20 x10   Total Gym Squat    * NV x15   ER seated   Yellow x10  Yellow x15 RED 3x10           Gait Training        Sidestepping        Hurdles OBO        Hurdles weave                Mirror amb        Neuro Re-Ed        [employer]        Core brace        Towel roll / posture education   Done AD             Mat walk         CSMi weight shift/squat Squats "+" x10 Squats "+" x15      Tandem balance :30x2 B/L :30x1 :30x1 :30x1    Foam balance EO/EC 2x:30 EO/EC 2x:30 EO/EC 2x:30 EO/EC 2x:30 EO/EC tandem 1x:30 ea B/L   Tandem walk // bar 10ft x 4 10ft x 6 10ft x6 10ft x 6    Alt step taps 6" x10 ea 8" x15 2H x8" x15 8" x15    Therapeutic Activity        Hurdles     * 6 hurdles   Hurdles ambulation    * Over 25ft x 2   Sit to stand transfers x10 x10  x9 x2   Squats        Step Ups fwd/side 6" x10 ea 6" x15  *    Up/down steps    * 1 HR and SPC   4 steps x 1 trial   1HR no cane x3 trials

## 2022-08-24 ENCOUNTER — OFFICE VISIT (OUTPATIENT)
Dept: PHYSICAL THERAPY | Facility: CLINIC | Age: 72
End: 2022-08-24
Payer: MEDICARE

## 2022-08-24 DIAGNOSIS — R26.9 GAIT ABNORMALITY: ICD-10-CM

## 2022-08-24 DIAGNOSIS — Z96.649 STATUS POST HIP HEMIARTHROPLASTY: ICD-10-CM

## 2022-08-24 DIAGNOSIS — S72.031D CLOSED TRANSCERVICAL FRACTURE OF RIGHT FEMUR WITH ROUTINE HEALING, SUBSEQUENT ENCOUNTER: Primary | ICD-10-CM

## 2022-08-24 PROCEDURE — 97140 MANUAL THERAPY 1/> REGIONS: CPT | Performed by: PHYSICAL THERAPIST

## 2022-08-24 PROCEDURE — 97110 THERAPEUTIC EXERCISES: CPT | Performed by: PHYSICAL THERAPIST

## 2022-08-24 PROCEDURE — 97530 THERAPEUTIC ACTIVITIES: CPT | Performed by: PHYSICAL THERAPIST

## 2022-08-24 NOTE — PROGRESS NOTES
Daily Note     Today's date: 2022  Patient name: Adia Levin  : 1950  MRN: [id number]  Referring provider: Shayan Benavides DO  Dx:   Encounter Diagnosis     ICD-10-CM    1  Closed transcervical fracture of right femur with routine healing, subsequent encounter  S72 031D    2  Status post hip hemiarthroplasty  Z96 649    3  Gait abnormality  R26 9                   Subjective: The patient is not complaining of any pain this morning  The patient did not bring her [facility] with yesterday to the fair or this morning to therapy  The patient reports she is feeling good without the cane  Objective: See treatment diary below      Assessment: The patient tolerated the therapy well today with no complaints of pain  The patient needed verbal cues to correct her posture during the exercises  The patient is walking well without an AD  The patient is walking slowly but does not exhibit any unsteadiness/falls risk  The patient will benefit from continued PT focused on improving balance and gait  Plan: Continue per plan of care  Progress treatment as tolerated         Precautions: R TOTAL HIP / R WRIST FRACTURE    Re-evaluation:    [facility]       Hip Specialty Daily Treatment Diary     Manual  8/24  8/15 8/17 8/22   Hip flexion / ER 5x:15  5x:15 ea 5x:15 5x:15   Hamstring stretch 3x:30  3x:30 3x:30 3x:30   Prone Quad Stretch 5x:15  5x:15 5x15 5x:15           Groin stretch 3x:30  3x:30 3x:30 3x:30       Therapeutic Exercise 8/24  8/15 8/17 8/22   Recumbent Bike S=        Nu step towel roll S= 10 L4 x 10 mins  L4 x10 mins L4 x 10 mins L4 x 10 mins   Stand hip ABD  Hip EXT        LAQ                Heel Slide flex/abd x10 ea   ABD x15 x15 x15 ea   Supine ball squeeze        Clamshell  Yellow x15  x20 reviewed RED x15   Bridges   x15 x15    SLR x15 EXT  EXT x10 EXT x10 B/L EXT x15   Ham curls        Calf stretch        Hamstring stretch        Prone on elbow   x10 x10 x10   Standing lumbar extensions   x20 x20 x10   Total Gym Squat    * NV x15   ER seated  GRN IR/ER 2x15   Yellow x15 RED 3x10                   Neuro Re-Ed        Steam boats        Core brace        Towel roll / posture education   Done AD     SLB mirror * NV       Mat walk         CSMi weight shift/squat        Tandem balance   :30x1 :30x1    Foam balance EO/EC 1x:30 ea B/L  EO/EC 2x:30 EO/EC 2x:30 EO/EC tandem 1x:30 ea B/L   Tandem walk 10ft x 2  10ft x6 10ft x 6    Alt step taps 8" x15 0H  2H x8" x15 8" x15    Therapeutic Activity        Hurdles  X 6 hurdles   * 6 hurdles   Hurdles ambulation Over 25ft x 4  Weave 25ft x2   * Over 25ft x 2   Sit to stand transfers 2x10 high mat   x9 x2   Squats        Step Ups fwd/side 6" x10 fwd/lat   *    Up/down steps 1HR SOS x 2 trials  0HR x 1 trial   * 1 HR and SPC   4 steps x 1 trial   1HR no cane x3 trials

## 2022-08-28 NOTE — PROGRESS NOTES
Daily Note     Today's date: 2022  Patient name: Ellen Rodriguez  : 1950  MRN: [id number]  Referring provider: Carmen Loza DO  Dx:   Encounter Diagnosis     ICD-10-CM    1  Closed transcervical fracture of right femur with routine healing, subsequent encounter  S72 031D    2  Status post hip hemiarthroplasty  Z96 649    3  Gait abnormality  R26 9                   Subjective: The patient reports feeling a 3/10 pain at her R groin/anterior thigh today   " I worked too much at the fair "      Objective: See treatment diary below      Assessment: The patient tolerated the treatment well with minimal complaints of pain/fatigue  The patient was given VC to correct her technique and posture during the exercises  The patient had some mild unsteadiness during the balance/gait activities today  The patient will benefit from continued PT to achieve her goals of therapy  Plan: Continue per plan of care  Progress treatment as tolerated         Precautions: R TOTAL HIP / R WRIST FRACTURE    Re-evaluation:    [facility]       Hip Specialty Daily Treatment Diary     Manual  8/24 8/29 8/15 8/17 8/22   Hip flexion / ER 5x:15 5x:15 5x:15 ea 5x:15 5x:15   Hamstring stretch 3x:30 3x:30 3x:30 3x:30 3x:30   Prone Quad Stretch 5x:15 5x:15 5x:15 5x15 5x:15           Groin stretch 3x:30 3x:30 3x:30 3x:30 3x:30       Therapeutic Exercise 8/24 8/29 8/15 8/17 8/22   Recumbent Bike S=        Nu step towel roll S= 10 L4 x 10 mins L4 x 10 mins L4 x10 mins L4 x 10 mins L4 x 10 mins   Stand hip ABD  Hip EXT        LAQ                Heel Slide flex/abd x10 ea  x10 ea  ABD x15 x15 x15 ea   Supine ball squeeze        Clamshell  Yellow x15 Yellow x20 ea x20 reviewed RED x15   Bridges   x15 x15    SLR x15 EXT x15 EXT EXT x10 EXT x10 B/L EXT x15   Ham curls        Calf stretch        Hamstring stretch        Prone on elbow   x10 x10 x10   Standing lumbar extensions   x20 x20 x10   Total Gym Squat  x20  * NV x15   ER seated  GRN IR/ER 2x15 x20 ea GRN IR/ER  Yellow x15 RED 3x10                   Neuro Re-Ed        Steam boats        Core brace        Towel roll / posture education   Done AD     SLB mirror * NV 1x:30 B/L      Mat walk         CSMi weight shift/squat        Tandem balance   :30x1 :30x1    Foam balance EO/EC 1x:30 ea B/L EO/EC tandem 1x:30 EO/EC 2x:30 EO/EC 2x:30 EO/EC tandem 1x:30 ea B/L   Tandem walk 10ft x 2 10ft x 4 10ft x6 10ft x 6    Alt step taps 8" x15 0H 8" x20 0H 2H x8" x15 8" x15    Therapeutic Activity        Hurdles  X 6 hurdles x6 hurdles  * 6 hurdles   Hurdles ambulation Over 25ft x 4  Weave 25ft x2 Over fwd/side 25ft x 2 no AD  * Over 25ft x 2   Sit to stand transfers 2x10 high mat x20  x9 x2   Squats        Step Ups fwd/side 6" x10 fwd/lat 6" x15 ea fwd/lat  *    Up/down steps 1HR SOS x 2 trials  0HR x 1 trial 1HR SOS x2 trials  * 1 HR and SPC   4 steps x 1 trial   1HR no cane x3 trials

## 2022-08-29 ENCOUNTER — OFFICE VISIT (OUTPATIENT)
Dept: PHYSICAL THERAPY | Facility: CLINIC | Age: 72
End: 2022-08-29
Payer: MEDICARE

## 2022-08-29 ENCOUNTER — OFFICE VISIT (OUTPATIENT)
Dept: OBGYN CLINIC | Facility: CLINIC | Age: 72
End: 2022-08-29

## 2022-08-29 VITALS
WEIGHT: 204 LBS | HEART RATE: 70 BPM | DIASTOLIC BLOOD PRESSURE: 78 MMHG | HEIGHT: 65 IN | SYSTOLIC BLOOD PRESSURE: 128 MMHG | BODY MASS INDEX: 33.99 KG/M2

## 2022-08-29 DIAGNOSIS — S72.031A: ICD-10-CM

## 2022-08-29 DIAGNOSIS — S62.101D CLOSED FRACTURE OF RIGHT WRIST WITH ROUTINE HEALING, SUBSEQUENT ENCOUNTER: Primary | ICD-10-CM

## 2022-08-29 DIAGNOSIS — S72.031D CLOSED TRANSCERVICAL FRACTURE OF RIGHT FEMUR WITH ROUTINE HEALING, SUBSEQUENT ENCOUNTER: Primary | ICD-10-CM

## 2022-08-29 DIAGNOSIS — R26.9 GAIT ABNORMALITY: ICD-10-CM

## 2022-08-29 DIAGNOSIS — Z96.649 STATUS POST HIP HEMIARTHROPLASTY: ICD-10-CM

## 2022-08-29 PROCEDURE — 99024 POSTOP FOLLOW-UP VISIT: CPT | Performed by: ORTHOPAEDIC SURGERY

## 2022-08-29 PROCEDURE — 97110 THERAPEUTIC EXERCISES: CPT | Performed by: PHYSICAL THERAPIST

## 2022-08-29 PROCEDURE — 97140 MANUAL THERAPY 1/> REGIONS: CPT | Performed by: PHYSICAL THERAPIST

## 2022-08-29 PROCEDURE — 97530 THERAPEUTIC ACTIVITIES: CPT | Performed by: PHYSICAL THERAPIST

## 2022-08-29 NOTE — PROGRESS NOTES
Assessment/Plan:   Diagnoses and all orders for this visit:    Closed fracture of right wrist with routine healing, subsequent encounter  -     XR wrist 2 vw right; Future    Closed transcervical fracture of right femur, initial encounter [facility])    Reviewed today's physical exam findings and wrist x-rays with patient at time of visit  Her wrist demonstrates callus formation and trabecular bridging consistent with routine healing  She can now begin to wean use of the brace using pain as her guide  She is advised to start with removing the brace with activities around the home, but should continue to wear outside the house  She will continue physical therapy as per protocol and regards to her right hip, and will wean use of a cane under the PT guidance  She should continue to monitor numbness and tingling affecting the right upper extremity, as she has history of carpal tunnel release in the past there is an opportunity that the most recent injury may have affected her median nerve, and may need to be addressed in the future  She will follow up in 6 weeks for re-evaluation of both the right wrist and right hip, and we will perform repeat x-rays of both to assess healing of both  Patient expresses understanding and is in agreement with this treatment plan  In regards to her fracture right wrist, it appears to have healed albeit in a shortened position  She does complain of intermittent numbness along her wrist since her fracture  She can start weaning of the splint  Continue home exercise program   Return back in 6 weeks re-evaluation with new x-rays of right wrist-two views and right hip-two views  There has any further nerve issues at that point, an updated EMG may be warranted      Subjective:   Patient ID: Felice Glasgow Gary  1950     HPI  Patient is a 67 y o  female who presents for follow-up evaluation of right hip fracture and right wrist fracture    She is now 2 days shy of being 2 months post injury  She reports that she feels her hip is progressing well  She has been working with physical therapy, and feels she is seeing significant benefit  She continues to walk with a cane, and use the provided wrist brace for added support with daily activity  She reports a recent increase in numbness and tingling affecting the median distribution of the right upper extremity  She denies any new onset bruising, swelling, feelings of instability, or mechanical symptoms  The following portions of the patient's history were reviewed and updated as appropriate:  Past medical history, past surgical history, Family history, social history, current medications and allergies    Past Medical History:   Diagnosis Date    Anxiety     Arthritis     Depression     GERD (gastroesophageal reflux disease)     Hiatal hernia     Hyperlipidemia     Hypertension        Past Surgical History:   Procedure Laterality Date    APPENDECTOMY      CHOLECYSTECTOMY      FRACTURE SURGERY Right     arm with plate and pins    HAND SURGERY Bilateral     HYSTERECTOMY      JOINT REPLACEMENT Bilateral     PA PARTIAL HIP REPLACEMENT Right 7/2/2022    Procedure: HEMIARTHROPLASTY HIP (BIPOLAR), closed reduction with splinting right wrist;  Surgeon: Shayan Barcenas; Location: [facility];  Service: Orthopedics    SHOULDER ARTHROSCOPY Left        History reviewed  No pertinent family history      Social History     Socioeconomic History    Marital status: /Civil Union     Spouse name: None    Number of children: None    Years of education: None    Highest education level: None   Occupational History    None   Tobacco Use    Smoking status: Former Smoker     Types: Cigarettes    Smokeless tobacco: Never Used   Vaping Use    Vaping Use: Never used   Substance and Sexual Activity    Alcohol use: Not Currently     Alcohol/week: 1 0 standard drink     Types: 1 Glasses of wine per week    Drug use: Never    Sexual activity: Not Currently   Other Topics Concern    None   Social History Narrative    None     Social Determinants of Health     Financial Resource Strain: Not on file   Food Insecurity: No Food Insecurity    Worried About Running Out of Food in the Last Year: Never true    Tereso of Food in the Last Year: Never true   Transportation Needs: No Transportation Needs    Lack of Transportation (Medical): No    Lack of Transportation (Non-Medical):  No   Physical Activity: Not on file   Stress: Not on file   Social Connections: Not on file   Intimate Partner Violence: Not on file   Housing Stability: Low Risk     Unable to Pay for Housing in the Last Year: No    Number of Places Lived in the Last Year: 1    Unstable Housing in the Last Year: No         Current Outpatient Medications:     acetaminophen (TYLENOL) 325 mg tablet, Take 2 tablets (650 mg total) by mouth every 6 (six) hours as needed for mild pain, Disp: , Rfl: 0    amLODIPine (NORVASC) 10 mg tablet, Take 10 mg by mouth daily, Disp: , Rfl:     cetirizine (ZyrTEC) 10 mg tablet, Take 10 mg by mouth daily, Disp: , Rfl:     ferrous sulfate 325 (65 Fe) mg tablet, Take 1 tablet (325 mg total) by mouth daily with breakfast, Disp: , Rfl: 0    gabapentin (NEURONTIN) 600 MG tablet, Take 1 tablet (600 mg total) by mouth daily at bedtime, Disp: 30 tablet, Rfl: 0    multivitamin (THERAGRAN) TABS, Take 1 tablet by mouth daily, Disp: , Rfl:     omeprazole (PriLOSEC) 20 mg delayed release capsule, Take 20 mg by mouth daily 1 in am 1 in pm, Disp: , Rfl:     pravastatin (PRAVACHOL) 40 mg tablet, Take 40 mg by mouth daily One at bedtime, Disp: , Rfl:     temazepam (RESTORIL) 7 5 mg capsule, Take 15 mg by mouth daily at bedtime as needed for sleep  , Disp: , Rfl:     venlafaxine (EFFEXOR-XR) 150 mg 24 hr capsule, Take 150 mg by mouth daily, Disp: , Rfl:     aspirin (ECOTRIN) 325 mg EC tablet, Take 2 tablets (650 mg total) by mouth daily Take with food, Disp: 30 tablet, Rfl: 0    senna (SENOKOT) 8 6 mg, Take 1 tablet (8 6 mg total) by mouth daily at bedtime, Disp: , Rfl: 0    No Known Allergies    Review of Systems   Constitutional: Negative for chills, fever and unexpected weight change  HENT: Negative for hearing loss, nosebleeds and sore throat  Eyes: Negative for pain, redness and visual disturbance  Respiratory: Negative for cough, shortness of breath and wheezing  Cardiovascular: Negative for chest pain, palpitations and leg swelling  Gastrointestinal: Negative for abdominal pain, nausea and vomiting  Endocrine: Negative for polydipsia and polyuria  Genitourinary: Negative for dysuria and hematuria  Musculoskeletal:        As noted in HPI   Skin: Negative for rash and wound  Neurological: Negative for dizziness, numbness and headaches  Psychiatric/Behavioral: Negative for decreased concentration and suicidal ideas  The patient is not nervous/anxious  Objective:  /78 (BP Location: Left arm, Patient Position: Sitting, Cuff Size: Standard)   Pulse 70   Ht 5' 5" (1 651 m)   Wt 92 5 kg (204 lb)   BMI 33 95 kg/m²     Ortho Exam  Right upper extremity is neurovascular intact  Fingers are pink and mobile  Compartments are soft  There is mild ulnar-sided wrist pain  No tenderness along the fracture site  Mild decreased range of motion  The fracture appears moving as 1 unit    Physical Exam  Vitals and nursing note reviewed  Constitutional:       General: She is not in acute distress  Appearance: She is well-developed  HENT:      Head: Normocephalic and atraumatic  Eyes:      Conjunctiva/sclera: Conjunctivae normal    Cardiovascular:      Rate and Rhythm: Normal rate  Pulmonary:      Effort: Pulmonary effort is normal    Musculoskeletal:      Cervical back: Neck supple  Skin:     General: Skin is warm and dry  Capillary Refill: Capillary refill takes less than 2 seconds     Neurological:      Mental Status: She is alert and oriented to person, place, and time  Psychiatric:         Mood and Affect: Mood normal          Behavior: Behavior normal         Diagnostic Test Review:  Attending Physician has personally reviewed pertinent imaging in PACS, impression is as follows:    Review of radiographic series taken 8/29/2022 of the right wrist shows comminuted distal radius fracture with evidence of callus formation and trabecular bridging consistent with routine healing  Ulnar positive deformity present      Procedures   No procedures performed this visit    Scribe Attestation    I,:  Aliza Bojorquez am acting as a scribe while in the presence of the attending physician :       I,:  Annamarie Andersen DO personally performed the services described in this documentation    as scribed in my presence :

## 2022-08-31 ENCOUNTER — OFFICE VISIT (OUTPATIENT)
Dept: PHYSICAL THERAPY | Facility: CLINIC | Age: 72
End: 2022-08-31
Payer: MEDICARE

## 2022-08-31 DIAGNOSIS — S72.031D CLOSED TRANSCERVICAL FRACTURE OF RIGHT FEMUR WITH ROUTINE HEALING, SUBSEQUENT ENCOUNTER: Primary | ICD-10-CM

## 2022-08-31 DIAGNOSIS — R26.9 GAIT ABNORMALITY: ICD-10-CM

## 2022-08-31 DIAGNOSIS — Z96.649 STATUS POST HIP HEMIARTHROPLASTY: ICD-10-CM

## 2022-08-31 PROCEDURE — 97112 NEUROMUSCULAR REEDUCATION: CPT | Performed by: PHYSICAL THERAPIST

## 2022-08-31 PROCEDURE — 97530 THERAPEUTIC ACTIVITIES: CPT | Performed by: PHYSICAL THERAPIST

## 2022-08-31 PROCEDURE — 97110 THERAPEUTIC EXERCISES: CPT | Performed by: PHYSICAL THERAPIST

## 2022-08-31 NOTE — PROGRESS NOTES
Daily Note     Today's date: 2022  Patient name: Hanna Clemente  : 1950  MRN: [id number]  Referring provider: Pura Kaur DO  Dx:   Encounter Diagnosis     ICD-10-CM    1  Closed transcervical fracture of right femur with routine healing, subsequent encounter  S72 031D    2  Status post hip hemiarthroplasty  Z96 649    3  Gait abnormality  R26 9                   Subjective: The patient reports being able to walk around the whole grocery store  The patient denies feeling any pain  Objective: See treatment diary below      Assessment: The patient continues to walk with a slow and steady pace without an assistive device  The patient was given VC to correct her posture during gait and balance activities  We focused more on balance activities today to help improve her confidence with walking in challenging situations  The patient tolerated the treatment well with no complaints of pain increase or problems  The patient will benefit from continued PT to achieve her goals of therapy  Plan: Continue per plan of care  Progress treatment as tolerated  Continue focusing on balance/gait related activities       Precautions: R TOTAL HIP / R WRIST FRACTURE    Re-evaluation:    [facility]       Hip Specialty Daily Treatment Diary     Manual     Hip flexion / ER 5x:15 5x:15  5x:15 5x:15   Hamstring stretch 3x:30 3x:30  3x:30 3x:30   Prone Quad Stretch 5x:15 5x:15  5x15 5x:15           Groin stretch 3x:30 3x:30  3x:30 3x:30       Therapeutic Exercise    Recumbent Bike S=12   L1 x 5 mins     Nu step towel roll S= 10 L4 x 10 mins L4 x 10 mins L4 x5 mins L4 x 10 mins L4 x 10 mins   Stand hip ABD  Hip EXT        LAQ                Heel Slide flex/abd x10 ea  x10 ea   x15 x15 ea   Supine ball squeeze        Clamshell  Yellow x15 Yellow x20 ea  reviewed RED x15   Bridges    x15    SLR x15 EXT x15 EXT  EXT x10 B/L EXT x15   Ham curls        Calf stretch Hamstring stretch        Prone on elbow    x10 x10   Standing lumbar extensions    x20 x10   Total Gym Squat  x20  * NV x15   ER seated  GRN IR/ER 2x15 x20 ea GRN IR/ER  Yellow x15 RED 3x10                   Neuro Re-Ed        SLB mirror * NV 1x:30 B/L 1x:30 B/L     Blue foam step over   x10 B/L mild UE use     Mat walk         CSMi balance   L2      Tandem balance    :30x1    Foam balance EO/EC 1x:30 ea B/L EO/EC tandem 1x:30 EO/EC 1x:30ea tandem  B/L EO/EC 2x:30 EO/EC tandem 1x:30 ea B/L   Shop walk   25ft x 2 yes, no     Tandem walk 10ft x 2 10ft x 4 10ft x6 10ft x 6    Alt step taps 8" x15 0H 8" x20 0H 0H x8" x25 8" x15    Therapeutic Activity        Hurdles  X 6 hurdles x6 hurdles  * 6 hurdles   Hurdles ambulation Over 25ft x 4  Weave 25ft x2 Over fwd/side 25ft x 2 no AD Over fwd/side 25ft x 2no AD * Over 25ft x 2   Sit to stand transfers 2x10 high mat x20 x20 x9 x2   Squats        Step Ups fwd/side 6" x10 fwd/lat 6" x15 ea fwd/lat 6" x20 ea fwd/lat *    Up/down steps 1HR SOS x 2 trials  0HR x 1 trial 1HR SOS x2 trials 1HR SOS x 2 trials * 1 HR and SPC   4 steps x 1 trial   1HR no cane x3 trials

## 2022-09-06 ENCOUNTER — OFFICE VISIT (OUTPATIENT)
Dept: PHYSICAL THERAPY | Facility: CLINIC | Age: 72
End: 2022-09-06
Payer: MEDICARE

## 2022-09-06 DIAGNOSIS — R26.9 GAIT ABNORMALITY: ICD-10-CM

## 2022-09-06 DIAGNOSIS — S72.031D CLOSED TRANSCERVICAL FRACTURE OF RIGHT FEMUR WITH ROUTINE HEALING, SUBSEQUENT ENCOUNTER: Primary | ICD-10-CM

## 2022-09-06 DIAGNOSIS — Z96.649 STATUS POST HIP HEMIARTHROPLASTY: ICD-10-CM

## 2022-09-06 PROCEDURE — 97112 NEUROMUSCULAR REEDUCATION: CPT

## 2022-09-06 PROCEDURE — 97110 THERAPEUTIC EXERCISES: CPT

## 2022-09-06 PROCEDURE — 97530 THERAPEUTIC ACTIVITIES: CPT

## 2022-09-06 NOTE — PROGRESS NOTES
Daily Note     Today's date: 2022  Patient name: Kristen Jackson  : 1950  MRN: [id number]  Referring provider: Sarah Randhawa DO  Dx:   Encounter Diagnosis     ICD-10-CM    1  Closed transcervical fracture of right femur with routine healing, subsequent encounter  S72 655D    2  Status post hip hemiarthroplasty  Z96 649    3  Gait abnormality  R26 9                   Subjective: Patient noted no new complaints  Objective: See treatment diary below      Assessment: Tolerated treatment fair  Patient exhibited good technique with therapeutic exercises and would benefit from continued PT  VC for dosage of exercises  Patient was able to perform exercises without complaints  Plan: Continue per plan of care        Precautions: R TOTAL HIP / R WRIST FRACTURE    Re-evaluation:    [facility]       Hip Specialty Daily Treatment Diary     Manual     Hip flexion / ER 5x:15 5x:15   5x:15   Hamstring stretch 3x:30 3x:30   3x:30   Prone Quad Stretch 5x:15 5x:15   5x:15           Groin stretch 3x:30 3x:30   3x:30       Therapeutic Exercise    Recumbent Bike S=12   L1 x 5 mins L1 x 6 min    Nu step towel roll S= 10 L4 x 10 mins L4 x 10 mins L4 x5 mins L4 x 5 min  L4 x 10 mins   Stand hip ABD  Hip EXT        LAQ                Heel Slide flex/abd x10 ea  x10 ea    x15 ea   Supine ball squeeze        Clamshell  Yellow x15 Yellow x20 ea   RED x15   Bridges        SLR x15 EXT x15 EXT   EXT x15   Ham curls        Calf stretch        Hamstring stretch        Prone on elbow     x10   Standing lumbar extensions     x10   Total Gym Squat  x20   x15   ER seated  GRN IR/ER 2x15 x20 ea GRN IR/ER   RED 3x10                   Neuro Re-Ed        SLB mirror * NV 1x:30 B/L 1x:30 B/L 1x:30 B/L    Blue foam step over   x10 B/L mild UE use x10 B/L mild UE use    Mat walk         CSMi balance   L2      Tandem balance        Foam balance EO/EC 1x:30 ea B/L EO/EC tandem 1x:30 EO/EC 1x:30ea tandem  B/L EO/EC 1x:30ea tandem  B/L EO/EC tandem 1x:30 ea B/L   Shop walk   25ft x 2 yes, no 25ft x 2 yes, no    Tandem walk 10ft x 2 10ft x 4 10ft x6 10 ft x 6    Alt step taps 8" x15 0H 8" x20 0H 0H x8" x25 0H x8" x25    Therapeutic Activity        Hurdles  X 6 hurdles x6 hurdles   6 hurdles   Hurdles ambulation Over 25ft x 4  Weave 25ft x2 Over fwd/side 25ft x 2 no AD Over fwd/side 25ft x 2no AD  Over 25ft x 2   Sit to stand transfers 2x10 high mat x20 x20 X 20 x2   Squats        Step Ups fwd/side 6" x10 fwd/lat 6" x15 ea fwd/lat 6" x20 ea fwd/lat 6" x20 ea fwd/lat    Up/down steps 1HR SOS x 2 trials  0HR x 1 trial 1HR SOS x2 trials 1HR SOS x 2 trials  1 HR and SPC   4 steps x 1 trial   1HR no cane x3 trials

## 2022-09-08 ENCOUNTER — OFFICE VISIT (OUTPATIENT)
Dept: PHYSICAL THERAPY | Facility: CLINIC | Age: 72
End: 2022-09-08
Payer: MEDICARE

## 2022-09-08 DIAGNOSIS — S72.031D CLOSED TRANSCERVICAL FRACTURE OF RIGHT FEMUR WITH ROUTINE HEALING, SUBSEQUENT ENCOUNTER: Primary | ICD-10-CM

## 2022-09-08 DIAGNOSIS — Z96.649 STATUS POST HIP HEMIARTHROPLASTY: ICD-10-CM

## 2022-09-08 DIAGNOSIS — R26.9 GAIT ABNORMALITY: ICD-10-CM

## 2022-09-08 PROCEDURE — 97530 THERAPEUTIC ACTIVITIES: CPT | Performed by: PHYSICAL THERAPIST

## 2022-09-08 PROCEDURE — 97110 THERAPEUTIC EXERCISES: CPT | Performed by: PHYSICAL THERAPIST

## 2022-09-08 PROCEDURE — 97112 NEUROMUSCULAR REEDUCATION: CPT | Performed by: PHYSICAL THERAPIST

## 2022-09-08 NOTE — PROGRESS NOTES
Daily Note     Today's date: 2022  Patient name: Hanna Clemente  : 1950  MRN: [id number]  Referring provider: Pura Kaur DO  Dx:   Encounter Diagnosis     ICD-10-CM    1  Closed transcervical fracture of right femur with routine healing, subsequent encounter  S72 031D    2  Status post hip hemiarthroplasty  Z96 649    3  Gait abnormality  R26 9                   Subjective: The patient feels stiff at her right femur this morning  The patient denies feeling any pain  Objective: See treatment diary below      Assessment: The patient tolerated the treatment well with minimal complaints  The patient needed verbal cues to correct her posture and improve her gait mechanics  The patient has a tendency to walk with a R limp and very slow gait speed  The patient was able to correct with verbal cues and demonstration  The patient will benefit from continued PT to achieve her goals of therapy      Plan: Continue per plan of care  Progress treatment as tolerated         Precautions: R TOTAL HIP / R WRIST FRACTURE    Re-evaluation:    [facility]       Hip Specialty Daily Treatment Diary     Manual      Hip flexion / ER  5x:15      Hamstring stretch  3x:30      Prone Quad Stretch  5x:15              Groin stretch  3x:30          Therapeutic Exercise     Recumbent Bike S=12   L1 x 5 mins L1 x 6 min L1 x 7 mins   Nu step towel roll S= 10  L4 x 10 mins L4 x5 mins L4 x 5 min     Stand hip ABD  Hip EXT        LAQ                Heel Slide flex/abd  x10 ea       Supine ball squeeze        Clamshell   Yellow x20 ea      Bridges        SLR  x15 EXT      Ham curls        Calf stretch        Hamstring stretch        Prone on elbow        Standing lumbar extensions        Total Gym Squat  x20   2x10   ER seated   x20 ea GRN IR/ER                      Neuro Re-Ed        SLB mirror  1x:30 B/L 1x:30 B/L 1x:30 B/L 1x:30 B/L   Blue foam step over   x10 B/L mild UE use x10 B/L mild UE use x15 B/L mild UE   Mat walk      W/ lump fwd/side 10ft x 4 ea Armando   CSMi balance   L2      Tandem balance        Foam balance  EO/EC tandem 1x:30 EO/EC 1x:30ea tandem  B/L EO/EC 1x:30ea tandem  B/L EO/EC 1H 1x:30 B/L   Shop walk   25ft x 2 yes, no 25ft x 2 yes, no 25ft x 2 yes, no normal speed   Tandem walk  10ft x 4 10ft x6 10 ft x 6 10ft x 6   Alt step taps  8" x20 0H 0H x8" x25 0H x8" x25 0H x30  8"    Therapeutic Activity        Hurdles   x6 hurdles      Hurdles ambulation  Over fwd/side 25ft x 2 no AD Over fwd/side 25ft x 2no AD     Sit to stand transfers  x20 x20 X 20 x10   Mirror walk     25 ft x 8 fast speed CGA   Step Ups fwd/side  6" x15 ea fwd/lat 6" x20 ea fwd/lat 6" x20 ea fwd/lat 6" x15 ea   Up/down steps  1HR SOS x2 trials 1HR SOS x 2 trials

## 2022-09-12 ENCOUNTER — OFFICE VISIT (OUTPATIENT)
Dept: PHYSICAL THERAPY | Facility: CLINIC | Age: 72
End: 2022-09-12
Payer: MEDICARE

## 2022-09-12 DIAGNOSIS — Z96.649 STATUS POST HIP HEMIARTHROPLASTY: ICD-10-CM

## 2022-09-12 DIAGNOSIS — R26.9 GAIT ABNORMALITY: ICD-10-CM

## 2022-09-12 DIAGNOSIS — S72.031D CLOSED TRANSCERVICAL FRACTURE OF RIGHT FEMUR WITH ROUTINE HEALING, SUBSEQUENT ENCOUNTER: Primary | ICD-10-CM

## 2022-09-12 PROCEDURE — 97530 THERAPEUTIC ACTIVITIES: CPT

## 2022-09-12 PROCEDURE — 97110 THERAPEUTIC EXERCISES: CPT

## 2022-09-12 PROCEDURE — 97112 NEUROMUSCULAR REEDUCATION: CPT

## 2022-09-12 NOTE — PROGRESS NOTES
Daily Note     Today's date: 2022  Patient name: Lissette Pagan  : 1950  MRN: [id number]  Referring provider: Luis Snyder DO  Dx:   Encounter Diagnosis     ICD-10-CM    1  Closed transcervical fracture of right femur with routine healing, subsequent encounter  S72 031D    2  Status post hip hemiarthroplasty  Z96 649    3  Gait abnormality  R26 9        Start Time: 0732          Subjective: Patient was able to walk around the festival this [place] without difficulty  She was tired from working at the [employer]  Presently she has       Objective: See treatment diary below      Assessment: Tolerated treatment well  Patient would benefit from continued PT for stretching and strengthening  She was able to perform her exercises with little difficulty and no pain  Patient fatigues quickly with uneven side steps  She felt ok, but tired by the end of the session  Plan: Continue per plan of care  Progress treatment as tolerated         Precautions: R TOTAL HIP / R WRIST FRACTURE    Re-evaluation:    [facility]       Hip Specialty Daily Treatment Diary     Manual     Hip flexion / ER        Hamstring stretch        Prone Quad Stretch                Groin stretch            Therapeutic Exercise    Recumbent Bike S=12 L1 x8 min  L1 x 5 mins L1 x 6 min L1 x 7 mins   Nu step towel roll S= 10   L4 x5 mins L4 x 5 min     Stand hip ABD  Hip EXT        LAQ                Heel Slide flex/abd        Supine ball squeeze        Clamshell         Bridges        SLR        Ham curls        Calf stretch        Hamstring stretch        Prone on elbow        Standing lumbar extensions        Total Gym Squat 2x10    2x10   ER seated                         Neuro Re-Ed        SLB mirror 2x:30 B/L  1x:30 B/L 1x:30 B/L 1x:30 B/L   Blue foam step over x15 B/L mild UE  x10 B/L mild UE use x10 B/L mild UE use x15 B/L mild UE   Mat walk  W/ lump fwd/side 10ft x 4 ea Armando    W/ lump fwd/side 10ft x 4 ea Armando   CSMi balance   L2      Tandem balance        Foam balance EO/EC 1H 1x:30 B/L  EO/EC 1x:30ea tandem  B/L EO/EC 1x:30ea tandem  B/L EO/EC 1H 1x:30 B/L   Shop walk 25ft x 2 yes, no normal speed  25ft x 2 yes, no 25ft x 2 yes, no 25ft x 2 yes, no normal speed   Tandem walk 10ft x6  10ft x6 10 ft x 6 10ft x 6   Alt step taps 0H x30  8"   0H x8" x25 0H x8" x25 0H x30  8"    Therapeutic Activity        Hurdles         Hurdles ambulation   Over fwd/side 25ft x 2no AD     Sit to stand transfers x10  x20 X 20 x10   Mirror walk 25 ft x 8 fast speed CGA    25 ft x 8 fast speed CGA   Step Ups fwd/side 6" x15 ea  6" x20 ea fwd/lat 6" x20 ea fwd/lat 6" x15 ea   Up/down steps   1HR SOS x 2 trials

## 2022-09-14 ENCOUNTER — EVALUATION (OUTPATIENT)
Dept: PHYSICAL THERAPY | Facility: CLINIC | Age: 72
End: 2022-09-14
Payer: MEDICARE

## 2022-09-14 DIAGNOSIS — R26.9 GAIT ABNORMALITY: ICD-10-CM

## 2022-09-14 DIAGNOSIS — S72.031D CLOSED TRANSCERVICAL FRACTURE OF RIGHT FEMUR WITH ROUTINE HEALING, SUBSEQUENT ENCOUNTER: Primary | ICD-10-CM

## 2022-09-14 DIAGNOSIS — Z96.649 STATUS POST HIP HEMIARTHROPLASTY: ICD-10-CM

## 2022-09-14 PROCEDURE — 97110 THERAPEUTIC EXERCISES: CPT | Performed by: PHYSICAL THERAPIST

## 2022-09-14 PROCEDURE — 97112 NEUROMUSCULAR REEDUCATION: CPT | Performed by: PHYSICAL THERAPIST

## 2022-09-19 ENCOUNTER — APPOINTMENT (OUTPATIENT)
Dept: PHYSICAL THERAPY | Facility: CLINIC | Age: 72
End: 2022-09-19
Payer: MEDICARE

## 2022-09-21 ENCOUNTER — APPOINTMENT (OUTPATIENT)
Dept: PHYSICAL THERAPY | Facility: CLINIC | Age: 72
End: 2022-09-21
Payer: MEDICARE

## 2022-09-26 ENCOUNTER — APPOINTMENT (OUTPATIENT)
Dept: PHYSICAL THERAPY | Facility: CLINIC | Age: 72
End: 2022-09-26
Payer: MEDICARE

## 2022-09-28 ENCOUNTER — APPOINTMENT (OUTPATIENT)
Dept: PHYSICAL THERAPY | Facility: CLINIC | Age: 72
End: 2022-09-28
Payer: MEDICARE

## 2022-10-10 ENCOUNTER — OFFICE VISIT (OUTPATIENT)
Dept: OBGYN CLINIC | Facility: CLINIC | Age: 72
End: 2022-10-10
Payer: MEDICARE

## 2022-10-10 VITALS
HEIGHT: 65 IN | HEART RATE: 96 BPM | SYSTOLIC BLOOD PRESSURE: 133 MMHG | WEIGHT: 201 LBS | BODY MASS INDEX: 33.49 KG/M2 | DIASTOLIC BLOOD PRESSURE: 87 MMHG

## 2022-10-10 DIAGNOSIS — S72.031D CLOSED TRANSCERVICAL FRACTURE OF RIGHT FEMUR WITH ROUTINE HEALING, SUBSEQUENT ENCOUNTER: ICD-10-CM

## 2022-10-10 DIAGNOSIS — R20.0 NUMBNESS AND TINGLING IN RIGHT HAND: ICD-10-CM

## 2022-10-10 DIAGNOSIS — Z96.649 S/P HIP HEMIARTHROPLASTY: ICD-10-CM

## 2022-10-10 DIAGNOSIS — S62.101D CLOSED FRACTURE OF RIGHT WRIST WITH ROUTINE HEALING, SUBSEQUENT ENCOUNTER: Primary | ICD-10-CM

## 2022-10-10 DIAGNOSIS — R20.2 NUMBNESS AND TINGLING IN RIGHT HAND: ICD-10-CM

## 2022-10-10 PROCEDURE — 99213 OFFICE O/P EST LOW 20 MIN: CPT | Performed by: ORTHOPAEDIC SURGERY

## 2022-10-10 NOTE — PROGRESS NOTES
Assessment/Plan:   Diagnoses and all orders for this visit:    Closed fracture of right wrist with routine healing, subsequent encounter  -     XR wrist 2 vw right; Future    S/P hip hemiarthroplasty  -     XR hip/pelv 2-3 vws right if performed; Future    Closed transcervical fracture of right femur with routine healing, subsequent encounter    Numbness and tingling in right hand  -     EMG 1 Limb; Future    Reviewed today's physical exam findings and x-ray findings with patient at time of visit  X-rays demonstrate well-healed right wrist fracture with persistent ulnar positive deformity  Her x-rays also demonstrate a well-seated right total hip prosthesis with maintained alignment  She is not considered clinically resolved in regards to these issues  Unfortunately, her clinical exam also demonstrates numbness and tingling affecting the median distribution of the right upper extremity  As she has a previous history of carpal tunnel and carpal tunnel release, she is being provided a referral for EMG study of the right upper extremity to evaluate for recurrence of median nerve compression  She will be seen for follow-up after her EMG study is complete  Patient expresses understanding is in agreement with this treatment plan  In regards to her fractures, they have healed  She has some residual numbness along her right wrist   New set of nerve conduction studies were ordered  Return back once complete  She has no pain along her right wrist her right hip  X-rays do show the wrist fracture to have healed in a shortened position  X-rays of the hip shows a well-seated hemiarthroplasty without any loosening    Subjective:   Patient ID: Maurilio Vega  1950     HPI  Patient is a 67 y o  female who presents for follow-up evaluation right hip fracture and right wrist fracture  She is now 3 5 months post injury    She was last in regards to this issue on 8/29/2022 at which time she was instructed to wean use of the provided wrist splint to only using in public, she was also instructed to continue physical therapy for the wrist and hip, and to wean use of the cane and the guidance of her physical therapist   On today's presentation she states that she has no difficulty regarding the right upper extremity or the right lower extremity  She denies any new onset bruising, swelling, or mechanical symptoms  Unfortunately, she does note increased numbness and tingling affecting the thumb, index, and long fingers of the right hand  She also notes depression of the right thenar webspace  The following portions of the patient's history were reviewed and updated as appropriate:  Past medical history, past surgical history, Family history, social history, current medications and allergies    Past Medical History:   Diagnosis Date   • Anxiety    • Arthritis    • Depression    • GERD (gastroesophageal reflux disease)    • Hiatal hernia    • Hyperlipidemia    • Hypertension        Past Surgical History:   Procedure Laterality Date   • APPENDECTOMY     • CHOLECYSTECTOMY     • FRACTURE SURGERY Right     arm with plate and pins   • HAND SURGERY Bilateral    • HYSTERECTOMY     • JOINT REPLACEMENT Bilateral    • ND PARTIAL HIP REPLACEMENT Right 7/2/2022    Procedure: HEMIARTHROPLASTY HIP (BIPOLAR), closed reduction with splinting right wrist;  Surgeon: Javad Rinaldi; Location: [facility];  Service: Orthopedics   • SHOULDER ARTHROSCOPY Left        History reviewed  No pertinent family history      Social History     Socioeconomic History   • Marital status: /Civil Union     Spouse name: None   • Number of children: None   • Years of education: None   • Highest education level: None   Occupational History   • None   Tobacco Use   • Smoking status: Former Smoker     Types: Cigarettes   • Smokeless tobacco: Never Used   Vaping Use   • Vaping Use: Never used   Substance and Sexual Activity   • Alcohol use: Not Currently Alcohol/week: 1 0 standard drink     Types: 1 Glasses of wine per week   • Drug use: Never   • Sexual activity: Not Currently   Other Topics Concern   • None   Social History Narrative   • None     Social Determinants of Health     Financial Resource Strain: Not on file   Food Insecurity: No Food Insecurity   • Worried About Running Out of Food in the Last Year: Never true   • Ran Out of Food in the Last Year: Never true   Transportation Needs: No Transportation Needs   • Lack of Transportation (Medical): No   • Lack of Transportation (Non-Medical):  No   Physical Activity: Not on file   Stress: Not on file   Social Connections: Not on file   Intimate Partner Violence: Not on file   Housing Stability: Low Risk    • Unable to Pay for Housing in the Last Year: No   • Number of Places Lived in the Last Year: 1   • Unstable Housing in the Last Year: No         Current Outpatient Medications:   •  acetaminophen (TYLENOL) 325 mg tablet, Take 2 tablets (650 mg total) by mouth every 6 (six) hours as needed for mild pain, Disp: , Rfl: 0  •  amLODIPine (NORVASC) 10 mg tablet, Take 10 mg by mouth daily, Disp: , Rfl:   •  cetirizine (ZyrTEC) 10 mg tablet, Take 10 mg by mouth daily, Disp: , Rfl:   •  gabapentin (NEURONTIN) 600 MG tablet, Take 1 tablet (600 mg total) by mouth daily at bedtime, Disp: 30 tablet, Rfl: 0  •  multivitamin (THERAGRAN) TABS, Take 1 tablet by mouth daily, Disp: , Rfl:   •  omeprazole (PriLOSEC) 20 mg delayed release capsule, Take 20 mg by mouth daily 1 in am 1 in pm, Disp: , Rfl:   •  pravastatin (PRAVACHOL) 40 mg tablet, Take 40 mg by mouth daily One at bedtime, Disp: , Rfl:   •  temazepam (RESTORIL) 7 5 mg capsule, Take 15 mg by mouth daily at bedtime as needed for sleep  , Disp: , Rfl:   •  venlafaxine (EFFEXOR-XR) 150 mg 24 hr capsule, Take 150 mg by mouth daily, Disp: , Rfl:   •  aspirin (ECOTRIN) 325 mg EC tablet, Take 2 tablets (650 mg total) by mouth daily Take with food, Disp: 30 tablet, Rfl: 0  •  ferrous sulfate 325 (65 Fe) mg tablet, Take 1 tablet (325 mg total) by mouth daily with breakfast (Patient not taking: Reported on 10/10/2022), Disp: , Rfl: 0  •  senna (SENOKOT) 8 6 mg, Take 1 tablet (8 6 mg total) by mouth daily at bedtime, Disp: , Rfl: 0    No Known Allergies    Review of Systems   Constitutional: Negative for chills, fever and unexpected weight change  HENT: Negative for hearing loss, nosebleeds and sore throat  Eyes: Negative for pain, redness and visual disturbance  Respiratory: Negative for cough, shortness of breath and wheezing  Cardiovascular: Negative for chest pain, palpitations and leg swelling  Gastrointestinal: Negative for abdominal pain, nausea and vomiting  Endocrine: Negative for polydipsia and polyuria  Genitourinary: Negative for dysuria and hematuria  Musculoskeletal:        As noted in HPI   Skin: Negative for rash and wound  Neurological: Negative for dizziness, numbness and headaches  Psychiatric/Behavioral: Negative for decreased concentration and suicidal ideas  The patient is not nervous/anxious           Objective:  /87 (BP Location: Left arm, Patient Position: Sitting, Cuff Size: Large)   Pulse 96   Ht 5' 5" (1 651 m)   Wt 91 2 kg (201 lb)   BMI 33 45 kg/m²     Ortho Exam  Right wrist -  Patient presents with no obvious anatomical deformity  Skin is warm and dry to touch with no signs of erythema, ecchymosis, infection  No soft tissue swelling or effusion noted   Appreciable ulnar positive deformity  Full FDS, FDP, extensor mechanisms are intact   Active wrist extension 0°-25°, active wrist flexion 0°-20°  Minimal thenar atrophy, positive intrinsic atrophy  Negative Tinel's at Carpal Tunnel  Positive Phalen's sign  Positive Carpal tunnel compression  Demonstrates normal wrist, elbow, and shoulder motion  Forearm compartments are soft and supple  2+ distal radial pulse with brisk capillary refill to the fingers  Radial, median, and ulnar motor and sensory distributions intact  Sensation light touch intact distally    Right hip -   Patient presents with no obvious anatomical deformity  Ambulates with steady gait pattern  Uses no assistive device  Nontender over lumbar midline  Nontender over paraspinal musculature  Nontender over greater trochanter/trochanteric bursa  Nontender over PSIS, nontender over piriformis/glute med  Nontender over anterior hip joint/groin  ROM:  0°-15° seated IR, 0°-30° seated ER, 110° seated hip flexion  Smooth passive circumduction without pain exacerbation  Knee flexor and extensor mechanisms intact  2+ TP and DP pulses with brisk capillary refill to the toes  Sural, saphenous, tibial, superficial and deep peroneal motor and sensory distributions intact  Sensation to light touch intact distally    Physical Exam  Vitals and nursing note reviewed  Constitutional:       General: She is not in acute distress  Appearance: She is well-developed  HENT:      Head: Normocephalic and atraumatic  Eyes:      Conjunctiva/sclera: Conjunctivae normal    Cardiovascular:      Rate and Rhythm: Normal rate  Pulmonary:      Effort: Pulmonary effort is normal    Musculoskeletal:      Cervical back: Neck supple  Skin:     General: Skin is warm and dry  Capillary Refill: Capillary refill takes less than 2 seconds  Neurological:      Mental Status: She is alert and oriented to person, place, and time  Psychiatric:         Mood and Affect: Mood normal          Behavior: Behavior normal         Diagnostic Test Review:  Attending Physician has personally reviewed pertinent imaging in PACS, impression is as follows:    Review of radiographic series taken 10/10/2022 of the right wrist shows well-healed distal radius fracture with ulnar positive deformity      Review of radiographic series taken 10/10/2022 of the AP pelvis and right hip shows well-seated hemiarthroplasty prosthesis with maintained alignment and no signs of loosening      Procedures   Procedures performed this visit    Scribe Attestation    I,:  Aubrey Simpson am acting as a scribe while in the presence of the attending physician :       I,:  Dena Babin, DO personally performed the services described in this documentation    as scribed in my presence :

## 2022-10-13 NOTE — PROGRESS NOTES
10/13/22 1309   Hello, [Guardian’s Name / Patient’s Tsering Sayer, this is [Caller Tsering Sayer from [facility], and our clinical care team wanted to check on you / your child after your recent visit to the hospital  It will only take 3-5 minutes  Is this a good time? Discharge Call Type/ Specific Diagnosis: General Call;ARC 90 Day Call   ARC 90 Day Discharge Call   Assessment Source 1   Call Complete for 90 Days call back?   Complete

## 2022-10-18 ENCOUNTER — TELEPHONE (OUTPATIENT)
Dept: OBGYN CLINIC | Facility: CLINIC | Age: 72
End: 2022-10-18

## 2022-10-26 RX ORDER — SODIUM CHLORIDE, SODIUM LACTATE, POTASSIUM CHLORIDE, CALCIUM CHLORIDE 600; 310; 30; 20 MG/100ML; MG/100ML; MG/100ML; MG/100ML
125 INJECTION, SOLUTION INTRAVENOUS CONTINUOUS
Status: CANCELLED | OUTPATIENT
Start: 2022-10-26

## 2022-10-27 ENCOUNTER — ANESTHESIA EVENT (OUTPATIENT)
Dept: GASTROENTEROLOGY | Facility: HOSPITAL | Age: 72
End: 2022-10-27

## 2022-10-27 ENCOUNTER — ANESTHESIA (OUTPATIENT)
Dept: GASTROENTEROLOGY | Facility: HOSPITAL | Age: 72
End: 2022-10-27

## 2022-10-27 ENCOUNTER — HOSPITAL ENCOUNTER (OUTPATIENT)
Dept: GASTROENTEROLOGY | Facility: HOSPITAL | Age: 72
Setting detail: OUTPATIENT SURGERY
End: 2022-10-27
Attending: INTERNAL MEDICINE
Payer: MEDICARE

## 2022-10-27 VITALS
SYSTOLIC BLOOD PRESSURE: 112 MMHG | RESPIRATION RATE: 18 BRPM | HEIGHT: 65 IN | WEIGHT: 201 LBS | HEART RATE: 84 BPM | TEMPERATURE: 97.1 F | DIASTOLIC BLOOD PRESSURE: 62 MMHG | OXYGEN SATURATION: 96 % | BODY MASS INDEX: 33.49 KG/M2

## 2022-10-27 DIAGNOSIS — K21.9 GASTRO-ESOPHAGEAL REFLUX DISEASE WITHOUT ESOPHAGITIS: ICD-10-CM

## 2022-10-27 DIAGNOSIS — K22.2 ESOPHAGEAL OBSTRUCTION: ICD-10-CM

## 2022-10-27 DIAGNOSIS — K44.9 DIAPHRAGMATIC HERNIA WITHOUT OBSTRUCTION OR GANGRENE: ICD-10-CM

## 2022-10-27 PROCEDURE — C1726 CATH, BAL DIL, NON-VASCULAR: HCPCS

## 2022-10-27 RX ORDER — LIDOCAINE HYDROCHLORIDE 20 MG/ML
INJECTION, SOLUTION EPIDURAL; INFILTRATION; INTRACAUDAL; PERINEURAL AS NEEDED
Status: DISCONTINUED | OUTPATIENT
Start: 2022-10-27 | End: 2022-10-27

## 2022-10-27 RX ORDER — PROPOFOL 10 MG/ML
INJECTION, EMULSION INTRAVENOUS AS NEEDED
Status: DISCONTINUED | OUTPATIENT
Start: 2022-10-27 | End: 2022-10-27

## 2022-10-27 RX ORDER — SODIUM CHLORIDE, SODIUM LACTATE, POTASSIUM CHLORIDE, CALCIUM CHLORIDE 600; 310; 30; 20 MG/100ML; MG/100ML; MG/100ML; MG/100ML
125 INJECTION, SOLUTION INTRAVENOUS CONTINUOUS
Status: DISCONTINUED | OUTPATIENT
Start: 2022-10-27 | End: 2022-10-31 | Stop reason: HOSPADM

## 2022-10-27 RX ADMIN — PROPOFOL 40 MG: 10 INJECTION, EMULSION INTRAVENOUS at 09:29

## 2022-10-27 RX ADMIN — PROPOFOL 20 MG: 10 INJECTION, EMULSION INTRAVENOUS at 09:24

## 2022-10-27 RX ADMIN — LIDOCAINE HYDROCHLORIDE 100 MG: 20 INJECTION, SOLUTION EPIDURAL; INFILTRATION; INTRACAUDAL; PERINEURAL at 09:20

## 2022-10-27 RX ADMIN — PROPOFOL 80 MG: 10 INJECTION, EMULSION INTRAVENOUS at 09:20

## 2022-10-27 RX ADMIN — PROPOFOL 30 MG: 10 INJECTION, EMULSION INTRAVENOUS at 09:22

## 2022-10-27 RX ADMIN — PROPOFOL 30 MG: 10 INJECTION, EMULSION INTRAVENOUS at 09:26

## 2022-10-27 RX ADMIN — SODIUM CHLORIDE, SODIUM LACTATE, POTASSIUM CHLORIDE, AND CALCIUM CHLORIDE 125 ML/HR: .6; .31; .03; .02 INJECTION, SOLUTION INTRAVENOUS at 08:12

## 2022-10-27 NOTE — INTERVAL H&P NOTE
H&P reviewed  After examining the patient I find no changes in the patients condition since the H&P had been written      Vitals:    10/27/22 0806   BP: 165/84   Pulse: 94   Resp: 18   Temp: (!) 97 1 °F (36 2 °C)   SpO2: 96%

## 2022-10-27 NOTE — ANESTHESIA PREPROCEDURE EVALUATION
Procedure:  EGD    Relevant Problems   CARDIO   (+) Hypertension      HEMATOLOGY   (+) Acute blood loss anemia      MUSCULOSKELETAL   (+) Cervical spondylosis   (+) Lumbar degenerative disc disease   (+) Lumbar spondylosis   (+) Rheumatoid arthritis involving both hands (HCC)        Physical Exam    Airway    Mallampati score: II  TM Distance: >3 FB  Neck ROM: full     Dental   No notable dental hx     Cardiovascular      Pulmonary      Other Findings        Anesthesia Plan  ASA Score- 2     Anesthesia Type- IV sedation with anesthesia with ASA Monitors  Additional Monitors:   Airway Plan:           Plan Factors-Exercise tolerance (METS): >4 METS  Chart reviewed  Patient summary reviewed  Patient is not a current smoker  There is medical exclusion for perioperative obstructive sleep apnea risk education  Induction- intravenous  Postoperative Plan-     Informed Consent- Anesthetic plan and risks discussed with patient  I personally reviewed this patient with the CRNA  Discussed and agreed on the Anesthesia Plan with the CRNA  Jayshree Diop

## 2022-10-27 NOTE — ANESTHESIA POSTPROCEDURE EVALUATION
Post-Op Assessment Note    CV Status:  Stable  Pain Score: 0    Pain management: adequate     Mental Status:  Arousable   Hydration Status:  Stable   PONV Controlled:  None   Airway Patency:  Patent      Post Op Vitals Reviewed: Yes      Staff: CRNA         No complications documented      BP   123/67   Temp     Pulse  82   Resp   13   SpO2 98%

## 2022-11-14 ENCOUNTER — APPOINTMENT (OUTPATIENT)
Dept: RADIOLOGY | Facility: CLINIC | Age: 72
End: 2022-11-14

## 2022-11-14 ENCOUNTER — OFFICE VISIT (OUTPATIENT)
Dept: RHEUMATOLOGY | Facility: CLINIC | Age: 72
End: 2022-11-14

## 2022-11-14 ENCOUNTER — APPOINTMENT (OUTPATIENT)
Dept: LAB | Facility: CLINIC | Age: 72
End: 2022-11-14

## 2022-11-14 VITALS
HEIGHT: 65 IN | BODY MASS INDEX: 32.99 KG/M2 | DIASTOLIC BLOOD PRESSURE: 92 MMHG | SYSTOLIC BLOOD PRESSURE: 130 MMHG | WEIGHT: 198 LBS

## 2022-11-14 DIAGNOSIS — M19.041 PRIMARY OSTEOARTHRITIS OF RIGHT HAND: ICD-10-CM

## 2022-11-14 DIAGNOSIS — M19.90 ARTHRITIS: Primary | ICD-10-CM

## 2022-11-14 DIAGNOSIS — S72.031A: ICD-10-CM

## 2022-11-14 DIAGNOSIS — M19.90 ARTHRITIS: ICD-10-CM

## 2022-11-14 DIAGNOSIS — M85.89 OTHER SPECIFIED DISORDERS OF BONE DENSITY AND STRUCTURE, MULTIPLE SITES: ICD-10-CM

## 2022-11-14 DIAGNOSIS — M89.9 DISORDER OF BONE, UNSPECIFIED: ICD-10-CM

## 2022-11-14 LAB
25(OH)D3 SERPL-MCNC: 32.5 NG/ML (ref 30–100)
CALCIUM SERPL-MCNC: 10 MG/DL (ref 8.3–10.1)
CRP SERPL QL: 4.2 MG/L
ERYTHROCYTE [SEDIMENTATION RATE] IN BLOOD: 20 MM/HOUR (ref 0–29)

## 2022-11-14 RX ORDER — CELECOXIB 100 MG/1
100 CAPSULE ORAL 2 TIMES DAILY
Qty: 60 CAPSULE | Refills: 6 | Status: SHIPPED | OUTPATIENT
Start: 2022-11-14

## 2022-11-14 NOTE — PATIENT INSTRUCTIONS
Take 1,200mg daily of calcium  Take 2,000 units daily of Vit   D  Can take celecoxib twice a day as needed for joint pain, take with food  Try diclofenac gel as needed for joint pain  Schedule DEXA scan  Do hand x-rays  Do labs    Return to clinic in 6 months

## 2022-11-14 NOTE — PROGRESS NOTES
Assessment and Plan:   Erin Russo is a 67 y o   female who presents as a Rheumatology consult referred by Taylor Wheat,* for evaluation of***    Plan:  Diagnoses and all orders for this visit:    Closed transcervical fracture of right femur, initial encounter ([facility] [address] )  -     DXA bone density spine hip and pelvis; Future    Primary osteoarthritis of right hand  -     Ambulatory Referral to Rheumatology  -     XR hand 3+ vw right; Future    Other specified disorders of bone density and structure, multiple sites   -     DXA bone density spine hip and pelvis; Future  -     Vitamin D 25 hydroxy  -     Calcium    Arthritis  -     Sedimentation rate, automated  -     C-reactive protein  -     RF Screen w/ Reflex to Titer  -     Cyclic citrul peptide antibody, IgG  -     celecoxib (CeleBREX) 100 mg capsule; Take 1 capsule (100 mg total) by mouth 2 (two) times a day As needed for joint pain, take with food  -     XR hand 3+ vw left; Future    Disorder of bone, unspecified   -     Vitamin D 25 hydroxy        Follow-up plan: ***        Chief Complaint  No chief complaint on file  HPI  Erin Russo is a 67 y o   female who presents as a Rheumatology consult referred by Taylor Wheat,* for evaluation of***  Denies photosensitivity, rashes, psoriasis, sicca symptoms, oral or nasal ulcers, alopecia, Raynaud's, h/o pericarditis or pleurisy, h/o blood clots or miscarriages  Review of Systems  Review of Systems  Reviewed and agree      Allergies  No Known Allergies    Home Medications    Current Outpatient Medications:   •  celecoxib (CeleBREX) 100 mg capsule, Take 1 capsule (100 mg total) by mouth 2 (two) times a day As needed for joint pain, take with food, Disp: 60 capsule, Rfl: 6  •  acetaminophen (TYLENOL) 325 mg tablet, Take 2 tablets (650 mg total) by mouth every 6 (six) hours as needed for mild pain, Disp: , Rfl: 0  •  amLODIPine (NORVASC) 10 mg tablet, Take 10 mg by mouth daily, Disp: , Rfl: •  aspirin (ECOTRIN) 325 mg EC tablet, Take 2 tablets (650 mg total) by mouth daily Take with food, Disp: 30 tablet, Rfl: 0  •  cetirizine (ZyrTEC) 10 mg tablet, Take 10 mg by mouth daily, Disp: , Rfl:   •  ferrous sulfate 325 (65 Fe) mg tablet, Take 1 tablet (325 mg total) by mouth daily with breakfast (Patient not taking: No sig reported), Disp: , Rfl: 0  •  gabapentin (NEURONTIN) 600 MG tablet, Take 1 tablet (600 mg total) by mouth daily at bedtime, Disp: 30 tablet, Rfl: 0  •  multivitamin (THERAGRAN) TABS, Take 1 tablet by mouth daily, Disp: , Rfl:   •  omeprazole (PriLOSEC) 20 mg delayed release capsule, Take 20 mg by mouth daily 1 in am 1 in pm, Disp: , Rfl:   •  pravastatin (PRAVACHOL) 40 mg tablet, Take 40 mg by mouth daily One at bedtime, Disp: , Rfl:   •  senna (SENOKOT) 8 6 mg, Take 1 tablet (8 6 mg total) by mouth daily at bedtime, Disp: , Rfl: 0  •  temazepam (RESTORIL) 7 5 mg capsule, Take 15 mg by mouth daily at bedtime as needed for sleep  , Disp: , Rfl:   •  venlafaxine (EFFEXOR-XR) 150 mg 24 hr capsule, Take 150 mg by mouth daily, Disp: , Rfl:     Past Medical History  Past Medical History:   Diagnosis Date   • Anxiety    • Arthritis    • Depression    • GERD (gastroesophageal reflux disease)    • Hiatal hernia    • Hyperlipidemia    • Hypertension        Past Surgical History   Past Surgical History:   Procedure Laterality Date   • APPENDECTOMY     • CHOLECYSTECTOMY     • FRACTURE SURGERY Right     arm with plate and pins   • HAND SURGERY Bilateral    • HYSTERECTOMY     • JOINT REPLACEMENT Bilateral    • LA PARTIAL HIP REPLACEMENT Right 7/2/2022    Procedure: HEMIARTHROPLASTY HIP (BIPOLAR), closed reduction with splinting right wrist;  Surgeon: Ratna Pelletier; Location: [facility];  Service: Orthopedics   • SHOULDER ARTHROSCOPY Left        Family History  History reviewed  No pertinent family history  No known family history of autoimmune or inflammatory diseases  ***    Social History  Occupation: ***  Social History     Substance and Sexual Activity   Alcohol Use Not Currently   • Alcohol/week: 1 0 standard drink   • Types: 1 Glasses of wine per week     Social History     Substance and Sexual Activity   Drug Use Never     Social History     Tobacco Use   Smoking Status Former Smoker   • Packs/day: 0 25   • Types: Cigarettes   • Quit date:    • Years since quittin 8   Smokeless Tobacco Never Used       Objective:  Vitals:    22 0925   BP: 130/92   Weight: 89 8 kg (198 lb)   Height: 5' 5" (1 651 m)       Physical Exam  Musculoskeletal--Peripheral Joint Exam:  Physical Exam  There is currently no information documented on the homunculus  Go to the Rheumatology activity and complete the homunculus joint exam   Joint Exam 2022     No joint exam has been documented for this visit     SHEA-28 (ESR): --  SHEA-28 (CRP): --      Reviewed labs and imaging  Imaging:   EGD 10/27/22  Gastric polyps, appeared benign  Upper and lower esophageal rings, both dilated  Moderate hiatal hernia  XR right hip 10/10/22  Unremarkable appearance of right hip hemiarthroplasty    XR right wrist 10/10/22  Healing comminuted intra-articular distal radius fracture with stable alignment  XR cervical spine 22  Severe disc space narrowing and anterior osteophyte formation at C5-C6 and C6-C7 with milder changes at C4-C5  Bilateral uncovertebral joint hypertrophy throughout the lower cervical spine with moderate to severe bilateral neural foraminal narrowing at   C5-6 and C6-7  Findings are slightly more pronounced since   XR bilateral knee 22  Satisfactory bilateral knee arthroplasty placement  XR right elbow 22  The patient is status post open reduction internal fixation of olecranon fracture  The orthopedic hardware appears to be intact    Degenerative changes of both the radial and ulnar aspect of the elbow joint are noted as well as enthesophyte of the medial   epicondyles  MRI lumbar spine 10/13/21  Slightly worsened multilevel degenerative changes of lumbar spine with varying degrees of canal stenosis (moderate L4-L5) and foraminal narrowing (mild left L3-L4, right L4-5, and bilateral L5-S1), as detailed above  XR left shoulder 6/8/21  Moderate osteoarthritis glenohumeral joint  Labs:   Hospital Outpatient Visit on 10/27/2022   Component Date Value Ref Range Status   • Case Report 10/27/2022    Final                    Value:Surgical Pathology Report                         Case: [id number]                                   Authorizing Provider:  Zeke Cunningham MD        Collected:           10/27/2022 2239              Ordering Location:     [facility] Received:            10/27/2022 1249                                     Endoscopy                                                                    Pathologist:           Rick Hoffman MD                                                           Specimens:   A) - Stomach, polyp, body                                                                           B) - Esophagus, schatzkie ring                                                            • Final Diagnosis 10/27/2022    Final                    Value: This result contains rich text formatting which cannot be displayed here  • Note 10/27/2022    Final                    Value: This result contains rich text formatting which cannot be displayed here  • Additional Information 10/27/2022    Final                    Value: This result contains rich text formatting which cannot be displayed here  • Gross Description 10/27/2022    Final                    Value: This result contains rich text formatting which cannot be displayed here     Admission on 07/06/2022, Discharged on 07/15/2022   Component Date Value Ref Range Status   • Sodium 07/07/2022 139  135 - 147 mmol/L Final   • Potassium 07/07/2022 3 5  3 5 - 5 3 mmol/L Final   • Chloride 07/07/2022 100  96 - 108 mmol/L Final   • CO2 07/07/2022 30  21 - 32 mmol/L Final   • ANION GAP 07/07/2022 9  4 - 13 mmol/L Final   • BUN 07/07/2022 21  5 - 25 mg/dL Final   • Creatinine 07/07/2022 0 83  0 60 - 1 30 mg/dL Final    Standardized to IDMS reference method   • Glucose 07/07/2022 114  65 - 140 mg/dL Final    If the patient is fasting, the ADA then defines impaired fasting glucose as > 100 mg/dL and diabetes as > or equal to 123 mg/dL  Specimen collection should occur prior to Sulfasalazine administration due to the potential for falsely depressed results  Specimen collection should occur prior to Sulfapyridine administration due to the potential for falsely elevated results  • Glucose, Fasting 07/07/2022 114 (A) 65 - 99 mg/dL Final    Specimen collection should occur prior to Sulfasalazine administration due to the potential for falsely depressed results  Specimen collection should occur prior to Sulfapyridine administration due to the potential for falsely elevated results  • Calcium 07/07/2022 9 0  8 4 - 10 2 mg/dL Final   • Corrected Calcium 07/07/2022 9 5  8 3 - 10 1 mg/dL Final   • AST 07/07/2022 49 (A) 13 - 39 U/L Final    Specimen collection should occur prior to Sulfasalazine administration due to the potential for falsely depressed results  • ALT 07/07/2022 44  7 - 52 U/L Final    Specimen collection should occur prior to Sulfasalazine administration due to the potential for falsely depressed results      • Alkaline Phosphatase 07/07/2022 48  34 - 104 U/L Final   • Total Protein 07/07/2022 6 5  6 4 - 8 4 g/dL Final   • Albumin 07/07/2022 3 4 (A) 3 5 - 5 0 g/dL Final   • Total Bilirubin 07/07/2022 0 62  0 20 - 1 00 mg/dL Final   • eGFR 07/07/2022 70  ml/min/1 73sq m Final   • WBC 07/07/2022 8 70  4 31 - 10 16 Thousand/uL Final   • RBC 07/07/2022 3 74 (A) 3 81 - 5 12 Million/uL Final   • Hemoglobin 07/07/2022 11 0 (A) 11 5 - 15 4 g/dL Final   • Hematocrit 07/07/2022 35 4  34 8 - 46 1 % Final   • MCV 07/07/2022 95  82 - 98 fL Final   • MCH 07/07/2022 29 4  26 8 - 34 3 pg Final   • MCHC 07/07/2022 31 1 (A) 31 4 - 37 4 g/dL Final   • RDW 07/07/2022 13 7  11 6 - 15 1 % Final   • MPV 07/07/2022 11 9  8 9 - 12 7 fL Final   • Platelets 50/92/7948 316  149 - 390 Thousands/uL Final   • nRBC 07/07/2022 0  /100 WBCs Final   • Neutrophils Relative 07/07/2022 55  43 - 75 % Final   • Immat GRANS % 07/07/2022 0  0 - 2 % Final   • Lymphocytes Relative 07/07/2022 32  14 - 44 % Final   • Monocytes Relative 07/07/2022 9  4 - 12 % Final   • Eosinophils Relative 07/07/2022 3  0 - 6 % Final   • Basophils Relative 07/07/2022 1  0 - 1 % Final   • Neutrophils Absolute 07/07/2022 4 79  1 85 - 7 62 Thousands/µL Final   • Immature Grans Absolute 07/07/2022 0 02  0 00 - 0 20 Thousand/uL Final   • Lymphocytes Absolute 07/07/2022 2 80  0 60 - 4 47 Thousands/µL Final   • Monocytes Absolute 07/07/2022 0 79  0 17 - 1 22 Thousand/µL Final   • Eosinophils Absolute 07/07/2022 0 25  0 00 - 0 61 Thousand/µL Final   • Basophils Absolute 07/07/2022 0 05  0 00 - 0 10 Thousands/µL Final   Admission on 07/01/2022, Discharged on 07/06/2022   Component Date Value Ref Range Status   • WBC 07/01/2022 7 15  4 31 - 10 16 Thousand/uL Final   • RBC 07/01/2022 5 01  3 81 - 5 12 Million/uL Final   • Hemoglobin 07/01/2022 15 0  11 5 - 15 4 g/dL Final   • Hematocrit 07/01/2022 47 0 (A) 34 8 - 46 1 % Final   • MCV 07/01/2022 94  82 - 98 fL Final   • MCH 07/01/2022 29 9  26 8 - 34 3 pg Final   • MCHC 07/01/2022 31 9  31 4 - 37 4 g/dL Final   • RDW 07/01/2022 13 7  11 6 - 15 1 % Final   • MPV 07/01/2022 12 0  8 9 - 12 7 fL Final   • Platelets 01/30/5416 280  149 - 390 Thousands/uL Final   • nRBC 07/01/2022 0  /100 WBCs Final   • Neutrophils Relative 07/01/2022 39 (A) 43 - 75 % Final   • Immat GRANS % 07/01/2022 0  0 - 2 % Final   • Lymphocytes Relative 07/01/2022 46 (A) 14 - 44 % Final   • Monocytes Relative 07/01/2022 11  4 - 12 % Final   • Eosinophils Relative 07/01/2022 3  0 - 6 % Final   • Basophils Relative 07/01/2022 1  0 - 1 % Final   • Neutrophils Absolute 07/01/2022 2 81  1 85 - 7 62 Thousands/µL Final   • Immature Grans Absolute 07/01/2022 0 03  0 00 - 0 20 Thousand/uL Final   • Lymphocytes Absolute 07/01/2022 3 27  0 60 - 4 47 Thousands/µL Final   • Monocytes Absolute 07/01/2022 0 80  0 17 - 1 22 Thousand/µL Final   • Eosinophils Absolute 07/01/2022 0 19  0 00 - 0 61 Thousand/µL Final   • Basophils Absolute 07/01/2022 0 05  0 00 - 0 10 Thousands/µL Final   • Sodium 07/01/2022 139  135 - 147 mmol/L Final   • Potassium 07/01/2022 4 4  3 5 - 5 3 mmol/L Final   • Chloride 07/01/2022 100  96 - 108 mmol/L Final   • CO2 07/01/2022 27  21 - 32 mmol/L Final   • ANION GAP 07/01/2022 12  4 - 13 mmol/L Final   • BUN 07/01/2022 19  5 - 25 mg/dL Final   • Creatinine 07/01/2022 1 05  0 60 - 1 30 mg/dL Final    Standardized to IDMS reference method   • Glucose 07/01/2022 137  65 - 140 mg/dL Final    If the patient is fasting, the ADA then defines impaired fasting glucose as > 100 mg/dL and diabetes as > or equal to 123 mg/dL  Specimen collection should occur prior to Sulfasalazine administration due to the potential for falsely depressed results  Specimen collection should occur prior to Sulfapyridine administration due to the potential for falsely elevated results     • Calcium 07/01/2022 9 8  8 4 - 10 2 mg/dL Final   • eGFR 07/01/2022 52  ml/min/1 73sq m Final   • Sodium 07/02/2022 136  135 - 147 mmol/L Final   • Potassium 07/02/2022 4 0  3 5 - 5 3 mmol/L Final   • Chloride 07/02/2022 102  96 - 108 mmol/L Final   • CO2 07/02/2022 25  21 - 32 mmol/L Final   • ANION GAP 07/02/2022 9  4 - 13 mmol/L Final   • BUN 07/02/2022 20  5 - 25 mg/dL Final   • Creatinine 07/02/2022 1 04  0 60 - 1 30 mg/dL Final    Standardized to IDMS reference method   • Glucose 07/02/2022 122  65 - 140 mg/dL Final    If the patient is fasting, the ADA then defines impaired fasting glucose as > 100 mg/dL and diabetes as > or equal to 123 mg/dL  Specimen collection should occur prior to Sulfasalazine administration due to the potential for falsely depressed results  Specimen collection should occur prior to Sulfapyridine administration due to the potential for falsely elevated results  • Calcium 07/02/2022 9 0  8 4 - 10 2 mg/dL Final   • AST 07/02/2022 35  13 - 39 U/L Final    Specimen collection should occur prior to Sulfasalazine administration due to the potential for falsely depressed results  • ALT 07/02/2022 35  7 - 52 U/L Final    Specimen collection should occur prior to Sulfasalazine administration due to the potential for falsely depressed results      • Alkaline Phosphatase 07/02/2022 34  34 - 104 U/L Final   • Total Protein 07/02/2022 6 9  6 4 - 8 4 g/dL Final   • Albumin 07/02/2022 4 3  3 5 - 5 0 g/dL Final   • Total Bilirubin 07/02/2022 0 55  0 20 - 1 00 mg/dL Final   • eGFR 07/02/2022 53  ml/min/1 73sq m Final   • WBC 07/02/2022 9 40  4 31 - 10 16 Thousand/uL Final   • RBC 07/02/2022 4 75  3 81 - 5 12 Million/uL Final   • Hemoglobin 07/02/2022 14 2  11 5 - 15 4 g/dL Final   • Hematocrit 07/02/2022 45 3  34 8 - 46 1 % Final   • MCV 07/02/2022 95  82 - 98 fL Final   • MCH 07/02/2022 29 9  26 8 - 34 3 pg Final   • MCHC 07/02/2022 31 3 (A) 31 4 - 37 4 g/dL Final   • RDW 07/02/2022 13 9  11 6 - 15 1 % Final   • Platelets 29/34/9912 262  149 - 390 Thousands/uL Final   • MPV 07/02/2022 11 9  8 9 - 12 7 fL Final   • Hepatitis C Ab 07/02/2022 Non-reactive  Non-reactive Final   • WBC 07/02/2022 17 13 (A) 4 31 - 10 16 Thousand/uL Final   • RBC 07/02/2022 4 35  3 81 - 5 12 Million/uL Final   • Hemoglobin 07/02/2022 13 2  11 5 - 15 4 g/dL Final   • Hematocrit 07/02/2022 42 3  34 8 - 46 1 % Final   • MCV 07/02/2022 97  82 - 98 fL Final   • MCH 07/02/2022 30 3  26 8 - 34 3 pg Final   • MCHC 07/02/2022 31 2 (A) 31 4 - 37 4 g/dL Final   • RDW 07/02/2022 13 9  11 6 - 15 1 % Final   • Platelets 47/01/2692 275  149 - 390 Thousands/uL Final   • MPV 07/02/2022 11 9  8 9 - 12 7 fL Final   • Sodium 07/03/2022 137  135 - 147 mmol/L Final   • Potassium 07/03/2022 4 9  3 5 - 5 3 mmol/L Final   • Chloride 07/03/2022 101  96 - 108 mmol/L Final   • CO2 07/03/2022 29  21 - 32 mmol/L Final   • ANION GAP 07/03/2022 7  4 - 13 mmol/L Final   • BUN 07/03/2022 26 (A) 5 - 25 mg/dL Final   • Creatinine 07/03/2022 1 26  0 60 - 1 30 mg/dL Final    Standardized to IDMS reference method   • Glucose 07/03/2022 149 (A) 65 - 140 mg/dL Final    If the patient is fasting, the ADA then defines impaired fasting glucose as > 100 mg/dL and diabetes as > or equal to 123 mg/dL  Specimen collection should occur prior to Sulfasalazine administration due to the potential for falsely depressed results  Specimen collection should occur prior to Sulfapyridine administration due to the potential for falsely elevated results     • Calcium 07/03/2022 8 9  8 4 - 10 2 mg/dL Final   • eGFR 07/03/2022 42  ml/min/1 73sq m Final   • WBC 07/03/2022 14 66 (A) 4 31 - 10 16 Thousand/uL Final   • RBC 07/03/2022 3 91  3 81 - 5 12 Million/uL Final   • Hemoglobin 07/03/2022 11 6  11 5 - 15 4 g/dL Final   • Hematocrit 07/03/2022 37 8  34 8 - 46 1 % Final   • MCV 07/03/2022 97  82 - 98 fL Final   • MCH 07/03/2022 29 7  26 8 - 34 3 pg Final   • MCHC 07/03/2022 30 7 (A) 31 4 - 37 4 g/dL Final   • RDW 07/03/2022 14 0  11 6 - 15 1 % Final   • MPV 07/03/2022 12 2  8 9 - 12 7 fL Final   • Platelets 84/37/0320 229  149 - 390 Thousands/uL Final   • nRBC 07/03/2022 0  /100 WBCs Final   • Neutrophils Relative 07/03/2022 83 (A) 43 - 75 % Final   • Immat GRANS % 07/03/2022 0  0 - 2 % Final   • Lymphocytes Relative 07/03/2022 8 (A) 14 - 44 % Final   • Monocytes Relative 07/03/2022 9  4 - 12 % Final   • Eosinophils Relative 07/03/2022 0  0 - 6 % Final   • Basophils Relative 07/03/2022 0  0 - 1 % Final   • Neutrophils Absolute 07/03/2022 12 10 (A) 1 85 - 7 62 Thousands/µL Final   • Immature Grans Absolute 07/03/2022 0 06  0 00 - 0 20 Thousand/uL Final   • Lymphocytes Absolute 07/03/2022 1 22  0 60 - 4 47 Thousands/µL Final   • Monocytes Absolute 07/03/2022 1 27 (A) 0 17 - 1 22 Thousand/µL Final   • Eosinophils Absolute 07/03/2022 0 00  0 00 - 0 61 Thousand/µL Final   • Basophils Absolute 07/03/2022 0 01  0 00 - 0 10 Thousands/µL Final   • Sodium 07/04/2022 137  135 - 147 mmol/L Final   • Potassium 07/04/2022 3 9  3 5 - 5 3 mmol/L Final   • Chloride 07/04/2022 101  96 - 108 mmol/L Final   • CO2 07/04/2022 27  21 - 32 mmol/L Final   • ANION GAP 07/04/2022 9  4 - 13 mmol/L Final   • BUN 07/04/2022 24  5 - 25 mg/dL Final   • Creatinine 07/04/2022 0 85  0 60 - 1 30 mg/dL Final    Standardized to IDMS reference method   • Glucose 07/04/2022 125  65 - 140 mg/dL Final    If the patient is fasting, the ADA then defines impaired fasting glucose as > 100 mg/dL and diabetes as > or equal to 123 mg/dL  Specimen collection should occur prior to Sulfasalazine administration due to the potential for falsely depressed results  Specimen collection should occur prior to Sulfapyridine administration due to the potential for falsely elevated results  • Calcium 07/04/2022 9 0  8 4 - 10 2 mg/dL Final   • eGFR 07/04/2022 68  ml/min/1 73sq m Final   • Sodium 07/05/2022 138  135 - 147 mmol/L Final   • Potassium 07/05/2022 4 0  3 5 - 5 3 mmol/L Final   • Chloride 07/05/2022 101  96 - 108 mmol/L Final   • CO2 07/05/2022 30  21 - 32 mmol/L Final   • ANION GAP 07/05/2022 7  4 - 13 mmol/L Final   • BUN 07/05/2022 21  5 - 25 mg/dL Final   • Creatinine 07/05/2022 0 83  0 60 - 1 30 mg/dL Final    Standardized to IDMS reference method   • Glucose 07/05/2022 120  65 - 140 mg/dL Final    If the patient is fasting, the ADA then defines impaired fasting glucose as > 100 mg/dL and diabetes as > or equal to 123 mg/dL    Specimen collection should occur prior to Sulfasalazine administration due to the potential for falsely depressed results  Specimen collection should occur prior to Sulfapyridine administration due to the potential for falsely elevated results     • Calcium 07/05/2022 9 1  8 4 - 10 2 mg/dL Final   • eGFR 07/05/2022 70  ml/min/1 73sq m Final   • WBC 07/05/2022 10 98 (A) 4 31 - 10 16 Thousand/uL Final   • RBC 07/05/2022 3 80 (A) 3 81 - 5 12 Million/uL Final   • Hemoglobin 07/05/2022 11 2 (A) 11 5 - 15 4 g/dL Final   • Hematocrit 07/05/2022 36 1  34 8 - 46 1 % Final   • MCV 07/05/2022 95  82 - 98 fL Final   • MCH 07/05/2022 29 5  26 8 - 34 3 pg Final   • MCHC 07/05/2022 31 0 (A) 31 4 - 37 4 g/dL Final   • RDW 07/05/2022 14 0  11 6 - 15 1 % Final   • MPV 07/05/2022 12 3  8 9 - 12 7 fL Final   • Platelets 44/86/1213 242  149 - 390 Thousands/uL Final   • nRBC 07/05/2022 0  /100 WBCs Final   • Neutrophils Relative 07/05/2022 63  43 - 75 % Final   • Immat GRANS % 07/05/2022 0  0 - 2 % Final   • Lymphocytes Relative 07/05/2022 27  14 - 44 % Final   • Monocytes Relative 07/05/2022 9  4 - 12 % Final   • Eosinophils Relative 07/05/2022 1  0 - 6 % Final   • Basophils Relative 07/05/2022 0  0 - 1 % Final   • Neutrophils Absolute 07/05/2022 6 86  1 85 - 7 62 Thousands/µL Final   • Immature Grans Absolute 07/05/2022 0 03  0 00 - 0 20 Thousand/uL Final   • Lymphocytes Absolute 07/05/2022 2 95  0 60 - 4 47 Thousands/µL Final   • Monocytes Absolute 07/05/2022 1 00  0 17 - 1 22 Thousand/µL Final   • Eosinophils Absolute 07/05/2022 0 10  0 00 - 0 61 Thousand/µL Final   • Basophils Absolute 07/05/2022 0 04  0 00 - 0 10 Thousands/µL Final   • SARS-CoV-2 07/05/2022 Negative  Negative Final   • INFLUENZA A PCR 07/05/2022 Negative  Negative Final   • INFLUENZA B PCR 07/05/2022 Negative  Negative Final   • RSV PCR 07/05/2022 Negative  Negative Final   • WBC 07/06/2022 9 64  4 31 - 10 16 Thousand/uL Final   • RBC 07/06/2022 3 63 (A) 3 81 - 5 12 Million/uL Final   • Hemoglobin 07/06/2022 11 0 (A) 11 5 - 15 4 g/dL Final   • Hematocrit 07/06/2022 34 1 (A) 34 8 - 46 1 % Final   • MCV 07/06/2022 94  82 - 98 fL Final   • MCH 07/06/2022 30 3  26 8 - 34 3 pg Final   • MCHC 07/06/2022 32 3  31 4 - 37 4 g/dL Final   • RDW 07/06/2022 13 8  11 6 - 15 1 % Final   • Platelets 83/65/1080 267  149 - 390 Thousands/uL Final   • MPV 07/06/2022 11 8  8 9 - 12 7 fL Final

## 2022-11-14 NOTE — PROGRESS NOTES
Assessment and Plan:   Jovanni Rock is a 67 y o   female who presents as a Rheumatology consult referred by Altaf Edmonds,* for evaluation of possible inflammatory arthritis  Physical exam and workup ordered only seems to indicate osteoarthritis, including Heberden's nodes on physical exam  I'm more concerned about patient having likely osteoporosis  She had right hip surgery s/p fracture in July; also sustained a right wrist fracture at time of that fall, treated with brace  Also fractured her right elbow 8 years ago  Take 1,200mg daily of calcium  Take 2,000 units daily of Vit  D  Can take celecoxib twice a day as needed for joint pain, take with food  Try diclofenac gel as needed for joint pain  Schedule DEXA scan; will decide how aggressive to pursue osteoporosis treatment based on results  Do hand x-rays  Do labs    Return to clinic in 6 months    Plan:  Diagnoses and all orders for this visit:    Arthritis  -     Sedimentation rate, automated  -     C-reactive protein  -     RF Screen w/ Reflex to Titer  -     Cyclic citrul peptide antibody, IgG  -     celecoxib (CeleBREX) 100 mg capsule; Take 1 capsule (100 mg total) by mouth 2 (two) times a day As needed for joint pain, take with food  -     XR hand 3+ vw left; Future    Primary osteoarthritis of right hand  -     Ambulatory Referral to Rheumatology  -     XR hand 3+ vw right; Future    Closed transcervical fracture of right femur, initial encounter ([facility] [address] )  -     DXA bone density spine hip and pelvis; Future    Other specified disorders of bone density and structure, multiple sites   -     DXA bone density spine hip and pelvis;  Future  -     Vitamin D 25 hydroxy  -     Calcium    Disorder of bone, unspecified   -     Vitamin D 25 hydroxy  Follow-up plan: Return to clinic in 6 months        HPI  Jovanni Rock is a 67 y o   female who presents as a Rheumatology consult referred by Altaf Edmonds,* for evaluation of possible inflammatory arthritis given hand arthritis symptoms  Patient admits to trouble with fingers and joints  Hasn't had any blood work  Broken right elbow - fall in the hospital - 2014, broken right hip and right wrist - fracture from fall on a porch - surgery July 2022 on hip, only splinted wrist  Never had a DEXA scan  Both knees replaced about 15 years ago  Left shoulder surgery twice  Joint pain worse in morning, stiffness and pain lasts about 2 hours and returns at night when trying to sleep  Pain also when trying to use hands  Morning pain is worse  Takes tylenol for pain but doesn't seem to help much  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[address] work  Started daily calcium about a month ago  Finger joints started swelling about 2 years ago but fingers now have lumps  Had PT in the past for neck, back, and arms  Is having an EMG done on hands on 11/17/2022  Finger joints swell, right knee swells, dry mouth  Denies rashes, dry eyes  Review of Systems  Review of Systems   Constitutional: Positive for fatigue  HENT: Positive for sinus pain  Negative for mouth sores  Dry mouth, difficulty swallowing   Respiratory: Negative for cough and shortness of breath  Cardiovascular: Negative for chest pain and leg swelling  Hypertension   Gastrointestinal: Positive for abdominal pain  Negative for constipation and diarrhea  Gastric ulcer   Endocrine: Positive for heat intolerance  Tired/sluggish, excessive thirst   Genitourinary: Positive for frequency  Musculoskeletal: Positive for back pain, joint swelling, myalgias and neck pain  Negative for arthralgias  Fingers, back, neck, right knee, left shoulder   Skin: Negative for color change and rash  Allergic/Immunologic:        Hay fever   Neurological: Positive for tremors, weakness, numbness and headaches  Hematological: Negative for adenopathy  Psychiatric/Behavioral: Negative for sleep disturbance       Reviewed and agree      Allergies  No Known Allergies    Home Medications    Current Outpatient Medications:   •  celecoxib (CeleBREX) 100 mg capsule, Take 1 capsule (100 mg total) by mouth 2 (two) times a day As needed for joint pain, take with food, Disp: 60 capsule, Rfl: 6  •  acetaminophen (TYLENOL) 325 mg tablet, Take 2 tablets (650 mg total) by mouth every 6 (six) hours as needed for mild pain, Disp: , Rfl: 0  •  amLODIPine (NORVASC) 10 mg tablet, Take 10 mg by mouth daily, Disp: , Rfl:   •  aspirin (ECOTRIN) 325 mg EC tablet, Take 2 tablets (650 mg total) by mouth daily Take with food, Disp: 30 tablet, Rfl: 0  •  cetirizine (ZyrTEC) 10 mg tablet, Take 10 mg by mouth daily, Disp: , Rfl:   •  ferrous sulfate 325 (65 Fe) mg tablet, Take 1 tablet (325 mg total) by mouth daily with breakfast (Patient not taking: No sig reported), Disp: , Rfl: 0  •  gabapentin (NEURONTIN) 600 MG tablet, Take 1 tablet (600 mg total) by mouth daily at bedtime, Disp: 30 tablet, Rfl: 0  •  multivitamin (THERAGRAN) TABS, Take 1 tablet by mouth daily, Disp: , Rfl:   •  omeprazole (PriLOSEC) 20 mg delayed release capsule, Take 20 mg by mouth daily 1 in am 1 in pm, Disp: , Rfl:   •  pravastatin (PRAVACHOL) 40 mg tablet, Take 40 mg by mouth daily One at bedtime, Disp: , Rfl:   •  senna (SENOKOT) 8 6 mg, Take 1 tablet (8 6 mg total) by mouth daily at bedtime, Disp: , Rfl: 0  •  temazepam (RESTORIL) 7 5 mg capsule, Take 15 mg by mouth daily at bedtime as needed for sleep  , Disp: , Rfl:   •  venlafaxine (EFFEXOR-XR) 150 mg 24 hr capsule, Take 150 mg by mouth daily, Disp: , Rfl:     Past Medical History  Past Medical History:   Diagnosis Date   • Anxiety    • Arthritis    • Depression    • GERD (gastroesophageal reflux disease)    • Hiatal hernia    • Hyperlipidemia    • Hypertension        Past Surgical History   Past Surgical History:   Procedure Laterality Date   • APPENDECTOMY     • CHOLECYSTECTOMY     • FRACTURE SURGERY Right     arm with plate and pins   • HAND SURGERY Bilateral    • HYSTERECTOMY     • JOINT REPLACEMENT Bilateral    • NH PARTIAL HIP REPLACEMENT Right 2022    Procedure: HEMIARTHROPLASTY HIP (BIPOLAR), closed reduction with splinting right wrist;  Surgeon: Mar Perez; Location: [facility];  Service: Orthopedics   • SHOULDER ARTHROSCOPY Left        Family History  History reviewed  No pertinent family history  No known family history of autoimmune or inflammatory diseases  Social History  Occupation: used to do sewing, 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  Social History     Substance and Sexual Activity   Alcohol Use Not Currently   • Alcohol/week: 1 0 standard drink   • Types: 1 Glasses of wine per week     Social History     Substance and Sexual Activity   Drug Use Never     Social History     Tobacco Use   Smoking Status Former   • Packs/day: 0 25   • Types: Cigarettes   • Quit date:    • Years since quittin 9   Smokeless Tobacco Never       Objective:  Vitals:    22 0925   BP: 130/92   Weight: 89 8 kg (198 lb)   Height: 5' 5" (1 651 m)       Physical Exam  Constitutional:       General: She is not in acute distress  HENT:      Head: Normocephalic and atraumatic  Eyes:      Conjunctiva/sclera: Conjunctivae normal    Cardiovascular:      Rate and Rhythm: Normal rate and regular rhythm  Heart sounds: S1 normal and S2 normal  Murmur heard  No friction rub  Comments: Systolic murmur auscultated  Pulmonary:      Effort: Pulmonary effort is normal  No respiratory distress  Breath sounds: Normal breath sounds  No wheezing, rhonchi or rales  Musculoskeletal:         General: Swelling and tenderness present  Cervical back: Neck supple  Comments: Right wrist swelling/tenderness; right 2nd and 3rd and 5th PIP nodules/Heberden's do not/tenderness; left 2nd and 5th PIP nodules/Heberden's nodes/tenderness; right thenar and right 1st interosseous muscle wasting   Skin:     General: Skin is warm and dry  Coloration: Skin is not pale  Findings: No rash  Neurological:      Mental Status: She is alert  Mental status is at baseline  Psychiatric:         Mood and Affect: Mood normal          Behavior: Behavior normal        Reviewed labs and imaging  Imaging:   EGD 10/27/22  Gastric polyps, appeared benign  Upper and lower esophageal rings, both dilated  Moderate hiatal hernia  XR right hip 10/10/22  Unremarkable appearance of right hip hemiarthroplasty    XR right wrist 10/10/22  Healing comminuted intra-articular distal radius fracture with stable alignment  XR cervical spine 6/20/22  Severe disc space narrowing and anterior osteophyte formation at C5-C6 and C6-C7 with milder changes at C4-C5  Bilateral uncovertebral joint hypertrophy throughout the lower cervical spine with moderate to severe bilateral neural foraminal narrowing at   C5-6 and C6-7  Findings are slightly more pronounced since 2015  XR bilateral knee 1/28/22  Satisfactory bilateral knee arthroplasty placement  XR right elbow 1/14/22  The patient is status post open reduction internal fixation of olecranon fracture  The orthopedic hardware appears to be intact  Degenerative changes of both the radial and ulnar aspect of the elbow joint are noted as well as enthesophyte of the medial   epicondyles  MRI lumbar spine 10/13/21  Slightly worsened multilevel degenerative changes of lumbar spine with varying degrees of canal stenosis (moderate L4-L5) and foraminal narrowing (mild left L3-L4, right L4-5, and bilateral L5-S1), as detailed above  XR left shoulder 6/8/21  Moderate osteoarthritis glenohumeral joint         Labs:   Office Visit on 11/14/2022   Component Date Value Ref Range Status   • Sed Rate 11/14/2022 20  0 - 29 mm/hour Final   • CRP 11/14/2022 4 2 (H)  <3 0 mg/L Final   • Vit D, 25-Hydroxy 11/14/2022 32 5  30 0 - 100 0 ng/mL Final   • Calcium 11/14/2022 10 0  8 3 - 10 1 mg/dL Final   • Rheumatoid Factor 11/14/2022 Negative  Negative Final   • Cyclic Citrullinated Peptide Ab  11/14/2022 0 7  See comment Final   Hospital Outpatient Visit on 10/27/2022   Component Date Value Ref Range Status   • Case Report 10/27/2022    Final                    Value:Surgical Pathology Report                         Case: [id number]                                   Authorizing Provider:  Vazquez Kim MD        Collected:           10/27/2022 3993              Ordering Location:     [facility] Received:            10/27/2022 1249                                     Endoscopy                                                                    Pathologist:           Pam Nogueira MD                                                           Specimens:   A) - Stomach, polyp, body                                                                           B) - Esophagus, schatzkie ring                                                            • Final Diagnosis 10/27/2022    Final                    Value: This result contains rich text formatting which cannot be displayed here  • Note 10/27/2022    Final                    Value: This result contains rich text formatting which cannot be displayed here  • Additional Information 10/27/2022    Final                    Value: This result contains rich text formatting which cannot be displayed here  • Gross Description 10/27/2022    Final                    Value: This result contains rich text formatting which cannot be displayed here     Admission on 07/06/2022, Discharged on 07/15/2022   Component Date Value Ref Range Status   • Sodium 07/07/2022 139  135 - 147 mmol/L Final   • Potassium 07/07/2022 3 5  3 5 - 5 3 mmol/L Final   • Chloride 07/07/2022 100  96 - 108 mmol/L Final   • CO2 07/07/2022 30  21 - 32 mmol/L Final   • ANION GAP 07/07/2022 9  4 - 13 mmol/L Final   • BUN 07/07/2022 21  5 - 25 mg/dL Final   • Creatinine 07/07/2022 0 83  0 60 - 1 30 mg/dL Final Standardized to IDMS reference method   • Glucose 07/07/2022 114  65 - 140 mg/dL Final    If the patient is fasting, the ADA then defines impaired fasting glucose as > 100 mg/dL and diabetes as > or equal to 123 mg/dL  Specimen collection should occur prior to Sulfasalazine administration due to the potential for falsely depressed results  Specimen collection should occur prior to Sulfapyridine administration due to the potential for falsely elevated results  • Glucose, Fasting 07/07/2022 114 (H)  65 - 99 mg/dL Final    Specimen collection should occur prior to Sulfasalazine administration due to the potential for falsely depressed results  Specimen collection should occur prior to Sulfapyridine administration due to the potential for falsely elevated results  • Calcium 07/07/2022 9 0  8 4 - 10 2 mg/dL Final   • Corrected Calcium 07/07/2022 9 5  8 3 - 10 1 mg/dL Final   • AST 07/07/2022 49 (H)  13 - 39 U/L Final    Specimen collection should occur prior to Sulfasalazine administration due to the potential for falsely depressed results  • ALT 07/07/2022 44  7 - 52 U/L Final    Specimen collection should occur prior to Sulfasalazine administration due to the potential for falsely depressed results      • Alkaline Phosphatase 07/07/2022 48  34 - 104 U/L Final   • Total Protein 07/07/2022 6 5  6 4 - 8 4 g/dL Final   • Albumin 07/07/2022 3 4 (L)  3 5 - 5 0 g/dL Final   • Total Bilirubin 07/07/2022 0 62  0 20 - 1 00 mg/dL Final   • eGFR 07/07/2022 70  ml/min/1 73sq m Final   • WBC 07/07/2022 8 70  4 31 - 10 16 Thousand/uL Final   • RBC 07/07/2022 3 74 (L)  3 81 - 5 12 Million/uL Final   • Hemoglobin 07/07/2022 11 0 (L)  11 5 - 15 4 g/dL Final   • Hematocrit 07/07/2022 35 4  34 8 - 46 1 % Final   • MCV 07/07/2022 95  82 - 98 fL Final   • MCH 07/07/2022 29 4  26 8 - 34 3 pg Final   • MCHC 07/07/2022 31 1 (L)  31 4 - 37 4 g/dL Final   • RDW 07/07/2022 13 7  11 6 - 15 1 % Final   • MPV 07/07/2022 11 9  8 9 - 12 7 fL Final   • Platelets 63/86/0132 316  149 - 390 Thousands/uL Final   • nRBC 07/07/2022 0  /100 WBCs Final   • Neutrophils Relative 07/07/2022 55  43 - 75 % Final   • Immat GRANS % 07/07/2022 0  0 - 2 % Final   • Lymphocytes Relative 07/07/2022 32  14 - 44 % Final   • Monocytes Relative 07/07/2022 9  4 - 12 % Final   • Eosinophils Relative 07/07/2022 3  0 - 6 % Final   • Basophils Relative 07/07/2022 1  0 - 1 % Final   • Neutrophils Absolute 07/07/2022 4 79  1 85 - 7 62 Thousands/µL Final   • Immature Grans Absolute 07/07/2022 0 02  0 00 - 0 20 Thousand/uL Final   • Lymphocytes Absolute 07/07/2022 2 80  0 60 - 4 47 Thousands/µL Final   • Monocytes Absolute 07/07/2022 0 79  0 17 - 1 22 Thousand/µL Final   • Eosinophils Absolute 07/07/2022 0 25  0 00 - 0 61 Thousand/µL Final   • Basophils Absolute 07/07/2022 0 05  0 00 - 0 10 Thousands/µL Final   Admission on 07/01/2022, Discharged on 07/06/2022   Component Date Value Ref Range Status   • WBC 07/01/2022 7 15  4 31 - 10 16 Thousand/uL Final   • RBC 07/01/2022 5 01  3 81 - 5 12 Million/uL Final   • Hemoglobin 07/01/2022 15 0  11 5 - 15 4 g/dL Final   • Hematocrit 07/01/2022 47 0 (H)  34 8 - 46 1 % Final   • MCV 07/01/2022 94  82 - 98 fL Final   • MCH 07/01/2022 29 9  26 8 - 34 3 pg Final   • MCHC 07/01/2022 31 9  31 4 - 37 4 g/dL Final   • RDW 07/01/2022 13 7  11 6 - 15 1 % Final   • MPV 07/01/2022 12 0  8 9 - 12 7 fL Final   • Platelets 04/86/3620 280  149 - 390 Thousands/uL Final   • nRBC 07/01/2022 0  /100 WBCs Final   • Neutrophils Relative 07/01/2022 39 (L)  43 - 75 % Final   • Immat GRANS % 07/01/2022 0  0 - 2 % Final   • Lymphocytes Relative 07/01/2022 46 (H)  14 - 44 % Final   • Monocytes Relative 07/01/2022 11  4 - 12 % Final   • Eosinophils Relative 07/01/2022 3  0 - 6 % Final   • Basophils Relative 07/01/2022 1  0 - 1 % Final   • Neutrophils Absolute 07/01/2022 2 81  1 85 - 7 62 Thousands/µL Final   • Immature Grans Absolute 07/01/2022 0 03  0 00 - 0 20 Thousand/uL Final   • Lymphocytes Absolute 07/01/2022 3 27  0 60 - 4 47 Thousands/µL Final   • Monocytes Absolute 07/01/2022 0 80  0 17 - 1 22 Thousand/µL Final   • Eosinophils Absolute 07/01/2022 0 19  0 00 - 0 61 Thousand/µL Final   • Basophils Absolute 07/01/2022 0 05  0 00 - 0 10 Thousands/µL Final   • Sodium 07/01/2022 139  135 - 147 mmol/L Final   • Potassium 07/01/2022 4 4  3 5 - 5 3 mmol/L Final   • Chloride 07/01/2022 100  96 - 108 mmol/L Final   • CO2 07/01/2022 27  21 - 32 mmol/L Final   • ANION GAP 07/01/2022 12  4 - 13 mmol/L Final   • BUN 07/01/2022 19  5 - 25 mg/dL Final   • Creatinine 07/01/2022 1 05  0 60 - 1 30 mg/dL Final    Standardized to IDMS reference method   • Glucose 07/01/2022 137  65 - 140 mg/dL Final    If the patient is fasting, the ADA then defines impaired fasting glucose as > 100 mg/dL and diabetes as > or equal to 123 mg/dL  Specimen collection should occur prior to Sulfasalazine administration due to the potential for falsely depressed results  Specimen collection should occur prior to Sulfapyridine administration due to the potential for falsely elevated results  • Calcium 07/01/2022 9 8  8 4 - 10 2 mg/dL Final   • eGFR 07/01/2022 52  ml/min/1 73sq m Final   • Sodium 07/02/2022 136  135 - 147 mmol/L Final   • Potassium 07/02/2022 4 0  3 5 - 5 3 mmol/L Final   • Chloride 07/02/2022 102  96 - 108 mmol/L Final   • CO2 07/02/2022 25  21 - 32 mmol/L Final   • ANION GAP 07/02/2022 9  4 - 13 mmol/L Final   • BUN 07/02/2022 20  5 - 25 mg/dL Final   • Creatinine 07/02/2022 1 04  0 60 - 1 30 mg/dL Final    Standardized to IDMS reference method   • Glucose 07/02/2022 122  65 - 140 mg/dL Final    If the patient is fasting, the ADA then defines impaired fasting glucose as > 100 mg/dL and diabetes as > or equal to 123 mg/dL  Specimen collection should occur prior to Sulfasalazine administration due to the potential for falsely depressed results   Specimen collection should occur prior to Sulfapyridine administration due to the potential for falsely elevated results  • Calcium 07/02/2022 9 0  8 4 - 10 2 mg/dL Final   • AST 07/02/2022 35  13 - 39 U/L Final    Specimen collection should occur prior to Sulfasalazine administration due to the potential for falsely depressed results  • ALT 07/02/2022 35  7 - 52 U/L Final    Specimen collection should occur prior to Sulfasalazine administration due to the potential for falsely depressed results      • Alkaline Phosphatase 07/02/2022 34  34 - 104 U/L Final   • Total Protein 07/02/2022 6 9  6 4 - 8 4 g/dL Final   • Albumin 07/02/2022 4 3  3 5 - 5 0 g/dL Final   • Total Bilirubin 07/02/2022 0 55  0 20 - 1 00 mg/dL Final   • eGFR 07/02/2022 53  ml/min/1 73sq m Final   • WBC 07/02/2022 9 40  4 31 - 10 16 Thousand/uL Final   • RBC 07/02/2022 4 75  3 81 - 5 12 Million/uL Final   • Hemoglobin 07/02/2022 14 2  11 5 - 15 4 g/dL Final   • Hematocrit 07/02/2022 45 3  34 8 - 46 1 % Final   • MCV 07/02/2022 95  82 - 98 fL Final   • MCH 07/02/2022 29 9  26 8 - 34 3 pg Final   • MCHC 07/02/2022 31 3 (L)  31 4 - 37 4 g/dL Final   • RDW 07/02/2022 13 9  11 6 - 15 1 % Final   • Platelets 65/09/9470 262  149 - 390 Thousands/uL Final   • MPV 07/02/2022 11 9  8 9 - 12 7 fL Final   • Hepatitis C Ab 07/02/2022 Non-reactive  Non-reactive Final   • WBC 07/02/2022 17 13 (H)  4 31 - 10 16 Thousand/uL Final   • RBC 07/02/2022 4 35  3 81 - 5 12 Million/uL Final   • Hemoglobin 07/02/2022 13 2  11 5 - 15 4 g/dL Final   • Hematocrit 07/02/2022 42 3  34 8 - 46 1 % Final   • MCV 07/02/2022 97  82 - 98 fL Final   • MCH 07/02/2022 30 3  26 8 - 34 3 pg Final   • MCHC 07/02/2022 31 2 (L)  31 4 - 37 4 g/dL Final   • RDW 07/02/2022 13 9  11 6 - 15 1 % Final   • Platelets 43/96/2681 275  149 - 390 Thousands/uL Final   • MPV 07/02/2022 11 9  8 9 - 12 7 fL Final   • Sodium 07/03/2022 137  135 - 147 mmol/L Final   • Potassium 07/03/2022 4 9  3 5 - 5 3 mmol/L Final   • Chloride 07/03/2022 101  96 - 108 mmol/L Final   • CO2 07/03/2022 29  21 - 32 mmol/L Final   • ANION GAP 07/03/2022 7  4 - 13 mmol/L Final   • BUN 07/03/2022 26 (H)  5 - 25 mg/dL Final   • Creatinine 07/03/2022 1 26  0 60 - 1 30 mg/dL Final    Standardized to IDMS reference method   • Glucose 07/03/2022 149 (H)  65 - 140 mg/dL Final    If the patient is fasting, the ADA then defines impaired fasting glucose as > 100 mg/dL and diabetes as > or equal to 123 mg/dL  Specimen collection should occur prior to Sulfasalazine administration due to the potential for falsely depressed results  Specimen collection should occur prior to Sulfapyridine administration due to the potential for falsely elevated results     • Calcium 07/03/2022 8 9  8 4 - 10 2 mg/dL Final   • eGFR 07/03/2022 42  ml/min/1 73sq m Final   • WBC 07/03/2022 14 66 (H)  4 31 - 10 16 Thousand/uL Final   • RBC 07/03/2022 3 91  3 81 - 5 12 Million/uL Final   • Hemoglobin 07/03/2022 11 6  11 5 - 15 4 g/dL Final   • Hematocrit 07/03/2022 37 8  34 8 - 46 1 % Final   • MCV 07/03/2022 97  82 - 98 fL Final   • MCH 07/03/2022 29 7  26 8 - 34 3 pg Final   • MCHC 07/03/2022 30 7 (L)  31 4 - 37 4 g/dL Final   • RDW 07/03/2022 14 0  11 6 - 15 1 % Final   • MPV 07/03/2022 12 2  8 9 - 12 7 fL Final   • Platelets 95/65/9127 229  149 - 390 Thousands/uL Final   • nRBC 07/03/2022 0  /100 WBCs Final   • Neutrophils Relative 07/03/2022 83 (H)  43 - 75 % Final   • Immat GRANS % 07/03/2022 0  0 - 2 % Final   • Lymphocytes Relative 07/03/2022 8 (L)  14 - 44 % Final   • Monocytes Relative 07/03/2022 9  4 - 12 % Final   • Eosinophils Relative 07/03/2022 0  0 - 6 % Final   • Basophils Relative 07/03/2022 0  0 - 1 % Final   • Neutrophils Absolute 07/03/2022 12 10 (H)  1 85 - 7 62 Thousands/µL Final   • Immature Grans Absolute 07/03/2022 0 06  0 00 - 0 20 Thousand/uL Final   • Lymphocytes Absolute 07/03/2022 1 22  0 60 - 4 47 Thousands/µL Final   • Monocytes Absolute 07/03/2022 1 27 (H)  0 17 - 1 22 Thousand/µL Final   • Eosinophils Absolute 07/03/2022 0 00  0 00 - 0 61 Thousand/µL Final   • Basophils Absolute 07/03/2022 0 01  0 00 - 0 10 Thousands/µL Final   • Sodium 07/04/2022 137  135 - 147 mmol/L Final   • Potassium 07/04/2022 3 9  3 5 - 5 3 mmol/L Final   • Chloride 07/04/2022 101  96 - 108 mmol/L Final   • CO2 07/04/2022 27  21 - 32 mmol/L Final   • ANION GAP 07/04/2022 9  4 - 13 mmol/L Final   • BUN 07/04/2022 24  5 - 25 mg/dL Final   • Creatinine 07/04/2022 0 85  0 60 - 1 30 mg/dL Final    Standardized to IDMS reference method   • Glucose 07/04/2022 125  65 - 140 mg/dL Final    If the patient is fasting, the ADA then defines impaired fasting glucose as > 100 mg/dL and diabetes as > or equal to 123 mg/dL  Specimen collection should occur prior to Sulfasalazine administration due to the potential for falsely depressed results  Specimen collection should occur prior to Sulfapyridine administration due to the potential for falsely elevated results  • Calcium 07/04/2022 9 0  8 4 - 10 2 mg/dL Final   • eGFR 07/04/2022 68  ml/min/1 73sq m Final   • Sodium 07/05/2022 138  135 - 147 mmol/L Final   • Potassium 07/05/2022 4 0  3 5 - 5 3 mmol/L Final   • Chloride 07/05/2022 101  96 - 108 mmol/L Final   • CO2 07/05/2022 30  21 - 32 mmol/L Final   • ANION GAP 07/05/2022 7  4 - 13 mmol/L Final   • BUN 07/05/2022 21  5 - 25 mg/dL Final   • Creatinine 07/05/2022 0 83  0 60 - 1 30 mg/dL Final    Standardized to IDMS reference method   • Glucose 07/05/2022 120  65 - 140 mg/dL Final    If the patient is fasting, the ADA then defines impaired fasting glucose as > 100 mg/dL and diabetes as > or equal to 123 mg/dL  Specimen collection should occur prior to Sulfasalazine administration due to the potential for falsely depressed results  Specimen collection should occur prior to Sulfapyridine administration due to the potential for falsely elevated results     • Calcium 07/05/2022 9 1  8 4 - 10 2 mg/dL Final   • eGFR 07/05/2022 70 ml/min/1 73sq m Final   • WBC 07/05/2022 10 98 (H)  4 31 - 10 16 Thousand/uL Final   • RBC 07/05/2022 3 80 (L)  3 81 - 5 12 Million/uL Final   • Hemoglobin 07/05/2022 11 2 (L)  11 5 - 15 4 g/dL Final   • Hematocrit 07/05/2022 36 1  34 8 - 46 1 % Final   • MCV 07/05/2022 95  82 - 98 fL Final   • MCH 07/05/2022 29 5  26 8 - 34 3 pg Final   • MCHC 07/05/2022 31 0 (L)  31 4 - 37 4 g/dL Final   • RDW 07/05/2022 14 0  11 6 - 15 1 % Final   • MPV 07/05/2022 12 3  8 9 - 12 7 fL Final   • Platelets 21/34/6864 242  149 - 390 Thousands/uL Final   • nRBC 07/05/2022 0  /100 WBCs Final   • Neutrophils Relative 07/05/2022 63  43 - 75 % Final   • Immat GRANS % 07/05/2022 0  0 - 2 % Final   • Lymphocytes Relative 07/05/2022 27  14 - 44 % Final   • Monocytes Relative 07/05/2022 9  4 - 12 % Final   • Eosinophils Relative 07/05/2022 1  0 - 6 % Final   • Basophils Relative 07/05/2022 0  0 - 1 % Final   • Neutrophils Absolute 07/05/2022 6 86  1 85 - 7 62 Thousands/µL Final   • Immature Grans Absolute 07/05/2022 0 03  0 00 - 0 20 Thousand/uL Final   • Lymphocytes Absolute 07/05/2022 2 95  0 60 - 4 47 Thousands/µL Final   • Monocytes Absolute 07/05/2022 1 00  0 17 - 1 22 Thousand/µL Final   • Eosinophils Absolute 07/05/2022 0 10  0 00 - 0 61 Thousand/µL Final   • Basophils Absolute 07/05/2022 0 04  0 00 - 0 10 Thousands/µL Final   • SARS-CoV-2 07/05/2022 Negative  Negative Final   • INFLUENZA A PCR 07/05/2022 Negative  Negative Final   • INFLUENZA B PCR 07/05/2022 Negative  Negative Final   • RSV PCR 07/05/2022 Negative  Negative Final   • WBC 07/06/2022 9 64  4 31 - 10 16 Thousand/uL Final   • RBC 07/06/2022 3 63 (L)  3 81 - 5 12 Million/uL Final   • Hemoglobin 07/06/2022 11 0 (L)  11 5 - 15 4 g/dL Final   • Hematocrit 07/06/2022 34 1 (L)  34 8 - 46 1 % Final   • MCV 07/06/2022 94  82 - 98 fL Final   • MCH 07/06/2022 30 3  26 8 - 34 3 pg Final   • MCHC 07/06/2022 32 3  31 4 - 37 4 g/dL Final   • RDW 07/06/2022 13 8  11 6 - 15 1 % Final • Platelets 07/60/2407 267  149 - 390 Thousands/uL Final   • MPV 07/06/2022 11 8  8 9 - 12 7 fL Final

## 2022-11-15 LAB
CCP AB SER IA-ACNC: 0.7
RHEUMATOID FACT SER QL LA: NEGATIVE

## 2022-11-21 ENCOUNTER — HOSPITAL ENCOUNTER (OUTPATIENT)
Dept: BONE DENSITY | Facility: HOSPITAL | Age: 72
Discharge: HOME | End: 2022-11-21
Attending: INTERNAL MEDICINE

## 2022-11-21 VITALS — HEIGHT: 66 IN | WEIGHT: 190.7 LBS | BODY MASS INDEX: 30.65 KG/M2

## 2022-11-21 DIAGNOSIS — M85.89 OTHER SPECIFIED DISORDERS OF BONE DENSITY AND STRUCTURE, MULTIPLE SITES: ICD-10-CM

## 2022-11-21 DIAGNOSIS — S72.031A: ICD-10-CM

## 2022-12-01 ENCOUNTER — TELEPHONE (OUTPATIENT)
Dept: OBGYN CLINIC | Facility: HOSPITAL | Age: 72
End: 2022-12-01

## 2022-12-01 NOTE — TELEPHONE ENCOUNTER
Caller: Patient    Doctor: Domingo Garcias    Reason for call: Patient calling in regards to her EMG results  Looks liek they were not done with SL  They have been scanned in on 11/19  Could they be addressed?     Call back#: [phone]

## 2022-12-09 NOTE — PROGRESS NOTES
Daily Note     Today's date: 9/10/2021  Patient name: Carole Blue  : 1950  MRN: [id number]  Referring provider: Berkeley Saint, CRNP  Dx:   Encounter Diagnosis     ICD-10-CM    1  Cervical radiculitis  M54 12    2  Poor posture  R29 3                   Subjective: The patient reports feeling 3/10 central lower cervical spine pain this AM   The patient reports her exercises are going well at home  Objective: See treatment diary below      Assessment: The patient tolerated th treatment well with minimal to no complaints of pain increase at her cervical spine  The patinient needed verbal cues to perform the snag exercises properly and to avoid holding her breath  The patient will benefit from continued PT to achieve her goals of therapy  The patient's cervical retraction ROM has improved compared to last week  Plan: Continue per plan of care  Progress treatment as tolerated  Precautions: DEPRESSION/ANXIETY  RE:     Manual   8/31 9/8 9/10     Massage B/L upper trapezius         Manual C-spine Traction  *:15x6 :15 x10     Sub Occipital Release  *2x 1 min 2x 1 min     Seated chin tuck with clinician O P                    Therapeutic Exercise 8/31 9/8 9/10     UBE stand ALT  *90/70 x2 min 90/70 x 4 mins     Nu-step   S=9                Bridges        Corner Pec stretch                        Cervical snag extension and rotation  *x10 ea 10x ea                     Levator Scapulae stretch B/L          B/L Upper Trap stretch        Sit T-spine Extensions  *:05x10 10x:05     Supine Pectoralis Minor stretch  *:30x3 4x:30             Neuro Re-Ed        Abdominal Hollow  *:05x10 x15     Kegels  *:05x10 x15     Chin Tucks 10x no O P   x5 clin O P   x5 self O P  x10 no OP  x10 self OP 10x no O P   x10 self O P       Chin tuck lifts  *:03x5 x5     Towel roll education/ sleep positioning Done AD       Quad DLS        MTP/LTP/ antirotation        Chin tuck + cervical EXT 10x  10x     Posture correction  *:10x10 10x:10     Prone chin Tuck        Therapeutic Activity        Crate lifts        Chair squats        Crate carry            Modalities 8/31 9/8 9/10     MHP/CP UT PRN Wartpeel Counseling:  I discussed with the patient the risks of Wartpeel including but not limited to erythema, scaling, itching, weeping, crusting, and pain.

## 2022-12-12 ENCOUNTER — OFFICE VISIT (OUTPATIENT)
Dept: OBGYN CLINIC | Facility: CLINIC | Age: 72
End: 2022-12-12

## 2022-12-12 VITALS
DIASTOLIC BLOOD PRESSURE: 87 MMHG | BODY MASS INDEX: 31.66 KG/M2 | WEIGHT: 197 LBS | HEART RATE: 103 BPM | SYSTOLIC BLOOD PRESSURE: 144 MMHG | HEIGHT: 66 IN

## 2022-12-12 DIAGNOSIS — Z01.812 PRE-OPERATIVE LABORATORY EXAMINATION: ICD-10-CM

## 2022-12-12 DIAGNOSIS — G56.21 CUBITAL TUNNEL SYNDROME ON RIGHT: Primary | ICD-10-CM

## 2022-12-12 RX ORDER — PHENOL 1.4 %
600 AEROSOL, SPRAY (ML) MUCOUS MEMBRANE DAILY
COMMUNITY

## 2022-12-12 RX ORDER — MELATONIN
1000 DAILY
COMMUNITY

## 2022-12-12 NOTE — PROGRESS NOTES
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  This cannot be localized at the elbow or wrist due to presence of significant abnormalities on nerve conduction studies and EMG  Patient has previous history of right elbow fracture requiring surgical intervention  Right median neuropathy at the wrist (carpal tunnel syndrome) which is mild in nature with demyelinating features  Patient has previous history of right carpal tunnel release surgery  Chronic right C7, C8 cervical radiculopathy which seems to be mild in nature  Risks and benefits of the cubital tunnel release were explained to patient in detail today and questions were answered to their satisfaction  Patient gave their informed consent for above procedure  The patient is tentatively scheduled for 02/08/2023  Patient expresses understanding and is in agreement with this treatment plan  The patient was given the opportunity to ask questions or present concerns  The patient's symptomatology is more along the ulnar nerve than the median nerve  A positive Tinel's is present along the right elbow  Nerve conduction studies show advanced cubital tunnel syndrome  Treatment options were discussed  She is opted for an elective right cubital tunnel release  All risks, complications, and benefits were discussed with the patient in great detail including bleeding, infection, blood clots, pain, stiffness, neurovascular damage, fractures, dislocations, the possibility of loss of life from surgery, worsening or partial nerve recovery, instability, etc   Her surgery scheduled for February 8, 2023    To do next visit:  Return for surgery on 02/08/2023  The above stated was discussed in layman's terms and the patient expressed understanding    All questions were answered to the patient's satisfaction  Scribe Attestation    I,:  Senmaria luz Richard am acting as a scribe while in the presence of the attending physician :       I,:  Misty Salcedo DO personally performed the services described in this documentation    as scribed in my presence :             Subjective:   Tiffany Duron is a 67 y o  female who presents today for a follow-up evaluation of her right elbow and wrist due to increased numbness and tingling affecting the medial aspect of her elbow and thumb, index, and long fingers of the right hand  She states that she has difficulty holding objets, and gripping or pinching motions  The patient is here today to review the results of her EMG performed on 11/17/2022  She denies any fevers, chills, dizziness, headaches, chest pain, shortness of breath, palpitations, abdominal pain, nausea, vomiting, diarrhea, lower extremity pain/swelling/edema  Review of systems negative unless otherwise specified in HPI  Review of Systems   Constitutional: Negative for chills and fever  HENT: Negative for ear pain and sore throat  Eyes: Negative for pain and visual disturbance  Respiratory: Negative for cough and shortness of breath  Cardiovascular: Negative for chest pain and palpitations  Gastrointestinal: Negative for abdominal pain and vomiting  Genitourinary: Negative for dysuria and hematuria  Musculoskeletal: Negative for arthralgias and back pain  Skin: Negative for color change and rash  Neurological: Negative for seizures and syncope  All other systems reviewed and are negative        Past Medical History:   Diagnosis Date   • Anxiety    • Arthritis    • Depression    • GERD (gastroesophageal reflux disease)    • Hiatal hernia    • Hyperlipidemia    • Hypertension        Past Surgical History:   Procedure Laterality Date   • APPENDECTOMY     • CHOLECYSTECTOMY     • FRACTURE SURGERY Right     arm with plate and pins   • HAND SURGERY Bilateral • HYSTERECTOMY     • JOINT REPLACEMENT Bilateral    • [place] HEMIARTHROPLASTY HIP PARTIAL Right 2022    Procedure: HEMIARTHROPLASTY HIP (BIPOLAR), closed reduction with splinting right wrist;  Surgeon: Javad Rinaldi; Location: [facility];  Service: Orthopedics   • SHOULDER ARTHROSCOPY Left        No family history on file      Social History     Occupational History   • Not on file   Tobacco Use   • Smoking status: Former     Packs/day: 0 25     Types: Cigarettes     Quit date:      Years since quittin 9   • Smokeless tobacco: Never   Vaping Use   • Vaping Use: Never used   Substance and Sexual Activity   • Alcohol use: Not Currently     Alcohol/week: 1 0 standard drink     Types: 1 Glasses of wine per week   • Drug use: Never   • Sexual activity: Not Currently         Current Outpatient Medications:   •  calcium carbonate (OS-ALPESH) 600 MG tablet, Take 600 mg by mouth daily Taking 2 tablets (1200 mg) daily, Disp: , Rfl:   •  cholecalciferol (VITAMIN D3) 1,000 units tablet, Take 1,000 Units by mouth daily 2,000 units daily, Disp: , Rfl:   •  acetaminophen (TYLENOL) 325 mg tablet, Take 2 tablets (650 mg total) by mouth every 6 (six) hours as needed for mild pain, Disp: , Rfl: 0  •  amLODIPine (NORVASC) 10 mg tablet, Take 10 mg by mouth daily, Disp: , Rfl:   •  aspirin (ECOTRIN) 325 mg EC tablet, Take 2 tablets (650 mg total) by mouth daily Take with food, Disp: 30 tablet, Rfl: 0  •  celecoxib (CeleBREX) 100 mg capsule, Take 1 capsule (100 mg total) by mouth 2 (two) times a day As needed for joint pain, take with food, Disp: 60 capsule, Rfl: 6  •  cetirizine (ZyrTEC) 10 mg tablet, Take 10 mg by mouth daily, Disp: , Rfl:   •  ferrous sulfate 325 (65 Fe) mg tablet, Take 1 tablet (325 mg total) by mouth daily with breakfast (Patient not taking: Reported on 10/10/2022), Disp: , Rfl: 0  •  gabapentin (NEURONTIN) 600 MG tablet, Take 1 tablet (600 mg total) by mouth daily at bedtime, Disp: 30 tablet, Rfl: 0  • multivitamin (THERAGRAN) TABS, Take 1 tablet by mouth daily, Disp: , Rfl:   •  omeprazole (PriLOSEC) 20 mg delayed release capsule, Take 20 mg by mouth daily 1 in am 1 in pm, Disp: , Rfl:   •  pravastatin (PRAVACHOL) 40 mg tablet, Take 40 mg by mouth daily One at bedtime, Disp: , Rfl:   •  senna (SENOKOT) 8 6 mg, Take 1 tablet (8 6 mg total) by mouth daily at bedtime, Disp: , Rfl: 0  •  temazepam (RESTORIL) 7 5 mg capsule, Take 15 mg by mouth daily at bedtime as needed for sleep  , Disp: , Rfl:   •  venlafaxine (EFFEXOR-XR) 150 mg 24 hr capsule, Take 150 mg by mouth daily, Disp: , Rfl:     No Known Allergies       Vitals:    12/12/22 1014   BP: 144/87   Pulse: 103       Objective:                    Ortho Exam  right wrist -  Patient presents with no obvious anatomical deformity  Skin is warm and dry to touch with no signs of erythema, ecchymosis, infection  No soft tissue swelling or effusion noted  Full FDS, FDP, extensor mechanisms are intact  - Thenar atrophy, + intrinsic atrophy  - Tinel's at carpal tunnel  + Phalen's sign  + Carpal Tunnel Compression  - AP / LM Drawer  - Finklestein  - Ulnar / Radial impaction   - Sheffield's  Demonstrates normal wrist, elbow, and shoulder motion  Forearm compartments are soft and supple  2+ Distal radial pulse with brisk capillary refill to the fingers  Radial, median, ulnar motor and sensory distribution intact  Sensation to light touch intact distally    right Elbow -   No anatomical deformity  Skin is warm and dry to touch with no signs of erythema, ecchymosis, or infection  No soft tissue swelling or effusion noted  Without palpable crepitus with passive motion  ROM: normal  - varus laxity, - valgus laxity  Demonstrates normal shoulder, wrist, and finger motion  - radial head instability  - ulnar nerve subluxation  2+ distal radial pulse with brisk capillary refill to the fingers  Radial, median, and ulnar motor and sensory distrubution intact  Sensation to light touch intact distally       Diagnostics, reviewed and taken today if performed as documented: The attending physician has personally reviewed the pertinent films in PACS and interpretation is as follows:    EMG of upper limb taken on 11/17/2022 were reviewed and showed 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  This cannot be localized at the elbow or wrist due to presence of significant abnormalities on nerve conduction studies and EMG  Patient has previous history of right elbow fracture requiring surgical intervention  Right median neuropathy at the wrist (carpal tunnel syndrome) which is mild in nature with demyelinating features  Patient has previous history of right carpal tunnel release surgery  Chronic right C7, C8 cervical radiculopathy which seems to be mild in nature  Procedures, if performed today:    None performed    Portions of the record may have been created with voice recognition software  Occasional wrong word or "sound a like" substitutions may have occurred due to the inherent limitations of voice recognition software  Read the chart carefully and recognize, using context, where substitutions have occurred

## 2022-12-13 ENCOUNTER — PREP FOR PROCEDURE (OUTPATIENT)
Dept: OBGYN CLINIC | Facility: CLINIC | Age: 72
End: 2022-12-13

## 2022-12-13 RX ORDER — CHLORHEXIDINE GLUCONATE 4 G/100ML
SOLUTION TOPICAL DAILY PRN
OUTPATIENT
Start: 2022-12-13

## 2022-12-13 RX ORDER — CHLORHEXIDINE GLUCONATE 0.12 MG/ML
15 RINSE ORAL ONCE
OUTPATIENT
Start: 2022-12-13 | End: 2022-12-13

## 2023-01-30 ENCOUNTER — OFFICE VISIT (OUTPATIENT)
Dept: OBGYN CLINIC | Facility: CLINIC | Age: 73
End: 2023-01-30

## 2023-01-30 VITALS
SYSTOLIC BLOOD PRESSURE: 146 MMHG | WEIGHT: 202 LBS | DIASTOLIC BLOOD PRESSURE: 82 MMHG | HEIGHT: 66 IN | HEART RATE: 94 BPM | BODY MASS INDEX: 32.47 KG/M2

## 2023-01-30 DIAGNOSIS — Z96.653 HX OF TOTAL KNEE REPLACEMENT, BILATERAL: Primary | ICD-10-CM

## 2023-01-30 NOTE — PROGRESS NOTES
Assessment:   Diagnosis ICD-10-CM Associated Orders   1  Hx of total knee replacement, bilateral  Z96 653 XR knee 3 vw right non injury     XR knee 3 vw left non injury          Plan:  Reviewed today's physical exam findings and x-ray findings with patient at time of visit  X-Ray of bilateral knee taken on 01/30/2023 were reviewed and showed well-seated total knee prosthesis with maintained anatomical alignment and no signs of loosening  The patient is 10+ years s/p bilateral total knee arthroplasty  She is doing well today with no complaints of pain  Weight-bearing as tolerated  She will be seen for follow-up in 1 year for re-evaluation and repeat x-rays  Patient expresses understanding and is in agreement with this treatment plan  The patient was given the opportunity to ask questions or present concerns  Patient is doing quite well from her bilateral total knee replacements  Strength and motion intact  No instability  Continue home exercise program   Follow-up in 1 year for evaluation with new x-rays of bilateral knees-3 views each  If her condition changes, she would not hesitate to let us know    To do next visit:  Return in about 1 year (around 1/30/2024) for evaluation and repeat x-rays  The above stated was discussed in layman's terms and the patient expressed understanding  All questions were answered to the patient's satisfaction  Scribe Attestation    I,:  Mohit Garcia am acting as a scribe while in the presence of the attending physician :       I,:  Puneet Biswas, DO personally performed the services described in this documentation    as scribed in my presence :             Subjective:   Randy Coelho is a 67 y o  female  who presents today for an annual follow-up evaluation of her bilateral knee  The patient is 10+ years s/p bilateral total knee arthroplasty  She is doing well today with no complaints of pain  Incisions are well healed with no signs or drainage or infection  She denies any recent bruising, numbness, tingling, or feelings of instability  She also denies any fevers, chills or shortness of breath  Review of systems negative unless otherwise specified in HPI  Review of Systems   Constitutional: Negative for chills and fever  HENT: Negative for ear pain and sore throat  Eyes: Negative for pain and visual disturbance  Respiratory: Negative for cough and shortness of breath  Cardiovascular: Negative for chest pain and palpitations  Gastrointestinal: Negative for abdominal pain and vomiting  Genitourinary: Negative for dysuria and hematuria  Musculoskeletal: Negative for arthralgias and back pain  Skin: Negative for color change and rash  Neurological: Negative for seizures and syncope  All other systems reviewed and are negative  Past Medical History:   Diagnosis Date   • Anxiety    • Arthritis    • Depression    • GERD (gastroesophageal reflux disease)    • Hiatal hernia    • Hyperlipidemia    • Hypertension        Past Surgical History:   Procedure Laterality Date   • APPENDECTOMY     • CHOLECYSTECTOMY     • FRACTURE SURGERY Right     arm with plate and pins   • HAND SURGERY Bilateral    • HYSTERECTOMY     • JOINT REPLACEMENT Bilateral    • [place] HEMIARTHROPLASTY HIP PARTIAL Right 2022    Procedure: HEMIARTHROPLASTY HIP (BIPOLAR), closed reduction with splinting right wrist;  Surgeon: Zane Kirkland; Location: [facility];  Service: Orthopedics   • SHOULDER ARTHROSCOPY Left        History reviewed  No pertinent family history      Social History     Occupational History   • Not on file   Tobacco Use   • Smoking status: Former     Packs/day: 0 25     Types: Cigarettes     Quit date:      Years since quittin 1   • Smokeless tobacco: Never   Vaping Use   • Vaping Use: Never used   Substance and Sexual Activity   • Alcohol use: Not Currently     Alcohol/week: 1 0 standard drink     Types: 1 Glasses of wine per week   • Drug use: Never   • Sexual activity: Not Currently         Current Outpatient Medications:   •  acetaminophen (TYLENOL) 325 mg tablet, Take 2 tablets (650 mg total) by mouth every 6 (six) hours as needed for mild pain, Disp: , Rfl: 0  •  amLODIPine (NORVASC) 10 mg tablet, Take 10 mg by mouth daily, Disp: , Rfl:   •  calcium carbonate (OS-ALPESH) 600 MG tablet, Take 600 mg by mouth daily Taking 2 tablets (1200 mg) daily, Disp: , Rfl:   •  celecoxib (CeleBREX) 100 mg capsule, Take 1 capsule (100 mg total) by mouth 2 (two) times a day As needed for joint pain, take with food, Disp: 60 capsule, Rfl: 6  •  cetirizine (ZyrTEC) 10 mg tablet, Take 10 mg by mouth daily, Disp: , Rfl:   •  cholecalciferol (VITAMIN D3) 1,000 units tablet, Take 1,000 Units by mouth daily 2,000 units daily, Disp: , Rfl:   •  gabapentin (NEURONTIN) 600 MG tablet, Take 1 tablet (600 mg total) by mouth daily at bedtime, Disp: 30 tablet, Rfl: 0  •  multivitamin (THERAGRAN) TABS, Take 1 tablet by mouth daily, Disp: , Rfl:   •  omeprazole (PriLOSEC) 20 mg delayed release capsule, Take 20 mg by mouth daily 1 in am 1 in pm, Disp: , Rfl:   •  pravastatin (PRAVACHOL) 40 mg tablet, Take 40 mg by mouth daily One at bedtime, Disp: , Rfl:   •  temazepam (RESTORIL) 7 5 mg capsule, Take 15 mg by mouth daily at bedtime as needed for sleep  , Disp: , Rfl:   •  venlafaxine (EFFEXOR-XR) 150 mg 24 hr capsule, Take 150 mg by mouth daily, Disp: , Rfl:   •  aspirin (ECOTRIN) 325 mg EC tablet, Take 2 tablets (650 mg total) by mouth daily Take with food, Disp: 30 tablet, Rfl: 0  •  ferrous sulfate 325 (65 Fe) mg tablet, Take 1 tablet (325 mg total) by mouth daily with breakfast (Patient not taking: Reported on 10/10/2022), Disp: , Rfl: 0  •  senna (SENOKOT) 8 6 mg, Take 1 tablet (8 6 mg total) by mouth daily at bedtime, Disp: , Rfl: 0    No Known Allergies       Vitals:    01/30/23 0803   BP: 146/82   Pulse: 94       Objective:                    Ortho Exam  bilateral knee(s) -   Patient ambulates with steady gait pattern  Uses No assistive device  No anatomical deformity  Skin is warm and dry to touch with no signs of erythema, ecchymosis, or infection  No soft tissue swelling or effusion noted  ROM (0° - 130°)  MMT: 5/5 throughout  No tenderness to palpation on exam  Flexor and extensor mechanisms are intact   Knee is stable to varus and valgus stress  Knee is stable to anterior and posterior stress  Patella tracks centrally without palpable crepitus  Calf compartments are soft and supple  - Alexus's sign  2+ DP and PT pulses with brisk capillary refill to the toes  Sural, saphenous, tibial, superficial, and deep peroneal motor and sensory distributions intact  Sensation light touch intact distally    Diagnostics, reviewed and taken today if performed as documented: The attending physician has personally reviewed the pertinent films in PACS and interpretation is as follows:    X-Ray of bilateral knee taken on 01/30/2023 were reviewed and showed well-seated total knee prosthesis with maintained anatomical alignment and no signs of loosening  Procedures, if performed today:    None performed    Portions of the record may have been created with voice recognition software  Occasional wrong word or "sound a like" substitutions may have occurred due to the inherent limitations of voice recognition software  Read the chart carefully and recognize, using context, where substitutions have occurred

## 2023-01-31 ENCOUNTER — CLINICAL SUPPORT (OUTPATIENT)
Dept: URGENT CARE | Facility: CLINIC | Age: 73
End: 2023-01-31

## 2023-01-31 ENCOUNTER — APPOINTMENT (OUTPATIENT)
Dept: LAB | Facility: CLINIC | Age: 73
End: 2023-01-31

## 2023-01-31 DIAGNOSIS — Z01.812 PRE-OPERATIVE LABORATORY EXAMINATION: ICD-10-CM

## 2023-01-31 LAB
ALBUMIN SERPL BCP-MCNC: 3.9 G/DL (ref 3.5–5)
ALP SERPL-CCNC: 57 U/L (ref 46–116)
ALT SERPL W P-5'-P-CCNC: 28 U/L (ref 12–78)
ANION GAP SERPL CALCULATED.3IONS-SCNC: 5 MMOL/L (ref 4–13)
AST SERPL W P-5'-P-CCNC: 28 U/L (ref 5–45)
BASOPHILS # BLD AUTO: 0.05 THOUSANDS/ÂΜL (ref 0–0.1)
BASOPHILS NFR BLD AUTO: 1 % (ref 0–1)
BILIRUB SERPL-MCNC: 0.39 MG/DL (ref 0.2–1)
BUN SERPL-MCNC: 21 MG/DL (ref 5–25)
CALCIUM SERPL-MCNC: 9.8 MG/DL (ref 8.3–10.1)
CHLORIDE SERPL-SCNC: 106 MMOL/L (ref 96–108)
CO2 SERPL-SCNC: 29 MMOL/L (ref 21–32)
CREAT SERPL-MCNC: 0.85 MG/DL (ref 0.6–1.3)
EOSINOPHIL # BLD AUTO: 0.2 THOUSAND/ÂΜL (ref 0–0.61)
EOSINOPHIL NFR BLD AUTO: 3 % (ref 0–6)
ERYTHROCYTE [DISTWIDTH] IN BLOOD BY AUTOMATED COUNT: 15.6 % (ref 11.6–15.1)
GFR SERPL CREATININE-BSD FRML MDRD: 68 ML/MIN/1.73SQ M
GLUCOSE P FAST SERPL-MCNC: 118 MG/DL (ref 65–99)
HCT VFR BLD AUTO: 45.4 % (ref 34.8–46.1)
HGB BLD-MCNC: 14.2 G/DL (ref 11.5–15.4)
IMM GRANULOCYTES # BLD AUTO: 0.02 THOUSAND/UL (ref 0–0.2)
IMM GRANULOCYTES NFR BLD AUTO: 0 % (ref 0–2)
LYMPHOCYTES # BLD AUTO: 2.94 THOUSANDS/ÂΜL (ref 0.6–4.47)
LYMPHOCYTES NFR BLD AUTO: 41 % (ref 14–44)
MCH RBC QN AUTO: 28.9 PG (ref 26.8–34.3)
MCHC RBC AUTO-ENTMCNC: 31.3 G/DL (ref 31.4–37.4)
MCV RBC AUTO: 92 FL (ref 82–98)
MONOCYTES # BLD AUTO: 0.6 THOUSAND/ÂΜL (ref 0.17–1.22)
MONOCYTES NFR BLD AUTO: 8 % (ref 4–12)
NEUTROPHILS # BLD AUTO: 3.43 THOUSANDS/ÂΜL (ref 1.85–7.62)
NEUTS SEG NFR BLD AUTO: 47 % (ref 43–75)
NRBC BLD AUTO-RTO: 0 /100 WBCS
PLATELET # BLD AUTO: 346 THOUSANDS/UL (ref 149–390)
PMV BLD AUTO: 11.6 FL (ref 8.9–12.7)
POTASSIUM SERPL-SCNC: 4.5 MMOL/L (ref 3.5–5.3)
PROT SERPL-MCNC: 7.6 G/DL (ref 6.4–8.4)
RBC # BLD AUTO: 4.92 MILLION/UL (ref 3.81–5.12)
SODIUM SERPL-SCNC: 140 MMOL/L (ref 135–147)
WBC # BLD AUTO: 7.24 THOUSAND/UL (ref 4.31–10.16)

## 2023-02-01 NOTE — PRE-PROCEDURE INSTRUCTIONS
Pre-Surgery Instructions:   Medication Instructions   • acetaminophen (TYLENOL) 325 mg tablet Uses PRN- OK to take day of surgery   • amLODIPine (NORVASC) 10 mg tablet Take day of surgery  • calcium carbonate (OS-ALPESH) 600 MG tablet Stop taking 7 days prior to surgery  • celecoxib (CeleBREX) 100 mg capsule Stop taking 3 days prior to surgery  • cetirizine (ZyrTEC) 10 mg tablet Take day of surgery  • cholecalciferol (VITAMIN D3) 1,000 units tablet Stop taking 7 days prior to surgery  • ferrous sulfate 325 (65 Fe) mg tablet Stop taking 7 days prior to surgery  • gabapentin (NEURONTIN) 600 MG tablet Take night before surgery   • multivitamin (THERAGRAN) TABS Stop taking 7 days prior to surgery  • omeprazole (PriLOSEC) 20 mg delayed release capsule Hold day of surgery  • pravastatin (PRAVACHOL) 40 mg tablet Take night before surgery   • temazepam (RESTORIL) 7 5 mg capsule Take night before surgery if needed   • venlafaxine (EFFEXOR-XR) 150 mg 24 hr capsule Take day of surgery  Covid screening negative as per patient  Reviewed pre op medicine and showering instructions with patient via phone call, verbalizes understanding  Advised patient to stop taking non prescribed vitamins, herbal meds and NSAID's 7 days pre op  Advised may take Tylenol products if needed  Advised to take DOS medicine with a small sip water  Advised NPO after MN prior to surgery and surgical services will call (2/7) with scheduled time of hospital arrival     Advised to notify surgeon's office of any change in health status from now until surgery  Pt verbalized understanding of all instructions

## 2023-02-03 LAB
ATRIAL RATE: 96 BPM
P AXIS: 44 DEGREES
PR INTERVAL: 222 MS
QRS AXIS: -56 DEGREES
QRSD INTERVAL: 104 MS
QT INTERVAL: 360 MS
QTC INTERVAL: 454 MS
T WAVE AXIS: 68 DEGREES
VENTRICULAR RATE: 96 BPM

## 2023-02-06 ENCOUNTER — ANESTHESIA EVENT (OUTPATIENT)
Dept: PERIOP | Facility: HOSPITAL | Age: 73
End: 2023-02-06

## 2023-02-08 ENCOUNTER — HOSPITAL ENCOUNTER (OUTPATIENT)
Facility: HOSPITAL | Age: 73
Setting detail: OUTPATIENT SURGERY
Discharge: HOME | End: 2023-02-08
Attending: ORTHOPAEDIC SURGERY | Admitting: ORTHOPAEDIC SURGERY

## 2023-02-08 ENCOUNTER — ANESTHESIA (OUTPATIENT)
Dept: PERIOP | Facility: HOSPITAL | Age: 73
End: 2023-02-08

## 2023-02-08 VITALS
DIASTOLIC BLOOD PRESSURE: 63 MMHG | SYSTOLIC BLOOD PRESSURE: 125 MMHG | HEIGHT: 66 IN | OXYGEN SATURATION: 95 % | HEART RATE: 92 BPM | BODY MASS INDEX: 31.66 KG/M2 | RESPIRATION RATE: 22 BRPM | TEMPERATURE: 98 F | WEIGHT: 197 LBS

## 2023-02-08 DIAGNOSIS — G56.21 CUBITAL TUNNEL SYNDROME ON RIGHT: ICD-10-CM

## 2023-02-08 RX ORDER — HYDROMORPHONE HCL/PF 1 MG/ML
0.5 SYRINGE (ML) INJECTION ONCE
Status: DISCONTINUED | OUTPATIENT
Start: 2023-02-08 | End: 2023-02-08 | Stop reason: HOSPADM

## 2023-02-08 RX ORDER — EPHEDRINE SULFATE 50 MG/ML
INJECTION INTRAVENOUS AS NEEDED
Status: DISCONTINUED | OUTPATIENT
Start: 2023-02-08 | End: 2023-02-08

## 2023-02-08 RX ORDER — CEPHALEXIN 500 MG/1
500 CAPSULE ORAL EVERY 8 HOURS SCHEDULED
Qty: 9 CAPSULE | Refills: 0 | Status: SHIPPED | OUTPATIENT
Start: 2023-02-08 | End: 2023-02-11

## 2023-02-08 RX ORDER — PROPOFOL 10 MG/ML
INJECTION, EMULSION INTRAVENOUS AS NEEDED
Status: DISCONTINUED | OUTPATIENT
Start: 2023-02-08 | End: 2023-02-08

## 2023-02-08 RX ORDER — POVIDONE-IODINE 10 MG/G
OINTMENT TOPICAL AS NEEDED
Status: DISCONTINUED | OUTPATIENT
Start: 2023-02-08 | End: 2023-02-08 | Stop reason: HOSPADM

## 2023-02-08 RX ORDER — CEFAZOLIN SODIUM 2 G/50ML
2000 SOLUTION INTRAVENOUS ONCE
Status: COMPLETED | OUTPATIENT
Start: 2023-02-08 | End: 2023-02-08

## 2023-02-08 RX ORDER — TRAMADOL HYDROCHLORIDE 50 MG/1
50 TABLET ORAL EVERY 6 HOURS PRN
Status: DISCONTINUED | OUTPATIENT
Start: 2023-02-08 | End: 2023-02-08 | Stop reason: HOSPADM

## 2023-02-08 RX ORDER — ONDANSETRON 2 MG/ML
4 INJECTION INTRAMUSCULAR; INTRAVENOUS EVERY 6 HOURS PRN
Status: CANCELLED | OUTPATIENT
Start: 2023-02-08

## 2023-02-08 RX ORDER — TRAMADOL HYDROCHLORIDE 50 MG/1
50 TABLET ORAL EVERY 6 HOURS PRN
Qty: 21 TABLET | Refills: 0 | Status: SHIPPED | OUTPATIENT
Start: 2023-02-08 | End: 2023-02-18

## 2023-02-08 RX ORDER — FENTANYL CITRATE 50 UG/ML
INJECTION, SOLUTION INTRAMUSCULAR; INTRAVENOUS AS NEEDED
Status: DISCONTINUED | OUTPATIENT
Start: 2023-02-08 | End: 2023-02-08

## 2023-02-08 RX ORDER — BUPIVACAINE HYDROCHLORIDE 5 MG/ML
INJECTION, SOLUTION PERINEURAL AS NEEDED
Status: DISCONTINUED | OUTPATIENT
Start: 2023-02-08 | End: 2023-02-08 | Stop reason: HOSPADM

## 2023-02-08 RX ORDER — DIPHENHYDRAMINE HYDROCHLORIDE 50 MG/ML
12.5 INJECTION INTRAMUSCULAR; INTRAVENOUS ONCE AS NEEDED
Status: DISCONTINUED | OUTPATIENT
Start: 2023-02-08 | End: 2023-02-08 | Stop reason: HOSPADM

## 2023-02-08 RX ORDER — FENTANYL CITRATE/PF 50 MCG/ML
50 SYRINGE (ML) INJECTION
Status: DISCONTINUED | OUTPATIENT
Start: 2023-02-08 | End: 2023-02-08 | Stop reason: HOSPADM

## 2023-02-08 RX ORDER — LIDOCAINE HYDROCHLORIDE 20 MG/ML
INJECTION, SOLUTION EPIDURAL; INFILTRATION; INTRACAUDAL; PERINEURAL AS NEEDED
Status: DISCONTINUED | OUTPATIENT
Start: 2023-02-08 | End: 2023-02-08

## 2023-02-08 RX ORDER — DEXAMETHASONE SODIUM PHOSPHATE 10 MG/ML
INJECTION, SOLUTION INTRAMUSCULAR; INTRAVENOUS AS NEEDED
Status: DISCONTINUED | OUTPATIENT
Start: 2023-02-08 | End: 2023-02-08

## 2023-02-08 RX ORDER — SODIUM CHLORIDE, SODIUM LACTATE, POTASSIUM CHLORIDE, CALCIUM CHLORIDE 600; 310; 30; 20 MG/100ML; MG/100ML; MG/100ML; MG/100ML
125 INJECTION, SOLUTION INTRAVENOUS CONTINUOUS
Status: DISCONTINUED | OUTPATIENT
Start: 2023-02-08 | End: 2023-02-08 | Stop reason: HOSPADM

## 2023-02-08 RX ORDER — CHLORHEXIDINE GLUCONATE 0.12 MG/ML
15 RINSE ORAL ONCE
Status: COMPLETED | OUTPATIENT
Start: 2023-02-08 | End: 2023-02-08

## 2023-02-08 RX ORDER — CHLORHEXIDINE GLUCONATE 4 G/100ML
SOLUTION TOPICAL DAILY PRN
Status: DISCONTINUED | OUTPATIENT
Start: 2023-02-08 | End: 2023-02-08 | Stop reason: HOSPADM

## 2023-02-08 RX ORDER — PHENYLEPHRINE HYDROCHLORIDE 10 MG/ML
INJECTION INTRAVENOUS AS NEEDED
Status: DISCONTINUED | OUTPATIENT
Start: 2023-02-08 | End: 2023-02-08

## 2023-02-08 RX ADMIN — FENTANYL CITRATE 50 MCG: 50 INJECTION INTRAMUSCULAR; INTRAVENOUS at 07:35

## 2023-02-08 RX ADMIN — CHLORHEXIDINE GLUCONATE 0.12% ORAL RINSE 15 ML: 1.2 LIQUID ORAL at 07:13

## 2023-02-08 RX ADMIN — FENTANYL CITRATE 25 MCG: 50 INJECTION INTRAMUSCULAR; INTRAVENOUS at 08:16

## 2023-02-08 RX ADMIN — LIDOCAINE HYDROCHLORIDE 100 MG: 20 INJECTION, SOLUTION EPIDURAL; INFILTRATION; INTRACAUDAL; PERINEURAL at 07:41

## 2023-02-08 RX ADMIN — PHENYLEPHRINE HYDROCHLORIDE 100 MCG: 10 INJECTION INTRAVENOUS at 07:53

## 2023-02-08 RX ADMIN — PROPOFOL 150 MG: 10 INJECTION, EMULSION INTRAVENOUS at 07:41

## 2023-02-08 RX ADMIN — PHENYLEPHRINE HYDROCHLORIDE 100 MCG: 10 INJECTION INTRAVENOUS at 08:24

## 2023-02-08 RX ADMIN — EPHEDRINE SULFATE 5 MG: 50 INJECTION, SOLUTION INTRAVENOUS at 07:58

## 2023-02-08 RX ADMIN — CEFAZOLIN SODIUM 2000 MG: 2 SOLUTION INTRAVENOUS at 07:47

## 2023-02-08 RX ADMIN — PHENYLEPHRINE HYDROCHLORIDE 100 MCG: 10 INJECTION INTRAVENOUS at 07:59

## 2023-02-08 RX ADMIN — SODIUM CHLORIDE, SODIUM LACTATE, POTASSIUM CHLORIDE, AND CALCIUM CHLORIDE 125 ML/HR: .6; .31; .03; .02 INJECTION, SOLUTION INTRAVENOUS at 07:15

## 2023-02-08 RX ADMIN — EPHEDRINE SULFATE 5 MG: 50 INJECTION, SOLUTION INTRAVENOUS at 08:24

## 2023-02-08 RX ADMIN — DEXAMETHASONE SODIUM PHOSPHATE 5 MG: 10 INJECTION, SOLUTION INTRAMUSCULAR; INTRAVENOUS at 07:47

## 2023-02-08 RX ADMIN — FENTANYL CITRATE 25 MCG: 50 INJECTION INTRAMUSCULAR; INTRAVENOUS at 08:11

## 2023-02-08 NOTE — ANESTHESIA POSTPROCEDURE EVALUATION
Post-Op Assessment Note    CV Status:  Stable  Pain Score: 0    Pain management: adequate     Mental Status:  Alert and awake   Hydration Status:  Euvolemic   PONV Controlled:  Controlled   Airway Patency:  Patent      Post Op Vitals Reviewed: Yes      Staff: Anesthesiologist, CRNA         No notable events documented      BP   135/62   Temp 98   Pulse 90   Resp 18   SpO2 96

## 2023-02-08 NOTE — H&P
H&P Exam - Orthopedics   Michele Vega 67 y o  female MRN: [id number]  Unit/Bed#: OR POOL Encounter: [id number]    Assessment/Plan     Assessment:  Right cubital tunnel syndrome  Plan:  Elective right cubital tunnel release    History of Present Illness   HPI:  Joyce Gerardo is a 67 y o  female who presented to my office complaining of worsening right elbow pain with numbness and tingling along her elbow and first, second and third fingers  The patient stated that her symptoms are continuing to worsen and starting to affect her quality of life  EMGs were performed which consistent with cubital tunnel syndrome  After exhausting conservative treatment, the risks and benefits of surgery were discussed with the patient  She decided on an elective right cubital tunnel release  Review of Systems   Constitutional: Negative for chills, fever and unexpected weight change  HENT: Negative for hearing loss, nosebleeds and sore throat  Eyes: Negative for pain, redness and visual disturbance  Respiratory: Negative for cough, shortness of breath and wheezing  Cardiovascular: Negative for chest pain, palpitations and leg swelling  Gastrointestinal: Negative for abdominal pain, nausea and vomiting  Endocrine: Negative for polydipsia and polyuria  Genitourinary: Negative for dysuria and hematuria  Musculoskeletal: Positive for arthralgias and myalgias  As noted in HPI   Skin: Negative for rash and wound  Neurological: Positive for numbness  Negative for dizziness and headaches  Psychiatric/Behavioral: Negative for decreased concentration and suicidal ideas  The patient is not nervous/anxious          Historical Information   Past Medical History:   Diagnosis Date   • Anemia    • Anxiety    • Arthritis    • Colon polyp    • Depression    • GERD (gastroesophageal reflux disease)    • Hiatal hernia    • Hyperlipidemia    • Hypertension      Past Surgical History:   Procedure Laterality Date   • APPENDECTOMY     • CHOLECYSTECTOMY     • COLONOSCOPY     • FRACTURE SURGERY Right     arm with plate and pins   • HAND SURGERY Bilateral    • HYSTERECTOMY     • JOINT REPLACEMENT Bilateral     knees   • MS HEMIARTHROPLASTY HIP PARTIAL Right 2022    Procedure: HEMIARTHROPLASTY HIP (BIPOLAR), closed reduction with splinting right wrist;  Surgeon: Landry Morataya; Location: [facility];  Service: Orthopedics   • SHOULDER ARTHROSCOPY Left      Social History   Social History     Substance and Sexual Activity   Alcohol Use Not Currently     Social History     Substance and Sexual Activity   Drug Use Never     Social History     Tobacco Use   Smoking Status Former   • Packs/day: 0 25   • Types: Cigarettes   • Quit date:    • Years since quittin 1   Smokeless Tobacco Never     Family History: History reviewed  No pertinent family history  Meds/Allergies   PTA meds:   Prior to Admission Medications   Prescriptions Last Dose Informant Patient Reported?  Taking?   acetaminophen (TYLENOL) 325 mg tablet 2023  No Yes   Sig: Take 2 tablets (650 mg total) by mouth every 6 (six) hours as needed for mild pain   amLODIPine (NORVASC) 10 mg tablet 2023  Yes Yes   Sig: Take 10 mg by mouth daily   calcium carbonate (OS-ALPESH) 600 MG tablet 2023  Yes Yes   Sig: Take 600 mg by mouth daily Taking 2 tablets (1200 mg) daily   celecoxib (CeleBREX) 100 mg capsule 2023  No Yes   Sig: Take 1 capsule (100 mg total) by mouth 2 (two) times a day As needed for joint pain, take with food   cetirizine (ZyrTEC) 10 mg tablet 2023  Yes Yes   Sig: Take 10 mg by mouth daily   cholecalciferol (VITAMIN D3) 1,000 units tablet 2023  Yes Yes   Sig: Take 1,000 Units by mouth daily 2,000 units daily   gabapentin (NEURONTIN) 600 MG tablet 2023  No Yes   Sig: Take 1 tablet (600 mg total) by mouth daily at bedtime   multivitamin (THERAGRAN) TABS 2023  Yes Yes   Sig: Take 1 tablet by mouth daily   omeprazole (PriLOSEC) 20 mg delayed release capsule 2/8/2023  Yes Yes   Sig: Take 20 mg by mouth daily 1 in am 1 in pm   pravastatin (PRAVACHOL) 40 mg tablet 2/7/2023  Yes Yes   Sig: Take 40 mg by mouth daily One at bedtime   temazepam (RESTORIL) 7 5 mg capsule 2/7/2023  Yes Yes   Sig: Take 15 mg by mouth daily at bedtime as needed for sleep     venlafaxine (EFFEXOR-XR) 150 mg 24 hr capsule 2/8/2023  Yes Yes   Sig: Take 150 mg by mouth daily      Facility-Administered Medications: None     No Known Allergies    Objective   Vitals: Blood pressure 150/74, pulse 94, temperature 97 6 °F (36 4 °C), temperature source Temporal, resp  rate 18, height 5' 6" (1 676 m), weight 89 4 kg (197 lb), SpO2 98 %  ,Body mass index is 31 8 kg/m²  No intake or output data in the 24 hours ending 02/08/23 0717    No intake/output data recorded  Invasive Devices     Peripheral Intravenous Line  Duration           Peripheral IV 02/08/23 Left Forearm <1 day                Physical Exam  Ortho Exam  Right upper extremity is neurovascular intact  Fingers are pink and mobile  Compartments are soft  Tenderness along the elbow  Positive elbow flexion test  Positive Tinel sign at elbow  No warmth or erythema  No significant edema  Brisk cap refill  Sensation intact  Lungs CTA  Heart RRR  Abdomen soft, nontender, nondistended  Lab Results: I have personally reviewed pertinent lab results  Imaging: I have personally reviewed pertinent films in PACS  EKG, Pathology, and Other Studies: I have personally reviewed pertinent films in PACS    Code Status: Prior  Advance Directive and Living Will:      Power of :    POLST:      Counseling / Coordination of Care  Total floor / unit time spent today 30 minutes  Greater than 50% of total time was spent with the patient and / or family counseling and / or coordination of care

## 2023-02-08 NOTE — OP NOTE
OPERATIVE REPORT  PATIENT NAME: Imani Garcia    :  1950  MRN: [id number]  Pt Location: [facility] OR ROOM 02    SURGERY DATE: 2023    Surgeon(s) and Role:     * Selina Shine DO - Primary    Assistant Sierra Moscoso PA-C    Preop Diagnosis:  Cubital tunnel syndrome on right G56 21    Post-Op Diagnosis Codes:     * Cubital tunnel syndrome on right [G56 21]    Procedure(s):  Right - RELEASE CUBITAL TUNNEL  Application of long-arm splint    Specimen(s):  Medial intramuscular septum    Estimated Blood Loss:   minimal    Drains:  none    Anesthesia Type:   General with local placed at conclusion of procedure    Operative Indications:  Cubital tunnel syndrome on right G56       Operative Findings:  TT: 27    Complications:   None    Procedure and Technique:    The patient was properly identified and brought into the operative suite  After successful induction of the general anesthetic, a tourniquet was placed in the patient's right proximal arm  The right upper extremity was then prepped and draped in the usual sterile fashion    It was medically necessary that the physician assistant be in the room to aid in positioning applying the appropriate amount retraction of patient  The physician assistant's room was very integral to the success of this case  He assisted on positioning, draping, retraction of soft tissue, retraction neurovascular bundles, assisted with closure of the wound and placement of the splint    The surgical landmarks were outlined with a skin marker  Using a #15 Scalpel blade, a posterior medial incision was made along the patient's posterior medial right  elbow  Hemostasis was achieved with the use of electrocautery  This layer was 1st brought down to the subcutaneous fat layer  There were several tributaries of the medial antebrachial cutaneous nerve which some were spared and some were sacrificed  Through further blunt dissection the cubital tunnel was exposed    The ulnar nerve was located and dissected out of the groove  Further dissection occurred to the level of the arcade of [place]  There was a hypertrophic medial intramuscular septum which was partially removed  The intra articular branch of the nerve was sacrificed  The dissection occurred distally to its bifurcation at the flexor carpi ulnaris muscle     At this point, the nerve was moving freely  The fibrosis was gone  The wound was irrigated again  The fascia was closed with 2 O Vicryl  The skin was approximated with 4 O nylon  The region was then infiltrated with 0 5% Marcaine  It was then dressed with ointment, Xeroform, 4x4s, Webril, and a long-arm splint  There was no complications during procedure    She was successfully woken, transferred to [facility] OR [facility], and went to recovery room in stable condition     I was present for the entire procedure, A qualified resident physician was not available and A physician assistant was required during the procedure for retraction tissue handling,dissection and suturing    Patient Disposition:  PACU         SIGNATURE: Catherine Ivey DO  DATE: February 8, 2023  TIME: 7:40 AM

## 2023-02-08 NOTE — ANESTHESIA PREPROCEDURE EVALUATION
Procedure:  RELEASE CUBITAL TUNNEL (Right: Elbow)    Relevant Problems   CARDIO   (+) Hypertension      HEMATOLOGY   (+) Acute blood loss anemia      MUSCULOSKELETAL   (+) Cervical spondylosis   (+) Lumbar degenerative disc disease   (+) Lumbar spondylosis   (+) Rheumatoid arthritis involving both hands (HCC)             Anesthesia Plan  ASA Score- 3     Anesthesia Type- general with ASA Monitors  Additional Monitors:   Airway Plan: LMA  Plan Factors-    Chart reviewed  Induction- intravenous  Postoperative Plan- Plan for postoperative opioid use  Planned trial extubation    Informed Consent- Anesthetic plan and risks discussed with patient  I personally reviewed this patient with the CRNA  Discussed and agreed on the Anesthesia Plan with the CRNA  Praful Gerardo

## 2023-02-10 ENCOUNTER — EVALUATION (OUTPATIENT)
Dept: OCCUPATIONAL THERAPY | Facility: CLINIC | Age: 73
End: 2023-02-10

## 2023-02-10 DIAGNOSIS — G56.21 CUBITAL TUNNEL SYNDROME ON RIGHT: Primary | ICD-10-CM

## 2023-02-10 NOTE — PROGRESS NOTES
OT Evaluation     Today's date: 2/10/2023  Patient name: Radha Pennington  : 1950  MRN: [id number]  Referring provider: Ramy Bender  Dx:   Encounter Diagnosis     ICD-10-CM    1  Cubital tunnel syndrome on right  G56 21 Ambulatory Referral to Occupational Therapy                     Assessment  Assessment details: Patient presenting to OP OT services with a dx of Cubital Tunnel Syndrome  Patient reports symptoms began 2022  Patient had fall before that in July when she was dog sitting in which she fractured her right hip and hand  Patient had EMG completed in December  Patient is right hand dominant  Patient had surgery completed on 2023 and has a follow-up with Ortho on 2023  As per MD referral:   Status post right cubital tunnel release  Range of motion, strengthening, stretching, edema control  DC splint  Impairments: abnormal or restricted ROM, activity intolerance and impaired physical strength    Symptom irritability: moderateUnderstanding of Dx/Px/POC: good   Prognosis: good    Goals  STGs    Pt will increase  strength by 5-10#  - Not Met    Pt will increase forearm and elbow strength by 1/2 grade  - Not Met    Pt will increase forearm and elbow ROM by 50%  - Not Met     Pt will demonstrate decrease in edema by 25%  - Not Met    Pt will report a decrease in sensation deficits by 25%  - Not Met    Independent with HEP  - Not Met      LTGs     Pt will increase  strength by an additional 5-10#  - Not Met    Pt will increase forearm and elbow strength by 1-2 grade  - Not Met    Pt will increase forearm and elbow ROM to [facility]  - Not Met     Pt will demonstrate decrease in edema by 50%  - Not Met    Pt will increase pinch strength by 3-5#  - Not Met    Pt will report an increase in ADL/IADL participation  - Not Met    Pt will report a decrease in sensation deficits by 50%   - Not Met    Pt will report no more than a 0/10 pain with activity - Not Met        Plan  Plan details: Patient presenting to OP OT services with a dx of Cubital Tunnel  Patient demonstrating increased pain, decreased strength, decreased ROM and decreased activity  Pt would benefit from continued Occupational Therapy services two times per week for 4 weeks to return to prior level of function and achieve all established goals  Thank you for the referral!    Patient would benefit from: OT eval, skilled occupational therapy and custom splinting  Referral necessary: Yes  Planned modality interventions: cryotherapy, thermotherapy: hydrocollator packs and ultrasound  Planned therapy interventions: joint mobilization, manual therapy, massage, motor coordination training, patient education, self care, strengthening, therapeutic activities, therapeutic exercise, stretching, fine motor coordination training, functional ROM exercises and home exercise program  Frequency: 2x week  Duration in visits: 8  Duration in weeks: 4  Treatment plan discussed with: patient        Subjective Evaluation    History of Present Illness  Date of surgery: 2023  Mechanism of injury: surgery  Mechanism of injury: S/p R Cubital Tunnel Release          Not a recurrent problem   Quality of life: good    Pain  Current pain ratin  At best pain ratin  At worst pain ratin  Location: Right Elbow  Quality: dull ache    Social Support    Employment status: not working  Hand dominance: right      Diagnostic Tests  X-ray: abnormal  EMG: abnormal  Treatments  Current treatment: occupational therapy  Patient Goals  Patient goals for therapy: decreased edema, decreased pain, increased motion, increased strength and independence with ADLs/IADLs          Objective     Observations     Right Wrist/Hand   Positive for incision  Additional Observation Details  Patient presenting with a 6 cm incision along right elbow with sutures present and no drainage       Neurological Testing     Sensation     Elbow   Left Elbow   Intact: light touch    Right Elbow   Diminished: light touch    Additional Neurological Details  Patient reports continued sensation deficits in right SF    Active Range of Motion     Left Elbow   Normal active range of motion    Right Elbow   Flexion: 140 degrees   Extension: -25 degrees with pain  Forearm supination: 35 degrees   Forearm pronation: WFL    Left Wrist   Normal active range of motion    Right Wrist   Normal active range of motion    Left Thumb   Opposition: WNL    Right Thumb   Opposition: WNL    Additional Active Range of Motion Details  Patient demonstrating with decreased right elbow ROM with some discomfort  Composite fist bilaterally    Strength/Myotome Testing     Left Elbow   Normal strength    Right Elbow   Flexion: 3+  Extension: 3-  Forearm supination: 3-  Forearm pronation: 3+    Left Wrist/Hand   Normal wrist strength     (2nd hand position)     Trial 1: 50    Thumb Strength  Key/Lateral Pinch     Trial 1: 13    Right Wrist/Hand   Wrist extension: 3  Wrist flexion: 3  Radial deviation: 3  Ulnar deviation: 3     (2nd hand position)     Trial 1: 10    Thumb Strength   Key/Lateral Pinch     Trial 1: 2    Additional Strength Details  Decreased right elbow strength compared to left  Patient demonstrating with decreased  and pinch strength of right hand    Swelling   Left Elbow Girth Measurements   Girth at joint line (cm): 28 2  Right Elbow Girth Measurements   Girth at joint line (cm): 29 5  Patient tolerated session well  Patient and therapist discussed exercises as per initial HEP  Patient verbalized understanding of education and demonstrated proper technique  Therapist will make increases as tolerated   Continue with POC            Precautions Cubital Tunnel Release  Initial Evaluation completed on: 02/10/2023  Re-Evaluation needs to be completed before: 03/10/2023  Insurance: Medicare  FOTO: 02/10/2023  Auth Visits: N/A          Manuals 02/10/2023       IASTM         Elbow Stretch Supinator Stretch        Scar Massage Dressing Changed               Neuro Re-Ed  02/10/2023                       Ther Ex 02/10/2023       Elbow Flex  X 3 Ways HEP        Sup/pronation HEP       Elbow Isometrics        Digi-Flex        Hand Power Pro        Wrist Maze        Hammer Stretch        Shoulder  ER/IE Iso                Ther Activity 02/10/2023                                               Modalities 02/10/2023       Ultrasound        E-STIM w/ DUARTE        CP Performed at end of session       MH

## 2023-02-16 ENCOUNTER — OFFICE VISIT (OUTPATIENT)
Dept: OCCUPATIONAL THERAPY | Facility: CLINIC | Age: 73
End: 2023-02-16

## 2023-02-16 DIAGNOSIS — G56.21 CUBITAL TUNNEL SYNDROME ON RIGHT: Primary | ICD-10-CM

## 2023-02-16 NOTE — PROGRESS NOTES
Daily Note     Today's date: 2023  Patient name: Leandra Marsh  : 1950  MRN: [id number]  Referring provider: Ashlyn Suárez  Dx:   Encounter Diagnosis     ICD-10-CM    1  Cubital tunnel syndrome on right  G56 21                      Subjective: "It looks pretty good "      Objective: See treatment diary below      Assessment: Tolerated treatment well  Patient exhibited good technique with therapeutic exercises and would benefit from continued OT  Patient presenting with incision covered with gauze patch  Patient tolerated session well with additional progressive exercises tolerated  Patient's follow-up appointment next week was moved to the following week  Plan: Continue per plan of care  Progress treatment as tolerated           Precautions Cubital Tunnel Release  Initial Evaluation completed on: 02/10/2023  Re-Evaluation needs to be completed before: 03/10/2023  Insurance: Medicare  FOTO: 02/10/2023  Auth Visits: N/A          Manuals 02/10/2023 2023      IASTM   Manual Massage      Elbow Stretch        Supinator Stretch        Scar Massage Dressing Changed Dressing Change              Neuro Re-Ed  02/10/2023 2023                      Ther Ex 02/10/2023 2023      Elbow Flex  X 3 Ways HEP  X 20      Sup/pronation HEP X 20 w/ Dowel      Elbow Isometrics        Digi-Flex  Red x 20      Hand Power Pro        Wrist Maze  X 5      Hammer Stretch        Shoulder  ER/IE Iso        Shoulder Pulley  X 20              Ther Activity 02/10/2023 2023                                              Modalities 02/10/2023 2023      Ultrasound        E-STIM w/ MH        CP Performed at end of session Performed at end of session      [address]

## 2023-02-22 NOTE — PROGRESS NOTES
Daily Note     Today's date: 2023  Patient name: Norah Alvarez  : 1950  MRN: [id number]  Referring provider: Ramsey Hollis  Dx:   Encounter Diagnosis     ICD-10-CM    1  Cubital tunnel syndrome on right  G56 21                      Subjective: I don't have any pain  Objective: See treatment diary below      Assessment: Tolerated treatment well  Patient demonstrated fatigue post treatment, exhibited good technique with therapeutic exercises and would benefit from continued OT  Tolerated additional exercises with no complaints of pain  Stitches intact no s/s of infection  Denied pain pre and post tx  Mild supination tightness noted  Plan: Continue per plan of care  Progress treatment as tolerated         Precautions Cubital Tunnel Release  Initial Evaluation completed on: 02/10/2023  Re-Evaluation needs to be completed before: 03/10/2023  Insurance: Medicare  FOTO: 02/10/2023  Auth Visits: N/A          Manuals 02/10/2023 2023 2023     IASTM   Manual Massage      Elbow Stretch   performed     Supinator Stretch   20 sec x 5     Scar Massage Dressing Changed Dressing Change              Neuro Re-Ed  02/10/2023 2023 2023                     Ther Ex 02/10/2023 2023 2023     Elbow Flex  X 3 Ways HEP  X 20 X 20     Sup/pronation HEP X 20 w/ Dowel X 20 w/ dowel     Elbow Isometrics        Digi-Flex  Red x 20 Red x 20     Hand Power Pro        Wrist Maze  X 5 X 5     Hammer Stretch        Shoulder  ER/IE Iso     X 15     Shoulder Pulley  X 20 X 20             Ther Activity 02/10/2023 2023 2023     poms   RTP with all poms                                     Modalities 02/10/2023 2023 2023     Ultrasound        E-STIM w/ MH        CP Performed at end of session Performed at end of session      [address]

## 2023-02-23 ENCOUNTER — OFFICE VISIT (OUTPATIENT)
Dept: OCCUPATIONAL THERAPY | Facility: CLINIC | Age: 73
End: 2023-02-23

## 2023-02-23 DIAGNOSIS — G56.21 CUBITAL TUNNEL SYNDROME ON RIGHT: Primary | ICD-10-CM

## 2023-02-27 ENCOUNTER — OFFICE VISIT (OUTPATIENT)
Dept: OBGYN CLINIC | Facility: CLINIC | Age: 73
End: 2023-02-27

## 2023-02-27 VITALS
SYSTOLIC BLOOD PRESSURE: 114 MMHG | WEIGHT: 197 LBS | BODY MASS INDEX: 31.66 KG/M2 | DIASTOLIC BLOOD PRESSURE: 71 MMHG | HEIGHT: 66 IN | HEART RATE: 87 BPM

## 2023-02-27 DIAGNOSIS — G56.21 CUBITAL TUNNEL SYNDROME ON RIGHT: Primary | ICD-10-CM

## 2023-02-27 NOTE — PROGRESS NOTES
Assessment/Plan:   There are no diagnoses linked to this encounter  Patient is progressing as expected postoperatively  She will continue with occupational therapy as prescribed  Continue NSAIDs as needed for pain and soreness  She will be seen for follow-up in 4 weeks for reevaluation  Any questions or concerns arise prior to that time, can contact the office or schedule sooner appointment  Patient stresses understanding is in agreement with this treatment plan  The patient is doing quite well from her right cubital tunnel release  There is still residual numbness, albeit improved  The nocturnal pain is gone  Continue home exercise program   Continue therapy  Follow-up 1 month evaluation for a  motion check    Subjective:   Patient ID: Christina Vega  1950     HPI  Patient is a 67 y o  female who presents for 1st postop evaluation 2 weeks s/p right cubital tunnel release performed 2/8/2023  Patient states that she is seen significant improvement in her pain, numbness, and tingling affecting the ulnar distribution of the right upper extremity  Has been consistent with occupational therapy as per protocol  She denies any new onset bruising, swelling, numbness, or tingling      The following portions of the patient's history were reviewed and updated as appropriate:  Past medical history, past surgical history, Family history, social history, current medications and allergies    Past Medical History:   Diagnosis Date   • Anemia    • Anxiety    • Arthritis    • Colon polyp    • Depression    • GERD (gastroesophageal reflux disease)    • Hiatal hernia    • Hyperlipidemia    • Hypertension        Past Surgical History:   Procedure Laterality Date   • APPENDECTOMY     • CHOLECYSTECTOMY     • COLONOSCOPY     • FRACTURE SURGERY Right     arm with plate and pins   • HAND SURGERY Bilateral    • HYSTERECTOMY     • JOINT REPLACEMENT Bilateral     knees   • [place] HEMIARTHROPLASTY HIP PARTIAL Right 07/02/2022 Procedure: HEMIARTHROPLASTY HIP (BIPOLAR), closed reduction with splinting right wrist;  Surgeon: Janelle Hinson; Location: [facility];  Service: Orthopedics   • [place] NEUROPLASTY &/TRANSPOSITION ULNAR NERVE ELBOW Right 2023    Procedure: RELEASE CUBITAL TUNNEL;  Surgeon: Lisa Pryor DO;  Location: [facility];  Service: Orthopedics   • SHOULDER ARTHROSCOPY Left        History reviewed  No pertinent family history  Social History     Socioeconomic History   • Marital status: /Civil Union     Spouse name: None   • Number of children: None   • Years of education: None   • Highest education level: None   Occupational History   • None   Tobacco Use   • Smoking status: Former     Packs/day: 0 25     Types: Cigarettes     Quit date:      Years since quittin 1   • Smokeless tobacco: Never   Vaping Use   • Vaping Use: Never used   Substance and Sexual Activity   • Alcohol use: Not Currently   • Drug use: Never   • Sexual activity: Not Currently   Other Topics Concern   • None   Social History Narrative   • None     Social Determinants of Health     Financial Resource Strain: Not on file   Food Insecurity: No Food Insecurity   • Worried About Running Out of Food in the Last Year: Never true   • Ran Out of Food in the Last Year: Never true   Transportation Needs: No Transportation Needs   • Lack of Transportation (Medical): No   • Lack of Transportation (Non-Medical):  No   Physical Activity: Not on file   Stress: Not on file   Social Connections: Not on file   Intimate Partner Violence: Not on file   Housing Stability: Low Risk    • Unable to Pay for Housing in the Last Year: No   • Number of Places Lived in the Last Year: 1   • Unstable Housing in the Last Year: No         Current Outpatient Medications:   •  acetaminophen (TYLENOL) 325 mg tablet, Take 2 tablets (650 mg total) by mouth every 6 (six) hours as needed for mild pain, Disp: , Rfl: 0  •  amLODIPine (NORVASC) 10 mg tablet, Take 10 mg by mouth daily, Disp: , Rfl:   •  calcium carbonate (OS-ALPESH) 600 MG tablet, Take 600 mg by mouth daily Taking 2 tablets (1200 mg) daily, Disp: , Rfl:   •  celecoxib (CeleBREX) 100 mg capsule, Take 1 capsule (100 mg total) by mouth 2 (two) times a day As needed for joint pain, take with food, Disp: 60 capsule, Rfl: 6  •  cetirizine (ZyrTEC) 10 mg tablet, Take 10 mg by mouth daily, Disp: , Rfl:   •  cholecalciferol (VITAMIN D3) 1,000 units tablet, Take 1,000 Units by mouth daily 2,000 units daily, Disp: , Rfl:   •  gabapentin (NEURONTIN) 600 MG tablet, Take 1 tablet (600 mg total) by mouth daily at bedtime, Disp: 30 tablet, Rfl: 0  •  multivitamin (THERAGRAN) TABS, Take 1 tablet by mouth daily, Disp: , Rfl:   •  omeprazole (PriLOSEC) 20 mg delayed release capsule, Take 20 mg by mouth daily 1 in am 1 in pm, Disp: , Rfl:   •  pravastatin (PRAVACHOL) 40 mg tablet, Take 40 mg by mouth daily One at bedtime, Disp: , Rfl:   •  temazepam (RESTORIL) 7 5 mg capsule, Take 15 mg by mouth daily at bedtime as needed for sleep  , Disp: , Rfl:   •  venlafaxine (EFFEXOR-XR) 150 mg 24 hr capsule, Take 150 mg by mouth daily, Disp: , Rfl:     No Known Allergies    Review of Systems   Constitutional: Negative for fatigue  Gastrointestinal: Negative for nausea  Musculoskeletal:        As noted in HPI   Neurological: Negative for dizziness, weakness, numbness and headaches  All other systems reviewed and are negative         Objective:  /71   Pulse 87   Ht 5' 6" (1 676 m)   Wt 89 4 kg (197 lb)   BMI 31 80 kg/m²     Ortho Exam  Right elbow -   Incision(s): Clean, dry, healing -edges well approximated with sutures -no signs of drainage or dehiscence  Skin is warm and dry to touch with no signs of erythema, ecchymosis, infection  Mild soft tissue swelling  - effusion  ROM: Within normal limits  Strength: 4+/5 close fist   2+ distal radial pulse with brisk capillary refill to the fingers  Radial, median, and ulnar motor and sensory distributions intact  Sensation light touch intact distally    Physical Exam  Vitals and nursing note reviewed  Constitutional:       General: She is not in acute distress  Appearance: She is well-developed  HENT:      Head: Normocephalic and atraumatic  Eyes:      Conjunctiva/sclera: Conjunctivae normal    Cardiovascular:      Rate and Rhythm: Normal rate  Pulmonary:      Effort: Pulmonary effort is normal    Musculoskeletal:      Cervical back: Neck supple  Skin:     General: Skin is warm and dry  Capillary Refill: Capillary refill takes less than 2 seconds  Neurological:      Mental Status: She is alert and oriented to person, place, and time     Psychiatric:         Mood and Affect: Mood normal          Behavior: Behavior normal           Diagnostic Test Review:  No new imaging reviewed this visit    Procedures   No procedures performed this visit    Scribe Attestation    I,:  Heath Edmonds am acting as a scribe while in the presence of the attending physician :       I,:  Tamanna Auguste DO personally performed the services described in this documentation    as scribed in my presence :

## 2023-02-28 ENCOUNTER — OFFICE VISIT (OUTPATIENT)
Dept: OCCUPATIONAL THERAPY | Facility: CLINIC | Age: 73
End: 2023-02-28

## 2023-02-28 ENCOUNTER — TELEPHONE (OUTPATIENT)
Dept: OBGYN CLINIC | Facility: CLINIC | Age: 73
End: 2023-02-28

## 2023-02-28 DIAGNOSIS — G56.21 CUBITAL TUNNEL SYNDROME ON RIGHT: Primary | ICD-10-CM

## 2023-03-02 ENCOUNTER — TELEPHONE (OUTPATIENT)
Age: 73
End: 2023-03-02

## 2023-03-06 NOTE — PROGRESS NOTES
Daily Note     Today's date: 3/6/2023  Patient name: Mike Cuello  : 1950  MRN: [id number]  Referring provider: Fela Hernandez  Dx:   Encounter Diagnosis     ICD-10-CM    1  Cubital tunnel syndrome on right  G56 21                      Subjective: It feels pretty good  Objective: See treatment diary below      Assessment: Tolerated treatment well  Patient demonstrated fatigue post treatment, exhibited good technique with therapeutic exercises and would benefit from continued OT  Tolerated additional light strengthening  Denied pain  Plan: Continue per plan of care  Progress treatment as tolerated         Precautions Cubital Tunnel Release  Initial Evaluation completed on: 02/10/2023  Re-Evaluation needs to be completed before: 03/10/2023  Insurance: Medicare  FOTO: 02/10/2023  Auth Visits: N/A          Manuals   2023 2023 3/7/2023   IASTM      Scar GT #3   Elbow Stretch   performed performed performed   Supinator Stretch   20 sec x 5 20 sec x 5 20 sec x 5   Scar Massage    performed performed           Neuro Re-Ed    2023 2023 3/7/2023                   Ther Ex   2023 2023 3/7/2023   Elbow Flex  X 3 Ways   X 20 X 20 1# x 20   Sup/pronation   X 20 w/ dowel X 20 w/ dowel  x 20 w/dowel   Elbow Isometrics        Digi-Flex   Red x 20 Red x 20 Red x 20  Green x 20   Hand Power Pro    Green  X 20 Green  X 20   Wrist Maze   X 5 X 5 X 5   Hammer Stretch     20 sec x 5   Shoulder  ER/IE Iso     X 15   X 15    Shoulder Pulley   X 20 X 20 X 20   UBC     3 5 F/3 5B min L 60   Ther Activity   2023 2023 3/7/2023   poms   RTP with all poms RTP with all poms RTP with all poms                                   Modalities   2023 2023 3/7/2023   Ultrasound        E-STIM w/ MH        ARLETTE

## 2023-03-07 ENCOUNTER — OFFICE VISIT (OUTPATIENT)
Dept: OCCUPATIONAL THERAPY | Facility: CLINIC | Age: 73
End: 2023-03-07

## 2023-03-07 DIAGNOSIS — G56.21 CUBITAL TUNNEL SYNDROME ON RIGHT: Primary | ICD-10-CM

## 2023-03-08 ENCOUNTER — OFFICE VISIT (OUTPATIENT)
Dept: PAIN MEDICINE | Facility: CLINIC | Age: 73
End: 2023-03-08

## 2023-03-08 VITALS
DIASTOLIC BLOOD PRESSURE: 76 MMHG | SYSTOLIC BLOOD PRESSURE: 110 MMHG | HEART RATE: 108 BPM | HEIGHT: 66 IN | BODY MASS INDEX: 32.98 KG/M2 | WEIGHT: 205.2 LBS

## 2023-03-08 DIAGNOSIS — M47.816 LUMBAR SPONDYLOSIS: ICD-10-CM

## 2023-03-08 DIAGNOSIS — M54.16 LUMBAR RADICULOPATHY: ICD-10-CM

## 2023-03-08 DIAGNOSIS — M51.36 LUMBAR DEGENERATIVE DISC DISEASE: ICD-10-CM

## 2023-03-08 DIAGNOSIS — G89.4 CHRONIC PAIN SYNDROME: Primary | ICD-10-CM

## 2023-03-08 RX ORDER — TRAZODONE HYDROCHLORIDE 100 MG/1
TABLET ORAL
COMMUNITY
Start: 2023-02-02

## 2023-03-08 RX ORDER — OMEPRAZOLE 40 MG/1
CAPSULE, DELAYED RELEASE ORAL
COMMUNITY
Start: 2023-01-11

## 2023-03-08 NOTE — H&P (VIEW-ONLY)
Assessment:  1  Chronic pain syndrome    2  Lumbar degenerative disc disease    3  Lumbar spondylosis    4  Lumbar radiculopathy        Plan:  While the patient was in the office today, I did have a thorough conversation regarding their chronic pain syndrome, medication management, and treatment plan options  Patient is being seen for a follow-up visit  She was initially seen here in June, 2022 at which time the plan was to proceed with an L4-5 interlaminar epidural steroid injection  Unfortunately she had a fall and ended up having a hip replacement  More recently she had cubital tunnel syndrome  Overall, she is doing well and has recovered from surgeries  At this point, she would like to proceed with the injection  She will be scheduled for an L4-5 interlaminar epidural steroid injection in the near future  Complete risks and benefits including bleeding, infection, tissue reaction, nerve injury and allergic reaction were discussed  The approach was demonstrated using models and literature was provided  Verbal and written consent was obtained  Continue gabapentin 600 mg at bedtime, Celebrex 100 mg twice daily and Tylenol as needed  Follow-up 1 month after the injection  History of Present Illness: The patient is a 67 y o  female who presents for a follow up office visit in regards to Back Pain  The patient’s current symptoms include complaints of low back and right leg pain  Pain radiates down the lateral aspect of her right leg to just below her knee  Current pain level is an 8/10  Quality pain is described as sharp  Current pain medications includes: Gabapentin 600 mg at bedtime, Celebrex 100 mg twice daily, Tylenol if needed  The patient reports that this regimen is providing 25-30% pain relief  The patient is reporting no side effects from this pain medication regimen      I have personally reviewed and/or updated the patient's past medical history, past surgical history, family history, social history, current medications, allergies, and vital signs today  Review of Systems  Review of Systems   Constitutional: Negative for unexpected weight change  HENT: Negative for hearing loss  Eyes: Negative for visual disturbance  Respiratory: Negative for shortness of breath  Cardiovascular: Negative for leg swelling  Gastrointestinal: Negative for constipation  Endocrine: Negative for polyuria  Genitourinary: Negative for difficulty urinating  Musculoskeletal: Positive for gait problem  Negative for joint swelling and myalgias  Joint stiffness  Pain in extremity- back and right leg   Skin: Negative for rash  Neurological: Negative for weakness and headaches  Psychiatric/Behavioral: Negative for decreased concentration  All other systems reviewed and are negative  Past Medical History:   Diagnosis Date   • Anemia    • Anxiety    • Arthritis    • Colon polyp    • Depression    • GERD (gastroesophageal reflux disease)    • Hiatal hernia    • Hyperlipidemia    • Hypertension        Past Surgical History:   Procedure Laterality Date   • APPENDECTOMY     • CHOLECYSTECTOMY     • COLONOSCOPY     • FRACTURE SURGERY Right     arm with plate and pins   • HAND SURGERY Bilateral    • HYSTERECTOMY     • JOINT REPLACEMENT Bilateral     knees   • [place] HEMIARTHROPLASTY HIP PARTIAL Right 07/02/2022    Procedure: HEMIARTHROPLASTY HIP (BIPOLAR), closed reduction with splinting right wrist;  Surgeon: Harry Richard; Location: [facility];  Service: Orthopedics   • [place] NEUROPLASTY &/TRANSPOSITION ULNAR NERVE ELBOW Right 2/8/2023    Procedure: RELEASE CUBITAL TUNNEL;  Surgeon: Francisco Vila DO;  Location: [facility];  Service: Orthopedics   • SHOULDER ARTHROSCOPY Left        No family history on file      Social History     Occupational History   • Not on file   Tobacco Use   • Smoking status: Former     Packs/day: 0 25     Types: Cigarettes     Quit date: 1982     Years since quittin 2   • Smokeless tobacco: Never   Vaping Use   • Vaping Use: Never used   Substance and Sexual Activity   • Alcohol use: Not Currently   • Drug use: Never   • Sexual activity: Not Currently         Current Outpatient Medications:   •  acetaminophen (TYLENOL) 325 mg tablet, Take 2 tablets (650 mg total) by mouth every 6 (six) hours as needed for mild pain, Disp: , Rfl: 0  •  amLODIPine (NORVASC) 10 mg tablet, Take 10 mg by mouth daily, Disp: , Rfl:   •  calcium carbonate (OS-ALPESH) 600 MG tablet, Take 600 mg by mouth daily Taking 2 tablets (1200 mg) daily, Disp: , Rfl:   •  celecoxib (CeleBREX) 100 mg capsule, Take 1 capsule (100 mg total) by mouth 2 (two) times a day As needed for joint pain, take with food, Disp: 60 capsule, Rfl: 6  •  cetirizine (ZyrTEC) 10 mg tablet, Take 10 mg by mouth daily, Disp: , Rfl:   •  cholecalciferol (VITAMIN D3) 1,000 units tablet, Take 1,000 Units by mouth daily 2,000 units daily, Disp: , Rfl:   •  gabapentin (NEURONTIN) 600 MG tablet, Take 1 tablet (600 mg total) by mouth daily at bedtime, Disp: 30 tablet, Rfl: 0  •  multivitamin (THERAGRAN) TABS, Take 1 tablet by mouth daily, Disp: , Rfl:   •  omeprazole (PriLOSEC) 20 mg delayed release capsule, Take 20 mg by mouth daily 1 in am 1 in pm, Disp: , Rfl:   •  omeprazole (PriLOSEC) 40 MG capsule, , Disp: , Rfl:   •  pravastatin (PRAVACHOL) 40 mg tablet, Take 40 mg by mouth daily One at bedtime, Disp: , Rfl:   •  temazepam (RESTORIL) 7 5 mg capsule, Take 15 mg by mouth daily at bedtime as needed for sleep  , Disp: , Rfl:   •  traZODone (DESYREL) 100 mg tablet, , Disp: , Rfl:   •  venlafaxine (EFFEXOR-XR) 150 mg 24 hr capsule, Take 150 mg by mouth daily, Disp: , Rfl:     No Known Allergies    Physical Exam:    /76   Pulse (!) 108   Ht 5' 6" (1 676 m)   Wt 93 1 kg (205 lb 3 2 oz)   BMI 33 12 kg/m²     Constitutional:normal, well developed, well nourished, alert, in no distress and non-toxic and no overt pain behavior    Eyes:anicteric  HEENT:grossly intact  Neck:supple, symmetric, trachea midline and no masses   Pulmonary:even and unlabored  Cardiovascular:No edema or pitting edema present  Skin:Normal without rashes or lesions and well hydrated  Psychiatric:Mood and affect appropriate  Neurologic:Cranial Nerves II-XII grossly intact  Musculoskeletal:normal    Imaging  [facility] procedure    (Results Pending)       Orders Placed This Encounter   Procedures   • [facility]

## 2023-03-08 NOTE — PROGRESS NOTES
Assessment:  1  Chronic pain syndrome    2  Lumbar degenerative disc disease    3  Lumbar spondylosis    4  Lumbar radiculopathy        Plan:  While the patient was in the office today, I did have a thorough conversation regarding their chronic pain syndrome, medication management, and treatment plan options  Patient is being seen for a follow-up visit  She was initially seen here in June, 2022 at which time the plan was to proceed with an L4-5 interlaminar epidural steroid injection  Unfortunately she had a fall and ended up having a hip replacement  More recently she had cubital tunnel syndrome  Overall, she is doing well and has recovered from surgeries  At this point, she would like to proceed with the injection  She will be scheduled for an L4-5 interlaminar epidural steroid injection in the near future  Complete risks and benefits including bleeding, infection, tissue reaction, nerve injury and allergic reaction were discussed  The approach was demonstrated using models and literature was provided  Verbal and written consent was obtained  Continue gabapentin 600 mg at bedtime, Celebrex 100 mg twice daily and Tylenol as needed  Follow-up 1 month after the injection  History of Present Illness: The patient is a 67 y o  female who presents for a follow up office visit in regards to Back Pain  The patient’s current symptoms include complaints of low back and right leg pain  Pain radiates down the lateral aspect of her right leg to just below her knee  Current pain level is an 8/10  Quality pain is described as sharp  Current pain medications includes: Gabapentin 600 mg at bedtime, Celebrex 100 mg twice daily, Tylenol if needed  The patient reports that this regimen is providing 25-30% pain relief  The patient is reporting no side effects from this pain medication regimen      I have personally reviewed and/or updated the patient's past medical history, past surgical history, family history, social history, current medications, allergies, and vital signs today  Review of Systems  Review of Systems   Constitutional: Negative for unexpected weight change  HENT: Negative for hearing loss  Eyes: Negative for visual disturbance  Respiratory: Negative for shortness of breath  Cardiovascular: Negative for leg swelling  Gastrointestinal: Negative for constipation  Endocrine: Negative for polyuria  Genitourinary: Negative for difficulty urinating  Musculoskeletal: Positive for gait problem  Negative for joint swelling and myalgias  Joint stiffness  Pain in extremity- back and right leg   Skin: Negative for rash  Neurological: Negative for weakness and headaches  Psychiatric/Behavioral: Negative for decreased concentration  All other systems reviewed and are negative  Past Medical History:   Diagnosis Date   • Anemia    • Anxiety    • Arthritis    • Colon polyp    • Depression    • GERD (gastroesophageal reflux disease)    • Hiatal hernia    • Hyperlipidemia    • Hypertension        Past Surgical History:   Procedure Laterality Date   • APPENDECTOMY     • CHOLECYSTECTOMY     • COLONOSCOPY     • FRACTURE SURGERY Right     arm with plate and pins   • HAND SURGERY Bilateral    • HYSTERECTOMY     • JOINT REPLACEMENT Bilateral     knees   • [place] HEMIARTHROPLASTY HIP PARTIAL Right 07/02/2022    Procedure: HEMIARTHROPLASTY HIP (BIPOLAR), closed reduction with splinting right wrist;  Surgeon: Tommy Schaefer; Location: [facility];  Service: Orthopedics   • [place] NEUROPLASTY &/TRANSPOSITION ULNAR NERVE ELBOW Right 2/8/2023    Procedure: RELEASE CUBITAL TUNNEL;  Surgeon: Ann Marie Haney DO;  Location: [facility];  Service: Orthopedics   • SHOULDER ARTHROSCOPY Left        No family history on file      Social History     Occupational History   • Not on file   Tobacco Use   • Smoking status: Former     Packs/day: 0 25     Types: Cigarettes     Quit date: 1982     Years since quittin 2   • Smokeless tobacco: Never   Vaping Use   • Vaping Use: Never used   Substance and Sexual Activity   • Alcohol use: Not Currently   • Drug use: Never   • Sexual activity: Not Currently         Current Outpatient Medications:   •  acetaminophen (TYLENOL) 325 mg tablet, Take 2 tablets (650 mg total) by mouth every 6 (six) hours as needed for mild pain, Disp: , Rfl: 0  •  amLODIPine (NORVASC) 10 mg tablet, Take 10 mg by mouth daily, Disp: , Rfl:   •  calcium carbonate (OS-ALPESH) 600 MG tablet, Take 600 mg by mouth daily Taking 2 tablets (1200 mg) daily, Disp: , Rfl:   •  celecoxib (CeleBREX) 100 mg capsule, Take 1 capsule (100 mg total) by mouth 2 (two) times a day As needed for joint pain, take with food, Disp: 60 capsule, Rfl: 6  •  cetirizine (ZyrTEC) 10 mg tablet, Take 10 mg by mouth daily, Disp: , Rfl:   •  cholecalciferol (VITAMIN D3) 1,000 units tablet, Take 1,000 Units by mouth daily 2,000 units daily, Disp: , Rfl:   •  gabapentin (NEURONTIN) 600 MG tablet, Take 1 tablet (600 mg total) by mouth daily at bedtime, Disp: 30 tablet, Rfl: 0  •  multivitamin (THERAGRAN) TABS, Take 1 tablet by mouth daily, Disp: , Rfl:   •  omeprazole (PriLOSEC) 20 mg delayed release capsule, Take 20 mg by mouth daily 1 in am 1 in pm, Disp: , Rfl:   •  omeprazole (PriLOSEC) 40 MG capsule, , Disp: , Rfl:   •  pravastatin (PRAVACHOL) 40 mg tablet, Take 40 mg by mouth daily One at bedtime, Disp: , Rfl:   •  temazepam (RESTORIL) 7 5 mg capsule, Take 15 mg by mouth daily at bedtime as needed for sleep  , Disp: , Rfl:   •  traZODone (DESYREL) 100 mg tablet, , Disp: , Rfl:   •  venlafaxine (EFFEXOR-XR) 150 mg 24 hr capsule, Take 150 mg by mouth daily, Disp: , Rfl:     No Known Allergies    Physical Exam:    /76   Pulse (!) 108   Ht 5' 6" (1 676 m)   Wt 93 1 kg (205 lb 3 2 oz)   BMI 33 12 kg/m²     Constitutional:normal, well developed, well nourished, alert, in no distress and non-toxic and no overt pain behavior    Eyes:anicteric  HEENT:grossly intact  Neck:supple, symmetric, trachea midline and no masses   Pulmonary:even and unlabored  Cardiovascular:No edema or pitting edema present  Skin:Normal without rashes or lesions and well hydrated  Psychiatric:Mood and affect appropriate  Neurologic:Cranial Nerves II-XII grossly intact  Musculoskeletal:normal    Imaging  [facility] procedure    (Results Pending)       Orders Placed This Encounter   Procedures   • [place] [facility] procedure

## 2023-03-10 ENCOUNTER — HOSPITAL ENCOUNTER (OUTPATIENT)
Dept: RADIOLOGY | Facility: HOSPITAL | Age: 73
Discharge: HOME | End: 2023-03-10
Admitting: ANESTHESIOLOGY

## 2023-03-10 VITALS
DIASTOLIC BLOOD PRESSURE: 88 MMHG | SYSTOLIC BLOOD PRESSURE: 141 MMHG | HEART RATE: 100 BPM | TEMPERATURE: 97.1 F | RESPIRATION RATE: 20 BRPM | OXYGEN SATURATION: 96 %

## 2023-03-10 DIAGNOSIS — M54.16 LUMBAR RADICULOPATHY: ICD-10-CM

## 2023-03-10 DIAGNOSIS — M47.816 LUMBAR SPONDYLOSIS: ICD-10-CM

## 2023-03-10 DIAGNOSIS — M51.36 LUMBAR DEGENERATIVE DISC DISEASE: ICD-10-CM

## 2023-03-10 RX ORDER — METHYLPREDNISOLONE ACETATE 80 MG/ML
80 INJECTION, SUSPENSION INTRA-ARTICULAR; INTRALESIONAL; INTRAMUSCULAR; PARENTERAL; SOFT TISSUE ONCE
Status: COMPLETED | OUTPATIENT
Start: 2023-03-10 | End: 2023-03-10

## 2023-03-10 RX ORDER — LIDOCAINE HYDROCHLORIDE 10 MG/ML
2 INJECTION, SOLUTION EPIDURAL; INFILTRATION; INTRACAUDAL; PERINEURAL ONCE
Status: COMPLETED | OUTPATIENT
Start: 2023-03-10 | End: 2023-03-10

## 2023-03-10 RX ADMIN — IOHEXOL 1 ML: 300 INJECTION, SOLUTION INTRAVENOUS at 08:13

## 2023-03-10 RX ADMIN — LIDOCAINE HYDROCHLORIDE 2 ML: 10 INJECTION, SOLUTION EPIDURAL; INFILTRATION; INTRACAUDAL; PERINEURAL at 08:11

## 2023-03-10 RX ADMIN — METHYLPREDNISOLONE ACETATE 80 MG: 80 INJECTION, SUSPENSION INTRA-ARTICULAR; INTRALESIONAL; INTRAMUSCULAR; PARENTERAL; SOFT TISSUE at 08:14

## 2023-03-10 NOTE — INTERVAL H&P NOTE
Update: (This section must be completed if the H&P was completed greater than 24 hrs to procedure or admission)    H&P reviewed  After examining the patient, I find no changed to the H&P since it had been written  Patient re-evaluated   Accept as history and physical     Hailee Pedraza MD/March 10, 2023/8:02 AM

## 2023-03-10 NOTE — DISCHARGE INSTRUCTIONS
Epidural Steroid Injection   WHAT YOU NEED TO KNOW:   An epidural steroid injection (DIANA) is a procedure to inject steroid medicine into the epidural space  The epidural space is between your spinal cord and vertebrae  Steroids reduce inflammation and fluid buildup in your spine that may be causing pain  You may be given pain medicine along with the steroids  ACTIVITY  Do not drive or operate machinery today  No strenuous activity today - bending, lifting, etc   You may resume normal activites starting tomorrow - start slowly and as tolerated  You may shower today, but no tub baths or hot tubs  You may have numbness for several hours from the local anesthetic  Please use caution and common sense, especially with weight-bearing activities  CARE OF THE INJECTION SITE  If you have soreness or pain, apply ice to the area today (20 minutes on/20 minutes off)  Starting tomorrow, you may use warm, moist heat or ice if needed  You may have an increase or change in your discomfort for 36-48 hours after your treatment  Apply ice and continue with any pain medication you have been prescribed  Notify [facility] if you have any of the following: redness, drainage, swelling, headache, stiff neck or fever above 100°F     SPECIAL INSTRUCTIONS  Our office will contact you in approximately 7 days for a progress report  MEDICATIONS  Continue to take all routine medications  Our office may have instructed you to hold some medications  As no general anesthesia was used in today's procedure, you should not experience any side effects related to anesthesia  If you are diabetic, the steroids used in today's injection may temporarily increase your blood sugar levels after the first few days after your injection  Please keep a close eye on your sugars and alert the doctor who manages your diabetes if your sugars are significantly high from your baseline or you are symptomatic       If you have a problem specifically related to your procedure, please call our office at [phone]  Problems not related to your procedure should be directed to your primary care physician

## 2023-03-13 NOTE — PROGRESS NOTES
Daily Note     Today's date: 3/14/2023  Patient name: Fernando Ziegler  : 1950  MRN: [id number]  Referring provider: Mojgan Pillow  Dx:   Encounter Diagnosis     ICD-10-CM    1  Cubital tunnel syndrome on right  G56 21                      Subjective: I'm surprised at how well my arm is doing  Objective: See treatment diary below      Assessment: Tolerated treatment well  Patient exhibited good technique with therapeutic exercises and would benefit from continued OT   Denied pain pre and post tx  Provided pt with green theraband for HEP  Reassessment completed by OTR refer to same details  Plan: D/C OT tx today       Precautions Cubital Tunnel Release  Initial Evaluation completed on: 02/10/2023  Re-Evaluation needs to be completed before: 03/10/2023  Insurance: Medicare  FOTO: 02/10/2023  Auth Visits: N/A          Manuals 3/14/2023  2023 2023 3/7/2023   IASTM  GT#3    Scar GT #3   Elbow Stretch performed  performed performed performed   Supinator Stretch 20 sec x 5  20 sec x 5 20 sec x 5 20 sec x 5   Scar Massage performed   performed performed           Neuro Re-Ed  3/14/2023  2023 2023 3/7/2023                   Ther Ex 3/14/2023  2023 2023 3/7/2023   Elbow Flex  X 3 Ways 1# x 20  X 20 X 20 1# x 20   Sup/pronation X 25 w/ hammer  X 20 w/ dowel X 20 w/ dowel  x 20 w/dowel   theraband  Elbow flex  Hammer curl  row Green  X 20  X 20  X 20       Digi-Flex Green x 20  Red x 20 Red x 20 Red x 20  Green x 20   Hand Power Pro Green x 20   Green  X 20 Green  X 20   Wrist Maze   X 5 X 5 X 5   Hammer Stretch 20 sec x 5    20 sec x 5   Shoulder  ER/IE Iso     X 15   X 15    Shoulder Pulley   X 20 X 20 X 20   UBC 5F/5B min  L 60    3 5 F/3 5B min L 60   Ther Activity 3/14/2023  2023 2023 3/7/2023   poms GTP with all poms  RTP with all poms RTP with all poms RTP with all poms                                   Modalities 3/14/2023  2023 2023 3/7/2023 Ultrasound        E-STIM w/ DUARTE CHONG        MH

## 2023-03-14 ENCOUNTER — EVALUATION (OUTPATIENT)
Dept: OCCUPATIONAL THERAPY | Facility: CLINIC | Age: 73
End: 2023-03-14

## 2023-03-14 DIAGNOSIS — G56.21 CUBITAL TUNNEL SYNDROME ON RIGHT: Primary | ICD-10-CM

## 2023-03-14 NOTE — PROGRESS NOTES
OT Re-Evaluation  and OT Discharge     Today's date: 3/14/2023  Patient name: Imani Garcia  : 1950  MRN: [id number]  Referring provider: Lynn Mccann  Dx:   Encounter Diagnosis     ICD-10-CM    1  Cubital tunnel syndrome on right  G56 21           Start Time: 0700  Stop Time: 0755  Total time in clinic (min): 55 minutes    Assessment  Assessment details: Patient presenting to OP OT services with a dx of Cubital Tunnel Syndrome  Patient reports symptoms began 2022  Patient had fall before that in July when she was dog sitting in which she fractured her right hip and hand  Patient had EMG completed in December  Patient is right hand dominant  Patient had surgery completed on 2023 and has a follow-up with Ortho on 2023  As per MD referral:   Status post right cubital tunnel release  Range of motion, strengthening, stretching, edema control  DC splint    Re-assessment completed on 2023  Patient has consistently attended OP OT services since start of care  Patient has made steady progress towards established goals  Patient demonstrating with increases in elbow range of motion,  strength, elbow strength, functional use and decreases in pain  Patient has met all established goals and will benefit from continued OT services to maximize level of function  D/C OT services  Patient returns to Ortho on 2023  Impairments: abnormal or restricted ROM, activity intolerance and impaired physical strength    Symptom irritability: moderateUnderstanding of Dx/Px/POC: good   Prognosis: good    Goals  STGs    Pt will increase  strength by 5-10#  - Met    Pt will increase forearm and elbow strength by 1/2 grade  - Met    Pt will increase forearm and elbow ROM by 50%  - Met     Pt will demonstrate decrease in edema by 25%  - Met    Pt will report a decrease in sensation deficits by 25%   - Met    Independent with HEP  - Met      LTGs     Pt will increase  strength by an additional 5-10#  - Met    Pt will increase forearm and elbow strength by 1-2 grade  - Met    Pt will increase forearm and elbow ROM to [facility]  - Met    Pt will demonstrate decrease in edema by 50%  - Met    Pt will increase pinch strength by 3-5#  - Met    Pt will report an increase in ADL/IADL participation  - Met    Pt will report a decrease in sensation deficits by 50%  - Met    Pt will report no more than a 0/10 pain with activity - Met        Plan  Patient would benefit from: OT eval, skilled occupational therapy and custom splinting  Referral necessary: Yes  Treatment plan discussed with: patient        Subjective Evaluation    History of Present Illness  Date of surgery: 2023  Mechanism of injury: surgery  Mechanism of injury: S/p R Cubital Tunnel Release          Not a recurrent problem   Quality of life: good    Pain  Current pain ratin  At best pain ratin  At worst pain ratin  Location: Right Elbow  Quality: dull ache    Social Support    Employment status: not working  Hand dominance: right      Diagnostic Tests  X-ray: abnormal  EMG: abnormal  Treatments  Current treatment: occupational therapy  Patient Goals  Patient goals for therapy: decreased edema, decreased pain, increased motion, increased strength and independence with ADLs/IADLs          Objective     Observations     Right Wrist/Hand   Positive for incision  Additional Observation Details  Patient presenting with a 6 cm incision along right elbow with sutures present and no drainage       Neurological Testing     Sensation     Elbow   Left Elbow   Intact: light touch    Right Elbow   Intact: light touch    Additional Neurological Details  Patient reports continued sensation deficits in right SF    Resolution of sensation deficits compared to [facility]    Active Range of Motion     Left Elbow   Normal active range of motion    Right Elbow   Flexion: WFL  Extension: WFL  Forearm supination: WFL  Forearm pronation: [facility]    Left Wrist Normal active range of motion    Right Wrist   Normal active range of motion    Left Thumb   Opposition: WNL    Right Thumb   Opposition: WNL    Additional Active Range of Motion Details  Patient demonstrating with increased ROM with resolution of pain  Composite fist bilaterally    Strength/Myotome Testing     Left Elbow   Normal strength    Right Elbow   Flexion: 4  Extension: 4  Forearm supination: 4  Forearm pronation: 4    Left Wrist/Hand   Normal wrist strength     (2nd hand position)     Trial 1: 50    Thumb Strength  Key/Lateral Pinch     Trial 1: 13    Right Wrist/Hand   Wrist extension: 4  Wrist flexion: 4  Radial deviation: 4  Ulnar deviation: 4     (2nd hand position)     Trial 1: 40    Thumb Strength   Key/Lateral Pinch     Trial 1: 3    Additional Strength Details  Increased right elbow strength compared to [facility]  Patient demonstrating with increased  and pinch strength compared to [facility]    Swelling   Left Elbow Girth Measurements   Girth at joint line (cm): 28 2  Right Elbow Girth Measurements   Girth at joint line (cm): 29 5

## 2023-03-17 ENCOUNTER — TELEPHONE (OUTPATIENT)
Dept: PAIN MEDICINE | Facility: CLINIC | Age: 73
End: 2023-03-17

## 2023-03-21 ENCOUNTER — APPOINTMENT (OUTPATIENT)
Dept: OCCUPATIONAL THERAPY | Facility: CLINIC | Age: 73
End: 2023-03-21

## 2023-03-27 ENCOUNTER — OFFICE VISIT (OUTPATIENT)
Dept: OBGYN CLINIC | Facility: CLINIC | Age: 73
End: 2023-03-27

## 2023-03-27 VITALS
BODY MASS INDEX: 33.12 KG/M2 | HEART RATE: 90 BPM | SYSTOLIC BLOOD PRESSURE: 127 MMHG | DIASTOLIC BLOOD PRESSURE: 83 MMHG | HEIGHT: 66 IN

## 2023-03-27 DIAGNOSIS — G56.21 CUBITAL TUNNEL SYNDROME ON RIGHT: Primary | ICD-10-CM

## 2023-03-27 NOTE — PROGRESS NOTES
Assessment:  1  Cubital tunnel syndrome on right            Plan:  7 weeks s/p right cubital tunnel release, 2/8/2023  She is doing well with well exam including well healed incision and intact ulnar sensation  She is encouraged to remain active  She is scheduled for her knees yet can follow up as needed for her right elbow  The patient is doing quite well from her right cubital tunnel release  Sensation nearly intact  No atrophy  Good strength  Continue home exercise program   Follow-up on an as-needed basis  If her condition changes, she would not hesitate to let us know    To do next visit:  Return if symptoms worsen or fail to improve  The above stated was discussed in layman's terms and the patient expressed understanding  All questions were answered to the patient's satisfaction  Scribe Attestation    I,:  Beti Wilkes am acting as a scribe while in the presence of the attending physician :       I,:  Zoila Peña, DO personally performed the services described in this documentation    as scribed in my presence :             Subjective:   Cara Villela is a 67 y o  female who presents 7 weeks s/p right cubital tunnel release, 2/8/2023  She is doing well  Today she complains of resolving right ulnar sided forearm and hand numbness  She has returned to baseline activity without repercussion  She has history of right olecranon ORIF          Review of systems negative unless otherwise specified in HPI    Past Medical History:   Diagnosis Date   • Anemia    • Anxiety    • Arthritis    • Colon polyp    • Depression    • GERD (gastroesophageal reflux disease)    • Hiatal hernia    • Hyperlipidemia    • Hypertension        Past Surgical History:   Procedure Laterality Date   • APPENDECTOMY     • CHOLECYSTECTOMY     • COLONOSCOPY     • FRACTURE SURGERY Right     arm with plate and pins   • HAND SURGERY Bilateral    • HYSTERECTOMY     • JOINT REPLACEMENT Bilateral     knees   • IA HEMIARTHROPLASTY HIP PARTIAL Right 2022    Procedure: HEMIARTHROPLASTY HIP (BIPOLAR), closed reduction with splinting right wrist;  Surgeon: Hailey De Anda; Location: [facility];  Service: Orthopedics   • AL NEUROPLASTY &/TRANSPOSITION ULNAR NERVE ELBOW Right 2023    Procedure: RELEASE CUBITAL TUNNEL;  Surgeon: Josef Lewis DO;  Location: [facility];  Service: Orthopedics   • SHOULDER ARTHROSCOPY Left        History reviewed  No pertinent family history      Social History     Occupational History   • Not on file   Tobacco Use   • Smoking status: Former     Packs/day: 0 25     Types: Cigarettes     Quit date:      Years since quittin 2   • Smokeless tobacco: Never   Vaping Use   • Vaping Use: Never used   Substance and Sexual Activity   • Alcohol use: Not Currently   • Drug use: Never   • Sexual activity: Not Currently         Current Outpatient Medications:   •  acetaminophen (TYLENOL) 325 mg tablet, Take 2 tablets (650 mg total) by mouth every 6 (six) hours as needed for mild pain, Disp: , Rfl: 0  •  amLODIPine (NORVASC) 10 mg tablet, Take 10 mg by mouth daily, Disp: , Rfl:   •  calcium carbonate (OS-ALPESH) 600 MG tablet, Take 600 mg by mouth daily Taking 2 tablets (1200 mg) daily, Disp: , Rfl:   •  celecoxib (CeleBREX) 100 mg capsule, Take 1 capsule (100 mg total) by mouth 2 (two) times a day As needed for joint pain, take with food, Disp: 60 capsule, Rfl: 6  •  cetirizine (ZyrTEC) 10 mg tablet, Take 10 mg by mouth daily, Disp: , Rfl:   •  cholecalciferol (VITAMIN D3) 1,000 units tablet, Take 1,000 Units by mouth daily 2,000 units daily, Disp: , Rfl:   •  gabapentin (NEURONTIN) 600 MG tablet, Take 1 tablet (600 mg total) by mouth daily at bedtime, Disp: 30 tablet, Rfl: 0  •  multivitamin (THERAGRAN) TABS, Take 1 tablet by mouth daily, Disp: , Rfl:   •  omeprazole (PriLOSEC) 20 mg delayed release capsule, Take 20 mg by mouth daily 1 in am 1 in pm, Disp: , Rfl:   •  pravastatin (PRAVACHOL) 40 "mg tablet, Take 40 mg by mouth daily One at bedtime, Disp: , Rfl:   •  temazepam (RESTORIL) 7 5 mg capsule, Take 15 mg by mouth daily at bedtime as needed for sleep  , Disp: , Rfl:   •  traZODone (DESYREL) 100 mg tablet, , Disp: , Rfl:   •  venlafaxine (EFFEXOR-XR) 150 mg 24 hr capsule, Take 150 mg by mouth daily, Disp: , Rfl:   •  omeprazole (PriLOSEC) 40 MG capsule, , Disp: , Rfl:     No Known Allergies         Vitals:    03/27/23 0752   BP: 127/83   Pulse: 90       Objective:  Physical exam  · General: Awake, Alert, Oriented  · Eyes: Pupils equal, round and reactive to light  · Heart: regular rate and rhythm  · Lungs: No audible wheezing  · Abdomen: soft                    Ortho Exam  Right elbow:  Well healed medial and posterior incisional scar  No erythema or ecchymosis  Full ROM  Good rotation of wrist  Sensation intact     Diagnostics, reviewed and taken today if performed as documented:    None performed     Procedures, if performed today:    Procedures    None performed      Portions of the record may have been created with voice recognition software  Occasional wrong word or \"sound a like\" substitutions may have occurred due to the inherent limitations of voice recognition software  Read the chart carefully and recognize, using context, where substitutions have occurred      "

## 2023-04-12 NOTE — H&P (VIEW-ONLY)
Assessment:  1  Chronic pain syndrome    2  Lumbar radiculopathy    3  Lumbar degenerative disc disease    4  Lumbar spondylosis    5  Spinal stenosis of lumbar region without neurogenic claudication        Plan:  While the patient was in the office today, I did have a thorough conversation regarding their chronic pain syndrome, medication management, and treatment plan options  Patient is being seen for a follow-up visit  She recently underwent an L4-5 interlaminar epidural steroid injection on 3/10/2023  She tells me that the pain down the posterior aspect of her right leg has almost completely resolved  She continues to experience pain in the low back that radiates down the posterior aspect of the left leg  At this point, we will plan to repeat the L4-5 interlaminar epidural steroid injection in the near future  Complete risks and benefits including bleeding, infection, tissue reaction, nerve injury and allergic reaction were discussed  The approach was demonstrated using models and literature was provided  Verbal and written consent was obtained  Continue Celebrex 100 mg twice daily as prescribed by rheumatology and gabapentin 600 mg at bedtime as prescribed by her PCP  Follow-up 1 month after the injection  History of Present Illness: The patient is a 67 y o  female who presents for a follow up office visit in regards to Back Pain and Leg Pain  The patient’s current symptoms include complaints of low back pain that radiates down the posterior left thigh  Current pain level is a 5/10  Quality pain is described as sharp  Current pain medications includes: Celebrex 100 mg twice daily, gabapentin 600 mg at bedtime  The patient reports that this regimen is providing 50% pain relief  The patient is reporting no side effects from this pain medication regimen      I have personally reviewed and/or updated the patient's past medical history, past surgical history, family history, social history, current medications, allergies, and vital signs today  Review of Systems  Review of Systems   Constitutional: Negative for unexpected weight change  HENT: Negative for hearing loss  Eyes: Negative for visual disturbance  Respiratory: Negative for shortness of breath  Cardiovascular: Negative for leg swelling  Gastrointestinal: Negative for constipation  Endocrine: Negative for polyuria  Genitourinary: Negative for difficulty urinating  Musculoskeletal: Negative for gait problem, joint swelling and myalgias  Pain in extremity- left side of back   Skin: Negative for rash  Neurological: Negative for weakness and headaches  Psychiatric/Behavioral: Negative for decreased concentration  All other systems reviewed and are negative  Past Medical History:   Diagnosis Date   • Anemia    • Anxiety    • Arthritis    • Colon polyp    • Depression    • GERD (gastroesophageal reflux disease)    • Hiatal hernia    • Hyperlipidemia    • Hypertension        Past Surgical History:   Procedure Laterality Date   • APPENDECTOMY     • CHOLECYSTECTOMY     • COLONOSCOPY     • FRACTURE SURGERY Right     arm with plate and pins   • HAND SURGERY Bilateral    • HYSTERECTOMY     • JOINT REPLACEMENT Bilateral     knees   • DC HEMIARTHROPLASTY HIP PARTIAL Right 2022    Procedure: HEMIARTHROPLASTY HIP (BIPOLAR), closed reduction with splinting right wrist;  Surgeon: Tita Peterson; Location: [facility];  Service: Orthopedics   • DC NEUROPLASTY &/TRANSPOSITION ULNAR NERVE ELBOW Right 2023    Procedure: RELEASE CUBITAL TUNNEL;  Surgeon: Mariah Ortiz DO;  Location: [facility];  Service: Orthopedics   • SHOULDER ARTHROSCOPY Left        No family history on file      Social History     Occupational History   • Not on file   Tobacco Use   • Smoking status: Former     Packs/day: 0 25     Types: Cigarettes     Quit date:      Years since quittin 3   • Smokeless tobacco: Never "  Vaping Use   • Vaping Use: Never used   Substance and Sexual Activity   • Alcohol use: Not Currently   • Drug use: Never   • Sexual activity: Not Currently         Current Outpatient Medications:   •  acetaminophen (TYLENOL) 325 mg tablet, Take 2 tablets (650 mg total) by mouth every 6 (six) hours as needed for mild pain, Disp: , Rfl: 0  •  amLODIPine (NORVASC) 10 mg tablet, Take 10 mg by mouth daily, Disp: , Rfl:   •  calcium carbonate (OS-ALPESH) 600 MG tablet, Take 600 mg by mouth daily Taking 2 tablets (1200 mg) daily, Disp: , Rfl:   •  celecoxib (CeleBREX) 100 mg capsule, Take 1 capsule (100 mg total) by mouth 2 (two) times a day As needed for joint pain, take with food, Disp: 60 capsule, Rfl: 6  •  cetirizine (ZyrTEC) 10 mg tablet, Take 10 mg by mouth daily, Disp: , Rfl:   •  cholecalciferol (VITAMIN D3) 1,000 units tablet, Take 1,000 Units by mouth daily 2,000 units daily, Disp: , Rfl:   •  gabapentin (NEURONTIN) 600 MG tablet, Take 1 tablet (600 mg total) by mouth daily at bedtime, Disp: 30 tablet, Rfl: 0  •  multivitamin (THERAGRAN) TABS, Take 1 tablet by mouth daily, Disp: , Rfl:   •  omeprazole (PriLOSEC) 20 mg delayed release capsule, Take 20 mg by mouth daily 1 in am 1 in pm, Disp: , Rfl:   •  pravastatin (PRAVACHOL) 40 mg tablet, Take 40 mg by mouth daily One at bedtime, Disp: , Rfl:   •  temazepam (RESTORIL) 7 5 mg capsule, Take 15 mg by mouth daily at bedtime as needed for sleep  , Disp: , Rfl:   •  traZODone (DESYREL) 100 mg tablet, , Disp: , Rfl:   •  traZODone (DESYREL) 100 mg tablet, Take 100 mg by mouth, Disp: , Rfl:   •  venlafaxine (EFFEXOR-XR) 150 mg 24 hr capsule, Take 150 mg by mouth daily, Disp: , Rfl:     No Known Allergies    Physical Exam:    /83   Pulse 81   Ht 5' 6\" (1 676 m)   Wt 92 4 kg (203 lb 9 6 oz)   BMI 32 86 kg/m²     Constitutional:normal, well developed, well nourished, alert, in no distress and non-toxic and no overt pain behavior    Eyes:anicteric  HEENT:grossly " intact  Neck:supple, symmetric, trachea midline and no masses   Pulmonary:even and unlabored  Cardiovascular:No edema or pitting edema present  Skin:Normal without rashes or lesions and well hydrated  Psychiatric:Mood and affect appropriate  Neurologic:Cranial Nerves II-XII grossly intact  Musculoskeletal:normal    Imaging  FL spine and pain procedure    (Results Pending)       Orders Placed This Encounter   Procedures   • FL spine and pain procedure

## 2023-04-21 ENCOUNTER — HOSPITAL ENCOUNTER (OUTPATIENT)
Dept: RADIOLOGY | Facility: HOSPITAL | Age: 73
Discharge: HOME | End: 2023-04-21
Admitting: ANESTHESIOLOGY

## 2023-04-21 VITALS
TEMPERATURE: 97.2 F | OXYGEN SATURATION: 95 % | DIASTOLIC BLOOD PRESSURE: 83 MMHG | SYSTOLIC BLOOD PRESSURE: 141 MMHG | RESPIRATION RATE: 20 BRPM | HEART RATE: 98 BPM

## 2023-04-21 DIAGNOSIS — M47.816 LUMBAR SPONDYLOSIS: ICD-10-CM

## 2023-04-21 DIAGNOSIS — M54.16 LUMBAR RADICULOPATHY: ICD-10-CM

## 2023-04-21 DIAGNOSIS — M48.061 SPINAL STENOSIS OF LUMBAR REGION WITHOUT NEUROGENIC CLAUDICATION: ICD-10-CM

## 2023-04-21 DIAGNOSIS — M51.36 LUMBAR DEGENERATIVE DISC DISEASE: ICD-10-CM

## 2023-04-21 DIAGNOSIS — G89.4 CHRONIC PAIN SYNDROME: ICD-10-CM

## 2023-04-21 RX ORDER — METHYLPREDNISOLONE ACETATE 80 MG/ML
80 INJECTION, SUSPENSION INTRA-ARTICULAR; INTRALESIONAL; INTRAMUSCULAR; PARENTERAL; SOFT TISSUE ONCE
Status: COMPLETED | OUTPATIENT
Start: 2023-04-21 | End: 2023-04-21

## 2023-04-21 RX ORDER — LIDOCAINE HYDROCHLORIDE 10 MG/ML
2 INJECTION, SOLUTION EPIDURAL; INFILTRATION; INTRACAUDAL; PERINEURAL ONCE
Status: COMPLETED | OUTPATIENT
Start: 2023-04-21 | End: 2023-04-21

## 2023-04-21 RX ADMIN — LIDOCAINE HYDROCHLORIDE 2 ML: 10 INJECTION, SOLUTION EPIDURAL; INFILTRATION; INTRACAUDAL; PERINEURAL at 14:42

## 2023-04-21 RX ADMIN — IOHEXOL 1 ML: 300 INJECTION, SOLUTION INTRAVENOUS at 14:45

## 2023-04-21 RX ADMIN — METHYLPREDNISOLONE ACETATE 80 MG: 80 INJECTION, SUSPENSION INTRA-ARTICULAR; INTRALESIONAL; INTRAMUSCULAR; PARENTERAL; SOFT TISSUE at 14:45

## 2023-04-21 NOTE — DISCHARGE INSTRUCTIONS
Epidural Steroid Injection   WHAT YOU NEED TO KNOW:   An epidural steroid injection (DIANA) is a procedure to inject steroid medicine into the epidural space  The epidural space is between your spinal cord and vertebrae  Steroids reduce inflammation and fluid buildup in your spine that may be causing pain  You may be given pain medicine along with the steroids  ACTIVITY  Do not drive or operate machinery today  No strenuous activity today - bending, lifting, etc   You may resume normal activites starting tomorrow - start slowly and as tolerated  You may shower today, but no tub baths or hot tubs  You may have numbness for several hours from the local anesthetic  Please use caution and common sense, especially with weight-bearing activities  CARE OF THE INJECTION SITE  If you have soreness or pain, apply ice to the area today (20 minutes on/20 minutes off)  Starting tomorrow, you may use warm, moist heat or ice if needed  You may have an increase or change in your discomfort for 36-48 hours after your treatment  Apply ice and continue with any pain medication you have been prescribed  Notify the [facility] if you have any of the following: redness, drainage, swelling, headache, stiff neck or fever above 100°F     SPECIAL INSTRUCTIONS  Our office will contact you in approximately 7 days for a progress report  MEDICATIONS  Continue to take all routine medications  Our office may have instructed you to hold some medications  As no general anesthesia was used in today's procedure, you should not experience any side effects related to anesthesia  If you are diabetic, the steroids used in today's injection may temporarily increase your blood sugar levels after the first few days after your injection  Please keep a close eye on your sugars and alert the doctor who manages your diabetes if your sugars are significantly high from your baseline or you are symptomatic       If you have a problem specifically related to your procedure, please call our office at [phone]  Problems not related to your procedure should be directed to your primary care physician

## 2023-04-21 NOTE — INTERVAL H&P NOTE
Update: (This section must be completed if the H&P was completed greater than 24 hrs to procedure or admission)    H&P reviewed  After examining the patient, I find no changed to the H&P since it had been written  Patient re-evaluated   Accept as history and physical     Hailee Pedraza MD/April 21, 2023/2:36 PM

## 2023-04-28 ENCOUNTER — TELEPHONE (OUTPATIENT)
Dept: PAIN MEDICINE | Facility: CLINIC | Age: 73
End: 2023-04-28

## 2023-05-04 ENCOUNTER — TELEPHONE (OUTPATIENT)
Age: 73
End: 2023-05-04

## 2023-05-04 NOTE — TELEPHONE ENCOUNTER
Caller: Manuel Cancer    Doctor/Office: Dr Zelalem Romero    Call regarding :  Alison Pereira     Call was transferred to: Alison Pereira

## 2023-05-31 DIAGNOSIS — M19.90 ARTHRITIS: ICD-10-CM

## 2023-05-31 RX ORDER — CELECOXIB 100 MG/1
CAPSULE ORAL
Qty: 60 CAPSULE | Refills: 6 | Status: SHIPPED | OUTPATIENT
Start: 2023-05-31

## 2023-06-20 ENCOUNTER — OFFICE VISIT (OUTPATIENT)
Dept: RHEUMATOLOGY | Facility: CLINIC | Age: 73
End: 2023-06-20
Payer: MEDICARE

## 2023-06-20 VITALS
BODY MASS INDEX: 33.43 KG/M2 | SYSTOLIC BLOOD PRESSURE: 134 MMHG | DIASTOLIC BLOOD PRESSURE: 90 MMHG | HEIGHT: 66 IN | WEIGHT: 208 LBS

## 2023-06-20 DIAGNOSIS — M81.0 OSTEOPOROSIS, UNSPECIFIED OSTEOPOROSIS TYPE, UNSPECIFIED PATHOLOGICAL FRACTURE PRESENCE: ICD-10-CM

## 2023-06-20 DIAGNOSIS — M19.90 ARTHRITIS: ICD-10-CM

## 2023-06-20 DIAGNOSIS — R01.1 SYSTOLIC MURMUR: ICD-10-CM

## 2023-06-20 DIAGNOSIS — M19.041 PRIMARY OSTEOARTHRITIS OF RIGHT HAND: Primary | ICD-10-CM

## 2023-06-20 PROCEDURE — 99214 OFFICE O/P EST MOD 30 MIN: CPT | Performed by: INTERNAL MEDICINE

## 2023-06-20 RX ORDER — ALENDRONATE SODIUM 70 MG/1
70 TABLET ORAL
Qty: 12 TABLET | Refills: 4 | Status: SHIPPED | OUTPATIENT
Start: 2023-06-20

## 2023-06-20 RX ORDER — CELECOXIB 200 MG/1
200 CAPSULE ORAL 2 TIMES DAILY
Qty: 180 CAPSULE | Refills: 3 | Status: SHIPPED | OUTPATIENT
Start: 2023-06-20

## 2023-06-20 NOTE — PATIENT INSTRUCTIONS
Continue daily calcium and Vit   D  Increase celecoxib to 200mg twice a day as needed for joint pain  Start alendronate once a week with a full glass of water on an empty stomach, and do not lie down for 30 minutes after  Do weight-bearing exercises  Schedule echocardiogram    Return to clinic in 1 year

## 2023-06-20 NOTE — PROGRESS NOTES
Assessment and Plan:   Mini Yeh is a 75 y o   female who presents as a follow up for osteoarthritis and osteoporosis  Noted to have systolic murmur on physical exam     Continue daily calcium and Vit  D  Increase celecoxib to 200mg twice a day as needed for joint pain  Start alendronate once a week with a full glass of water on an empty stomach, and do not lie down for 30 minutes after  Do weight-bearing exercises  Schedule echocardiogram    Return to clinic in 1 year    Plan:  Diagnoses and all orders for this visit:    Primary osteoarthritis of right hand    Arthritis  -     celecoxib (CeleBREX) 200 mg capsule; Take 1 capsule (200 mg total) by mouth 2 (two) times a day As needed for joint pain    Osteoporosis, unspecified osteoporosis type, unspecified pathological fracture presence  -     alendronate (FOSAMAX) 70 mg tablet; Take 1 tablet (70 mg total) by mouth every 7 days Take on an empty stomach with a full glass of water, and do not lie down for 30 minutes after    Systolic murmur  -     Echo complete w/ contrast if indicated; Future    Follow-up plan: Return to clinic in 1 year        Rheumatic Disease Summary:  Mini Yeh is a 75 y o  female who originally presented as a Rheumatology consult referred by Lillian Russell for evaluation of possible inflammatory arthritis given hand arthritis symptoms but RA ruled out  11/14/22: Take 1,200mg daily of calcium  Take 2,000 units daily of Vit  D  Can take celecoxib twice a day as needed for joint pain, take with food  Try diclofenac gel as needed for joint pain  Schedule DEXA scan; will decide how aggressive to pursue osteoporosis treatment based on results  Do hand x-rays  Do labs    HPI  Mini Yeh is a 75 y o   female who presents as a follow up for possible inflammatory arthritis and low bone mineral density  Last visit 11/14/22  Today, patient reports feeling quite well  She states she no longer has much pain in the right hand  Since last visit, she was found to have cubital tunnel and carpal tunnel syndrome, for which she received cubital tunnel release  She states she has some lingering numbness and tingling in the ulnar distribution of her right fingers but that this is improving with OT and does not cause much pain  She reports low back and neck pain for which she is seeing Pain Management  She has received CSI to the low back with some good effect on her pain and is due for follow up to discuss her neck pain next month  She also reports intermittent left shoulder pain without swelling or overlying erythema  She reports knee pain bilaterally s/p remote TKAs for which she sees Orthopedics  She states her shoulder and knee pain is minimal most days but is worse in cold weather or after prolonged activity  She is taking her Celebrex twice daily most days and finds it largely beneficial, but she inquires about availability of increasing her dose  She takes OTC calcium and Vitamin D as recommended  She denies any new falls or injuries since her last visit, but reports some trepidation when walking, particularly at night  She questions whether or not she should ambulate with a cane when she feels this way  She denies feeling weak or unsteady when she walks  She denies being told previously that she has a cardiac murmur  Otherwise, patient feels well today and in her normal state of health  Review of Systems  Review of Systems   Constitutional: Negative for chills, fatigue and fever  HENT: Negative for congestion, postnasal drip, rhinorrhea, sinus pressure, sinus pain and sore throat  Eyes: Negative for visual disturbance  Respiratory: Negative for cough, shortness of breath and wheezing  Cardiovascular: Negative for chest pain and palpitations  Gastrointestinal: Negative for abdominal pain, constipation, diarrhea, nausea and vomiting  Genitourinary: Negative for difficulty urinating, dysuria, frequency and urgency  Musculoskeletal: Positive for arthralgias (Left shoulder), back pain (Low back and neck) and neck pain  Negative for gait problem, myalgias and neck stiffness  Skin: Negative for pallor, rash and wound  Neurological: Positive for numbness (Right hand)  Negative for dizziness, seizures, syncope, weakness, light-headedness and headaches  Answers for HPI/ROS submitted by the patient on 6/13/2023  Chronicity: chronic  Onset: more than 1 year ago  Frequency: daily  Progression since onset: waxing and waning  Pain location: lumbar spine  Pain quality: stabbing  Radiates to: does not radiate  Pain - numeric: 8/10  Pain is: the same all the time  Aggravated by: bending, position, sitting  Stiffness is present: all day  bladder incontinence: No  bowel incontinence: No  leg pain: No  paresis: No  paresthesias: No  perianal numbness: No  tingling: No  weight loss: No  Risk factors: obesity    Reviewed and agree      Allergies  No Known Allergies    Home Medications    Current Outpatient Medications:   •  acetaminophen (TYLENOL) 325 mg tablet, Take 2 tablets (650 mg total) by mouth every 6 (six) hours as needed for mild pain, Disp: , Rfl: 0  •  alendronate (FOSAMAX) 70 mg tablet, Take 1 tablet (70 mg total) by mouth every 7 days Take on an empty stomach with a full glass of water, and do not lie down for 30 minutes after, Disp: 12 tablet, Rfl: 4  •  calcium carbonate (OS-ALPESH) 600 MG tablet, Take 600 mg by mouth daily Taking 2 tablets (1200 mg) daily, Disp: , Rfl:   •  celecoxib (CeleBREX) 200 mg capsule, Take 1 capsule (200 mg total) by mouth 2 (two) times a day As needed for joint pain, Disp: 180 capsule, Rfl: 3  •  cetirizine (ZyrTEC) 10 mg tablet, Take 10 mg by mouth daily, Disp: , Rfl:   •  cholecalciferol (VITAMIN D3) 1,000 units tablet, Take 1,000 Units by mouth daily 2,000 units daily, Disp: , Rfl:   •  gabapentin (NEURONTIN) 600 MG tablet, Take 1 tablet (600 mg total) by mouth daily at bedtime, Disp: 30 tablet, Rfl: 0  •  multivitamin (THERAGRAN) TABS, Take 1 tablet by mouth daily, Disp: , Rfl:   •  omeprazole (PriLOSEC) 20 mg delayed release capsule, Take 20 mg by mouth daily 1 in am 1 in pm, Disp: , Rfl:   •  pravastatin (PRAVACHOL) 40 mg tablet, Take 40 mg by mouth daily One at bedtime, Disp: , Rfl:   •  temazepam (RESTORIL) 7 5 mg capsule, Take 15 mg by mouth daily at bedtime as needed for sleep  , Disp: , Rfl:   •  traZODone (DESYREL) 100 mg tablet, , Disp: , Rfl:   •  traZODone (DESYREL) 100 mg tablet, Take 100 mg by mouth, Disp: , Rfl:   •  venlafaxine (EFFEXOR-XR) 150 mg 24 hr capsule, Take 150 mg by mouth daily, Disp: , Rfl:   •  amLODIPine (NORVASC) 10 mg tablet, Take 10 mg by mouth daily, Disp: , Rfl:     Past Medical History  Past Medical History:   Diagnosis Date   • Anemia    • Anxiety    • Arthritis    • Colon polyp    • Depression    • GERD (gastroesophageal reflux disease)    • Hiatal hernia    • Hyperlipidemia    • Hypertension        Past Surgical History   Past Surgical History:   Procedure Laterality Date   • APPENDECTOMY     • CHOLECYSTECTOMY     • COLONOSCOPY     • FRACTURE SURGERY Right     arm with plate and pins   • HAND SURGERY Bilateral    • HYSTERECTOMY     • JOINT REPLACEMENT Bilateral     knees   • [place] HEMIARTHROPLASTY HIP PARTIAL Right 07/02/2022    Procedure: HEMIARTHROPLASTY HIP (BIPOLAR), closed reduction with splinting right wrist;  Surgeon: Reginaldo Leigh; Location: [facility];  Service: Orthopedics   • [place] NEUROPLASTY &/TRANSPOSITION ULNAR NERVE ELBOW Right 2/8/2023    Procedure: RELEASE CUBITAL TUNNEL;  Surgeon: Kierra Glez DO;  Location: [facility];  Service: Orthopedics   • SHOULDER ARTHROSCOPY Left        Family History  History reviewed  No pertinent family history  No known family history of autoimmune or inflammatory diseases      Social History  Occupation: used to do sewing, 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  Social History     Substance and Sexual Activity   Alcohol Use Not Currently "    Social History     Substance and Sexual Activity   Drug Use Never     Social History     Tobacco Use   Smoking Status Former   • Packs/day: 0 25   • Types: Cigarettes   • Quit date:    • Years since quittin 5   Smokeless Tobacco Never       Objective:  Vitals:    23 1116   BP: 134/90   Weight: 94 3 kg (208 lb)   Height: 5' 6\" (1 676 m)       Physical Exam  Vitals and nursing note reviewed  Constitutional:       General: She is not in acute distress  Appearance: Normal appearance  She is obese  She is not ill-appearing, toxic-appearing or diaphoretic  HENT:      Head: Normocephalic and atraumatic  Eyes:      General: No scleral icterus  Conjunctiva/sclera: Conjunctivae normal    Cardiovascular:      Rate and Rhythm: Normal rate and regular rhythm  Pulses: Normal pulses  Heart sounds: Murmur (Systolic, 2/6) heard  Pulmonary:      Effort: Pulmonary effort is normal  No respiratory distress  Breath sounds: Normal breath sounds  No stridor  No wheezing or rhonchi  Musculoskeletal:         General: No swelling or tenderness  Skin:     General: Skin is warm and dry  Capillary Refill: Capillary refill takes less than 2 seconds  Coloration: Skin is not pale  Findings: No erythema or rash  Neurological:      General: No focal deficit present  Mental Status: She is alert  Psychiatric:         Mood and Affect: Mood normal          Behavior: Behavior normal          Thought Content: Thought content normal          Judgment: Judgment normal        Reviewed labs and imaging  Imaging:   EGD 10/27/22  Gastric polyps, appeared benign  Upper and lower esophageal rings, both dilated  Moderate hiatal hernia  XR right hip 10/10/22  Unremarkable appearance of right hip hemiarthroplasty    XR right wrist 10/10/22  Healing comminuted intra-articular distal radius fracture with stable alignment      XR cervical spine 22  Severe disc space narrowing and " anterior osteophyte formation at C5-C6 and C6-C7 with milder changes at C4-C5  Bilateral uncovertebral joint hypertrophy throughout the lower cervical spine with moderate to severe bilateral neural foraminal narrowing at   C5-6 and C6-7  Findings are slightly more pronounced since 2015  XR bilateral knee 1/28/22  Satisfactory bilateral knee arthroplasty placement  XR right elbow 1/14/22  The patient is status post open reduction internal fixation of olecranon fracture  The orthopedic hardware appears to be intact  Degenerative changes of both the radial and ulnar aspect of the elbow joint are noted as well as enthesophyte of the medial   epicondyles  MRI lumbar spine 10/13/21  Slightly worsened multilevel degenerative changes of lumbar spine with varying degrees of canal stenosis (moderate L4-L5) and foraminal narrowing (mild left L3-L4, right L4-5, and bilateral L5-S1), as detailed above  XR left shoulder 6/8/21  Moderate osteoarthritis glenohumeral joint  Labs:   Admission on 02/08/2023, Discharged on 02/08/2023   Component Date Value Ref Range Status   • Case Report 02/08/2023    Final                    Value:Surgical Pathology Report                         Case: [id number]                                   Authorizing Provider:  Lisa Oh DO      Collected:           02/08/2023 0818              Ordering Location:     [facility] Received:            02/08/2023 1600                                     Operating Room                                                               Pathologist:           Lisa Brantley MD                                                       Specimen:    Joint, Right Elbow, medial intermuscular septum                                           • Final Diagnosis 02/08/2023    Final                    Value: This result contains rich text formatting which cannot be displayed here     • Note 02/08/2023    Final Value:This result contains rich text formatting which cannot be displayed here  • Additional Information 02/08/2023    Final                    Value: This result contains rich text formatting which cannot be displayed here  • Gross Description 02/08/2023    Final                    Value: This result contains rich text formatting which cannot be displayed here  Clinical Support on 01/31/2023   Component Date Value Ref Range Status   • Ventricular Rate 01/31/2023 96  BPM Final   • Atrial Rate 01/31/2023 96  BPM Final   • GA Interval 01/31/2023 222  ms Final   • QRSD Interval 01/31/2023 104  ms Final   • QT Interval 01/31/2023 360  ms Final   • QTC Interval 01/31/2023 454  ms Final   • P Axis 01/31/2023 44  degrees Final   • QRS Axis 01/31/2023 -56  degrees Final   • T Wave Axis 01/31/2023 68  degrees Final   Appointment on 01/31/2023   Component Date Value Ref Range Status   • Sodium 01/31/2023 140  135 - 147 mmol/L Final   • Potassium 01/31/2023 4 5  3 5 - 5 3 mmol/L Final   • Chloride 01/31/2023 106  96 - 108 mmol/L Final   • CO2 01/31/2023 29  21 - 32 mmol/L Final   • ANION GAP 01/31/2023 5  4 - 13 mmol/L Final   • BUN 01/31/2023 21  5 - 25 mg/dL Final   • Creatinine 01/31/2023 0 85  0 60 - 1 30 mg/dL Final    Standardized to IDMS reference method   • Glucose, Fasting 01/31/2023 118 (H)  65 - 99 mg/dL Final    Specimen collection should occur prior to Sulfasalazine administration due to the potential for falsely depressed results  Specimen collection should occur prior to Sulfapyridine administration due to the potential for falsely elevated results  • Calcium 01/31/2023 9 8  8 3 - 10 1 mg/dL Final   • AST 01/31/2023 28  5 - 45 U/L Final    Specimen collection should occur prior to Sulfasalazine administration due to the potential for falsely depressed results      • ALT 01/31/2023 28  12 - 78 U/L Final    Specimen collection should occur prior to Sulfasalazine and/or Sulfapyridine administration due to the potential for falsely depressed results  • Alkaline Phosphatase 01/31/2023 57  46 - 116 U/L Final   • Total Protein 01/31/2023 7 6  6 4 - 8 4 g/dL Final   • Albumin 01/31/2023 3 9  3 5 - 5 0 g/dL Final   • Total Bilirubin 01/31/2023 0 39  0 20 - 1 00 mg/dL Final    Use of this assay is not recommended for patients undergoing treatment with eltrombopag due to the potential for falsely elevated results     • eGFR 01/31/2023 68  ml/min/1 73sq m Final   • WBC 01/31/2023 7 24  4 31 - 10 16 Thousand/uL Final   • RBC 01/31/2023 4 92  3 81 - 5 12 Million/uL Final   • Hemoglobin 01/31/2023 14 2  11 5 - 15 4 g/dL Final   • Hematocrit 01/31/2023 45 4  34 8 - 46 1 % Final   • MCV 01/31/2023 92  82 - 98 fL Final   • MCH 01/31/2023 28 9  26 8 - 34 3 pg Final   • MCHC 01/31/2023 31 3 (L)  31 4 - 37 4 g/dL Final   • RDW 01/31/2023 15 6 (H)  11 6 - 15 1 % Final   • MPV 01/31/2023 11 6  8 9 - 12 7 fL Final   • Platelets 03/00/8030 346  149 - 390 Thousands/uL Final   • nRBC 01/31/2023 0  /100 WBCs Final   • Neutrophils Relative 01/31/2023 47  43 - 75 % Final   • Immat GRANS % 01/31/2023 0  0 - 2 % Final   • Lymphocytes Relative 01/31/2023 41  14 - 44 % Final   • Monocytes Relative 01/31/2023 8  4 - 12 % Final   • Eosinophils Relative 01/31/2023 3  0 - 6 % Final   • Basophils Relative 01/31/2023 1  0 - 1 % Final   • Neutrophils Absolute 01/31/2023 3 43  1 85 - 7 62 Thousands/µL Final   • Immature Grans Absolute 01/31/2023 0 02  0 00 - 0 20 Thousand/uL Final   • Lymphocytes Absolute 01/31/2023 2 94  0 60 - 4 47 Thousands/µL Final   • Monocytes Absolute 01/31/2023 0 60  0 17 - 1 22 Thousand/µL Final   • Eosinophils Absolute 01/31/2023 0 20  0 00 - 0 61 Thousand/µL Final   • Basophils Absolute 01/31/2023 0 05  0 00 - 0 10 Thousands/µL Final

## 2023-06-28 ENCOUNTER — HOSPITAL ENCOUNTER (OUTPATIENT)
Dept: NON INVASIVE DIAGNOSTICS | Facility: CLINIC | Age: 73
Discharge: HOME | End: 2023-06-28
Payer: MEDICARE

## 2023-06-28 VITALS
HEIGHT: 66 IN | SYSTOLIC BLOOD PRESSURE: 134 MMHG | BODY MASS INDEX: 33.43 KG/M2 | DIASTOLIC BLOOD PRESSURE: 84 MMHG | HEART RATE: 81 BPM | WEIGHT: 208 LBS

## 2023-06-28 DIAGNOSIS — R01.1 SYSTOLIC MURMUR: ICD-10-CM

## 2023-06-28 LAB
AORTIC ROOT: 3.3 CM
AORTIC VALVE MEAN VELOCITY: 16.7 M/S
APICAL FOUR CHAMBER EJECTION FRACTION: 45 %
ASCENDING AORTA: 3.7 CM
AV AREA BY CONTINUOUS VTI: 1.3 CM2
AV AREA PEAK VELOCITY: 1.5 CM2
AV LVOT MEAN GRADIENT: 2 MMHG
AV LVOT PEAK GRADIENT: 4 MMHG
AV MEAN GRADIENT: 13 MMHG
AV PEAK GRADIENT: 23 MMHG
AV VALVE AREA: 1.34 CM2
AV VELOCITY RATIO: 0.38
DOP CALC AO PEAK VEL: 2.5 M/S
DOP CALC AO VTI: 44.69 CM
DOP CALC LVOT AREA: 3.8 CM2
DOP CALC LVOT CARDIAC INDEX: 2.76 L/MIN/M2
DOP CALC LVOT CARDIAC OUTPUT: 5.61 L/MIN
DOP CALC LVOT DIAMETER: 2.2 CM
DOP CALC LVOT PEAK VEL VTI: 15.75 CM
DOP CALC LVOT PEAK VEL: 0.94 M/S
DOP CALC LVOT STROKE INDEX: 29.6 ML/M2
DOP CALC LVOT STROKE VOLUME: 59.84
FRACTIONAL SHORTENING: 38 (ref 28–44)
INTERVENTRICULAR SEPTUM IN DIASTOLE (PARASTERNAL SHORT AXIS VIEW): 0.9 CM
INTERVENTRICULAR SEPTUM: 0.9 CM (ref 0.6–1.1)
LAAS-AP2: 19 CM2
LAAS-AP4: 19.3 CM2
LEFT ATRIUM SIZE: 3.8 CM
LEFT ATRIUM VOLUME (MOD BIPLANE): 54 ML
LEFT INTERNAL DIMENSION IN SYSTOLE: 2.8 CM (ref 2.1–4)
LEFT VENTRICULAR INTERNAL DIMENSION IN DIASTOLE: 4.5 CM (ref 3.5–6)
LEFT VENTRICULAR POSTERIOR WALL IN END DIASTOLE: 1 CM
LEFT VENTRICULAR STROKE VOLUME: 64 ML
LVSV (TEICH): 64 ML
MV E'TISSUE VEL-SEP: 8 CM/S
RIGHT ATRIUM AREA SYSTOLE A4C: 10.8 CM2
RIGHT VENTRICLE ID DIMENSION: 2 CM
SL CV LEFT ATRIUM LENGTH A2C: 5.6 CM
SL CV LV EF: 55
SL CV PED ECHO LEFT VENTRICLE DIASTOLIC VOLUME (MOD BIPLANE) 2D: 93 ML
SL CV PED ECHO LEFT VENTRICLE SYSTOLIC VOLUME (MOD BIPLANE) 2D: 29 ML
TRICUSPID ANNULAR PLANE SYSTOLIC EXCURSION: 1.8 CM

## 2023-06-28 PROCEDURE — 93306 TTE W/DOPPLER COMPLETE: CPT | Performed by: INTERNAL MEDICINE

## 2023-06-28 PROCEDURE — 93306 TTE W/DOPPLER COMPLETE: CPT

## 2023-06-29 ENCOUNTER — OFFICE VISIT (OUTPATIENT)
Dept: PAIN MEDICINE | Facility: CLINIC | Age: 73
End: 2023-06-29
Payer: MEDICARE

## 2023-06-29 VITALS
DIASTOLIC BLOOD PRESSURE: 91 MMHG | HEART RATE: 99 BPM | HEIGHT: 66 IN | SYSTOLIC BLOOD PRESSURE: 143 MMHG | BODY MASS INDEX: 33.62 KG/M2 | WEIGHT: 209.2 LBS

## 2023-06-29 DIAGNOSIS — M54.16 LUMBAR RADICULOPATHY: ICD-10-CM

## 2023-06-29 DIAGNOSIS — G89.29 CHRONIC MIDLINE LOW BACK PAIN WITHOUT SCIATICA: ICD-10-CM

## 2023-06-29 DIAGNOSIS — M47.816 LUMBAR SPONDYLOSIS: ICD-10-CM

## 2023-06-29 DIAGNOSIS — G89.4 CHRONIC PAIN SYNDROME: Primary | ICD-10-CM

## 2023-06-29 DIAGNOSIS — M47.812 CERVICAL SPONDYLOSIS: ICD-10-CM

## 2023-06-29 DIAGNOSIS — M54.50 CHRONIC MIDLINE LOW BACK PAIN WITHOUT SCIATICA: ICD-10-CM

## 2023-06-29 DIAGNOSIS — M54.2 NECK PAIN: ICD-10-CM

## 2023-06-29 DIAGNOSIS — M48.061 SPINAL STENOSIS OF LUMBAR REGION WITHOUT NEUROGENIC CLAUDICATION: ICD-10-CM

## 2023-06-29 DIAGNOSIS — M54.12 CERVICAL RADICULOPATHY: ICD-10-CM

## 2023-06-29 NOTE — PROGRESS NOTES
Assessment:  1  Chronic pain syndrome    2  Chronic midline low back pain without sciatica    3  Lumbar radiculopathy    4  Lumbar spondylosis    5  Spinal stenosis of lumbar region without neurogenic claudication    6  Neck pain    7  Cervical radiculopathy    8  Cervical spondylosis        Plan:  While the patient was in the office today, I did have a thorough conversation regarding their chronic pain syndrome, medication management, and treatment plan options  Patient is being seen for follow-up visit  She underwent an L4-5 interlaminar epidural steroid injection on 4/21/2023  She is reporting about 80% improvement in her symptoms  She reports that her pain is pretty minimal/tolerable at this time  Patient previously participated in physical therapy and continues with home exercises for lumbar core strengthening/stretching  We discussed the importance of a lifelong commitment to daily exercises geared toward lumbar core stretching and strengthening  The patient was agreeable and verbalized an understanding  We will repeat L4-5 interlaminar epidural steroid injection as needed  Continue Celebrex 100 mg twice daily as prescribed by her rheumatologist     Continue gabapentin 600 mg at bedtime as prescribed by her PCP  Follow-up in 2 months  History of Present Illness: The patient is a 75 y o  female who presents for a follow up office visit in regards to Back Pain  The patient’s current symptoms include complaints of left-sided low back pain, nonradiating  Current pain level is a 2/10  Quality pain is described as sharp  Current pain medications includes: Rheumatology prescribes Celebrex 100 mg twice daily  PCP prescribes gabapentin 600 mg at bedtime  The patient reports that this regimen is providing 80% pain relief  The patient is reporting no side effects from this pain medication regimen      I have personally reviewed and/or updated the patient's past medical history, past surgical history, family history, social history, current medications, allergies, and vital signs today  Review of Systems  Review of Systems   Eyes: Negative for visual disturbance  Respiratory: Negative for shortness of breath and wheezing  Cardiovascular: Negative for chest pain and palpitations  Gastrointestinal: Negative for constipation and nausea  Endocrine: Negative for polydipsia  Musculoskeletal: Negative for gait problem, joint swelling and myalgias  Skin: Negative for rash  Neurological: Negative for dizziness and headaches  Psychiatric/Behavioral: Negative for decreased concentration  Past Medical History:   Diagnosis Date   • Anemia    • Anxiety    • Arthritis    • Colon polyp    • Depression    • GERD (gastroesophageal reflux disease)    • Hiatal hernia    • Hyperlipidemia    • Hypertension        Past Surgical History:   Procedure Laterality Date   • APPENDECTOMY     • CHOLECYSTECTOMY     • COLONOSCOPY     • FRACTURE SURGERY Right     arm with plate and pins   • HAND SURGERY Bilateral    • HYSTERECTOMY     • JOINT REPLACEMENT Bilateral     knees   • [place] HEMIARTHROPLASTY HIP PARTIAL Right 2022    Procedure: HEMIARTHROPLASTY HIP (BIPOLAR), closed reduction with splinting right wrist;  Surgeon: Isidra Jean-Baptiste; Location: [facility] OR;  Service: Orthopedics   • [place] NEUROPLASTY &/TRANSPOSITION ULNAR NERVE ELBOW Right 2023    Procedure: RELEASE CUBITAL TUNNEL;  Surgeon: Raymond Chavez DO;  Location: [facility] OR;  Service: Orthopedics   • SHOULDER ARTHROSCOPY Left        History reviewed  No pertinent family history      Social History     Occupational History   • Not on file   Tobacco Use   • Smoking status: Former     Packs/day: 0 25     Types: Cigarettes     Quit date:      Years since quittin 5   • Smokeless tobacco: Never   Vaping Use   • Vaping Use: Never used   Substance and Sexual Activity   • Alcohol use: Not Currently   • Drug use: Never   • Sexual "activity: Not Currently         Current Outpatient Medications:   •  acetaminophen (TYLENOL) 325 mg tablet, Take 2 tablets (650 mg total) by mouth every 6 (six) hours as needed for mild pain, Disp: , Rfl: 0  •  alendronate (FOSAMAX) 70 mg tablet, Take 1 tablet (70 mg total) by mouth every 7 days Take on an empty stomach with a full glass of water, and do not lie down for 30 minutes after, Disp: 12 tablet, Rfl: 4  •  amLODIPine (NORVASC) 10 mg tablet, Take 10 mg by mouth daily, Disp: , Rfl:   •  calcium carbonate (OS-ALPESH) 600 MG tablet, Take 600 mg by mouth daily Taking 2 tablets (1200 mg) daily, Disp: , Rfl:   •  celecoxib (CeleBREX) 200 mg capsule, Take 1 capsule (200 mg total) by mouth 2 (two) times a day As needed for joint pain, Disp: 180 capsule, Rfl: 3  •  cetirizine (ZyrTEC) 10 mg tablet, Take 10 mg by mouth daily, Disp: , Rfl:   •  cholecalciferol (VITAMIN D3) 1,000 units tablet, Take 1,000 Units by mouth daily 2,000 units daily, Disp: , Rfl:   •  gabapentin (NEURONTIN) 600 MG tablet, Take 1 tablet (600 mg total) by mouth daily at bedtime, Disp: 30 tablet, Rfl: 0  •  multivitamin (THERAGRAN) TABS, Take 1 tablet by mouth daily, Disp: , Rfl:   •  omeprazole (PriLOSEC) 20 mg delayed release capsule, Take 20 mg by mouth daily 1 in am 1 in pm, Disp: , Rfl:   •  pravastatin (PRAVACHOL) 40 mg tablet, Take 40 mg by mouth daily One at bedtime, Disp: , Rfl:   •  temazepam (RESTORIL) 7 5 mg capsule, Take 15 mg by mouth daily at bedtime as needed for sleep  , Disp: , Rfl:   •  traZODone (DESYREL) 100 mg tablet, Take 100 mg by mouth, Disp: , Rfl:   •  venlafaxine (EFFEXOR-XR) 150 mg 24 hr capsule, Take 150 mg by mouth daily, Disp: , Rfl:   •  traZODone (DESYREL) 100 mg tablet, , Disp: , Rfl:     No Known Allergies    Physical Exam:    /91   Pulse 99   Ht 5' 6\" (1 676 m)   Wt 94 9 kg (209 lb 3 2 oz)   BMI 33 77 kg/m²     Constitutional:normal, well developed, well nourished, alert, in no distress and non-toxic " and no overt pain behavior  Eyes:anicteric  HEENT:grossly intact  Neck:supple, symmetric, trachea midline and no masses   Pulmonary:even and unlabored  Cardiovascular:No edema or pitting edema present  Skin:Normal without rashes or lesions and well hydrated  Psychiatric:Mood and affect appropriate  Neurologic:Cranial Nerves II-XII grossly intact  Musculoskeletal:normal    Imaging  No orders to display       No orders of the defined types were placed in this encounter

## 2023-10-05 NOTE — PROGRESS NOTES
2019Martita BARNARD 1950 was seen in outpatient PT for 3 sessions beginning 10/7 for diagnosis of cervicalgia  Patient has not been seen since 10/14 with attempt to call for f/u  Renny Pencil is to be discharged from care at this time due to lapse of PT for > 1 month 
Daily Note     Today's date: 10/14/2019  Patient name: Denise Fish  : 1950  MRN: [id number]  Referring provider: Hesham Linares MD  Dx:   Encounter Diagnosis     ICD-10-CM    1  Spinal stenosis in cervical region M48 02    2  Cervicalgia M54 2    3  Posture abnormality R29 3        Start Time: 1400  Stop Time: 1500  Total time in clinic (min): 60 minutes    Subjective: Patient states she felt better after last session  Decreased HA occurance      Objective: See treatment diary below      Assessment: Tolerated treatment well  Able to stretch cervical mm without increased pain  Patient would benefit from continued PT      Plan: Continue per plan of care  Progress treatment as tolerated  Precautions: none noted       Re-evaluation:    Cervical flowsheet    Manual  10/7 10/10/19 10/14                     SOR performed Performed 1x2min performed     Cervical distraction performed 8x:15 performed     1st rib mob        T/S PA mobs  performed performed     C/S PA and side glide mobs  P/A peformed p! Performed pain free             IASTM            Exercise Diary  10/7 10/10/19 10/14                             Doorway stretch        C/S AROM        Chin tucks with O P   10x:05 10x:05     Shoulder rolls Reviewed HEP Mirror x15 20x     scap retraction Reviewed HEP Mirror x15 20x     MTP/LTP  Attempted( UT firing) Cont to compensate with VC     Serratus punch        Prone row        Prone I's         Prone T's (thumbs up and down)        Standing YTI        Lat pull down        Thoracic extension  10x:05 10x:05     UT stretch B/L  3x:30 3x:30     Levator Stretch B/L with O P    With self O P  3x:30 3x30"     SCM stretch B/L  3x:30 3x:30     bilat TB sh er   Yellow 20x         Modalities 10/7 10/10/19 10/14     estim premod bilateral C/S 10 min with MH  premod bilateral C/S 10 min with MH
n/a

## 2023-11-22 NOTE — PROGRESS NOTES
Mayda U  65   Progress Note Mily Brothers 1950, 67 y o  female MRN: [id number]  Unit/Bed#: -01 Encounter: [id number]  Primary Care Provider: Rosalee Ulloa DO   Date and time admitted to hospital: 7/1/2022  6:06 AM    * Closed transcervical fracture of right femur [facility])  Assessment & Plan  · Patient with mechanical fall prior to admission  · Surgical repair of right hip on 07/02/2022  · Continued postop management as per Orthopedic surgery  · Continue Tylenol for mild pain, Percocet for moderate pain, and or IV Dilaudid or IV morphine for severe pain  · Lovenox for DVT prophylaxis  · Patient is medically stable for discharge  · Patient needs post acute rehabilitation services  · Case management is working on placement    Fracture of right wrist  Assessment & Plan  · Secondary to mechanical fall as experienced prior to arrival  · Xray revealed: "Distal radial comminuted intra-articular fracture and ulnar styloid fracture "  · Status post an orthopedic surgical evaluation  · Patient is postop day 2-status post- closed reduction with splinting right wrist (Right)  · Awaiting placement    Acute blood loss anemia  Assessment & Plan  · Slight drop in hemoglobin level from admission  · No signs of acute bleeding  · Will continue to monitor H&H as needed    Leukemoid reaction  Assessment & Plan  · Most likely reactive  · No signs of infection  · Improving    Hypertension  Assessment & Plan  · Blood pressure is controlled  · Continue amlodipine    Cervical radiculopathy  Assessment & Plan  · continue home gabapentin      VTE Prophylaxis:  Enoxaparin (Lovenox)    Patient Centered Rounds: I have performed bedside rounds with nursing staff today      Discussions with Specialists or Other Care Team Provider: yes  Education and Discussions with Family / Patient:  Updated patient's family at bedside regarding plan of care    Current Length of Stay: 4 day(s)    Current Patient Status: Inpatient Certification Statement: The patient will continue to require additional inpatient hospital stay due to Hip fracture, pending placement for rehab    Discharge Plan:  Case management working on rehab placement    Code Status: Level 2 - DNAR: but accepts endotracheal intubation    Subjective:   No overnight events    Objective:     Vitals:   Temp (24hrs), Av 7 °F (37 1 °C), Min:98 3 °F (36 8 °C), Max:99 3 °F (37 4 °C)    Temp:  [98 3 °F (36 8 °C)-99 3 °F (37 4 °C)] 98 4 °F (36 9 °C)  HR:  [] 101  Resp:  [18-20] 20  BP: (127-138)/(70-81) 131/78  SpO2:  [89 %-93 %] 91 %  Body mass index is 34 71 kg/m²  Input and Output Summary (last 24 hours): Intake/Output Summary (Last 24 hours) at 2022 1305  Last data filed at 2022 0700  Gross per 24 hour   Intake 345 ml   Output --   Net 345 ml       Physical Exam:   Physical Exam  Constitutional:       General: She is not in acute distress  Appearance: She is obese  HENT:      Head: Normocephalic and atraumatic  Nose: Nose normal       Mouth/Throat:      Mouth: Mucous membranes are moist    Eyes:      Extraocular Movements: Extraocular movements intact  Conjunctiva/sclera: Conjunctivae normal    Cardiovascular:      Rate and Rhythm: Normal rate and regular rhythm  Pulmonary:      Effort: Pulmonary effort is normal  No respiratory distress  Abdominal:      Palpations: Abdomen is soft  Tenderness: There is no abdominal tenderness  Musculoskeletal:         General: Normal range of motion  Cervical back: Normal range of motion and neck supple  Comments: Right wrist with splint in place  Decreased range of motion right hip due to recent fracture/repair   Skin:     General: Skin is warm and dry  Neurological:      General: No focal deficit present  Mental Status: She is alert  Mental status is at baseline  Cranial Nerves: No cranial nerve deficit     Psychiatric:         Mood and Affect: Mood normal  Behavior: Behavior normal          Additional Data:     Labs:    Results from last 7 days   Lab Units 07/05/22  0454   WBC Thousand/uL 10 98*   HEMOGLOBIN g/dL 11 2*   HEMATOCRIT % 36 1   PLATELETS Thousands/uL 242   NEUTROS PCT % 63   LYMPHS PCT % 27   MONOS PCT % 9   EOS PCT % 1     Results from last 7 days   Lab Units 07/05/22  0454 07/03/22  0449 07/02/22  0435   SODIUM mmol/L 138   < > 136   POTASSIUM mmol/L 4 0   < > 4 0   CHLORIDE mmol/L 101   < > 102   CO2 mmol/L 30   < > 25   BUN mg/dL 21   < > 20   CREATININE mg/dL 0 83   < > 1 04   CALCIUM mg/dL 9 1   < > 9 0   ALK PHOS U/L  --   --  34   ALT U/L  --   --  35   AST U/L  --   --  35    < > = values in this interval not displayed  * I Have Reviewed All Lab Data Listed Above  * Additional Pertinent Lab Tests Reviewed:  Osbaldo  Admission  Reviewed    Imaging:  Imaging Reports Reviewed Today Include:  No new imaging    Recent Cultures (last 7 days):           Last 24 Hours Medication List:   Current Facility-Administered Medications   Medication Dose Route Frequency Provider Last Rate    acetaminophen  650 mg Oral Q6H PRN [facility] REJI Hernandez      aluminum-magnesium hydroxide-simethicone  30 mL Oral Q6H PRN Dali Booker PA-C      amLODIPine  10 mg Oral Daily Demetrio Truong DO      ascorbic acid  500 mg Oral BID [facility] REJI Hernandez      docusate sodium  100 [facility]ID [facility]      enoxaparin  40 mg Subcutaneous Daily [place], [place]      ferrous sulfate  325 mg Oral Daily With Breakfast [place], [place]      gabapentin  600 mg Oral HS Demetrio Truong DO      loratadine  10 mg Oral Daily Demetrio Truong DO      morphine injection  2 mg Intravenous Q6H PRN Dali Booker PA-C      multivitamin-minerals  1 tablet Oral Daily [place], [place]      ondansetron  4 mg Intravenous Q6H PRN [facility] ProvoREJI      oxyCODONE  5 mg Oral Q6H PRN Dali Booker PA-C  pantoprazole  20 mg Oral BID AC Vivek Olmedo DO      pravastatin  40 mg Oral Daily Vivek Olmedo DO      senna  1 tablet Oral HS Griselda Chancellor, MD      temazepam  15 mg Oral HS PRN Vivek Olmedo DO      venlafaxine  150 mg Oral Daily Vivek Olmedo DO          Today, Patient Was Seen By: Griselda Chancellor, MD    ** Please Note: Dictation voice to text software may have been used in the creation of this document   ** yes...

## 2024-01-19 NOTE — LETTER
Dr Kalina Gastelum mutual patient, [address] [facility],  1950 was seen in outpatient PT for 3 sessions beginning 10/7 for diagnosis of cervicalgia  Patient has not been seen since 10/14 with attempt to call for f/u  Milesgoldie Montiel is to be discharged from care at this time due to lapse of PT for > 1 month        Thank you for your referral,     Ira Eason PT, DPT, ATC 19-Jan-2024 00:37

## 2024-02-06 ENCOUNTER — OFFICE VISIT (OUTPATIENT)
Dept: OBGYN CLINIC | Facility: CLINIC | Age: 74
End: 2024-02-06
Payer: MEDICARE

## 2024-02-06 ENCOUNTER — APPOINTMENT (OUTPATIENT)
Dept: RADIOLOGY | Facility: CLINIC | Age: 74
End: 2024-02-06
Payer: MEDICARE

## 2024-02-06 VITALS
SYSTOLIC BLOOD PRESSURE: 116 MMHG | HEIGHT: 66 IN | HEART RATE: 94 BPM | BODY MASS INDEX: 33.59 KG/M2 | WEIGHT: 209 LBS | DIASTOLIC BLOOD PRESSURE: 75 MMHG

## 2024-02-06 DIAGNOSIS — Z96.653 HX OF TOTAL KNEE REPLACEMENT, BILATERAL: Primary | ICD-10-CM

## 2024-02-06 DIAGNOSIS — Z96.653 HX OF TOTAL KNEE REPLACEMENT, BILATERAL: ICD-10-CM

## 2024-02-06 PROCEDURE — 99213 OFFICE O/P EST LOW 20 MIN: CPT | Performed by: ORTHOPAEDIC SURGERY

## 2024-02-06 PROCEDURE — 73562 X-RAY EXAM OF KNEE 3: CPT

## 2024-02-06 NOTE — PROGRESS NOTES
Assessment/Plan:   Diagnoses and all orders for this visit:    Hx of total knee replacement, bilateral  -     XR knee 3 vw right non injury; Future  -     XR knee 3 vw left non injury; Future         Reviewed physical exam and imaging with patient at time of visit. The patient is 15+ years s/p Bilateral total knee replacements. Radiographic findings demonstrate a well-seated prosthesis, with no signs of loosening. She continues to do well post-operatively. Continue weightbearing activities. She will follow-up in 1 year for an annual appointment, with repeat XR of her Bilateral knees. The patient expresses understanding and is in agreement with today's treatment plan.     The patient is doing quite well from her bilateral total knee replacements.  There is full strength full motion.  No instability.  X-rays show anatomic placement the prosthesis.  Continue home exercise program.  Follow-up 1 year for evaluation with new x-rays of bilateral knees-3 views each    Subjective:   Patient ID: Sharon Vega  1950     HPI  Patient is a 73 y.o. female who presents for annual post-operative evaluation of her bilateral knees. The patient has a history of bilateral knee replacements, completed 15+ years ago. She continues to do well with both knees. She denies knee pain. She denies complications with weightbearing activities such as walking or ambulating stairs. She denies weakness. She denies feelings of instability.     The following portions of the patient's history were reviewed and updated as appropriate:  Past medical history, past surgical history, Family history, social history, current medications and allergies    Past Medical History:   Diagnosis Date    Anemia     Anxiety     Arthritis     Colon polyp     Depression     GERD (gastroesophageal reflux disease)     Hiatal hernia     Hyperlipidemia     Hypertension        Past Surgical History:   Procedure Laterality Date    APPENDECTOMY      CHOLECYSTECTOMY       COLONOSCOPY      FRACTURE SURGERY Right     arm with plate and pins    HAND SURGERY Bilateral     HYSTERECTOMY      JOINT REPLACEMENT Bilateral     knees    IN HEMIARTHROPLASTY HIP PARTIAL Right 2022    Procedure: HEMIARTHROPLASTY HIP (BIPOLAR), closed reduction with splinting right wrist;  Surgeon: Leo Roblero;  Location: [facility];  Service: Orthopedics    IN NEUROPLASTY &/TRANSPOSITION ULNAR NERVE ELBOW Right 2023    Procedure: RELEASE CUBITAL TUNNEL;  Surgeon: Cody Calles DO;  Location: [facility];  Service: Orthopedics    SHOULDER ARTHROSCOPY Left        History reviewed. No pertinent family history.    Social History     Socioeconomic History    Marital status: /Civil Union     Spouse name: None    Number of children: None    Years of education: None    Highest education level: None   Occupational History    None   Tobacco Use    Smoking status: Former     Current packs/day: 0.00     Types: Cigarettes     Quit date:      Years since quittin.1    Smokeless tobacco: Never   Vaping Use    Vaping status: Never Used   Substance and Sexual Activity    Alcohol use: Not Currently    Drug use: Never    Sexual activity: Not Currently   Other Topics Concern    None   Social History Narrative    None     Social Determinants of Health     Financial Resource Strain: Not on file   Food Insecurity: No Food Insecurity (2022)    Hunger Vital Sign     Worried About Running Out of Food in the Last Year: Never true     Ran Out of Food in the Last Year: Never true   Transportation Needs: No Transportation Needs (2022)    PRAPARE - Transportation     Lack of Transportation (Medical): No     Lack of Transportation (Non-Medical): No   Physical Activity: Not on file   Stress: Not on file   Social Connections: Not on file   Intimate Partner Violence: Not on file   Housing Stability: Low Risk  (2022)    Housing Stability Vital Sign     Unable to Pay for Housing in the Last Year: No      Number of Places Lived in the Last Year: 1     Unstable Housing in the Last Year: No         Current Outpatient Medications:     acetaminophen (TYLENOL) 325 mg tablet, Take 2 tablets (650 mg total) by mouth every 6 (six) hours as needed for mild pain, Disp: , Rfl: 0    alendronate (FOSAMAX) 70 mg tablet, Take 1 tablet (70 mg total) by mouth every 7 days Take on an empty stomach with a full glass of water, and do not lie down for 30 minutes after, Disp: 12 tablet, Rfl: 4    amLODIPine (NORVASC) 10 mg tablet, Take 10 mg by mouth daily, Disp: , Rfl:     calcium carbonate (OS-ALPESH) 600 MG tablet, Take 600 mg by mouth daily Taking 2 tablets (1200 mg) daily, Disp: , Rfl:     celecoxib (CeleBREX) 200 mg capsule, Take 1 capsule (200 mg total) by mouth 2 (two) times a day As needed for joint pain, Disp: 180 capsule, Rfl: 3    cetirizine (ZyrTEC) 10 mg tablet, Take 10 mg by mouth daily, Disp: , Rfl:     cholecalciferol (VITAMIN D3) 1,000 units tablet, Take 1,000 Units by mouth daily 2,000 units daily, Disp: , Rfl:     gabapentin (NEURONTIN) 600 MG tablet, Take 1 tablet (600 mg total) by mouth daily at bedtime, Disp: 30 tablet, Rfl: 0    multivitamin (THERAGRAN) TABS, Take 1 tablet by mouth daily, Disp: , Rfl:     omeprazole (PriLOSEC) 20 mg delayed release capsule, Take 20 mg by mouth daily 1 in am 1 in pm, Disp: , Rfl:     pravastatin (PRAVACHOL) 40 mg tablet, Take 40 mg by mouth daily One at bedtime, Disp: , Rfl:     temazepam (RESTORIL) 7.5 mg capsule, Take 15 mg by mouth daily at bedtime as needed for sleep  , Disp: , Rfl:     venlafaxine (EFFEXOR-XR) 150 mg 24 hr capsule, Take 150 mg by mouth daily, Disp: , Rfl:     traZODone (DESYREL) 100 mg tablet, , Disp: , Rfl:     traZODone (DESYREL) 100 mg tablet, Take 100 mg by mouth, Disp: , Rfl:     No Known Allergies    Review of Systems   Constitutional:  Negative for chills, fever and unexpected weight change.   HENT:  Negative for hearing loss, nosebleeds and sore throat.   "  Eyes:  Negative for pain, redness and visual disturbance.   Respiratory:  Negative for cough, shortness of breath and wheezing.    Cardiovascular:  Negative for chest pain, palpitations and leg swelling.   Gastrointestinal:  Negative for abdominal pain, nausea and vomiting.   Endocrine: Negative for polydipsia and polyuria.   Genitourinary:  Negative for dysuria and hematuria.   Skin:  Negative for rash and wound.   Neurological:  Negative for dizziness, numbness and headaches.   Psychiatric/Behavioral:  Negative for decreased concentration and suicidal ideas. The patient is not nervous/anxious.    All other systems reviewed and are negative.       Objective:  /75   Pulse 94   Ht 5' 6\" (1.676 m)   Wt 94.8 kg (209 lb)   BMI 33.73 kg/m²     Ortho Exam  bilateral knee(s) -   Patient ambulates with steady gait pattern  Uses No assistive device  No anatomical deformity  Skin is warm and dry to touch with no signs of erythema, ecchymosis, or infection   No soft tissue swelling or effusion noted  ROM (0° - 115°)   Strength: 5/5 throughout  No tenderness to palpation on exam  Flexor and extensor mechanisms are intact   Knee is stable to varus and valgus stress  - Lachman's  - Anterior Drawer, - Posterior Drawer  - Clare's  Patella tracks centrally without palpable crepitus  Calf compartments are soft and supple  - Alexus's sign  2+ DP and PT pulses with brisk capillary refill to the toes  Sural, saphenous, tibial, superficial, and deep peroneal motor and sensory distributions intact  Sensation light touch intact distally      Physical Exam  HENT:      Head: Normocephalic and atraumatic.      Nose: Nose normal.   Eyes:      Conjunctiva/sclera: Conjunctivae normal.   Cardiovascular:      Rate and Rhythm: Normal rate.   Pulmonary:      Effort: Pulmonary effort is normal.   Musculoskeletal:      Cervical back: Neck supple.   Skin:     General: Skin is warm and dry.      Capillary Refill: Capillary refill takes " less than 2 seconds.   Neurological:      Mental Status: She is alert and oriented to person, place, and time.   Psychiatric:         Mood and Affect: Mood normal.         Behavior: Behavior normal.          Diagnostic Test Review:  The attending physician has personally reviewed the pertinent films in PACS and the interpretation is as follows:    X-Ray of bilateral knees taken on 2/6/24 were reviewed and showed well-seated total knee prosthesis with maintained anatomical alignment and no signs of loosening.      Procedures   None performed.    Scribe Attestation      I,:  Gagandeep Goodwin am acting as a scribe while in the presence of the attending physician.:       I,:  Cody Calles, DO personally performed the services described in this documentation    as scribed in my presence.:

## 2024-04-12 NOTE — PLAN OF CARE
Physical Therapy Treatment Note           Name: Lazara Casillas    : 1950 (73 y.o.)  MRN: [id number]             Subjective Assessment:     No complaints  Lethargic   X Awake, alert, cooperative  Uncooperative    Agitated  c/o pain    Appropriate  c/o fatigue   X Confused  Treated at bedside     Emotionally labile  Treated in gym/dept.    Impulsive  Other:    Flat affect       Pain/Comfort:    Pain Rating 1: 0/10    Therapy Precautions:     X Cognitive deficits  Spinal precautions    Collar - hard  Sternal precautions    Collar - soft   TLSO   X Fall risk  LSO    Hip precautions - posterior  Knee immobilizer    Hip precautions - anterior  WBAT    Impaired communication  Partial weightbearing    Oxygen  TTWB    PEG tube  NWB    Visual deficits  Other:    Hearing deficits               Mobility Training:     Assist Level Assistive Device Comments    Bed Mobility Sup  Semi-supine to sitting at EOB using the bed railing for assistance.    Sit to stand/Stand to sit Sup  Sit to stand/stand to sit x several trials. Pt stood from EOB and from WC level.  Stand step t/f performed from the EOB to the WC to be transported for car t/f training.   Transfers      Gait      Balance Training      Wheelchair Mobility      Stair Climbing      Car Transfer CGA-Chelsie  Car t/f training performed into pt's personal vehicle.  Pt's spouse constructed a platform step to assist pt into the truck.  Pt mobilized into the vehicle with CGA only.  Upon exiting the vehicle, pt did require Chelsie with anterior weight shifting for her feet to reach the platform.  Chelsie Provided with rising into standing. Pt turned and stabilized  herself on the car door and descended off the platform.    Other:           Assessment:     Patient tolerated session well and was able to practice car t/f training in preparation for D/C home.  Pt performed very well this this activity and required assistance mainly due to her decreased height.  All questions and  Problem: PHYSICAL THERAPY ADULT  Goal: Performs mobility at highest level of function for planned discharge setting  See evaluation for individualized goals  Description: Treatment/Interventions: Functional transfer training, LE strengthening/ROM, Therapeutic exercise, Endurance training, Bed mobility, Gait training, Spoke to nursing  Equipment Recommended: Sanjuanita Matt       See flowsheet documentation for full assessment, interventions and recommendations  Outcome: Progressing  Note: Prognosis: Good  Problem List: Decreased strength, Decreased range of motion, Decreased endurance, Impaired balance, Decreased mobility, Pain, Orthopedic restrictions, Decreased skin integrity  Assessment: Pt seen for PT treatment session this date with interventions consisting of seated TE, transfer training, toilet transfers, bed mobility tasks, and education provided as needed for safety and direction to improve functional  mobility and activity tolerance  Pt agreeable to PT treatment session upon arrival, pt found sitting OOB in recliner   At end of session, pt left in bed per patient request, positioned for comfort with ABD wedge in place, SCD's applied, bed alarm activated, RUE elevated on pillow, and all needs in reach  In comparison to previous session, pt with improvements in amount of pain at rest   Continue to recommend STR at time of d/c in order to maximize pt's functional independence and safety w/ mobility  Pt continues to be functioning below baseline level  PT will continue to see pt while here in order to address the deficits listed above and provide interventions consistent w/ POC in effort to achieve STGs  PT Discharge Recommendation: Post acute rehabilitation services          See flowsheet documentation for full assessment  concerns addressed.  PT offered for increased practice next week as pt desires.        GOALS:   Multidisciplinary Problems       Physical Therapy Goals          Problem: Physical Therapy    Goal Priority Disciplines Outcome Goal Variances Interventions   Physical Therapy Goal     PT, PT/OT Ongoing, Progressing     Description: Patient will increase functional independence with mobility by performin. Supine to sit with Stand-by Assistance  2. Sit to supine with Stand-by Assistance  3. Sit to stand transfer with Supervision  4. Gait  x 250 feet with Supervision using No Assistive Device.   5. Ascend/descend 5 stair with right Handrails Minimal Assistance using No Assistive Device.                            Discharge Recommendations:      Home with 24 hour care and  services    Discharge Equipment Recommendations:     shower chair     At end of treatment, patient remained:    X  Up in chair     In bed   X With alarm activated    Bed rails up   X Call bell in reach      Family/friends present     Restraints secured properly     In bathroom with CNA/RN notified     In gym with PT/PTA/tech     Nurse aware     Other:           PT Start Time: 1440     PT Stop Time: 1455  PT Total Time (min): 15 min     Billable Minutes: Therapeutic Activity 15      2024

## 2024-06-12 ENCOUNTER — OFFICE VISIT (OUTPATIENT)
Age: 74
End: 2024-06-12
Payer: MEDICARE

## 2024-06-12 VITALS
HEIGHT: 66 IN | WEIGHT: 205 LBS | BODY MASS INDEX: 32.95 KG/M2 | SYSTOLIC BLOOD PRESSURE: 158 MMHG | DIASTOLIC BLOOD PRESSURE: 92 MMHG | HEART RATE: 84 BPM

## 2024-06-12 DIAGNOSIS — M54.16 LUMBAR RADICULOPATHY: ICD-10-CM

## 2024-06-12 DIAGNOSIS — M54.50 CHRONIC MIDLINE LOW BACK PAIN WITHOUT SCIATICA: Primary | ICD-10-CM

## 2024-06-12 DIAGNOSIS — G89.29 CHRONIC MIDLINE LOW BACK PAIN WITHOUT SCIATICA: Primary | ICD-10-CM

## 2024-06-12 PROCEDURE — 99214 OFFICE O/P EST MOD 30 MIN: CPT | Performed by: NURSE PRACTITIONER

## 2024-06-12 RX ORDER — METHYLPREDNISOLONE 4 MG/1
TABLET ORAL
Qty: 1 EACH | Refills: 0 | Status: SHIPPED | OUTPATIENT
Start: 2024-06-12

## 2024-06-12 NOTE — PROGRESS NOTES
Assessment:  1. Chronic midline low back pain without sciatica    2. Lumbar radiculopathy        Plan:  While the patient was in the office today, I did have a thorough conversation regarding their chronic pain syndrome, medication management, and treatment plan options.  Patient is being seen for a follow-up visit.  She was last seen here in June 2023 following an L4-5 interlaminar epidural steroid injection that was performed on 4/21/2023.  At that time she was reporting 80% improvement in her symptoms.  Pain was reasonably controlled until a few months ago.  At this point, we will plan to repeat the L4-5 interlaminar epidural steroid injection in the near future.  Due to scheduling issues, injection cannot be scheduled until sometime in August.    I discussed with the patient that at this point time since I feel that there is a significant inflammatory component to their pain symptoms, that they would benefit from a titrating dose of oral steroids over the next 7 days. I advised the patient that while on the steroids, they should not take any other oral NSAIDs except for acetaminophen or Tylenol until they have completed the steroid taper. I also advised them that once they have completed the steroid taper, they are to give our office a follow up phone call to let us know how they are doing and if there is any improvement.The patient was agreeable and verbalized an understanding.    Follow-up 1 month after the injection.    History of Present Illness:  The patient is a 73 y.o. female who presents for a follow up office visit in regards to Back Pain.   The patient’s current symptoms include complaints of low back pain.  Current pain level is a 7/10.  Quality pain is described as sharp, pressure-like.    Current pain medications includes: Rheumatology prescribed Celebrex 100 mg twice daily.  PCP prescribes gabapentin 600 mg at bedtime .  The patient reports that this regimen is providing 25-30% pain relief.  The  patient is reporting no side effects from this pain medication regimen.    I have personally reviewed and/or updated the patient's past medical history, past surgical history, family history, social history, current medications, allergies, and vital signs today.         Review of Systems  Review of Systems   Constitutional:  Negative for unexpected weight change.   HENT:  Negative for hearing loss.    Eyes:  Negative for visual disturbance.   Respiratory:  Negative for shortness of breath.    Cardiovascular:  Negative for leg swelling.   Gastrointestinal:  Negative for constipation.   Endocrine: Negative for polyuria.   Genitourinary:  Negative for difficulty urinating.   Musculoskeletal:  Positive for gait problem. Negative for joint swelling and myalgias.        Joint stiffness   Skin:  Negative for rash.   Neurological:  Negative for weakness and headaches.   Psychiatric/Behavioral:  Negative for decreased concentration.    All other systems reviewed and are negative.        Past Medical History:   Diagnosis Date    Anemia     Anxiety     Arthritis     Colon polyp     Depression     GERD (gastroesophageal reflux disease)     Hiatal hernia     Hyperlipidemia     Hypertension        Past Surgical History:   Procedure Laterality Date    APPENDECTOMY      CHOLECYSTECTOMY      COLONOSCOPY      FRACTURE SURGERY Right     arm with plate and pins    HAND SURGERY Bilateral     HYSTERECTOMY      JOINT REPLACEMENT Bilateral     knees    [place] HEMIARTHROPLASTY HIP PARTIAL Right 07/02/2022    Procedure: HEMIARTHROPLASTY HIP (BIPOLAR), closed reduction with splinting right wrist;  Surgeon: Leo Roblero;  Location: [facility];  Service: Orthopedics    [place] NEUROPLASTY &/TRANSPOSITION ULNAR NERVE ELBOW Right 2/8/2023    Procedure: RELEASE CUBITAL TUNNEL;  Surgeon: Cody Calles DO;  Location: [facility];  Service: Orthopedics    SHOULDER ARTHROSCOPY Left        History reviewed. No pertinent family history.    Social History      Occupational History    Not on file   Tobacco Use    Smoking status: Former     Current packs/day: 0.00     Types: Cigarettes     Quit date:      Years since quittin.4    Smokeless tobacco: Never   Vaping Use    Vaping status: Never Used   Substance and Sexual Activity    Alcohol use: Not Currently    Drug use: Never    Sexual activity: Not Currently         Current Outpatient Medications:     acetaminophen (TYLENOL) 325 mg tablet, Take 2 tablets (650 mg total) by mouth every 6 (six) hours as needed for mild pain, Disp: , Rfl: 0    alendronate (FOSAMAX) 70 mg tablet, Take 1 tablet (70 mg total) by mouth every 7 days Take on an empty stomach with a full glass of water, and do not lie down for 30 minutes after, Disp: 12 tablet, Rfl: 4    amLODIPine (NORVASC) 10 mg tablet, Take 10 mg by mouth daily, Disp: , Rfl:     calcium carbonate (OS-ALPESH) 600 MG tablet, Take 600 mg by mouth daily Taking 2 tablets (1200 mg) daily, Disp: , Rfl:     celecoxib (CeleBREX) 200 mg capsule, Take 1 capsule (200 mg total) by mouth 2 (two) times a day As needed for joint pain, Disp: 180 capsule, Rfl: 3    cetirizine (ZyrTEC) 10 mg tablet, Take 10 mg by mouth daily, Disp: , Rfl:     cholecalciferol (VITAMIN D3) 1,000 units tablet, Take 1,000 Units by mouth daily 2,000 units daily, Disp: , Rfl:     gabapentin (NEURONTIN) 600 MG tablet, Take 1 tablet (600 mg total) by mouth daily at bedtime, Disp: 30 tablet, Rfl: 0    methylPREDNISolone 4 MG tablet therapy pack, Use as directed on package, Disp: 1 each, Rfl: 0    multivitamin (THERAGRAN) TABS, Take 1 tablet by mouth daily, Disp: , Rfl:     omeprazole (PriLOSEC) 20 mg delayed release capsule, Take 20 mg by mouth daily 1 in am 1 in pm, Disp: , Rfl:     pravastatin (PRAVACHOL) 40 mg tablet, Take 40 mg by mouth daily One at bedtime, Disp: , Rfl:     temazepam (RESTORIL) 7.5 mg capsule, Take 15 mg by mouth daily at bedtime as needed for sleep  , Disp: , Rfl:     venlafaxine (EFFEXOR-XR)  "150 mg 24 hr capsule, Take 150 mg by mouth daily, Disp: , Rfl:     traZODone (DESYREL) 100 mg tablet, Take 100 mg by mouth, Disp: , Rfl:     No Known Allergies    Physical Exam:    /92   Pulse 84   Ht 5' 6\" (1.676 m)   Wt 93 kg (205 lb)   BMI 33.09 kg/m²     Constitutional:normal, well developed, well nourished, alert, in no distress and non-toxic and no overt pain behavior.  Eyes:anicteric  HEENT:grossly intact  Neck:supple, symmetric, trachea midline and no masses   Pulmonary:even and unlabored  Cardiovascular:No edema or pitting edema present  Skin:Normal without rashes or lesions and well hydrated  Psychiatric:Mood and affect appropriate  Neurologic:Cranial Nerves II-XII grossly intact  Musculoskeletal:normal    Imaging  FL spine and pain procedure    (Results Pending)       Orders Placed This Encounter   Procedures    FL spine and pain procedure         "

## 2024-08-27 ENCOUNTER — TELEPHONE (OUTPATIENT)
Dept: PAIN MEDICINE | Facility: CLINIC | Age: 74
End: 2024-08-27

## 2024-12-10 ENCOUNTER — TELEPHONE (OUTPATIENT)
Age: 74
End: 2024-12-10

## 2024-12-10 NOTE — TELEPHONE ENCOUNTER
S/w pt who is looking to sched prev ordered inj from [facility]. Pt states having same pain as when seen by [facility]. RN advised proc sched will ro for sched.    Sched--> Pls sched pt accordingly. Thank you!

## 2024-12-10 NOTE — TELEPHONE ENCOUNTER
Caller: Sharon    Doctor: Kocher    Reason for call: returning nurses phone call    Call back#: [phone]

## 2024-12-10 NOTE — TELEPHONE ENCOUNTER
Caller: Patient    Doctor: Barbara Kocher    Reason for call: Patient returning call from nurse.    Please assist    Call back#: [phone]

## 2024-12-10 NOTE — TELEPHONE ENCOUNTER
Caller: Pt    Doctor: Barbara Kocher    Reason for call: Pt calling to schedule injection procedure. Pt last seen in office on 6/12/2024.    Please assist.       Call back#: [phone]

## 2024-12-10 NOTE — TELEPHONE ENCOUNTER
Attempted contact w/ pt. [facility] w/ c/b #, [place] and loc.    Pt has order from [facility] 6/12/24 for L4-5 ILESI was to be sched no sooner than Aug 2024.

## 2025-01-17 ENCOUNTER — HOSPITAL ENCOUNTER (OUTPATIENT)
Dept: RADIOLOGY | Facility: HOSPITAL | Age: 75
End: 2025-01-17
Payer: MEDICARE

## 2025-01-17 VITALS
TEMPERATURE: 97.5 F | DIASTOLIC BLOOD PRESSURE: 97 MMHG | HEART RATE: 97 BPM | RESPIRATION RATE: 18 BRPM | OXYGEN SATURATION: 96 % | SYSTOLIC BLOOD PRESSURE: 156 MMHG

## 2025-01-17 DIAGNOSIS — M54.16 LUMBAR RADICULOPATHY: ICD-10-CM

## 2025-01-17 DIAGNOSIS — M54.50 CHRONIC MIDLINE LOW BACK PAIN WITHOUT SCIATICA: ICD-10-CM

## 2025-01-17 DIAGNOSIS — G89.29 CHRONIC MIDLINE LOW BACK PAIN WITHOUT SCIATICA: ICD-10-CM

## 2025-01-17 PROCEDURE — 62323 NJX INTERLAMINAR LMBR/SAC: CPT | Performed by: ANESTHESIOLOGY

## 2025-01-17 RX ORDER — METHYLPREDNISOLONE ACETATE 80 MG/ML
80 INJECTION, SUSPENSION INTRA-ARTICULAR; INTRALESIONAL; INTRAMUSCULAR; PARENTERAL; SOFT TISSUE ONCE
Status: COMPLETED | OUTPATIENT
Start: 2025-01-17 | End: 2025-01-17

## 2025-01-17 RX ADMIN — METHYLPREDNISOLONE ACETATE 80 MG: 80 INJECTION, SUSPENSION INTRA-ARTICULAR; INTRALESIONAL; INTRAMUSCULAR; PARENTERAL; SOFT TISSUE at 09:01

## 2025-01-17 RX ADMIN — IOHEXOL 1 ML: 300 INJECTION, SOLUTION INTRAVENOUS at 09:00

## 2025-01-17 NOTE — DISCHARGE INSTR - LAB
Epidural Steroid Injection   WHAT YOU NEED TO KNOW:   An epidural steroid injection (DIANA) is a procedure to inject steroid medicine into the epidural space. The epidural space is between your spinal cord and vertebrae. Steroids reduce inflammation and fluid buildup in your spine that may be causing pain. You may be given pain medicine along with the steroids.          ACTIVITY  Do not drive or operate machinery today.  No strenuous activity today - bending, lifting, etc.  You may resume normal activites starting tomorrow - start slowly and as tolerated.  You may shower today, but no tub baths or hot tubs.  You may have numbness for several hours from the local anesthetic. Please use caution and common sense, especially with weight-bearing activities.    CARE OF THE INJECTION SITE  If you have soreness or pain, apply ice to the area today (20 minutes on/20 minutes off).  Starting tomorrow, you may use warm, moist heat or ice if needed.  You may have an increase or change in your discomfort for 36-48 hours after your treatment.  Apply ice and continue with any pain medication you have been prescribed.  Notify the [facility] if you have any of the following: redness, drainage, swelling, headache, stiff neck or fever above 100°F.    SPECIAL INSTRUCTIONS  Our office will contact you in approximately 14 days for a progress report.    MEDICATIONS  Continue to take all routine medications.  Our office may have instructed you to hold some medications.    As no general anesthesia was used in today's procedure, you should not experience any side effects related to anesthesia.     If you are diabetic, the steroids used in today's injection may temporarily increase your blood sugar levels after the first few days after your injection. Please keep a close eye on your sugars and alert the doctor who manages your diabetes if your sugars are significantly high from your baseline or you are symptomatic.     If you have a  problem specifically related to your procedure, please call our office at [phone].  Problems not related to your procedure should be directed to your primary care physician.

## 2025-01-17 NOTE — H&P
Assessment:  1. Chronic midline low back pain without sciatica  FL spine and pain procedure    FL spine and pain procedure      2. Lumbar radiculopathy  FL spine and pain procedure    FL spine and pain procedure          Plan:  Sharon Vega is a 74 y.o. female with complaints of low back and leg pain presents to surgical center for procedure.  We will perform a L4-L5 interlaminar epidural steroid injection  2. Follow-up 1 month after injection    Complete risks and benefits including bleeding, infection, tissue reaction, nerve injury and allergic reaction were discussed. The approach was demonstrated using models and literature was provided. Verbal and written consent was obtained.    My impressions and treatment recommendations were discussed in detail with the patient who verbalized understanding and had no further questions.  Discharge instructions were provided. I personally saw and examined the patient and I agree with the above discussed plan of care.    Orders Placed This Encounter   Procedures    FL spine and pain procedure     Standing Status:   Standing     Number of Occurrences:   1     Reason for Exam::   L4-5 IESI     Anticoagulant hold needed?:   no    Discharge Diet     Diet Type::   Regular    Activity as tolerated    Remove dressing in 24 hours    Call provider for:  persistent nausea or vomiting    Call provider for:  severe uncontrolled pain    Call provider for:  redness, tenderness, or signs of infection (pain, swelling, redness, odor or green/yellow discharge around incision site)    Call provider for: active or persistent bleeding    Call provider for:  difficulty breathing, headache or visual disturbances    Call provider for:  hives    Call provider for:  persistent dizziness or light-headedness    Call provider for:  extreme fatigue    Discharge patient     Standing Status:   Standing     Number of Occurrences:   1     Is this a planned readmission (within 30 days)?:   No     New  Medications Ordered This Visit   Medications    iohexol (OMNIPAQUE) 300 mg/mL injection 1 mL    methylPREDNISolone acetate (DEPO-MEDROL) injection 80 mg       History of Present Illness:  Sharon Vega is a 74 y.o. female who presents for a follow up office visit in regards to low back and leg pain.   The patient’s current symptoms include 8 or 10 sharp and stabbing throbbing pain time particular time pattern.      I have personally reviewed and/or updated the patient's past medical history, past surgical history, family history, social history, current medications, allergies, and vital signs today.     Review of Systems   Musculoskeletal:  Positive for arthralgias and back pain.   Neurological:  Positive for weakness.   All other systems reviewed and are negative.      Patient Active Problem List   Diagnosis    Lumbar degenerative disc disease    Lumbar spondylosis    Cervical radiculopathy    Cervical spondylosis    Rheumatoid arthritis involving both hands (HCC)    Spinal stenosis of lumbar region without neurogenic claudication    Closed transcervical fracture of right femur (HCC)    Fracture of right wrist    Hypertension    Leukemoid reaction    Acute blood loss anemia    Chronic pain syndrome    Lumbar radiculopathy    Chronic midline low back pain without sciatica    Neck pain       Past Medical History:   Diagnosis Date    Anemia     Anxiety     Arthritis     Colon polyp     Depression     GERD (gastroesophageal reflux disease)     Hiatal hernia     Hyperlipidemia     Hypertension        Past Surgical History:   Procedure Laterality Date    APPENDECTOMY      CHOLECYSTECTOMY      COLONOSCOPY      FRACTURE SURGERY Right     arm with plate and pins    HAND SURGERY Bilateral     HYSTERECTOMY      JOINT REPLACEMENT Bilateral     knees    [place] HEMIARTHROPLASTY HIP PARTIAL Right 07/02/2022    Procedure: HEMIARTHROPLASTY HIP (BIPOLAR), closed reduction with splinting right wrist;  Surgeon: Leo Roblero;   Location: [facility];  Service: Orthopedics    [place] NEUROPLASTY &/TRANSPOSITION ULNAR NERVE ELBOW Right 2023    Procedure: RELEASE CUBITAL TUNNEL;  Surgeon: Cody Calles DO;  Location: [facility];  Service: Orthopedics    SHOULDER ARTHROSCOPY Left        No family history on file.    Social History     Occupational History    Not on file   Tobacco Use    Smoking status: Former     Current packs/day: 0.00     Types: Cigarettes     Quit date:      Years since quittin.0    Smokeless tobacco: Never   Vaping Use    Vaping status: Never Used   Substance and Sexual Activity    Alcohol use: Not Currently    Drug use: Never    Sexual activity: Not Currently       Current Outpatient Medications on File Prior to Encounter   Medication Sig    acetaminophen (TYLENOL) 325 mg tablet Take 2 tablets (650 mg total) by mouth every 6 (six) hours as needed for mild pain    alendronate (FOSAMAX) 70 mg tablet Take 1 tablet (70 mg total) by mouth every 7 days Take on an empty stomach with a full glass of water, and do not lie down for 30 minutes after    amLODIPine (NORVASC) 10 mg tablet Take 10 mg by mouth daily    calcium carbonate (OS-ALPESH) 600 MG tablet Take 600 mg by mouth daily Taking 2 tablets (1200 mg) daily    celecoxib (CeleBREX) 200 mg capsule Take 1 capsule (200 mg total) by mouth 2 (two) times a day As needed for joint pain    cetirizine (ZyrTEC) 10 mg tablet Take 10 mg by mouth daily    cholecalciferol (VITAMIN D3) 1,000 units tablet Take 1,000 Units by mouth daily 2,000 units daily    gabapentin (NEURONTIN) 600 MG tablet Take 1 tablet (600 mg total) by mouth daily at bedtime    methylPREDNISolone 4 MG tablet therapy pack Use as directed on package    multivitamin (THERAGRAN) TABS Take 1 tablet by mouth daily    omeprazole (PriLOSEC) 20 mg delayed release capsule Take 20 mg by mouth daily 1 in am 1 in pm    pravastatin (PRAVACHOL) 40 mg tablet Take 40 mg by mouth daily One at bedtime    temazepam (RESTORIL)  7.5 mg capsule Take 15 mg by mouth daily at bedtime as needed for sleep      traZODone (DESYREL) 100 mg tablet Take 100 mg by mouth    venlafaxine (EFFEXOR-XR) 150 mg 24 hr capsule Take 150 mg by mouth daily     No current facility-administered medications on file prior to encounter.       No Known Allergies    Physical Exam:    /86   Pulse 92   Temp 97.5 °F (36.4 °C)   Resp 18   SpO2 96%     Constitutional:normal, well developed, well nourished, alert, in no distress and non-toxic and no overt pain behavior. and obese  Eyes:anicteric  HEENT:grossly intact  Neck:supple, symmetric, trachea midline and no masses   Pulmonary:even and unlabored  Cardiovascular:No edema or pitting edema present  Skin:Normal without rashes or lesions and well hydrated  Psychiatric:Mood and affect appropriate  Neurologic:Cranial Nerves II-XII grossly intact  Musculoskeletal:antalgic

## 2025-01-31 ENCOUNTER — TELEPHONE (OUTPATIENT)
Dept: PAIN MEDICINE | Facility: CLINIC | Age: 75
End: 2025-01-31

## 2025-01-31 NOTE — TELEPHONE ENCOUNTER
Caller: Patient   Doctor/office: Dr Pedraza  #: [phone]    % of improvement: 100%  Pain Scale (1-10): 0/10

## 2025-02-11 ENCOUNTER — APPOINTMENT (OUTPATIENT)
Dept: RADIOLOGY | Facility: CLINIC | Age: 75
End: 2025-02-11
Payer: MEDICARE

## 2025-02-11 ENCOUNTER — OFFICE VISIT (OUTPATIENT)
Dept: OBGYN CLINIC | Facility: CLINIC | Age: 75
End: 2025-02-11
Payer: MEDICARE

## 2025-02-11 VITALS — HEIGHT: 66 IN | WEIGHT: 205 LBS | BODY MASS INDEX: 32.95 KG/M2

## 2025-02-11 DIAGNOSIS — Z96.653 HX OF TOTAL KNEE REPLACEMENT, BILATERAL: Primary | ICD-10-CM

## 2025-02-11 DIAGNOSIS — Z96.653 HX OF TOTAL KNEE REPLACEMENT, BILATERAL: ICD-10-CM

## 2025-02-11 PROCEDURE — 73562 X-RAY EXAM OF KNEE 3: CPT

## 2025-02-11 PROCEDURE — 99213 OFFICE O/P EST LOW 20 MIN: CPT | Performed by: ORTHOPAEDIC SURGERY

## 2025-02-11 NOTE — PROGRESS NOTES
Assessment/Plan:   Diagnoses and all orders for this visit:    Hx of total knee replacement, bilateral  -     XR knee 3 vw right non injury; Future  -     XR knee 3 vw left non injury; Future    Reviewed physical exam and imaging with patient at time of visit. The patient has a history of bilateral total knee replacements. Radiographic findings demonstrate a well-seated prosthesis, with no signs of loosening. She continues to do well post-operatively. Continue weightbearing activities. She will follow-up in 1 year for an annual appointment, with repeat XR of her Bilateral knee. The patient expresses understanding and is in agreement with today's treatment plan.     The patient is doing quite well in regards to her bilateral total knee replacements.  There is full strength full motion.  No instability.  X-rays show anatomic placement of prosthesis.  Continue home exercise program.  Follow-up in 1 year for evaluation with new x-rays of bilateral knees/3 views each      Subjective:   Patient ID: Sharon Vega  1950     HPI  Patient is a 74 y.o. female who presents for annual evaluation of her bilateral knees. The patient has a history of bilateral TKA  from over 15 years ago. She continues to do well in regard to the knee. She denies pain in the knee. She walks without assistance. She denies instability or weakness.     The following portions of the patient's history were reviewed and updated as appropriate:  Past medical history, past surgical history, Family history, social history, current medications and allergies    Past Medical History:   Diagnosis Date    Anemia     Anxiety     Arthritis     Colon polyp     Depression     GERD (gastroesophageal reflux disease)     Hiatal hernia     Hyperlipidemia     Hypertension        Past Surgical History:   Procedure Laterality Date    APPENDECTOMY      CHOLECYSTECTOMY      COLONOSCOPY      FRACTURE SURGERY Right     arm with plate and pins    HAND SURGERY Bilateral      HYSTERECTOMY      JOINT REPLACEMENT Bilateral     knees    MA HEMIARTHROPLASTY HIP PARTIAL Right 2022    Procedure: HEMIARTHROPLASTY HIP (BIPOLAR), closed reduction with splinting right wrist;  Surgeon: Leo Roblero;  [facility]: [facility];  Service: Orthopedics    [place] NEUROPLASTY &/TRANSPOSITION ULNAR NERVE ELBOW Right 2023    Procedure: RELEASE CUBITAL TUNNEL;  Surgeon: Cody Calles DO;  Location: [facility];  Service: Orthopedics    SHOULDER ARTHROSCOPY Left        History reviewed. No pertinent family history.    Social History     Socioeconomic History    Marital status: /Civil Union     Spouse name: None    Number of children: None    Years of education: None    Highest education level: None   Occupational History    None   Tobacco Use    Smoking status: Former     Current packs/day: 0.00     Types: Cigarettes     Quit date:      Years since quittin.1    Smokeless tobacco: Never   Vaping Use    Vaping status: Never Used   Substance and Sexual Activity    Alcohol use: Not Currently    Drug use: Never    Sexual activity: Not Currently   Other Topics Concern    None   Social History Narrative    None     Social Drivers of Health     Financial Resource Strain: Not At Risk (2025)    Received from [facility]    Financial Insecurity     In the last 12 months did you skip medications to save money?: No     In the last 12 months was there a time when you needed to see a doctor but could not because of cost?: No   Food Insecurity: No Food Insecurity (2025)    Received from [facility]    Food Insecurity     In the last 12 months did you ever eat less than you felt you should because there wasn't enough money for food?: No   Transportation Needs: No Transportation Needs (2025)    Received from [facility]    Transportation Needs     In the last 12 months have you ever had to go without healthcare because you didn't have  a way to get there?: No   Physical Activity: Not on file   Stress: Not on file   Social Connections: Socially Isolated (1/23/2025)    Received from [facility]    Social Connection     Do you often feel lonely?: Yes   Intimate Partner Violence: Not on file   Housing Stability: Not At Risk (1/23/2025)    Received from [facility]    Housing Stability     Are you worried that in the next 2 months you may not have stable housing?: No         Current Outpatient Medications:     acetaminophen (TYLENOL) 325 mg tablet, Take 2 tablets (650 mg total) by mouth every 6 (six) hours as needed for mild pain, Disp: , Rfl: 0    amLODIPine (NORVASC) 10 mg tablet, Take 10 mg by mouth daily, Disp: , Rfl:     calcium carbonate (OS-ALPESH) 600 MG tablet, Take 600 mg by mouth daily Taking 2 tablets (1200 mg) daily, Disp: , Rfl:     celecoxib (CeleBREX) 200 mg capsule, Take 1 capsule (200 mg total) by mouth 2 (two) times a day As needed for joint pain, Disp: 180 capsule, Rfl: 3    cetirizine (ZyrTEC) 10 mg tablet, Take 10 mg by mouth daily, Disp: , Rfl:     cholecalciferol (VITAMIN D3) 1,000 units tablet, Take 1,000 Units by mouth daily 2,000 units daily, Disp: , Rfl:     gabapentin (NEURONTIN) 600 MG tablet, Take 1 tablet (600 mg total) by mouth daily at bedtime, Disp: 30 tablet, Rfl: 0    multivitamin (THERAGRAN) TABS, Take 1 tablet by mouth daily, Disp: , Rfl:     omeprazole (PriLOSEC) 20 mg delayed release capsule, Take 20 mg by mouth daily 1 in am 1 in pm, Disp: , Rfl:     pravastatin (PRAVACHOL) 40 mg tablet, Take 40 mg by mouth daily One at bedtime, Disp: , Rfl:     temazepam (RESTORIL) 7.5 mg capsule, Take 15 mg by mouth daily at bedtime as needed for sleep  , Disp: , Rfl:     venlafaxine (EFFEXOR-XR) 150 mg 24 hr capsule, Take 150 mg by mouth daily, Disp: , Rfl:     alendronate (FOSAMAX) 70 mg tablet, Take 1 tablet (70 mg total) by mouth every 7 days Take on an empty stomach with a full glass of  "water, and do not lie down for 30 minutes after, Disp: 12 tablet, Rfl: 4    methylPREDNISolone 4 MG tablet therapy pack, Use as directed on package, Disp: 1 each, Rfl: 0    No Known Allergies    Review of Systems   Constitutional:  Negative for chills, fever and unexpected weight change.   HENT:  Negative for hearing loss, nosebleeds and sore throat.    Eyes:  Negative for pain, redness and visual disturbance.   Respiratory:  Negative for cough, shortness of breath and wheezing.    Cardiovascular:  Negative for chest pain, palpitations and leg swelling.   Gastrointestinal:  Negative for abdominal pain, nausea and vomiting.   Endocrine: Negative for polydipsia and polyuria.   Genitourinary:  Negative for dysuria and hematuria.   Skin:  Negative for rash and wound.   Neurological:  Negative for dizziness, numbness and headaches.   Psychiatric/Behavioral:  Negative for decreased concentration and suicidal ideas. The patient is not nervous/anxious.    All other systems reviewed and are negative.       Objective:  Ht 5' 6\" (1.676 m)   Wt 93 kg (205 lb)   BMI 33.09 kg/m²     Ortho Exam  bilateral knee(s) -   Patient ambulates with steady gait pattern  Uses No assistive device  No anatomical deformity  Skin is warm and dry to touch with no signs of erythema, ecchymosis, or infection   No soft tissue swelling or effusion noted  ROM (0° - 120°)   Strength: 5/5 throughout  No tenderness to palpation on exam  Flexor and extensor mechanisms are intact   Knee is stable to varus and valgus stress  - Lachman's  - Anterior Drawer, - Posterior Drawer  - Clare's  Patella tracks centrally without palpable crepitus  Calf compartments are soft and supple  - Alexus's sign  2+ DP and PT pulses with brisk capillary refill to the toes  Sural, saphenous, tibial, superficial, and deep peroneal motor and sensory distributions intact  Sensation light touch intact distally      Physical Exam  HENT:      Head: Normocephalic and atraumatic.    "   Nose: Nose normal.   Eyes:      Conjunctiva/sclera: Conjunctivae normal.   Cardiovascular:      Rate and Rhythm: Normal rate.   Pulmonary:      Effort: Pulmonary effort is normal.   Musculoskeletal:      Cervical back: Neck supple.   Skin:     General: Skin is warm and dry.      Capillary Refill: Capillary refill takes less than 2 seconds.   Neurological:      Mental Status: She is alert and oriented to person, place, and time.   Psychiatric:         Mood and Affect: Mood normal.         Behavior: Behavior normal.          Diagnostic Test Review:  X-Ray of bilateral knees obtained on 2/11/2025 were reviewed and demonstrate well-seated total knee prosthesis with maintained anatomical alignment and no signs of loosening.      Procedures   None performed at time of visit.       Scribe Attestation      I,:  Gagandeep Doe am acting as a scribe while in the presence of the attending physician.:       I,:  Cody Calles, DO personally performed the services described in this documentation    as scribed in my presence.:

## 2025-02-24 NOTE — PLAN OF CARE
Problem: Nutrition/Hydration-ADULT  Goal: Nutrient/Hydration intake appropriate for improving, restoring or maintaining nutritional needs  Description: Monitor and assess patient's nutrition/hydration status for malnutrition  Collaborate with interdisciplinary team and initiate plan and interventions as ordered  Monitor patient's weight and dietary intake as ordered or per policy  Utilize nutrition screening tool and intervene as necessary  Determine patient's food preferences and provide high-protein, high-caloric foods as appropriate       INTERVENTIONS:  - Monitor oral intake, urinary output, labs, and treatment plans  - Assess nutrition and hydration status and recommend course of action  - Evaluate amount of meals eaten  - Assist patient with eating if necessary   - Allow adequate time for meals  - Recommend/ encourage appropriate diets, oral nutritional supplements, and vitamin/mineral supplements  - Order, calculate, and assess calorie counts as needed  - Recommend, monitor, and adjust tube feedings and TPN/PPN based on assessed needs  - Assess need for intravenous fluids  - Provide specific nutrition/hydration education as appropriate  - Include patient/family/caregiver in decisions related to nutrition  Outcome: Progressing Patient needs 1.5 L fluid restriction per Nephrology recommendations.  Patient needs 1.5 L fluid restriction per Nephrology recommendations.

## 2025-02-26 ENCOUNTER — OFFICE VISIT (OUTPATIENT)
Age: 75
End: 2025-02-26
Payer: MEDICARE

## 2025-02-26 ENCOUNTER — TELEPHONE (OUTPATIENT)
Dept: PAIN MEDICINE | Facility: CLINIC | Age: 75
End: 2025-02-26

## 2025-02-26 VITALS — HEIGHT: 66 IN | BODY MASS INDEX: 31.66 KG/M2 | WEIGHT: 197 LBS

## 2025-02-26 DIAGNOSIS — M54.50 CHRONIC MIDLINE LOW BACK PAIN WITHOUT SCIATICA: ICD-10-CM

## 2025-02-26 DIAGNOSIS — M46.1 SACROILIITIS (HCC): ICD-10-CM

## 2025-02-26 DIAGNOSIS — G89.29 CHRONIC MIDLINE LOW BACK PAIN WITHOUT SCIATICA: ICD-10-CM

## 2025-02-26 DIAGNOSIS — G89.4 CHRONIC PAIN SYNDROME: Primary | ICD-10-CM

## 2025-02-26 DIAGNOSIS — M47.816 LUMBAR SPONDYLOSIS: ICD-10-CM

## 2025-02-26 PROCEDURE — 99214 OFFICE O/P EST MOD 30 MIN: CPT | Performed by: NURSE PRACTITIONER

## 2025-02-26 NOTE — PROGRESS NOTES
Assessment:  1. Chronic pain syndrome    2. Chronic midline low back pain without sciatica    3. Sacroiliitis (HCC)    4. Lumbar spondylosis        Plan:  While the patient was in the office today, I did have a thorough conversation regarding their chronic pain syndrome, medication management, and treatment plan options.  Patient is being seen for a follow-up visit.  She underwent an L4-5 interlaminar epidural steroid injection on 1/17/2025.  She is reporting about 90% improvement in her back and leg pain.  Her biggest complaint today is increasing right sided low back pain radiating to the right buttock.  On physical exam, she demonstrates findings consistent with right-sided sacroiliitis.  As such, she will be scheduled for a diagnostic/therapeutic right sided sacroiliac joint injection in the near future.    Complete risks and benefits including bleeding, infection, tissue reaction, nerve injury and allergic reaction were discussed. The approach was demonstrated using models and literature was provided. Verbal and written consent was obtained.    Continue Celebrex 100 mg twice daily as prescribed by her rheumatologist.    Continue gabapentin 600 mg at bedtime as prescribed by her PCP.    Follow-up 1 month after injection.      History of Present Illness:  The patient is a 74 y.o. female who presents for a follow up office visit in regards to Back Pain.   The patient’s current symptoms include plaints of primarily right sided low back pain.  Current pain level is a 3/10.  Pain increases with activity.  Quality pain is described as dull and aching.    Current pain medications includes: Rheumatology prescribes Celebrex 100 mg twice daily.  PCP prescribes gabapentin 600 mg at bedtime .      I have personally reviewed and/or updated the patient's past medical history, past surgical history, family history, social history, current medications, allergies, and vital signs today.         Review of Systems  Review of  Systems   Constitutional:  Negative for chills and fever.   HENT:  Negative for ear pain and sore throat.    Eyes:  Negative for pain and visual disturbance.   Respiratory:  Negative for cough and shortness of breath.    Cardiovascular:  Negative for chest pain and palpitations.   Gastrointestinal:  Negative for abdominal pain and vomiting.   Genitourinary:  Negative for dysuria and hematuria.   Musculoskeletal:  Positive for gait problem. Negative for arthralgias and back pain.        Pain in lower right back   Skin:  Negative for color change and rash.   Neurological:  Negative for seizures and syncope.   All other systems reviewed and are negative.        Past Medical History:   Diagnosis Date    Anemia     Anxiety     Arthritis     Colon polyp     Depression     GERD (gastroesophageal reflux disease)     Hiatal hernia     Hyperlipidemia     Hypertension        Past Surgical History:   Procedure Laterality Date    APPENDECTOMY      CHOLECYSTECTOMY      COLONOSCOPY      FRACTURE SURGERY Right     arm with plate and pins    HAND SURGERY Bilateral     HYSTERECTOMY      JOINT REPLACEMENT Bilateral     knees    [place] HEMIARTHROPLASTY HIP PARTIAL Right 2022    Procedure: HEMIARTHROPLASTY HIP (BIPOLAR), closed reduction with splinting right wrist;  Surgeon: Leo Roblero;  Location: [facility];  Service: Orthopedics    [place] NEUROPLASTY &/TRANSPOSITION ULNAR NERVE ELBOW Right 2023    Procedure: RELEASE CUBITAL TUNNEL;  Surgeon: Cody Calles DO;  Location: [facility];  Service: Orthopedics    SHOULDER ARTHROSCOPY Left        History reviewed. No pertinent family history.    Social History     Occupational History    Not on file   Tobacco Use    Smoking status: Former     Current packs/day: 0.00     Types: Cigarettes     Quit date:      Years since quittin.1    Smokeless tobacco: Never   Vaping Use    Vaping status: Never Used   Substance and Sexual Activity    Alcohol use: Not Currently    Drug use:  "Never    Sexual activity: Not Currently         Current Outpatient Medications:     amLODIPine (NORVASC) 10 mg tablet, Take 10 mg by mouth daily, Disp: , Rfl:     calcium carbonate (OS-ALPESH) 600 MG tablet, Take 600 mg by mouth daily Taking 2 tablets (1200 mg) daily, Disp: , Rfl:     celecoxib (CeleBREX) 200 mg capsule, Take 1 capsule (200 mg total) by mouth 2 (two) times a day As needed for joint pain, Disp: 180 capsule, Rfl: 3    cetirizine (ZyrTEC) 10 mg tablet, Take 10 mg by mouth daily, Disp: , Rfl:     cholecalciferol (VITAMIN D3) 1,000 units tablet, Take 1,000 Units by mouth daily 2,000 units daily, Disp: , Rfl:     gabapentin (NEURONTIN) 600 MG tablet, Take 1 tablet (600 mg total) by mouth daily at bedtime, Disp: 30 tablet, Rfl: 0    multivitamin (THERAGRAN) TABS, Take 1 tablet by mouth daily, Disp: , Rfl:     omeprazole (PriLOSEC) 20 mg delayed release capsule, Take 20 mg by mouth daily 1 in am 1 in pm, Disp: , Rfl:     pravastatin (PRAVACHOL) 40 mg tablet, Take 40 mg by mouth daily One at bedtime, Disp: , Rfl:     temazepam (RESTORIL) 7.5 mg capsule, Take 15 mg by mouth daily at bedtime as needed for sleep  , Disp: , Rfl:     venlafaxine (EFFEXOR-XR) 150 mg 24 hr capsule, Take 150 mg by mouth daily, Disp: , Rfl:     acetaminophen (TYLENOL) 325 mg tablet, Take 2 tablets (650 mg total) by mouth every 6 (six) hours as needed for mild pain, Disp: , Rfl: 0    alendronate (FOSAMAX) 70 mg tablet, Take 1 tablet (70 mg total) by mouth every 7 days Take on an empty stomach with a full glass of water, and do not lie down for 30 minutes after, Disp: 12 tablet, Rfl: 4    methylPREDNISolone 4 MG tablet therapy pack, Use as directed on package, Disp: 1 each, Rfl: 0    No Known Allergies    Physical Exam:    Ht 5' 6\" (1.676 m)   Wt 89.4 kg (197 lb)   BMI 31.80 kg/m²     Constitutional:normal, well developed, well nourished, alert, in no distress and non-toxic and no overt pain behavior.  Eyes:anicteric  HEENT:grossly " intact  Neck:supple, symmetric, trachea midline and no masses   Pulmonary:even and unlabored  Cardiovascular:No edema or pitting edema present  Skin:Normal without rashes or lesions and well hydrated  Psychiatric:Mood and affect appropriate  Neurologic:Cranial Nerves II-XII grossly intact  Musculoskeletal: Tenderness over the right sacroiliac joint.  Compression test, distraction test, sacral thrust are positive on the right.    Imaging  FL spine and pain procedure    (Results Pending)       Orders Placed This Encounter   Procedures    FL spine and pain procedure

## 2025-03-18 ENCOUNTER — APPOINTMENT (OUTPATIENT)
Dept: RADIOLOGY | Facility: CLINIC | Age: 75
End: 2025-03-18
Payer: MEDICARE

## 2025-03-18 ENCOUNTER — OFFICE VISIT (OUTPATIENT)
Dept: OBGYN CLINIC | Facility: CLINIC | Age: 75
End: 2025-03-18
Payer: MEDICARE

## 2025-03-18 VITALS — BODY MASS INDEX: 31.66 KG/M2 | WEIGHT: 197 LBS | HEIGHT: 66 IN

## 2025-03-18 DIAGNOSIS — Z96.649 S/P HIP HEMIARTHROPLASTY: ICD-10-CM

## 2025-03-18 DIAGNOSIS — M16.11 PRIMARY OSTEOARTHRITIS OF RIGHT HIP: ICD-10-CM

## 2025-03-18 DIAGNOSIS — Z96.649 S/P HIP HEMIARTHROPLASTY: Primary | ICD-10-CM

## 2025-03-18 DIAGNOSIS — Z96.641 HISTORY OF RIGHT HIP REPLACEMENT: ICD-10-CM

## 2025-03-18 PROCEDURE — 73502 X-RAY EXAM HIP UNI 2-3 VIEWS: CPT

## 2025-03-18 PROCEDURE — 99213 OFFICE O/P EST LOW 20 MIN: CPT | Performed by: ORTHOPAEDIC SURGERY

## 2025-03-18 NOTE — PROGRESS NOTES
ASSESSMENT/PLAN:    Diagnoses and all orders for this visit:    S/P hip hemiarthroplasty  -     XR hip/pelv 2-3 vws right if performed; Future  -     Ambulatory Referral to Interventional Radiology; Future  -     NM bone scan 3 phase; Future    History of right hip replacement  -     Ambulatory Referral to Interventional Radiology; Future  -     NM bone scan 3 phase; Future    Primary osteoarthritis of right hip  -     Ambulatory Referral to Interventional Radiology; Future  -     NM bone scan 3 phase; Future        X-rays of the patient's right hip are consistent with a right unipolar partial hip replacement.  There are arthritic changes present along the acetabulum.  Will order a bone scan to rule out aseptic loosening along the hip.  We will also refer her to interventional radiology for an intra-articular injection of her right hip region was also injected with with anesthetic and no cortisone.  She will follow-up with our office once both procedures are complete.  She is acceptable to this plan.    Return for bone scan results.    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  Would recommend obtaining an bone scan as well as an intra-articular injection with local anesthetic to see if this is a source of her problem.  Return back once this is complete.      _____________________________________________________  CHIEF COMPLAINT:  Chief Complaint   Patient presents with    Right Hip - Follow-up         SUBJECTIVE:  Sharon Vega is a 74 y.o. female who presents to our office complaining of right hip and groin pain.  The patient is status post right unipolar hemiarthroplasty from 7/2/2022 by Dr. Roblero.  The patient states that her pain is worsened with ambulation and prolonged activity.  She denies any numbness or tingling.  She denies any fever or chills.    The following portions of the patient's history were reviewed and updated as appropriate: allergies,  current medications, past family history, past medical history, past social history, past surgical history and problem list.    PAST MEDICAL HISTORY:  Past Medical History:   Diagnosis Date    Anemia     Anxiety     Arthritis     Colon polyp     Depression     GERD (gastroesophageal reflux disease)     Hiatal hernia     Hyperlipidemia     Hypertension        PAST SURGICAL HISTORY:  Past Surgical History:   Procedure Laterality Date    APPENDECTOMY      CHOLECYSTECTOMY      COLONOSCOPY      FRACTURE SURGERY Right     arm with plate and pins    HAND SURGERY Bilateral     HYSTERECTOMY      JOINT REPLACEMENT Bilateral     knees    [place] HEMIARTHROPLASTY HIP PARTIAL Right 2022    Procedure: HEMIARTHROPLASTY HIP (BIPOLAR), closed reduction with splinting right wrist;  Surgeon: Leo Roblero;  Location: [facility];  Service: Orthopedics    [place] NEUROPLASTY &/TRANSPOSITION ULNAR NERVE ELBOW Right 2023    Procedure: RELEASE CUBITAL TUNNEL;  Surgeon: Cody Calles DO;  Location: [facility];  Service: Orthopedics    SHOULDER ARTHROSCOPY Left        FAMILY HISTORY:  History reviewed. No pertinent family history.    SOCIAL HISTORY:  Social History     Tobacco Use    Smoking status: Former     Current packs/day: 0.00     Types: Cigarettes     Quit date:      Years since quittin.2    Smokeless tobacco: Never   Vaping Use    Vaping status: Never Used   Substance Use Topics    Alcohol use: Not Currently    Drug use: Never       MEDICATIONS:    Current Outpatient Medications:     acetaminophen (TYLENOL) 325 mg tablet, Take 2 tablets (650 mg total) by mouth every 6 (six) hours as needed for mild pain, Disp: , Rfl: 0    amLODIPine (NORVASC) 10 mg tablet, Take 10 mg by mouth daily, Disp: , Rfl:     calcium carbonate (OS-ALPESH) 600 MG tablet, Take 600 mg by mouth daily Taking 2 tablets (1200 mg) daily, Disp: , Rfl:     celecoxib (CeleBREX) 200 mg capsule, Take 1 capsule (200 mg total) by mouth 2 (two) times a day As  "needed for joint pain, Disp: 180 capsule, Rfl: 3    cetirizine (ZyrTEC) 10 mg tablet, Take 10 mg by mouth daily, Disp: , Rfl:     cholecalciferol (VITAMIN D3) 1,000 units tablet, Take 1,000 Units by mouth daily 2,000 units daily, Disp: , Rfl:     gabapentin (NEURONTIN) 600 MG tablet, Take 1 tablet (600 mg total) by mouth daily at bedtime, Disp: 30 tablet, Rfl: 0    multivitamin (THERAGRAN) TABS, Take 1 tablet by mouth daily, Disp: , Rfl:     omeprazole (PriLOSEC) 20 mg delayed release capsule, Take 20 mg by mouth daily 1 in am 1 in pm, Disp: , Rfl:     pravastatin (PRAVACHOL) 40 mg tablet, Take 40 mg by mouth daily One at bedtime, Disp: , Rfl:     temazepam (RESTORIL) 7.5 mg capsule, Take 15 mg by mouth daily at bedtime as needed for sleep  , Disp: , Rfl:     venlafaxine (EFFEXOR-XR) 150 mg 24 hr capsule, Take 150 mg by mouth daily, Disp: , Rfl:     alendronate (FOSAMAX) 70 mg tablet, Take 1 tablet (70 mg total) by mouth every 7 days Take on an empty stomach with a full glass of water, and do not lie down for 30 minutes after, Disp: 12 tablet, Rfl: 4    methylPREDNISolone 4 MG tablet therapy pack, Use as directed on package, Disp: 1 each, Rfl: 0    ALLERGIES:  No Known Allergies    ROS:  Review of Systems     Constitutional: Negative for fatigue, fever or loss of appetite.   HENT: Negative.    Respiratory: Negative for shortness of breath, dyspnea.    Cardiovascular: Negative for chest pain/tightness.   Gastrointestinal: Negative for abdominal pain, N/V.   Endocrine: Negative for cold/heat intolerance, unexplained weight loss/gain.   Genitourinary: Negative for flank pain, dysuria, hematuria.   Musculoskeletal: Positive for arthralgia   Skin: Negative for rash.    Neurological: Negative for numbness or tingling  Psychiatric/Behavioral: Negative for agitation.  _____________________________________________________  PHYSICAL EXAMINATION:    Height 5' 6\" (1.676 m), weight 89.4 kg (197 lb).    Constitutional: Oriented " "to person, place, and time. Appears well-developed and well-nourished. No distress.   HENT:   Head: Normocephalic.   Eyes: Conjunctivae are normal. Right eye exhibits no discharge. Left eye exhibits no discharge. No scleral icterus.   Cardiovascular: Normal rate.    Pulmonary/Chest: Effort normal.   Neurological: Alert and oriented to person, place, and time.   Skin: Skin is warm and dry. No rash noted. Not diaphoretic. No erythema. No pallor.   Psychiatric: Normal mood and affect. Behavior is normal. Judgment and thought content normal.      MUSCULOSKELETAL EXAMINATION:   Physical Exam  Ortho Exam    Right lower extremity is neurovascular intact  Toes are pink and mobile  Compartments are soft  Pain with passive range of motion of hip  Pain to palpation along hip and groin  Brisk cap refill  Sensation intact  Objective:  BP Readings from Last 1 Encounters:   01/17/25 156/97      Wt Readings from Last 1 Encounters:   03/18/25 89.4 kg (197 lb)        BMI:   Estimated body mass index is 31.8 kg/m² as calculated from the following:    Height as of this encounter: 5' 6\" (1.676 m).    Weight as of this encounter: 89.4 kg (197 lb).        Scribe Attestation      I,:  Osbaldo Strange PA-C am acting as a scribe while in the presence of the attending physician.:       I,:  Cody Calles, DO personally performed the services described in this documentation    as scribed in my presence.:            "

## 2025-03-26 ENCOUNTER — HOSPITAL ENCOUNTER (OUTPATIENT)
Dept: NUCLEAR MEDICINE | Facility: HOSPITAL | Age: 75
Discharge: HOME | End: 2025-03-26
Attending: PHYSICIAN ASSISTANT
Payer: MEDICARE

## 2025-03-26 DIAGNOSIS — M16.11 PRIMARY OSTEOARTHRITIS OF RIGHT HIP: ICD-10-CM

## 2025-03-26 DIAGNOSIS — Z96.641 HISTORY OF RIGHT HIP REPLACEMENT: ICD-10-CM

## 2025-03-26 DIAGNOSIS — Z96.649 S/P HIP HEMIARTHROPLASTY: ICD-10-CM

## 2025-03-26 PROCEDURE — 78315 BONE IMAGING 3 PHASE: CPT

## 2025-03-26 PROCEDURE — A9503 TC99M MEDRONATE: HCPCS

## 2025-03-31 ENCOUNTER — TELEPHONE (OUTPATIENT)
Age: 75
End: 2025-03-31

## 2025-03-31 NOTE — TELEPHONE ENCOUNTER
Caller: Sharon     Doctor: Dr. Silva     Reason for call: pt calling to reschedule procedure     Call back#: [phone]

## 2025-04-08 NOTE — PRE-PROCEDURE INSTRUCTIONS
Call placed to patient to discuss upcoming right hip injection at [facility]. Allergies reviewed and verified patient does not currently take any anticoagulant medications. Pre-procedure instructions discussed with patient. Patient instructed that she may eat normally and take medications as usual before the procedure. Procedure and post procedure expectations and instructions reviewed with the patient. Reminder given to patient of appointment date and time of Monday. April 21st at 1:45 pm. Patient verbalizes understanding and denies any questions at this time.

## 2025-04-11 ENCOUNTER — HOSPITAL ENCOUNTER (OUTPATIENT)
Dept: NUCLEAR MEDICINE | Facility: HOSPITAL | Age: 75
End: 2025-04-11
Payer: MEDICARE

## 2025-04-11 DIAGNOSIS — K31.89 RETAINED FOOD IN STOMACH: ICD-10-CM

## 2025-04-11 PROCEDURE — 78264 GASTRIC EMPTYING IMG STUDY: CPT

## 2025-04-11 PROCEDURE — A9541 TC99M SULFUR COLLOID: HCPCS

## 2025-04-21 ENCOUNTER — APPOINTMENT (OUTPATIENT)
Dept: RADIOLOGY | Facility: HOSPITAL | Age: 75
End: 2025-04-21
Attending: PHYSICIAN ASSISTANT
Payer: MEDICARE

## 2025-04-21 ENCOUNTER — HOSPITAL ENCOUNTER (OUTPATIENT)
Dept: RADIOLOGY | Facility: HOSPITAL | Age: 75
Discharge: HOME | End: 2025-04-21
Attending: ORTHOPAEDIC SURGERY
Payer: MEDICARE

## 2025-04-21 DIAGNOSIS — Z96.649 S/P HIP HEMIARTHROPLASTY: ICD-10-CM

## 2025-04-21 PROCEDURE — 77002 NEEDLE LOCALIZATION BY XRAY: CPT

## 2025-04-21 PROCEDURE — 20610 DRAIN/INJ JOINT/BURSA W/O US: CPT

## 2025-04-21 RX ORDER — ROPIVACAINE HYDROCHLORIDE 2 MG/ML
20 INJECTION, SOLUTION EPIDURAL; INFILTRATION; PERINEURAL
Status: DISCONTINUED | OUTPATIENT
Start: 2025-04-21 | End: 2025-04-21

## 2025-04-21 RX ORDER — LIDOCAINE HYDROCHLORIDE 10 MG/ML
3 INJECTION, SOLUTION EPIDURAL; INFILTRATION; INTRACAUDAL; PERINEURAL
Status: COMPLETED | OUTPATIENT
Start: 2025-04-21 | End: 2025-04-21

## 2025-04-21 RX ORDER — ROPIVACAINE HYDROCHLORIDE 2 MG/ML
20 INJECTION, SOLUTION EPIDURAL; INFILTRATION; PERINEURAL ONCE
Status: COMPLETED | OUTPATIENT
Start: 2025-04-21 | End: 2025-04-21

## 2025-04-21 RX ADMIN — LIDOCAINE HYDROCHLORIDE 3 ML: 10 INJECTION, SOLUTION EPIDURAL; INFILTRATION; INTRACAUDAL; PERINEURAL at 13:58

## 2025-04-21 RX ADMIN — ROPIVACAINE HYDROCHLORIDE 2 ML: 2 INJECTION, SOLUTION EPIDURAL; INFILTRATION at 14:02

## 2025-04-21 RX ADMIN — IOHEXOL 1 ML: 300 INJECTION, SOLUTION INTRAVENOUS at 14:01

## 2025-04-24 ENCOUNTER — HOSPITAL ENCOUNTER (INPATIENT)
Facility: HOSPITAL | Age: 75
LOS: 4 days | Discharge: NURSING, REHAB OR LONG-TERM CARE | DRG: 559 | End: 2025-04-29
Attending: EMERGENCY MEDICINE | Admitting: INTERNAL MEDICINE
Payer: MEDICARE

## 2025-04-24 ENCOUNTER — APPOINTMENT (OUTPATIENT)
Dept: INTERVENTIONAL RADIOLOGY/VASCULAR | Facility: HOSPITAL | Age: 75
DRG: 559 | End: 2025-04-24
Attending: PHYSICIAN ASSISTANT
Payer: MEDICARE

## 2025-04-24 DIAGNOSIS — A41.9 SEPSIS WITHOUT ACUTE ORGAN DYSFUNCTION, DUE TO UNSPECIFIED ORGANISM (HCC): ICD-10-CM

## 2025-04-24 DIAGNOSIS — M25.551 RIGHT HIP PAIN: Primary | ICD-10-CM

## 2025-04-24 DIAGNOSIS — D72.829 LEUKOCYTOSIS: ICD-10-CM

## 2025-04-24 LAB
ALBUMIN SERPL BCG-MCNC: 4.3 G/DL (ref 3.5–5)
ALP SERPL-CCNC: 33 U/L (ref 34–104)
ALT SERPL W P-5'-P-CCNC: 15 U/L (ref 7–52)
ANION GAP SERPL CALCULATED.3IONS-SCNC: 8 MMOL/L (ref 4–13)
AST SERPL W P-5'-P-CCNC: 18 U/L (ref 13–39)
BASOPHILS # BLD AUTO: 0.04 THOUSANDS/ÂΜL (ref 0–0.1)
BASOPHILS NFR BLD AUTO: 0 % (ref 0–1)
BILIRUB SERPL-MCNC: 0.56 MG/DL (ref 0.2–1)
BUN SERPL-MCNC: 18 MG/DL (ref 5–25)
CALCIUM SERPL-MCNC: 9.6 MG/DL (ref 8.4–10.2)
CHLORIDE SERPL-SCNC: 102 MMOL/L (ref 96–108)
CO2 SERPL-SCNC: 28 MMOL/L (ref 21–32)
CREAT SERPL-MCNC: 0.84 MG/DL (ref 0.6–1.3)
CRP SERPL QL: 85.3 MG/L
EOSINOPHIL # BLD AUTO: 0.09 THOUSAND/ÂΜL (ref 0–0.61)
EOSINOPHIL NFR BLD AUTO: 1 % (ref 0–6)
ERYTHROCYTE [DISTWIDTH] IN BLOOD BY AUTOMATED COUNT: 14.5 % (ref 11.6–15.1)
ERYTHROCYTE [SEDIMENTATION RATE] IN BLOOD: 35 MM/HOUR (ref 0–29)
GFR SERPL CREATININE-BSD FRML MDRD: 68 ML/MIN/1.73SQ M
GLUCOSE SERPL-MCNC: 125 MG/DL (ref 65–140)
HCT VFR BLD AUTO: 42.8 % (ref 34.8–46.1)
HGB BLD-MCNC: 13.6 G/DL (ref 11.5–15.4)
IMM GRANULOCYTES # BLD AUTO: 0.04 THOUSAND/UL (ref 0–0.2)
IMM GRANULOCYTES NFR BLD AUTO: 0 % (ref 0–2)
LACTATE SERPL-SCNC: 0.9 MMOL/L (ref 0.5–2)
LYMPHOCYTES # BLD AUTO: 1.13 THOUSANDS/ÂΜL (ref 0.6–4.47)
LYMPHOCYTES NFR BLD AUTO: 9 % (ref 14–44)
MCH RBC QN AUTO: 29.8 PG (ref 26.8–34.3)
MCHC RBC AUTO-ENTMCNC: 31.8 G/DL (ref 31.4–37.4)
MCV RBC AUTO: 94 FL (ref 82–98)
MONOCYTES # BLD AUTO: 1.39 THOUSAND/ÂΜL (ref 0.17–1.22)
MONOCYTES NFR BLD AUTO: 11 % (ref 4–12)
NEUTROPHILS # BLD AUTO: 9.88 THOUSANDS/ÂΜL (ref 1.85–7.62)
NEUTS SEG NFR BLD AUTO: 79 % (ref 43–75)
NRBC BLD AUTO-RTO: 0 /100 WBCS
PLATELET # BLD AUTO: 280 THOUSANDS/UL (ref 149–390)
PMV BLD AUTO: 10.2 FL (ref 8.9–12.7)
POTASSIUM SERPL-SCNC: 4 MMOL/L (ref 3.5–5.3)
PROCALCITONIN SERPL-MCNC: 0.16 NG/ML
PROT SERPL-MCNC: 7.3 G/DL (ref 6.4–8.4)
RBC # BLD AUTO: 4.57 MILLION/UL (ref 3.81–5.12)
SODIUM SERPL-SCNC: 138 MMOL/L (ref 135–147)
WBC # BLD AUTO: 12.57 THOUSAND/UL (ref 4.31–10.16)

## 2025-04-24 PROCEDURE — 99283 EMERGENCY DEPT VISIT LOW MDM: CPT

## 2025-04-24 PROCEDURE — 80053 COMPREHEN METABOLIC PANEL: CPT | Performed by: EMERGENCY MEDICINE

## 2025-04-24 PROCEDURE — 99222 1ST HOSP IP/OBS MODERATE 55: CPT | Performed by: NURSE PRACTITIONER

## 2025-04-24 PROCEDURE — 87040 BLOOD CULTURE FOR BACTERIA: CPT | Performed by: EMERGENCY MEDICINE

## 2025-04-24 PROCEDURE — 0S993ZZ DRAINAGE OF RIGHT HIP JOINT, PERCUTANEOUS APPROACH: ICD-10-PCS | Performed by: RADIOLOGY

## 2025-04-24 PROCEDURE — 99285 EMERGENCY DEPT VISIT HI MDM: CPT | Performed by: EMERGENCY MEDICINE

## 2025-04-24 PROCEDURE — 85652 RBC SED RATE AUTOMATED: CPT | Performed by: EMERGENCY MEDICINE

## 2025-04-24 PROCEDURE — 77002 NEEDLE LOCALIZATION BY XRAY: CPT

## 2025-04-24 PROCEDURE — NC001 PR NO CHARGE: Performed by: RADIOLOGY

## 2025-04-24 PROCEDURE — 20610 DRAIN/INJ JOINT/BURSA W/O US: CPT

## 2025-04-24 PROCEDURE — 86140 C-REACTIVE PROTEIN: CPT | Performed by: EMERGENCY MEDICINE

## 2025-04-24 PROCEDURE — 96374 THER/PROPH/DIAG INJ IV PUSH: CPT

## 2025-04-24 PROCEDURE — 87081 CULTURE SCREEN ONLY: CPT | Performed by: NURSE PRACTITIONER

## 2025-04-24 PROCEDURE — 85025 COMPLETE CBC W/AUTO DIFF WBC: CPT | Performed by: EMERGENCY MEDICINE

## 2025-04-24 PROCEDURE — 36415 COLL VENOUS BLD VENIPUNCTURE: CPT | Performed by: EMERGENCY MEDICINE

## 2025-04-24 PROCEDURE — NC001 PR NO CHARGE: Performed by: PHYSICIAN ASSISTANT

## 2025-04-24 PROCEDURE — 87070 CULTURE OTHR SPECIMN AEROBIC: CPT | Performed by: PHYSICIAN ASSISTANT

## 2025-04-24 PROCEDURE — 84145 PROCALCITONIN (PCT): CPT | Performed by: EMERGENCY MEDICINE

## 2025-04-24 PROCEDURE — 20610 DRAIN/INJ JOINT/BURSA W/O US: CPT | Performed by: RADIOLOGY

## 2025-04-24 PROCEDURE — 83605 ASSAY OF LACTIC ACID: CPT | Performed by: EMERGENCY MEDICINE

## 2025-04-24 PROCEDURE — 87205 SMEAR GRAM STAIN: CPT | Performed by: PHYSICIAN ASSISTANT

## 2025-04-24 PROCEDURE — 77002 NEEDLE LOCALIZATION BY XRAY: CPT | Performed by: RADIOLOGY

## 2025-04-24 RX ORDER — ACETAMINOPHEN 325 MG/1
975 TABLET ORAL EVERY 8 HOURS SCHEDULED
Status: DISCONTINUED | OUTPATIENT
Start: 2025-04-24 | End: 2025-04-29 | Stop reason: HOSPADM

## 2025-04-24 RX ORDER — TEMAZEPAM 7.5 MG/1
15 CAPSULE ORAL
Status: DISCONTINUED | OUTPATIENT
Start: 2025-04-24 | End: 2025-04-29 | Stop reason: HOSPADM

## 2025-04-24 RX ORDER — VANCOMYCIN HYDROCHLORIDE 750 MG/150ML
12.5 INJECTION, SOLUTION INTRAVENOUS EVERY 12 HOURS
Status: DISCONTINUED | OUTPATIENT
Start: 2025-04-25 | End: 2025-04-26

## 2025-04-24 RX ORDER — LIDOCAINE WITH 8.4% SOD BICARB 0.9%(10ML)
SYRINGE (ML) INJECTION AS NEEDED
Status: COMPLETED | OUTPATIENT
Start: 2025-04-24 | End: 2025-04-24

## 2025-04-24 RX ORDER — OXYCODONE HYDROCHLORIDE 10 MG/1
10 TABLET ORAL EVERY 4 HOURS PRN
Refills: 0 | Status: DISCONTINUED | OUTPATIENT
Start: 2025-04-24 | End: 2025-04-29 | Stop reason: HOSPADM

## 2025-04-24 RX ORDER — AMLODIPINE BESYLATE 10 MG/1
10 TABLET ORAL DAILY
Status: DISCONTINUED | OUTPATIENT
Start: 2025-04-25 | End: 2025-04-29 | Stop reason: HOSPADM

## 2025-04-24 RX ORDER — HEPARIN SODIUM 5000 [USP'U]/ML
5000 INJECTION, SOLUTION INTRAVENOUS; SUBCUTANEOUS EVERY 8 HOURS SCHEDULED
Status: DISCONTINUED | OUTPATIENT
Start: 2025-04-24 | End: 2025-04-24

## 2025-04-24 RX ORDER — GABAPENTIN 600 MG/1
600 TABLET ORAL
Status: DISCONTINUED | OUTPATIENT
Start: 2025-04-24 | End: 2025-04-29 | Stop reason: HOSPADM

## 2025-04-24 RX ORDER — PRAVASTATIN SODIUM 40 MG
40 TABLET ORAL DAILY
Status: DISCONTINUED | OUTPATIENT
Start: 2025-04-24 | End: 2025-04-29 | Stop reason: HOSPADM

## 2025-04-24 RX ORDER — CELECOXIB 100 MG/1
200 CAPSULE ORAL 2 TIMES DAILY
Status: DISCONTINUED | OUTPATIENT
Start: 2025-04-24 | End: 2025-04-29 | Stop reason: HOSPADM

## 2025-04-24 RX ORDER — HEPARIN SODIUM 5000 [USP'U]/ML
5000 INJECTION, SOLUTION INTRAVENOUS; SUBCUTANEOUS EVERY 8 HOURS SCHEDULED
Status: DISCONTINUED | OUTPATIENT
Start: 2025-04-24 | End: 2025-04-29 | Stop reason: HOSPADM

## 2025-04-24 RX ORDER — PANTOPRAZOLE SODIUM 40 MG/1
40 TABLET, DELAYED RELEASE ORAL
Status: DISCONTINUED | OUTPATIENT
Start: 2025-04-25 | End: 2025-04-29 | Stop reason: HOSPADM

## 2025-04-24 RX ORDER — LORATADINE 10 MG/1
10 TABLET ORAL DAILY
Status: DISCONTINUED | OUTPATIENT
Start: 2025-04-25 | End: 2025-04-29 | Stop reason: HOSPADM

## 2025-04-24 RX ORDER — HYDROMORPHONE HCL IN WATER/PF 6 MG/30 ML
0.2 PATIENT CONTROLLED ANALGESIA SYRINGE INTRAVENOUS ONCE
Refills: 0 | Status: COMPLETED | OUTPATIENT
Start: 2025-04-24 | End: 2025-04-24

## 2025-04-24 RX ORDER — VENLAFAXINE HYDROCHLORIDE 150 MG/1
150 CAPSULE, EXTENDED RELEASE ORAL DAILY
Status: DISCONTINUED | OUTPATIENT
Start: 2025-04-25 | End: 2025-04-29 | Stop reason: HOSPADM

## 2025-04-24 RX ORDER — VANCOMYCIN HYDROCHLORIDE 1 G/200ML
1000 INJECTION, SOLUTION INTRAVENOUS EVERY 12 HOURS
Status: DISCONTINUED | OUTPATIENT
Start: 2025-04-24 | End: 2025-04-24

## 2025-04-24 RX ORDER — OXYCODONE HYDROCHLORIDE 5 MG/1
5 TABLET ORAL EVERY 4 HOURS PRN
Refills: 0 | Status: DISCONTINUED | OUTPATIENT
Start: 2025-04-24 | End: 2025-04-29 | Stop reason: HOSPADM

## 2025-04-24 RX ADMIN — CELECOXIB 200 MG: 100 CAPSULE ORAL at 17:15

## 2025-04-24 RX ADMIN — SODIUM CHLORIDE, SODIUM LACTATE, POTASSIUM CHLORIDE, AND CALCIUM CHLORIDE 500 ML: .6; .31; .03; .02 INJECTION, SOLUTION INTRAVENOUS at 16:45

## 2025-04-24 RX ADMIN — VANCOMYCIN HYDROCHLORIDE 1250 MG: 500 INJECTION, POWDER, LYOPHILIZED, FOR SOLUTION INTRAVENOUS at 17:15

## 2025-04-24 RX ADMIN — GABAPENTIN 600 MG: 600 TABLET, FILM COATED ORAL at 21:30

## 2025-04-24 RX ADMIN — PRAVASTATIN SODIUM 40 MG: 40 TABLET ORAL at 14:55

## 2025-04-24 RX ADMIN — Medication 10 ML: at 14:27

## 2025-04-24 RX ADMIN — ACETAMINOPHEN 975 MG: 325 TABLET ORAL at 14:55

## 2025-04-24 RX ADMIN — OXYCODONE HYDROCHLORIDE 10 MG: 10 TABLET ORAL at 14:57

## 2025-04-24 RX ADMIN — HEPARIN SODIUM 5000 UNITS: 5000 INJECTION INTRAVENOUS; SUBCUTANEOUS at 14:55

## 2025-04-24 RX ADMIN — CEFTRIAXONE SODIUM 2000 MG: 10 INJECTION, POWDER, FOR SOLUTION INTRAVENOUS at 16:18

## 2025-04-24 RX ADMIN — HYDROMORPHONE HYDROCHLORIDE 0.2 MG: 0.2 INJECTION, SOLUTION INTRAMUSCULAR; INTRAVENOUS; SUBCUTANEOUS at 11:18

## 2025-04-24 RX ADMIN — MORPHINE SULFATE 2 MG: 2 INJECTION, SOLUTION INTRAMUSCULAR; INTRAVENOUS at 23:52

## 2025-04-24 RX ADMIN — OXYCODONE HYDROCHLORIDE 10 MG: 10 TABLET ORAL at 21:30

## 2025-04-24 RX ADMIN — ACETAMINOPHEN 975 MG: 325 TABLET ORAL at 21:30

## 2025-04-24 RX ADMIN — HEPARIN SODIUM 5000 UNITS: 5000 INJECTION INTRAVENOUS; SUBCUTANEOUS at 21:39

## 2025-04-24 NOTE — ASSESSMENT & PLAN NOTE
Status post right unipolar hemiarthroplasty on 7/2/2022 by Dr. Roblero.  Has been seeing orthopedics outpatient-Dr. Calles as she has been having increasing pain on the right hip with ambulatory dysfunction. However, 24-hour prior to admission, she is unable to ambulate at all due to pain. She has no numbness, tingling or foot drop.   Bone scan (3/26) no findings for loosening of the right hip arthroplasty.   S/p intra-articular injection of local anesthetic (4/21)   Mild leukocytosis, elevated ESR 35, CRP 85   IR input appreciated.  Status post aspiration of the joint effusion (4/24).    Will consult orthopedics   Pain management, PT/OT once clear by orthopedics

## 2025-04-24 NOTE — ASSESSMENT & PLAN NOTE
History of cervical, lumbar spondylosis and radiculopathy  Chronically on Celebrex and tylenol for pain   Continue pain management, supportive care

## 2025-04-24 NOTE — ASSESSMENT & PLAN NOTE
Patient with tachycardia, leukocytosis with suspect underlying septic arthritis  Elevated ESR-35, CRP 85  Blood cultures- pending   Status post aspiration of the joint effusion; follow up with fluid culture   Will start on ceftriaxone and vancomycin with consult to pharmacy  Orthopedic consult  Follow-up with cultures

## 2025-04-24 NOTE — TELEMEDICINE
e-Consult ([facility])  - Interventional Radiology  Sharon Vega 74 y.o. female MRN: [id number]  Unit/Bed#: -01 Encounter: [id number]          Interventional Radiology has been consulted to evaluate Sharon Vega    We were consulted by ED concerning this patient with suspected R hip septic arthritis. She does have R hip replacement a few years ago. Recent R hip injection this week now with worsening pain, elevated ESR and CRP    Inpatient Consult to IR  Consult performed by: Tereso Ward PA-C  Consult ordered by: Dionicio Alberto DO        04/24/25    Assessment/Recommendation:     R Hip Septic Arthritis, Suspected  - plan for R hip aspiration and send for cultures and cell count, pending IR scheduling      11-20 minutes, >50% of the total time devoted to medical consultative verbal/EMR discussion between providers. Written report will be generated in the EMR.     Thank you for allowing Interventional Radiology to participate in the care of Sharon Vega. Please don't hesitate to call or TigerText us with any questions.     Tereso Ward PA-C

## 2025-04-24 NOTE — ASSESSMENT & PLAN NOTE
Patient had a right hip hemiarthroplasty in 7/2022 with Dr. Roblero.   Over the past few months patient reports increased right hip pain. She saw Dr. Calles on 3/18.   3/18-R hip X-rays right unipolar partial hip replacement, and arthritic changes along the acetabulum  3/26-Bone scan negative for loosening of right hip arthoplasty   4/21-Patient underwent a right hip lidocaine injection  Initially had relief from pain, but on 4/23 PM she started with acute pain in right hip, now unable to bear weight  IR input appreciated; s/p right hip joint effusion aspiration 4/24  Pain management, PT/OT evaluation once clear by orthopedics

## 2025-04-24 NOTE — PROCEDURES
Interventional Radiology Procedure Note    PATIENT NAME: Sharon Vega  : 1950  MRN: [id number]     Pre-op Diagnosis:   1. Right hip pain    2. Leukocytosis    3. Sepsis without acute organ dysfunction, due to unspecified organism (HCC)      2.    Complete arthroplasty about 4 years ago.  She was having some pain.  She came in and got an injection.  The pain got better.  Now the pain is back.  Concern is for infection.  I do not know if the infection was present originally and was causing the pain.  It is possible that the recent injection simply masked the pain while the disease progressed.  It is also possible that this is a complication from the recent injection.  Very hard to say 1 where the other.    Post-op Diagnosis:   1. Right hip pain    2. Leukocytosis    3. Sepsis without acute organ dysfunction, due to unspecified organism (HCC)      2.   Same    Procedure: Arthrogram and joint aspiration    Surgeon:   Cody Naik MD  Assistants:     No qualified resident was available, Resident is only observing    Estimated Blood Loss: Minimal     Findings: Needle was placed into the joint using fluoroscopic guidance.  There was no fluid.  Contrast was injected using digital subtraction.  Intra-articular position was confirmed.  No joint effusion was identified.  Sterile fluid was injected and then aspirated.  This was sent for culture.    The patient did have pain upon injection of the joint.  She says this pain is similar to the pain that caused her to seek help originally.  I think the hip is the issue.  But there was no evidence of infection, on gross inspection of the fluid.    Specimens: I only got enough fluid for culture.  Injecting more fluid, simply because it to pool posteriorly.  I did not have access to that collection.    Complications:  None     Anesthesia: local    Cody Naik MD     Date: 2025  Time: 5:19 PM

## 2025-04-24 NOTE — ASSESSMENT & PLAN NOTE
Patient with history of chronic low back pain/cervical, lumbar radiculopathy.   Patient undergoes L4-L5 interlaminar epidural steroid injections with pain medicine in outpatient setting, last injection was in January/2025.  Continue Celebrex 200mg BID, Gabapentin 600mg HS

## 2025-04-24 NOTE — PROGRESS NOTES
Sharon Vega is a 74 y.o. female who is currently ordered Vancomycin IV with management by the Pharmacy Consult service.  Relevant clinical data and objective / subjective history reviewed.  Vancomycin Assessment:  Indication and Goal AUC/Trough: Bone/joint infection (goal -600, trough >10)  Clinical Status: stable  Micro:   Cx pending  Renal Function:  SCr: 0.84 mg/dL  CrCl: 61 mL/min  Renal replacement: Not on dialysis  Days of Therapy: 1  Current Dose: new start  Vancomycin Plan:  New Dosin mg LD then 750 mg IV q12  Estimated AUC: 492 mcg*hr/mL  Estimated Trough: 15.8 mcg/mL  Next Level:  am  Renal Function Monitoring: Daily BMP and UOP  Pharmacy will continue to follow closely for s/sx of nephrotoxicity, infusion reactions and appropriateness of therapy.  BMP and CBC will be ordered per protocol. We will continue to follow the patient’s culture results and clinical progress daily.    Davi Montoya, Pharmacist

## 2025-04-24 NOTE — H&P
H&P - Hospitalist   Name: Sharon Vega 74 y.o. female I MRN: [id number]  Unit/Bed#: -01 I Date of Admission: 4/24/2025   Date of Service: 4/24/2025 I Hospital Day: 0     Assessment & Plan  Right hip pain  Patient had a right hip hemiarthroplasty in 7/2022 with Dr. Roblero.   Over the past few months patient reports increased right hip pain. She saw Dr. Calles on 3/18.   3/18-R hip X-rays right unipolar partial hip replacement, and arthritic changes along the acetabulum  3/26-Bone scan negative for loosening of right hip arthoplasty   4/21-Patient underwent a right hip lidocaine injection  Initially had relief from pain, but on 4/23 PM she started with acute pain in right hip, now unable to bear weight  IR input appreciated; s/p right hip joint effusion aspiration 4/24  Pain management, PT/OT evaluation once clear by orthopedics   Sepsis without acute organ dysfunction (HCC)  Patient with tachycardia, leukocytosis with suspect underlying septic arthritis  Elevated ESR-35, CRP 85  Blood cultures- pending   Status post aspiration of the joint effusion; follow up with fluid culture   Will start on ceftriaxone and vancomycin with consult to pharmacy  Orthopedic consult  Follow-up with cultures  Primary hypertension  Home regimen includes Amlodipine 5 mg daily   Continue while inpatient with hold parameters   Chronic pain syndrome  Patient with history of chronic low back pain/cervical, lumbar radiculopathy.   Patient undergoes L4-L5 interlaminar epidural steroid injections with pain medicine in outpatient setting, last injection was in January/2025.  Continue Celebrex 200mg BID, Gabapentin 600mg HS       VTE Pharmacologic Prophylaxis: VTE Score: 4 Moderate Risk (Score 3-4) - Pharmacological DVT Prophylaxis Ordered: heparin.  Code Status: Level 1 - Full Code   Discussion with family: Updated  (granddaughter) via phone.    Anticipated Length of Stay: Patient will be admitted on an inpatient basis with  an anticipated length of stay of greater than 2 midnights secondary to septic hip.    History of Present Illness   Chief Complaint: Right hip pain     Sharon Vega is a 74 y.o. female with a PMH of hypertension, hyperlipidemia, GERD, and chronic pain who presents with right hip pain. Patient has a PSH of a right hip hemiarthroplasty in 7/2022 with Dr. Roblero. Over the past few months patient reports increased right hip pain. She recently saw Dr. Calles who had ordered x-rays which showed right unipolar partial hip replacement, and arthritic changes along the acetabulum. Patient was referred to IR and an injection done on Monday, 4/21. She initially had relief from pain, and was able to go on daily walks Tuesday and Wednesday, but last night she had acute pain in right hip and was unable to bear weight on right leg due to pain. She states her pain is a sharp pain 10/10 at its worst when she moves. She says not moving her right leg makes pain better. Her pain radiates down to her right leg. Denies any fevers, chills, nausea, vomiting, recent falls or trauma to right hip.     Review of Systems   Constitutional:  Negative for activity change, appetite change, chills, diaphoresis and fever.   HENT:  Negative for congestion, ear pain, hearing loss, tinnitus and trouble swallowing.    Eyes:  Negative for photophobia, pain, discharge, itching and visual disturbance.   Respiratory:  Negative for cough, shortness of breath, wheezing and stridor.    Cardiovascular:  Negative for chest pain, palpitations and leg swelling.   Gastrointestinal:  Negative for abdominal pain, blood in stool, constipation, diarrhea, nausea and vomiting.   Endocrine: Negative for cold intolerance, heat intolerance, polydipsia, polyphagia and polyuria.   Genitourinary:  Negative for difficulty urinating, dysuria, frequency, hematuria and urgency.   Musculoskeletal:  Positive for gait problem. Negative for back pain and neck stiffness.        Right  hip pain    Skin:  Negative for pallor, rash and wound.   Allergic/Immunologic: Negative for environmental allergies, food allergies and immunocompromised state.   Neurological:  Negative for dizziness, tremors, speech difficulty, weakness, light-headedness, numbness and headaches.   Hematological:  Negative for adenopathy. Does not bruise/bleed easily.   Psychiatric/Behavioral:  Negative for confusion, hallucinations and sleep disturbance.        Historical Information   Past Medical History:   Diagnosis Date    Anemia     Anxiety     Arthritis     Colon polyp     Depression     GERD (gastroesophageal reflux disease)     Hiatal hernia     Hyperlipidemia     Hypertension      Past Surgical History:   Procedure Laterality Date    APPENDECTOMY      CHOLECYSTECTOMY      COLONOSCOPY      FL INJECTION RIGHT HIP (NON ARTHROGRAM)  2025    FRACTURE SURGERY Right     arm with plate and pins    HAND SURGERY Bilateral     HYSTERECTOMY      JOINT REPLACEMENT Bilateral     knees    SC HEMIARTHROPLASTY HIP PARTIAL Right 2022    Procedure: HEMIARTHROPLASTY HIP (BIPOLAR), closed reduction with splinting right wrist;  Surgeon: Leo Roblero;  Location: [facility];  Service: Orthopedics    SC NEUROPLASTY &/TRANSPOSITION ULNAR NERVE ELBOW Right 2023    Procedure: RELEASE CUBITAL TUNNEL;  Surgeon: Cody Calles DO;  Location: [facility];  Service: Orthopedics    SHOULDER ARTHROSCOPY Left      Social History     Tobacco Use    Smoking status: Former     Current packs/day: 0.00     Types: Cigarettes     Quit date:      Years since quittin.3    Smokeless tobacco: Never   Vaping Use    Vaping status: Never Used   Substance and Sexual Activity    Alcohol use: Not Currently    Drug use: Never    Sexual activity: Not Currently     E-Cigarette/Vaping    E-Cigarette Use Never User      E-Cigarette/Vaping Substances    Nicotine No     THC No     CBD No     Flavoring No     Other No     Unknown No      Family History    Problem Relation Age of Onset    No Known Problems Mother      Social History:  Marital Status: /Civil Union   Occupation: retired   Patient Pre-hospital Living Situation: Home  Patient Pre-hospital Level of Mobility: walks  Patient Pre-hospital Diet Restrictions: None     Meds/Allergies   I have reviewed home medications with patient personally.  Prior to Admission medications    Medication Sig Start Date End Date Taking? Authorizing Provider   amLODIPine (NORVASC) 10 mg tablet Take 10 mg by mouth daily   Yes Historical Provider, MD   calcium carbonate (OS-ALPESH) 600 MG tablet Take 600 mg by mouth daily Taking 2 tablets (1200 mg) daily   Yes Historical Provider, MD   celecoxib (CeleBREX) 200 mg capsule Take 1 capsule (200 mg total) by mouth 2 (two) times a day As needed for joint pain 6/20/23  Yes Facundo Douglas MD   cetirizine (ZyrTEC) 10 mg tablet Take 10 mg by mouth daily   Yes Historical Provider, MD   cholecalciferol (VITAMIN D3) 1,000 units tablet Take 1,000 Units by mouth daily 2,000 units daily   Yes Historical Provider, MD   gabapentin (NEURONTIN) 600 MG tablet Take 1 tablet (600 mg total) by mouth daily at bedtime 7/15/22  Yes Wilbert Ingram MD   multivitamin (THERAGRAN) TABS Take 1 tablet by mouth daily   Yes Historical Provider, MD   omeprazole (PriLOSEC) 20 mg delayed release capsule Take 20 mg by mouth daily 1 in am 1 in pm   Yes Historical Provider, MD   pravastatin (PRAVACHOL) 40 mg tablet Take 40 mg by mouth daily One at bedtime   Yes Historical Provider, MD   temazepam (RESTORIL) 7.5 mg capsule Take 15 mg by mouth daily at bedtime as needed for sleep     Yes Historical Provider, MD   venlafaxine (EFFEXOR-XR) 150 mg 24 hr capsule Take 150 mg by mouth daily   Yes Historical Provider, MD   acetaminophen (TYLENOL) 325 mg tablet Take 2 tablets (650 mg total) by mouth every 6 (six) hours as needed for mild pain 7/15/22   Wilbert Ingram MD   alendronate (FOSAMAX) 70 mg tablet Take 1 tablet (70 mg  total) by mouth every 7 days Take on an empty stomach with a full glass of water, and do not lie down for 30 minutes after 6/20/23 4/24/25  Facundo Douglas MD   methylPREDNISolone 4 MG tablet therapy pack Use as directed on package 6/12/24 4/24/25  Barbara Kocher, CRNP   traZODone (DESYREL) 100 mg tablet  2/2/23 6/12/24  Historical Provider, MD     No Known Allergies    Objective :  Temp:  [97.7 °F (36.5 °C)-97.9 °F (36.6 °C)] 97.9 °F (36.6 °C)  HR:  [89-98] 89  BP: (129-141)/(75-84) 133/75  Resp:  [18] 18  SpO2:  [92 %-94 %] 93 %  O2 Device: None (Room air)    Physical Exam  Vitals and nursing note reviewed.   Constitutional:       General: She is not in acute distress.     Appearance: Normal appearance. She is not ill-appearing or toxic-appearing.   HENT:      Head: Normocephalic and atraumatic.      Right Ear: External ear normal.      Left Ear: External ear normal.      Nose: Nose normal. No rhinorrhea.      Mouth/Throat:      Mouth: Mucous membranes are moist.      Pharynx: Oropharynx is clear.   Eyes:      General:         Right eye: No discharge.         Left eye: No discharge.      Pupils: Pupils are equal, round, and reactive to light.   Cardiovascular:      Rate and Rhythm: Normal rate and regular rhythm.      Pulses: Normal pulses.      Heart sounds: Normal heart sounds. No murmur heard.  Pulmonary:      Effort: Pulmonary effort is normal. No respiratory distress.      Breath sounds: Normal breath sounds.   Abdominal:      General: Abdomen is flat. Bowel sounds are normal. There is no distension.      Palpations: Abdomen is soft. There is no mass.      Tenderness: There is no abdominal tenderness.   Musculoskeletal:         General: No swelling.      Cervical back: Normal range of motion and neck supple. No muscular tenderness.      Right hip: Tenderness present. No bony tenderness. Decreased range of motion.   Skin:     General: Skin is warm and dry.      Capillary Refill: Capillary refill takes less than  "2 seconds.      Findings: No erythema or rash.   Neurological:      General: No focal deficit present.      Mental Status: She is alert and oriented to person, place, and time. Mental status is at baseline.   Psychiatric:         Mood and Affect: Mood normal.         Behavior: Behavior normal.         Thought Content: Thought content normal.         Judgment: Judgment normal.         Lines/Drains:            Lab Results: I have reviewed the following results:  Results from last 7 days   Lab Units 04/24/25  0941   WBC Thousand/uL 12.57*   HEMOGLOBIN g/dL 13.6   HEMATOCRIT % 42.8   PLATELETS Thousands/uL 280   SEGS PCT % 79*   LYMPHO PCT % 9*   MONO PCT % 11   EOS PCT % 1     Results from last 7 days   Lab Units 04/24/25  0941   SODIUM mmol/L 138   POTASSIUM mmol/L 4.0   CHLORIDE mmol/L 102   CO2 mmol/L 28   BUN mg/dL 18   CREATININE mg/dL 0.84   ANION GAP mmol/L 8   CALCIUM mg/dL 9.6   ALBUMIN g/dL 4.3   TOTAL BILIRUBIN mg/dL 0.56   ALK PHOS U/L 33*   ALT U/L 15   AST U/L 18   GLUCOSE RANDOM mg/dL 125             No results found for: \"HGBA1C\"  Results from last 7 days   Lab Units 04/24/25  1226   LACTIC ACID mmol/L 0.9   PROCALCITONIN ng/ml 0.16       Imaging Results Review: No pertinent imaging studies reviewed.  Other Study Results Review: No additional pertinent studies reviewed.    Administrative Statements   I have spent a total time of 50 minutes in caring for this patient on the day of the visit/encounter including Diagnostic results, Prognosis, Risks and benefits of tx options, Instructions for management, Patient and family education, Importance of tx compliance, Risk factor reductions, Impressions, Counseling / Coordination of care, Documenting in the medical record, Reviewing/placing orders in the medical record (including tests, medications, and/or procedures), Obtaining or reviewing history  , and Communicating with other healthcare professionals .    ** Please Note: This note has been constructed using " a voice recognition system. **

## 2025-04-24 NOTE — ASSESSMENT & PLAN NOTE
BP appears to be well-controlled   Will continue with amlodipine   Monitor BP closely and adjust medications as indicated

## 2025-04-24 NOTE — ED PROVIDER NOTES
Time reflects when diagnosis was documented in both MDM as applicable and the Disposition within this note       Time User Action Codes Description Comment    4/24/2025 12:44 PM Dionicio Alberto Add [M25.551] Right hip pain     4/24/2025 12:44 PM Dionicio Alberto Add [D72.829] Leukocytosis     4/24/2025  3:32 PM Shelley Walls Add [A41.9] Sepsis without acute organ dysfunction, due to unspecified organism (HCC)           ED Disposition       ED Disposition   Admit    Condition   Stable    Date/Time   Thu Apr 24, 2025 12:44 PM    Comment   Case was discussed with MISTY and the patient's admission status was agreed to be Admission Status: observation status to the service of Dr. Mariano .               Assessment & Plan       Medical Decision Making  74-year-old female presenting for evaluation of right hip pain.  History of right hip arthritis had hip replacement 3 years ago.  Had an injection done by IR on 4-21.  Worsening pain last night, unable to bear weight.  No fevers or chills.  Tenderness on palpation, no erythema but has pain with flexion and external/internal rotation  Differential includes osteoarthritis versus muscle strain. At risk for septic hip given recent injection  Obtain CBC, CMP, ESR and CRP.  Patient does have a leukocytosis of 12.  Elevated ESR and CRP.  Spoke with IR and they are in agreement with arthrocentesis.  Will add lactic acid, Pro-Jesse and blood cultures to blood work.  Will hold off on antibiotics at this time as patient does not show active signs of infection and would prefer to wait for culture results    Problems Addressed:  Leukocytosis: acute illness or injury  Right hip pain: acute illness or injury    Amount and/or Complexity of Data Reviewed  Labs: ordered.    Risk  Prescription drug management.  Decision regarding hospitalization.             Medications   HYDROmorphone HCl (DILAUDID) injection 0.2 mg (0.2 mg Intravenous Given 4/24/25 1118)       ED Risk Strat Scores                     No data recorded        SBIRT 20yo+      Flowsheet Row Most Recent Value   Initial Alcohol Screen: US AUDIT-C     1. How often do you have a drink containing alcohol? 0 Filed at: 04/24/2025 0923   2. How many drinks containing alcohol do you have on a typical day you are drinking?  0 Filed at: 04/24/2025 0923   3a. Male UNDER 65: How often do you have five or more drinks on one occasion? 0 Filed at: 04/24/2025 0923   3b. FEMALE Any Age, or MALE 65+: How often do you have 4 or more drinks on one occassion? 0 Filed at: 04/24/2025 0923   Audit-C Score 0 Filed at: 04/24/2025 0923   PHYLLIS: How many times in the past year have you...    Used an illegal drug or used a prescription medication for non-medical reasons? Never Filed at: 04/24/2025 0923                            History of Present Illness       Chief Complaint   Patient presents with    Hip Pain     Right hip pain, had injection on Monday in same. Went for a walk yesterday around her block, felt fine, and by last night she reports pain so severe she couldn't walk on it.  Pt states too painful to bear weight today.        Past Medical History:   Diagnosis Date    Anemia     Anxiety     Arthritis     Colon polyp     Depression     GERD (gastroesophageal reflux disease)     Hiatal hernia     Hyperlipidemia     Hypertension       Past Surgical History:   Procedure Laterality Date    APPENDECTOMY      CHOLECYSTECTOMY      COLONOSCOPY      FL INJECTION RIGHT HIP (NON ARTHROGRAM)  4/21/2025    FRACTURE SURGERY Right     arm with plate and pins    HAND SURGERY Bilateral     HYSTERECTOMY      JOINT REPLACEMENT Bilateral     knees    NM HEMIARTHROPLASTY HIP PARTIAL Right 07/02/2022    Procedure: HEMIARTHROPLASTY HIP (BIPOLAR), closed reduction with splinting right wrist;  Surgeon: Leo Roblero;  Location: [facility];  Service: Orthopedics    NM NEUROPLASTY &/TRANSPOSITION ULNAR NERVE ELBOW Right 2/8/2023    Procedure: RELEASE CUBITAL TUNNEL;  Surgeon:  Cody Calles, DO;  Location: [facility];  Service: Orthopedics    SHOULDER ARTHROSCOPY Left       Family History   Problem Relation Age of Onset    No Known Problems Mother       Social History     Tobacco Use    Smoking status: Former     Current packs/day: 0.00     Types: Cigarettes     Quit date:      Years since quittin.3    Smokeless tobacco: Never   Vaping Use    Vaping status: Never Used   Substance Use Topics    Alcohol use: Not Currently    Drug use: Never      E-Cigarette/Vaping    E-Cigarette Use Never User       E-Cigarette/Vaping Substances    Nicotine No     THC No     CBD No     Flavoring No     Other No     Unknown No       I have reviewed and agree with the history as documented.     Patient is a 74-year-old female who presents for evaluation of right hip pain.  Patient has a history of a hip replacement in that right knee done 3 years ago.  Patient now follows with Dr. Calles of orthopedic surgery.  He saw her on  in the office for evaluation of hip pain.  She was referred to interventional radiology and had an injection done on .  Patient says that her hip pain got acutely worse around 8 PM last night.  She says she is not able to bear weight on the hip secondary to pain.  She says this is worse than pain she normally experiences with it.  Denies any fevers or chills.  Says she has no pain when she is not moving it.        Review of Systems   Constitutional:  Negative for fever and unexpected weight change.   HENT:  Negative for congestion, ear pain, sore throat and trouble swallowing.    Eyes:  Negative for pain and redness.   Respiratory:  Negative for cough, chest tightness and shortness of breath.    Cardiovascular:  Negative for chest pain and leg swelling.   Gastrointestinal:  Negative for abdominal distention, abdominal pain, diarrhea and vomiting.   Endocrine: Negative for polyuria.   Genitourinary:  Negative for dysuria, hematuria, pelvic pain and vaginal  bleeding.   Musculoskeletal:  Positive for arthralgias (R hip). Negative for back pain and myalgias.   Skin:  Negative for rash.   Neurological:  Negative for dizziness, syncope, weakness, light-headedness and headaches.           Objective       ED Triage Vitals   Temperature Pulse Blood Pressure Respirations SpO2 Patient Position - Orthostatic VS   04/24/25 0922 04/24/25 0922 04/24/25 0922 04/24/25 0922 04/24/25 0922 --   97.7 °F (36.5 °C) 98 141/84 18 94 %       Temp Source Heart Rate Source BP Location FiO2 (%) Pain Score    04/24/25 0922 04/24/25 1257 04/24/25 1257 -- 04/24/25 0922    Temporal Monitor Right arm  10 - Worst Possible Pain      Vitals      Date and Time Temp Pulse SpO2 Resp BP Pain Score FACES Pain Rating User   04/24/25 1457 -- -- -- -- -- 10 - Worst Possible Pain -- ND   04/24/25 1455 -- -- -- -- -- 10 - Worst Possible Pain -- ND   04/24/25 1308 97.9 °F (36.6 °C) 89 93 % 18 133/75 -- -- DII   04/24/25 1257 97.7 °F (36.5 °C) 89 92 % 18 129/78 -- -- AC   04/24/25 1155 -- -- -- -- -- 7 -- SG   04/24/25 0922 97.7 °F (36.5 °C) 98 94 % 18 141/84 10 - Worst Possible Pain -- SHELLEY            Physical Exam  Vitals and nursing note reviewed.   Constitutional:       General: She is not in acute distress.     Appearance: She is well-developed.   HENT:      Head: Normocephalic and atraumatic.      Right Ear: External ear normal.      Left Ear: External ear normal.      Nose: Nose normal.      Mouth/Throat:      Mouth: Mucous membranes are moist.      Pharynx: No oropharyngeal exudate.   Eyes:      Conjunctiva/sclera: Conjunctivae normal.      Pupils: Pupils are equal, round, and reactive to light.   Cardiovascular:      Rate and Rhythm: Normal rate and regular rhythm.      Heart sounds: Normal heart sounds. No murmur heard.     No friction rub. No gallop.   Pulmonary:      Effort: Pulmonary effort is normal. No respiratory distress.      Breath sounds: Normal breath sounds. No wheezing or rales.   Abdominal:       General: There is no distension.      Palpations: Abdomen is soft.      Tenderness: There is no abdominal tenderness. There is no guarding.   Musculoskeletal:         General: Tenderness (R hip) present. No swelling or deformity. Normal range of motion.      Cervical back: Normal range of motion and neck supple.      Comments: No swelling or erythema to hip  Pain with flexion and internal rotation of the hip   Lymphadenopathy:      Cervical: No cervical adenopathy.   Skin:     General: Skin is warm and dry.   Neurological:      General: No focal deficit present.      Mental Status: She is alert and oriented to person, place, and time. Mental status is at baseline.      Cranial Nerves: No cranial nerve deficit.      Sensory: No sensory deficit.      Motor: No weakness or abnormal muscle tone.      Coordination: Coordination normal.         Results Reviewed       Procedure Component Value Units Date/Time    Procalcitonin [[id number]]  (Normal) Collected: 04/24/25 1226    Lab Status: Final result Specimen: Blood from Arm, Left Updated: 04/24/25 1304     Procalcitonin 0.16 ng/ml     Lactic acid, plasma (w/reflex if result > 2.0) [[id number]]  (Normal) Collected: 04/24/25 1226    Lab Status: Final result Specimen: Blood from Arm, Left Updated: 04/24/25 1253     LACTIC ACID 0.9 mmol/L     Narrative:      Result may be elevated if tourniquet was used during collection.    Blood culture [[id number]] Collected: 04/24/25 1226    Lab Status: In process Specimen: Blood from Arm, Left Updated: 04/24/25 1230    Blood culture [[id number]] Collected: 04/24/25 1226    Lab Status: In process Specimen: Blood from Arm, Right Updated: 04/24/25 1230    Sedimentation rate, automated [[id number]]  (Abnormal) Collected: 04/24/25 0941    Lab Status: Final result Specimen: Blood from Arm, Right Updated: 04/24/25 1008     Sed Rate 35 mm/hour     Comprehensive metabolic panel [[id number]]  (Abnormal) Collected: 04/24/25 0941    Lab Status: Final  result Specimen: Blood from Arm, Right Updated: 04/24/25 1003     Sodium 138 mmol/L      Potassium 4.0 mmol/L      Chloride 102 mmol/L      CO2 28 mmol/L      ANION GAP 8 mmol/L      BUN 18 mg/dL      Creatinine 0.84 mg/dL      Glucose 125 mg/dL      Calcium 9.6 mg/dL      AST 18 U/L      ALT 15 U/L      Alkaline Phosphatase 33 U/L      Total Protein 7.3 g/dL      Albumin 4.3 g/dL      Total Bilirubin 0.56 mg/dL      eGFR 68 ml/min/1.73sq m     Narrative:      National Kid[employer] Foundation guidelines for Chronic Kidney Disease (CKD):     Stage 1 with normal or high GFR (GFR > 90 mL/min/1.73 square meters)    Stage 2 Mild CKD (GFR = 60-89 mL/min/1.73 square meters)    Stage 3A Moderate CKD (GFR = 45-59 mL/min/1.73 square meters)    Stage 3B Moderate CKD (GFR = 30-44 mL/min/1.73 square meters)    Stage 4 Severe CKD (GFR = 15-29 mL/min/1.73 square meters)    Stage 5 End Stage CKD (GFR <15 mL/min/1.73 square meters)  Note: GFR calculation is accurate only with a steady state creatinine    C-reactive protein [[id number]]  (Abnormal) Collected: 04/24/25 0941    Lab Status: Final result Specimen: Blood from Arm, Right Updated: 04/24/25 1003     CRP 85.3 mg/L     CBC and differential [[id number]]  (Abnormal) Collected: 04/24/25 0941    Lab Status: Final result Specimen: Blood from Arm, Right Updated: 04/24/25 0948     WBC 12.57 Thousand/uL      RBC 4.57 Million/uL      Hemoglobin 13.6 g/dL      Hematocrit 42.8 %      MCV 94 fL      MCH 29.8 pg      MCHC 31.8 g/dL      RDW 14.5 %      MPV 10.2 fL      Platelets 280 Thousands/uL      nRBC 0 /100 WBCs      Segmented % 79 %      Immature Grans % 0 %      Lymphocytes % 9 %      Monocytes % 11 %      Eosinophils Relative 1 %      Basophils Relative 0 %      Absolute Neutrophils 9.88 Thousands/µL      Absolute Immature Grans 0.04 Thousand/uL      Absolute Lymphocytes 1.13 Thousands/µL      Absolute Monocytes 1.39 Thousand/µL      Eosinophils Absolute 0.09 Thousand/µL       Basophils Absolute 0.04 Thousands/µL             IR aspiration joint (specify location)    (Results Pending)       Procedures    ED Medication and Procedure Management   Prior to Admission Medications   Prescriptions Last Dose Informant Patient Reported? Taking?   acetaminophen (TYLENOL) 325 mg tablet   No No   Sig: Take 2 tablets (650 mg total) by mouth every 6 (six) hours as needed for mild pain   amLODIPine (NORVASC) 10 mg tablet 4/24/2025 Morning  Yes Yes   Sig: Take 10 mg by mouth daily   calcium carbonate (OS-ALPESH) 600 MG tablet 4/24/2025 Morning  Yes Yes   Sig: Take 600 mg by mouth daily Taking 2 tablets (1200 mg) daily   celecoxib (CeleBREX) 200 mg capsule 4/24/2025 Morning  No Yes   Sig: Take 1 capsule (200 mg total) by mouth 2 (two) times a day As needed for joint pain   cetirizine (ZyrTEC) 10 mg tablet 4/24/2025 Morning  Yes Yes   Sig: Take 10 mg by mouth daily   cholecalciferol (VITAMIN D3) 1,000 units tablet 4/24/2025 Morning  Yes Yes   Sig: Take 1,000 Units by mouth daily 2,000 units daily   gabapentin (NEURONTIN) 600 MG tablet 4/23/2025  No Yes   Sig: Take 1 tablet (600 mg total) by mouth daily at bedtime   multivitamin (THERAGRAN) TABS 4/24/2025 Morning  Yes Yes   Sig: Take 1 tablet by mouth daily   omeprazole (PriLOSEC) 20 mg delayed release capsule 4/24/2025 Morning  Yes Yes   Sig: Take 20 mg by mouth daily 1 in am 1 in pm   pravastatin (PRAVACHOL) 40 mg tablet 4/23/2025  Yes Yes   Sig: Take 40 mg by mouth daily One at bedtime   temazepam (RESTORIL) 7.5 mg capsule 4/23/2025  Yes Yes   Sig: Take 15 mg by mouth daily at bedtime as needed for sleep     venlafaxine (EFFEXOR-XR) 150 mg 24 hr capsule 4/24/2025 Morning  Yes Yes   Sig: Take 150 mg by mouth daily      Facility-Administered Medications: None     Current Discharge Medication List        CONTINUE these medications which have NOT CHANGED    Details   amLODIPine (NORVASC) 10 mg tablet Take 10 mg by mouth daily      calcium carbonate (OS-ALPESH) 600  MG tablet Take 600 mg by mouth daily Taking 2 tablets (1200 mg) daily      celecoxib (CeleBREX) 200 mg capsule Take 1 capsule (200 mg total) by mouth 2 (two) times a day As needed for joint pain  Qty: 180 capsule, Refills: 3    Comments: Patient will be using GoodRx  Associated Diagnoses: Arthritis      cetirizine (ZyrTEC) 10 mg tablet Take 10 mg by mouth daily      cholecalciferol (VITAMIN D3) 1,000 units tablet Take 1,000 Units by mouth daily 2,000 units daily      gabapentin (NEURONTIN) 600 MG tablet Take 1 tablet (600 mg total) by mouth daily at bedtime  Qty: 30 tablet, Refills: 0    Associated Diagnoses: Closed transcervical fracture of right femur with routine healing, subsequent encounter      multivitamin (THERAGRAN) TABS Take 1 tablet by mouth daily      omeprazole (PriLOSEC) 20 mg delayed release capsule Take 20 mg by mouth daily 1 in am 1 in pm      pravastatin (PRAVACHOL) 40 mg tablet Take 40 mg by mouth daily One at bedtime      temazepam (RESTORIL) 7.5 mg capsule Take 15 mg by mouth daily at bedtime as needed for sleep        venlafaxine (EFFEXOR-XR) 150 mg 24 hr capsule Take 150 mg by mouth daily      acetaminophen (TYLENOL) 325 mg tablet Take 2 tablets (650 mg total) by mouth every 6 (six) hours as needed for mild pain  Refills: 0    Associated Diagnoses: Closed fracture of right wrist with routine healing, subsequent encounter           No discharge procedures on file.  ED SEPSIS DOCUMENTATION   Time reflects when diagnosis was documented in both MDM as applicable and the Disposition within this note       Time User Action Codes Description Comment    4/24/2025 12:44 PM Dionicio Alberto Add [M25.551] Right hip pain     4/24/2025 12:44 PM Dionicio Alberto Add [D72.829] Leukocytosis     4/24/2025  3:32 PM Shelley Walls Add [A41.9] Sepsis without acute organ dysfunction, due to unspecified organism (HCC)            Initial Sepsis Screening       Row Name 04/24/25 1629                Is the patient's  history suggestive of a new or worsening infection? No  Patient recently had injection of steroids which may have lead to leukocytosis  -NR                  User Key  (r) = Recorded By, (t) = Taken By, (c) = Cosigned By      Initials Name Provider Type    NR Dionicio Alberto DO Physician                       Dionicio Alberto DO  04/24/25 7306

## 2025-04-25 LAB
ANION GAP SERPL CALCULATED.3IONS-SCNC: 9 MMOL/L (ref 4–13)
BUN SERPL-MCNC: 21 MG/DL (ref 5–25)
CALCIUM SERPL-MCNC: 9 MG/DL (ref 8.4–10.2)
CHLORIDE SERPL-SCNC: 103 MMOL/L (ref 96–108)
CO2 SERPL-SCNC: 27 MMOL/L (ref 21–32)
CREAT SERPL-MCNC: 0.81 MG/DL (ref 0.6–1.3)
ERYTHROCYTE [DISTWIDTH] IN BLOOD BY AUTOMATED COUNT: 14.6 % (ref 11.6–15.1)
GFR SERPL CREATININE-BSD FRML MDRD: 71 ML/MIN/1.73SQ M
GLUCOSE P FAST SERPL-MCNC: 116 MG/DL (ref 65–99)
GLUCOSE SERPL-MCNC: 116 MG/DL (ref 65–140)
HCT VFR BLD AUTO: 39.2 % (ref 34.8–46.1)
HGB BLD-MCNC: 12.6 G/DL (ref 11.5–15.4)
MCH RBC QN AUTO: 30.2 PG (ref 26.8–34.3)
MCHC RBC AUTO-ENTMCNC: 32.1 G/DL (ref 31.4–37.4)
MCV RBC AUTO: 94 FL (ref 82–98)
PLATELET # BLD AUTO: 256 THOUSANDS/UL (ref 149–390)
PMV BLD AUTO: 10.5 FL (ref 8.9–12.7)
POTASSIUM SERPL-SCNC: 3.9 MMOL/L (ref 3.5–5.3)
PROCALCITONIN SERPL-MCNC: 0.22 NG/ML
RBC # BLD AUTO: 4.17 MILLION/UL (ref 3.81–5.12)
SODIUM SERPL-SCNC: 139 MMOL/L (ref 135–147)
WBC # BLD AUTO: 9.33 THOUSAND/UL (ref 4.31–10.16)

## 2025-04-25 PROCEDURE — 80048 BASIC METABOLIC PNL TOTAL CA: CPT | Performed by: NURSE PRACTITIONER

## 2025-04-25 PROCEDURE — 99222 1ST HOSP IP/OBS MODERATE 55: CPT | Performed by: STUDENT IN AN ORGANIZED HEALTH CARE EDUCATION/TRAINING PROGRAM

## 2025-04-25 PROCEDURE — 84145 PROCALCITONIN (PCT): CPT | Performed by: NURSE PRACTITIONER

## 2025-04-25 PROCEDURE — 97167 OT EVAL HIGH COMPLEX 60 MIN: CPT

## 2025-04-25 PROCEDURE — 99232 SBSQ HOSP IP/OBS MODERATE 35: CPT | Performed by: INTERNAL MEDICINE

## 2025-04-25 PROCEDURE — 85027 COMPLETE CBC AUTOMATED: CPT | Performed by: NURSE PRACTITIONER

## 2025-04-25 PROCEDURE — 97163 PT EVAL HIGH COMPLEX 45 MIN: CPT

## 2025-04-25 RX ORDER — METHOCARBAMOL 500 MG/1
500 TABLET, FILM COATED ORAL EVERY 6 HOURS PRN
Status: DISCONTINUED | OUTPATIENT
Start: 2025-04-25 | End: 2025-04-29 | Stop reason: HOSPADM

## 2025-04-25 RX ADMIN — MORPHINE SULFATE 2 MG: 2 INJECTION, SOLUTION INTRAMUSCULAR; INTRAVENOUS at 22:51

## 2025-04-25 RX ADMIN — HEPARIN SODIUM 5000 UNITS: 5000 INJECTION INTRAVENOUS; SUBCUTANEOUS at 22:47

## 2025-04-25 RX ADMIN — HEPARIN SODIUM 5000 UNITS: 5000 INJECTION INTRAVENOUS; SUBCUTANEOUS at 05:45

## 2025-04-25 RX ADMIN — AMLODIPINE BESYLATE 10 MG: 10 TABLET ORAL at 09:22

## 2025-04-25 RX ADMIN — ACETAMINOPHEN 975 MG: 325 TABLET ORAL at 22:47

## 2025-04-25 RX ADMIN — ACETAMINOPHEN 975 MG: 325 TABLET ORAL at 05:31

## 2025-04-25 RX ADMIN — VANCOMYCIN HYDROCHLORIDE 750 MG: 750 INJECTION, SOLUTION INTRAVENOUS at 14:42

## 2025-04-25 RX ADMIN — CEFTRIAXONE SODIUM 2000 MG: 10 INJECTION, POWDER, FOR SOLUTION INTRAVENOUS at 16:28

## 2025-04-25 RX ADMIN — VANCOMYCIN HYDROCHLORIDE 750 MG: 750 INJECTION, SOLUTION INTRAVENOUS at 02:23

## 2025-04-25 RX ADMIN — PANTOPRAZOLE SODIUM 40 MG: 40 TABLET, DELAYED RELEASE ORAL at 05:31

## 2025-04-25 RX ADMIN — Medication 1000 UNITS: at 09:22

## 2025-04-25 RX ADMIN — CELECOXIB 200 MG: 100 CAPSULE ORAL at 17:31

## 2025-04-25 RX ADMIN — B-COMPLEX W/ C & FOLIC ACID TAB 1 TABLET: TAB at 09:22

## 2025-04-25 RX ADMIN — ACETAMINOPHEN 975 MG: 325 TABLET ORAL at 14:35

## 2025-04-25 RX ADMIN — OXYCODONE HYDROCHLORIDE 5 MG: 5 TABLET ORAL at 12:32

## 2025-04-25 RX ADMIN — VENLAFAXINE HYDROCHLORIDE 150 MG: 150 CAPSULE, EXTENDED RELEASE ORAL at 09:22

## 2025-04-25 RX ADMIN — MORPHINE SULFATE 2 MG: 2 INJECTION, SOLUTION INTRAMUSCULAR; INTRAVENOUS at 05:53

## 2025-04-25 RX ADMIN — OXYCODONE HYDROCHLORIDE 10 MG: 10 TABLET ORAL at 04:29

## 2025-04-25 RX ADMIN — GABAPENTIN 600 MG: 600 TABLET, FILM COATED ORAL at 22:47

## 2025-04-25 RX ADMIN — HEPARIN SODIUM 5000 UNITS: 5000 INJECTION INTRAVENOUS; SUBCUTANEOUS at 14:38

## 2025-04-25 RX ADMIN — OXYCODONE HYDROCHLORIDE 10 MG: 10 TABLET ORAL at 20:42

## 2025-04-25 RX ADMIN — CELECOXIB 200 MG: 100 CAPSULE ORAL at 09:22

## 2025-04-25 RX ADMIN — LORATADINE 10 MG: 10 TABLET ORAL at 09:22

## 2025-04-25 RX ADMIN — PRAVASTATIN SODIUM 40 MG: 40 TABLET ORAL at 09:22

## 2025-04-25 NOTE — CASE MANAGEMENT
Case Management Discharge Planning Note    Patient name Sharon Vega  Location /-01 MRN [id number]  : 1950 Date 2025       Current Admission Date: 2025  Current Admission Diagnosis:Right hip pain   Patient Active Problem List    Diagnosis Date Noted Date Diagnosed    Right hip pain 2025     Sepsis without acute organ dysfunction (HCC) 2025     Sacroiliitis (HCC) 2025     Chronic midline low back pain without sciatica 2023     Neck pain 2023     Lumbar radiculopathy      Chronic pain syndrome 2023     Acute blood loss anemia 2022     Leukemoid reaction 2022     Closed transcervical fracture of right femur (HCC) 2022     Fracture of right wrist 2022     Primary hypertension 2022     Lumbar degenerative disc disease 2022     Lumbar spondylosis 2022     Cervical radiculopathy 2022     Cervical spondylosis 2022     Rheumatoid arthritis involving both hands (HCC) 2022     Spinal stenosis of lumbar region without neurogenic claudication 2022       LOS (days): 0  Geometric Mean LOS (GMLOS) (days):   Days to GMLOS:     OBJECTIVE:        Current admission status: Observation   Preferred Pharmacy:   [id number] - [place] - [address] [address]  Phone: [phone] Fax: [phone]    Primary Care Provider: Danielito Antunez DO    Primary Insurance: MEDICARE  Secondary Insurance: [facility]    DISCHARGE DETAILS:    Discharge planning discussed with:: Pt  Freedom of Choice: Yes  Comments - Freedom of Choice: Pt is a level  2.  Pt would rather         go home with C than go to STR.  Referrals made via [facility] for STR     Contacts  Patient Contacts: Perfecto Vega (Spouse)   [phone] (Mobile)  Relationship to Patient:: Family  Contact Method: Phone  Phone Number: [phone]  Reason/Outcome: Discharge Planning    Requested Home Health Care          Is the patient interested in HHC at discharge?: Yes  Home Health Discipline requested:: Occupational Therapy, Physical Therapy  HHA External Referral Reason (only applicable if external HHA name selected): Patient has established relationship with provider  Home Health Follow-Up Provider:: PCP  Home Health Services Needed:: Strengthening/Theraputic Exercises to Improve Function, Gait/ADL Training, Evaluate Functional Status and Safety  Homebound Criteria Met:: Uses an Assist Device (i.e. cane, walker, etc), Requires the Assistance of Another Person for Safe Ambulation or to Leave the Home  Supporting Clincal Findings:: Fatigues Easliy in Short Distances, Limited Endurance  Spoke to the pt at the bedside of PT/OT recommending a level 2.  Pt states she would rather go home with HHC than STR.  Pt was a mod of 2 to get up in the chair.  Pt aware of same.  Pt states her son works, however her DIL is home all day.  She can sleep in the recliner and there is a 1/2 bath on the first floor.  Referrals made via [facility] for HHC.

## 2025-04-25 NOTE — PLAN OF CARE
Problem: PAIN - ADULT  Goal: Verbalizes/displays adequate comfort level or baseline comfort level  Description: Interventions:- Encourage patient to monitor pain and request assistance- Assess pain using appropriate pain scale- Administer analgesics based on type and severity of pain and evaluate response- Implement non-pharmacological measures as appropriate and evaluate response- Consider cultural and social influences on pain and pain management- Notify physician/advanced practitioner if interventions unsuccessful or patient reports new pain  Outcome: Progressing     Problem: INFECTION - ADULT  Goal: Absence or prevention of progression during hospitalization  Description: INTERVENTIONS:- Assess and monitor for signs and symptoms of infection- Monitor lab/diagnostic results- Monitor all insertion sites, i.e. indwelling lines, tubes, and drains- Monitor endotracheal if appropriate and nasal secretions for changes in amount and color- [place] appropriate cooling/warming therapies per order- Administer medications as ordered- Instruct and encourage patient and family to use good hand hygiene technique- Identify and instruct in appropriate isolation precautions for identified infection/condition  Outcome: Progressing  Goal: Absence of fever/infection during neutropenic period  Description: INTERVENTIONS:- Monitor WBC  Outcome: Progressing     Problem: SAFETY ADULT  Goal: Patient will remain free of falls  Description: INTERVENTIONS:- Educate patient/family on patient safety including physical limitations- Instruct patient to call for assistance with activity - Consult OT/PT to assist with strengthening/mobility - Keep Call bell within reach- Keep bed low and locked with side rails adjusted as appropriate- Keep care items and personal belongings within reach- Initiate and maintain comfort rounds- Make Fall Risk Sign visible to staff- Offer Toileting every 2 Hours, in advance of need- Initiate/Maintain bed alarm-  Obtain necessary fall risk management equipment: yellow socks- Apply yellow socks and bracelet for high fall risk patients- Consider moving patient to room near nurses station  Outcome: Progressing  Goal: Maintain or return to baseline ADL function  Description: INTERVENTIONS:-  Assess patient's ability to carry out ADLs; assess patient's baseline for ADL function and identify physical deficits which impact ability to perform ADLs (bathing, care of mouth/teeth, toileting, grooming, dressing, etc.)- Assess/evaluate cause of self-care deficits - Assess range of motion- Assess patient's mobility; develop plan if impaired- Assess patient's need for assistive devices and provide as appropriate- Encourage maximum independence but intervene and supervise when necessary- Involve family in performance of ADLs- Assess for home care needs following discharge - Consider OT consult to assist with ADL evaluation and planning for discharge- Provide patient education as appropriate  Outcome: Progressing  Goal: Maintains/Returns to pre admission functional level  Description: INTERVENTIONS:- Perform AM-PAC 6 Click Basic Mobility/ Daily Activity assessment daily.- Set and communicate daily mobility goal to care team and patient/family/caregiver. - Collaborate with rehabilitation services on mobility goals if consulted- Perform Range of Motion 3 times a day.- Reposition patient every 2 hours.- Dangle patient 3 times a day- Stand patient 3 times a day- Ambulate patient 3 times a day- Out of bed to chair 3 times a day - Out of bed for meals 3 times a day- Out of bed for toileting- Record patient progress and toleration of activity level   Outcome: Progressing     Problem: DISCHARGE PLANNING  Goal: Discharge to home or other facility with appropriate resources  Description: INTERVENTIONS:- Identify barriers to discharge w/patient and caregiver- Arrange for needed discharge resources and transportation as appropriate- Identify discharge  learning needs (meds, wound care, etc.)- Arrange for interpretive services to assist at discharge as needed- Refer to Case Management Department for coordinating discharge planning if the patient needs post-hospital services based on physician/advanced practitioner order or complex needs related to functional status, cognitive ability, or social support system  Outcome: Progressing     Problem: Knowledge Deficit  Goal: Patient/family/caregiver demonstrates understanding of disease process, treatment plan, medications, and discharge instructions  Description: Complete learning assessment and assess knowledge base.Interventions:- Provide teaching at level of understanding- Provide teaching via preferred learning methods  Outcome: Progressing

## 2025-04-25 NOTE — PLAN OF CARE
Problem: PAIN - ADULT  Goal: Verbalizes/displays adequate comfort level or baseline comfort level  Description: Interventions:- Encourage patient to monitor pain and request assistance- Assess pain using appropriate pain scale- Administer analgesics based on type and severity of pain and evaluate response- Implement non-pharmacological measures as appropriate and evaluate response- Consider cultural and social influences on pain and pain management- Notify physician/advanced practitioner if interventions unsuccessful or patient reports new pain  Outcome: Progressing     Problem: INFECTION - ADULT  Goal: Absence or prevention of progression during hospitalization  Description: INTERVENTIONS:- Assess and monitor for signs and symptoms of infection- Monitor lab/diagnostic results- Monitor all insertion sites, i.e. indwelling lines, tubes, and drains- Monitor endotracheal if appropriate and nasal secretions for changes in amount and color- Oley appropriate cooling/warming therapies per order- Administer medications as ordered- Instruct and encourage patient and family to use good hand hygiene technique- Identify and instruct in appropriate isolation precautions for identified infection/condition  Outcome: Progressing  Goal: Absence of fever/infection during neutropenic period  Description: INTERVENTIONS:- Monitor WBC  Outcome: Progressing     Problem: SAFETY ADULT  Goal: Maintain or return to baseline ADL function  Description: INTERVENTIONS:-  Assess patient's ability to carry out ADLs; assess patient's baseline for ADL function and identify physical deficits which impact ability to perform ADLs (bathing, care of mouth/teeth, toileting, grooming, dressing, etc.)- Assess/evaluate cause of self-care deficits - Assess range of motion- Assess patient's mobility; develop plan if impaired- Assess patient's need for assistive devices and provide as appropriate- Encourage maximum independence but intervene and supervise  when necessary- Involve family in performance of ADLs- Assess for home care needs following discharge - Consider OT consult to assist with ADL evaluation and planning for discharge- Provide patient education as appropriate  Outcome: Progressing     Problem: DISCHARGE PLANNING  Goal: Discharge to home or other facility with appropriate resources  Description: INTERVENTIONS:- Identify barriers to discharge w/patient and caregiver- Arrange for needed discharge resources and transportation as appropriate- Identify discharge learning needs (meds, wound care, etc.)- Arrange for interpretive services to assist at discharge as needed- Refer to Case Management Department for coordinating discharge planning if the patient needs post-hospital services based on physician/advanced practitioner order or complex needs related to functional status, cognitive ability, or social support system  Outcome: Progressing     Problem: Knowledge Deficit  Goal: Patient/family/caregiver demonstrates understanding of disease process, treatment plan, medications, and discharge instructions  Description: Complete learning assessment and assess knowledge base.Interventions:- Provide teaching at level of understanding- Provide teaching via preferred learning methods  Outcome: Progressing

## 2025-04-25 NOTE — PROGRESS NOTES
Sharon Vega is a 74 y.o. female who is currently ordered Vancomycin IV with management by the Pharmacy Consult service.  Relevant clinical data and objective / subjective history reviewed.  Vancomycin Assessment:  Indication and Goal AUC/Trough: Bone/joint infection (goal -600, trough >10), -600, trough >10  Clinical Status: stable  Micro:   pending  Renal Function:  SCr: 0.81 mg/dL  CrCl: 63.3 mL/min  Renal replacement: Not on dialysis  Days of Therapy: 2  Current Dose: 750 q12  Vancomycin Plan:  New Dosing: continue 750 q12  Estimated AUC: 489 mcg*hr/mL  Estimated Trough: 15.6 mcg/mL  Next Level: 4/26 6am  Renal Function Monitoring: Daily BMP and UOP  Pharmacy will continue to follow closely for s/sx of nephrotoxicity, infusion reactions and appropriateness of therapy.  BMP and CBC will be ordered per protocol. We will continue to follow the patient’s culture results and clinical progress daily.    Beau Cornejo, Pharmacist

## 2025-04-25 NOTE — CONSULTS
Consultation - Orthopedics   Name: Sharon Vega 74 y.o. female I MRN: [id number]  Unit/Bed#: -01 I Date of Admission: 4/24/2025   Date of Service: 4/25/2025 I Hospital Day: 0   Inpatient consult to Orthopedic Surgery  Consult performed by: Elroy Ware PA-C  Consult ordered by: LOTUS Gee        Physician Requesting Evaluation: Tao Mariano, *   Reason for Evaluation / Principal Problem: right hip pain    Assessment & Plan  Right hip pain  74-year-old female status post right hip unipolar hemiarthroplasty with Dr. Roblero July 2022 with atraumatic right hip pain for 2 days  WBAT RLE  Continue antibiotics per primary team  S/p right hip aspiration by IR  Minimal joint effusion identified  Sample sent for culture. Not enough sample for synovial cell count  Cultures negative so far. Will continue to monitor.   WBC improved to 9.33 this AM from 12.57  Afebrile  Monitor exam   Pain with passive range of motion but no micromotion tenderness  Compartments soft and compressible  PT/OT evaluation  DVT ppx per primary team  Pain control per primary team  No acute orthopedic surgical intervention indicated at this time  Will continue to monitor for worsening symptoms        This consult was completed with the attending on-call, Dr. Linares  Primary hypertension    Chronic pain syndrome    Sepsis without acute organ dysfunction (HCC)    Orthopedics service will follow.    History of Present Illness   HPI: Sharon Vega is a 74 y.o. year old female who presents community ambulator complaining of atraumatic right hip pain.  Patient had a right hip hemiarthroplasty unipolar in July 2022 with Dr. Roblero.  Patient states she made a full recovery after the procedure.  Patient states that over the past year or so she has had worsening right hip pain.  Patient was seen in office by Dr. Calles where x-rays showed arthritic changes along the acetabulum.  She was then referred to IR for a lidocaine  injection which was performed on 4/21.  Patient states she had complete relief of her pain after the injection.  Patient states that she was able to continue with activities of daily living including walks around the block.  Patient states the injection lasted for about 48 hours.  On the evening of 4/23 patient states she was unable to move her right lower extremity or weight-bear due to pain.  Patient presented to the hospital on 4/24 due to worsening symptoms.  She met sepsis criteria, had elevated infectious markers, and was admitted to [facility] for further management.  IR was consulted to perform an aspiration of the right hip.  They did send the fluid for cultures.  Unfortunately, there was not enough sample for a synovial cell count.  An orthopedics consult was placed to assess for septic joint.  In the meantime, patient has been placed on antibiotics per SLIM.     Patient denies any recent falls or trauma.  Patient denies any recent illness.  Pain is sharp in character, Located laterally and in the right groin, acute in onset, episodic in duration, severe in intensity. Exacerbating factors movement and weight bearing of right lower extremity, remitting factors rest. The pain radiates to the the anterior knee to the level of the patella, denies numbness, denies tingling.  She denies any fever or chills. No other complaints at this time. PMH significant for hypertension, hyperlipidemia, GERD, chronic pain. Occupation retired.    Review of Systems significant for findings described in the HPI.  Historical Information   Past Medical History:   Diagnosis Date    Anemia     Anxiety     Arthritis     Colon polyp     Depression     GERD (gastroesophageal reflux disease)     Hiatal hernia     Hyperlipidemia     Hypertension      Past Surgical History:   Procedure Laterality Date    APPENDECTOMY      CHOLECYSTECTOMY      COLONOSCOPY      FL INJECTION RIGHT HIP (NON ARTHROGRAM)  4/21/2025    FRACTURE SURGERY Right     arm  with plate and pins    HAND SURGERY Bilateral     HYSTERECTOMY      JOINT REPLACEMENT Bilateral     knees    WA HEMIARTHROPLASTY HIP PARTIAL Right 2022    Procedure: HEMIARTHROPLASTY HIP (BIPOLAR), closed reduction with splinting right wrist;  Surgeon: Leo Roblero;  Location: [facility];  Service: Orthopedics    WA NEUROPLASTY &/TRANSPOSITION ULNAR NERVE ELBOW Right 2023    Procedure: RELEASE CUBITAL TUNNEL;  Surgeon: Cody Calles DO;  Location: [facility];  Service: Orthopedics    SHOULDER ARTHROSCOPY Left      Social History     Tobacco Use    Smoking status: Former     Current packs/day: 0.00     Types: Cigarettes     Quit date:      Years since quittin.3    Smokeless tobacco: Never   Vaping Use    Vaping status: Never Used   Substance and Sexual Activity    Alcohol use: Not Currently    Drug use: Never    Sexual activity: Not Currently     E-Cigarette/Vaping    E-Cigarette Use Never User      E-Cigarette/Vaping Substances    Nicotine No     THC No     CBD No     Flavoring No     Other No     Unknown No      Family History   Problem Relation Age of Onset    No Known Problems Mother        Objective :  Temp:  [97.7 °F (36.5 °C)-98.6 °F (37 °C)] 98.6 °F (37 °C)  HR:  [] 101  BP: (127-141)/(75-84) 127/82  Resp:  [16-18] 16  SpO2:  [86 %-94 %] 90 %  O2 Device: None (Room air)  Physical ExamOrtho Exam   Musculoskeletal: Right lower extremity  Skin dry, and intact, no erythema or ecchymosis.  Bandage noted over anterior hip.  Painful range of motion of hip joint but no micromotion tenderness  Pain with circumduction of the hip with no mechanical block  Good flexion with pain at end range of motion.  Limited abduction, adduction, internal rotation, external rotation secondary to pain  SILT sp/dp/s/s/t  5/5 motor strength to hip flexion/extension, knee flexion/extension, ankle dorsi/plantar flexion  Tenderness to palpation over greater trochanter and right groin  2+ DP pulse  Musculature  "is soft and compressible, no pain with passive stretch  Leg lengths equal       Lab Results: I have reviewed the following results:   Recent Labs     04/24/25  0941 04/25/25  0541   WBC 12.57* 9.33   HGB 13.6 12.6   HCT 42.8 39.2    256   BUN 18 21   CREATININE 0.84 0.81   ESR 35*  --    CRP 85.3*  --    WBC improved to 9.33 from 12.57    Blood Culture:   Lab Results   Component Value Date    BLOODCX Received in Microbiology Lab. Culture in Progress. 04/24/2025    BLOODCX Received in Microbiology Lab. Culture in Progress. 04/24/2025     Wound Culture: No results found for: \"WOUNDCULT\"    Imaging Results Review: No pertinent imaging studies reviewed.  Other Study Results Review: No additional pertinent studies reviewed.  "

## 2025-04-25 NOTE — PLAN OF CARE
Problem: OCCUPATIONAL THERAPY ADULT  Goal: Performs self-care activities at highest level of function for planned discharge setting.  See evaluation for individualized goals.  Description: Treatment Interventions: ADL retraining, Functional transfer training, UE strengthening/ROM, Endurance training, Patient/family training, Activityengagement, Energy conservation     See flowsheet documentation for full assessment, interventions and recommendations.   Note: Limitation: Decreased ADL status, Decreased UE strength, Decreased Safe judgement during ADL, Decreased endurance, Decreased self-care trans, Decreased high-level ADLs  Prognosis: Good  Assessment: Pt is a 74 y.o. female seen for OT evaluation s/p admit to [facility] on 4/24/2025 w/ Right hip pain.  Comorbidities affecting pt's functional performance at time of assessment include: HTN, chronic pain syndrome, sepsis, lumbar DDD, lumbar spondylosis, cervical radiculopathy, cervical spondylosis, RA, spinal stenosis. Personal factors affecting pt at time of IE include:steps to enter environment, difficulty performing ADLS, difficulty performing IADLS , decreased initiation and engagement , health management , and environment. OT order placed to assess Pt's ADLs, cognitive status, and performance during functional tasks in order to maximize safety and independence while making most appropriate d/c recommendations. Prior to admission, pt was I with ADLs, mobility and IADLs. Upon evaluation: the following deficits impact occupational performance: weakness, decreased strength, decreased balance, decreased tolerance, impaired problem solving, decreased safety awareness, and increased pain. Pt's clinical presentation is currently evolving given new onset deficits that effect Pt's occupational performance and ability to safely return to OF including decrease activity tolerance, decrease standing balance, decrease sitting balance, decrease performance during ADL tasks,  decrease safety awareness , decrease UB MS, decrease generalized strength, decrease activity engagement, decrease performance during functional transfers, steps to enter home, and high fall risk combined with medical complications of pain impacting overall mobility status, abnormal CBC, decreased skin integrity, and need for input for mobility technique/safety.  Pt to benefit from continued skilled OT tx while in the hospital to address deficits as defined above and maximize level of functional independence w ADL's and functional mobility. Occupational Performance areas to address include: grooming, bathing/shower, toilet hygiene, dressing, functional mobility, community mobility, and clothing management. From OT standpoint, recommendation at time of d/c would with moderate intensity OT resources.     Rehab Resource Intensity Level, OT: II (Moderate Resource Intensity)     Sandy Cantu OTR/L

## 2025-04-25 NOTE — ASSESSMENT & PLAN NOTE
74-year-old female status post right hip unipolar hemiarthroplasty with Dr. Roblero July 2022 with atraumatic right hip pain for 2 days  WBAT RLE  Continue antibiotics per primary team  S/p right hip aspiration by IR  Minimal joint effusion identified  Sample sent for culture. Not enough sample for synovial cell count  Cultures negative so far. Will continue to monitor.   WBC improved to 9.33 this AM from 12.57  Afebrile  Monitor exam   Pain with passive range of motion but no micromotion tenderness  Compartments soft and compressible  PT/OT evaluation  DVT ppx per primary team  Pain control per primary team  No acute orthopedic surgical intervention indicated at this time  Will continue to monitor for worsening symptoms        This consult was completed with the attending on-call, Dr. Linares

## 2025-04-25 NOTE — ASSESSMENT & PLAN NOTE
Patient with tachycardia, leukocytosis with suspect underlying septic arthritis  Elevated ESR-35, CRP 85  Blood cultures and aspiration cultures- pending   Status post aspiration of the joint effusion; follow up with fluid culture   Ceftriaxone/ vancomycin  Orthopedic consult  Follow-up with cultures

## 2025-04-25 NOTE — PLAN OF CARE
Problem: PHYSICAL THERAPY ADULT  Goal: Performs mobility at highest level of function for planned discharge setting.  See evaluation for individualized goals.  Description: Treatment/Interventions: Functional transfer training, Therapeutic exercise, Endurance training, Gait training, Bed mobility, Equipment eval/education, Elevations  Equipment Recommended: Walker       See flowsheet documentation for full assessment, interventions and recommendations.  Outcome: Progressing  Note: Prognosis: Fair     Assessment: Pt is 74 y.o. female seen for PT evaluation s/p admit to [facility] on 4/24/2025 w/ Right hip pain. PT consulted to assess pt's functional mobility and d/c needs. Order placed for PT eval and tx, w/ activity as tolerated order. Pt agreeable to PT  session upon arrival, pt found supine in bed.  PTA, pt was independent w/ all functional mobility w/ no AD, ambulates community distances and elevations, has 2 JOANNE, and lives w/ family in 2 level home .  Pt to benefit from continued PT tx to address deficits and maximize level of functional independent mobility and consistency. Upon conclusion pt  seated in recliner. Complexity: Comorbidities affecting pt's physical performance at time of assessment include: htn and sepsis, chronic pain, anxiety and depression . Personal factors affecting pt at time of IE include: lives in multistory home, steps to enter home, inability to ambulate household distances, inability to navigate without external assistance, depression, and anxiety. Please find objective findings from PT assessment regarding body systems outlined above with impairments and limitations including impaired balance, pain, decreased activity tolerance, decreased functional mobility tolerance, altered sensation, and fall risk.  Pt's clinical presentation is currently unstable/unpredictable seen in pt's presentation of pain, polypharmacy, decline in mobility status, and recent arthrogram and joint  aspiration . The patient's AM-PAC Basic Mobility Inpatient Short Form Raw Score is 8.  Based on patient presentations and impairments, pt would most appropriately benefit from Level 2 resource intensity upon discharge. A Raw score of less than or equal to 16 suggests the patient may benefit from discharge to post-acute rehabilitation services. Please also refer to the recommendation of the Physical Therapist for safe discharge planning. RN verbalized pt appropriate for PT session. Pt seen as a co-eval with OT due to the patient's co-morbidities, clinically unstable presentation, and present impairments which are a regression from the patient's baseline.  Barriers to Discharge: Inaccessible home environment     Rehab Resource Intensity Level, PT: II (Moderate Resource Intensity)    See flowsheet documentation for full assessment.

## 2025-04-25 NOTE — PHYSICAL THERAPY NOTE
PHYSICAL THERAPY EVALUATION  NAME:  Sharon Vega  DATE: 04/25/25    AGE:   74 y.o.  Mrn:   [id number]  ADMIT DX:  Leukocytosis [D72.829]  Hip pain [M25.559]  Right hip pain [M25.551]  Problem List:   Patient Active Problem List   Diagnosis    Lumbar degenerative disc disease    Lumbar spondylosis    Cervical radiculopathy    Cervical spondylosis    Rheumatoid arthritis involving both hands (HCC)    Spinal stenosis of lumbar region without neurogenic claudication    Closed transcervical fracture of right femur (HCC)    Fracture of right wrist    Primary hypertension    Leukemoid reaction    Acute blood loss anemia    Chronic pain syndrome    Lumbar radiculopathy    Chronic midline low back pain without sciatica    Neck pain    Sacroiliitis (HCC)    Right hip pain    Sepsis without acute organ dysfunction (HCC)       Past Medical History  Past Medical History:   Diagnosis Date    Anemia     Anxiety     Arthritis     Colon polyp     Depression     GERD (gastroesophageal reflux disease)     Hiatal hernia     Hyperlipidemia     Hypertension        Past Surgical History  Past Surgical History:   Procedure Laterality Date    APPENDECTOMY      CHOLECYSTECTOMY      COLONOSCOPY      FL INJECTION RIGHT HIP (NON ARTHROGRAM)  04/21/2025    FRACTURE SURGERY Right     arm with plate and pins    HAND SURGERY Bilateral     HYSTERECTOMY      JOINT REPLACEMENT Bilateral     knees    [facility] HEMIARTHROPLASTY HIP PARTIAL Right 07/02/2022    Procedure: HEMIARTHROPLASTY HIP (BIPOLAR), closed reduction with splinting right wrist;  Surgeon: Leo Roblero;  [facility]: [facility];  Service: Orthopedics    [facility] NEUROPLASTY &/TRANSPOSITION ULNAR NERVE ELBOW Right 02/08/2023    Procedure: RELEASE CUBITAL TUNNEL;  Surgeon: Cody Calles DO;  Location: [facility];  Service: Orthopedics    SHOULDER ARTHROSCOPY Left        Length Of Stay: 0  Performed at least 2 patient identifiers during session: Name and ID bracelet         04/25/25 1025   PT  Last Visit   PT Visit Date 04/25/25   Note Type   Note type Evaluation   Pain Assessment   Pain Assessment Tool 0-10   Pain Score 6   Pain Location/Orientation Orientation: Right;Location: Hip   Pain Onset/Description Onset: Ongoing;Frequency: Constant/Continuous   Hospital Pain Intervention(s) Medication (See MAR)   Restrictions/Precautions   Weight Bearing Precautions Per Order Yes   RLE Weight Bearing Per Order WBAT   Other Precautions Chair Alarm;Bed Alarm;WBS;Fall Risk;Pain   Home Living   Type of Home House   Home Layout Two level;Stairs to enter with rails;1/2 bath on main level;Bed/bath upstairs  (2 JOANNE w/ bilateral HR; FOS to 2nd floor)   Bathroom Shower/Tub Tub/shower unit   Bathroom Toilet Raised   Bathroom Equipment Grab bars in shower   Home Equipment Walker  (no AD used at baseline)   Prior Function   Level of [place] Independent with ADLs;Independent with functional mobility;Independent with IADLS   Lives With Daughter  (+ MARQUES)   Receives Help From Family   IADLs Independent with driving;Independent with meal prep;Independent with medication management   Falls in the last 6 months 0  (pt denies)   Vocational Retired   General   Family/Caregiver Present No   Cognition   Overall Cognitive Status WFL   Arousal/Participation Alert   Attention Within functional limits   Orientation Level Oriented X4   Memory Decreased recall of precautions   Following Commands Follows one step commands without difficulty   RUE Assessment   RUE Assessment WFL   RUE Strength   RUE Overall Strength   (4-/5)   LUE Assessment   LUE Assessment WFL   LUE Strength   LUE Overall Strength   (4-/5)   RLE Assessment   RLE Assessment   (unable to test due to pain)   LLE Assessment   LLE Assessment WFL   Vision-Basic Assessment   Current Vision Wears glasses all the time   Light Touch   RLE Light Touch Impaired   Bed Mobility   Supine to Sit 3  Moderate assistance   Additional items Assist x 1;Increased time required;Verbal cues;LE  management   Additional Comments pt denied dizziness with transitional movement   Transfers   Sit to Stand 3  Moderate assistance   Additional items Assist x 2;Increased time required;Verbal cues;Bedrails   Stand to Sit 3  Moderate assistance   Additional items Assist x 2;Armrests;Increased time required;Verbal cues   Stand pivot 4  Minimal assistance   Additional items Assist x 2;Increased time required;Verbal cues  (with Rw; decreased WBing on R LE; cues for sequence)   Additional Comments pt required cues for proper hand placement   Ambulation/Elevation   Ambulation/Elevation Additional Comments good safety awareness noted   Balance   Static Sitting Fair +   Dynamic Sitting Fair   Static Standing Fair -   Dynamic Standing Poor +   Endurance Deficit   Endurance Deficit Yes   Endurance Deficit Description pt reported fatigue with activity   Activity Tolerance   Activity Tolerance Patient limited by fatigue;Patient limited by pain   Assessment   Prognosis Fair   Assessment Pt is 74 y.o. female seen for PT evaluation s/p admit to [facility] on 4/24/2025 w/ Right hip pain. PT consulted to assess pt's functional mobility and d/c needs. Order placed for PT eval and tx, w/ activity as tolerated order. Pt agreeable to PT  session upon arrival, pt found supine in bed.  PTA, pt was independent w/ all functional mobility w/ no AD, ambulates community distances and elevations, has 2 JOANNE, and lives w/ family in 2 level home .  Pt to benefit from continued PT tx to address deficits and maximize level of functional independent mobility and consistency. Upon conclusion pt  seated in recliner. Complexity: Comorbidities affecting pt's physical performance at time of assessment include: htn and sepsis, chronic pain, anxiety and depression . Personal factors affecting pt at time of IE include: lives in multistory home, steps to enter home, inability to ambulate household distances, inability to navigate without external  assistance, depression, and anxiety. Please find objective findings from PT assessment regarding body systems outlined above with impairments and limitations including impaired balance, pain, decreased activity tolerance, decreased functional mobility tolerance, altered sensation, and fall risk.  Pt's clinical presentation is currently unstable/unpredictable seen in pt's presentation of pain, polypharmacy, decline in mobility status, and recent arthrogram and joint aspiration . The patient's AM-PAC Basic Mobility Inpatient Short Form Raw Score is 8.  Based on patient presentations and impairments, pt would most appropriately benefit from Level 2 resource intensity upon discharge. A Raw score of less than or equal to 16 suggests the patient may benefit from discharge to post-acute rehabilitation services. Please also refer to the recommendation of the Physical Therapist for safe discharge planning. RN verbalized pt appropriate for PT session. Pt seen as a co-eval with OT due to the patient's co-morbidities, clinically unstable presentation, and present impairments which are a regression from the patient's baseline.   Barriers to Discharge Inaccessible home environment   Goals   Patient Goals to have less pain   LTG Expiration Date 05/05/25   Long Term Goal #1 Pt will: Perform bed mobility tasks to modified I to improve ease of bed mobility. Perform transfers to modified I to improve ease of transfers. Perform ambulation with MI and LRAD for 250 feet to increase Indep in home environment. Increase dynamic standing balance to F+ to decrease fall risk. Increase OOB activity tolerance to 10 minutes without s/s of exertion to decrease fall risk. Navigate up and down 2 steps with MI so patient can enter and exit home.   Plan   Treatment/Interventions Functional transfer training;Therapeutic exercise;Endurance training;Gait training;Bed mobility;Equipment eval/education;Elevations   PT Frequency 3-5x/wk   Discharge  Recommendation   Rehab Resource Intensity Level, PT II (Moderate Resource Intensity)   Equipment Recommended Walker   Walker Package Recommended Wheeled walker   AM-PAC Basic Mobility Inpatient   Turning in Flat Bed Without Bedrails 2   Lying on Back to Sitting on Edge of Flat Bed Without Bedrails 2   Moving Bed to Chair 1   Standing Up From Chair Using Arms 1   Walk in Room 1   Climb 3-5 Stairs With Railing 1   Basic Mobility Inpatient Raw Score 8   Turning Head Towards Sound 4   Follow Simple Instructions 4   Low Function Basic Mobility Raw Score  16   Low Function Basic Mobility Standardized Score  25.72   [facility] Highest Level Of Mobility   -HLM Goal 3: Sit at edge of bed   -HLM Achieved 4: Move to chair/commode     Time In: 1013  Time Out: 1025  Total Evaluation Minutes: 12    Lisbeth Montelongo, PT

## 2025-04-25 NOTE — OCCUPATIONAL THERAPY NOTE
Occupational Therapy Evaluation     Patient Name: Sharon Vega  Today's Date: 4/25/2025  Problem List  Principal Problem:    Right hip pain  Active Problems:    Primary hypertension    Chronic pain syndrome    Sepsis without acute organ dysfunction (HCC)    Past Medical History  Past Medical History:   Diagnosis Date    Anemia     Anxiety     Arthritis     Colon polyp     Depression     GERD (gastroesophageal reflux disease)     Hiatal hernia     Hyperlipidemia     Hypertension      Past Surgical History  Past Surgical History:   Procedure Laterality Date    APPENDECTOMY      CHOLECYSTECTOMY      COLONOSCOPY      FL INJECTION RIGHT HIP (NON ARTHROGRAM)  04/21/2025    FRACTURE SURGERY Right     arm with plate and pins    HAND SURGERY Bilateral     HYSTERECTOMY      JOINT REPLACEMENT Bilateral     knees    [facility] HEMIARTHROPLASTY HIP PARTIAL Right 07/02/2022    Procedure: HEMIARTHROPLASTY HIP (BIPOLAR), closed reduction with splinting right wrist;  Surgeon: Leo Roblero;  [facility]: [facility];  Service: Orthopedics    [facility] NEUROPLASTY &/TRANSPOSITION ULNAR NERVE ELBOW Right 02/08/2023    Procedure: RELEASE CUBITAL TUNNEL;  Surgeon: Cody Calles DO;  [facility]: [facility];  Service: Orthopedics    SHOULDER ARTHROSCOPY Left         04/25/25 1013   OT Last Visit   OT Visit Date 04/25/25   Note Type   Note type Evaluation   Additional Comments Pt seen as a co-eval with PT due to the patient's co-morbidities, clinically unstable presentation, and present impairments which are a regression from the patient's baseline.   Pain Assessment   Pain Assessment Tool 0-10   Pain Score 6   Pain Location/Orientation Orientation: Right;Location: Hip   Pain Radiating Towards n/a   Pain Onset/Description Onset: Ongoing;Frequency: Constant/Continuous   Effect of Pain on Daily Activities mobility   Patient's Stated Pain Goal No pain   Hospital Pain Intervention(s) Medication (See MAR)   Multiple Pain Sites No  "  Restrictions/Precautions   Weight Bearing Precautions Per Order Yes   RLE Weight Bearing Per Order WBAT   Other Precautions Chair Alarm;Bed Alarm;WBS;Fall Risk;Pain   Home Living   Type of Home House   Home Layout Two level;Stairs to enter with rails;1/2 bath on main level;Bed/bath upstairs  (2 JOANNE w/ bilateral HR; FOS to 2nd floor)   Bathroom Shower/Tub Tub/shower unit   Bathroom Toilet Raised   Bathroom Equipment Grab bars in shower   Bathroom Accessibility Accessible   Home Equipment Walker  (Pt denies AD used at baseline but reports availability of RW)   Prior Function   Level of [place] Independent with ADLs;Independent with functional mobility;Independent with IADLS   Lives With Daughter  (+ MARQUES)   Receives Help From Family   IADLs Independent with driving;Independent with meal prep;Independent with medication management   Falls in the last 6 months 0  (pt denies)   Vocational Retired   Lifestyle   Autonomy Pt resides in two level house w/ daughter + MARQUES; I with ADLs, mobility and IADLs; +    Reciprocal Relationships supportive family   Service to Others retired   Intrinsic Gratification being active   Subjective   Subjective \"I don't think I could get up   ADL   Where Assessed Edge of bed   Eating Assistance 6  Modified independent   Grooming Assistance 5  Supervision/Setup   UB Bathing Assistance 5  Supervision/Setup   LB Bathing Assistance 4  Minimal Assistance   UB Dressing Assistance 5  Supervision/Setup   LB Dressing Assistance 3  Moderate Assistance   Toileting Assistance  3  Moderate Assistance   Additional Comments Given functional performance skills + medical complexity, therapist suspects via clinical judgement + skilled analysis; pt currently requires stated assist above to perform each area of ADLs d/t limitations including:functional mobility, functional activity tolerance, coordination, sitting/standing balance, decreased equilibrium reactions and overall pain   Bed Mobility "   Supine to Sit 3  Moderate assistance   Additional items Assist x 1;Increased time required;Verbal cues;LE management   Sit to Supine   (DNT; pt seated in recliner upon conclusion of session)   Additional Comments VC for bedrail utilization and proper body mechanics; denied dizziness with transitional movements   Transfers   Sit to Stand 3  Moderate assistance   Additional items Assist x 2;Increased time required;Verbal cues;Bedrails   Stand to Sit 3  Moderate assistance   Additional items Assist x 2;Armrests;Increased time required;Verbal cues   Stand pivot 4  Minimal assistance   Additional items Assist x 2;Increased time required;Verbal cues   Additional Comments RW used; VC for safe hand placement, proper body mechanics and overall RW management during directional turns   Balance   Static Sitting Fair +   Dynamic Sitting Fair   Static Standing Fair -   Dynamic Standing Poor +   Activity Tolerance   Activity Tolerance Patient limited by fatigue;Patient limited by pain   Medical Staff Made Aware Yes, CM made aware of d/c recs   Nurse Made Aware Yes, nursing staff made aware of session outcomes   RUE Assessment   RUE Assessment WFL   RUE Strength   RUE Overall Strength   (4-/5)   LUE Assessment   LUE Assessment WFL   LUE Strength   LUE Overall Strength   (4-/5)   Hand Function   Gross Motor Coordination Functional   Fine Motor Coordination Functional   Vision-Basic Assessment   Current Vision Wears glasses all the time   Psychosocial   Psychosocial (WDL) WDL   Cognition   Overall Cognitive Status WFL   Arousal/Participation Alert;Responsive;Cooperative   Attention Within functional limits   Orientation Level Oriented X4   Memory Decreased recall of precautions   Following Commands Follows one step commands without difficulty   Comments Pt agreeable to OT evaluation, pleasant   Assessment   Limitation Decreased ADL status;Decreased UE strength;Decreased Safe judgement during ADL;Decreased endurance;Decreased  self-care trans;Decreased high-level ADLs   Prognosis Good   Assessment Pt is a 74 y.o. female seen for OT evaluation s/p admit [facility]24[facility]25 w/ Right hip pain.  Comorbidities affecting pt's functional performance at time of assessment include: HTN, chronic pain syndrome, sepsis, lumbar DDD, lumbar spondylosis, cervical radiculopathy, cervical spondylosis, RA, spinal stenosis. Personal factors affecting pt at time of IE include:steps to enter environment, difficulty performing ADLS, difficulty performing IADLS , decreased initiation and engagement , health management , and environment. OT order placed to assess Pt's ADLs, cognitive status, and performance during functional tasks in order to maximize safety and independence while making most appropriate d/c recommendations. Prior to admission, pt was I with ADLs, mobility and IADLs. Upon evaluation: the following deficits impact occupational performance: weakness, decreased strength, decreased balance, decreased tolerance, impaired problem solving, decreased safety awareness, and increased pain. Pt's clinical presentation is currently evolving given new onset deficits that effect Pt's occupational performance and ability to safely return to OF including decrease activity tolerance, decrease standing balance, decrease sitting balance, decrease performance during ADL tasks, decrease safety awareness , decrease UB MS, decrease generalized strength, decrease activity engagement, decrease performance during functional transfers, steps to enter home, and high fall risk combined with medical complications of pain impacting overall mobility status, abnormal CBC, decreased skin integrity, and need for input for mobility technique/safety.  Pt to benefit from continued skilled OT tx while in the hospital to address deficits as defined above and maximize level of functional independence w ADL's and functional mobility. Occupational Performance areas to address  include: grooming, bathing/shower, toilet hygiene, dressing, functional mobility, community mobility, and clothing management. From OT standpoint, recommendation at time of d/c would with moderate intensity OT resources.   Goals   Patient Goals to have less pain   Plan   Treatment Interventions ADL retraining;Functional transfer training;UE strengthening/ROM;Endurance training;Patient/family training;Activityengagement;Energy conservation   Goal Expiration Date 05/05/25   OT Treatment Day 0   OT Frequency 2-3x/wk   Discharge Recommendation   Rehab Resource Intensity Level, OT II (Moderate Resource Intensity)   Additional Comments  The patient's raw score on the AM-PAC Daily Activity inpatient short form is 17, standardized score is 37.26, less than 39.4. Patients at this level are likely to benefit from discharge with moderate intensity OT resources. Please refer to the recommendation of the Occupational Therapist for safe discharge planning.   AM-PAC Daily Activity Inpatient   Lower Body Dressing 2   Bathing 2   Toileting 2   Upper Body Dressing 3   Grooming 4   Eating 4   Daily Activity Raw Score 17   Daily Activity Standardized Score (Calc for Raw Score >=11) 37.26   AM-PAC Applied Cognition Inpatient   Following a Speech/Presentation 3   Understanding Ordinary Conversation 4   Taking Medications 4   Remembering Where Things Are Placed or Put Away 3   Remembering List of 4-5 Errands 3   Taking Care of Complicated Tasks 2   Applied Cognition Raw Score 19   Applied Cognition Standardized Score 39.77     GOALS  Pt will achieve the following within specified time frame: LTG  Pt will achieve the following goals within 10 days    *ADL transfers with (S) for inc'd independence with ADLs/purposeful tasks    *UB ADL with (S) for inc'd independence with self cares    *LB ADL with (S) using AE prn for inc'd independence with self cares    *Toileting with (S) for clothing management and hygiene for return to OF with  personal care    *Increase static stand balance and dyn stand balance to F+ for inc'd safety with standing purposeful tasks    *Increase stand tolerance x7 m for inc'd tolerance with standing purposeful tasks    *Bed mobility- (I) for inc'd independence to manage own comfort and initiate EOB & OOB purposeful tasks    *Participate in 10-15m UE therex to increase overall stamina/activity tolerance for purposeful tasks      Sandy Cantu MS, OTR/L

## 2025-04-25 NOTE — ASSESSMENT & PLAN NOTE
Patient had a right hip hemiarthroplasty in 7/2022 with Dr. Roblero.   Over the past few months patient reports increased right hip pain. She saw Dr. Calles on 3/18.   3/18-R hip X-rays right unipolar partial hip replacement, and arthritic changes along the acetabulum  3/26-Bone scan negative for loosening of right hip arthoplasty   4/21-Patient underwent a right hip lidocaine injection  Initially had relief from pain, but on 4/23 PM she started with acute pain in right hip, now with difficulty on range of motion  IR input appreciated; s/p right hip joint effusion aspiration 4/24  Pain management, PT/OT evaluation, orthopedic eval  Follow-up joint aspiration culture

## 2025-04-25 NOTE — PROGRESS NOTES
Progress Note - Hospitalist   Name: Sharon Vega 74 y.o. female I MRN: [id number]  Unit/Bed#: -01 I Date of Admission: 4/24/2025   Date of Service: 4/25/2025 I Hospital Day: 0    Assessment & Plan  Right hip pain  Patient had a right hip hemiarthroplasty in 7/2022 with Dr. Roblero.   Over the past few months patient reports increased right hip pain. She saw Dr. Calles on 3/18.   3/18-R hip X-rays right unipolar partial hip replacement, and arthritic changes along the acetabulum  3/26-Bone scan negative for loosening of right hip arthoplasty   4/21-Patient underwent a right hip lidocaine injection  Initially had relief from pain, but on 4/23 PM she started with acute pain in right hip, now with difficulty on range of motion  IR input appreciated; s/p right hip joint effusion aspiration 4/24  Pain management, PT/OT evaluation, orthopedic eval  Follow-up joint aspiration culture  Sepsis without acute organ dysfunction (HCC)  Patient with tachycardia, leukocytosis with suspect underlying septic arthritis  Elevated ESR-35, CRP 85  Blood cultures and aspiration cultures- pending   Status post aspiration of the joint effusion; follow up with fluid culture   Ceftriaxone/ vancomycin  Orthopedic consult  Follow-up with cultures  Primary hypertension  Home regimen includes Amlodipine 5 mg daily   Continue while inpatient with hold parameters   Chronic pain syndrome  Patient with history of chronic low back pain/cervical, lumbar radiculopathy.   Patient undergoes L4-L5 interlaminar epidural steroid injections with pain medicine in outpatient setting, last injection was in January/2025.  Continue Celebrex 200mg BID, Gabapentin 600mg HS     VTE Pharmacologic Prophylaxis: VTE Score: 4 Moderate Risk (Score 3-4) - Pharmacological DVT Prophylaxis Ordered: heparin.    Mobility:   Basic Mobility Inpatient Raw Score: 17  JH-HLM Goal: 5: Stand one or more mins  JH-HLM Achieved: 6: Walk 10 steps or more  JH-HLM Goal achieved.  Continue to encourage appropriate mobility.    Patient Centered Rounds: I performed bedside rounds with nursing staff today.   Discussions with Specialists or Other Care Team Provider: yes    Education and Discussions with Family / Patient:  Updated patient regarding plan of care.     Current Length of Stay: 0 day(s)  Current Patient Status: Inpatient   Certification Statement: The patient will continue to require additional inpatient hospital stay due to hip pain, pending cultures  Discharge Plan: Anticipate discharge in 24-48 hrs to discharge location to be determined pending rehab evaluations.    Code Status: Level 1 - Full Code    Subjective   No overnight events noted.  Patient still with pain on range of motion of right hip    Objective :  Temp:  [97.7 °F (36.5 °C)-98.6 °F (37 °C)] 97.7 °F (36.5 °C)  HR:  [] 103  BP: (123-127)/(79-82) 126/79  Resp:  [16-18] 17  SpO2:  [86 %-92 %] 92 %    Body mass index is 34.54 kg/m².     Input and Output Summary (last 24 hours):     Intake/Output Summary (Last 24 hours) at 4/25/2025 1503  Last data filed at 4/25/2025 0722  Gross per 24 hour   Intake 792 ml   Output 300 ml   Net 492 ml       Physical Exam  Constitutional:       General: She is not in acute distress.  HENT:      Head: Normocephalic and atraumatic.      Nose: Nose normal.      Mouth/Throat:      Mouth: Mucous membranes are moist.   Eyes:      Extraocular Movements: Extraocular movements intact.      Conjunctiva/sclera: Conjunctivae normal.   Cardiovascular:      Rate and Rhythm: Regular rhythm.   Pulmonary:      Effort: Pulmonary effort is normal. No respiratory distress.   Abdominal:      Palpations: Abdomen is soft.      Tenderness: There is no abdominal tenderness.   Musculoskeletal:         General: Normal range of motion.      Cervical back: Normal range of motion and neck supple.      Comments: Difficulty with range of motion of right hip   Skin:     General: Skin is warm and dry.   Neurological:       General: No focal deficit present.      Mental Status: She is alert. Mental status is at baseline.      Cranial Nerves: No cranial nerve deficit.   Psychiatric:         Mood and Affect: Mood normal.         Behavior: Behavior normal.       Lines/Drains:        Lab Results: I have reviewed the following results:   Results from last 7 days   Lab Units 04/25/25  0541 04/24/25  0941   WBC Thousand/uL 9.33 12.57*   HEMOGLOBIN g/dL 12.6 13.6   HEMATOCRIT % 39.2 42.8   PLATELETS Thousands/uL 256 280   SEGS PCT %  --  79*   LYMPHO PCT %  --  9*   MONO PCT %  --  11   EOS PCT %  --  1     Results from last 7 days   Lab Units 04/25/25  0541 04/24/25  0941   SODIUM mmol/L 139 138   POTASSIUM mmol/L 3.9 4.0   CHLORIDE mmol/L 103 102   CO2 mmol/L 27 28   BUN mg/dL 21 18   CREATININE mg/dL 0.81 0.84   ANION GAP mmol/L 9 8   CALCIUM mg/dL 9.0 9.6   ALBUMIN g/dL  --  4.3   TOTAL BILIRUBIN mg/dL  --  0.56   ALK PHOS U/L  --  33*   ALT U/L  --  15   AST U/L  --  18   GLUCOSE RANDOM mg/dL 116 125                 Results from last 7 days   Lab Units 04/25/25  0541 04/24/25  1226   LACTIC ACID mmol/L  --  0.9   PROCALCITONIN ng/ml 0.22 0.16       Recent Cultures (last 7 days):   Results from last 7 days   Lab Units 04/24/25  1435 04/24/25  1226   BLOOD CULTURE   --  Received in Microbiology Lab. Culture in Progress.  Received in Microbiology Lab. Culture in Progress.   BODY FLUID CULTURE, STERILE  No growth  --        Imaging Results Review: No pertinent imaging studies reviewed.  Other Study Results Review: No additional pertinent studies reviewed.    Last 24 Hours Medication List:     Current Facility-Administered Medications:     acetaminophen (TYLENOL) tablet 975 mg, Q8H FELIPA    amLODIPine (NORVASC) tablet 10 mg, Daily    ceftriaxone (ROCEPHIN) 2 g/50 mL in dextrose IVPB, Q24H, Last Rate: 2,000 mg (04/24/25 1618)    celecoxib (CeleBREX) capsule 200 mg, BID    Cholecalciferol (VITAMIN D3) tablet 1,000 Units, Daily    gabapentin  (NEURONTIN) tablet 600 mg, HS    heparin (porcine) subcutaneous injection 5,000 Units, Q8H FELIPA    loratadine (CLARITIN) tablet 10 mg, Daily    methocarbamol (ROBAXIN) tablet 500 mg, Q6H PRN    morphine injection 2 mg, Q4H PRN    multivitamin stress formula tablet 1 tablet, Daily    oxyCODONE (ROXICODONE) IR tablet 5 mg, Q4H PRN **OR** oxyCODONE (ROXICODONE) immediate release tablet 10 mg, Q4H PRN    pantoprazole (PROTONIX) EC tablet 40 mg, Early Morning    pravastatin (PRAVACHOL) tablet 40 mg, Daily    temazepam (RESTORIL) capsule 15 mg, HS PRN    vancomycin (VANCOCIN) IVPB (premix in dextrose) 750 mg 150 mL, Q12H, Last Rate: 750 mg (04/25/25 1442)    venlafaxine (EFFEXOR-XR) 24 hr capsule 150 mg, Daily    Administrative Statements   Today, Patient Was Seen By: Tao Mariano MD      **Please Note: This note may have been constructed using a voice recognition system.**

## 2025-04-26 LAB
ANION GAP SERPL CALCULATED.3IONS-SCNC: 6 MMOL/L (ref 4–13)
BASOPHILS # BLD AUTO: 0.03 THOUSANDS/ÂΜL (ref 0–0.1)
BASOPHILS NFR BLD AUTO: 0 % (ref 0–1)
BUN SERPL-MCNC: 26 MG/DL (ref 5–25)
CALCIUM SERPL-MCNC: 9.3 MG/DL (ref 8.4–10.2)
CHLORIDE SERPL-SCNC: 103 MMOL/L (ref 96–108)
CO2 SERPL-SCNC: 29 MMOL/L (ref 21–32)
CREAT SERPL-MCNC: 0.84 MG/DL (ref 0.6–1.3)
EOSINOPHIL # BLD AUTO: 0.13 THOUSAND/ÂΜL (ref 0–0.61)
EOSINOPHIL NFR BLD AUTO: 2 % (ref 0–6)
ERYTHROCYTE [DISTWIDTH] IN BLOOD BY AUTOMATED COUNT: 14.7 % (ref 11.6–15.1)
GFR SERPL CREATININE-BSD FRML MDRD: 68 ML/MIN/1.73SQ M
GLUCOSE SERPL-MCNC: 109 MG/DL (ref 65–140)
HCT VFR BLD AUTO: 39 % (ref 34.8–46.1)
HGB BLD-MCNC: 12.2 G/DL (ref 11.5–15.4)
IMM GRANULOCYTES # BLD AUTO: 0.04 THOUSAND/UL (ref 0–0.2)
IMM GRANULOCYTES NFR BLD AUTO: 1 % (ref 0–2)
LYMPHOCYTES # BLD AUTO: 1.47 THOUSANDS/ÂΜL (ref 0.6–4.47)
LYMPHOCYTES NFR BLD AUTO: 17 % (ref 14–44)
MCH RBC QN AUTO: 30 PG (ref 26.8–34.3)
MCHC RBC AUTO-ENTMCNC: 31.3 G/DL (ref 31.4–37.4)
MCV RBC AUTO: 96 FL (ref 82–98)
MONOCYTES # BLD AUTO: 0.84 THOUSAND/ÂΜL (ref 0.17–1.22)
MONOCYTES NFR BLD AUTO: 10 % (ref 4–12)
MRSA NOSE QL CULT: NORMAL
NEUTROPHILS # BLD AUTO: 5.92 THOUSANDS/ÂΜL (ref 1.85–7.62)
NEUTS SEG NFR BLD AUTO: 70 % (ref 43–75)
NRBC BLD AUTO-RTO: 0 /100 WBCS
PLATELET # BLD AUTO: 262 THOUSANDS/UL (ref 149–390)
PMV BLD AUTO: 10.7 FL (ref 8.9–12.7)
POTASSIUM SERPL-SCNC: 4.1 MMOL/L (ref 3.5–5.3)
PROCALCITONIN SERPL-MCNC: 0.21 NG/ML
RBC # BLD AUTO: 4.07 MILLION/UL (ref 3.81–5.12)
SODIUM SERPL-SCNC: 138 MMOL/L (ref 135–147)
URATE SERPL-MCNC: 5.1 MG/DL (ref 2–7.5)
VANCOMYCIN SERPL-MCNC: 28.6 UG/ML (ref 10–20)
WBC # BLD AUTO: 8.43 THOUSAND/UL (ref 4.31–10.16)

## 2025-04-26 PROCEDURE — 99232 SBSQ HOSP IP/OBS MODERATE 35: CPT | Performed by: INTERNAL MEDICINE

## 2025-04-26 PROCEDURE — 80202 ASSAY OF VANCOMYCIN: CPT | Performed by: NURSE PRACTITIONER

## 2025-04-26 PROCEDURE — 84550 ASSAY OF BLOOD/URIC ACID: CPT | Performed by: INTERNAL MEDICINE

## 2025-04-26 PROCEDURE — 99232 SBSQ HOSP IP/OBS MODERATE 35: CPT | Performed by: STUDENT IN AN ORGANIZED HEALTH CARE EDUCATION/TRAINING PROGRAM

## 2025-04-26 PROCEDURE — 80048 BASIC METABOLIC PNL TOTAL CA: CPT | Performed by: INTERNAL MEDICINE

## 2025-04-26 PROCEDURE — 84145 PROCALCITONIN (PCT): CPT | Performed by: INTERNAL MEDICINE

## 2025-04-26 PROCEDURE — 85025 COMPLETE CBC W/AUTO DIFF WBC: CPT | Performed by: INTERNAL MEDICINE

## 2025-04-26 RX ORDER — VANCOMYCIN HYDROCHLORIDE 500 MG/100ML
500 INJECTION, SOLUTION INTRAVENOUS EVERY 12 HOURS
Status: DISCONTINUED | OUTPATIENT
Start: 2025-04-26 | End: 2025-04-28 | Stop reason: SDUPTHER

## 2025-04-26 RX ADMIN — B-COMPLEX W/ C & FOLIC ACID TAB 1 TABLET: TAB at 09:39

## 2025-04-26 RX ADMIN — VANCOMYCIN HYDROCHLORIDE 500 MG: 500 INJECTION, SOLUTION INTRAVENOUS at 20:31

## 2025-04-26 RX ADMIN — ACETAMINOPHEN 975 MG: 325 TABLET ORAL at 21:08

## 2025-04-26 RX ADMIN — CELECOXIB 200 MG: 100 CAPSULE ORAL at 17:11

## 2025-04-26 RX ADMIN — GABAPENTIN 600 MG: 600 TABLET, FILM COATED ORAL at 21:10

## 2025-04-26 RX ADMIN — LORATADINE 10 MG: 10 TABLET ORAL at 09:39

## 2025-04-26 RX ADMIN — VANCOMYCIN HYDROCHLORIDE 750 MG: 750 INJECTION, SOLUTION INTRAVENOUS at 02:30

## 2025-04-26 RX ADMIN — VENLAFAXINE HYDROCHLORIDE 150 MG: 150 CAPSULE, EXTENDED RELEASE ORAL at 09:39

## 2025-04-26 RX ADMIN — HEPARIN SODIUM 5000 UNITS: 5000 INJECTION INTRAVENOUS; SUBCUTANEOUS at 14:48

## 2025-04-26 RX ADMIN — CEFTRIAXONE SODIUM 2000 MG: 10 INJECTION, POWDER, FOR SOLUTION INTRAVENOUS at 14:50

## 2025-04-26 RX ADMIN — OXYCODONE HYDROCHLORIDE 10 MG: 10 TABLET ORAL at 14:42

## 2025-04-26 RX ADMIN — OXYCODONE HYDROCHLORIDE 10 MG: 10 TABLET ORAL at 04:08

## 2025-04-26 RX ADMIN — HEPARIN SODIUM 5000 UNITS: 5000 INJECTION INTRAVENOUS; SUBCUTANEOUS at 06:53

## 2025-04-26 RX ADMIN — PRAVASTATIN SODIUM 40 MG: 40 TABLET ORAL at 09:39

## 2025-04-26 RX ADMIN — ACETAMINOPHEN 975 MG: 325 TABLET ORAL at 06:55

## 2025-04-26 RX ADMIN — HEPARIN SODIUM 5000 UNITS: 5000 INJECTION INTRAVENOUS; SUBCUTANEOUS at 21:09

## 2025-04-26 RX ADMIN — CELECOXIB 200 MG: 100 CAPSULE ORAL at 09:39

## 2025-04-26 RX ADMIN — AMLODIPINE BESYLATE 10 MG: 10 TABLET ORAL at 09:39

## 2025-04-26 RX ADMIN — OXYCODONE HYDROCHLORIDE 5 MG: 5 TABLET ORAL at 23:28

## 2025-04-26 RX ADMIN — PANTOPRAZOLE SODIUM 40 MG: 40 TABLET, DELAYED RELEASE ORAL at 06:54

## 2025-04-26 RX ADMIN — Medication 1000 UNITS: at 09:39

## 2025-04-26 NOTE — PROGRESS NOTES
Progress Note - Hospitalist   Name: Sharon Vega 74 y.o. female I MRN: [id number]  Unit/Bed#: -01 I Date of Admission: 4/24/2025   Date of Service: 4/26/2025 I Hospital Day: 1    Assessment & Plan  Right hip pain  Patient had a right hip hemiarthroplasty in 7/2022 with Dr. Roblero.   Over the past few months patient reports increased right hip pain. She saw Dr. Calles on 3/18.   3/18-R hip X-rays right unipolar partial hip replacement, and arthritic changes along the acetabulum  3/26-Bone scan negative for loosening of right hip arthoplasty   4/21-Patient underwent a right hip lidocaine injection  Initially had relief from pain, but on 4/23 PM she started with acute pain in right hip, now with difficulty on range of motion  IR input appreciated; s/p right hip joint effusion aspiration 4/24  Pain management, PT/OT evaluation, orthopedic eval  Culture did grow gram-positive cocci  Sepsis without acute organ dysfunction (HCC)  Patient with tachycardia, leukocytosis with suspect underlying septic arthritis  Elevated ESR-35, CRP 85  Blood cultures with no growth today.  Status post aspiration of the joint effusion; culture growing gram-positive cocci  Continue ceftriaxone/ vancomycin  Orthopedic consult appreciated  Follow-up with cultures  Consult infectious disease  Primary hypertension  Home regimen includes Amlodipine 5 mg daily   Continue while inpatient with hold parameters   Chronic pain syndrome  Patient with history of chronic low back pain/cervical, lumbar radiculopathy.   Patient undergoes L4-L5 interlaminar epidural steroid injections with pain medicine in outpatient setting, last injection was in January/2025.  Continue Celebrex 200mg BID, Gabapentin 600mg HS     VTE Pharmacologic Prophylaxis: VTE Score: 4 Moderate Risk (Score 3-4) - Pharmacological DVT Prophylaxis Ordered: heparin.    Mobility:   Basic Mobility Inpatient Raw Score: 17  JH-HLM Goal: 5: Stand one or more mins  -HLM Achieved: 3: Sit  at edge of bed  -HLM Goal achieved. Continue to encourage appropriate mobility.    Patient Centered Rounds: I performed bedside rounds with nursing staff today.   Discussions with Specialists or Other Care Team Provider: yes    Education and Discussions with Family / Patient:  Updated family at bedside regarding plan of care.     Current Length of Stay: 1 day(s)  Current Patient Status: Inpatient   Certification Statement: The patient will continue to require additional inpatient hospital stay due to joint infection  Discharge Plan: Anticipate discharge in 24-48 hrs to discharge location to be determined pending rehab evaluations.    Code Status: Level 1 - Full Code    Subjective   Joint culture returned positive today    Objective :  Temp:  [97.7 °F (36.5 °C)-98.3 °F (36.8 °C)] 98.3 °F (36.8 °C)  HR:  [] 92  BP: (126-140)/(79-92) 140/92  Resp:  [17-18] 18  SpO2:  [89 %-92 %] 91 %  O2 Device: None (Room air)    Body mass index is 34.54 kg/m².     Input and Output Summary (last 24 hours):     Intake/Output Summary (Last 24 hours) at 4/26/2025 1310  Last data filed at 4/26/2025 0405  Gross per 24 hour   Intake --   Output 425 ml   Net -425 ml       Physical Exam  Constitutional:       General: She is not in acute distress.     Comments: Frail elderly female   HENT:      Head: Normocephalic and atraumatic.      Nose: Nose normal.      Mouth/Throat:      Mouth: Mucous membranes are moist.   Eyes:      Extraocular Movements: Extraocular movements intact.      Conjunctiva/sclera: Conjunctivae normal.   Cardiovascular:      Rate and Rhythm: Normal rate and regular rhythm.   Pulmonary:      Effort: Pulmonary effort is normal. No respiratory distress.   Abdominal:      Palpations: Abdomen is soft.      Tenderness: There is no abdominal tenderness.   Musculoskeletal:         General: Normal range of motion.      Cervical back: Normal range of motion and neck supple.      Comments: Generalized weakness.  Pain on active  and passive range of motion of right hip.  Difficulty with range of motion   Skin:     General: Skin is warm and dry.   Neurological:      General: No focal deficit present.      Mental Status: She is alert. Mental status is at baseline.      Cranial Nerves: No cranial nerve deficit.   Psychiatric:         Mood and Affect: Mood normal.         Behavior: Behavior normal.       Lines/Drains:        Lab Results: I have reviewed the following results:   Results from last 7 days   Lab Units 04/26/25  0931   WBC Thousand/uL 8.43   HEMOGLOBIN g/dL 12.2   HEMATOCRIT % 39.0   PLATELETS Thousands/uL 262   SEGS PCT % 70   LYMPHO PCT % 17   MONO PCT % 10   EOS PCT % 2     Results from last 7 days   Lab Units 04/26/25  0931 04/25/25  0541 04/24/25  0941   SODIUM mmol/L 138   < > 138   POTASSIUM mmol/L 4.1   < > 4.0   CHLORIDE mmol/L 103   < > 102   CO2 mmol/L 29   < > 28   BUN mg/dL 26*   < > 18   CREATININE mg/dL 0.84   < > 0.84   ANION GAP mmol/L 6   < > 8   CALCIUM mg/dL 9.3   < > 9.6   ALBUMIN g/dL  --   --  4.3   TOTAL BILIRUBIN mg/dL  --   --  0.56   ALK PHOS U/L  --   --  33*   ALT U/L  --   --  15   AST U/L  --   --  18   GLUCOSE RANDOM mg/dL 109   < > 125    < > = values in this interval not displayed.                 Results from last 7 days   Lab Units 04/26/25  0931 04/25/25  0541 04/24/25  1226   LACTIC ACID mmol/L  --   --  0.9   PROCALCITONIN ng/ml 0.21 0.22 0.16       Recent Cultures (last 7 days):   Results from last 7 days   Lab Units 04/24/25  1435 04/24/25  1226   BLOOD CULTURE   --  No Growth at 24 hrs.  No Growth at 24 hrs.   GRAM STAIN RESULT  No Polys*  Rare Gram positive cocci in pairs*  --    BODY FLUID CULTURE, STERILE  No growth  --        Imaging Results Review: No pertinent imaging studies reviewed.  Other Study Results Review: No additional pertinent studies reviewed.    Last 24 Hours Medication List:     Current Facility-Administered Medications:     acetaminophen (TYLENOL) tablet 975 mg, Q8H  FELIPA    amLODIPine (NORVASC) tablet 10 mg, Daily    ceftriaxone (ROCEPHIN) 2 g/50 mL in dextrose IVPB, Q24H, Last Rate: 2,000 mg (04/25/25 1628)    celecoxib (CeleBREX) capsule 200 mg, BID    Cholecalciferol (VITAMIN D3) tablet 1,000 Units, Daily    gabapentin (NEURONTIN) tablet 600 mg, HS    heparin (porcine) subcutaneous injection 5,000 Units, Q8H FELIPA    loratadine (CLARITIN) tablet 10 mg, Daily    methocarbamol (ROBAXIN) tablet 500 mg, Q6H PRN    morphine injection 2 mg, Q4H PRN    multivitamin stress formula tablet 1 tablet, Daily    oxyCODONE (ROXICODONE) IR tablet 5 mg, Q4H PRN **OR** oxyCODONE (ROXICODONE) immediate release tablet 10 mg, Q4H PRN    pantoprazole (PROTONIX) EC tablet 40 mg, Early Morning    pravastatin (PRAVACHOL) tablet 40 mg, Daily    temazepam (RESTORIL) capsule 15 mg, HS PRN    vancomycin (VANCOCIN) IVPB (premix in dextrose) 500 mg 100 mL, Q12H    venlafaxine (EFFEXOR-XR) 24 hr capsule 150 mg, Daily    Administrative Statements   Today, Patient Was Seen By: Tao Mariano MD      **Please Note: This note may have been constructed using a voice recognition system.**

## 2025-04-26 NOTE — PLAN OF CARE

## 2025-04-26 NOTE — ASSESSMENT & PLAN NOTE
Patient had a right hip hemiarthroplasty in 7/2022 with Dr. Roblero.   Over the past few months patient reports increased right hip pain. She saw Dr. Calles on 3/18.   3/18-R hip X-rays right unipolar partial hip replacement, and arthritic changes along the acetabulum  3/26-Bone scan negative for loosening of right hip arthoplasty   4/21-Patient underwent a right hip lidocaine injection  Initially had relief from pain, but on 4/23 PM she started with acute pain in right hip, now with difficulty on range of motion  IR input appreciated; s/p right hip joint effusion aspiration 4/24  Pain management, PT/OT evaluation, orthopedic eval  Culture did grow gram-positive cocci

## 2025-04-26 NOTE — PLAN OF CARE
Problem: PAIN - ADULT  Goal: Verbalizes/displays adequate comfort level or baseline comfort level  Description: Interventions:- Encourage patient to monitor pain and request assistance- Assess pain using appropriate pain scale- Administer analgesics based on type and severity of pain and evaluate response- Implement non-pharmacological measures as appropriate and evaluate response- Consider cultural and social influences on pain and pain management- Notify physician/advanced practitioner if interventions unsuccessful or patient reports new pain  Outcome: Progressing     Problem: INFECTION - ADULT  Goal: Absence of fever/infection during neutropenic period  Description: INTERVENTIONS:- Monitor WBC  Outcome: Progressing     Problem: SAFETY ADULT  Goal: Maintains/Returns to pre admission functional level  Description: INTERVENTIONS:- Perform AM-PAC 6 Click Basic Mobility/ Daily Activity assessment daily.- Set and communicate daily mobility goal to care team and patient/family/caregiver. - Collaborate with rehabilitation services on mobility goals if consulted- Perform Range of Motion 3 times a day.- Reposition patient every 2 hours.- Dangle patient 3 times a day- Stand patient 3 times a day- Ambulate patient 3 times a day- Out of bed to chair 3 times a day - Out of bed for meals 3 times a day- Out of bed for toileting- Record patient progress and toleration of activity level   Outcome: Progressing

## 2025-04-26 NOTE — ASSESSMENT & PLAN NOTE
Patient with tachycardia, leukocytosis with suspect underlying septic arthritis  Elevated ESR-35, CRP 85  Blood cultures with no growth today.  Status post aspiration of the joint effusion; culture growing gram-positive cocci  Continue ceftriaxone/ vancomycin  Orthopedic consult appreciated  Follow-up with cultures  Consult infectious disease

## 2025-04-26 NOTE — ASSESSMENT & PLAN NOTE
74-year-old female status post right hip unipolar hemiarthroplasty with Dr. Roblero July 2022 with atraumatic right hip pain for 2 days  WBAT RLE  Continue antibiotics per primary team  S/p right hip aspiration by IR  Minimal joint effusion identified  Sample sent for culture. Not enough sample for synovial cell count  Cultures as an appended report now show Gram + cocci in pairs.  Will continue to monitor.   Last WBC 9.33 yesterday.  Await today's CBC.  Continue to monitor temperature  Monitor exam   Pain with passive range of motion but no micromotion tenderness  Compartments soft and compressible  PT/OT evaluation  DVT ppx per primary team  Pain control per primary team  No acute orthopedic surgical intervention indicated at this time  Will continue to monitor for worsening symptoms

## 2025-04-26 NOTE — NURSING NOTE
Awake and pain to left hip 3/10. 02 sat 91% hr 100/min. No distress . Plus 2 b/l pedals call bell near and bed alarm on

## 2025-04-26 NOTE — PROGRESS NOTES
Progress Note - Orthopedics   Name: Sharon Vega 74 y.o. female I MRN: [id number]  Unit/Bed#: -01 I Date of Admission: 4/24/2025   Date of Service: 4/26/2025 I Hospital Day: 1     Assessment & Plan  Right hip pain  74-year-old female status post right hip unipolar hemiarthroplasty with Dr. Roblero July 2022 with atraumatic right hip pain for 2 days  WBAT RLE  Continue antibiotics per primary team  S/p right hip aspiration by IR  Minimal joint effusion identified  Sample sent for culture. Not enough sample for synovial cell count  Cultures as an appended report now show Gram + cocci in pairs.  Will continue to monitor.   Last WBC 9.33 yesterday.  Await today's CBC.  Continue to monitor temperature  Monitor exam   Pain with passive range of motion but no micromotion tenderness  Compartments soft and compressible  PT/OT evaluation  DVT ppx per primary team  Pain control per primary team  No acute orthopedic surgical intervention indicated at this time  Will continue to monitor for worsening symptoms    Primary hypertension    Chronic pain syndrome    Sepsis without acute organ dysfunction (HCC)    Medical co-morbidities include as above, which are being managed per primary team. Orthopedics service will follow.    Subjective   74 y.o.female without acute events, no new complaints. Pain fairly well controlled having pain-free episodes at rest before needing to change positions due to some hip discomfort that extends down her leg past her knee, but does not extend into the ankle or foot.  Denies a history of back pain or radicular back pain.  Denies fevers, chills, CP, SOB, N/V, numbness or tingling. Patient reports no issues with urination or bowel movements.     Objective :  Temp:  [97.7 °F (36.5 °C)-98 °F (36.7 °C)] 98 °F (36.7 °C)  HR:  [] 99  BP: (126-136)/(79-85) 136/85  Resp:  [17] 17  SpO2:  [90 %-92 %] 90 %  O2 Device: None (Room air)    Physical Exam  Musculoskeletal: right hip is without gross  "erythema, bandaid anterior from IR aspiration.  No pain at rest.  No pain with micromotion logrolling of the hip.  Patient locates the primary pain of the lateral hip area and secondary pain in the groin near the Band-Aid.  She can be taken into hip flexion at approximately 35 degrees without pain with the knee flexed.  In this position when the knee is straightened she seems to get worsening pain that goes below the knee replicating some radicular type of pain.  The initial contralateral straight leg raise test also produced pain but then a repeat did not reproduce pain.  Patient seems to be anxious and expects pain and is difficult to get her to relax.  Her light touch sensation is intact throughout the both extremities with excellent plantar dorsiflexion strength symmetric bilaterally as well as great toe extension strength symmetric bilaterally.  She did have pain with axial loading of the hip but not with distraction.  Patient can be passively taken into abduction/adduction with minimal to no pain upon repeat test she has more pain with this.  She is unable to do an active straight leg raise/Stinchfield test due to pain.  There is no calf pain.    Right knee without erythema nor warmth. Well healed surgical scar midline.    Lab Results: I have reviewed the following results:  Recent Labs     04/24/25  0941 04/25/25  0541   WBC 12.57* 9.33   HGB 13.6 12.6   HCT 42.8 39.2    256   BUN 18 21   CREATININE 0.84 0.81   ESR 35*  --    CRP 85.3*  --      Blood Culture:    Lab Results   Component Value Date    BLOODCX No Growth at 24 hrs. 04/24/2025    BLOODCX No Growth at 24 hrs. 04/24/2025     Wound Culture: No results found for: \"WOUNDCULT\"    Three-phase bone scan 3/26/2025 no suspicious loosening with mild radiotracer uptake at the bilateral knees she has nonspecific although chronic loosening is not excluded.  "

## 2025-04-26 NOTE — PROGRESS NOTES
Sharon Vega is a 74 y.o. female who is currently ordered Vancomycin IV with management by the Pharmacy Consult service.  Relevant clinical data and objective / subjective history reviewed.  Vancomycin Assessment:  Indication and Goal AUC/Trough: Bone/joint infection (goal -600, trough >10), -600, trough >10  Clinical Status: stable  Micro:     Renal Function:  SCr: 0.81 mg/dL  CrCl: 62.5 mL/min  Renal replacement: Not on dialysis  Days of Therapy: 3  Current Dose: 750mg IV q12h  Vancomycin Plan:  New Dosinmg IV q12h  Estimated AUC: 416 mcg*hr/mL  Estimated Trough: 13.7 mcg/mL  Next Level: 428 AM labs  Renal Function Monitoring: Daily BMP and UOP  Pharmacy will continue to follow closely for s/sx of nephrotoxicity, infusion reactions and appropriateness of therapy.  BMP and CBC will be ordered per protocol. We will continue to follow the patient’s culture results and clinical progress daily.    Pino Allen, Pharmacist

## 2025-04-27 ENCOUNTER — APPOINTMENT (INPATIENT)
Dept: RADIOLOGY | Facility: HOSPITAL | Age: 75
DRG: 559 | End: 2025-04-27
Payer: MEDICARE

## 2025-04-27 LAB
ANION GAP SERPL CALCULATED.3IONS-SCNC: 11 MMOL/L (ref 4–13)
BACTERIA SPEC BFLD CULT: NO GROWTH
BUN SERPL-MCNC: 20 MG/DL (ref 5–25)
CALCIUM SERPL-MCNC: 9.7 MG/DL (ref 8.4–10.2)
CHLORIDE SERPL-SCNC: 99 MMOL/L (ref 96–108)
CO2 SERPL-SCNC: 26 MMOL/L (ref 21–32)
CREAT SERPL-MCNC: 0.71 MG/DL (ref 0.6–1.3)
ERYTHROCYTE [DISTWIDTH] IN BLOOD BY AUTOMATED COUNT: 14.3 % (ref 11.6–15.1)
GFR SERPL CREATININE-BSD FRML MDRD: 83 ML/MIN/1.73SQ M
GLUCOSE SERPL-MCNC: 104 MG/DL (ref 65–140)
GRAM STN SPEC: ABNORMAL
GRAM STN SPEC: ABNORMAL
HCT VFR BLD AUTO: 41.3 % (ref 34.8–46.1)
HGB BLD-MCNC: 13.2 G/DL (ref 11.5–15.4)
MCH RBC QN AUTO: 29.9 PG (ref 26.8–34.3)
MCHC RBC AUTO-ENTMCNC: 32 G/DL (ref 31.4–37.4)
MCV RBC AUTO: 93 FL (ref 82–98)
PLATELET # BLD AUTO: 344 THOUSANDS/UL (ref 149–390)
PMV BLD AUTO: 11 FL (ref 8.9–12.7)
POTASSIUM SERPL-SCNC: 3.9 MMOL/L (ref 3.5–5.3)
RBC # BLD AUTO: 4.42 MILLION/UL (ref 3.81–5.12)
SODIUM SERPL-SCNC: 136 MMOL/L (ref 135–147)
WBC # BLD AUTO: 10.99 THOUSAND/UL (ref 4.31–10.16)

## 2025-04-27 PROCEDURE — 99232 SBSQ HOSP IP/OBS MODERATE 35: CPT | Performed by: PHYSICIAN ASSISTANT

## 2025-04-27 PROCEDURE — 99232 SBSQ HOSP IP/OBS MODERATE 35: CPT | Performed by: INTERNAL MEDICINE

## 2025-04-27 PROCEDURE — 72100 X-RAY EXAM L-S SPINE 2/3 VWS: CPT

## 2025-04-27 PROCEDURE — 80048 BASIC METABOLIC PNL TOTAL CA: CPT | Performed by: INTERNAL MEDICINE

## 2025-04-27 PROCEDURE — 85027 COMPLETE CBC AUTOMATED: CPT | Performed by: INTERNAL MEDICINE

## 2025-04-27 RX ADMIN — VENLAFAXINE HYDROCHLORIDE 150 MG: 150 CAPSULE, EXTENDED RELEASE ORAL at 08:46

## 2025-04-27 RX ADMIN — CEFTRIAXONE SODIUM 2000 MG: 10 INJECTION, POWDER, FOR SOLUTION INTRAVENOUS at 14:30

## 2025-04-27 RX ADMIN — LORATADINE 10 MG: 10 TABLET ORAL at 08:47

## 2025-04-27 RX ADMIN — CELECOXIB 200 MG: 100 CAPSULE ORAL at 17:42

## 2025-04-27 RX ADMIN — Medication 1000 UNITS: at 08:47

## 2025-04-27 RX ADMIN — VANCOMYCIN HYDROCHLORIDE 500 MG: 500 INJECTION, SOLUTION INTRAVENOUS at 08:48

## 2025-04-27 RX ADMIN — HEPARIN SODIUM 5000 UNITS: 5000 INJECTION INTRAVENOUS; SUBCUTANEOUS at 05:46

## 2025-04-27 RX ADMIN — HEPARIN SODIUM 5000 UNITS: 5000 INJECTION INTRAVENOUS; SUBCUTANEOUS at 14:30

## 2025-04-27 RX ADMIN — HEPARIN SODIUM 5000 UNITS: 5000 INJECTION INTRAVENOUS; SUBCUTANEOUS at 22:25

## 2025-04-27 RX ADMIN — ACETAMINOPHEN 975 MG: 325 TABLET ORAL at 14:28

## 2025-04-27 RX ADMIN — AMLODIPINE BESYLATE 10 MG: 10 TABLET ORAL at 08:47

## 2025-04-27 RX ADMIN — PRAVASTATIN SODIUM 40 MG: 40 TABLET ORAL at 08:47

## 2025-04-27 RX ADMIN — GABAPENTIN 600 MG: 600 TABLET, FILM COATED ORAL at 22:25

## 2025-04-27 RX ADMIN — ACETAMINOPHEN 975 MG: 325 TABLET ORAL at 05:46

## 2025-04-27 RX ADMIN — CELECOXIB 200 MG: 100 CAPSULE ORAL at 08:47

## 2025-04-27 RX ADMIN — VANCOMYCIN HYDROCHLORIDE 500 MG: 500 INJECTION, SOLUTION INTRAVENOUS at 20:37

## 2025-04-27 RX ADMIN — MORPHINE SULFATE 2 MG: 2 INJECTION, SOLUTION INTRAMUSCULAR; INTRAVENOUS at 22:42

## 2025-04-27 RX ADMIN — OXYCODONE HYDROCHLORIDE 10 MG: 10 TABLET ORAL at 12:01

## 2025-04-27 RX ADMIN — ACETAMINOPHEN 975 MG: 325 TABLET ORAL at 22:25

## 2025-04-27 RX ADMIN — B-COMPLEX W/ C & FOLIC ACID TAB 1 TABLET: TAB at 08:46

## 2025-04-27 RX ADMIN — PANTOPRAZOLE SODIUM 40 MG: 40 TABLET, DELAYED RELEASE ORAL at 05:46

## 2025-04-27 RX ADMIN — OXYCODONE HYDROCHLORIDE 10 MG: 10 TABLET ORAL at 20:33

## 2025-04-27 NOTE — ASSESSMENT & PLAN NOTE
Patient with tachycardia, leukocytosis with suspect underlying septic arthritis  Elevated ESR-35, CRP 85  Blood cultures with no growth today.  Status post aspiration of the joint effusion; Gram stain with gram-positive cocci  Continue ceftriaxone/ vancomycin  Orthopedic consult appreciated  Follow-up with cultures  Consult infectious disease

## 2025-04-27 NOTE — PLAN OF CARE
Problem: PAIN - ADULT  Goal: Verbalizes/displays adequate comfort level or baseline comfort level  Description: Interventions:- Encourage patient to monitor pain and request assistance- Assess pain using appropriate pain scale- Administer analgesics based on type and severity of pain and evaluate response- Implement non-pharmacological measures as appropriate and evaluate response- Consider cultural and social influences on pain and pain management- Notify physician/advanced practitioner if interventions unsuccessful or patient reports new pain  Outcome: Progressing     Problem: DISCHARGE PLANNING  Goal: Discharge to home or other facility with appropriate resources  Description: INTERVENTIONS:- Identify barriers to discharge w/patient and caregiver- Arrange for needed discharge resources and transportation as appropriate- Identify discharge learning needs (meds, wound care, etc.)- Arrange for interpretive services to assist at discharge as needed- Refer to Case Management Department for coordinating discharge planning if the patient needs post-hospital services based on physician/advanced practitioner order or complex needs related to functional status, cognitive ability, or social support system  Outcome: Progressing     Problem: INFECTION - ADULT  Goal: Absence of fever/infection during neutropenic period  Description: INTERVENTIONS:- Monitor WBC  Outcome: Progressing

## 2025-04-27 NOTE — ASSESSMENT & PLAN NOTE
74-year-old female status post right hip unipolar hemiarthroplasty with Dr. Roblero July 2022 with atraumatic right hip pain for 5 days and history of left leg radicular pain status post lumbar injection January 2025.  Patient does have a history of lumbar radiculopathy and had previous lumbar injection in January 2025.  Her pain was in her left leg at that point in time and did get improvement with the injection.  X-rays of the lumbar spine are being ordered as her exam shows some findings that could be consistent with the right radicular type of pain pattern.  WBAT RLE  Continue antibiotics per primary team  S/p right hip aspiration by IR  Minimal joint effusion identified  Sample sent for culture. Not enough sample for synovial cell count  Gram stain as an appended report now show Gram + cocci in pairs, however culture still shows no growth and are still preliminary.  Will continue to monitor, no update as yet for today.   WBC 10.99 today.  Continues to be afebrile.   Monitor exam and await final culture results.  Pain with passive range of motion but no micromotion tenderness  Compartments soft and compressible  PT/OT evaluation  DVT ppx per primary team  Pain control per primary team  No acute orthopedic surgical intervention indicated at this time  Will continue to monitor for worsening symptoms

## 2025-04-27 NOTE — ASSESSMENT & PLAN NOTE
Patient had a right hip hemiarthroplasty in 7/2022 with Dr. Roblero.   Over the past few months patient reports increased right hip pain. She saw Dr. Calles on 3/18.   3/18-R hip X-rays right unipolar partial hip replacement, and arthritic changes along the acetabulum  3/26-Bone scan negative for loosening of right hip arthoplasty   4/21-Patient underwent a right hip lidocaine injection  Initially had relief from pain, but on 4/23 PM she started with acute pain in right hip, now with difficulty on range of motion  IR input appreciated; s/p right hip joint effusion aspiration 4/24  Pain management, PT/OT evaluation, orthopedic eval  Gram stain with rare gram-positive cocci

## 2025-04-27 NOTE — PROGRESS NOTES
Progress Note - Hospitalist   Name: Sharon Vega 74 y.o. female I MRN: [id number]  Unit/Bed#: -01 I Date of Admission: 4/24/2025   Date of Service: 4/27/2025 I Hospital Day: 2    Assessment & Plan  Right hip pain  Patient had a right hip hemiarthroplasty in 7/2022 with Dr. Roblero.   Over the past few months patient reports increased right hip pain. She saw Dr. Calles on 3/18.   3/18-R hip X-rays right unipolar partial hip replacement, and arthritic changes along the acetabulum  3/26-Bone scan negative for loosening of right hip arthoplasty   4/21-Patient underwent a right hip lidocaine injection  Initially had relief from pain, but on 4/23 PM she started with acute pain in right hip, now with difficulty on range of motion  IR input appreciated; s/p right hip joint effusion aspiration 4/24  Pain management, PT/OT evaluation, orthopedic eval  Gram stain with rare gram-positive cocci  Sepsis without acute organ dysfunction (HCC)  Patient with tachycardia, leukocytosis with suspect underlying septic arthritis  Elevated ESR-35, CRP 85  Blood cultures with no growth today.  Status post aspiration of the joint effusion; Gram stain with gram-positive cocci  Continue ceftriaxone/ vancomycin  Orthopedic consult appreciated  Follow-up with cultures  Consult infectious disease  Primary hypertension  Home regimen includes Amlodipine 5 mg daily   Continue while inpatient with hold parameters   Chronic pain syndrome  Patient with history of chronic low back pain/cervical, lumbar radiculopathy.   Patient undergoes L4-L5 interlaminar epidural steroid injections with pain medicine in outpatient setting, last injection was in January/2025.  Continue Celebrex 200mg BID, Gabapentin 600mg HS     VTE Pharmacologic Prophylaxis: VTE Score: 4 Moderate Risk (Score 3-4) - Pharmacological DVT Prophylaxis Ordered: heparin.    Mobility:   Basic Mobility Inpatient Raw Score: 17  -[facility] Goal: 5: Stand one or more mins  -HLM Achieved: 4:  Move to chair/commode  -HL Goal achieved. Continue to encourage appropriate mobility.    Patient Centered Rounds: I performed bedside rounds with nursing staff today.   Discussions with Specialists or Other Care Team Provider: yes    Education and Discussions with Family / Patient: Updated  (son) via phone.    Current Length of Stay: 2 day(s)  Current Patient Status: Inpatient   Certification Statement: The patient will continue to require additional inpatient hospital stay due to suspected joint infection  Discharge Plan: Anticipate discharge in 48-72 hrs to discharge location to be determined pending rehab evaluations.    Code Status: Level 1 - Full Code    Subjective   No overnight events noted.  Still with pain of right hip, worse with movement    Objective :  Temp:  [98.2 °F (36.8 °C)-98.3 °F (36.8 °C)] 98.2 °F (36.8 °C)  HR:  [] 106  BP: (126-165)/() 150/105  Resp:  [18] 18  SpO2:  [89 %-95 %] 93 %  O2 Device: None (Room air)    Body mass index is 34.54 kg/m².     Input and Output Summary (last 24 hours):     Intake/Output Summary (Last 24 hours) at 4/27/2025 0808  Last data filed at 4/27/2025 0601  Gross per 24 hour   Intake 730 ml   Output 700 ml   Net 30 ml       Physical Exam  Constitutional:       General: She is not in acute distress.  HENT:      Head: Normocephalic and atraumatic.      Nose: Nose normal.      Mouth/Throat:      Mouth: Mucous membranes are moist.   Eyes:      Extraocular Movements: Extraocular movements intact.      Conjunctiva/sclera: Conjunctivae normal.   Cardiovascular:      Rate and Rhythm: Normal rate and regular rhythm.   Pulmonary:      Effort: Pulmonary effort is normal. No respiratory distress.   Abdominal:      Palpations: Abdomen is soft.      Tenderness: There is no abdominal tenderness.   Musculoskeletal:         General: Normal range of motion.      Cervical back: Normal range of motion and neck supple.      Comments: Generalized weakness.   Pain/difficulty with range of motion of right hip   Skin:     General: Skin is warm and dry.   Neurological:      General: No focal deficit present.      Mental Status: She is alert. Mental status is at baseline.      Cranial Nerves: No cranial nerve deficit.   Psychiatric:         Mood and Affect: Mood normal.         Behavior: Behavior normal.           Lines/Drains:        Lab Results: I have reviewed the following results:   Results from last 7 days   Lab Units 04/27/25  0425 04/26/25  0931   WBC Thousand/uL 10.99* 8.43   HEMOGLOBIN g/dL 13.2 12.2   HEMATOCRIT % 41.3 39.0   PLATELETS Thousands/uL 344 262   SEGS PCT %  --  70   LYMPHO PCT %  --  17   MONO PCT %  --  10   EOS PCT %  --  2     Results from last 7 days   Lab Units 04/27/25  0425 04/25/25  0541 04/24/25  0941   SODIUM mmol/L 136   < > 138   POTASSIUM mmol/L 3.9   < > 4.0   CHLORIDE mmol/L 99   < > 102   CO2 mmol/L 26   < > 28   BUN mg/dL 20   < > 18   CREATININE mg/dL 0.71   < > 0.84   ANION GAP mmol/L 11   < > 8   CALCIUM mg/dL 9.7   < > 9.6   ALBUMIN g/dL  --   --  4.3   TOTAL BILIRUBIN mg/dL  --   --  0.56   ALK PHOS U/L  --   --  33*   ALT U/L  --   --  15   AST U/L  --   --  18   GLUCOSE RANDOM mg/dL 104   < > 125    < > = values in this interval not displayed.                 Results from last 7 days   Lab Units 04/26/25  0931 04/25/25  0541 04/24/25  1226   LACTIC ACID mmol/L  --   --  0.9   PROCALCITONIN ng/ml 0.21 0.22 0.16       Recent Cultures (last 7 days):   Results from last 7 days   Lab Units 04/24/25  1435 04/24/25  1226   BLOOD CULTURE   --  No Growth at 48 hrs.  No Growth at 48 hrs.   GRAM STAIN RESULT  No Polys*  Rare Gram positive cocci in pairs*  --    BODY FLUID CULTURE, STERILE  No growth  --        Imaging Results Review: No pertinent imaging studies reviewed.  Other Study Results Review: No additional pertinent studies reviewed.    Last 24 Hours Medication List:     Current Facility-Administered Medications:      acetaminophen (TYLENOL) tablet 975 mg, Q8H FELIPA    amLODIPine (NORVASC) tablet 10 mg, Daily    ceftriaxone (ROCEPHIN) 2 g/50 mL in dextrose IVPB, Q24H, Last Rate: Stopped (04/26/25 1547)    celecoxib (CeleBREX) capsule 200 mg, BID    Cholecalciferol (VITAMIN D3) tablet 1,000 Units, Daily    gabapentin (NEURONTIN) tablet 600 mg, HS    heparin (porcine) subcutaneous injection 5,000 Units, Q8H FELIPA    loratadine (CLARITIN) tablet 10 mg, Daily    methocarbamol (ROBAXIN) tablet 500 mg, Q6H PRN    morphine injection 2 mg, Q4H PRN    multivitamin stress formula tablet 1 tablet, Daily    oxyCODONE (ROXICODONE) IR tablet 5 mg, Q4H PRN **OR** oxyCODONE (ROXICODONE) immediate release tablet 10 mg, Q4H PRN    pantoprazole (PROTONIX) EC tablet 40 mg, Early Morning    pravastatin (PRAVACHOL) tablet 40 mg, Daily    temazepam (RESTORIL) capsule 15 mg, HS PRN    vancomycin (VANCOCIN) IVPB (premix in dextrose) 500 mg 100 mL, Q12H, Last Rate: 500 mg (04/26/25 2031)    venlafaxine (EFFEXOR-XR) 24 hr capsule 150 mg, Daily    Administrative Statements   Today, Patient Was Seen By: Tao Mariano MD      **Please Note: This note may have been constructed using a voice recognition system.**

## 2025-04-27 NOTE — PROGRESS NOTES
Progress Note - Orthopedics   Name: Sharon Vega 74 y.o. female I MRN: [id number]  Unit/Bed#: -01 I Date of Admission: 4/24/2025   Date of Service: 4/27/2025 I Hospital Day: 2     Assessment & Plan  Right hip pain  74-year-old female status post right hip unipolar hemiarthroplasty with Dr. Roblero July 2022 with atraumatic right hip pain for 5 days and history of left leg radicular pain status post lumbar injection January 2025.  Patient does have a history of lumbar radiculopathy and had previous lumbar injection in January 2025.  Her pain was in her left leg at that point in time and did get improvement with the injection.  X-rays of the lumbar spine are being ordered as her exam shows some findings that could be consistent with the right radicular type of pain pattern.  WBAT RLE  Continue antibiotics per primary team  S/p right hip aspiration by IR  Minimal joint effusion identified  Sample sent for culture. Not enough sample for synovial cell count  Gram stain as an appended report now show Gram + cocci in pairs, however culture still shows no growth and are still preliminary.  Will continue to monitor, no update as yet for today.   WBC 10.99 today.  Continues to be afebrile.   Monitor exam and await final culture results.  Pain with passive range of motion but no micromotion tenderness  Compartments soft and compressible  PT/OT evaluation  DVT ppx per primary team  Pain control per primary team  No acute orthopedic surgical intervention indicated at this time  Will continue to monitor for worsening symptoms    Primary hypertension    Chronic pain syndrome    Sepsis without acute organ dysfunction (HCC)      Medical co-morbidities include as above, which are being managed per primary team. Orthopedics service will follow.    Subjective   74 y.o.female without acute events, no new complaints. Pain well controlled at rest. Denies fevers, chills, CP, SOB, N/V, numbness or tingling. Patient reports no  "issues with urination or bowel movements.     Objective :  Temp:  [98.2 °F (36.8 °C)-98.3 °F (36.8 °C)] 98.2 °F (36.8 °C)  HR:  [] 106  BP: (126-165)/() 150/105  Resp:  [18] 18  SpO2:  [89 %-95 %] 93 %  O2 Device: None (Room air)    Physical Exam  Musculoskeletal: Right hip is without erythema or growth warmth.  With the patient relaxed and upon initially entering the room logrolling of the hip approximately 10 to 15 degrees internal and external rotation elicit no significant hip pain.  Can continue to passively flex the hip to approximately 30 degrees without gross pain with the knee flexed.  Upon extension of the knee in this position she does get lateral thigh pain that radiates distally some.  She has pain to palpation over the mid IT band but not distally.  Again today she does note that she does get some lateral right hip pain with supine straight leg raise of the contralateral side.  He does however have moderate pain with straight leg axial loading of the right hipLight touch sensation is intact distally..      Right knee is also without erythema or warmth.  There is no effusion.  There is no pain about the right knee with varus and valgus stress nor with anterior posterior stress.  No obvious signs of right knee prosthetic loosening.      Lab Results: I have reviewed the following results:  Recent Labs     04/24/25  0941 04/25/25  0541 04/26/25  0931 04/27/25  0425   WBC 12.57* 9.33 8.43 10.99*   HGB 13.6 12.6 12.2 13.2   HCT 42.8 39.2 39.0 41.3    256 262 344   BUN 18 21 26* 20   CREATININE 0.84 0.81 0.84 0.71   ESR 35*  --   --   --    CRP 85.3*  --   --   --      Blood Culture:    Lab Results   Component Value Date    BLOODCX No Growth at 48 hrs. 04/24/2025    BLOODCX No Growth at 48 hrs. 04/24/2025     Wound Culture: No results found for: \"WOUNDCULT\"    "

## 2025-04-27 NOTE — PROGRESS NOTES
Sharon Vega is a 74 y.o. female who is currently ordered Vancomycin IV with management by the Pharmacy Consult service.  Relevant clinical data and objective / subjective history reviewed.  Vancomycin Assessment:  Indication and Goal AUC/Trough: Bone/joint infection (goal -600, trough >10), -600, trough >10  Clinical Status: stable  Micro:     Renal Function:  SCr: 0.71 mg/dL  CrCl: 71.3 mL/min  Renal replacement: Not on dialysis  Days of Therapy: 4  Current Dose: 500mg IV q12h  Vancomycin Plan:  New Dosinmg IV q12h  Estimated AUC:  416 mcg*hr/mL  Estimated Trough: 13.7 mcg/mL  Next Level:  AM labs  Renal Function Monitoring: Daily BMP and UOP  Pharmacy will continue to follow closely for s/sx of nephrotoxicity, infusion reactions and appropriateness of therapy.  BMP and CBC will be ordered per protocol. We will continue to follow the patient’s culture results and clinical progress daily.    Pino Allen, Pharmacist

## 2025-04-28 LAB
ANION GAP SERPL CALCULATED.3IONS-SCNC: 12 MMOL/L (ref 4–13)
BUN SERPL-MCNC: 21 MG/DL (ref 5–25)
CALCIUM SERPL-MCNC: 9.5 MG/DL (ref 8.4–10.2)
CHLORIDE SERPL-SCNC: 99 MMOL/L (ref 96–108)
CO2 SERPL-SCNC: 25 MMOL/L (ref 21–32)
CREAT SERPL-MCNC: 0.62 MG/DL (ref 0.6–1.3)
ERYTHROCYTE [DISTWIDTH] IN BLOOD BY AUTOMATED COUNT: 14.1 % (ref 11.6–15.1)
GFR SERPL CREATININE-BSD FRML MDRD: 88 ML/MIN/1.73SQ M
GLUCOSE SERPL-MCNC: 126 MG/DL (ref 65–140)
HCT VFR BLD AUTO: 39.9 % (ref 34.8–46.1)
HGB BLD-MCNC: 12.9 G/DL (ref 11.5–15.4)
MCH RBC QN AUTO: 29.8 PG (ref 26.8–34.3)
MCHC RBC AUTO-ENTMCNC: 32.3 G/DL (ref 31.4–37.4)
MCV RBC AUTO: 92 FL (ref 82–98)
PLATELET # BLD AUTO: 355 THOUSANDS/UL (ref 149–390)
PMV BLD AUTO: 10.1 FL (ref 8.9–12.7)
POTASSIUM SERPL-SCNC: 3.7 MMOL/L (ref 3.5–5.3)
RBC # BLD AUTO: 4.33 MILLION/UL (ref 3.81–5.12)
SODIUM SERPL-SCNC: 136 MMOL/L (ref 135–147)
VANCOMYCIN TROUGH SERPL-MCNC: 9.2 UG/ML (ref 10–20)
WBC # BLD AUTO: 12.36 THOUSAND/UL (ref 4.31–10.16)

## 2025-04-28 PROCEDURE — 97116 GAIT TRAINING THERAPY: CPT

## 2025-04-28 PROCEDURE — G0545 PR INHERENT VISIT TO INPT: HCPCS | Performed by: INTERNAL MEDICINE

## 2025-04-28 PROCEDURE — 85027 COMPLETE CBC AUTOMATED: CPT | Performed by: INTERNAL MEDICINE

## 2025-04-28 PROCEDURE — 99232 SBSQ HOSP IP/OBS MODERATE 35: CPT | Performed by: INTERNAL MEDICINE

## 2025-04-28 PROCEDURE — 80202 ASSAY OF VANCOMYCIN: CPT | Performed by: INTERNAL MEDICINE

## 2025-04-28 PROCEDURE — 80048 BASIC METABOLIC PNL TOTAL CA: CPT | Performed by: INTERNAL MEDICINE

## 2025-04-28 PROCEDURE — 99222 1ST HOSP IP/OBS MODERATE 55: CPT | Performed by: INTERNAL MEDICINE

## 2025-04-28 PROCEDURE — 97530 THERAPEUTIC ACTIVITIES: CPT

## 2025-04-28 PROCEDURE — 99232 SBSQ HOSP IP/OBS MODERATE 35: CPT | Performed by: ORTHOPAEDIC SURGERY

## 2025-04-28 PROCEDURE — 97110 THERAPEUTIC EXERCISES: CPT

## 2025-04-28 PROCEDURE — 97112 NEUROMUSCULAR REEDUCATION: CPT

## 2025-04-28 RX ORDER — VANCOMYCIN HYDROCHLORIDE 1 G/200ML
1000 INJECTION, SOLUTION INTRAVENOUS EVERY 12 HOURS
Status: DISCONTINUED | OUTPATIENT
Start: 2025-04-28 | End: 2025-04-28

## 2025-04-28 RX ADMIN — CELECOXIB 200 MG: 100 CAPSULE ORAL at 09:48

## 2025-04-28 RX ADMIN — ACETAMINOPHEN 975 MG: 325 TABLET ORAL at 13:52

## 2025-04-28 RX ADMIN — Medication 1000 UNITS: at 09:48

## 2025-04-28 RX ADMIN — B-COMPLEX W/ C & FOLIC ACID TAB 1 TABLET: TAB at 09:48

## 2025-04-28 RX ADMIN — HEPARIN SODIUM 5000 UNITS: 5000 INJECTION INTRAVENOUS; SUBCUTANEOUS at 21:21

## 2025-04-28 RX ADMIN — OXYCODONE HYDROCHLORIDE 10 MG: 10 TABLET ORAL at 05:10

## 2025-04-28 RX ADMIN — GABAPENTIN 600 MG: 600 TABLET, FILM COATED ORAL at 21:21

## 2025-04-28 RX ADMIN — CELECOXIB 200 MG: 100 CAPSULE ORAL at 17:42

## 2025-04-28 RX ADMIN — AMLODIPINE BESYLATE 10 MG: 10 TABLET ORAL at 09:48

## 2025-04-28 RX ADMIN — PANTOPRAZOLE SODIUM 40 MG: 40 TABLET, DELAYED RELEASE ORAL at 05:10

## 2025-04-28 RX ADMIN — PRAVASTATIN SODIUM 40 MG: 40 TABLET ORAL at 09:48

## 2025-04-28 RX ADMIN — HEPARIN SODIUM 5000 UNITS: 5000 INJECTION INTRAVENOUS; SUBCUTANEOUS at 05:11

## 2025-04-28 RX ADMIN — HEPARIN SODIUM 5000 UNITS: 5000 INJECTION INTRAVENOUS; SUBCUTANEOUS at 13:53

## 2025-04-28 RX ADMIN — ACETAMINOPHEN 975 MG: 325 TABLET ORAL at 05:10

## 2025-04-28 RX ADMIN — ACETAMINOPHEN 975 MG: 325 TABLET ORAL at 21:21

## 2025-04-28 RX ADMIN — OXYCODONE HYDROCHLORIDE 10 MG: 10 TABLET ORAL at 21:21

## 2025-04-28 RX ADMIN — LORATADINE 10 MG: 10 TABLET ORAL at 09:48

## 2025-04-28 RX ADMIN — MORPHINE SULFATE 2 MG: 2 INJECTION, SOLUTION INTRAMUSCULAR; INTRAVENOUS at 22:51

## 2025-04-28 RX ADMIN — OXYCODONE HYDROCHLORIDE 5 MG: 5 TABLET ORAL at 13:53

## 2025-04-28 RX ADMIN — VENLAFAXINE HYDROCHLORIDE 150 MG: 150 CAPSULE, EXTENDED RELEASE ORAL at 09:48

## 2025-04-28 NOTE — PROGRESS NOTES
Progress Note - Hospitalist   Name: Sharon Vega 74 y.o. female I MRN: [id number]  Unit/Bed#: -01 I Date of Admission: 4/24/2025   Date of Service: 4/28/2025 I Hospital Day: 3     Assessment & Plan  Right hip pain  Patient had a right hip hemiarthroplasty in 7/2022 with Dr. Roblero.   Over the past few months patient reports increased right hip pain. She saw Dr. Calles on 3/18.   3/18-R hip X-rays right unipolar partial hip replacement, and arthritic changes along the acetabulum  3/26-Bone scan negative for loosening of right hip arthoplasty   4/21-Patient underwent a right hip lidocaine injection  Initially had relief from pain, but on 4/23 PM she started with acute pain in right hip, now with difficulty on range of motion  IR input appreciated; s/p right hip joint effusion aspiration 4/24  Pain management, PT/OT evaluation, orthopedic eval  Gram stain with rare gram-positive cocci - ortho feels that this is likely a contaminant  Sepsis without acute organ dysfunction (HCC)  Patient with tachycardia, leukocytosis with suspect underlying septic arthritis  Elevated ESR-35, CRP 85 - will check tomorrow am  Blood cultures with no growth today.  Status post aspiration of the joint effusion; Gram stain with gram-positive cocci - possible contaminate  Continue ceftriaxone/ vancomycin  Orthopedic consult appreciated  Follow-up with cultures  Consult infectious disease - plan is to dc antibiotics and observe off of antibiotics.  If stable, plan to dc to rehab with outpatient ortho follow up  Primary hypertension  Home regimen includes Amlodipine 5 mg daily   Continue while inpatient with hold parameters   Chronic pain syndrome  Patient with history of chronic low back pain/cervical, lumbar radiculopathy.   Patient undergoes L4-L5 interlaminar epidural steroid injections with pain medicine in outpatient setting, last injection was in January/2025.  Continue Celebrex 200mg BID, Gabapentin 600mg HS     VTE Pharmacologic  Prophylaxis: VTE Score: 4 High Risk (Score >/= 5) - Pharmacological DVT Prophylaxis Ordered: heparin. Sequential Compression Devices Ordered.    Mobility:   Basic Mobility Inpatient Raw Score: 17  JH-HLM Goal: 5: Stand one or more mins  JH-HLM Achieved: 2: Bed activities/Dependent transfer  JH-HLM Goal achieved. Continue to encourage appropriate mobility.    Patient Centered Rounds: I performed bedside rounds with nursing staff today.   Discussions with Specialists or Other Care Team Provider: ID    Education and Discussions with Family / Patient: Patient declined call to .     Current Length of Stay: 3 day(s)  Current Patient Status: Inpatient   Certification Statement: The patient will continue to require additional inpatient hospital stay due to monitor off of antibiotics for possible septic arthritis  Discharge Plan: Anticipate discharge tomorrow to rehab facility.    Code Status: Level 1 - Full Code    Subjective   Patient seen and examined No acute events overnight.  Pain well-controlled.    Objective :  Temp:  [98.2 °F (36.8 °C)-99 °F (37.2 °C)] 98.2 °F (36.8 °C)  HR:  [84-98] 93  BP: (111-152)/(83-92) 140/92  Resp:  [18-20] 18  SpO2:  [89 %-96 %] 89 %  O2 Device: None (Room air)    Body mass index is 34.54 kg/m².     Input and Output Summary (last 24 hours):     Intake/Output Summary (Last 24 hours) at 4/28/2025 1033  Last data filed at 4/28/2025 0907  Gross per 24 hour   Intake 1470 ml   Output 450 ml   Net 1020 ml       Physical Exam  Vitals and nursing note reviewed.   Constitutional:       Appearance: She is obese.   HENT:      Head: Normocephalic.      Nose: Nose normal.      Mouth/Throat:      Mouth: Mucous membranes are moist.   Eyes:      Pupils: Pupils are equal, round, and reactive to light.   Cardiovascular:      Rate and Rhythm: Normal rate and regular rhythm.      Pulses: Normal pulses.      Heart sounds: Normal heart sounds.   Pulmonary:      Effort: Pulmonary effort is normal.       Breath sounds: Normal breath sounds.   Abdominal:      General: Abdomen is flat.   Musculoskeletal:         General: No swelling.   Skin:     General: Skin is warm.      Capillary Refill: Capillary refill takes less than 2 seconds.   Neurological:      General: No focal deficit present.      Mental Status: She is alert and oriented to person, place, and time. Mental status is at baseline.   Psychiatric:         Mood and Affect: Mood normal.         Behavior: Behavior normal.         Thought Content: Thought content normal.           Lines/Drains:              Lab Results: I have reviewed the following results:   Results from last 7 days   Lab Units 04/28/25  0531 04/27/25  0425 04/26/25  0931   WBC Thousand/uL 12.36*   < > 8.43   HEMOGLOBIN g/dL 12.9   < > 12.2   HEMATOCRIT % 39.9   < > 39.0   PLATELETS Thousands/uL 355   < > 262   SEGS PCT %  --   --  70   LYMPHO PCT %  --   --  17   MONO PCT %  --   --  10   EOS PCT %  --   --  2    < > = values in this interval not displayed.     Results from last 7 days   Lab Units 04/28/25  0531 04/25/25  0541 04/24/25  0941   SODIUM mmol/L 136   < > 138   POTASSIUM mmol/L 3.7   < > 4.0   CHLORIDE mmol/L 99   < > 102   CO2 mmol/L 25   < > 28   BUN mg/dL 21   < > 18   CREATININE mg/dL 0.62   < > 0.84   ANION GAP mmol/L 12   < > 8   CALCIUM mg/dL 9.5   < > 9.6   ALBUMIN g/dL  --   --  4.3   TOTAL BILIRUBIN mg/dL  --   --  0.56   ALK PHOS U/L  --   --  33*   ALT U/L  --   --  15   AST U/L  --   --  18   GLUCOSE RANDOM mg/dL 126   < > 125    < > = values in this interval not displayed.                 Results from last 7 days   Lab Units 04/26/25  0931 04/25/25  0541 04/24/25  1226   LACTIC ACID mmol/L  --   --  0.9   PROCALCITONIN ng/ml 0.21 0.22 0.16       Recent Cultures (last 7 days):   Results from last 7 days   Lab Units 04/24/25  1435 04/24/25  1226   BLOOD CULTURE   --  No Growth at 72 hrs.  No Growth at 72 hrs.   GRAM STAIN RESULT  No Polys*  Rare Gram positive cocci  in pairs*  --    BODY FLUID CULTURE, STERILE  No growth  --        Imaging Results Review: No pertinent imaging studies reviewed.  Other Study Results Review: No additional pertinent studies reviewed.    Last 24 Hours Medication List:     Current Facility-Administered Medications:     acetaminophen (TYLENOL) tablet 975 mg, Q8H FELIPA    amLODIPine (NORVASC) tablet 10 mg, Daily    celecoxib (CeleBREX) capsule 200 mg, BID    Cholecalciferol (VITAMIN D3) tablet 1,000 Units, Daily    gabapentin (NEURONTIN) tablet 600 mg, HS    heparin (porcine) subcutaneous injection 5,000 Units, Q8H FELIPA    loratadine (CLARITIN) tablet 10 mg, Daily    methocarbamol (ROBAXIN) tablet 500 mg, Q6H PRN    morphine injection 2 mg, Q4H PRN    multivitamin stress formula tablet 1 tablet, Daily    oxyCODONE (ROXICODONE) IR tablet 5 mg, Q4H PRN **OR** oxyCODONE (ROXICODONE) immediate release tablet 10 mg, Q4H PRN    pantoprazole (PROTONIX) EC tablet 40 mg, Early Morning    pravastatin (PRAVACHOL) tablet 40 mg, Daily    temazepam (RESTORIL) capsule 15 mg, HS PRN    venlafaxine (EFFEXOR-XR) 24 hr capsule 150 mg, Daily    Administrative Statements   Today, Patient Was Seen By: Brett Dove MD  I have spent a total time of 22 minutes in caring for this patient on the day of the visit/encounter including Diagnostic results, Documenting in the medical record, Reviewing/placing orders in the medical record (including tests, medications, and/or procedures), Obtaining or reviewing history  , and Communicating with other healthcare professionals .    **Please Note: This note may have been constructed using a voice recognition system.**

## 2025-04-28 NOTE — CASE MANAGEMENT
Case Management Discharge Planning Note    Patient name Sharon Vega  Location /-01 MRN [id number]  : 1950 Date 2025       Current Admission Date: 2025  Current Admission Diagnosis:Right hip pain   Patient Active Problem List    Diagnosis Date Noted Date Diagnosed    Right hip pain 2025     Sepsis without acute organ dysfunction (HCC) 2025     Sacroiliitis (HCC) 2025     Chronic midline low back pain without sciatica 2023     Neck pain 2023     Lumbar radiculopathy      Chronic pain syndrome 2023     Acute blood loss anemia 2022     Leukemoid reaction 2022     Closed transcervical fracture of right femur (HCC) 2022     Fracture of right wrist 2022     Primary hypertension 2022     Lumbar degenerative disc disease 2022     Lumbar spondylosis 2022     Cervical radiculopathy 2022     Cervical spondylosis 2022     Rheumatoid arthritis involving both hands (HCC) 2022     Spinal stenosis of lumbar region without neurogenic claudication 2022       LOS (days): 3  Geometric Mean LOS (GMLOS) (days): 5.1  Days to GMLOS:2.1     OBJECTIVE:  Risk of Unplanned Readmission Score: 6.24         Current admission status: Inpatient   Preferred Pharmacy:   [id number] [address]  [address] [place] [id number]  Phone: [phone] Fax: [phone]    Primary Care Provider: Danielito Antunez DO    Primary Insurance: MEDICARE  Secondary Insurance: [facility]    DISCHARGE DETAILS:  Pt still unable to walk.  Referrals made via [facility] for ARU and STR.  AS per ortho pt will need to f/u as an OP for hip surgery.  Spoke to the pt about same.  Pt in agreement with STR referrals via [facility].

## 2025-04-28 NOTE — PROGRESS NOTES
Progress Note - Orthopedics   Name: Sharon Vega 74 y.o. female I MRN: [id number]  Unit/Bed#: -01 I Date of Admission: 4/24/2025   Date of Service: 4/28/2025 I Hospital Day: 3     Assessment & Plan  Right hip pain  74-year-old female status post right hip unipolar hemiarthroplasty with Dr. Roblero July 2022 with atraumatic right hip pain for 5 days and history of left leg radicular pain status post lumbar injection January 2025.  Patient does have a history of lumbar radiculopathy and had previous lumbar injection in January 2025.  Her pain was in her left leg at that point in time and did get improvement with the injection.  X-rays of the lumbar spine are being ordered as her exam shows some findings that could be consistent with the right radicular type of pain pattern.  WBAT RLE  Continue antibiotics per primary team  S/p right hip aspiration by IR  Minimal joint effusion identified  Sample sent for culture. Not enough sample for synovial cell count  Gram stain as an appended report now show Gram + cocci in pairs, however culture still shows no growth and are still preliminary.  Will continue to monitor, no update as yet for today.   WBC 10.99 today.  Continues to be afebrile.   Monitor exam and await final culture results.  Pain with passive range of motion but no micromotion tenderness  Compartments soft and compressible  PT/OT evaluation  DVT ppx per primary team  Pain control per primary team  No acute orthopedic surgical intervention indicated at this time  Will continue to monitor for worsening symptoms      April 28, 2025  This is the first time I am seeing the patient in the hospital.  The patient told me that once her injection of her hip wore off on Thursday, her pain increased.  It appears that her acetabulum is the source of her discomfort.  The Gram stain results was initially negative.  However a second Gram stain said there is a few GPC's.  Final cultures are negative.  It is my medical  opinion that Gram stain is a result of a contamination.  The patient does not look toxic.  No signs of infection.  The patient can be set up as an outpatient for exploration of her hip with a probable conversion to a total hip replacement.  The patient is in agreeable this plan.  If however her condition changes, please let us know.  Also of note, when the aspiration was attempted by interventional radiology, no effusion was present    Primary hypertension    Chronic pain syndrome    Sepsis without acute organ dysfunction (HCC)      Medical co-morbidities include as above, which are being managed per primary team.     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  The patient did have an injection of local anesthetic on Monday.  By the time it wore off on Thursday, her pain has increased.  She was admitted for rule out septic right hip in which a second Gram stain showed gram-positive cocci.  Remaining diagnostics were negative.  No acute events, no new complaints. Pain well controlled with analgesics. Denies fevers, chills, CP, SOB, N/V, numbness or tingling. Patient reports no issues with urination or bowel movements. Patient states she understands that she will need further operative intervention including an expiration with a probable conversion to a total hip replacement    Objective :  Temp:  [98.2 °F (36.8 °C)-99 °F (37.2 °C)] 98.2 °F (36.8 °C)  HR:  [84-98] 93  BP: (111-152)/(83-92) 140/92  Resp:  [18-20] 18  SpO2:  [89 %-96 %] 89 %  O2 Device: None (Room air)    Physical Exam  Musculoskeletal: Right lower extremity  Skin clean, dry, and intact. No erythema or ecchymosis.  Vascular Status intact  Neurologic Status neurologically intact  Dressing not applicable  Right lower extremity is neurovascularly intact.  Toes are pink and mobile.  Compartments are soft.  Leg lengths intact.  There is tenderness along the groin to rotation.  There is no warmth, no  "erythema, no swelling.  Leg lengths intact.      Lab Results: I have reviewed the following results:  Recent Labs     04/26/25  0931 04/27/25  0425 04/28/25  0531   WBC 8.43 10.99* 12.36*   HGB 12.2 13.2 12.9   HCT 39.0 41.3 39.9    344 355   BUN 26* 20 21   CREATININE 0.84 0.71 0.62     Blood Culture:    Lab Results   Component Value Date    BLOODCX No Growth at 72 hrs. 04/24/2025    BLOODCX No Growth at 72 hrs. 04/24/2025     Wound Culture: No results found for: \"WOUNDCULT\"    X-rays of her hip do show acetabular arthritis.  X-rays of her lumbar spine do show scoliosis, arthritis of lumbar spine, as well as posterior stenosis    First Gram stain was negative.  Second Gram stain showed gram-positive cocci.  Final cultures were negative  "

## 2025-04-28 NOTE — CONSULTS
Consultation - Infectious Disease   Name: Sharon Vega 74 y.o. female I MRN: [id number]  Unit/Bed#: -01 I Date of Admission: 4/24/2025   Date of Service: 4/28/2025 I Hospital Day: 3     Inpatient consult to Infectious Diseases  Consult performed by: Ihsan Winters PA-C  Consult ordered by: Tao Mariano MD      Physician Requesting Evaluation: Tao Mariano, *   Reason for Evaluation / Principal Problem: Right hip pain    Assessment & Plan  Right hip pain  History of right hip hemiarthroplasty 7/2022.  Recently developed right hip pain.  Outpatient x-rays showed arthritic changes along the acetabulum.  She underwent a right hip lidocaine injection on 4/21/2025 with pain relief.  Unfortunately pain returned about 48 hours later and she was unable to bear weight, precipitating ED eval and subsequent admission.  No imaging done on admission.  She had mild tachycardia and leukocytosis, likely reactive in the setting of pain and there was initially concern for possible septic joint.  Patient underwent joint aspiration with IR on 4/24 without evidence of joint effusion.  Sterile fluid was injected into the joint and then aspirated and sent for culture.  Blood cultures are negative.  Body fluid culture from IR aspiration negative.  There was rare gram-positive cocci in pairs initially reported on Gram stain however this was an error.  Discontinue IV ceftriaxone and vancomycin  Monitor off abx   Ongoing follow-up and workup with Ortho surgery, will likely need GINA/revision   Follow-up blood cultures for completeness  Pain control per primary team and Ortho  Infectious disease will sign off    I have discussed the above management plan in detail with the primary service.     Infectious Disease service signing off.    Antibiotics:  Day 1 off IV antibiotics    History of Present Illness   Sharon Vega is a 74 y.o. year old female who initially presented on 4/24 with complaint of right hip  pain.  There was concern for possible sepsis given very mild leukocytosis and tachycardia and she was started on IV ceftriaxone and vancomycin.  Patient is well-known to outpatient Ortho team.  She is status post initial right hip hemiarthroplasty in July 2022.  On 3/18 she had x-rays done in the outpatient setting which showed arthritis of the acetabulum.  Additional imaging studies negative for loosening of the hardware.  She underwent lidocaine injection into the joint which was initially successful with about 48 hours of pain relief however pain returned and she presented to the ED with inability to bear weight.  No imaging was done but there was concern for septic joint.  She went to IR for joint aspiration which did not reveal joint effusion.  IR was unable to aspirate any joint fluid and instead injected sterile saline and cultured return which so far has remained negative.  She did have some gram-positive cocci on Gram stain but this was noted to be an error.  She is afebrile.  Has no chills.  Continues to have pain and Ortho is following. Pain is similar to what she had prior to getting her initial lidocaine shot.     A complete review of systems is negative other than that noted in the HPI.    Medical History Review: I have reviewed the patient's PMH, PSH, Social History, Family History, Meds, and Allergies     Objective :  Temp:  [98.2 °F (36.8 °C)-99 °F (37.2 °C)] 98.2 °F (36.8 °C)  HR:  [84-98] 93  BP: (111-152)/(83-92) 140/92  Resp:  [18-20] 18  SpO2:  [89 %-96 %] 89 %  O2 Device: None (Room air)    Physical exam:  General:  No acute distress, laying in bed taking AM meds   Psychiatric:  Awake and alert, pleasant and cooperative   HEENT: MMM neck supple head normocephalic   Cardiovascular: RRR no murmur  Pulmonary: lungs clear b/l   Abdomen:  Soft, nontender, obese   Extremities/musculoskeletal:  No edema of LE; no TTP or overlying erythema of R hip joint   Skin:  No rashes or wounds noted on exposed  skin       Lab Results: I have reviewed the following results:  Results from last 7 days   Lab Units 04/28/25  0531 04/27/25  0425 04/26/25  0931   WBC Thousand/uL 12.36* 10.99* 8.43   HEMOGLOBIN g/dL 12.9 13.2 12.2   PLATELETS Thousands/uL 355 344 262     Results from last 7 days   Lab Units 04/28/25  0531 04/27/25  0425 04/26/25  0931 04/25/25  0541 04/24/25  0941   SODIUM mmol/L 136 136 138   < > 138   POTASSIUM mmol/L 3.7 3.9 4.1   < > 4.0   CHLORIDE mmol/L 99 99 103   < > 102   CO2 mmol/L 25 26 29   < > 28   BUN mg/dL 21 20 26*   < > 18   CREATININE mg/dL 0.62 0.71 0.84   < > 0.84   EGFR ml/min/1.73sq m 88 83 68   < > 68   CALCIUM mg/dL 9.5 9.7 9.3   < > 9.6   AST U/L  --   --   --   --  18   ALT U/L  --   --   --   --  15   ALK PHOS U/L  --   --   --   --  33*   ALBUMIN g/dL  --   --   --   --  4.3    < > = values in this interval not displayed.     Results from last 7 days   Lab Units 04/24/25  1623 04/24/25  1435 04/24/25  1226   BLOOD CULTURE   --   --  No Growth at 72 hrs.  No Growth at 72 hrs.   GRAM STAIN RESULT   --  No Polys*  Rare Gram positive cocci in pairs*  --    BODY FLUID CULTURE, STERILE   --  No growth  --    MRSA CULTURE ONLY  No Methicillin Resistant Staphlyococcus aureus (MRSA) isolated  --   --      Results from last 7 days   Lab Units 04/26/25  0931 04/25/25  0541 04/24/25  1226   PROCALCITONIN ng/ml 0.21 0.22 0.16     Results from last 7 days   Lab Units 04/24/25  0941   CRP mg/L 85.3*               Imaging Results Review: I reviewed radiology reports from this admission including: xray(s) and procedure reports.  Other Study Results Review: Other studies reviewed include: Micro

## 2025-04-28 NOTE — OCCUPATIONAL THERAPY NOTE
04/28/25 1528   OT Last Visit   OT Visit Date 04/28/25   Note Type   Note Type Treatment   Pain Assessment   Pain Assessment Tool 0-10   Pain Score 1   Pain Location/Orientation Orientation: Right;Location: Hip   Pain Onset/Description Onset: Ongoing;Frequency: Constant/Continuous;Descriptor: Aching;Descriptor: Sore   Effect of Pain on Daily Activities mobility   Patient's Stated Pain Goal No pain   Hospital Pain Intervention(s) Cold applied;Ambulation/increased activity;Emotional support   Restrictions/Precautions   Weight Bearing Precautions Per Order Yes   RLE Weight Bearing Per Order WBAT   Other Precautions Chair Alarm;Bed Alarm;Fall Risk;Pain;WBS   Therapeutic Excerise-Strength   UE Strength Yes   Right Upper Extremity- Strength   R Shoulder Flexion;Extension;Horizontal ABduction  (horiz. add, scap pro/ret)   R Elbow Elbow extension;Elbow flexion   R Wrist   (wrist rotation)   R Position Supine   Equipment Dumbbell   R Weight/Reps/Sets 1# weight x 2 sets of 10 reps   Left Upper Extremity-Strength   L Weights/Reps/Sets TE same as R UE above   Cognition   Overall Cognitive Status WFL   Arousal/Participation Alert;Responsive;Cooperative   Attention Within functional limits   Orientation Level Oriented X4   Memory Decreased recall of precautions   Following Commands Follows one step commands without difficulty   Comments PT agreeable to OT treatment   Activity Tolerance   Activity Tolerance Patient tolerated treatment well   Assessment   Assessment Patient participated in Skilled OT session this date with interventions consisting of Energy Conservation techniques and therapeutic exercise to: increase functional use of BUEs, increase BUE muscle strength  . Patient agreeable to OT treatment session, upon arrival patient was found supine in bed, alert, responsive , and in no apparent distress. Patient performs UB TE using 1# weight x 2 sets of 10 reps as detailed in flow sheet. Patient did require frequent rest  breaks after each set. Following TE, patient declined to get OOB to chair or for a walk as she just finished up with PT. Session ended with patient supine in bed, all needs met, and call bell within reach.  In comparison to previous session, patient with improvements in UB strength and endurance. Patient requiring verbal cues for correct technique, verbal cues for pacing thru activity steps, and frequent rest periods. Patient performance  demonstrated good carryover of learned techniques and strategies to facilitate safety during functional tasks. Patient continues to be functioning below baseline level, occupational performance remains limited secondary to factors listed above and increased risk for falls and injury.   From OT standpoint, recommendation at time of d/c would be Level 2 Resource Intensity upon discharge.  Patient to benefit from continued Occupational Therapy treatment while in the hospital to address deficits as defined above and maximize level of functional independence with ADLs and functional mobility in order to return to OF.   Plan   Treatment Interventions UE strengthening/ROM;Energy conservation   Goal Expiration Date 05/05/25   OT Treatment Day 1   OT Frequency 2-3x/wk   Discharge Recommendation   Rehab Resource Intensity Level, OT II (Moderate Resource Intensity)   Additional Comments  The patient's raw score on the AM-PAC Daily Activity Inpatient Short Form is 20. A raw score of greater than or equal to 19 suggests the patient may benefit from discharge to home. Please refer to the recommendation of the Occupational Therapist for safe discharge planning.   AM-PAC Daily Activity Inpatient   Lower Body Dressing 3   Bathing 3   Toileting 3   Upper Body Dressing 3   Grooming 4   Eating 4   Daily Activity Raw Score 20   Daily Activity Standardized Score (Calc for Raw Score >=11) 42.03   AM-[facility] Applied Cognition Inpatient   Following a Speech/Presentation 3   Understanding Ordinary  Conversation 4   Taking Medications 4   Remembering Where Things Are Placed or Put Away 3   Remembering List of 4-5 Errands 3   Taking Care of Complicated Tasks 2   Applied Cognition Raw Score 19   Applied Cognition Standardized Score 39.77       Blessing Aguilar

## 2025-04-28 NOTE — ASSESSMENT & PLAN NOTE
Patient with tachycardia, leukocytosis with suspect underlying septic arthritis  Elevated ESR-35, CRP 85 - will check tomorrow am  Blood cultures with no growth today.  Status post aspiration of the joint effusion; Gram stain with gram-positive cocci - possible contaminate  Continue ceftriaxone/ vancomycin  Orthopedic consult appreciated  Follow-up with cultures  Consult infectious disease - plan is to dc antibiotics and observe off of antibiotics.  If stable, plan to dc to rehab with outpatient ortho follow up

## 2025-04-28 NOTE — PROGRESS NOTES
Vancomycin IV Pharmacy-to-Dose Consultation    Vancomycin has been discontinued.  Pharmacy will sign off.  Please contact or re-consult with questions.    Chau Becerra, PharmD, [facility]  Infectious Diseases Clinical Pharmacy Specialist  [phone]

## 2025-04-28 NOTE — ASSESSMENT & PLAN NOTE
History of right hip hemiarthroplasty 7/2022.  Recently developed right hip pain.  Outpatient x-rays showed arthritic changes along the acetabulum.  She underwent a right hip lidocaine injection on 4/21/2025 with pain relief.  Unfortunately pain returned about 48 hours later and she was unable to bear weight, precipitating ED eval and subsequent admission.  No imaging done on admission.  She had mild tachycardia and leukocytosis, likely reactive in the setting of pain and there was initially concern for possible septic joint.  Patient underwent joint aspiration with IR on 4/24 without evidence of joint effusion.  Sterile fluid was injected into the joint and then aspirated and sent for culture.  Blood cultures are negative.  Body fluid culture from IR aspiration negative.  There was rare gram-positive cocci in pairs initially reported on Gram stain however this was an error.  Discontinue IV ceftriaxone and vancomycin  Monitor off abx   Ongoing follow-up and workup with Ortho surgery, will likely need GINA/revision   Follow-up blood cultures for completeness  Pain control per primary team and Ortho  Infectious disease will sign off

## 2025-04-28 NOTE — ASSESSMENT & PLAN NOTE
Patient had a right hip hemiarthroplasty in 7/2022 with Dr. Roblero.   Over the past few months patient reports increased right hip pain. She saw Dr. Calles on 3/18.   3/18-R hip X-rays right unipolar partial hip replacement, and arthritic changes along the acetabulum  3/26-Bone scan negative for loosening of right hip arthoplasty   4/21-Patient underwent a right hip lidocaine injection  Initially had relief from pain, but on 4/23 PM she started with acute pain in right hip, now with difficulty on range of motion  IR input appreciated; s/p right hip joint effusion aspiration 4/24  Pain management, PT/OT evaluation, orthopedic eval  Gram stain with rare gram-positive cocci - ortho feels that this is likely a contaminant

## 2025-04-28 NOTE — PLAN OF CARE
Problem: PAIN - ADULT  Goal: Verbalizes/displays adequate comfort level or baseline comfort level  Description: Interventions:- Encourage patient to monitor pain and request assistance- Assess pain using appropriate pain scale- Administer analgesics based on type and severity of pain and evaluate response- Implement non-pharmacological measures as appropriate and evaluate response- Consider cultural and social influences on pain and pain management- Notify physician/advanced practitioner if interventions unsuccessful or patient reports new pain  Outcome: Progressing     Problem: INFECTION - ADULT  Goal: Absence or prevention of progression during hospitalization  Description: INTERVENTIONS:- Assess and monitor for signs and symptoms of infection- Monitor lab/diagnostic results- Monitor all insertion sites, i.e. indwelling lines, tubes, and drains- Monitor endotracheal if appropriate and nasal secretions for changes in amount and color- [place] appropriate cooling/warming therapies per order- Administer medications as ordered- Instruct and encourage patient and family to use good hand hygiene technique- Identify and instruct in appropriate isolation precautions for identified infection/condition  Outcome: Progressing  Goal: Absence of fever/infection during neutropenic period  Description: INTERVENTIONS:- Monitor WBC  Outcome: Progressing     Problem: SAFETY ADULT  Goal: Patient will remain free of falls  Description: INTERVENTIONS:- Educate patient/family on patient safety including physical limitations- Instruct patient to call for assistance with activity - Consult OT/PT to assist with strengthening/mobility - Keep Call bell within reach- Keep bed low and locked with side rails adjusted as appropriate- Keep care items and personal belongings within reach- Initiate and maintain comfort rounds- Make Fall Risk Sign visible to staff- Offer Toileting every 1 Hours, in advance of need- Initiate/Maintain bed alarm-  Obtain necessary fall risk management equipment: bed alarm- Apply yellow socks and bracelet for high fall risk patients- Consider moving patient to room near nurses station  Outcome: Progressing  Goal: Maintain or return to baseline ADL function  Description: INTERVENTIONS:-  Assess patient's ability to carry out ADLs; assess patient's baseline for ADL function and identify physical deficits which impact ability to perform ADLs (bathing, care of mouth/teeth, toileting, grooming, dressing, etc.)- Assess/evaluate cause of self-care deficits - Assess range of motion- Assess patient's mobility; develop plan if impaired- Assess patient's need for assistive devices and provide as appropriate- Encourage maximum independence but intervene and supervise when necessary- Involve family in performance of ADLs- Assess for home care needs following discharge - Consider OT consult to assist with ADL evaluation and planning for discharge- Provide patient education as appropriate  Outcome: Progressing  Goal: Maintains/Returns to pre admission functional level  Description: INTERVENTIONS:- Perform AM-PAC 6 Click Basic Mobility/ Daily Activity assessment daily.- Set and communicate daily mobility goal to care team and patient/family/caregiver. - Collaborate with rehabilitation services on mobility goals if consulted- Perform Range of Motion 3 times a day.- Reposition patient every 2 hours.- Dangle patient 3 times a day- Stand patient 3 times a day- Ambulate patient 3 times a day- Out of bed to chair 3 times a day - Out of bed for meals 3 times a day- Out of bed for toileting- Record patient progress and toleration of activity level   Outcome: Progressing     Problem: DISCHARGE PLANNING  Goal: Discharge to home or other facility with appropriate resources  Description: INTERVENTIONS:- Identify barriers to discharge w/patient and caregiver- Arrange for needed discharge resources and transportation as appropriate- Identify discharge  learning needs (meds, wound care, etc.)- Arrange for interpretive services to assist at discharge as needed- Refer to Case Management Department for coordinating discharge planning if the patient needs post-hospital services based on physician/advanced practitioner order or complex needs related to functional status, cognitive ability, or social support system  Outcome: Progressing     Problem: Knowledge Deficit  Goal: Patient/family/caregiver demonstrates understanding of disease process, treatment plan, medications, and discharge instructions  Description: Complete learning assessment and assess knowledge base.Interventions:- Provide teaching at level of understanding- Provide teaching via preferred learning methods  Outcome: Progressing

## 2025-04-28 NOTE — PLAN OF CARE
Problem: PHYSICAL THERAPY ADULT  Goal: Performs mobility at highest level of function for planned discharge setting.  See evaluation for individualized goals.  Description: Treatment/Interventions: Functional transfer training, Therapeutic exercise, Endurance training, Gait training, Bed mobility, Equipment eval/education, Elevations  Equipment Recommended: Walker       See flowsheet documentation for full assessment, interventions and recommendations.  Outcome: Progressing  Note: Prognosis: Fair  Problem List: Decreased strength, Decreased range of motion, Decreased endurance, Impaired balance, Decreased mobility, Orthopedic restrictions, Pain  Assessment: Pt seen for PT treatment session this date with interventions consisting of bed mobility tasks, transfer training, neuromuscular re-education of movement performed to improve dynamic sitting balance, gait training, toilet transfers, standing tolerance, and education provided as needed for safety and direction to improve functional mobility, safety awareness, and activity tolerance. Pt agreeable to PT treatment session upon arrival, pt found resting in bed. At end of session, pt left in bed with bed alarm activated, SCD's applied, and with all needs in reach. In comparison to previous session, pt with improvements in ambulation distance and amount of assistance required for all mobility tasks . Continue to recommend Level II (Moderate Resource Intensity) at time of d/c in order to maximize pt's functional independence and safety w/ mobility. Pt continues to be functioning below baseline level. PT will continue to see pt while here in order to address the deficits listed above and provide interventions consistent w/ POC in effort to achieve STGs.  Barriers to Discharge: Inaccessible home environment     Rehab Resource Intensity Level, PT: II (Moderate Resource Intensity)    See flowsheet documentation for full assessment.

## 2025-04-28 NOTE — ASSESSMENT & PLAN NOTE
74-year-old female status post right hip unipolar hemiarthroplasty with Dr. Roblero July 2022 with atraumatic right hip pain for 5 days and history of left leg radicular pain status post lumbar injection January 2025.  Patient does have a history of lumbar radiculopathy and had previous lumbar injection in January 2025.  Her pain was in her left leg at that point in time and did get improvement with the injection.  X-rays of the lumbar spine are being ordered as her exam shows some findings that could be consistent with the right radicular type of pain pattern.  WBAT RLE  Continue antibiotics per primary team  S/p right hip aspiration by IR  Minimal joint effusion identified  Sample sent for culture. Not enough sample for synovial cell count  Gram stain as an appended report now show Gram + cocci in pairs, however culture still shows no growth and are still preliminary.  Will continue to monitor, no update as yet for today.   WBC 10.99 today.  Continues to be afebrile.   Monitor exam and await final culture results.  Pain with passive range of motion but no micromotion tenderness  Compartments soft and compressible  PT/OT evaluation  DVT ppx per primary team  Pain control per primary team  No acute orthopedic surgical intervention indicated at this time  Will continue to monitor for worsening symptoms      April 28, 2025  This is the first time I am seeing the patient in the hospital.  The patient told me that once her injection of her hip wore off on Thursday, her pain increased.  It appears that her acetabulum is the source of her discomfort.  The Gram stain results was initially negative.  However a second Gram stain said there is a few GPC's.  Final cultures are negative.  It is my medical opinion that Gram stain is a result of a contamination.  The patient does not look toxic.  No signs of infection.  The patient can be set up as an outpatient for exploration of her hip with a probable conversion to a total hip  replacement.  The patient is in agreeable this plan.  If however her condition changes, please let us know.  Also of note, when the aspiration was attempted by interventional radiology, no effusion was present

## 2025-04-28 NOTE — PLAN OF CARE
Problem: PAIN - ADULT  Goal: Verbalizes/displays adequate comfort level or baseline comfort level  Description: Interventions:- Encourage patient to monitor pain and request assistance- Assess pain using appropriate pain scale- Administer analgesics based on type and severity of pain and evaluate response- Implement non-pharmacological measures as appropriate and evaluate response- Consider cultural and social influences on pain and pain management- Notify physician/advanced practitioner if interventions unsuccessful or patient reports new pain  4/28/2025 1237 by Daphney Rice RN  Outcome: Progressing  4/28/2025 1237 by Daphney Rice RN  Outcome: Progressing     Problem: INFECTION - ADULT  Goal: Absence or prevention of progression during hospitalization  Description: INTERVENTIONS:- Assess and monitor for signs and symptoms of infection- Monitor lab/diagnostic results- Monitor all insertion sites, i.e. indwelling lines, tubes, and drains- Monitor endotracheal if appropriate and nasal secretions for changes in amount and color- [place] appropriate cooling/warming therapies per order- Administer medications as ordered- Instruct and encourage patient and family to use good hand hygiene technique- Identify and instruct in appropriate isolation precautions for identified infection/condition  4/28/2025 1237 by Daphney Rice RN  Outcome: Progressing  4/28/2025 1237 by Daphney Rice RN  Outcome: Progressing  Goal: Absence of fever/infection during neutropenic period  Description: INTERVENTIONS:- Monitor WBC  4/28/2025 1237 by Daphney Rice RN  Outcome: Progressing  4/28/2025 1237 by Daphney Rice RN  Outcome: Progressing     Problem: SAFETY ADULT  Goal: Patient will remain free of falls  Description: INTERVENTIONS:- Educate patient/family on patient safety including physical limitations- Instruct patient to call for assistance with activity - Consult OT/PT to assist with strengthening/mobility - Keep Call bell within  reach- Keep bed low and locked with side rails adjusted as appropriate- Keep care items and personal belongings within reach- Initiate and maintain comfort rounds- Make Fall Risk Sign visible to staff- Offer Toileting every 2 Hours, in advance of need- Initiate/Maintain bedalarm- Obtain necessary fall risk management equipment: nonskid socks- Apply yellow socks and bracelet for high fall risk patients- Consider moving patient to room near nurses station  4/28/2025 1237 by Daphney Rice RN  Outcome: Progressing  4/28/2025 1237 by Daphney Rice RN  Outcome: Progressing  Goal: Maintain or return to baseline ADL function  Description: INTERVENTIONS:-  Assess patient's ability to carry out ADLs; assess patient's baseline for ADL function and identify physical deficits which impact ability to perform ADLs (bathing, care of mouth/teeth, toileting, grooming, dressing, etc.)- Assess/evaluate cause of self-care deficits - Assess range of motion- Assess patient's mobility; develop plan if impaired- Assess patient's need for assistive devices and provide as appropriate- Encourage maximum independence but intervene and supervise when necessary- Involve family in performance of ADLs- Assess for home care needs following discharge - Consider OT consult to assist with ADL evaluation and planning for discharge- Provide patient education as appropriate  4/28/2025 1237 by Daphney Rice RN  Outcome: Progressing  4/28/2025 1237 by Daphney Rice RN  Outcome: Progressing  Goal: Maintains/Returns to pre admission functional level  Description: INTERVENTIONS:- Perform AM-PAC 6 Click Basic Mobility/ Daily Activity assessment daily.- Set and communicate daily mobility goal to care team and patient/family/caregiver. - Collaborate with rehabilitation services on mobility goals if consulted- Perform Range of Motion 3 times a day.- Reposition patient every 2 hours.- Dangle patient 3 times a day- Stand patient 3 times a day- Ambulate patient 3  times a day- Out of bed to chair 3 times a day - Out of bed for meals 3 times a day- Out of bed for toileting- Record patient progress and toleration of activity level   Outcome: Progressing     Problem: DISCHARGE PLANNING  Goal: Discharge to home or other facility with appropriate resources  Description: INTERVENTIONS:- Identify barriers to discharge w/patient and caregiver- Arrange for needed discharge resources and transportation as appropriate- Identify discharge learning needs (meds, wound care, etc.)- Arrange for interpretive services to assist at discharge as needed- Refer to Case Management Department for coordinating discharge planning if the patient needs post-hospital services based on physician/advanced practitioner order or complex needs related to functional status, cognitive ability, or social support system  Outcome: Progressing     Problem: Knowledge Deficit  Goal: Patient/family/caregiver demonstrates understanding of disease process, treatment plan, medications, and discharge instructions  Description: Complete learning assessment and assess knowledge base.Interventions:- Provide teaching at level of understanding- Provide teaching via preferred learning methods  Outcome: Progressing

## 2025-04-28 NOTE — SEPSIS NOTE
Sepsis Note   Sharon Vega 74 y.o. female MRN: [id number]  Unit/Bed#: -01 Encounter: [id number]       Initial Sepsis Screening       Row Name 04/28/25 0539 04/24/25 1629             Is the patient's history suggestive of a new or worsening infection? No  -ES No  Patient recently had injection of steroids which may have lead to leukocytosis  -NR                 User Key  (r) = Recorded By, (t) = Taken By, (c) = Cosigned By      Initials Name Provider Type    NR Dionicio Alberto DO Physician    ES Steffen Chiu PA-C Physician Assistant                        Body mass index is 34.54 kg/m².  Wt Readings from Last 1 Encounters:   04/24/25 88.5 kg (195 lb)        Ideal body weight: 52.4 kg (115 lb 8.3 oz)  Adjusted ideal body weight: 66.8 kg (147 lb 5 oz)

## 2025-04-28 NOTE — PHYSICAL THERAPY NOTE
"   PHYSICAL THERAPY TREATMENT NOTE  NAME:  Sharon Vega  DATE: 04/28/25    Length Of Stay: 3  Performed at least 2 patient identifiers during session: Name and ID bracelet    TREATMENT FLOWSHEET:    04/28/25 1425   PT Last Visit   PT Visit Date 04/28/25   Note Type   Note Type Treatment   Pain Assessment   Pain Assessment Tool 0-10   Pain Score 5   Pain Location/Orientation Orientation: Right;Location: Hip   Pain Onset/Description Onset: Ongoing;Frequency: Constant/Continuous;Descriptor: Aching;Descriptor: Sore  (\"gnawing\")   Patient's Stated Pain Goal No pain   Hospital Pain Intervention(s) Cold applied;Repositioned;Ambulation/increased activity;Emotional support   Restrictions/Precautions   Weight Bearing Precautions Per Order Yes   RLE Weight Bearing Per Order WBAT   Other Precautions Chair Alarm;Bed Alarm;Fall Risk;Pain;WBS   General   Chart Reviewed Yes   Response to Previous Treatment Patient with no complaints from previous session.   Family/Caregiver Present No   Cognition   Overall Cognitive Status WFL   Arousal/Participation Alert;Responsive;Cooperative   Attention Within functional limits   Orientation Level Oriented X4   Memory Decreased recall of precautions   Following Commands Follows one step commands without difficulty   Subjective   Subjective \"I need to go to the toilet.\"   Bed Mobility   Supine to Sit 4  Minimal assistance   Additional items Assist x 1;Bedrails;Increased time required;Verbal cues;LE management   Sit to Supine 4  Minimal assistance   Additional items Assist x 1;HOB elevated;Bedrails;Increased time required;LE management;Verbal cues   Additional Comments Pt. tolerated sitting at EOB to perform dynamic sitting balance activities   Transfers   Sit to Stand 4  Minimal assistance   Additional items Assist x 1;Armrests;Increased time required;Verbal cues  (RW)   Stand to Sit 4  Minimal assistance   Additional items Assist x 1;Armrests;Increased time required;Verbal cues   Stand pivot " 4  Minimal assistance   Additional items Assist x 1;Armrests;Increased time required;Verbal cues  (RW)   Toilet transfer 4  Minimal assistance   Additional items Assist x 1;Armrests;Increased time required;Verbal cues;Commode  (RW)   Additional Comments VC's provided for proper hand placement during transitions.  Pt. requires increased time for mobility tasks but is capable of performing with min Ax1 and RW.  Pt. cued to slide RLE forward prior to sitting to decrease pain. Pt. did attempt sitting OOB in recliner but was unable to find a position of comfort and could not tolerate pain level so was assisted back into bed. Pt. tolerated standing x 3 mins whiile staff performed hygiene needs following use of BSC.   Ambulation/Elevation   Gait pattern Improper Weight shift;Decreased foot clearance;Decreased R stance;Antalgic;Shuffling;Inconsistent leonel;Short stride;Excessively slow;Step to;R Foot drag;Decreased hip extension;Decreased heel strike;Decreased toe off   Gait Assistance 4  Minimal assist   Additional items Assist x 1;Verbal cues   Assistive Device Rolling walker   Distance 3ft x3   Ambulation/Elevation Additional Comments Pt. with decreased WB on RLE and slides or drags R foot due to inability to pick it up and place it properly.  Pt. ambulated 3 ft to BSC, then 3ft to Recliner, and finally 3ft back to EOB.   Balance   Static Sitting Fair   Dynamic Sitting Fair   Static Standing Fair -   Dynamic Standing Poor +   Ambulatory Poor +   Endurance Deficit   Endurance Deficit Yes   Activity Tolerance   Activity Tolerance Patient limited by pain;Patient limited by fatigue   Nurse Made Aware RN notified of pain level   Exercises   Bridging Supine;5 reps;AROM  (performed to assist with boosting up in bed)   Neuro re-ed Patient performed dynamic sitting balance activities while sitting at EOB  supported by UE on mattress close S including multidirectional weight shifting, reaching, and scooting.   Assessment    Prognosis Fair   Problem List Decreased strength;Decreased range of motion;Decreased endurance;Impaired balance;Decreased mobility;Orthopedic restrictions;Pain   Goals   Patient Goals to move with less pain   PT Treatment Day 1   Plan   Treatment/Interventions Functional transfer training;LE strengthening/ROM;Endurance training;Patient/family training;Equipment eval/education;Bed mobility;Gait training;Compensatory technique education;Spoke to nursing   Progress Slow progress, decreased activity tolerance   PT Frequency 3-5x/wk   Discharge Recommendation   Rehab Resource Intensity Level, PT II (Moderate Resource Intensity)   AM-PAC Basic Mobility Inpatient   Turning in Flat Bed Without Bedrails 3   Lying on Back to Sitting on Edge of Flat Bed Without Bedrails 3   Moving Bed to Chair 3   Standing Up From Chair Using Arms 3   Walk in Room 3   Climb 3-5 Stairs With Railing 1   Basic Mobility Inpatient Raw Score 16   Basic Mobility Standardized Score 38.32   [facility] Level Of Mobility   [facility] Goal 5: Stand one or more mins       The patient's AM-PAC Basic Mobility Inpatient Short Form Raw Score is 16. A raw score 16 suggests the patient may benefit from discharge to post-acute rehabilitation services. Please also refer to the recommendation of the Physical Therapist for safe discharge planning.    Pt seen for PT treatment session this date with interventions consisting of bed mobility tasks, transfer training, neuromuscular re-education of movement performed to improve dynamic sitting balance, gait training, toilet transfers, standing tolerance, and education provided as needed for safety and direction to improve functional mobility, safety awareness, and activity tolerance. Pt agreeable to PT treatment session upon arrival, pt found resting in bed. At end of session, pt left in bed with bed alarm activated, SCD's applied, and with all needs in reach. In comparison to previous session, pt with  improvements in ambulation distance and amount of assistance required for all mobility tasks . Continue to recommend Level II (Moderate Resource Intensity) at time of d/c in order to maximize pt's functional independence and safety w/ mobility. Pt continues to be functioning below baseline level. PT will continue to see pt while here in order to address the deficits listed above and provide interventions consistent w/ POC in effort to achieve STGs.    Sharon Fraser, PTA

## 2025-04-28 NOTE — PLAN OF CARE
Problem: OCCUPATIONAL THERAPY ADULT  Goal: Performs self-care activities at highest level of function for planned discharge setting.  See evaluation for individualized goals.  Description: Treatment Interventions: ADL retraining, Functional transfer training, UE strengthening/ROM, Endurance training, Patient/family training, Activityengagement, Energy conservation          See flowsheet documentation for full assessment, interventions and recommendations.   Outcome: Progressing  Note: Limitation: Decreased ADL status, Decreased UE strength, Decreased Safe judgement during ADL, Decreased endurance, Decreased self-care trans, Decreased high-level ADLs  Prognosis: Good  Assessment: Patient participated in Skilled OT session this date with interventions consisting of Energy Conservation techniques and therapeutic exercise to: increase functional use of BUEs, increase BUE muscle strength  . Patient agreeable to OT treatment session, upon arrival patient was found supine in bed, alert, responsive , and in no apparent distress. Patient performs UB TE using 1# weight x 2 sets of 10 reps as detailed in flow sheet. Patient did require frequent rest breaks after each set. Following TE, patient declined to get OOB to chair or for a walk as she just finished up with PT. Session ended with patient supine in bed, all needs met, and call bell within reach.  In comparison to previous session, patient with improvements in UB strength and endurance. Patient requiring verbal cues for correct technique, verbal cues for pacing thru activity steps, and frequent rest periods. Patient performance  demonstrated good carryover of learned techniques and strategies to facilitate safety during functional tasks. Patient continues to be functioning below baseline level, occupational performance remains limited secondary to factors listed above and increased risk for falls and injury.   From OT standpoint, recommendation at time of d/c would be  Level 2 Resource Intensity upon discharge.  Patient to benefit from continued Occupational Therapy treatment while in the hospital to address deficits as defined above and maximize level of functional independence with ADLs and functional mobility in order to return to PLOF.     Rehab Resource Intensity Level, OT: II (Moderate Resource Intensity)

## 2025-04-29 VITALS
BODY MASS INDEX: 34.55 KG/M2 | WEIGHT: 195 LBS | SYSTOLIC BLOOD PRESSURE: 126 MMHG | DIASTOLIC BLOOD PRESSURE: 87 MMHG | RESPIRATION RATE: 16 BRPM | HEART RATE: 98 BPM | TEMPERATURE: 98.2 F | OXYGEN SATURATION: 88 % | HEIGHT: 63 IN

## 2025-04-29 PROBLEM — R65.10 SIRS (SYSTEMIC INFLAMMATORY RESPONSE SYNDROME) (HCC): Status: ACTIVE | Noted: 2025-04-24

## 2025-04-29 LAB
ALBUMIN SERPL BCG-MCNC: 3.9 G/DL (ref 3.5–5)
ALP SERPL-CCNC: 55 U/L (ref 34–104)
ALT SERPL W P-5'-P-CCNC: 30 U/L (ref 7–52)
ANION GAP SERPL CALCULATED.3IONS-SCNC: 13 MMOL/L (ref 4–13)
AST SERPL W P-5'-P-CCNC: 27 U/L (ref 13–39)
BACTERIA BLD CULT: NORMAL
BACTERIA BLD CULT: NORMAL
BILIRUB SERPL-MCNC: 0.52 MG/DL (ref 0.2–1)
BUN SERPL-MCNC: 30 MG/DL (ref 5–25)
CALCIUM SERPL-MCNC: 9.7 MG/DL (ref 8.4–10.2)
CHLORIDE SERPL-SCNC: 100 MMOL/L (ref 96–108)
CO2 SERPL-SCNC: 25 MMOL/L (ref 21–32)
CREAT SERPL-MCNC: 0.77 MG/DL (ref 0.6–1.3)
CRP SERPL QL: 260.8 MG/L
ERYTHROCYTE [DISTWIDTH] IN BLOOD BY AUTOMATED COUNT: 14.2 % (ref 11.6–15.1)
ERYTHROCYTE [SEDIMENTATION RATE] IN BLOOD: 114 MM/HOUR (ref 0–29)
FLUAV RNA RESP QL NAA+PROBE: NEGATIVE
FLUBV RNA RESP QL NAA+PROBE: NEGATIVE
GFR SERPL CREATININE-BSD FRML MDRD: 75 ML/MIN/1.73SQ M
GLUCOSE SERPL-MCNC: 126 MG/DL (ref 65–140)
HCT VFR BLD AUTO: 40.5 % (ref 34.8–46.1)
HGB BLD-MCNC: 13.1 G/DL (ref 11.5–15.4)
MCH RBC QN AUTO: 29.7 PG (ref 26.8–34.3)
MCHC RBC AUTO-ENTMCNC: 32.3 G/DL (ref 31.4–37.4)
MCV RBC AUTO: 92 FL (ref 82–98)
PLATELET # BLD AUTO: 409 THOUSANDS/UL (ref 149–390)
PMV BLD AUTO: 10.9 FL (ref 8.9–12.7)
POTASSIUM SERPL-SCNC: 3.8 MMOL/L (ref 3.5–5.3)
PROT SERPL-MCNC: 7.7 G/DL (ref 6.4–8.4)
RBC # BLD AUTO: 4.41 MILLION/UL (ref 3.81–5.12)
RSV RNA RESP QL NAA+PROBE: NEGATIVE
SARS-COV-2 RNA RESP QL NAA+PROBE: NEGATIVE
SODIUM SERPL-SCNC: 138 MMOL/L (ref 135–147)
WBC # BLD AUTO: 13.04 THOUSAND/UL (ref 4.31–10.16)

## 2025-04-29 PROCEDURE — 97112 NEUROMUSCULAR REEDUCATION: CPT

## 2025-04-29 PROCEDURE — 80053 COMPREHEN METABOLIC PANEL: CPT | Performed by: INTERNAL MEDICINE

## 2025-04-29 PROCEDURE — 99238 HOSP IP/OBS DSCHRG MGMT 30/<: CPT | Performed by: PHYSICIAN ASSISTANT

## 2025-04-29 PROCEDURE — 97530 THERAPEUTIC ACTIVITIES: CPT

## 2025-04-29 PROCEDURE — 85027 COMPLETE CBC AUTOMATED: CPT | Performed by: INTERNAL MEDICINE

## 2025-04-29 PROCEDURE — 86140 C-REACTIVE PROTEIN: CPT | Performed by: INTERNAL MEDICINE

## 2025-04-29 PROCEDURE — 97116 GAIT TRAINING THERAPY: CPT

## 2025-04-29 PROCEDURE — 85652 RBC SED RATE AUTOMATED: CPT | Performed by: INTERNAL MEDICINE

## 2025-04-29 PROCEDURE — 0241U HB NFCT DS VIR RESP RNA 4 TRGT: CPT | Performed by: PHYSICIAN ASSISTANT

## 2025-04-29 RX ORDER — METHOCARBAMOL 500 MG/1
500 TABLET, FILM COATED ORAL EVERY 6 HOURS PRN
Qty: 10 TABLET | Refills: 0 | Status: ON HOLD
Start: 2025-04-29

## 2025-04-29 RX ORDER — OXYCODONE HYDROCHLORIDE 5 MG/1
5 TABLET ORAL EVERY 4 HOURS PRN
Qty: 10 TABLET | Refills: 0 | Status: SHIPPED | OUTPATIENT
Start: 2025-04-29 | End: 2025-05-09

## 2025-04-29 RX ADMIN — Medication 1000 UNITS: at 08:18

## 2025-04-29 RX ADMIN — LORATADINE 10 MG: 10 TABLET ORAL at 08:18

## 2025-04-29 RX ADMIN — AMLODIPINE BESYLATE 10 MG: 10 TABLET ORAL at 08:18

## 2025-04-29 RX ADMIN — OXYCODONE HYDROCHLORIDE 10 MG: 10 TABLET ORAL at 04:25

## 2025-04-29 RX ADMIN — CELECOXIB 200 MG: 100 CAPSULE ORAL at 08:18

## 2025-04-29 RX ADMIN — ACETAMINOPHEN 975 MG: 325 TABLET ORAL at 05:07

## 2025-04-29 RX ADMIN — PRAVASTATIN SODIUM 40 MG: 40 TABLET ORAL at 08:18

## 2025-04-29 RX ADMIN — PANTOPRAZOLE SODIUM 40 MG: 40 TABLET, DELAYED RELEASE ORAL at 05:07

## 2025-04-29 RX ADMIN — VENLAFAXINE HYDROCHLORIDE 150 MG: 150 CAPSULE, EXTENDED RELEASE ORAL at 08:18

## 2025-04-29 RX ADMIN — HEPARIN SODIUM 5000 UNITS: 5000 INJECTION INTRAVENOUS; SUBCUTANEOUS at 14:49

## 2025-04-29 RX ADMIN — B-COMPLEX W/ C & FOLIC ACID TAB 1 TABLET: TAB at 08:18

## 2025-04-29 RX ADMIN — ACETAMINOPHEN 975 MG: 325 TABLET ORAL at 14:49

## 2025-04-29 RX ADMIN — HEPARIN SODIUM 5000 UNITS: 5000 INJECTION INTRAVENOUS; SUBCUTANEOUS at 05:07

## 2025-04-29 NOTE — ASSESSMENT & PLAN NOTE
Patient had a right hip hemiarthroplasty in 7/2022 with Dr. Roblero.   Over the past few months patient reports increased right hip pain. She saw Dr. Calles on 3/18.   3/18-R hip X-rays right unipolar partial hip replacement, and arthritic changes along the acetabulum  3/26-Bone scan negative for loosening of right hip arthoplasty   4/21-Patient underwent a right hip lidocaine injection  Initially had relief from pain, but on 4/23/25 she started with acute pain in right hip, and difficulty on range of motion  IR input appreciated; s/p right hip joint effusion aspiration 4/24  Orthopedic surgery consultation appreciated   Pain likely due to arthritic changes along the acetabulum  Feels gram stain results is likely contaminant  Recommending outpatient follow-up for total right hip replacement  Infectious disease consultation appreciated   Patient underwent joint aspiration with IR on 4/24 without evidence of joint effusion.  Sterile fluid was injected into the joint and then aspirated and sent for culture.  Cultures are negative.  Blood fluid culture from IR aspiration is negative.  The report of rare gram-positive cocci in in pairs initially port on Gram stain is an error.  Recommend discontinuation of antibiotics  Now signed off  PT recommending rehab  The [facility]ed at [facility] for [facility]-term rehab  Outpatient follow-up with PCP and orthopedic surgery

## 2025-04-29 NOTE — PLAN OF CARE
Problem: PHYSICAL THERAPY ADULT  Goal: Performs mobility at highest level of function for planned discharge setting.  See evaluation for individualized goals.  Description: Treatment/Interventions: Functional transfer training, Therapeutic exercise, Endurance training, Gait training, Bed mobility, Equipment eval/education, Elevations  Equipment Recommended: Walker       See flowsheet documentation for full assessment, interventions and recommendations.  4/29/2025 1258 by Sharon Fraser PTA  Outcome: Progressing  Note: Prognosis: Fair  Problem List: Decreased strength, Decreased range of motion, Decreased endurance, Impaired balance, Decreased mobility, Orthopedic restrictions, Pain  Assessment: Pt seen for PT treatment session this date with interventions consisting of bed mobility tasks, transfer training, gait training, toilet transfers, and education provided as needed for safety and direction to improve functional mobility, safety awareness, and activity tolerance. Pt agreeable to PT treatment session upon arrival, pt found resting in bed . At end of session, pt left in bed with bed alarm activated and with all needs in reach. In comparison to previous session, pt with no improvements as evidenced by no functional gains made this session . Continue to recommend Level II (Moderate Resource Intensity) at time of d/c in order to maximize pt's functional independence and safety w/ mobility. Pt continues to be functioning below baseline level. PT will continue to see pt while here in order to address the deficits listed above and provide interventions consistent w/ POC in effort to achieve STGs.  Barriers to Discharge: Inaccessible home environment     Rehab Resource Intensity Level, PT: II (Moderate Resource Intensity)    See flowsheet documentation for full assessment.     4/29/2025 1210 by Sharon Fraser PTA  Outcome: Not Progressing  Note: Prognosis: Fair  Problem List: Decreased strength, Decreased  range of motion, Decreased endurance, Impaired balance, Decreased mobility, Orthopedic restrictions, Pain  Assessment: Pt seen for PT treatment session this date with interventions consisting of bed mobility tasks, transfer training, gait training, toilet transfers, and education provided as needed for safety and direction to improve functional mobility, safety awareness, and activity tolerance. Pt agreeable to PT treatment session upon arrival, pt found resting in bed . At end of session, pt left in bed with bed alarm activated and with all needs in reach. In comparison to previous session, pt with no improvements as evidenced by no functional gains made this session . Continue to recommend Level II (Moderate Resource Intensity) at time of d/c in order to maximize pt's functional independence and safety w/ mobility. Pt continues to be functioning below baseline level. PT will continue to see pt while here in order to address the deficits listed above and provide interventions consistent w/ POC in effort to achieve STGs.  Barriers to Discharge: Inaccessible home environment     Rehab Resource Intensity Level, PT: II (Moderate Resource Intensity)    See flowsheet documentation for full assessment.

## 2025-04-29 NOTE — CASE MANAGEMENT
Case Management Discharge Planning Note    Patient name Sharon Vega  Location /-01 MRN [id number]  : 1950 Date 2025       Current Admission Date: 2025  Current Admission Diagnosis:Right hip pain   Patient Active Problem List    Diagnosis Date Noted Date Diagnosed    Right hip pain 2025     Sepsis without acute organ dysfunction (HCC) 2025     Sacroiliitis (HCC) 2025     Chronic midline low back pain without sciatica 2023     Neck pain 2023     Lumbar radiculopathy      Chronic pain syndrome 2023     Acute blood loss anemia 2022     Leukemoid reaction 2022     Closed transcervical fracture of right femur (HCC) 2022     Fracture of right wrist 2022     Primary hypertension 2022     Lumbar degenerative disc disease 2022     Lumbar spondylosis 2022     Cervical radiculopathy 2022     Cervical spondylosis 2022     Rheumatoid arthritis involving both hands (HCC) 2022     Spinal stenosis of lumbar region without neurogenic claudication 2022       LOS (days): 4  Geometric Mean LOS (GMLOS) (days): 5.1  Days to GMLOS:1.1     OBJECTIVE:  Risk of Unplanned Readmission Score: 7.25         Current admission status: Inpatient   Preferred Pharmacy:   [id number] [address] [address]  Phone: [phone] Fax: [phone]    Primary Care Provider: Danielito Antunez DO    Primary Insurance: MEDICARE  Secondary Insurance: [facility]    DISCHARGE DETAILS:  Received a message from Luz at [facility] the pt can come to the facility today.  COVID swab ordered.  Sent for transport via [employer].  Pt will be transported via [facility] at 1430.  Notified  [facility] via [facility].  Siri made aware of transport time.  TC to pt granddaughter Alexandra Wright and made aware of pt being DC today to [facility].  Pt aware of transport time.             Accepting Facility Name, City & State : [facility] [address] [facility] [address] [place] [address]  Receiving Facility/Agency Phone Number: [phone]  Facility/Agency Fax Number: [phone]

## 2025-04-29 NOTE — PLAN OF CARE
Problem: Knowledge Deficit  Goal: Patient/family/caregiver demonstrates understanding of disease process, treatment plan, medications, and discharge instructions  Description: Complete learning assessment and assess knowledge base.Interventions:- Provide teaching at level of understanding- Provide teaching via preferred learning methods  Outcome: Progressing     Problem: INFECTION - ADULT  Goal: Absence or prevention of progression during hospitalization  Description: INTERVENTIONS:- Assess and monitor for signs and symptoms of infection- Monitor lab/diagnostic results- Monitor all insertion sites, i.e. indwelling lines, tubes, and drains- Monitor endotracheal if appropriate and nasal secretions for changes in amount and color- [place] appropriate cooling/warming therapies per order- Administer medications as ordered- Instruct and encourage patient and family to use good hand hygiene technique- Identify and instruct in appropriate isolation precautions for identified infection/condition  Outcome: Progressing  Goal: Absence of fever/infection during neutropenic period  Description: INTERVENTIONS:- Monitor WBC  Outcome: Progressing     Problem: PAIN - ADULT  Goal: Verbalizes/displays adequate comfort level or baseline comfort level  Description: Interventions:- Encourage patient to monitor pain and request assistance- Assess pain using appropriate pain scale- Administer analgesics based on type and severity of pain and evaluate response- Implement non-pharmacological measures as appropriate and evaluate response- Consider cultural and social influences on pain and pain management- Notify physician/advanced practitioner if interventions unsuccessful or patient reports new pain  Outcome: Progressing

## 2025-04-29 NOTE — PLAN OF CARE
Problem: PHYSICAL THERAPY ADULT  Goal: Performs mobility at highest level of function for planned discharge setting.  See evaluation for individualized goals.  Description: Treatment/Interventions: Functional transfer training, Therapeutic exercise, Endurance training, Gait training, Bed mobility, Equipment eval/education, Elevations  Equipment Recommended: Walker       See flowsheet documentation for full assessment, interventions and recommendations.  Outcome: Not Progressing  Note: Prognosis: Fair  Problem List: Decreased strength, Decreased range of motion, Decreased endurance, Impaired balance, Decreased mobility, Orthopedic restrictions, Pain  Assessment: Pt seen for PT treatment session this date with interventions consisting of bed mobility tasks, transfer training, gait training, toilet transfers, and education provided as needed for safety and direction to improve functional mobility, safety awareness, and activity tolerance. Pt agreeable to PT treatment session upon arrival, pt found resting in bed . At end of session, pt left in bed with bed alarm activated and with all needs in reach. In comparison to previous session, pt with no improvements as evidenced by no functional gains made this session . Continue to recommend Level II (Moderate Resource Intensity) at time of d/c in order to maximize pt's functional independence and safety w/ mobility. Pt continues to be functioning below baseline level. PT will continue to see pt while here in order to address the deficits listed above and provide interventions consistent w/ POC in effort to achieve STGs.  Barriers to Discharge: Inaccessible home environment     Rehab Resource Intensity Level, PT: II (Moderate Resource Intensity)    See flowsheet documentation for full assessment.

## 2025-04-29 NOTE — DISCHARGE SUMMARY
Discharge Summary - Hospitalist   Name: Sharon Vega 74 y.o. female I MRN: [id number]  Unit/Bed#: -01 I Date of Admission: 4/24/2025   Date of Service: 4/29/2025 I Hospital Day: 4     Assessment & Plan  Right hip pain  Patient had a right hip hemiarthroplasty in 7/2022 with Dr. Roblero.   Over the past few months patient reports increased right hip pain. She saw Dr. Calles on 3/18.   3/18-R hip X-rays right unipolar partial hip replacement, and arthritic changes along the acetabulum  3/26-Bone scan negative for loosening of right hip arthoplasty   4/21-Patient underwent a right hip lidocaine injection  Initially had relief from pain, but on 4/23/25 she started with acute pain in right hip, and difficulty on range of motion  IR input appreciated; s/p right hip joint effusion aspiration 4/24  Orthopedic surgery consultation appreciated   Pain likely due to arthritic changes along the acetabulum  Feels gram stain results is likely contaminant  Recommending outpatient follow-up for total right hip replacement  Infectious disease consultation appreciated   Patient underwent joint aspiration with IR on 4/24 without evidence of joint effusion.  Sterile fluid was injected into the joint and then aspirated and sent for culture.  Cultures are negative.  Blood fluid culture from IR aspiration is negative.  The report of rare gram-positive cocci in in pairs initially port on Gram stain is an error.  Recommend discontinuation of antibiotics  Now signed off  PT recommending rehab  The patient was excepted at the [facility] for short-term rehab  Outpatient follow-up with PCP and orthopedic surgery  SIRS (systemic inflammatory response syndrome) (HCC)  Patient with tachycardia, leukocytosis with suspect underlying septic arthritis  Elevated ESR-35, CRP 85 - will check tomorrow am  Blood cultures with no growth today.  Status post aspiration of the joint effusion; Gram stain with gram-positive cocci - possible  contaminate  Continue ceftriaxone/ vancomycin  Orthopedic consult appreciated  Follow-up with cultures  Consult infectious disease - plan is to dc antibiotics and observe off of antibiotics.  If stable, plan to dc to rehab with outpatient ortho follow up  Primary hypertension  Blood pressure controlled  Continue home amlodipine 5 mg daily   Chronic pain syndrome  Patient with history of chronic low back pain/cervical, lumbar radiculopathy.   Patient undergoes L4-L5 interlaminar epidural steroid injections with pain medicine in outpatient setting, last injection was in January/2025.  Continue Celebrex 200mg BID, Gabapentin 600mg HS      Medical Problems       Resolved Problems  Date Reviewed: 4/24/2025   None       Discharging Physician / Practitioner: Keenan Fulelr PA-C  PCP: Danielito Antunez DO  Admission Date:   Admission Orders (From admission, onward)       Ordered        04/25/25 1500  INPATIENT ADMISSION  Once            04/24/25 1246  Place in Observation  Once                          Discharge Date: 04/29/25    Consultations During Hospital Stay:  Orthopedic surgery   Infectious disease    Procedures Performed:   Aspiration of right hip by IR 4/24/25    Significant Findings / Test Results:   XR spine lumbar 2 or 3 views injury  Result Date: 4/28/2025  Impression: No acute osseous abnormality. Degenerative changes as described. Computerized Assisted Algorithm (CAA) may have been used to analyze all applicable images. Workstation performed: [id number]     IR aspiration joint (specify location)  Result Date: 4/25/2025  Impression: Technically successful aspiration and arthrogram using fluoroscopic guidance. This is the end of the clinically relevant portion of this report.  Subsequent information is for compliance, documentation, and coding purposes. In the process of informed consent, information was communicated, verbally, to the patient regarding the procedure.  The patient was informed of; the name of  "the procedure, nature of the procedure, nature of the condition, risks, benefits, alternatives, and potential complications, as well as the consequences of not having the procedure.  All the patient's questions were answered.  Informed consent was signed. Automated exposure control was utilized, and dose was minimized, in accordance with the application of the ALARA technique. Chlorhexidine and alcohol was used for cleansing and sterile preparation of the skin.  This was allowed to dry prior to the application of sterile draping. Timeout was performed, with all team members present, and in agreement.  Confirmation of patient, procedure, laterally, allergies, and all needed equipment was performed. Fluoroscopy time: 1.5 Minutes Radiation dose: 108 milliGray CONTRAST DOSE: 5 cc Omnipaque 350 PROCEDURE: See report title Preprocedure diagnosis: Please see \"history \", above Postprocedure diagnosis: Same Antibiotics: None Specimen: Could only get enough to send for culture. Estimated blood loss: Less than 5 mL Anesthesia: Local Workstation performed: [id number]         Incidental Findings:   none     Test Results Pending at Discharge (will require follow up):   none     Outpatient Tests Requested:  none    Complications:  none    Reason for Admission: right hip pain    Hospital Course:   Sharon Vega is a 74 y.o. female patient who originally presented to the hospital on 4/24/2025 due to right hip pain. Please see H&P for complete details of presentation. Patient met SIRS criteria and it was initially felt she may have septic arthritis. She was started on IV ceftriaxone and vancomycin. Orthopedic surgery was consulted, No surgical intervention required. It was felt the patient's pain was likely due to arthritis of the acetabulum. Gram statin initially showed grown, this was addended as an error. ID consulted and recommended discontinuation of IV antibiotics. The patient was watched for 24 hours off antibiotics. The " "patient had no signs of infection. PT recommended STR. The patient was agreeable and was discharged to [facility] for STR. This is a brief discharge summary, please see notes from throughout the patient's hospital stay for complete details of her hospitalization.       Please see above list of diagnoses and related plan for additional information.     Condition at Discharge: good    Discharge Day Visit / Exam:   Subjective:    Vitals: Blood Pressure: 126/87 (04/29/25 1458)  Pulse: 98 (04/29/25 1458)  Temperature: 98.2 °F (36.8 °C) (04/29/25 1458)  Temp Source: Oral (04/29/25 0705)  Respirations: 16 (04/29/25 0705)  Height: 5' 3\" (160 cm) (04/24/25 0922)  Weight - Scale: 88.5 kg (195 lb) (04/24/25 0922)  SpO2: (!) 88 % (04/29/25 1458)  Physical Exam  Vitals and nursing note reviewed.   Constitutional:       Appearance: Normal appearance.   HENT:      Head: Normocephalic and atraumatic.      Nose: Nose normal.      Mouth/Throat:      Mouth: Mucous membranes are dry.   Pulmonary:      Effort: Pulmonary effort is normal.      Breath sounds: Normal breath sounds.   Abdominal:      General: There is no distension.      Palpations: Abdomen is soft.      Tenderness: There is no abdominal tenderness.   Skin:     General: Skin is warm and dry.   Neurological:      General: No focal deficit present.      Mental Status: She is alert and oriented to person, place, and time.   Psychiatric:         Mood and Affect: Mood normal.         Behavior: Behavior normal.          Discussion with Family: Updated  (granddaughter) via phone.    Discharge instructions/Information to patient and family:   See after visit summary for information provided to patient and family.      Provisions for Follow-Up Care:  See after visit summary for information related to follow-up care and any pertinent home health orders.      Mobility at time of Discharge:   Basic Mobility Inpatient Raw Score: 16  -HLM Goal: 5: Stand one or more " mins  JH-HLM Achieved: 6: Walk 10 steps or more  HLM Goal achieved. Continue to encourage appropriate mobility.     Disposition:   [facility] Facility: [facility].    Planned Readmission: no    Discharge Medications:  See after visit summary for reconciled discharge medications provided to patient and/or family.      Administrative Statements   Discharge Statement:  I have spent a total time of 25 minutes in caring for this patient on the day of the visit/encounter. >30 minutes of time was spent on: Counseling / Coordination of care, Documenting in the medical record, and Reviewing / ordering tests, medicine, procedures  .    **Please Note: This note may have been constructed using a voice recognition system**

## 2025-04-29 NOTE — CASE MANAGEMENT
Case Management Discharge Planning Note    Patient name Sharon Vega  Location /-01 MRN [id number]  : 1950 Date 2025       Current Admission Date: 2025  Current Admission Diagnosis:Right hip pain   Patient Active Problem List    Diagnosis Date Noted Date Diagnosed    Right hip pain 2025     Sepsis without acute organ dysfunction (HCC) 2025     Sacroiliitis (HCC) 2025     Chronic midline low back pain without sciatica 2023     Neck pain 2023     Lumbar radiculopathy      Chronic pain syndrome 2023     Acute blood loss anemia 2022     Leukemoid reaction 2022     Closed transcervical fracture of right femur (HCC) 2022     Fracture of right wrist 2022     Primary hypertension 2022     Lumbar degenerative disc disease 2022     Lumbar spondylosis 2022     Cervical radiculopathy 2022     Cervical spondylosis 2022     Rheumatoid arthritis involving both hands (HCC) 2022     Spinal stenosis of lumbar region without neurogenic claudication 2022       LOS (days): 4  Geometric Mean LOS (GMLOS) (days): 5.1  Days to GMLOS:1.2     OBJECTIVE:  Risk of Unplanned Readmission Score: 7.25         Current admission status: Inpatient   Preferred Pharmacy:   [id number] [address]  [address] [place] [address]  Phone: [phone] Fax: [phone]    Primary Care Provider: Danielito Antunez DO    Primary Insurance: MEDICARE  Secondary Insurance: [facility]    DISCHARGE DETAILS:  Provided the choice list from [facility] for STR.  Pt chose [facility].  Reserved in [facility], sent a message to Luz from [facility] to determine if bed available, would need a COVID swab.  Awaiting to hear back from the facility.

## 2025-04-29 NOTE — ARC ADMISSION
After review with ARC MD, patient is denied for ARC.  She does not have the medical necessity needed for acute rehab.

## 2025-04-29 NOTE — PHYSICAL THERAPY NOTE
"   PHYSICAL THERAPY TREATMENT NOTE  NAME:  Sharon Vega  DATE: 04/29/25    Length Of Stay: 4  Performed at least 2 patient identifiers during session: Name and Birthday    TREATMENT FLOWSHEET:    04/29/25 1155   PT Last Visit   PT Visit Date 04/29/25   Note Type   Note Type Treatment   Pain Assessment   Pain Assessment Tool 0-10   Pain Score 3  (0/10 pain reported at rest bu increased with movement)   Pain Location/Orientation Orientation: Right;Location: Hip   Pain Onset/Description Onset: Ongoing;Frequency: Intermittent;Descriptor: Sharp   Effect of Pain on Daily Activities any movement and when weight  bearing   Patient's Stated Pain Goal No pain   Hospital Pain Intervention(s) Cold applied;Repositioned;Ambulation/increased activity;Emotional support   Restrictions/Precautions   Weight Bearing Precautions Per Order Yes   RLE Weight Bearing Per Order WBAT   Other Precautions Chair Alarm;Bed Alarm;Fall Risk;Pain   General   Chart Reviewed Yes   Response to Previous Treatment Patient with no complaints from previous session.   Family/Caregiver Present No   Cognition   Overall Cognitive Status WFL   Arousal/Participation Alert;Responsive;Cooperative   Attention Within functional limits   Orientation Level Oriented X4   Memory Decreased recall of precautions   Following Commands Follows multistep commands without difficulty   Subjective   Subjective \"I'm ready to give it a try.\"   Bed Mobility   Supine to Sit 4  Minimal assistance   Additional items Assist x 1;HOB elevated;Bedrails;Increased time required;Verbal cues;LE management   Sit to Supine 4  Minimal assistance   Additional items Assist x 1;HOB elevated;Bedrails;Increased time required;Verbal cues;LE management   Transfers   Sit to Stand 4  Minimal assistance   Additional items Assist x 1;Increased time required;Verbal cues;Bedrails  (RW)   Stand to Sit 4  Minimal assistance   Additional items Assist x 1;Armrests;Increased time required;Verbal cues  (RW) "   Stand pivot 4  Minimal assistance   Additional items Assist x 1;Armrests;Increased time required;Verbal cues  (RW)   Toilet transfer 4  Minimal assistance   Additional items Assist x 1;Armrests;Increased time required;Verbal cues;Commode  (RW)   Additional Comments VC's provided for proper hand placememnt during transitions and to slide RLE forward prior to sitting   Ambulation/Elevation   Gait pattern Improper Weight shift;Decreased foot clearance;R Foot drag;Antalgic;Forward Flexion;Decreased R stance;Shuffling;Inconsistent leonel;Short stride;Step to;Decreased heel strike;Decreased toe off;Decreased hip extension   Gait Assistance 4  Minimal assist   Additional items Assist x 1;Verbal cues   Assistive Device Rolling walker   Distance 3ft x2   Balance   Static Sitting Fair   Dynamic Sitting Fair   Static Standing Fair -   Dynamic Standing Poor +   Ambulatory Poor +   Endurance Deficit   Endurance Deficit Yes   Activity Tolerance   Activity Tolerance Patient limited by fatigue;Patient limited by pain   Nurse Made Aware RN aware   Assessment   Prognosis Fair   Problem List Decreased strength;Decreased range of motion;Decreased endurance;Impaired balance;Decreased mobility;Orthopedic restrictions;Pain   Goals   Patient Goals to have less pain   PT Treatment Day 2   Plan   Treatment/Interventions Functional transfer training;LE strengthening/ROM;Endurance training;Patient/family training;Equipment eval/education;Bed mobility;Gait training;Compensatory technique education;Spoke to nursing   Progress No functional improvements   PT Frequency 3-5x/wk   Discharge Recommendation   Rehab Resource Intensity Level, PT II (Moderate Resource Intensity)   AM-PAC Basic Mobility Inpatient   Turning in Flat Bed Without Bedrails 3   Lying on Back to Sitting on Edge of Flat Bed Without Bedrails 3   Moving Bed to Chair 3   Standing Up From Chair Using Arms 3   Walk in Room 3   Climb 3-5 Stairs With Railing 1   Basic Mobility  Inpatient Raw Score 16   Basic Mobility Standardized Score 38.32   [facility] Highest Level Of Mobility   -HL Goal 5: Stand one or more mins   JH-HLM Achieved 6: Walk 10 steps or more   Neuromuscular re-education of movement performed to improve dynamic standing balance performed while supported by RW with CGA/min Ax1 including multidirectional weight shifting, reaching out of GINO, turning B directions before and after toileting while performing clothing management and hygiene needs    The patient's AM-PAC Basic Mobility Inpatient Short Form Raw Score is 16. A raw score 16 suggests the patient may benefit from discharge to post-acute rehabilitation services. Please also refer to the recommendation of the Physical Therapist for safe discharge planning.    Pt seen for PT treatment session this date with interventions consisting of bed mobility tasks, transfer training, gait training, toilet transfers, and education provided as needed for safety and direction to improve functional mobility, safety awareness, and activity tolerance. Pt agreeable to PT treatment session upon arrival, pt found resting in bed . At end of session, pt left in bed with bed alarm activated and with all needs in reach. In comparison to previous session, pt with no improvements as evidenced by no functional gains made this session . Continue to recommend Level II (Moderate Resource Intensity) at time of d/c in order to maximize pt's functional independence and safety w/ mobility. Pt continues to be functioning below baseline level. PT will continue to see pt while here in order to address the deficits listed above and provide interventions consistent w/ POC in effort to achieve STGs.    Sharon Fraser, PTA

## 2025-04-30 NOTE — PROGRESS NOTES
Patient:    MRN:  [id number]    [place] Request ID:  [id number]    Level of care reserved:  Home Health Agency    Partner Reserved:  [facility], [place] [address] [phone]    Clinical needs requested:    Geography searched:  [phone]    Start of Service:    Request sent:  1:33pm EDT on 4/25/2025 by Patti Hughes    Partner reserved:  3:27pm EDT on 4/30/2025 by Patti Hughes    Choice list shared:  2:49pm EDT on 4/28/2025 by Patti Hughes

## 2025-05-02 ENCOUNTER — OFFICE VISIT (OUTPATIENT)
Dept: OBGYN CLINIC | Facility: CLINIC | Age: 75
End: 2025-05-02
Payer: MEDICARE

## 2025-05-02 ENCOUNTER — LAB REQUISITION (OUTPATIENT)
Dept: LAB | Facility: HOSPITAL | Age: 75
End: 2025-05-02

## 2025-05-02 ENCOUNTER — TELEPHONE (OUTPATIENT)
Age: 75
End: 2025-05-02

## 2025-05-02 VITALS — BODY MASS INDEX: 34.55 KG/M2 | WEIGHT: 195 LBS | HEIGHT: 63 IN

## 2025-05-02 DIAGNOSIS — M16.11 ARTHRITIS OF RIGHT HIP: Primary | ICD-10-CM

## 2025-05-02 DIAGNOSIS — I10 ESSENTIAL (PRIMARY) HYPERTENSION: ICD-10-CM

## 2025-05-02 DIAGNOSIS — Z01.812 PRE-OPERATIVE LABORATORY EXAMINATION: ICD-10-CM

## 2025-05-02 DIAGNOSIS — E66.01 SEVERE OBESITY WITH BODY MASS INDEX (BMI) OF 35.0 TO 39.9 WITH COMORBIDITY (HCC): ICD-10-CM

## 2025-05-02 LAB
ANION GAP SERPL CALCULATED.3IONS-SCNC: 11 MMOL/L (ref 4–13)
BASOPHILS # BLD AUTO: 0.05 THOUSANDS/ÂΜL (ref 0–0.1)
BASOPHILS NFR BLD AUTO: 0 % (ref 0–1)
BUN SERPL-MCNC: 28 MG/DL (ref 5–25)
CALCIUM SERPL-MCNC: 9.9 MG/DL (ref 8.4–10.2)
CHLORIDE SERPL-SCNC: 100 MMOL/L (ref 96–108)
CO2 SERPL-SCNC: 26 MMOL/L (ref 21–32)
CREAT SERPL-MCNC: 0.7 MG/DL (ref 0.6–1.3)
EOSINOPHIL # BLD AUTO: 0.05 THOUSAND/ÂΜL (ref 0–0.61)
EOSINOPHIL NFR BLD AUTO: 0 % (ref 0–6)
ERYTHROCYTE [DISTWIDTH] IN BLOOD BY AUTOMATED COUNT: 14.3 % (ref 11.6–15.1)
GFR SERPL CREATININE-BSD FRML MDRD: 85 ML/MIN/1.73SQ M
GLUCOSE SERPL-MCNC: 128 MG/DL (ref 65–140)
HCT VFR BLD AUTO: 40.1 % (ref 34.8–46.1)
HGB BLD-MCNC: 12.9 G/DL (ref 11.5–15.4)
IMM GRANULOCYTES # BLD AUTO: 0.1 THOUSAND/UL (ref 0–0.2)
IMM GRANULOCYTES NFR BLD AUTO: 1 % (ref 0–2)
LYMPHOCYTES # BLD AUTO: 1.46 THOUSANDS/ÂΜL (ref 0.6–4.47)
LYMPHOCYTES NFR BLD AUTO: 9 % (ref 14–44)
MCH RBC QN AUTO: 29.5 PG (ref 26.8–34.3)
MCHC RBC AUTO-ENTMCNC: 32.2 G/DL (ref 31.4–37.4)
MCV RBC AUTO: 92 FL (ref 82–98)
MONOCYTES # BLD AUTO: 1.72 THOUSAND/ÂΜL (ref 0.17–1.22)
MONOCYTES NFR BLD AUTO: 10 % (ref 4–12)
NEUTROPHILS # BLD AUTO: 13.24 THOUSANDS/ÂΜL (ref 1.85–7.62)
NEUTS SEG NFR BLD AUTO: 80 % (ref 43–75)
NRBC BLD AUTO-RTO: 0 /100 WBCS
PLATELET # BLD AUTO: 471 THOUSANDS/UL (ref 149–390)
PMV BLD AUTO: 10.7 FL (ref 8.9–12.7)
POTASSIUM SERPL-SCNC: 3.8 MMOL/L (ref 3.5–5.3)
RBC # BLD AUTO: 4.37 MILLION/UL (ref 3.81–5.12)
SODIUM SERPL-SCNC: 137 MMOL/L (ref 135–147)
WBC # BLD AUTO: 16.62 THOUSAND/UL (ref 4.31–10.16)

## 2025-05-02 PROCEDURE — 80048 BASIC METABOLIC PNL TOTAL CA: CPT | Performed by: INTERNAL MEDICINE

## 2025-05-02 PROCEDURE — 85025 COMPLETE CBC W/AUTO DIFF WBC: CPT | Performed by: INTERNAL MEDICINE

## 2025-05-02 PROCEDURE — 99213 OFFICE O/P EST LOW 20 MIN: CPT | Performed by: ORTHOPAEDIC SURGERY

## 2025-05-02 RX ORDER — FERROUS SULFATE 324(65)MG
324 TABLET, DELAYED RELEASE (ENTERIC COATED) ORAL
Qty: 60 TABLET | Refills: 1 | Status: SHIPPED | OUTPATIENT
Start: 2025-05-02 | End: 2025-07-01

## 2025-05-02 RX ORDER — MULTIVIT-MIN/IRON FUM/FOLIC AC 7.5 MG-4
1 TABLET ORAL DAILY
Qty: 30 TABLET | Refills: 1 | Status: SHIPPED | OUTPATIENT
Start: 2025-05-02

## 2025-05-02 RX ORDER — CHLORHEXIDINE GLUCONATE 40 MG/ML
SOLUTION TOPICAL DAILY PRN
OUTPATIENT
Start: 2025-05-02

## 2025-05-02 RX ORDER — FOLIC ACID 1 MG/1
1 TABLET ORAL DAILY
Qty: 30 TABLET | Refills: 1 | Status: SHIPPED | OUTPATIENT
Start: 2025-05-02 | End: 2025-07-01

## 2025-05-02 RX ORDER — ASCORBIC ACID 500 MG
500 TABLET ORAL 2 TIMES DAILY
Qty: 60 TABLET | Refills: 1 | Status: SHIPPED | OUTPATIENT
Start: 2025-05-02 | End: 2025-07-01

## 2025-05-02 RX ORDER — CHLORHEXIDINE GLUCONATE ORAL RINSE 1.2 MG/ML
15 SOLUTION DENTAL ONCE
OUTPATIENT
Start: 2025-05-02 | End: 2025-05-02

## 2025-05-02 RX ORDER — MUPIROCIN 20 MG/G
OINTMENT TOPICAL
Qty: 15 G | Refills: 1 | Status: SHIPPED | OUTPATIENT
Start: 2025-05-02

## 2025-05-02 NOTE — TELEPHONE ENCOUNTER
Caller: gaby from [facility] in [place]    Doctor: Dr. Calles    Reason for call: the nurse practitioner was reviewing the patiens OVN from today and noticed the patient received the same NM bone scan scheduled for 5/9 on 3/26    Call back#: [phone]

## 2025-05-02 NOTE — PROGRESS NOTES
Assessment & Plan  Arthritis of right hip  The patient was seen and examined.  She is still having significant pain along her right hip and groin.  Patient states that her symptoms are affecting her quality of life.  After exhausting conservative treatment, the risks and benefits of surgery were discussed with the patient.  She decided on an elective conversion of the right hemiarthroplasty to right total hip replacement.  The patient's surgery is tentatively scheduled for May 15, 2025.  She is acceptable to this plan.  Orders:    Ambulatory Referral to [facility]; Future    Ambulatory referral to Physical Therapy; Future    Case request operating room: CONVERSION OF PARTIAL HIP ARTHROPLASTY TO TOTAL HIP ARTHROPLASTY; Standing    mupirocin (BACTROBAN) 2 % ointment; Apply topically to the inside of the left and right nostrils twice daily for 5 days before surgery, including the morning of surgery.    ascorbic acid (VITAMIN C) 500 MG tablet; Take 1 tablet (500 mg total) by mouth 2 (two) times a day    ferrous sulfate 324 (65 Fe) mg; Take 1 tablet (324 mg total) by mouth 2 (two) times a day before meals    folic acid (FOLVITE) 1 mg tablet; Take 1 tablet (1 mg total) by mouth daily    Multiple Vitamins-Minerals (multivitamin with minerals) tablet; Take 1 tablet by mouth daily    NM bone scan 3 phase; Future    Pre-operative laboratory examination    Orders:    Comprehensive metabolic panel; Future    Hemoglobin A1C W/EAG Estimation; Future    CBC and differential; Future    MRSA culture; Future    Anemia Panel w/Reflex; Future    Protime-INR; Future    APTT; Future    EKG 12 lead; Future    Severe obesity with body mass index (BMI) of 35.0 to 39.9 with comorbidity (HCC)               The patient has acetabular arthritis around a bipolar hemiarthroplasty.  Ultimately, she will need a conversion of her partial hip replacement to a total.  The patient did have a local anesthetic injection which  helped for 2 days.  A bone scan was ordered which was not performed yet.  The patient understands that she will need to get this before surgery.  All risk, complications, benefits were discussed with the patient in great detail including bleeding, infection, blood clots, pain, stiffness, neurovascular damage, fractures, dislocations, the possibility loss of life and surgery, heart attack, stroke, wound problems, etc.  Her surgery scheduled for May 14, 2025    Return for surgery.      _____________________________________________________  CHIEF COMPLAINT:  Chief Complaint   Patient presents with    Right Hip - Follow-up     Bone scan report         SUBJECTIVE:  Sharon Vega is a 74 y.o. female who presents to our office for a follow-up appointment.  The patient has a history of a right hemiarthroplasty from 7/2/2022.  Last visit, she was given a prescription for a bone scan and an order for an intra-articular right hip injection.  She states that the injection provided her with 2 days of pain relief, however, she is still having significant pain.  She was also recently hospitalized where a septic joint was ruled out.  She did not complete the bone scan.  She denies any numbness or tingling.  She denies any fever or chills.    The following portions of the patient's history were reviewed and updated as appropriate: allergies, current medications, past family history, past medical history, past social history, past surgical history and problem list.    PAST MEDICAL HISTORY:  Past Medical History:   Diagnosis Date    Anemia     Anxiety     Arthritis     Colon polyp     Depression     GERD (gastroesophageal reflux disease)     Hiatal hernia     Hyperlipidemia     Hypertension        PAST SURGICAL HISTORY:  Past Surgical History:   Procedure Laterality Date    APPENDECTOMY      CHOLECYSTECTOMY      COLONOSCOPY      FL INJECTION RIGHT HIP (NON ARTHROGRAM)  04/21/2025    FRACTURE SURGERY Right     arm with plate and pins     HAND SURGERY Bilateral     HYSTERECTOMY      IR ASPIRATION JOINT (SPECIFY LOCATION)  2025    JOINT REPLACEMENT Bilateral     knees    NC HEMIARTHROPLASTY HIP PARTIAL Right 2022    Procedure: HEMIARTHROPLASTY HIP (BIPOLAR), closed reduction with splinting right wrist;  Surgeon: Leo Roblero;  Location: [facility];  Service: Orthopedics    NC NEUROPLASTY &/TRANSPOSITION ULNAR NERVE ELBOW Right 2023    Procedure: RELEASE CUBITAL TUNNEL;  Surgeon: Cody Calles DO;  Location: [facility];  Service: Orthopedics    SHOULDER ARTHROSCOPY Left        FAMILY HISTORY:  Family History   Problem Relation Age of Onset    No Known Problems Mother        SOCIAL HISTORY:  Social History     Tobacco Use    Smoking status: Former     Current packs/day: 0.00     Types: Cigarettes     Quit date:      Years since quittin.3    Smokeless tobacco: Never   Vaping Use    Vaping status: Never Used   Substance Use Topics    Alcohol use: Not Currently    Drug use: Never       MEDICATIONS:    Current Outpatient Medications:     acetaminophen (TYLENOL) 325 mg tablet, Take 2 tablets (650 mg total) by mouth every 6 (six) hours as needed for mild pain, Disp: , Rfl: 0    amLODIPine (NORVASC) 10 mg tablet, Take 10 mg by mouth daily, Disp: , Rfl:     ascorbic acid (VITAMIN C) 500 MG tablet, Take 1 tablet (500 mg total) by mouth 2 (two) times a day, Disp: 60 tablet, Rfl: 1    calcium carbonate (OS-ALPESH) 600 MG tablet, Take 600 mg by mouth daily Taking 2 tablets (1200 mg) daily, Disp: , Rfl:     celecoxib (CeleBREX) 200 mg capsule, Take 1 capsule (200 mg total) by mouth 2 (two) times a day As needed for joint pain, Disp: 180 capsule, Rfl: 3    cetirizine (ZyrTEC) 10 mg tablet, Take 10 mg by mouth daily, Disp: , Rfl:     cholecalciferol (VITAMIN D3) 1,000 units tablet, Take 1,000 Units by mouth daily 2,000 units daily, Disp: , Rfl:     ferrous sulfate 324 (65 Fe) mg, Take 1 tablet (324 mg total) by mouth 2 (two) times a  day before meals, Disp: 60 tablet, Rfl: 1    folic acid (FOLVITE) 1 mg tablet, Take 1 tablet (1 mg total) by mouth daily, Disp: 30 tablet, Rfl: 1    gabapentin (NEURONTIN) 600 MG tablet, Take 1 tablet (600 mg total) by mouth daily at bedtime, Disp: 30 tablet, Rfl: 0    methocarbamol (ROBAXIN) 500 mg tablet, Take 1 tablet (500 mg total) by mouth every 6 (six) hours as needed for muscle spasms, Disp: 10 tablet, Rfl: 0    Multiple Vitamins-Minerals (multivitamin with minerals) tablet, Take 1 tablet by mouth daily, Disp: 30 tablet, Rfl: 1    multivitamin (THERAGRAN) TABS, Take 1 tablet by mouth daily, Disp: , Rfl:     mupirocin (BACTROBAN) 2 % ointment, Apply topically to the inside of the left and right nostrils twice daily for 5 days before surgery, including the morning of surgery., Disp: 15 g, Rfl: 1    omeprazole (PriLOSEC) 20 mg delayed release capsule, Take 20 mg by mouth daily 1 in am 1 in pm, Disp: , Rfl:     oxyCODONE (ROXICODONE) 5 immediate release tablet, Take 1 tablet (5 mg total) by mouth every 4 (four) hours as needed for moderate pain for up to 10 days Max Daily Amount: 30 mg, Disp: 10 tablet, Rfl: 0    pravastatin (PRAVACHOL) 40 mg tablet, Take 40 mg by mouth daily One at bedtime, Disp: , Rfl:     temazepam (RESTORIL) 7.5 mg capsule, Take 15 mg by mouth daily at bedtime as needed for sleep  , Disp: , Rfl:     venlafaxine (EFFEXOR-XR) 150 mg 24 hr capsule, Take 150 mg by mouth daily, Disp: , Rfl:     ALLERGIES:  No Known Allergies    ROS:  Review of Systems     Constitutional: Negative for fatigue, fever or loss of appetite.   HENT: Negative.    Respiratory: Negative for shortness of breath, dyspnea.    Cardiovascular: Negative for chest pain/tightness.   Gastrointestinal: Negative for abdominal pain, N/V.   Endocrine: Negative for cold/heat intolerance, unexplained weight loss/gain.   Genitourinary: Negative for flank pain, dysuria, hematuria.   Musculoskeletal: Positive for arthralgia   Skin: Negative  "for rash.    Neurological: Negative for numbness or tingling  Psychiatric/Behavioral: Negative for agitation.  _____________________________________________________  PHYSICAL EXAMINATION:    Height 5' 3\" (1.6 m), weight 88.5 kg (195 lb).    Constitutional: Oriented to person, place, and time. Appears well-developed and well-nourished. No distress.   HENT:   Head: Normocephalic.   Eyes: Conjunctivae are normal. Right eye exhibits no discharge. Left eye exhibits no discharge. No scleral icterus.   Cardiovascular: Normal rate.    Pulmonary/Chest: Effort normal.   Neurological: Alert and oriented to person, place, and time.   Skin: Skin is warm and dry. No rash noted. Not diaphoretic. No erythema. No pallor.   Psychiatric: Normal mood and affect. Behavior is normal. Judgment and thought content normal.      MUSCULOSKELETAL EXAMINATION:   Physical Exam  Ortho Exam    Right lower extremity is neurovascular intact  Toes are pink M[place]  Compartments are soft  Tenderness to palpation along hip and groin  Brisk cap refill  Sensation intact  There is pain with passive range of motion of the hip  Cardiovascular: Normal rate    Pulmonary: Effort normal  Abdomen: Soft, nontender, nondistended   Objective:  BP Readings from Last 1 Encounters:   04/29/25 126/87      Wt Readings from Last 1 Encounters:   05/02/25 88.5 kg (195 lb)        BMI:   Estimated body mass index is 34.54 kg/m² as calculated from the following:    Height as of this encounter: 5' 3\" (1.6 m).    Weight as of this encounter: 88.5 kg (195 lb).        Scribe Attestation      I,:  Osbaldo Strange PA-C am acting as a scribe while in the presence of the attending physician.:       I,:  Cody Calles, DO personally performed the services described in this documentation    as scribed in my presence.:            "

## 2025-05-05 ENCOUNTER — TELEPHONE (OUTPATIENT)
Dept: OBGYN CLINIC | Facility: CLINIC | Age: 75
End: 2025-05-05

## 2025-05-07 ENCOUNTER — LAB REQUISITION (OUTPATIENT)
Dept: LAB | Facility: HOSPITAL | Age: 75
End: 2025-05-07

## 2025-05-07 DIAGNOSIS — M16.11 UNILATERAL PRIMARY OSTEOARTHRITIS, RIGHT HIP: ICD-10-CM

## 2025-05-07 LAB
ANION GAP SERPL CALCULATED.3IONS-SCNC: 8 MMOL/L (ref 4–13)
BASOPHILS # BLD AUTO: 0.08 THOUSANDS/ÂΜL (ref 0–0.1)
BASOPHILS NFR BLD AUTO: 0 % (ref 0–1)
BUN SERPL-MCNC: 39 MG/DL (ref 5–25)
CALCIUM SERPL-MCNC: 10.4 MG/DL (ref 8.4–10.2)
CHLORIDE SERPL-SCNC: 96 MMOL/L (ref 96–108)
CO2 SERPL-SCNC: 27 MMOL/L (ref 21–32)
CREAT SERPL-MCNC: 0.78 MG/DL (ref 0.6–1.3)
EOSINOPHIL # BLD AUTO: 0.13 THOUSAND/ÂΜL (ref 0–0.61)
EOSINOPHIL NFR BLD AUTO: 1 % (ref 0–6)
ERYTHROCYTE [DISTWIDTH] IN BLOOD BY AUTOMATED COUNT: 14.5 % (ref 11.6–15.1)
GFR SERPL CREATININE-BSD FRML MDRD: 74 ML/MIN/1.73SQ M
GLUCOSE SERPL-MCNC: 115 MG/DL (ref 65–140)
HCT VFR BLD AUTO: 35.6 % (ref 34.8–46.1)
HGB BLD-MCNC: 11.6 G/DL (ref 11.5–15.4)
IMM GRANULOCYTES # BLD AUTO: 0.28 THOUSAND/UL (ref 0–0.2)
IMM GRANULOCYTES NFR BLD AUTO: 1 % (ref 0–2)
LYMPHOCYTES # BLD AUTO: 2.15 THOUSANDS/ÂΜL (ref 0.6–4.47)
LYMPHOCYTES NFR BLD AUTO: 10 % (ref 14–44)
MCH RBC QN AUTO: 29.4 PG (ref 26.8–34.3)
MCHC RBC AUTO-ENTMCNC: 32.6 G/DL (ref 31.4–37.4)
MCV RBC AUTO: 90 FL (ref 82–98)
MONOCYTES # BLD AUTO: 2.21 THOUSAND/ÂΜL (ref 0.17–1.22)
MONOCYTES NFR BLD AUTO: 10 % (ref 4–12)
NEUTROPHILS # BLD AUTO: 16.51 THOUSANDS/ÂΜL (ref 1.85–7.62)
NEUTS SEG NFR BLD AUTO: 78 % (ref 43–75)
NRBC BLD AUTO-RTO: 0 /100 WBCS
PLATELET # BLD AUTO: 488 THOUSANDS/UL (ref 149–390)
PMV BLD AUTO: 11.8 FL (ref 8.9–12.7)
POTASSIUM SERPL-SCNC: 4.2 MMOL/L (ref 3.5–5.3)
RBC # BLD AUTO: 3.95 MILLION/UL (ref 3.81–5.12)
SODIUM SERPL-SCNC: 131 MMOL/L (ref 135–147)
WBC # BLD AUTO: 21.36 THOUSAND/UL (ref 4.31–10.16)

## 2025-05-07 PROCEDURE — 85025 COMPLETE CBC W/AUTO DIFF WBC: CPT | Performed by: INTERNAL MEDICINE

## 2025-05-07 PROCEDURE — 80048 BASIC METABOLIC PNL TOTAL CA: CPT | Performed by: INTERNAL MEDICINE

## 2025-05-08 ENCOUNTER — HOSPITAL ENCOUNTER (OUTPATIENT)
Dept: RADIOLOGY | Facility: HOSPITAL | Age: 75
End: 2025-05-08
Attending: NURSE PRACTITIONER
Payer: MEDICARE

## 2025-05-08 ENCOUNTER — TELEPHONE (OUTPATIENT)
Dept: OBGYN CLINIC | Facility: HOSPITAL | Age: 75
End: 2025-05-08

## 2025-05-08 DIAGNOSIS — Z01.818 PREPROCEDURAL EXAMINATION: ICD-10-CM

## 2025-05-08 PROCEDURE — 71046 X-RAY EXAM CHEST 2 VIEWS: CPT

## 2025-05-08 NOTE — TELEPHONE ENCOUNTER
Spoke with Keenan from the [facility] who needs to confrim the scheduled NM Bone Scan scheduled for tomorrow 5/9/25; Also asking for labs to be reviewed. Message sent to the SOC.

## 2025-05-08 NOTE — TELEPHONE ENCOUNTER
Keenan SCHULTZ at the [facility] is asking about whether Sharon needs to have a repeat NM Bone Scan last done on 3/26/25 for B/L Hips.

## 2025-05-09 NOTE — TELEPHONE ENCOUNTER
Caller: I[facility]an- [facility]     Doctor: Dr. Calles     Reason for call: Asked for a call from the nurse     Call back#: [phone]

## 2025-05-09 NOTE — TELEPHONE ENCOUNTER
Spoke with Ihsan from [facility]. Medications- in house pharmacy and in house PT. 24/7 caregiver support. Patient will bring RW to the hospital.

## 2025-05-13 ENCOUNTER — HOSPITAL ENCOUNTER (OUTPATIENT)
Dept: CT IMAGING | Facility: HOSPITAL | Age: 75
End: 2025-05-13
Attending: NURSE PRACTITIONER

## 2025-05-13 ENCOUNTER — LAB REQUISITION (OUTPATIENT)
Dept: LAB | Facility: HOSPITAL | Age: 75
End: 2025-05-13

## 2025-05-13 ENCOUNTER — HOSPITAL ENCOUNTER (OUTPATIENT)
Dept: CT IMAGING | Facility: HOSPITAL | Age: 75
Discharge: HOME | End: 2025-05-13
Attending: NURSE PRACTITIONER

## 2025-05-13 ENCOUNTER — HOSPITAL ENCOUNTER (OUTPATIENT)
Dept: RADIOLOGY | Facility: HOSPITAL | Age: 75
Discharge: HOME | End: 2025-05-13
Attending: INTERNAL MEDICINE
Payer: MEDICARE

## 2025-05-13 ENCOUNTER — HOSPITAL ENCOUNTER (INPATIENT)
Facility: HOSPITAL | Age: 75
LOS: 1 days | DRG: 872 | End: 2025-05-14
Attending: EMERGENCY MEDICINE | Admitting: INTERNAL MEDICINE
Payer: MEDICARE

## 2025-05-13 ENCOUNTER — TELEPHONE (OUTPATIENT)
Age: 75
End: 2025-05-13

## 2025-05-13 DIAGNOSIS — R50.9 FEVER, UNSPECIFIED: ICD-10-CM

## 2025-05-13 DIAGNOSIS — R50.9 FEVER OF UNKNOWN ORIGIN: ICD-10-CM

## 2025-05-13 DIAGNOSIS — R65.10 SIRS (SYSTEMIC INFLAMMATORY RESPONSE SYNDROME) (HCC): ICD-10-CM

## 2025-05-13 DIAGNOSIS — M16.11 PRIMARY OSTEOARTHRITIS OF RIGHT HIP: ICD-10-CM

## 2025-05-13 DIAGNOSIS — R65.20 SEVERE SEPSIS WITHOUT SEPTIC SHOCK (CODE) (HCC): ICD-10-CM

## 2025-05-13 DIAGNOSIS — L02.415 ABSCESS OF RIGHT HIP: ICD-10-CM

## 2025-05-13 DIAGNOSIS — M25.551 RIGHT HIP PAIN: Primary | ICD-10-CM

## 2025-05-13 DIAGNOSIS — L02.91 ABSCESS: ICD-10-CM

## 2025-05-13 DIAGNOSIS — A41.9 SEPSIS (HCC): Primary | ICD-10-CM

## 2025-05-13 DIAGNOSIS — D72.829 ELEVATED WHITE BLOOD CELL COUNT, UNSPECIFIED: ICD-10-CM

## 2025-05-13 DIAGNOSIS — M25.551 RIGHT HIP PAIN: ICD-10-CM

## 2025-05-13 LAB
ALBUMIN SERPL BCG-MCNC: 2.9 G/DL (ref 3.5–5)
ALP SERPL-CCNC: 132 U/L (ref 34–104)
ALT SERPL W P-5'-P-CCNC: 34 U/L (ref 7–52)
ANION GAP SERPL CALCULATED.3IONS-SCNC: 10 MMOL/L (ref 4–13)
ANION GAP SERPL CALCULATED.3IONS-SCNC: 9 MMOL/L (ref 4–13)
APTT PPP: 28 SECONDS (ref 23–34)
AST SERPL W P-5'-P-CCNC: 31 U/L (ref 13–39)
BASE EX.OXY STD BLDV CALC-SCNC: 61.5 % (ref 60–80)
BASE EXCESS BLDV CALC-SCNC: 3.8 MMOL/L
BASOPHILS # BLD AUTO: 0.11 THOUSANDS/ÂΜL (ref 0–0.1)
BASOPHILS # BLD MANUAL: 0 THOUSAND/UL (ref 0–0.1)
BASOPHILS NFR BLD AUTO: 0 % (ref 0–1)
BASOPHILS NFR MAR MANUAL: 0 % (ref 0–1)
BILIRUB SERPL-MCNC: 1.26 MG/DL (ref 0.2–1)
BILIRUB UR QL STRIP: NEGATIVE
BUN SERPL-MCNC: 23 MG/DL (ref 5–25)
BUN SERPL-MCNC: 25 MG/DL (ref 5–25)
CALCIUM ALBUM COR SERPL-MCNC: 11.6 MG/DL (ref 8.3–10.1)
CALCIUM SERPL-MCNC: 10.3 MG/DL (ref 8.4–10.2)
CALCIUM SERPL-MCNC: 10.7 MG/DL (ref 8.4–10.2)
CHLORIDE SERPL-SCNC: 93 MMOL/L (ref 96–108)
CHLORIDE SERPL-SCNC: 94 MMOL/L (ref 96–108)
CLARITY UR: ABNORMAL
CO2 SERPL-SCNC: 26 MMOL/L (ref 21–32)
CO2 SERPL-SCNC: 27 MMOL/L (ref 21–32)
COLOR UR: YELLOW
CREAT SERPL-MCNC: 0.71 MG/DL (ref 0.6–1.3)
CREAT SERPL-MCNC: 0.75 MG/DL (ref 0.6–1.3)
EOSINOPHIL # BLD AUTO: 0.04 THOUSAND/ÂΜL (ref 0–0.61)
EOSINOPHIL # BLD MANUAL: 0.29 THOUSAND/UL (ref 0–0.4)
EOSINOPHIL NFR BLD AUTO: 0 % (ref 0–6)
EOSINOPHIL NFR BLD MANUAL: 1 % (ref 0–6)
ERYTHROCYTE [DISTWIDTH] IN BLOOD BY AUTOMATED COUNT: 13.9 % (ref 11.6–15.1)
ERYTHROCYTE [DISTWIDTH] IN BLOOD BY AUTOMATED COUNT: 14.2 % (ref 11.6–15.1)
FLUAV RNA RESP QL NAA+PROBE: NEGATIVE
FLUBV RNA RESP QL NAA+PROBE: NEGATIVE
GFR SERPL CREATININE-BSD FRML MDRD: 78 ML/MIN/1.73SQ M
GFR SERPL CREATININE-BSD FRML MDRD: 83 ML/MIN/1.73SQ M
GIANT PLATELETS BLD QL SMEAR: PRESENT
GLUCOSE SERPL-MCNC: 119 MG/DL (ref 65–140)
GLUCOSE SERPL-MCNC: 138 MG/DL (ref 65–140)
GLUCOSE UR STRIP-MCNC: NEGATIVE MG/DL
HCO3 BLDV-SCNC: 28.4 MMOL/L (ref 24–30)
HCT VFR BLD AUTO: 36.4 % (ref 34.8–46.1)
HCT VFR BLD AUTO: 37.4 % (ref 34.8–46.1)
HGB BLD-MCNC: 12 G/DL (ref 11.5–15.4)
HGB BLD-MCNC: 12.1 G/DL (ref 11.5–15.4)
HGB UR QL STRIP.AUTO: NEGATIVE
IMM GRANULOCYTES # BLD AUTO: >0.5 THOUSAND/UL (ref 0–0.2)
IMM GRANULOCYTES NFR BLD AUTO: 2 % (ref 0–2)
INR PPP: 1.24 (ref 0.85–1.19)
KETONES UR STRIP-MCNC: NEGATIVE MG/DL
LACTATE SERPL-SCNC: 1.1 MMOL/L (ref 0.5–2)
LEUKOCYTE ESTERASE UR QL STRIP: NEGATIVE
LYMPHOCYTES # BLD AUTO: 1.88 THOUSANDS/ÂΜL (ref 0.6–4.47)
LYMPHOCYTES # BLD AUTO: 2 THOUSAND/UL (ref 0.6–4.47)
LYMPHOCYTES # BLD AUTO: 7 % (ref 14–44)
LYMPHOCYTES NFR BLD AUTO: 7 % (ref 14–44)
MCH RBC QN AUTO: 28.9 PG (ref 26.8–34.3)
MCH RBC QN AUTO: 29.4 PG (ref 26.8–34.3)
MCHC RBC AUTO-ENTMCNC: 32.4 G/DL (ref 31.4–37.4)
MCHC RBC AUTO-ENTMCNC: 33 G/DL (ref 31.4–37.4)
MCV RBC AUTO: 89 FL (ref 82–98)
MCV RBC AUTO: 90 FL (ref 82–98)
MONOCYTES # BLD AUTO: 1.87 THOUSAND/ÂΜL (ref 0.17–1.22)
MONOCYTES # BLD AUTO: 2 THOUSAND/UL (ref 0–1.22)
MONOCYTES NFR BLD AUTO: 7 % (ref 4–12)
MONOCYTES NFR BLD: 7 % (ref 4–12)
MYELOCYTE ABSOLUTE CT: 0.29 THOUSAND/UL (ref 0–0.1)
MYELOCYTES NFR BLD MANUAL: 1 % (ref 0–1)
NEUTROPHILS # BLD AUTO: 24.29 THOUSANDS/ÂΜL (ref 1.85–7.62)
NEUTROPHILS # BLD MANUAL: 24.01 THOUSAND/UL (ref 1.85–7.62)
NEUTS BAND NFR BLD MANUAL: 3 % (ref 0–8)
NEUTS SEG NFR BLD AUTO: 81 % (ref 43–75)
NEUTS SEG NFR BLD AUTO: 84 % (ref 43–75)
NITRITE UR QL STRIP: NEGATIVE
NRBC BLD AUTO-RTO: 0 /100 WBCS
O2 CT BLDV-SCNC: 10.5 ML/DL
PCO2 BLDV: 42.9 MM HG (ref 42–50)
PH BLDV: 7.44 [PH] (ref 7.3–7.4)
PH UR STRIP.AUTO: 6 [PH]
PLATELET # BLD AUTO: 603 THOUSANDS/UL (ref 149–390)
PLATELET # BLD AUTO: 674 THOUSANDS/UL (ref 149–390)
PLATELET BLD QL SMEAR: ABNORMAL
PMV BLD AUTO: 10 FL (ref 8.9–12.7)
PMV BLD AUTO: 10.6 FL (ref 8.9–12.7)
PO2 BLDV: 33.2 MM HG (ref 35–45)
POTASSIUM SERPL-SCNC: 3.8 MMOL/L (ref 3.5–5.3)
POTASSIUM SERPL-SCNC: 4 MMOL/L (ref 3.5–5.3)
PROCALCITONIN SERPL-MCNC: 0.58 NG/ML
PROCALCITONIN SERPL-MCNC: 0.75 NG/ML
PROT SERPL-MCNC: 7 G/DL (ref 6.4–8.4)
PROT UR STRIP-MCNC: NEGATIVE MG/DL
PROTHROMBIN TIME: 16.1 SECONDS (ref 12.3–15)
RBC # BLD AUTO: 4.08 MILLION/UL (ref 3.81–5.12)
RBC # BLD AUTO: 4.18 MILLION/UL (ref 3.81–5.12)
RBC MORPH BLD: NORMAL
RSV RNA RESP QL NAA+PROBE: NEGATIVE
SARS-COV-2 RNA RESP QL NAA+PROBE: NEGATIVE
SODIUM SERPL-SCNC: 129 MMOL/L (ref 135–147)
SODIUM SERPL-SCNC: 130 MMOL/L (ref 135–147)
SP GR UR STRIP.AUTO: 1.02
UROBILINOGEN UR QL STRIP.AUTO: 2 E.U./DL
WBC # BLD AUTO: 28.58 THOUSAND/UL (ref 4.31–10.16)
WBC # BLD AUTO: 28.82 THOUSAND/UL (ref 4.31–10.16)

## 2025-05-13 PROCEDURE — 96365 THER/PROPH/DIAG IV INF INIT: CPT

## 2025-05-13 PROCEDURE — 99285 EMERGENCY DEPT VISIT HI MDM: CPT | Performed by: EMERGENCY MEDICINE

## 2025-05-13 PROCEDURE — 85025 COMPLETE CBC W/AUTO DIFF WBC: CPT | Performed by: EMERGENCY MEDICINE

## 2025-05-13 PROCEDURE — 0241U HB NFCT DS VIR RESP RNA 4 TRGT: CPT | Performed by: INTERNAL MEDICINE

## 2025-05-13 PROCEDURE — 36415 COLL VENOUS BLD VENIPUNCTURE: CPT | Performed by: EMERGENCY MEDICINE

## 2025-05-13 PROCEDURE — 96360 HYDRATION IV INFUSION INIT: CPT

## 2025-05-13 PROCEDURE — 81003 URINALYSIS AUTO W/O SCOPE: CPT | Performed by: INTERNAL MEDICINE

## 2025-05-13 PROCEDURE — 71046 X-RAY EXAM CHEST 2 VIEWS: CPT

## 2025-05-13 PROCEDURE — 80048 BASIC METABOLIC PNL TOTAL CA: CPT | Performed by: INTERNAL MEDICINE

## 2025-05-13 PROCEDURE — 87154 CUL TYP ID BLD PTHGN 6+ TRGT: CPT | Performed by: EMERGENCY MEDICINE

## 2025-05-13 PROCEDURE — 87076 CULTURE ANAEROBE IDENT EACH: CPT | Performed by: EMERGENCY MEDICINE

## 2025-05-13 PROCEDURE — 85652 RBC SED RATE AUTOMATED: CPT | Performed by: PHYSICIAN ASSISTANT

## 2025-05-13 PROCEDURE — 85610 PROTHROMBIN TIME: CPT | Performed by: EMERGENCY MEDICINE

## 2025-05-13 PROCEDURE — 85007 BL SMEAR W/DIFF WBC COUNT: CPT | Performed by: INTERNAL MEDICINE

## 2025-05-13 PROCEDURE — 73502 X-RAY EXAM HIP UNI 2-3 VIEWS: CPT

## 2025-05-13 PROCEDURE — 87086 URINE CULTURE/COLONY COUNT: CPT | Performed by: INTERNAL MEDICINE

## 2025-05-13 PROCEDURE — 71250 CT THORAX DX C-: CPT

## 2025-05-13 PROCEDURE — 85730 THROMBOPLASTIN TIME PARTIAL: CPT | Performed by: EMERGENCY MEDICINE

## 2025-05-13 PROCEDURE — 93010 ELECTROCARDIOGRAM REPORT: CPT | Performed by: INTERNAL MEDICINE

## 2025-05-13 PROCEDURE — 84145 PROCALCITONIN (PCT): CPT | Performed by: INTERNAL MEDICINE

## 2025-05-13 PROCEDURE — 84145 PROCALCITONIN (PCT): CPT | Performed by: EMERGENCY MEDICINE

## 2025-05-13 PROCEDURE — 82805 BLOOD GASES W/O2 SATURATION: CPT | Performed by: EMERGENCY MEDICINE

## 2025-05-13 PROCEDURE — 86140 C-REACTIVE PROTEIN: CPT | Performed by: PHYSICIAN ASSISTANT

## 2025-05-13 PROCEDURE — 74176 CT ABD & PELVIS W/O CONTRAST: CPT

## 2025-05-13 PROCEDURE — 80053 COMPREHEN METABOLIC PANEL: CPT | Performed by: EMERGENCY MEDICINE

## 2025-05-13 PROCEDURE — 85027 COMPLETE CBC AUTOMATED: CPT | Performed by: INTERNAL MEDICINE

## 2025-05-13 PROCEDURE — 99285 EMERGENCY DEPT VISIT HI MDM: CPT

## 2025-05-13 PROCEDURE — 87040 BLOOD CULTURE FOR BACTERIA: CPT | Performed by: EMERGENCY MEDICINE

## 2025-05-13 PROCEDURE — 83605 ASSAY OF LACTIC ACID: CPT | Performed by: EMERGENCY MEDICINE

## 2025-05-13 PROCEDURE — 87181 SC STD AGAR DILUTION PER AGT: CPT | Performed by: EMERGENCY MEDICINE

## 2025-05-13 RX ORDER — ENOXAPARIN SODIUM 100 MG/ML
40 INJECTION SUBCUTANEOUS DAILY
Status: DISCONTINUED | OUTPATIENT
Start: 2025-05-14 | End: 2025-05-14 | Stop reason: HOSPADM

## 2025-05-13 RX ORDER — SODIUM CHLORIDE 9 MG/ML
100 INJECTION, SOLUTION INTRAVENOUS CONTINUOUS
Status: DISCONTINUED | OUTPATIENT
Start: 2025-05-14 | End: 2025-05-14 | Stop reason: HOSPADM

## 2025-05-13 RX ORDER — ACETAMINOPHEN 325 MG/1
650 TABLET ORAL EVERY 4 HOURS PRN
Status: DISCONTINUED | OUTPATIENT
Start: 2025-05-13 | End: 2025-05-14 | Stop reason: HOSPADM

## 2025-05-13 RX ORDER — ONDANSETRON 2 MG/ML
4 INJECTION INTRAMUSCULAR; INTRAVENOUS EVERY 6 HOURS PRN
Status: DISCONTINUED | OUTPATIENT
Start: 2025-05-13 | End: 2025-05-14 | Stop reason: HOSPADM

## 2025-05-13 RX ADMIN — SODIUM CHLORIDE 4.5 G: 9 INJECTION, SOLUTION INTRAVENOUS at 22:31

## 2025-05-13 RX ADMIN — SODIUM CHLORIDE 1000 ML: 0.9 INJECTION, SOLUTION INTRAVENOUS at 22:39

## 2025-05-13 RX ADMIN — SODIUM CHLORIDE 1000 ML: 0.9 INJECTION, SOLUTION INTRAVENOUS at 22:09

## 2025-05-13 NOTE — TELEPHONE ENCOUNTER
Below message has been relayed to Keenan @ [facility] and patients son Jose Armando.    DO Maira Urias MA; P Soc Md/Np  I would recommend the surgery would be postponed due to her leukocytosis.  I will leave it up to the primary service to find a source of this.  She had a bone scan which was negative.  She had x-rays which were negative.  MRI would not be helpful due to the metallic artifact          Previous Messages       ----- Message -----  From: Maira Cortez MA  Sent: 5/13/2025  10:32 AM EDT  To: Cody Calles DO; Soc Md/Np  Subject: FW: 5/14/25 [facility] Add On Needs scheduling          Keenan at the [facility] states patient not cleared due to elevated WBC.  They are unsure as too the source of infection and patients right hip pain is worsening.  ----- Message -----  From: Nichole Pacheco RN  Sent: 5/9/2025   1:51 PM EDT  To: Maira Cortez MA  Subject: RE: 5/14/25 [facility] Add On Needs scheduling          FYI -- Per Miley at facility -- patient not currently cleared due to elevated WBC after being seen by nurse practitioner today.  Repeat CBC being done 5/13 to try and clear her for 5/14 surgery date.

## 2025-05-13 NOTE — TELEPHONE ENCOUNTER
Caller: Jose Armando ( son)     Doctor: Dr. Calles     Reason for call: Asked to be called about the the time for tomorrows surgery     Call back#: [phone]

## 2025-05-14 ENCOUNTER — HOSPITAL ENCOUNTER (INPATIENT)
Facility: HOSPITAL | Age: 75
LOS: 13 days | DRG: 466 | End: 2025-05-27
Attending: INTERNAL MEDICINE | Admitting: FAMILY MEDICINE
Payer: MEDICARE

## 2025-05-14 VITALS
WEIGHT: 190.48 LBS | OXYGEN SATURATION: 95 % | DIASTOLIC BLOOD PRESSURE: 73 MMHG | HEIGHT: 62 IN | SYSTOLIC BLOOD PRESSURE: 128 MMHG | RESPIRATION RATE: 18 BRPM | TEMPERATURE: 98.9 F | HEART RATE: 101 BPM | BODY MASS INDEX: 35.05 KG/M2

## 2025-05-14 DIAGNOSIS — M51.369 LUMBAR DEGENERATIVE DISC DISEASE: ICD-10-CM

## 2025-05-14 DIAGNOSIS — T84.51XA INFECTION ASSOCIATED WITH INTERNAL RIGHT HIP PROSTHESIS, INITIAL ENCOUNTER (HCC): ICD-10-CM

## 2025-05-14 DIAGNOSIS — K59.00 CONSTIPATION: ICD-10-CM

## 2025-05-14 DIAGNOSIS — L02.415 ABSCESS OF RIGHT HIP: Primary | ICD-10-CM

## 2025-05-14 PROBLEM — S72.031A: Status: RESOLVED | Noted: 2022-07-01 | Resolved: 2025-05-14

## 2025-05-14 PROBLEM — E66.812 CLASS 2 SEVERE OBESITY DUE TO EXCESS CALORIES WITH SERIOUS COMORBIDITY AND BODY MASS INDEX (BMI) OF 35.0 TO 35.9 IN ADULT (HCC): Chronic | Status: ACTIVE | Noted: 2025-05-02

## 2025-05-14 PROBLEM — G89.29 CHRONIC MIDLINE LOW BACK PAIN WITHOUT SCIATICA: Status: RESOLVED | Noted: 2023-06-29 | Resolved: 2025-05-14

## 2025-05-14 PROBLEM — D72.823 LEUKEMOID REACTION: Status: RESOLVED | Noted: 2022-07-02 | Resolved: 2025-05-14

## 2025-05-14 PROBLEM — M54.50 CHRONIC MIDLINE LOW BACK PAIN WITHOUT SCIATICA: Status: RESOLVED | Noted: 2023-06-29 | Resolved: 2025-05-14

## 2025-05-14 PROBLEM — E66.01 CLASS 2 SEVERE OBESITY DUE TO EXCESS CALORIES WITH SERIOUS COMORBIDITY AND BODY MASS INDEX (BMI) OF 35.0 TO 35.9 IN ADULT (HCC): Chronic | Status: ACTIVE | Noted: 2025-05-02

## 2025-05-14 PROBLEM — L02.419 ABSCESS OF HIP: Status: ACTIVE | Noted: 2025-05-14

## 2025-05-14 PROBLEM — R01.1 HEART MURMUR: Status: ACTIVE | Noted: 2025-05-14

## 2025-05-14 PROBLEM — S62.101A FRACTURE OF RIGHT WRIST: Status: RESOLVED | Noted: 2022-07-01 | Resolved: 2025-05-14

## 2025-05-14 PROBLEM — D62 ACUTE BLOOD LOSS ANEMIA: Status: RESOLVED | Noted: 2022-07-03 | Resolved: 2025-05-14

## 2025-05-14 PROBLEM — Z96.641 HISTORY OF HEMIARTHROPLASTY OF RIGHT HIP: Status: ACTIVE | Noted: 2025-05-14

## 2025-05-14 PROBLEM — M54.2 NECK PAIN: Status: RESOLVED | Noted: 2023-06-29 | Resolved: 2025-05-14

## 2025-05-14 PROBLEM — I10 PRIMARY HYPERTENSION: Chronic | Status: ACTIVE | Noted: 2022-07-01

## 2025-05-14 PROBLEM — D75.839 THROMBOCYTOSIS: Status: ACTIVE | Noted: 2025-05-14

## 2025-05-14 LAB
ANION GAP SERPL CALCULATED.3IONS-SCNC: 8 MMOL/L (ref 4–13)
BACTERIA UR CULT: NORMAL
BUN SERPL-MCNC: 22 MG/DL (ref 5–25)
CALCIUM SERPL-MCNC: 9.2 MG/DL (ref 8.4–10.2)
CHLORIDE SERPL-SCNC: 101 MMOL/L (ref 96–108)
CO2 SERPL-SCNC: 25 MMOL/L (ref 21–32)
CREAT SERPL-MCNC: 0.66 MG/DL (ref 0.6–1.3)
CRP SERPL QL: 374.9 MG/L
ERYTHROCYTE [DISTWIDTH] IN BLOOD BY AUTOMATED COUNT: 14.5 % (ref 11.6–15.1)
ERYTHROCYTE [SEDIMENTATION RATE] IN BLOOD: >130 MM/HOUR (ref 0–29)
GFR SERPL CREATININE-BSD FRML MDRD: 86 ML/MIN/1.73SQ M
GLUCOSE SERPL-MCNC: 108 MG/DL (ref 65–140)
HCT VFR BLD AUTO: 31.6 % (ref 34.8–46.1)
HGB BLD-MCNC: 10.4 G/DL (ref 11.5–15.4)
MCH RBC QN AUTO: 29.7 PG (ref 26.8–34.3)
MCHC RBC AUTO-ENTMCNC: 32.9 G/DL (ref 31.4–37.4)
MCV RBC AUTO: 90 FL (ref 82–98)
PLATELET # BLD AUTO: 524 THOUSANDS/UL (ref 149–390)
PMV BLD AUTO: 9.8 FL (ref 8.9–12.7)
POTASSIUM SERPL-SCNC: 3.9 MMOL/L (ref 3.5–5.3)
PROCALCITONIN SERPL-MCNC: 0.69 NG/ML
RBC # BLD AUTO: 3.5 MILLION/UL (ref 3.81–5.12)
SODIUM SERPL-SCNC: 134 MMOL/L (ref 135–147)
WBC # BLD AUTO: 26.11 THOUSAND/UL (ref 4.31–10.16)

## 2025-05-14 PROCEDURE — 85027 COMPLETE CBC AUTOMATED: CPT | Performed by: PHYSICIAN ASSISTANT

## 2025-05-14 PROCEDURE — 84145 PROCALCITONIN (PCT): CPT | Performed by: PHYSICIAN ASSISTANT

## 2025-05-14 PROCEDURE — NC001 PR NO CHARGE: Performed by: INTERNAL MEDICINE

## 2025-05-14 PROCEDURE — G0545 PR INHERENT VISIT TO INPT: HCPCS | Performed by: INTERNAL MEDICINE

## 2025-05-14 PROCEDURE — 99223 1ST HOSP IP/OBS HIGH 75: CPT | Performed by: INTERNAL MEDICINE

## 2025-05-14 PROCEDURE — 99222 1ST HOSP IP/OBS MODERATE 55: CPT | Performed by: SURGERY

## 2025-05-14 PROCEDURE — 83605 ASSAY OF LACTIC ACID: CPT | Performed by: INTERNAL MEDICINE

## 2025-05-14 PROCEDURE — 99222 1ST HOSP IP/OBS MODERATE 55: CPT | Performed by: ORTHOPAEDIC SURGERY

## 2025-05-14 PROCEDURE — 80048 BASIC METABOLIC PNL TOTAL CA: CPT | Performed by: PHYSICIAN ASSISTANT

## 2025-05-14 PROCEDURE — NC001 PR NO CHARGE: Performed by: PHYSICIAN ASSISTANT

## 2025-05-14 PROCEDURE — 87040 BLOOD CULTURE FOR BACTERIA: CPT | Performed by: INTERNAL MEDICINE

## 2025-05-14 PROCEDURE — 87081 CULTURE SCREEN ONLY: CPT | Performed by: INTERNAL MEDICINE

## 2025-05-14 RX ORDER — OXYCODONE HYDROCHLORIDE 10 MG/1
10 TABLET ORAL EVERY 4 HOURS PRN
Refills: 0 | Status: DISCONTINUED | OUTPATIENT
Start: 2025-05-14 | End: 2025-05-14 | Stop reason: HOSPADM

## 2025-05-14 RX ORDER — LORATADINE 10 MG/1
10 TABLET ORAL DAILY
Status: CANCELLED | OUTPATIENT
Start: 2025-05-15

## 2025-05-14 RX ORDER — LORATADINE 10 MG/1
10 TABLET ORAL DAILY
Status: DISCONTINUED | OUTPATIENT
Start: 2025-05-15 | End: 2025-05-27 | Stop reason: HOSPADM

## 2025-05-14 RX ORDER — FERROUS SULFATE 325(65) MG
325 TABLET ORAL 2 TIMES DAILY WITH MEALS
Status: DISCONTINUED | OUTPATIENT
Start: 2025-05-14 | End: 2025-05-14 | Stop reason: HOSPADM

## 2025-05-14 RX ORDER — AMLODIPINE BESYLATE 10 MG/1
10 TABLET ORAL DAILY
Status: DISCONTINUED | OUTPATIENT
Start: 2025-05-14 | End: 2025-05-14

## 2025-05-14 RX ORDER — VANCOMYCIN HYDROCHLORIDE 750 MG/150ML
750 INJECTION, SOLUTION INTRAVENOUS EVERY 12 HOURS
Status: DISCONTINUED | OUTPATIENT
Start: 2025-05-14 | End: 2025-05-14 | Stop reason: HOSPADM

## 2025-05-14 RX ORDER — GABAPENTIN 600 MG/1
600 TABLET ORAL
Status: ON HOLD | COMMUNITY

## 2025-05-14 RX ORDER — LORATADINE 10 MG/1
10 TABLET ORAL DAILY
Status: DISCONTINUED | OUTPATIENT
Start: 2025-05-14 | End: 2025-05-14 | Stop reason: HOSPADM

## 2025-05-14 RX ORDER — VANCOMYCIN HYDROCHLORIDE 750 MG/150ML
750 INJECTION, SOLUTION INTRAVENOUS EVERY 12 HOURS
Status: CANCELLED | OUTPATIENT
Start: 2025-05-14

## 2025-05-14 RX ORDER — TEMAZEPAM 15 MG/1
15 CAPSULE ORAL
Status: CANCELLED | OUTPATIENT
Start: 2025-05-14

## 2025-05-14 RX ORDER — HYDROMORPHONE HCL/PF 1 MG/ML
0.5 SYRINGE (ML) INJECTION EVERY 4 HOURS PRN
Refills: 0 | Status: CANCELLED | OUTPATIENT
Start: 2025-05-14

## 2025-05-14 RX ORDER — PRAVASTATIN SODIUM 40 MG
40 TABLET ORAL
Status: DISCONTINUED | OUTPATIENT
Start: 2025-05-14 | End: 2025-05-14 | Stop reason: HOSPADM

## 2025-05-14 RX ORDER — TRAZODONE HYDROCHLORIDE 100 MG/1
100 TABLET ORAL
Status: DISCONTINUED | OUTPATIENT
Start: 2025-05-14 | End: 2025-05-14 | Stop reason: HOSPADM

## 2025-05-14 RX ORDER — ONDANSETRON 2 MG/ML
4 INJECTION INTRAMUSCULAR; INTRAVENOUS EVERY 6 HOURS PRN
Status: CANCELLED | OUTPATIENT
Start: 2025-05-14

## 2025-05-14 RX ORDER — PRAVASTATIN SODIUM 40 MG
40 TABLET ORAL
Status: ON HOLD | COMMUNITY

## 2025-05-14 RX ORDER — HYDROMORPHONE HCL/PF 1 MG/ML
0.5 SYRINGE (ML) INJECTION EVERY 4 HOURS PRN
Refills: 0 | Status: DISCONTINUED | OUTPATIENT
Start: 2025-05-14 | End: 2025-05-14 | Stop reason: HOSPADM

## 2025-05-14 RX ORDER — FERROUS SULFATE 325(65) MG
325 TABLET ORAL 2 TIMES DAILY WITH MEALS
Status: DISCONTINUED | OUTPATIENT
Start: 2025-05-15 | End: 2025-05-27 | Stop reason: HOSPADM

## 2025-05-14 RX ORDER — TRAZODONE HYDROCHLORIDE 100 MG/1
100 TABLET ORAL
Status: CANCELLED | OUTPATIENT
Start: 2025-05-14

## 2025-05-14 RX ORDER — SODIUM CHLORIDE 9 MG/ML
100 INJECTION, SOLUTION INTRAVENOUS CONTINUOUS
Status: CANCELLED | OUTPATIENT
Start: 2025-05-14 | End: 2025-05-14

## 2025-05-14 RX ORDER — OXYCODONE HYDROCHLORIDE 5 MG/1
5 TABLET ORAL EVERY 4 HOURS PRN
Refills: 0 | Status: DISCONTINUED | OUTPATIENT
Start: 2025-05-14 | End: 2025-05-14

## 2025-05-14 RX ORDER — VENLAFAXINE HYDROCHLORIDE 150 MG/1
150 CAPSULE, EXTENDED RELEASE ORAL DAILY
Status: DISCONTINUED | OUTPATIENT
Start: 2025-05-14 | End: 2025-05-14 | Stop reason: HOSPADM

## 2025-05-14 RX ORDER — MUPIROCIN 20 MG/G
OINTMENT TOPICAL DAILY
Status: CANCELLED | OUTPATIENT
Start: 2025-05-15

## 2025-05-14 RX ORDER — FOLIC ACID 1 MG/1
1 TABLET ORAL DAILY
Status: DISCONTINUED | OUTPATIENT
Start: 2025-05-15 | End: 2025-05-27 | Stop reason: HOSPADM

## 2025-05-14 RX ORDER — TRAZODONE HYDROCHLORIDE 100 MG/1
100 TABLET ORAL
Status: ON HOLD | COMMUNITY
Start: 2025-02-25

## 2025-05-14 RX ORDER — ENOXAPARIN SODIUM 100 MG/ML
40 INJECTION SUBCUTANEOUS DAILY
Status: CANCELLED | OUTPATIENT
Start: 2025-05-15

## 2025-05-14 RX ORDER — VANCOMYCIN HYDROCHLORIDE 1 G/200ML
1000 INJECTION, SOLUTION INTRAVENOUS EVERY 12 HOURS
Status: DISCONTINUED | OUTPATIENT
Start: 2025-05-15 | End: 2025-05-15

## 2025-05-14 RX ORDER — PANTOPRAZOLE SODIUM 40 MG/1
40 TABLET, DELAYED RELEASE ORAL
Status: CANCELLED | OUTPATIENT
Start: 2025-05-15

## 2025-05-14 RX ORDER — PANTOPRAZOLE SODIUM 20 MG/1
20 TABLET, DELAYED RELEASE ORAL
Status: CANCELLED | OUTPATIENT
Start: 2025-05-14

## 2025-05-14 RX ORDER — OXYCODONE HYDROCHLORIDE 5 MG/1
5 TABLET ORAL EVERY 4 HOURS PRN
Status: DISCONTINUED | OUTPATIENT
Start: 2025-05-14 | End: 2025-05-27 | Stop reason: HOSPADM

## 2025-05-14 RX ORDER — OXYCODONE HYDROCHLORIDE 5 MG/1
5 CAPSULE ORAL EVERY 4 HOURS PRN
Status: ON HOLD | COMMUNITY

## 2025-05-14 RX ORDER — OMEPRAZOLE 40 MG/1
40 CAPSULE, DELAYED RELEASE ORAL DAILY
Status: ON HOLD | COMMUNITY
Start: 2025-04-24

## 2025-05-14 RX ORDER — ACETAMINOPHEN 325 MG/1
650 TABLET ORAL EVERY 4 HOURS PRN
Status: DISCONTINUED | OUTPATIENT
Start: 2025-05-14 | End: 2025-05-27 | Stop reason: HOSPADM

## 2025-05-14 RX ORDER — OXYCODONE HYDROCHLORIDE 10 MG/1
10 TABLET ORAL EVERY 4 HOURS PRN
Refills: 0 | Status: CANCELLED | OUTPATIENT
Start: 2025-05-14

## 2025-05-14 RX ORDER — HYDROMORPHONE HCL/PF 1 MG/ML
0.5 SYRINGE (ML) INJECTION EVERY 4 HOURS PRN
Status: DISCONTINUED | OUTPATIENT
Start: 2025-05-14 | End: 2025-05-26

## 2025-05-14 RX ORDER — PRAVASTATIN SODIUM 40 MG
40 TABLET ORAL DAILY
Status: CANCELLED | OUTPATIENT
Start: 2025-05-14

## 2025-05-14 RX ORDER — ACETAMINOPHEN 325 MG/1
650 TABLET ORAL EVERY 4 HOURS PRN
Status: CANCELLED | OUTPATIENT
Start: 2025-05-14

## 2025-05-14 RX ORDER — ASCORBIC ACID 500 MG
500 TABLET ORAL 2 TIMES DAILY
Status: DISCONTINUED | OUTPATIENT
Start: 2025-05-15 | End: 2025-05-27 | Stop reason: HOSPADM

## 2025-05-14 RX ORDER — OXYCODONE HYDROCHLORIDE 5 MG/1
5 TABLET ORAL EVERY 4 HOURS PRN
Refills: 0 | Status: DISCONTINUED | OUTPATIENT
Start: 2025-05-14 | End: 2025-05-14 | Stop reason: HOSPADM

## 2025-05-14 RX ORDER — VENLAFAXINE HYDROCHLORIDE 150 MG/1
150 CAPSULE, EXTENDED RELEASE ORAL DAILY
Status: CANCELLED | OUTPATIENT
Start: 2025-05-15

## 2025-05-14 RX ORDER — OXYCODONE HYDROCHLORIDE 10 MG/1
10 TABLET ORAL EVERY 4 HOURS PRN
Status: DISCONTINUED | OUTPATIENT
Start: 2025-05-14 | End: 2025-05-27 | Stop reason: HOSPADM

## 2025-05-14 RX ORDER — OXYCODONE HYDROCHLORIDE 5 MG/1
5 TABLET ORAL EVERY 4 HOURS PRN
Refills: 0 | Status: CANCELLED | OUTPATIENT
Start: 2025-05-14

## 2025-05-14 RX ORDER — SODIUM CHLORIDE 9 MG/ML
100 INJECTION, SOLUTION INTRAVENOUS CONTINUOUS
Status: DISCONTINUED | OUTPATIENT
Start: 2025-05-14 | End: 2025-05-14

## 2025-05-14 RX ORDER — FOLIC ACID 1 MG/1
1 TABLET ORAL DAILY
Status: CANCELLED | OUTPATIENT
Start: 2025-05-15

## 2025-05-14 RX ORDER — MUPIROCIN 20 MG/G
OINTMENT TOPICAL DAILY
Status: DISCONTINUED | OUTPATIENT
Start: 2025-05-14 | End: 2025-05-14 | Stop reason: HOSPADM

## 2025-05-14 RX ORDER — ONDANSETRON 2 MG/ML
4 INJECTION INTRAMUSCULAR; INTRAVENOUS EVERY 6 HOURS PRN
Status: DISCONTINUED | OUTPATIENT
Start: 2025-05-14 | End: 2025-05-27 | Stop reason: HOSPADM

## 2025-05-14 RX ORDER — PANTOPRAZOLE SODIUM 40 MG/1
40 TABLET, DELAYED RELEASE ORAL
Status: DISCONTINUED | OUTPATIENT
Start: 2025-05-14 | End: 2025-05-14 | Stop reason: HOSPADM

## 2025-05-14 RX ORDER — GABAPENTIN 300 MG/1
600 CAPSULE ORAL
Status: DISCONTINUED | OUTPATIENT
Start: 2025-05-14 | End: 2025-05-14 | Stop reason: HOSPADM

## 2025-05-14 RX ORDER — MUPIROCIN 20 MG/G
OINTMENT TOPICAL DAILY
Status: DISCONTINUED | OUTPATIENT
Start: 2025-05-15 | End: 2025-05-27 | Stop reason: HOSPADM

## 2025-05-14 RX ORDER — METHOCARBAMOL 500 MG/1
500 TABLET, FILM COATED ORAL EVERY 6 HOURS PRN
Status: CANCELLED | OUTPATIENT
Start: 2025-05-14

## 2025-05-14 RX ORDER — PRAVASTATIN SODIUM 40 MG
40 TABLET ORAL
Status: DISCONTINUED | OUTPATIENT
Start: 2025-05-14 | End: 2025-05-27 | Stop reason: HOSPADM

## 2025-05-14 RX ORDER — TRAZODONE HYDROCHLORIDE 100 MG/1
100 TABLET ORAL
Status: DISCONTINUED | OUTPATIENT
Start: 2025-05-14 | End: 2025-05-27 | Stop reason: HOSPADM

## 2025-05-14 RX ORDER — PRAVASTATIN SODIUM 40 MG
40 TABLET ORAL
Status: CANCELLED | OUTPATIENT
Start: 2025-05-14

## 2025-05-14 RX ORDER — FERROUS SULFATE 325(65) MG
325 TABLET ORAL 2 TIMES DAILY WITH MEALS
Status: CANCELLED | OUTPATIENT
Start: 2025-05-14

## 2025-05-14 RX ORDER — GABAPENTIN 300 MG/1
600 CAPSULE ORAL
Status: CANCELLED | OUTPATIENT
Start: 2025-05-14

## 2025-05-14 RX ORDER — PANTOPRAZOLE SODIUM 40 MG/1
40 TABLET, DELAYED RELEASE ORAL
Status: DISCONTINUED | OUTPATIENT
Start: 2025-05-15 | End: 2025-05-27 | Stop reason: HOSPADM

## 2025-05-14 RX ORDER — FOLIC ACID 1 MG/1
1 TABLET ORAL DAILY
Status: DISCONTINUED | OUTPATIENT
Start: 2025-05-14 | End: 2025-05-14 | Stop reason: HOSPADM

## 2025-05-14 RX ORDER — GABAPENTIN 300 MG/1
600 CAPSULE ORAL
Status: DISCONTINUED | OUTPATIENT
Start: 2025-05-14 | End: 2025-05-27 | Stop reason: HOSPADM

## 2025-05-14 RX ORDER — ASCORBIC ACID 500 MG
500 TABLET ORAL 2 TIMES DAILY
Status: CANCELLED | OUTPATIENT
Start: 2025-05-14

## 2025-05-14 RX ORDER — ASCORBIC ACID 500 MG
500 TABLET ORAL 2 TIMES DAILY
Status: DISCONTINUED | OUTPATIENT
Start: 2025-05-14 | End: 2025-05-14 | Stop reason: HOSPADM

## 2025-05-14 RX ORDER — VENLAFAXINE HYDROCHLORIDE 150 MG/1
150 CAPSULE, EXTENDED RELEASE ORAL DAILY
Status: DISCONTINUED | OUTPATIENT
Start: 2025-05-15 | End: 2025-05-27 | Stop reason: HOSPADM

## 2025-05-14 RX ORDER — SODIUM CHLORIDE 9 MG/ML
75 INJECTION, SOLUTION INTRAVENOUS CONTINUOUS
Status: DISCONTINUED | OUTPATIENT
Start: 2025-05-14 | End: 2025-05-14

## 2025-05-14 RX ORDER — ENOXAPARIN SODIUM 100 MG/ML
40 INJECTION SUBCUTANEOUS DAILY
Status: DISCONTINUED | OUTPATIENT
Start: 2025-05-15 | End: 2025-05-27 | Stop reason: HOSPADM

## 2025-05-14 RX ORDER — GABAPENTIN 600 MG/1
600 TABLET ORAL
Status: CANCELLED | OUTPATIENT
Start: 2025-05-14

## 2025-05-14 RX ADMIN — SODIUM CHLORIDE 100 ML/HR: 0.9 INJECTION, SOLUTION INTRAVENOUS at 01:14

## 2025-05-14 RX ADMIN — OXYCODONE HYDROCHLORIDE AND ACETAMINOPHEN 500 MG: 500 TABLET ORAL at 17:53

## 2025-05-14 RX ADMIN — GABAPENTIN 600 MG: 300 CAPSULE ORAL at 01:56

## 2025-05-14 RX ADMIN — TRAZODONE HYDROCHLORIDE 100 MG: 100 TABLET ORAL at 22:42

## 2025-05-14 RX ADMIN — TRAZODONE HYDROCHLORIDE 100 MG: 100 TABLET ORAL at 01:56

## 2025-05-14 RX ADMIN — FERROUS SULFATE TAB 325 MG (65 MG ELEMENTAL FE) 325 MG: 325 (65 FE) TAB at 17:48

## 2025-05-14 RX ADMIN — SODIUM CHLORIDE 4.5 G: 9 INJECTION, SOLUTION INTRAVENOUS at 01:57

## 2025-05-14 RX ADMIN — OXYCODONE HYDROCHLORIDE 5 MG: 5 TABLET ORAL at 01:13

## 2025-05-14 RX ADMIN — VANCOMYCIN HYDROCHLORIDE 1250 MG: 1 INJECTION, POWDER, LYOPHILIZED, FOR SOLUTION INTRAVENOUS at 14:34

## 2025-05-14 RX ADMIN — PANTOPRAZOLE SODIUM 40 MG: 40 TABLET, DELAYED RELEASE ORAL at 06:03

## 2025-05-14 RX ADMIN — OXYCODONE HYDROCHLORIDE 10 MG: 10 TABLET ORAL at 22:42

## 2025-05-14 RX ADMIN — PRAVASTATIN SODIUM 40 MG: 40 TABLET ORAL at 22:42

## 2025-05-14 RX ADMIN — VENLAFAXINE HYDROCHLORIDE 150 MG: 150 CAPSULE, EXTENDED RELEASE ORAL at 17:55

## 2025-05-14 RX ADMIN — GABAPENTIN 600 MG: 300 CAPSULE ORAL at 22:41

## 2025-05-14 NOTE — DISCHARGE SUMMARY
Discharge Summary - Hospitalist   Name: Sharon Vega 74 y.o. female I MRN: [id number]  Unit/Bed#: -01 I Date of Admission: 5/13/2025   Date of Service: 5/14/2025 I Hospital Day: 1     Assessment & Plan  Abscess of right hip  Patient was recently admitted for septic arthritis ; had an IR hip joint aspiration 4/24  Presents with right hip pain  CT with evidence of infected hip joint with surrounding gas concerning for possible necrotizing fasciitis  Patient is hemodynamically stable  Infectious Diseases following  Orthopedic surgery recommends transfer to tertiary [facility] center for operative intervention  NPO midnight  Plan for transfer to [facility]  Continue IV vancomycin  Pain control  Sepsis without acute organ dysfunction (HCC)  Noted by leukocytosis, tachycardia, tachypnea with right hip infection  Secondary to iliopsoas muscle abscess and right hip infection  Broad-spectrum antibiotics  Gentle IV fluid hydration  Follow-up blood cultures  Trend fever curve/WBC count/procalcitonin  Primary hypertension  Continue amlodipine with hold parameters  Chronic pain syndrome  Patient with chronic low back pain, cervical/lumbar radiculopathy  Patient with history of epidural steroid injections  Continue Celebrex 200 mg twice daily and gabapentin 600 mg at bedtime  Class 2 severe obesity due to excess calories with serious comorbidity and body mass index (BMI) of 35.0 to 35.9 in adult (HCC)  BMI 35  Healthy diet and lifestyle modification     Medical Problems       Resolved Problems  Date Reviewed: 5/14/2025   None       Discharging Physician / Practitioner: Elroy Tripathi DO  PCP: Danielito Antunez DO  Admission Date:   Admission Orders (From admission, onward)       Ordered        05/13/25 2336  INPATIENT ADMISSION  Once                          Discharge Date: 05/14/25    Consultations During Hospital Stay:  ID, orthopedic surgery, general surgery    Procedures Performed:   Not  applicable    Significant Findings / Test Results:   Not applicable    Incidental Findings:   Not applicable    Test Results Pending at Discharge (will require follow up):   Not applicable     Outpatient Tests Requested:  Not applicable    Complications: Not applicable    Reason for Admission: Right hip pain    Hospital Course:   Sharon Vega is a 74 y.o. female with a PMH of anxiety, depression, RA, HTN, HLD, GERD,  who presents with right hip pain.  Patient with admission April 24 to April 29 for right hip pain.  She was seen by orthopedics, seen by IR with a right hip joint effusion aspiration on April 24 and seen by infectious disease.  Patient was discharged to [facility] for rehab.   She had a chest x-ray earlier today for fever and elevated wbc that was negative and hip x-ray that showed overlying foci of soft tissue gas possibly postprocedural. CT C/A/P ordered STAT noting postsurgical changes from right hip hemiarthroplasty. Organized collection of fluid and gas in the right iliac muscle and similar collection surrounding the right hip arthroplasty in the deep gluteal muscles measuring up to 10 cm concerning for infection. ED reviewed with orthopedics recommending IR and n.p.o. He reports Dr. Calles is aware of this patient and was scheduled for out surgery. Was started on Ceftriaxone IM at SNF for elevated WBC. Patient presents tonight secondary to the right hip pain brought by EMS from rehab.     Infectious disease was consulted and recommended IV vancomycin.  General surgery and orthopedic surgery had discussions pertaining to who will be performing operative intervention on the patient's likely infected right prosthetic joint.  Orthopedic surgery recommended transfer to [facility] for operative intervention and source control.  The patient was accepted by Daniel at [facility].  The patient is hemodynamically stable and saturating well on room air.  The patient will require  "ACLS as she has IV fluids and IV vancomycin currently running.    Condition at Discharge: stable    Discharge Day Visit / Exam:   Subjective:  Patient seen and examined at bedside. No acute events overnight. Denies chest pain, SOB, diaphoresis, nausea/vomiting/diarrhea, fevers/chills.  Plan for transfer to [facility] for orthopedic surgery operative intervention.    Vitals: Blood Pressure: 128/73 (05/14/25 1550)  Pulse: 101 (05/14/25 1550)  Temperature: 98.9 °F (37.2 °C) (05/14/25 1550)  Temp Source: Oral (05/14/25 1000)  Respirations: 18 (05/14/25 0432)  Height: 5' 2\" (157.5 cm) (05/13/25 2345)  Weight - Scale: 86.4 kg (190 lb 7.6 oz) (05/14/25 0035)  SpO2: 95 % (05/14/25 1550)    Physical Exam  Vitals and nursing note reviewed.   Constitutional:       General: She is not in acute distress.     Appearance: She is well-developed.   HENT:      Head: Normocephalic and atraumatic.     Eyes:      Conjunctiva/sclera: Conjunctivae normal.       Cardiovascular:      Rate and Rhythm: Normal rate and regular rhythm.      Heart sounds: Murmur heard.   Pulmonary:      Effort: Pulmonary effort is normal. No respiratory distress.      Breath sounds: Normal breath sounds.   Abdominal:      Palpations: Abdomen is soft.      Tenderness: There is no abdominal tenderness.     Musculoskeletal:         General: No swelling.      Cervical back: Neck supple.     Skin:     General: Skin is warm and dry.      Capillary Refill: Capillary refill takes less than 2 seconds.     Neurological:      Mental Status: She is alert.     Psychiatric:         Mood and Affect: Mood normal.       Discussion with Family: Updated  (daughter in law) at bedside.    Discharge instructions/Information to patient and family:   See after visit summary for information provided to patient and family.      Provisions for Follow-Up Care:  See after visit summary for information related to follow-up care and any pertinent home health " orders.      Mobility at time of Discharge:   Basic Mobility Inpatient Raw Score: 14  JH-HLM Goal: 4: Move to chair/commode  JH-HLM Achieved: 4: Move to chair/commode  HLM Goal achieved. Continue to encourage appropriate mobility.     Disposition:   Acute Care Hospital Transfer to [facility]    Planned Readmission: [facility]    Discharge Medications:  See after visit summary for reconciled discharge medications provided to patient and/or family.      Administrative Statements   Discharge Statement:  I have spent a total time of 65 minutes in caring for this patient on the day of the visit/encounter. >30 minutes of time was spent on: Diagnostic results, Prognosis, Risks and benefits of tx options, Instructions for management, Patient and family education, Importance of tx compliance, Risk factor reductions, Impressions, Counseling / Coordination of care, Documenting in the medical record, Reviewing / ordering tests, medicine, procedures  , and Communicating with other healthcare professionals .    **Please Note: This note may have been constructed using a voice recognition system**

## 2025-05-14 NOTE — ASSESSMENT & PLAN NOTE
Patient was recently admitted for septic arthritis ; had an IR hip joint aspiration 4/24  Presents with right hip pain  CT with evidence of infected hip joint with surrounding gas concerning for possible necrotizing fasciitis  Patient is hemodynamically stable  Infectious Diseases following  Orthopedic surgery recommends transfer to tertiary care center for operative intervention  NPO midnight  Plan for transfer to [facility]  Continue IV vancomycin  Pain control

## 2025-05-14 NOTE — ASSESSMENT & PLAN NOTE
Patient presented with right hip pain from rehab with concerns for right hip intramuscular abscess on CT  Continue IV vancomycin per infectious disease  Orthopedic surgery consulted  Follow-up cultures  As needed analgesics

## 2025-05-14 NOTE — ASSESSMENT & PLAN NOTE
Presented w/ persistent right hip pain from rehab.  Recent admission for the same.  Prior x-rays showed arthritic changes along the acetabulum.  Has history of right hip hemiarthroplasty 7/2022.  Status post right hip lidocaine injection 4/21/2025.  During recent admission she underwent IR aspiration on 4/24 without evidence of joint effusion and sterile fluid was injected into the joint and aspirated for culture that was ultimately negative.  Imaging this admission shows organizing collection of fluid and gas in the right iliacus and gluteal muscles surrounding the prior right hip hemiarthroplasty. Consider necrotizing infection though no soft tissue gas noted. Suspect due to staph aureus though consider possibility of polymicrobial infection. Less like due to to contiguous spread and no chronic wound overlying hip. Consider possibility of a transient bacteremia seeding the hip.  Consider possibility of inoculation during recent joint lidocaine injection and/or aspiration.  Ortho consultation is pending.  IR unavailable today.  Blood cultures are currently pending.  Plan for source control not yet established at this time.  Discontinue IV Zosyn  Start IV vancomycin with pharmacy consultation  Follow-up orthopedic and surgery recommendations  Ultimately would recommend urgent source control measures with I&D, washout versus IR drainage  Low threshold to transfer to facility with IR or Ortho availability   Will need updated aerobic and anaerobic cultures  Follow-up blood cultures  Serial skin exams   PT/OT and out of bed as able once cleared by Ortho  Should patient decompensated, can resume cefepime/flagyl for polymicrobial coverage

## 2025-05-14 NOTE — H&P
H&P - Hospitalist   Name: Sharon Vega 74 y.o. female I MRN: [id number]  Unit/Bed#: -01 I Date of Admission: 5/13/2025   Date of Service: 5/14/2025 I Hospital Day: 1     Assessment & Plan  Abscess of hip  The patient was recently admitted for septic arthritis, had an IR hip joint aspiration 4/24. Now with pain  ED reviewed w/ Ortho recommend IR for fluid sampling and ortho consult (no IR till Thursday), Patient was scheduled for OR w/ Dr. Calles, will request ortho consult  NPO  IVF  IV abx  Pain control  CT: Postsurgical change from right hip hemiarthroplasty. Organized collection of fluid and gas in the right iliac is muscle and similar collection surrounding the right hip arthroplasty in the deep gluteal muscles measuring up to 10 cm, concerning for infection. No hematoma. Recommend orthopedic surgery consultation.   Sepsis without acute organ dysfunction (HCC)  Noted by leukocytosis, tachycardia, tachypnea with right hip infection  IV abx  IVF  Follow up cultures  Procal 0.75  Lactic acid 1.1  Primary hypertension  Continue amlodipine with hold parameters  Chronic pain syndrome  Patient with chronic low back pain, cervical/lumbar radiculopathy  Patient with history of epidural steroid injections  Continue Celebrex 200 mg twice daily and gabapentin 600 mg at bedtime  Class 2 severe obesity due to excess calories with serious comorbidity and body mass index (BMI) of 35.0 to 35.9 in adult (HCC)  BMI 35  Healthy diet and lifestyle modification      VTE Pharmacologic Prophylaxis: VTE Score: 8 High Risk (Score >/= 5) - Pharmacological DVT Prophylaxis Contraindicated. Sequential Compression Devices Ordered.  Code Status: Level 1 - Full Code   Discussion with patient    Anticipated Length of Stay: Patient will be admitted on an inpatient basis with an anticipated length of stay of greater than 2 midnights secondary to IV abx, follow up cultures, likely OR.    History of Present Illness   Chief Complaint: right  hip pain    Sharon Vega is a 74 y.o. female with a PMH of anxiety, depression, RA, HTN, HLD, GERD,  who presents with right hip pain.  Patient with admission April 24 to April 29 for right hip pain.  She was seen by orthopedics, seen by IR with a right hip joint effusion aspiration on April 24 and seen by infectious disease.  Patient was discharged to [facility] for rehab.   She had a chest x-ray earlier today for fever and elevated wbc that was negative and hip x-ray that showed overlying foci of soft tissue gas possibly postprocedural. CT C/A/P ordered STAT noting postsurgical changes from right hip hemiarthroplasty. Organized collection of fluid and gas in the right iliac muscle and similar collection surrounding the right hip arthroplasty in the deep gluteal muscles measuring up to 10 cm concerning for infection. ED reviewed with orthopedics recommending IR and n.p.o. He reports Dr. Calles is aware of this patient and was scheduled for out surgery. Was started on Ceftriaxone IM at SNF for elevated WBC. Patient presents tonight secondary to the right hip pain brought by EMS from rehab.     Review of Systems   Constitutional:  Positive for fever. Negative for chills and fatigue.   HENT:  Negative for rhinorrhea, sore throat and trouble swallowing.    Eyes:  Negative for discharge and redness.   Respiratory:  Negative for cough and shortness of breath.    Cardiovascular:  Negative for chest pain and leg swelling.   Gastrointestinal:  Negative for abdominal pain, diarrhea, nausea and vomiting.   Genitourinary:  Negative for dysuria and frequency.   Musculoskeletal:  Positive for arthralgias. Negative for back pain, myalgias and neck pain.   Skin:  Negative for rash and wound.   Neurological:  Negative for dizziness, weakness and headaches.   Psychiatric/Behavioral:  Negative for agitation and confusion.        Historical Information   Past Medical History:   Diagnosis Date    Anemia     Anxiety     Arthritis      Closed transcervical fracture of right femur (HCC) 2022    Colon polyp     Depression     Fracture of right wrist 2022    GERD (gastroesophageal reflux disease)     Hiatal hernia     Hyperlipidemia     Hypertension      Past Surgical History:   Procedure Laterality Date    APPENDECTOMY      CHOLECYSTECTOMY      COLONOSCOPY      FL INJECTION RIGHT HIP (NON ARTHROGRAM)  2025    FRACTURE SURGERY Right     arm with plate and pins    HAND SURGERY Bilateral     HYSTERECTOMY      IR ASPIRATION JOINT (SPECIFY LOCATION)  2025    JOINT REPLACEMENT Bilateral     knees    TX HEMIARTHROPLASTY HIP PARTIAL Right 2022    Procedure: HEMIARTHROPLASTY HIP (BIPOLAR), closed reduction with splinting right wrist;  Surgeon: Leo Roblero;  Location: [facility];  Service: Orthopedics    TX NEUROPLASTY &/TRANSPOSITION ULNAR NERVE ELBOW Right 2023    Procedure: RELEASE CUBITAL TUNNEL;  Surgeon: Cody Calles DO;  Location: [facility];  Service: Orthopedics    SHOULDER ARTHROSCOPY Left      Social History     Tobacco Use    Smoking status: Former     Current packs/day: 0.00     Types: Cigarettes     Quit date:      Years since quittin.3    Smokeless tobacco: Never   Vaping Use    Vaping status: Never Used   Substance and Sexual Activity    Alcohol use: Not Currently    Drug use: Never    Sexual activity: Not Currently     E-Cigarette/Vaping    E-Cigarette Use Never User      E-Cigarette/Vaping Substances    Nicotine No     THC No     CBD No     Flavoring No     Other No     Unknown No      Family History   Problem Relation Age of Onset    No Known Problems Mother      Social History:  Marital Status: /Civil Union   Occupation: Retired  Patient Pre-hospital Living Situation: Skilled Nursing Facility: [facility]  Patient Pre-hospital Level of Mobility: walks with walker  Patient Pre-hospital Diet Restrictions: none    Meds/Allergies   I have reveiwed home medications using  records provided by [facility].  Prior to Admission medications    Medication Sig Start Date End Date Taking? Authorizing Provider   acetaminophen (TYLENOL) 325 mg tablet Take 2 tablets (650 mg total) by mouth every 6 (six) hours as needed for mild pain 7/15/22  Yes Wilbert Ingram MD   amLODIPine (NORVASC) 10 mg tablet Take 10 mg by mouth daily   Yes Historical Provider, MD   ascorbic acid (VITAMIN C) 500 MG tablet Take 1 tablet (500 mg total) by mouth 2 (two) times a day 5/2/25 7/1/25 Yes Osbaldo Strange PA-C   calcium carbonate (OS-ALPESH) 600 MG tablet Take 600 mg by mouth daily Taking 2 tablets (1200 mg) daily   Yes Historical Provider, MD   celecoxib (CeleBREX) 200 mg capsule Take 1 capsule (200 mg total) by mouth 2 (two) times a day As needed for joint pain 6/20/23  Yes Facundo Douglas MD   cetirizine (ZyrTEC) 10 mg tablet Take 10 mg by mouth daily   Yes Historical Provider, MD   ferrous sulfate 324 (65 Fe) mg Take 1 tablet (324 mg total) by mouth 2 (two) times a day before meals 5/2/25 7/1/25 Yes Osbaldo Strange PA-C   folic acid (FOLVITE) 1 mg tablet Take 1 tablet (1 mg total) by mouth daily 5/2/25 7/1/25 Yes Osbaldo Strange PA-C   gabapentin (NEURONTIN) 600 MG tablet Take 1 tablet (600 mg total) by mouth daily at bedtime 7/15/22  Yes Wilbert Ingram MD   methocarbamol (ROBAXIN) 500 mg tablet Take 1 tablet (500 mg total) by mouth every 6 (six) hours as needed for muscle spasms 4/29/25  Yes Keenan Fuller PA-C   Multiple Vitamins-Minerals (multivitamin with minerals) tablet Take 1 tablet by mouth daily 5/2/25  Yes Osbaldo Strange PA-C   multivitamin (THERAGRAN) TABS Take 1 tablet by mouth daily   Yes Historical Provider, MD   mupirocin (BACTROBAN) 2 % ointment Apply topically to the inside of the left and right nostrils twice daily for 5 days before surgery, including the morning of surgery. 5/2/25  Yes Osbaldo Strange PA-C   omeprazole (PriLOSEC) 20 mg delayed release capsule Take 20  mg by mouth daily 1 in am 1 in pm   Yes Historical Provider, MD   pravastatin (PRAVACHOL) 40 mg tablet Take 40 mg by mouth daily One at bedtime   Yes Historical Provider, MD   temazepam (RESTORIL) 7.5 mg capsule Take 15 mg by mouth daily at bedtime as needed for sleep     Yes Historical Provider, MD   venlafaxine (EFFEXOR-XR) 150 mg 24 hr capsule Take 150 mg by mouth daily   Yes Historical Provider, MD   cholecalciferol (VITAMIN D3) 1,000 units tablet Take 1,000 Units by mouth daily 2,000 units daily    Historical Provider, MD   traZODone (DESYREL) 100 mg tablet  2/2/23 6/12/24  Historical Provider, MD     No Known Allergies    Objective :  Temp:  [97.1 °F (36.2 °C)-98.5 °F (36.9 °C)] 97.3 °F (36.3 °C)  HR:  [] 124  BP: (124-138)/(64-78) 125/75  Resp:  [12-22] 18  SpO2:  [88 %-97 %] 96 %  O2 Device: Nasal cannula  Nasal Cannula O2 Flow Rate (L/min):  [1 L/min-2 L/min] 1 L/min    Physical Exam  Vitals reviewed.   Constitutional:       Appearance: Normal appearance.   HENT:      Head: Normocephalic and atraumatic.      Nose: Nose normal.     Eyes:      General:         Right eye: No discharge.         Left eye: No discharge.      Extraocular Movements: Extraocular movements intact.      Conjunctiva/sclera: Conjunctivae normal.      Comments: Glasses in place     Cardiovascular:      Rate and Rhythm: Normal rate and regular rhythm.      Heart sounds: Murmur heard.   Pulmonary:      Effort: Pulmonary effort is normal. No respiratory distress.      Breath sounds: Normal breath sounds. No wheezing.   Abdominal:      General: Bowel sounds are normal. There is no distension.      Palpations: Abdomen is soft.      Tenderness: There is no abdominal tenderness. There is no guarding.     Musculoskeletal:         General: Tenderness (right hip/gluteal region w/ soft tissue collection firm to touch) present. No swelling. Normal range of motion.      Cervical back: Normal range of motion.      Right lower leg: No edema.       "Left lower leg: No edema.     Skin:     General: Skin is warm and dry.      Capillary Refill: Capillary refill takes less than 2 seconds.      Coloration: Skin is pale.     Neurological:      General: No focal deficit present.      Mental Status: She is alert and oriented to person, place, and time. Mental status is at baseline.     Psychiatric:         Mood and Affect: Mood normal.         Behavior: Behavior normal.         Thought Content: Thought content normal.         Judgment: Judgment normal.       Lines/Drains:        Lab Results: I have reviewed the following results:  Results from last 7 days   Lab Units 05/13/25 2236 05/13/25  0400   WBC Thousand/uL 28.82* 28.58*   HEMOGLOBIN g/dL 12.0 12.1   HEMATOCRIT % 36.4 37.4   PLATELETS Thousands/uL 603* 674*   BANDS PCT %  --  3   SEGS PCT % 84*  --    LYMPHO PCT % 7* 7*   MONO PCT % 7 7   EOS PCT % 0 1     Results from last 7 days   Lab Units 05/13/25  2206   SODIUM mmol/L 130*   POTASSIUM mmol/L 4.0   CHLORIDE mmol/L 94*   CO2 mmol/L 26   BUN mg/dL 25   CREATININE mg/dL 0.75   ANION GAP mmol/L 10   CALCIUM mg/dL 10.7*   ALBUMIN g/dL 2.9*   TOTAL BILIRUBIN mg/dL 1.26*   ALK PHOS U/L 132*   ALT U/L 34   AST U/L 31   GLUCOSE RANDOM mg/dL 119     Results from last 7 days   Lab Units 05/13/25  2225   INR  1.24*         No results found for: \"HGBA1C\"  Results from last 7 days   Lab Units 05/13/25  2206 05/13/25  0923   LACTIC ACID mmol/L 1.1  --    PROCALCITONIN ng/ml 0.75* 0.58*       Imaging Results Review: I reviewed radiology reports from this admission including: chest xray, CT abdomen/pelvis, and xray(s).  Other Study Results Review: No additional pertinent studies reviewed.    Administrative Statements   I have spent a total time of 75 minutes in caring for this patient on the day of the visit/encounter including Diagnostic results, Counseling / Coordination of care, Documenting in the medical record, Reviewing/placing orders in the medical record (including " tests, medications, and/or procedures), and Obtaining or reviewing history  .    ** Please Note: This note has been constructed using a voice recognition system. **

## 2025-05-14 NOTE — ASSESSMENT & PLAN NOTE
Noted by leukocytosis, tachycardia, tachypnea with right hip infection  IV abx  IVF  Follow up cultures  Procal 0.75  Lactic acid 1.1

## 2025-05-14 NOTE — ASSESSMENT & PLAN NOTE
Patient with chronic low back pain, cervical/lumbar radiculopathy  Patient with history of epidural steroid injections  Continue Celebrex 200 mg twice daily and gabapentin 600 mg at bedtime

## 2025-05-14 NOTE — ASSESSMENT & PLAN NOTE
After personally reviewing the CT of her hip, it appears to be around the hip but not the hip joint itself.  Her symptoms are more around the iliac fossa.  This is correlating with a possible iliacus abscess.  General surgery was consulted as well as interventional radiology.  I look forward to hearing back once this is obtained.  Unfortunately, we are the abscess is located is beyond my orthopedic expertise.  Her pain today is not around the hip but anterior to this.  Would recommend continuation of surgical/medical/intervention radiology follow-up.  We will monitor from the periphery but there is any problems, please do not hesitate to let us know

## 2025-05-14 NOTE — ASSESSMENT & PLAN NOTE
The patient is a pleasant 74-year-old otherwise healthy female who had been living independently up until 3 weeks ago when she developed increasing pain within the right hip joint.  She has been unable to ambulate over the past 3 weeks.    She has been evaluated by orthopedics during this hospitalization who does not feel that the patient's soft tissue infection requires orthopedic intervention.    On my physical examination the patient has weakness of the right lower extremity.  She is unable to raise her right leg independently.  She does have strength maintained at the knee joint and the foot.    On palpation she does have mild to moderate tenderness anteriorly over the quadriceps.  She has acute pain most notably over the old scar laterally status post right hip hemiarthroplasty.    My review of the CT suggest the presence of air surrounding the right hip joint and involving the deep soft tissues of the right hip.  It is my opinion that the patient would benefit from transfer to a tertiary center for consultation with orthopedics.    Thank for the consultation and for the privilege of assisting in the care of this patient.

## 2025-05-14 NOTE — SEPSIS NOTE
Sepsis Note   Sharon Vega 74 y.o. female MRN: [id number]  Unit/Bed#: -01 Encounter: [id number]       Initial Sepsis Screening       Row Name 05/13/25 2643                Is the patient's history suggestive of a new or worsening infection? Yes (Proceed)  -AI        Suspected source of infection soft tissue  -AI        Indicate SIRS criteria Leukocytosis (WBC > 41097 IJL) OR Leukopenia (WBC <4000 IJL) OR Bandemia (WBC >10% bands);Tachycardia > 90 bpm;Tachypnea > 20 resp per min  -AI        Are two or more of the above signs & symptoms of infection both present and new to the patient? Yes (Proceed)  -AI        Assess for evidence of organ dysfunction: Are any of the below criteria present within 6 hours of suspected infection and SIRS criteria that are NOT considered to be chronic conditions? --                  User Key  (r) = Recorded By, (t) = Taken By, (c) = Cosigned By      Initials Name Provider Type    AI Keri Rebollar PA-C Physician Assistant                        Body mass index is 34.54 kg/m².  Wt Readings from Last 1 Encounters:   05/13/25 88.5 kg (195 lb)     IBW (Ideal Body Weight): 52.4 kg    Ideal body weight: 52.4 kg (115 lb 8.3 oz)  Adjusted ideal body weight: 66.8 kg (147 lb 5 oz)

## 2025-05-14 NOTE — ASSESSMENT & PLAN NOTE
Meet sepsis criteria at [facility] with leukocytosis and tachycardia in the setting of right hip intramuscular abscess.  See plan as above  Follow-up blood cultures

## 2025-05-14 NOTE — CASE MANAGEMENT
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) 2025    Class 2 severe obesity due to excess calories with serious comorbidity and body mass index (BMI) of 35.0 to 35.9 in adult (HCC) 2025    Right hip pain 2025    Sepsis without acute organ dysfunction (HCC) 2025    Sacroiliitis (HCC) 2025    Lumbar radiculopathy     Chronic pain syndrome 2023    Primary hypertension 2022    Lumbar degenerative disc disease 2022    Lumbar spondylosis 2022    Cervical radiculopathy 2022    Cervical spondylosis 2022    Rheumatoid arthritis involving both hands (HCC) 2022    Spinal stenosis of lumbar region without neurogenic claudication 2022      LOS (days): 1  Geometric Mean LOS (GMLOS) (days): 3.5  Days to GMLOS:2.7     OBJECTIVE:    Risk of Unplanned Readmission Score: 12.2         Current admission status: Inpatient  Referral Reason: Other (d/c planning)    Preferred Pharmacy:   [id number] - [place] - [address] [facility]  [address] [place], [place] [address] [address]  Phone: [phone] Fax: [phone]    Primary Care Provider: Danielito Antunez DO    Primary Insurance: MEDICARE  Secondary Insurance: AARP    ASSESSMENT:  Active Health Care Proxies       Perfecto Vega Health Care Representative - Son   Primary Phone: [phone] (Mobile)                 Advance Directives  Does patient have a Health Care POA?: Yes  Does patient have Advance Directives?: Yes          Readmission Root Cause  30 Day Readmission: No    Patient Information  Admitted from:: Facility ([facility])  Mental Status: Alert  During Assessment patient was accompanied by: Not accompanied during assessment  Assessment information provided by:: Patient  Primary Caregiver: Self  Support Systems: Son, Family members  County of Residence: [facility] do you live in?: [place]  Home entry access options. Select all that apply.: Stairs  Number of steps to enter home.: 6  Do the steps have railings?: Yes  Type of Current Residence: 2 story home  Upon entering residence, is there a bedroom on the main floor (no further steps)?: No  A bedroom is located on the following floor levels of residence (select all that apply):: 2nd Floor  Upon entering residence, is there a bathroom on the main floor (no further steps)?: Yes  Number of steps to 2nd floor from main floor: One Flight  Living Arrangements: Lives w/ Son  Is patient a ?: No    Activities of Daily Living Prior to Admission  Functional Status: Independent  Completes ADLs independently?: Yes  Ambulates independently?: Yes  Does patient use assisted devices?: Yes  Assisted Devices (DME) used: Walker  Does patient currently own DME?: Yes  What DME does the patient currently own?: Walker, Straight Cane  Does patient have a history of Outpatient Therapy (PT/OT)?: Yes  Does the patient have a history of Short-Term Rehab?: Yes ([facility])  Does patient have a history of HHC?: Yes  Does patient currently have HHC?: No         Patient Information Continued  Income Source: Pension/half-way  Does patient have prescription coverage?: Yes ([facility] [place])  Can the patient afford their medications and any related supplies (such as glucometers or test strips)?: Yes  Does patient receive dialysis treatments?: No  Does patient have a history of substance abuse?: No  Does patient have a history of Mental Health Diagnosis?: Yes (anxiety/depression)  Is patient  receiving treatment for mental health?: Yes (medication)  Has patient received inpatient treatment related to mental health in the last 2 years?: No         Means of Transportation  Means of Transport to [facility]:: Drives Self          DISCHARGE DETAILS:    Discharge planning discussed with:: patient  and Antonieta( daughter in-law) at the bedside  Freedom of Choice: Yes  Comments - Freedom of Choice: pt and kleber are in agreement wiht the transfer for higher level of care  CM contacted family/caregiver?: Yes  Were Treatment Team discharge recommendations reviewed with patient/caregiver?: Yes  Did patient/caregiver verbalize understanding of patient care needs?: Yes  Were patient/caregiver advised of the risks associated with not following Treatment Team discharge recommendations?: Yes    Contacts  Patient Contacts: Antonieta  Relationship to Patient:: Family (daughter in-law)  Contact Method: In Person  Reason/Outcome: Discharge Planning    Requested Home Health Care         Is the patient interested in HHC at discharge?: No    DME Referral Provided  Referral made for DME?: No    Other Referral/Resources/Interventions Provided:  Interventions: Acute Hospital Transfer  Referral Comments: pt to be transferred to [facility] for ortho surgery    Would you like to participate in our [facility] service program?  : No - Declined    Treatment Team Recommendation: Acute Hospital Transfer ([facility]- [facility])  Discharge Destination Plan:: Acute Hospital Transfer ([facility])                                      Family notified:: family

## 2025-05-14 NOTE — PLAN OF CARE
Problem: PAIN - ADULT  Goal: Verbalizes/displays adequate comfort level or baseline comfort level  Description: Interventions:  - Encourage patient to monitor pain and request assistance  - Assess pain using appropriate pain scale  - Administer analgesics based on type and severity of pain and evaluate response  - Implement non-pharmacological measures as appropriate and evaluate response  - Consider cultural and social influences on pain and pain management  - Notify physician/advanced practitioner if interventions unsuccessful or patient reports new pain  Outcome: Progressing     Problem: INFECTION - ADULT  Goal: Absence or prevention of progression during hospitalization  Description: INTERVENTIONS:  - Assess and monitor for signs and symptoms of infection  - Monitor lab/diagnostic results  - Monitor all insertion sites, i.e. indwelling lines, tubes, and drains  - Monitor endotracheal if appropriate and nasal secretions for changes in amount and color  - [place] appropriate cooling/warming therapies per order  - Administer medications as ordered  - Instruct and encourage patient and family to use good hand hygiene technique  - Identify and instruct in appropriate isolation precautions for identified infection/condition  Outcome: Progressing  Goal: Absence of fever/infection during neutropenic period  Description: INTERVENTIONS:  - Monitor WBC    Outcome: Progressing     Problem: SAFETY ADULT  Goal: Patient will remain free of falls  Description: INTERVENTIONS:  - Educate patient/family on patient safety including physical limitations  - Instruct patient to call for assistance with activity   - Consult OT/PT to assist with strengthening/mobility   - Keep Call bell within reach  - Keep bed low and locked with side rails adjusted as appropriate  - Keep care items and personal belongings within reach  - Initiate and maintain comfort rounds  - Make Fall Risk Sign visible to staff  - Offer Toileting every 2 Hours,  in advance of need  - Initiate/Maintain bed alarm  - Obtain necessary fall risk management equipment: yellow socks  - Apply yellow socks and bracelet for high fall risk patients  - Consider moving patient to room near nurses station  Outcome: Progressing  Goal: Maintain or return to baseline ADL function  Description: INTERVENTIONS:  -  Assess patient's ability to carry out ADLs; assess patient's baseline for ADL function and identify physical deficits which impact ability to perform ADLs (bathing, care of mouth/teeth, toileting, grooming, dressing, etc.)  - Assess/evaluate cause of self-care deficits   - Assess range of motion  - Assess patient's mobility; develop plan if impaired  - Assess patient's need for assistive devices and provide as appropriate  - Encourage maximum independence but intervene and supervise when necessary  - Involve family in performance of ADLs  - Assess for home care needs following discharge   - Consider OT consult to assist with ADL evaluation and planning for discharge  - Provide patient education as appropriate  Outcome: Progressing  Goal: Maintains/Returns to pre admission functional level  Description: INTERVENTIONS:  - Perform AM-PAC 6 Click Basic Mobility/ Daily Activity assessment daily.  - Set and communicate daily mobility goal to care team and patient/family/caregiver.   - Collaborate with rehabilitation services on mobility goals if consulted  - Perform Range of Motion 3 times a day.  - Reposition patient every 2 hours.  - Dangle patient 3 times a day  - Stand patient 3 times a day  - Ambulate patient 3 times a day  - Out of bed to chair 3 times a day   - Out of bed for meals 3 times a day  - Out of bed for toileting  - Record patient progress and toleration of activity level   Outcome: Progressing     Problem: DISCHARGE PLANNING  Goal: Discharge to home or other facility with appropriate resources  Description: INTERVENTIONS:  - Identify barriers to discharge w/patient and  caregiver  - Arrange for needed discharge resources and transportation as appropriate  - Identify discharge learning needs (meds, wound care, etc.)  - Arrange for interpretive services to assist at discharge as needed  - Refer to Case Management Department for coordinating discharge planning if the patient needs post-hospital services based on physician/advanced practitioner order or complex needs related to functional status, cognitive ability, or social support system  Outcome: Progressing     Problem: Knowledge Deficit  Goal: Patient/family/caregiver demonstrates understanding of disease process, treatment plan, medications, and discharge instructions  Description: Complete learning assessment and assess knowledge base.  Interventions:  - Provide teaching at level of understanding  - Provide teaching via preferred learning methods  Outcome: Progressing

## 2025-05-14 NOTE — ASSESSMENT & PLAN NOTE
Noted by leukocytosis, tachycardia, tachypnea with right hip infection  Secondary to iliopsoas muscle abscess and right hip infection  Broad-spectrum antibiotics  Gentle IV fluid hydration  Follow-up blood cultures  Trend fever curve/WBC count/procalcitonin

## 2025-05-14 NOTE — CONSULTS
Consultation - Surgery-General   Name: Sharon Vega 74 y.o. 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  She has been unable to ambulate over the past 3 weeks.    She has been evaluated by orthopedics during this hospitalization who does not feel that the patient's soft tissue infection requires orthopedic intervention.    On my physical examination the patient has weakness of the right lower extremity.  She is unable to raise her right leg independently.  She does have strength maintained at the knee joint and the foot.    On palpation she does have mild to moderate tenderness anteriorly over the quadriceps.  She has acute pain most notably over the old scar laterally status post right hip hemiarthroplasty.    My review of the CT suggest the presence of air surrounding the right hip joint and involving the deep soft tissues of the right hip.  It is my opinion that the patient would benefit from transfer to a tertiary center for consultation with orthopedics.    Thank for the consultation and for the privilege of assisting in the care of this patient.  Primary hypertension    Chronic pain syndrome    Sepsis without acute organ dysfunction (HCC)    Class 2 severe obesity due to excess calories with serious comorbidity and body mass index (BMI) of 35.0 to 35.9 in adult (HCC)    Abscess of right hip    History of  hemiarthroplasty of right hip    Heart murmur    Thrombocytosis    Medications reviewed. Continue current medications at prescribed doses.    History of Present Illness   Sharon Vega is a 74 y.o. female who presents with ambulatory dysfunction secondary to 3 weeks of increasing right hip pain and weakness and a CT suggesting the presence of deep soft tissue infection of the right hip joint for which orthopedic surgery care is now indicated.    Review of Systems  Medical History Review: I have reviewed the patient's PMH, PSH, Social History, Family History, Meds, and Allergies     Objective :  Temp:  [97.1 °F (36.2 °C)-100 °F (37.8 °C)] 99.4 °F (37.4 °C)  HR:  [] 103  BP: (103-138)/(63-78) 117/66  Resp:  [12-22] 18  SpO2:  [88 %-97 %] 91 %  O2 Device: Nasal cannula  Nasal Cannula O2 Flow Rate (L/min):  [1 L/min-2 L/min] 1 L/min      Physical Exam    Lab Results: I have reviewed the following results:  Recent Labs     05/13/25  0400 05/13/25  0923 05/13/25  2206 05/13/25  2225 05/13/25  2236 05/14/25  0629   WBC 28.58*  --   --   --    < > 26.11*   HGB 12.1  --   --   --    < > 10.4*   HCT 37.4  --   --   --    < > 31.6*   *  --   --   --    < > 524*   BANDSPCT 3  --   --   --   --   --    SODIUM  --    < > 130*  --   --  134*   K  --    < > 4.0  --   --  3.9   CL  --    < > 94*  --   --  101   CO2  --    < > 26  --   --  25   BUN  --    < > 25  --   --  22   CREATININE  --    < > 0.75  --   --  0.66   GLUC  --    < > 119  --   --  108   AST  --   --  31  --   --   --    ALT  --   --  34  --   --   --    ALB  --   --  2.9*  --   --   --    TBILI  --   --  1.26*  --   --   --    ALKPHOS  --   --  132*  --   --   --    PTT  --   --   --  28  --   --    INR  --   --   --  1.24*  --   --    LACTICACID  --   --  1.1  --   --   --     < > = values in this interval not displayed.       Imaging Results Review: I personally reviewed the following image studies/reports in PACS and discussed pertinent findings  with Radiology: CT abdomen/pelvis. My interpretation of the radiology images/reports is: There is soft tissue infection of the muscles anterior and posterior to the right hip joint.  Other Study Results Review: No additional pertinent studies reviewed.    VTE Pharmacologic Prophylaxis: Heparin  VTE Mechanical Prophylaxis: sequential compression device    Administrative Statements   I have spent a total time of 30 minutes in caring for this patient on the day of the visit/encounter including Diagnostic results, Prognosis, Patient and family education, Impressions, and Documenting in the medical record.

## 2025-05-14 NOTE — TELEMEDICINE
e-Consult ([facility])  - Interventional Radiology  Sharon Vega 74 y.o. female MRN: [id number]  Unit/Bed#: -01 Encounter: [id number]          Interventional Radiology has been consulted to evaluate Sharon Vega    We were consulted by medicine concerning this patient with sepsis and R hip abscesses, iliacus muscle abscess. Prior distant R hip arthroplasty. Prior join injection/aspiration for pain and to rule out septic joint for persistent pain last month, yielded sterile fluid sample. IR has been consulted to evaulate the patient for possible drain placement.    Inpatient Consult to IR  Consult performed by: Tereso Ward PA-C  Consult ordered by: Elroy Tripathi DO        05/14/25    Assessment/Recommendation:     Complex R Hip and Iliacus Collections  - case and imaging reviewed with Dr. Cevallos  - Recommend Surgical Evaluation  - complex R hip collections with air/fluid levels and gas apparently tracking in gluteal and iliacus muscles  - given the apparently widespread nature of these collations and gas, this likely would not be adequately source controlled by IR drain placement.   - This could represent possible necrotizing infection given its appearance on CT imaging.   - Would hold off on drain placement at this time pending surgical review of patient  - please contact IR with any questions or concerns or for further discussion and review    31 + minutes, >50% of the total time devoted to medical consultative verbal/EMR discussion between providers. Written report will be generated in the EMR.     Thank you for allowing Interventional Radiology to participate in the care of Sharon Vega. Please don't hesitate to call or TigerText us with any questions.     Tereso Ward PA-C

## 2025-05-14 NOTE — H&P
H&P - Hospitalist   Name: Sharon Vega 74 y.o. female I MRN: [id number]  Unit/Bed#: -01 I Date of Admission: (Not on file)   Date of Service: 5/14/2025 I Hospital Day: 0     Assessment & Plan  Abscess of right hip  Patient presented with right hip pain from rehab with concerns for right hip intramuscular abscess on CT  Continue IV vancomycin per infectious disease  Orthopedic surgery consulted  Follow-up cultures  As needed analgesics   Sepsis without acute organ dysfunction (HCC)  Meet sepsis criteria at [facility] with leukocytosis and tachycardia in the setting of right hip intramuscular abscess.  See plan as above  Follow-up blood cultures  Primary hypertension  Amlodipine on hold. Resume as appropriate.       VTE Pharmacologic Prophylaxis:   Moderate Risk (Score 3-4) - Pharmacological DVT Prophylaxis Ordered: enoxaparin (Lovenox).  Code Status: Level 1 - Full Code       Anticipated Length of Stay: Patient will be admitted on an inpatient basis with an anticipated length of stay of greater than 2 midnights secondary to hip abscess.    History of Present Illness   Chief Complaint: Right hip pain    Sharon Vega is a 74 y.o. female with a PMH of right hip hemiarthroplasty, hypertension, chronic back pain, depression and hyperlipidemia who presents with worsening right hip pain with CT abdomen and pelvis concerning for right hip intramuscular abscess for which she was transferred from [facility] to [facility] for orthopedic surgery evaluation.    Review of Systems negative apart from HPI    Historical Information   Past Medical History:   Diagnosis Date    Anemia     Anxiety     Arthritis     Closed transcervical fracture of right femur (HCC) 07/01/2022    Colon polyp     Depression     Fracture of right wrist 07/01/2022    GERD (gastroesophageal reflux disease)     Hiatal hernia     Hyperlipidemia     Hypertension      Past Surgical History:   Procedure Laterality Date    APPENDECTOMY       CHOLECYSTECTOMY      COLONOSCOPY      FL INJECTION RIGHT HIP (NON ARTHROGRAM)  2025    FRACTURE SURGERY Right     arm with plate and pins    HAND SURGERY Bilateral     HYSTERECTOMY      IR ASPIRATION JOINT (SPECIFY LOCATION)  2025    JOINT REPLACEMENT Bilateral     knees    [place] HEMIARTHROPLASTY HIP PARTIAL Right 2022    Procedure: HEMIARTHROPLASTY HIP (BIPOLAR), closed reduction with splinting right wrist;  Surgeon: Leo Roblero;  Location: [facility];  Service: Orthopedics    [place] NEUROPLASTY &/TRANSPOSITION ULNAR NERVE ELBOW Right 2023    Procedure: RELEASE CUBITAL TUNNEL;  Surgeon: Cody Calles DO;  Location: [facility];  Service: Orthopedics    SHOULDER ARTHROSCOPY Left      Social History     Tobacco Use    Smoking status: Former     Current packs/day: 0.00     Types: Cigarettes     Quit date:      Years since quittin.3    Smokeless tobacco: Never   Vaping Use    Vaping status: Never Used   Substance and Sexual Activity    Alcohol use: Not Currently    Drug use: Never    Sexual activity: Not Currently     E-Cigarette/Vaping    E-Cigarette Use Never User      E-Cigarette/Vaping Substances    Nicotine No     THC No     CBD No     Flavoring No     Other No     Unknown No      Family history non-contributory  Social History:  Marital Status: /Civil Union       Meds/Allergies   I have reviewed home medications with patient personally.  Prior to Admission medications    Medication Sig Start Date End Date Taking? Authorizing Provider   acetaminophen (TYLENOL) 325 mg tablet Take 2 tablets (650 mg total) by mouth every 6 (six) hours as needed for mild pain 7/15/22   Wilbert Ingram MD   amLODIPine (NORVASC) 10 mg tablet Take 10 mg by mouth daily    Historical Provider, MD   ascorbic acid (VITAMIN C) 500 MG tablet Take 1 tablet (500 mg total) by mouth 2 (two) times a day 25  Osbaldo Strange PA-C   calcium carbonate (OS-ALPESH) 600 MG tablet Take 600 mg by mouth  daily Taking 2 tablets (1200 mg) daily    Historical Provider, MD   celecoxib (CeleBREX) 200 mg capsule Take 1 capsule (200 mg total) by mouth 2 (two) times a day As needed for joint pain  Patient taking differently: Take 200 mg by mouth 2 (two) times a day 6/20/23   Facundo Douglas MD   cetirizine (ZyrTEC) 10 mg tablet Take 10 mg by mouth daily    Historical Provider, MD   cholecalciferol (VITAMIN D3) 1,000 units tablet Take 1,000 Units by mouth daily 2,000 units daily    Historical Provider, MD   ferrous sulfate 324 (65 Fe) mg Take 1 tablet (324 mg total) by mouth 2 (two) times a day before meals 5/2/25 7/1/25  Osbaldo Strange PA-C   folic acid (FOLVITE) 1 mg tablet Take 1 tablet (1 mg total) by mouth daily 5/2/25 7/1/25  Osbaldo Strange PA-C   gabapentin (NEURONTIN) 600 MG tablet Take 600 mg by mouth daily at bedtime    Historical Provider, MD   methocarbamol (ROBAXIN) 500 mg tablet Take 1 tablet (500 mg total) by mouth every 6 (six) hours as needed for muscle spasms 4/29/25   Keenan Fuller PA-C   Multiple Vitamins-Minerals (multivitamin with minerals) tablet Take 1 tablet by mouth daily 5/2/25   Osbaldo Strange PA-C   mupirocin (BACTROBAN) 2 % ointment Apply topically to the inside of the left and right nostrils twice daily for 5 days before surgery, including the morning of surgery. 5/2/25   Osbaldo Strange PA-C   omeprazole (PriLOSEC) 40 MG capsule Take 40 mg by mouth daily 4/24/25   Historical Provider, MD   oxyCODONE (OXY-IR) 5 MG capsule Take 5 mg by mouth every 4 (four) hours as needed for moderate pain    Historical Provider, MD   pravastatin (PRAVACHOL) 40 mg tablet Take 40 mg by mouth daily at bedtime    Historical Provider, MD   traZODone (DESYREL) 100 mg tablet Take 100 mg by mouth daily at bedtime 2/25/25   Historical Provider, MD   venlafaxine (EFFEXOR-XR) 150 mg 24 hr capsule Take 150 mg by mouth daily    Historical Provider, MD   gabapentin (NEURONTIN) 600 MG tablet  Take 1 tablet (600 mg total) by mouth daily at bedtime 7/15/22 5/14/25  Wilbert Ingram MD   multivitamin (THERAGRAN) TABS Take 1 tablet by mouth daily  5/14/25  Historical Provider, MD   omeprazole (PriLOSEC) 20 mg delayed release capsule Take 20 mg by mouth daily 1 in am 1 in pm  5/14/25  Historical Provider, MD   pravastatin (PRAVACHOL) 40 mg tablet Take 40 mg by mouth daily One at bedtime  5/14/25  Historical Provider, MD   temazepam (RESTORIL) 7.5 mg capsule Take 15 mg by mouth daily at bedtime as needed for sleep    5/14/25  Historical Provider, MD     No Known Allergies    Objective :  Temp:  [97.1 °F (36.2 °C)-100 °F (37.8 °C)] 98.9 °F (37.2 °C)  HR:  [] 101  BP: (103-138)/(63-78) 128/73  Resp:  [12-22] 18  SpO2:  [88 %-97 %] 95 %  O2 Device: Nasal cannula  Nasal Cannula O2 Flow Rate (L/min):  [1 L/min-2 L/min] 1 L/min    Physical Exam    Cardiovascular:      Rate and Rhythm: Normal rate and regular rhythm.   Pulmonary:      Effort: Pulmonary effort is normal.      Breath sounds: Normal breath sounds.   Abdominal:      Tenderness: There is no abdominal tenderness.     Neurological:      Mental Status: She is alert.          Lines/Drains:            Lab Results: I have reviewed the following results:  Results from last 7 days   Lab Units 05/14/25  0629 05/13/25  2236 05/13/25  0400   WBC Thousand/uL 26.11* 28.82* 28.58*   HEMOGLOBIN g/dL 10.4* 12.0 12.1   HEMATOCRIT % 31.6* 36.4 37.4   PLATELETS Thousands/uL 524* 603* 674*   BANDS PCT %  --   --  3   SEGS PCT %  --  84*  --    LYMPHO PCT %  --  7* 7*   MONO PCT %  --  7 7   EOS PCT %  --  0 1     Results from last 7 days   Lab Units 05/14/25  0629 05/13/25  2206   SODIUM mmol/L 134* 130*   POTASSIUM mmol/L 3.9 4.0   CHLORIDE mmol/L 101 94*   CO2 mmol/L 25 26   BUN mg/dL 22 25   CREATININE mg/dL 0.66 0.75   ANION GAP mmol/L 8 10   CALCIUM mg/dL 9.2 10.7*   ALBUMIN g/dL  --  2.9*   TOTAL BILIRUBIN mg/dL  --  1.26*   ALK PHOS U/L  --  132*   ALT U/L  --  34  "  AST U/L  --  31   GLUCOSE RANDOM mg/dL 108 119     Results from last 7 days   Lab Units 05/13/25  2225   INR  1.24*         No results found for: \"HGBA1C\"  Results from last 7 days   Lab Units 05/14/25  0629 05/13/25  2206 05/13/25  0923   LACTIC ACID mmol/L  --  1.1  --    PROCALCITONIN ng/ml 0.69* 0.75* 0.58*       Imaging Results Review: No pertinent imaging studies reviewed.  Other Study Results Review: No additional pertinent studies reviewed.    Administrative Statements   I have spent a total time of 35 minutes in caring for this patient on the day of the visit/encounter including Diagnostic results, Reviewing/placing orders in the medical record (including tests, medications, and/or procedures), Obtaining or reviewing history  , and Communicating with other healthcare professionals .    ** Please Note: This note has been constructed using a voice recognition system. **    "

## 2025-05-14 NOTE — ASSESSMENT & PLAN NOTE
History of right hip hemiarthroplasty in July 2022.  Now complicated by the above.  Was originally planned for conversion to total hip arthroplasty with outpatient Ortho scheduled for today which is now been canceled.

## 2025-05-14 NOTE — CONSULTS
Consultation - Infectious Disease   Name: Sharon Vega 74 y.o. female I MRN: [id number]  Unit/Bed#: -01 I Date of Admission: 5/13/2025   Date of Service: 5/14/2025 I Hospital Day: 1     Inpatient consult to Infectious Diseases  Consult performed by: Ihsan Winters PA-C  Consult ordered by: Keri Rebollar PA-C        Physician Requesting Evaluation: Elroy Tripathi DO   Reason for Evaluation / Principal Problem: Hip abscess    Assessment & Plan  Sepsis without acute organ dysfunction (HCC)  E/b tachycardia and leukocytosis.  Patient is otherwise afebrile and hemodynamically stable.  Leukocytosis is persistent since recent admission however peaked at 28.  This is most likely due to abscess of the right hip.  Consider possibility of bacteremia.  Blood cultures x 4 are pending.  COVID/flu/RSV is negative.  Urinalysis is benign and patient denies urinary complaints.  No other acute findings on CT chest, abdomen or pelvis.  Continue antibiotics as below  Follow-up blood cultures  Trend temps and hemodynamics  Daily CBC DIF and CHEM to monitor response to treatment and for developing toxicities  Abscess of right hip  Presented w/ persistent right hip pain from rehab.  Recent admission for the same.  Prior x-rays showed arthritic changes along the acetabulum.  Has history of right hip hemiarthroplasty 7/2022.  Status post right hip lidocaine injection 4/21/2025.  During recent admission she underwent IR aspiration on 4/24 without evidence of joint effusion and sterile fluid was injected into the joint and aspirated for culture that was ultimately negative.  Imaging this admission shows organizing collection of fluid and gas in the right iliacus and gluteal muscles surrounding the prior right hip hemiarthroplasty. Consider necrotizing infection though no soft tissue gas noted. Suspect due to staph aureus though consider possibility of polymicrobial infection. Less like due to to contiguous spread  and no chronic wound overlying hip. Consider possibility of a transient bacteremia seeding the hip.  Consider possibility of inoculation during recent joint lidocaine injection and/or aspiration.  Ortho consultation is pending.  IR unavailable today.  Blood cultures are currently pending.  Plan for source control not yet established at this time.  Discontinue IV Zosyn  Start IV vancomycin with pharmacy consultation  Follow-up orthopedic and surgery recommendations  Ultimately would recommend urgent source control measures with I&D, washout versus IR drainage  Low threshold to transfer to facility with IR or Ortho availability   Will need updated aerobic and anaerobic cultures  Follow-up blood cultures  Serial skin exams   PT/OT and out of bed as able once cleared by Ortho  Should patient decompensated, can resume cefepime/flagyl for polymicrobial coverage   History of hemiarthroplasty of right hip  History of right hip hemiarthroplasty in July 2022.  Now complicated by the above.  Was originally planned for conversion to total hip arthroplasty with outpatient Ortho scheduled for today which is now been canceled.  Heart murmur  Noted on cardiac auscultation to have holosystolic murmur.  Prior echo in 2023 showed mild to moderate AS.  Consider updating echocardiogram.  Thrombocytosis  This is most likely elevated in the setting of acute inflammation.  Will monitor daily labs.  Class 2 severe obesity due to excess calories with serious comorbidity and body mass index (BMI) of 35.0 to 35.9 in adult (HCC)  BMI 34.84.  Recommend diet, lifestyle modifications, weight loss as able.    I have discussed the above management plan in detail with the primary service.   Infectious Disease service will follow.    Antibiotics:  Day 2 IV Zosyn    History of Present Illness   Sharon Vega is a 74 y.o. year old female who was recently admitted for right hip pain 4/20/2025 to 4/29/2025.  She has been experiencing increasing right  hip pain now for almost 2 months.  Has history of right hip hemiarthroplasty in July 2022.  Underwent a right hip lidocaine injection with outpatient Ortho on 4/21 initially with some pain relief however pain returned about 48 hours later and she was unable to bear weight precipitating initial ED evaluation.  No imaging was done on admission but she had a mild tachycardia and leukocytosis which was felt to be reactive in the setting of pain and there was concern for septic joint.  She underwent joint aspiration with IR on 4/24 without evidence of joint effusion.  Sterile fluid was injected into the joint and then aspirated and sent for culture.  Her blood cultures were negative during this admission.  That body fluid culture from IR aspiration was negative but rare gram-positive cocci in pairs were initially reported on Gram stain however found to be an error.  She was on IV ceftriaxone and vancomycin and ultimately monitored off antibiotics after she was seen by inpatient infectious disease consult service.  X-rays showed acetabular arthritic changes and Ortho recommended outpatient follow-up for possible GINA/revision surgery.  Patient was ultimately discharged to the [facility] for rehab.  She had followed up with Dr. Calles with Ortho on 5/2 and was being scheduled for right hip hemiarthroplasty conversion to GINA.    Today patient continues to have persistent right hip and buttock pain.  Unable to lift right leg off of the bed.  States pain seemed to improve a little bit while at rehab however worsened over the last 1 to 2 days.  She denies fevers or chills but on exam is diaphoretic and flushed.  No recent wounds or obvious portal of entry on right lower extremity.  No recent nausea, vomiting or diarrhea or GI complaints.  No abdominal pains.    A complete review of systems is negative other than that noted in the HPI.    Medical History Review: I have reviewed the patient's PMH, PSH, Social History,  Family History, Meds, and Allergies     Objective :  Temp:  [97.1 °F (36.2 °C)-100 °F (37.8 °C)] 99.9 °F (37.7 °C)  HR:  [] 103  BP: (103-138)/(63-78) 117/66  Resp:  [12-22] 18  SpO2:  [88 %-97 %] 91 %  O2 Device: Nasal cannula  Nasal Cannula O2 Flow Rate (L/min):  [1 L/min-2 L/min] 1 L/min    Physical exam:  General: In mild distress secondary to pain, ill-appearing and diaphoretic  Psychiatric:  Awake and alert oriented x 3 but slow to respond  HEENT: Mucous membranes dry, neck supple, head normocephalic  Cardiovascular: Tachycardic rate with holosystolic murmur noted  Pulmonary: No evidence of wheezing, rhonchi or rales, on room air without dyspnea  Abdomen:  Soft, nontender, obese abdomen  Extremities: Right hip swelling with decreased range of motion, specifically hip flexion on exam  Skin: Facial flushing.  No rashes or wounds on exposed skin.  Right hip and glutes without overlying erythema or obvious wounds      Lab Results: I have reviewed the following results:  Results from last 7 days   Lab Units 05/14/25  0629 05/13/25 2236 05/13/25  0400   WBC Thousand/uL 26.11* 28.82* 28.58*   HEMOGLOBIN g/dL 10.4* 12.0 12.1   PLATELETS Thousands/uL 524* 603* 674*     Results from last 7 days   Lab Units 05/14/25  0629 05/13/25 2206 05/13/25  0923   SODIUM mmol/L 134* 130* 129*   POTASSIUM mmol/L 3.9 4.0 3.8   CHLORIDE mmol/L 101 94* 93*   CO2 mmol/L 25 26 27   BUN mg/dL 22 25 23   CREATININE mg/dL 0.66 0.75 0.71   EGFR ml/min/1.73sq m 86 78 83   CALCIUM mg/dL 9.2 10.7* 10.3*   AST U/L  --  31  --    ALT U/L  --  34  --    ALK PHOS U/L  --  132*  --    ALBUMIN g/dL  --  2.9*  --      Results from last 7 days   Lab Units 05/13/25 2225 05/13/25  2205 05/13/25  1240 05/13/25  1230   BLOOD CULTURE  Received in Microbiology Lab. Culture in Progress. Received in Microbiology Lab. Culture in Progress. Received in Microbiology Lab. Culture in Progress. Received in Microbiology Lab. Culture in Progress.     Results  from last 7 days   Lab Units 05/14/25  0629 05/13/25  2206 05/13/25  0923   PROCALCITONIN ng/ml 0.69* 0.75* 0.58*     Results from last 7 days   Lab Units 05/13/25  2206   CRP mg/L 374.9*               Imaging Results Review: I reviewed radiology reports from this admission including: CT chest and CT abdomen/pelvis.  Other Study Results Review: Other studies reviewed include: Micro

## 2025-05-14 NOTE — ASSESSMENT & PLAN NOTE
Noted on cardiac auscultation to have holosystolic murmur.  Prior echo in 2023 showed mild to moderate AS.  Consider updating echocardiogram.

## 2025-05-14 NOTE — CONSULTS
Consultation - Orthopedics   Name: Sharon Vega 74 y.o. female I MRN: [id number]  Unit/Bed#: -01 I Date of Admission: 5/13/2025   Date of Service: 5/14/2025 I Hospital Day: 1   Inpatient consult to Orthopedic Surgery  Consult performed by: Cody Calles DO  Consult ordered by: Keri Rebollar PA-C        Physician Requesting Evaluation: Elroy Tripathi DO   Reason for Evaluation / Principal Problem: Abscess right hip region    Assessment & Plan  Abscess of right hip  After personally reviewing the CT of her hip, it appears to be around the hip but not the hip joint itself.  Her symptoms are more around the iliac fossa.  This is correlating with a possible iliacus abscess.  General surgery was consulted as well as interventional radiology.  I look forward to hearing back once this is obtained.  Unfortunately, we are the abscess is located is beyond my orthopedic expertise.  Her pain today is not around the hip but anterior to this.  Would recommend continuation of surgical/medical/intervention radiology follow-up.  We will monitor from the periphery but there is any problems, please do not hesitate to let us know      Primary hypertension    Chronic pain syndrome    Sepsis without acute organ dysfunction (HCC)    Class 2 severe obesity due to excess calories with serious comorbidity and body mass index (BMI) of 35.0 to 35.9 in adult (HCC)    History of hemiarthroplasty of right hip    Heart murmur    Thrombocytosis        History of Present Illness   HPI: Sharon Vega is a 74 y.o. year old female who presents an elevated white blood cell count since at least yesterday.  The patient is a short-term resident of the [facility].  She does have a history of a right hip fracture which was treated with a cemented unipolar arthroplasty.  It appears that she did have pain over the past couple months.  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  She received an injection of lidocaine  only by interventional radiology with a complete relief of pain for 2 days.  She also had a bone scan which showed no evidence of any infection but arthritis along the acetabulum.  She did come back to the hospital for hip pain.  At that time, she had an aspiration of her hip in which the cultures were negative.  She was originally scheduled for a conversion of her partial hip replacement to a total hip replacement today.  This was canceled due to her leukocytosis.    Review of Systems significant for findings described in the HPI.  Historical Information   Past Medical History:   Diagnosis Date    Anemia     Anxiety     Arthritis     Closed transcervical fracture of right femur (HCC) 2022    Colon polyp     Depression     Fracture of right wrist 2022    GERD (gastroesophageal reflux disease)     Hiatal hernia     Hyperlipidemia     Hypertension      Past Surgical History:   Procedure Laterality Date    APPENDECTOMY      CHOLECYSTECTOMY      COLONOSCOPY      FL INJECTION RIGHT HIP (NON ARTHROGRAM)  2025    FRACTURE SURGERY Right     arm with plate and pins    HAND SURGERY Bilateral     HYSTERECTOMY      IR ASPIRATION JOINT (SPECIFY LOCATION)  2025    JOINT REPLACEMENT Bilateral     knees    CT HEMIARTHROPLASTY HIP PARTIAL Right 2022    Procedure: HEMIARTHROPLASTY HIP (BIPOLAR), closed reduction with splinting right wrist;  Surgeon: Leo Roblero;  Location: [facility];  Service: Orthopedics    CT NEUROPLASTY &/TRANSPOSITION ULNAR NERVE ELBOW Right 2023    Procedure: RELEASE CUBITAL TUNNEL;  Surgeon: Cody Calles DO;  Location: [facility];  Service: Orthopedics    SHOULDER ARTHROSCOPY Left      Social History     Tobacco Use    Smoking status: Former     Current packs/day: 0.00     Types: Cigarettes     Quit date:      Years since quittin.3    Smokeless tobacco: Never   Vaping Use    Vaping status: Never Used   Substance and Sexual Activity    Alcohol use: Not  Currently    Drug use: Never    Sexual activity: Not Currently     E-Cigarette/Vaping    E-Cigarette Use Never User      E-Cigarette/Vaping Substances    Nicotine No     THC No     CBD No     Flavoring No     Other No     Unknown No          Objective :  Temp:  [97.1 °F (36.2 °C)-100 °F (37.8 °C)] 99.4 °F (37.4 °C)  HR:  [] 103  BP: (103-138)/(63-78) 117/66  Resp:  [12-22] 18  SpO2:  [88 %-97 %] 91 %  O2 Device: Nasal cannula  Nasal Cannula O2 Flow Rate (L/min):  [1 L/min-2 L/min] 1 L/min    Physical Exam   Constitutional: Appears well-developed and well-nourished. No distress.  Appears nontoxic  HENT:   Head: Normocephalic.   Eyes: Conjunctivae are normal. Right eye exhibits no discharge. Left eye exhibits no discharge. No scleral icterus.   Cardiovascular: Normal rate.    Pulmonary/Chest: Effort normal.   Neurological: Alert and oriented to person, place, and time.   Skin: Skin is warm and dry. No rash noted. The patient is not diaphoretic. No erythema. No pallor.   Psychiatric: Normal mood and affect. Behavior is normal. Judgment and thought content normal.     Physical ExamOrtho Exam   Musculoskeletal: Right hip  Skin incision is clean, dry, and intact. No erythema or ecchymosis.  Vascular Status peripheral pulses intact  Neurologic Status good dorsiflexion of foot.  Good quad tone    Right lower extremity is neurovascularly intact.  Toes are pink and mobile.  Compartments are soft.  Incision is clean, dry, and intact.  There is no pain over the incision.  There is pain along the iliac crest region.  There is no flocculence.  No warmth erythema.  Range of motion of the hip was painful due to the above but fairly intact.  Negative Homans        Lab Results: I have reviewed the following results:   Recent Labs     05/13/25  0400 05/13/25  0923 05/13/25  2206 05/13/25  2225 05/13/25  2236 05/14/25  0629   WBC 28.58*  --   --   --  28.82* 26.11*   HGB 12.1  --   --   --  12.0 10.4*   HCT 37.4  --   --   --   "36.4 31.6*   *  --   --   --  603* 524*   BANDSPCT 3  --   --   --   --   --    BUN  --  23 25  --   --  22   CREATININE  --  0.71 0.75  --   --  0.66   PTT  --   --   --  28  --   --    INR  --   --   --  1.24*  --   --    ESR  --   --   --   --  >130*  --    CRP  --   --  374.9*  --   --   --      Blood Culture:   Lab Results   Component Value Date    BLOODCX Received in Microbiology Lab. Culture in Progress. 05/13/2025     Wound Culture: No results found for: \"WOUNDCULT\"    Imaging Results Review: I personally reviewed the following image studies in PACS and associated radiology reports: CT of the right hip and pelvis show no fractures or dislocations.  There is no evidence of any abscess in the hip joint itself.  There appears to be an abscess around the iliac region.. My interpretation of the radiology images/reports is: As above.  Other Study Results Review: No additional pertinent studies reviewed.  "

## 2025-05-14 NOTE — ASSESSMENT & PLAN NOTE
E/b tachycardia and leukocytosis.  Patient is otherwise afebrile and hemodynamically stable.  Leukocytosis is persistent since recent admission however peaked at 28.  This is most likely due to abscess of the right hip.  Consider possibility of bacteremia.  Blood cultures x 4 are pending.  COVID/flu/RSV is negative.  Urinalysis is benign and patient denies urinary complaints.  No other acute findings on CT chest, abdomen or pelvis.  Continue antibiotics as below  Follow-up blood cultures  Trend temps and hemodynamics  Daily CBC DIF and CHEM to monitor response to treatment and for developing toxicities

## 2025-05-14 NOTE — ASSESSMENT & PLAN NOTE
The patient was recently admitted for septic arthritis, had an IR hip joint aspiration 4/24. Now with pain  ED reviewed w/ Ortho recommend IR for fluid sampling and ortho consult (no IR till Thursday), Patient was scheduled for OR w/ Dr. Calles, will request ortho consult  NPO  IVF  IV abx  Pain control  CT: Postsurgical change from right hip hemiarthroplasty. Organized collection of fluid and gas in the right iliac is muscle and similar collection surrounding the right hip arthroplasty in the deep gluteal muscles measuring up to 10 cm, concerning for infection. No hematoma. Recommend orthopedic surgery consultation.

## 2025-05-14 NOTE — ED PROVIDER NOTES
Time reflects when diagnosis was documented in both MDM as applicable and the Disposition within this note       Time User Action Codes Description Comment    5/13/2025 11:37 PM Juan Jose Wynn Add [A41.9] Sepsis (HCC)     5/13/2025 11:37 PM Juan Jose Wynn Add [L02.91] Abscess     5/13/2025 11:55 PM Keri Rebollar Add [M25.551] Right hip pain           ED Disposition       ED Disposition   Admit    Condition   Stable    Date/Time   Tue May 13, 2025 11:37 PM    Comment   Case was discussed with Hospitalist and ORTHO and the patient's admission status was agreed to be Admission Status: inpatient status to the service of Dr. Murdock .               Assessment & Plan       Medical Decision Making  DDx including but not limited to: sepsis, UTI, pneumonia, viral illness, cellulitis, sinusitis; doubt influenza, meningitis, line sepsis or other intracranial process.     Patient has presented to the Emergency Department with exacerbation of chronic condition / pt with new illness-injury that may poses a threat to life or body function.  At least 3 different tests have been ordered on this patient AND reviewed the results.   Old laboratory data (of at least 2 tests) were reviewed from the medical records and compared to today's results  Discussion with patient and/or family members of results (normal and abnormal) and the implications for immediate and long term treatment/management.  Due to the high risk of morbidity further diagnostic testing and treatment is necessary.    10:05 PM  I discussed the case with the hospitalist. We reviewed the HPI, pertinent PMH, ED course and management.   Hospitalist agreed with plan and will admit the patient to the hospital.    Medications  sodium chloride 0.9 % bolus 1,000 mL (has no administration in time range)    Followed by  sodium chloride 0.9 % bolus 1,000 mL (has no administration in time range)  piperacillin-tazobactam (ZOSYN) IVPB 4.5 g (has no administration in time range)                Problems Addressed:  Sepsis (HCC): acute illness or injury    Amount and/or Complexity of Data Reviewed  Labs: ordered. Decision-making details documented in ED Course.    Risk  Decision regarding hospitalization.        ED Course as of 05/14/25 0519   Tue May 13, 2025   2208 EKG is sinus tachycardia with first-degree AV block and premature atrial complexes.  No ST elevations or depressions to signify ischemia.  Rate is 116   2212 CT or pelvis from earlier today:  Organizing collection of fluid and gas in the right iliacus muscle measuring approximately 5.5 x 2.6 x 7.6 cm. Inflammation in the right retroperitoneum   2213  Inflammation and fluid collection surrounding right hip arthroplasty, most prominent posteriorly measuring up to 10 cm. Adjacent foci of more superiorly located gas and inflammation in the deep gluteal muscles.     2213 WBC was 28.58 this AM   2241 pH, Stephen(!): 7.439   2251 WBC(!): 28.82       Medications   sodium chloride 0.9 % bolus 1,000 mL (0 mL Intravenous Stopped 5/13/25 2239)     Followed by   sodium chloride 0.9 % bolus 1,000 mL (0 mL Intravenous Stopped 5/13/25 2314)   piperacillin-tazobactam (ZOSYN) IVPB 4.5 g (0 g Intravenous Stopped 5/13/25 2349)       ED Risk Strat Scores                    No data recorded        SBIRT 20yo+      Flowsheet Row Most Recent Value   Initial Alcohol Screen: US AUDIT-C     1. How often do you have a drink containing alcohol? 0 Filed at: 05/13/2025 2202   2. How many drinks containing alcohol do you have on a typical day you are drinking?  0 Filed at: 05/13/2025 2202   3b. FEMALE Any Age, or MALE 65+: How often do you have 4 or more drinks on one occassion? 0 Filed at: 05/13/2025 2202   Audit-C Score 0 Filed at: 05/13/2025 2202   PHYLLIS: How many times in the past year have you...    Used an illegal drug or used a prescription medication for non-medical reasons? Never Filed at: 05/13/2025 2202                            History of Present Illness        Chief Complaint   Patient presents with    Evaluation of Abnormal Diagnostic Test     Chest infection shown on some testing done today at the hospital.     Hip Pain     Right hip pain, started 2 weeks ago         Past Medical History:   Diagnosis Date    Anemia     Anxiety     Arthritis     Closed transcervical fracture of right femur (HCC) 2022    Colon polyp     Depression     Fracture of right wrist 2022    GERD (gastroesophageal reflux disease)     Hiatal hernia     Hyperlipidemia     Hypertension       Past Surgical History:   Procedure Laterality Date    APPENDECTOMY      CHOLECYSTECTOMY      COLONOSCOPY      FL INJECTION RIGHT HIP (NON ARTHROGRAM)  2025    FRACTURE SURGERY Right     arm with plate and pins    HAND SURGERY Bilateral     HYSTERECTOMY      IR ASPIRATION JOINT (SPECIFY LOCATION)  2025    JOINT REPLACEMENT Bilateral     knees    ID HEMIARTHROPLASTY HIP PARTIAL Right 2022    Procedure: HEMIARTHROPLASTY HIP (BIPOLAR), closed reduction with splinting right wrist;  Surgeon: Leo Roblero;  Location: [facility];  Service: Orthopedics    ID NEUROPLASTY &/TRANSPOSITION ULNAR NERVE ELBOW Right 2023    Procedure: RELEASE CUBITAL TUNNEL;  Surgeon: Cody Calles DO;  Location: [facility];  Service: Orthopedics    SHOULDER ARTHROSCOPY Left       Family History   Problem Relation Age of Onset    No Known Problems Mother       Social History     Tobacco Use    Smoking status: Former     Current packs/day: 0.00     Types: Cigarettes     Quit date:      Years since quittin.3    Smokeless tobacco: Never   Vaping Use    Vaping status: Never Used   Substance Use Topics    Alcohol use: Not Currently    Drug use: Never      E-Cigarette/Vaping    E-Cigarette Use Never User       E-Cigarette/Vaping Substances    Nicotine No     THC No     CBD No     Flavoring No     Other No     Unknown No       I have reviewed and agree with the history as documented.     Sharon RUFFIN  Gary is a 74 y.o.  year old female  Past Medical History:  No date: Anemia  No date: Anxiety  No date: Arthritis  No date: Colon polyp  No date: Depression  No date: GERD (gastroesophageal reflux disease)  No date: Hiatal hernia  No date: Hyperlipidemia  No date: Hypertension  Social History    Tobacco Use      Smoking status: Former        Packs/day: 0.00        Types: Cigarettes        Quit date:         Years since quittin.3      Smokeless tobacco: Never    Vaping Use      Vaping status: Never Used    Alcohol use: Not Currently    Drug use: Never    Patient presents with:  Evaluation of Abnormal Diagnostic Test: Chest infection shown on some testing done today at the hospital.   Hip Pain: Right hip pain, started 2 weeks ago  This patient was sent to the emergency department via EMS from nursing home secondary to abnormal outpatient diagnostic studies.  Patient today had a CT scan of chest abdomen pelvis.  As well as a chest x-ray and some outpatient labs.  On the CT scan of the abdomen pelvis she had a fluid collection is 10 cm next to the right arthroplasty and this has air-fluid levels.  She also has a white count of 28,000.                  History provided by:  Patient   used: No    Evaluation of Abnormal Diagnostic Test  Hip Pain  Associated symptoms: fatigue    Associated symptoms: no abdominal pain, no chest pain, no cough, no ear pain, no fever, no rash, no shortness of breath, no sore throat and no vomiting        Review of Systems   Constitutional:  Positive for chills and fatigue. Negative for fever.   HENT:  Negative for ear pain and sore throat.    Eyes:  Negative for pain and visual disturbance.   Respiratory:  Negative for cough and shortness of breath.    Cardiovascular:  Negative for chest pain and palpitations.   Gastrointestinal:  Negative for abdominal pain and vomiting.   Genitourinary:  Negative for dysuria and hematuria.   Musculoskeletal:  Positive for  arthralgias. Negative for back pain.   Skin:  Negative for color change and rash.   Neurological:  Positive for weakness. Negative for seizures and syncope.   All other systems reviewed and are negative.          Objective       ED Triage Vitals   Temperature Pulse Blood Pressure Respirations SpO2 Patient Position - Orthostatic VS   05/13/25 2159 05/13/25 2159 05/13/25 2159 05/13/25 2159 05/13/25 2159 05/13/25 2159   (!) 97.1 °F (36.2 °C) 92 133/77 18 94 % Lying      Temp Source Heart Rate Source BP Location FiO2 (%) Pain Score    05/13/25 2159 05/13/25 2159 05/13/25 2159 -- 05/14/25 0035    Temporal Monitor Left arm  8      Vitals      Date and Time Temp Pulse SpO2 Resp BP Pain Score FACES Pain Rating User   05/14/25 0432 98.6 °F (37 °C) 89 95 % 18 112/73 -- -- DII   05/14/25 0313 98.2 °F (36.8 °C) 95 94 % 18 103/63 -- -- DII   05/14/25 0113 -- -- -- -- -- 8 -- TB   05/14/25 0039 -- -- 96 % -- -- -- -- TB   05/14/25 0035 -- -- -- -- -- 8 -- TB   05/14/25 0002 97.3 °F (36.3 °C) 124 97 % 18 125/75 -- -- DII   05/13/25 2345 97.6 °F (36.4 °C) 93 90 % 22 134/69 -- -- JR   05/13/25 2335 -- 95 -- -- -- -- -- CO   05/13/25 2334 -- 123 -- -- 138/69 -- -- CO   05/13/25 2330 98.2 °F (36.8 °C) 93 94 % 22 138/69 -- -- JR   05/13/25 2300 -- 98 94 % 19 124/64 -- -- JR   05/13/25 2245 98.5 °F (36.9 °C) 97 92 % 12 133/75 -- -- JR   05/13/25 2237 -- -- 95 % -- -- -- --    05/13/25 2230 -- 91 88 % 18 135/65 -- --    05/13/25 2215 -- 95 91 % 18 133/78 -- --    05/13/25 2159 97.1 °F (36.2 °C) 92 94 % 18 133/77 -- --             Physical Exam  Vitals and nursing note reviewed.   Constitutional:       General: She is not in acute distress (clammy).     Appearance: Normal appearance. She is well-developed. She is obese.   HENT:      Head: Normocephalic and atraumatic.      Right Ear: External ear normal.      Left Ear: External ear normal.     Eyes:      Conjunctiva/sclera: Conjunctivae normal.      Pupils: Pupils are equal,  round, and reactive to light.       Cardiovascular:      Rate and Rhythm: Normal rate. Rhythm irregular.      Heart sounds: Murmur (soft systolic) heard.   Pulmonary:      Effort: Pulmonary effort is normal. No respiratory distress.      Breath sounds: Normal breath sounds.   Abdominal:      Palpations: Abdomen is soft.      Tenderness: There is no abdominal tenderness.     Musculoskeletal:         General: No swelling.      Cervical back: Neck supple.     Skin:     General: Skin is warm and dry.      Capillary Refill: Capillary refill takes less than 2 seconds.     Neurological:      General: No focal deficit present.      Mental Status: She is alert and oriented to person, place, and time.      Cranial Nerves: No cranial nerve deficit.      Motor: Weakness present.     Psychiatric:         Mood and Affect: Mood normal.         Results Reviewed       Procedure Component Value Units Date/Time    Basic metabolic panel [[id number]]     Lab Status: No result Specimen: Blood     CBC (With Platelets) [[id number]]     Lab Status: No result Specimen: Blood     Sedimentation rate, automated [[id number]]  (Abnormal) Collected: 05/13/25 2236    Lab Status: Final result Specimen: Blood from Arm, Left Updated: 05/14/25 0155     Sed Rate >130 mm/hour     C-reactive protein [[id number]]  (Abnormal) Collected: 05/13/25 2206    Lab Status: Final result Specimen: Blood from Arm, Left Updated: 05/14/25 0140     .9 mg/L     Procalcitonin [[id number]]  (Abnormal) Collected: 05/13/25 2206    Lab Status: Final result Specimen: Blood from Arm, Left Updated: 05/13/25 2301     Procalcitonin 0.75 ng/ml     Comprehensive metabolic panel [[id number]]  (Abnormal) Collected: 05/13/25 2206    Lab Status: Final result Specimen: Blood from Arm, Left Updated: 05/13/25 2256     Sodium 130 mmol/L      Potassium 4.0 mmol/L      Chloride 94 mmol/L      CO2 26 mmol/L      ANION GAP 10 mmol/L      BUN 25 mg/dL      Creatinine 0.75 mg/dL      Glucose 119  mg/dL      Calcium 10.7 mg/dL      Corrected Calcium 11.6 mg/dL      AST 31 U/L      ALT 34 U/L      Alkaline Phosphatase 132 U/L      Total Protein 7.0 g/dL      Albumin 2.9 g/dL      Total Bilirubin 1.26 mg/dL      eGFR 78 ml/min/1.73sq m     Narrative:      [employer] guidelines for Chronic Kidney Disease (CKD):     Stage 1 with normal or high GFR (GFR > 90 mL/min/1.73 square meters)    Stage 2 Mild CKD (GFR = 60-89 mL/min/1.73 square meters)    Stage 3A Moderate CKD (GFR = 45-59 mL/min/1.73 square meters)    Stage 3B Moderate CKD (GFR = 30-44 mL/min/1.73 square meters)    Stage 4 Severe CKD (GFR = 15-29 mL/min/1.73 square meters)    Stage 5 End Stage CKD (GFR <15 mL/min/1.73 square meters)  Note: GFR calculation is accurate only with a steady state creatinine    Protime-INR [[id number]]  (Abnormal) Collected: 05/13/25 2225    Lab Status: Final result Specimen: Blood from Arm, Right Updated: 05/13/25 2247     Protime 16.1 seconds      INR 1.24    Narrative:      INR Therapeutic Range    Indication                                             INR Range      Atrial Fibrillation                                               2.0-3.0  Hypercoagulable State                                    2.0.2.3  Left Ventricular Asist Device                            2.0-3.0  Mechanical Heart Valve                                  -    Aortic(with afib, MI, embolism, HF, LA enlargement,    and/or coagulopathy)                                     2.0-3.0 (2.5-3.5)     Mitral                                                             2.5-3.5  Prosthetic/Bioprosthetic Heart Valve               2.0-3.0  Venous thromboembolism (VTE: VT, PE        2.0-3.0    APTT [[id number]]  (Normal) Collected: 05/13/25 2225    Lab Status: Final result Specimen: Blood from Arm, Right Updated: 05/13/25 2247     PTT 28 seconds     CBC and differential [[id number]]  (Abnormal) Collected: 05/13/25 2236    Lab Status: Final result  Specimen: Blood from Arm, Left Updated: 05/13/25 2244     WBC 28.82 Thousand/uL      RBC 4.08 Million/uL      Hemoglobin 12.0 g/dL      Hematocrit 36.4 %      MCV 89 fL      MCH 29.4 pg      MCHC 33.0 g/dL      RDW 13.9 %      MPV 10.0 fL      Platelets 603 Thousands/uL      nRBC 0 /100 WBCs      Segmented % 84 %      Immature Grans % 2 %      Lymphocytes % 7 %      Monocytes % 7 %      Eosinophils Relative 0 %      Basophils Relative 0 %      Absolute Neutrophils 24.29 Thousands/µL      Absolute Immature Grans >0.50 Thousand/uL      Absolute Lymphocytes 1.88 Thousands/µL      Absolute Monocytes 1.87 Thousand/µL      Eosinophils Absolute 0.04 Thousand/µL      Basophils Absolute 0.11 Thousands/µL     Blood gas, Venous [[id number]]  (Abnormal) Collected: 05/13/25 2225    Lab Status: Final result Specimen: Blood from Arm, Right Updated: 05/13/25 2236     pH, Stephen 7.439     pCO2, Stephen 42.9 mm Hg      pO2, Stephen 33.2 mm Hg      HCO3, Stephen 28.4 mmol/L      Base Excess, Stephen 3.8 mmol/L      O2 Content, Stephen 10.5 ml/dL      O2 HGB, VENOUS 61.5 %     Lactic acid [[id number]]  (Normal) Collected: 05/13/25 2206    Lab Status: Final result Specimen: Blood from Arm, Left Updated: 05/13/25 2233     LACTIC ACID 1.1 mmol/L     Narrative:      Result may be elevated if tourniquet was used during collection.    Blood culture #1 [[id number]] Collected: 05/13/25 2205    Lab Status: In process Specimen: Blood from Arm, Left Updated: 05/13/25 2231    Blood culture #2 [[id number]] Collected: 05/13/25 2225    Lab Status: In process Specimen: Blood from Arm, Right Updated: 05/13/25 2231            No orders to display       Procedures    ED Medication and Procedure Management   Prior to Admission Medications   Prescriptions Last Dose Informant Patient Reported? Taking?   Multiple Vitamins-Minerals (multivitamin with minerals) tablet 5/13/2025  No Yes   Sig: Take 1 tablet by mouth daily   acetaminophen (TYLENOL) 325 mg tablet 5/13/2025  No Yes   Sig:  Take 2 tablets (650 mg total) by mouth every 6 (six) hours as needed for mild pain   amLODIPine (NORVASC) 10 mg tablet 5/13/2025  Yes Yes   Sig: Take 10 mg by mouth daily   ascorbic acid (VITAMIN C) 500 MG tablet 5/13/2025  No Yes   Sig: Take 1 tablet (500 mg total) by mouth 2 (two) times a day   calcium carbonate (OS-ALPESH) 600 MG tablet Past Month  Yes Yes   Sig: Take 600 mg by mouth daily Taking 2 tablets (1200 mg) daily   celecoxib (CeleBREX) 200 mg capsule Past Month  No Yes   Sig: Take 1 capsule (200 mg total) by mouth 2 (two) times a day As needed for joint pain   Patient taking differently: Take 200 mg by mouth 2 (two) times a day   cetirizine (ZyrTEC) 10 mg tablet 5/13/2025  Yes Yes   Sig: Take 10 mg by mouth daily   cholecalciferol (VITAMIN D3) 1,000 units tablet More than a month  Yes No   Sig: Take 1,000 Units by mouth daily 2,000 units daily   ferrous sulfate 324 (65 Fe) mg Past Month  No Yes   Sig: Take 1 tablet (324 mg total) by mouth 2 (two) times a day before meals   folic acid (FOLVITE) 1 mg tablet Past Month  No Yes   Sig: Take 1 tablet (1 mg total) by mouth daily   gabapentin (NEURONTIN) 600 MG tablet   Yes Yes   Sig: Take 600 mg by mouth daily at bedtime   methocarbamol (ROBAXIN) 500 mg tablet 5/13/2025  No Yes   Sig: Take 1 tablet (500 mg total) by mouth every 6 (six) hours as needed for muscle spasms   mupirocin (BACTROBAN) 2 % ointment 5/13/2025  No Yes   Sig: Apply topically to the inside of the left and right nostrils twice daily for 5 days before surgery, including the morning of surgery.   omeprazole (PriLOSEC) 40 MG capsule 5/13/2025  Yes Yes   Sig: Take 40 mg by mouth daily   oxyCODONE (OXY-IR) 5 MG capsule   Yes Yes   Sig: Take 5 mg by mouth every 4 (four) hours as needed for moderate pain   pravastatin (PRAVACHOL) 40 mg tablet   Yes Yes   Sig: Take 40 mg by mouth daily at bedtime   traZODone (DESYREL) 100 mg tablet 5/13/2025  Yes Yes   Sig: Take 100 mg by mouth daily at bedtime    venlafaxine (EFFEXOR-XR) 150 mg 24 hr capsule 5/13/2025  Yes Yes   Sig: Take 150 mg by mouth daily      Facility-Administered Medications: None     Current Discharge Medication List        CONTINUE these medications which have NOT CHANGED    Details   acetaminophen (TYLENOL) 325 mg tablet Take 2 tablets (650 mg total) by mouth every 6 (six) hours as needed for mild pain  Refills: 0    Associated Diagnoses: Closed fracture of right wrist with routine healing, subsequent encounter      amLODIPine (NORVASC) 10 mg tablet Take 10 mg by mouth daily      ascorbic acid (VITAMIN C) 500 MG tablet Take 1 tablet (500 mg total) by mouth 2 (two) times a day  Qty: 60 tablet, Refills: 1    Associated Diagnoses: Arthritis of right hip      calcium carbonate (OS-ALPESH) 600 MG tablet Take 600 mg by mouth daily Taking 2 tablets (1200 mg) daily      celecoxib (CeleBREX) 200 mg capsule Take 1 capsule (200 mg total) by mouth 2 (two) times a day As needed for joint pain  Qty: 180 capsule, Refills: 3    Comments: Patient will be using GoodRx  Associated Diagnoses: Arthritis      cetirizine (ZyrTEC) 10 mg tablet Take 10 mg by mouth daily      ferrous sulfate 324 (65 Fe) mg Take 1 tablet (324 mg total) by mouth 2 (two) times a day before meals  Qty: 60 tablet, Refills: 1    Associated Diagnoses: Arthritis of right hip      folic acid (FOLVITE) 1 mg tablet Take 1 tablet (1 mg total) by mouth daily  Qty: 30 tablet, Refills: 1    Associated Diagnoses: Arthritis of right hip      gabapentin (NEURONTIN) 600 MG tablet Take 600 mg by mouth daily at bedtime      methocarbamol (ROBAXIN) 500 mg tablet Take 1 tablet (500 mg total) by mouth every 6 (six) hours as needed for muscle spasms  Qty: 10 tablet, Refills: 0    Associated Diagnoses: Right hip pain      Multiple Vitamins-Minerals (multivitamin with minerals) tablet Take 1 tablet by mouth daily  Qty: 30 tablet, Refills: 1    Associated Diagnoses: Arthritis of right hip      mupirocin (BACTROBAN) 2 %  ointment Apply topically to the inside of the left and right nostrils twice daily for 5 days before surgery, including the morning of surgery.  Qty: 15 g, Refills: 1    Associated Diagnoses: Arthritis of right hip      omeprazole (PriLOSEC) 40 MG capsule Take 40 mg by mouth daily      oxyCODONE (OXY-IR) 5 MG capsule Take 5 mg by mouth every 4 (four) hours as needed for moderate pain      pravastatin (PRAVACHOL) 40 mg tablet Take 40 mg by mouth daily at bedtime      traZODone (DESYREL) 100 mg tablet Take 100 mg by mouth daily at bedtime      venlafaxine (EFFEXOR-XR) 150 mg 24 hr capsule Take 150 mg by mouth daily      cholecalciferol (VITAMIN D3) 1,000 units tablet Take 1,000 Units by mouth daily 2,000 units daily           No discharge procedures on file.  ED SEPSIS DOCUMENTATION   Time reflects when diagnosis was documented in both MDM as applicable and the Disposition within this note       Time User Action Codes Description Comment    5/13/2025 11:37 PM Juan Jose Wynn [A41.9] Sepsis (HCC)     5/13/2025 11:37 PM Juan Jose Wynn Add [L02.91] Abscess     5/13/2025 11:55 PM Keri Rebollar Add [M25.551] Right hip pain            Initial Sepsis Screening       Row Name 05/13/25 5599                Is the patient's history suggestive of a new or worsening infection? Yes (Proceed)  -AI        Suspected source of infection soft tissue  -AI        Indicate SIRS criteria Leukocytosis (WBC > 30382 IJL) OR Leukopenia (WBC <4000 IJL) OR Bandemia (WBC >10% bands);Tachycardia > 90 bpm;Tachypnea > 20 resp per min  -AI        Are two or more of the above signs & symptoms of infection both present and new to the patient? Yes (Proceed)  -AI        Assess for evidence of organ dysfunction: Are any of the below criteria present within 6 hours of suspected infection and SIRS criteria that are NOT considered to be chronic conditions? --                  User Key  (r) = Recorded By, (t) = Taken By, (c) = Cosigned By      Initials  Name Provider Type    AI Keri Rebollar PA-C Physician Assistant                       Juan Jose Wynn MD  05/14/25 0519

## 2025-05-14 NOTE — PLAN OF CARE
Problem: PAIN - ADULT  Goal: Verbalizes/displays adequate comfort level or baseline comfort level  Description: Interventions:  - Encourage patient to monitor pain and request assistance  - Assess pain using appropriate pain scale  - Administer analgesics based on type and severity of pain and evaluate response  - Implement non-pharmacological measures as appropriate and evaluate response  - Consider cultural and social influences on pain and pain management  - Notify physician/advanced practitioner if interventions unsuccessful or patient reports new pain  Outcome: Progressing     Problem: INFECTION - ADULT  Goal: Absence or prevention of progression during hospitalization  Description: INTERVENTIONS:  - Assess and monitor for signs and symptoms of infection  - Monitor lab/diagnostic results  - Monitor all insertion sites, i.e. indwelling lines, tubes, and drains  - Monitor endotracheal if appropriate and nasal secretions for changes in amount and color  - [place] appropriate cooling/warming therapies per order  - Administer medications as ordered  - Instruct and encourage patient and family to use good hand hygiene technique  - Identify and instruct in appropriate isolation precautions for identified infection/condition  Outcome: Progressing  Goal: Absence of fever/infection during neutropenic period  Description: INTERVENTIONS:  - Monitor WBC    Outcome: Progressing     Problem: SAFETY ADULT  Goal: Patient will remain free of falls  Description: INTERVENTIONS:  - Educate patient/family on patient safety including physical limitations  - Instruct patient to call for assistance with activity   - Consult OT/PT to assist with strengthening/mobility   - Keep Call bell within reach  - Keep bed low and locked with side rails adjusted as appropriate  - Keep care items and personal belongings within reach  - Initiate and maintain comfort rounds  - Make Fall Risk Sign visible to staff  - Offer Toileting every 2 Hours,  in advance of need  - Initiate/Maintain bed alarm  - Obtain necessary fall risk management equipment: yellow socks  - Apply yellow socks and bracelet for high fall risk patients  - Consider moving patient to room near nurses station  Outcome: Progressing  Goal: Maintain or return to baseline ADL function  Description: INTERVENTIONS:  -  Assess patient's ability to carry out ADLs; assess patient's baseline for ADL function and identify physical deficits which impact ability to perform ADLs (bathing, care of mouth/teeth, toileting, grooming, dressing, etc.)  - Assess/evaluate cause of self-care deficits   - Assess range of motion  - Assess patient's mobility; develop plan if impaired  - Assess patient's need for assistive devices and provide as appropriate  - Encourage maximum independence but intervene and supervise when necessary  - Involve family in performance of ADLs  - Assess for home care needs following discharge   - Consider OT consult to assist with ADL evaluation and planning for discharge  - Provide patient education as appropriate  Outcome: Progressing  Goal: Maintains/Returns to pre admission functional level  Description: INTERVENTIONS:  - Perform AM-PAC 6 Click Basic Mobility/ Daily Activity assessment daily.  - Set and communicate daily mobility goal to care team and patient/family/caregiver.   - Collaborate with rehabilitation services on mobility goals if consulted  - Perform Range of Motion 3 times a day.  - Reposition patient every 2 hours.  - Dangle patient 3 times a day  - Stand patient 3 times a day  - Ambulate patient 3 times a day  - Out of bed to chair 3 times a day   - Out of bed for meals 3 times a day  - Out of bed for toileting  - Record patient progress and toleration of activity level   Outcome: Progressing     Problem: DISCHARGE PLANNING  Goal: Discharge to home or other facility with appropriate resources  Description: INTERVENTIONS:  - Identify barriers to discharge w/patient and  caregiver  - Arrange for needed discharge resources and transportation as appropriate  - Identify discharge learning needs (meds, wound care, etc.)  - Arrange for interpretive services to assist at discharge as needed  - Refer to Case Management Department for coordinating discharge planning if the patient needs post-hospital services based on physician/advanced practitioner order or complex needs related to functional status, cognitive ability, or social support system  Outcome: Progressing     Problem: Knowledge Deficit  Goal: Patient/family/caregiver demonstrates understanding of disease process, treatment plan, medications, and discharge instructions  Description: Complete learning assessment and assess knowledge base.  Interventions:  - Provide teaching at level of understanding  - Provide teaching via preferred learning methods  Outcome: Progressing

## 2025-05-15 ENCOUNTER — APPOINTMENT (INPATIENT)
Dept: RADIOLOGY | Facility: HOSPITAL | Age: 75
DRG: 466 | End: 2025-05-15
Attending: NURSE PRACTITIONER
Payer: MEDICARE

## 2025-05-15 ENCOUNTER — TELEPHONE (OUTPATIENT)
Age: 75
End: 2025-05-15

## 2025-05-15 PROBLEM — E83.42 HYPOMAGNESEMIA: Status: ACTIVE | Noted: 2025-05-15

## 2025-05-15 PROBLEM — E87.1 HYPONATREMIA: Status: ACTIVE | Noted: 2025-05-15

## 2025-05-15 LAB
ANION GAP SERPL CALCULATED.3IONS-SCNC: 8 MMOL/L (ref 4–13)
BUN SERPL-MCNC: 22 MG/DL (ref 5–25)
CALCIUM SERPL-MCNC: 9 MG/DL (ref 8.4–10.2)
CHLORIDE SERPL-SCNC: 101 MMOL/L (ref 96–108)
CO2 SERPL-SCNC: 23 MMOL/L (ref 21–32)
CREAT SERPL-MCNC: 0.61 MG/DL (ref 0.6–1.3)
ERYTHROCYTE [DISTWIDTH] IN BLOOD BY AUTOMATED COUNT: 14.7 % (ref 11.6–15.1)
GFR SERPL CREATININE-BSD FRML MDRD: 88 ML/MIN/1.73SQ M
GLUCOSE SERPL-MCNC: 94 MG/DL (ref 65–140)
HCT VFR BLD AUTO: 30.9 % (ref 34.8–46.1)
HGB BLD-MCNC: 10.1 G/DL (ref 11.5–15.4)
INR PPP: 1.16 (ref 0.85–1.19)
LACTATE SERPL-SCNC: 0.8 MMOL/L (ref 0.5–2)
MAGNESIUM SERPL-MCNC: 1.6 MG/DL (ref 1.9–2.7)
MCH RBC QN AUTO: 29.4 PG (ref 26.8–34.3)
MCHC RBC AUTO-ENTMCNC: 32.7 G/DL (ref 31.4–37.4)
MCV RBC AUTO: 90 FL (ref 82–98)
MRSA NOSE QL CULT: NORMAL
PHOSPHATE SERPL-MCNC: 3 MG/DL (ref 2.3–4.1)
PLATELET # BLD AUTO: 382 THOUSANDS/UL (ref 149–390)
PMV BLD AUTO: 10.7 FL (ref 8.9–12.7)
POTASSIUM SERPL-SCNC: 4.2 MMOL/L (ref 3.5–5.3)
PROTHROMBIN TIME: 15 SECONDS (ref 12.3–15)
RBC # BLD AUTO: 3.43 MILLION/UL (ref 3.81–5.12)
SODIUM SERPL-SCNC: 132 MMOL/L (ref 135–147)
WBC # BLD AUTO: 23.16 THOUSAND/UL (ref 4.31–10.16)

## 2025-05-15 PROCEDURE — 80048 BASIC METABOLIC PNL TOTAL CA: CPT | Performed by: INTERNAL MEDICINE

## 2025-05-15 PROCEDURE — 0K9N30Z DRAINAGE OF RIGHT HIP MUSCLE WITH DRAINAGE DEVICE, PERCUTANEOUS APPROACH: ICD-10-PCS | Performed by: RADIOLOGY

## 2025-05-15 PROCEDURE — NC001 PR NO CHARGE: Performed by: NURSE PRACTITIONER

## 2025-05-15 PROCEDURE — 85610 PROTHROMBIN TIME: CPT | Performed by: INTERNAL MEDICINE

## 2025-05-15 PROCEDURE — 84100 ASSAY OF PHOSPHORUS: CPT | Performed by: INTERNAL MEDICINE

## 2025-05-15 PROCEDURE — C1769 GUIDE WIRE: HCPCS

## 2025-05-15 PROCEDURE — 85027 COMPLETE CBC AUTOMATED: CPT | Performed by: INTERNAL MEDICINE

## 2025-05-15 PROCEDURE — 99222 1ST HOSP IP/OBS MODERATE 55: CPT | Performed by: STUDENT IN AN ORGANIZED HEALTH CARE EDUCATION/TRAINING PROGRAM

## 2025-05-15 PROCEDURE — NC001 PR NO CHARGE: Performed by: RADIOLOGY

## 2025-05-15 PROCEDURE — 83735 ASSAY OF MAGNESIUM: CPT | Performed by: INTERNAL MEDICINE

## 2025-05-15 PROCEDURE — C1729 CATH, DRAINAGE: HCPCS

## 2025-05-15 PROCEDURE — 10030 IMG GID FLU COLL DRG SFT TIS: CPT | Performed by: RADIOLOGY

## 2025-05-15 PROCEDURE — 93005 ELECTROCARDIOGRAM TRACING: CPT

## 2025-05-15 PROCEDURE — 99152 MOD SED SAME PHYS/QHP 5/>YRS: CPT | Performed by: RADIOLOGY

## 2025-05-15 PROCEDURE — 99222 1ST HOSP IP/OBS MODERATE 55: CPT | Performed by: ORTHOPAEDIC SURGERY

## 2025-05-15 PROCEDURE — 99232 SBSQ HOSP IP/OBS MODERATE 35: CPT | Performed by: PHYSICIAN ASSISTANT

## 2025-05-15 PROCEDURE — 10030 IMG GID FLU COLL DRG SFT TIS: CPT

## 2025-05-15 PROCEDURE — G0545 PR INHERENT VISIT TO INPT: HCPCS | Performed by: STUDENT IN AN ORGANIZED HEALTH CARE EDUCATION/TRAINING PROGRAM

## 2025-05-15 RX ORDER — CHLORHEXIDINE GLUCONATE 40 MG/ML
SOLUTION TOPICAL DAILY PRN
Status: DISCONTINUED | OUTPATIENT
Start: 2025-05-15 | End: 2025-05-21 | Stop reason: HOSPADM

## 2025-05-15 RX ORDER — MAGNESIUM SULFATE 1 G/100ML
1 INJECTION INTRAVENOUS ONCE
Status: COMPLETED | OUTPATIENT
Start: 2025-05-15 | End: 2025-05-15

## 2025-05-15 RX ORDER — LIDOCAINE WITH 8.4% SOD BICARB 0.9%(10ML)
SYRINGE (ML) INJECTION AS NEEDED
Status: COMPLETED | OUTPATIENT
Start: 2025-05-15 | End: 2025-05-15

## 2025-05-15 RX ORDER — SODIUM CHLORIDE 9 MG/ML
75 INJECTION, SOLUTION INTRAVENOUS CONTINUOUS
Status: DISCONTINUED | OUTPATIENT
Start: 2025-05-15 | End: 2025-05-17

## 2025-05-15 RX ORDER — CHLORHEXIDINE GLUCONATE ORAL RINSE 1.2 MG/ML
15 SOLUTION DENTAL ONCE
Status: COMPLETED | OUTPATIENT
Start: 2025-05-15 | End: 2025-05-15

## 2025-05-15 RX ORDER — CEFAZOLIN SODIUM 2 G/50ML
2000 SOLUTION INTRAVENOUS ONCE
Status: COMPLETED | OUTPATIENT
Start: 2025-05-15 | End: 2025-05-15

## 2025-05-15 RX ORDER — FENTANYL CITRATE 50 UG/ML
INJECTION, SOLUTION INTRAMUSCULAR; INTRAVENOUS AS NEEDED
Status: COMPLETED | OUTPATIENT
Start: 2025-05-15 | End: 2025-05-15

## 2025-05-15 RX ORDER — SODIUM CHLORIDE 9 MG/ML
10 INJECTION, SOLUTION INTRAMUSCULAR; INTRAVENOUS; SUBCUTANEOUS DAILY
Qty: 300 ML | Refills: 3 | Status: SHIPPED | OUTPATIENT
Start: 2025-05-15 | End: 2025-09-12

## 2025-05-15 RX ORDER — AMLODIPINE BESYLATE 10 MG/1
10 TABLET ORAL DAILY
Status: DISCONTINUED | OUTPATIENT
Start: 2025-05-16 | End: 2025-05-27 | Stop reason: HOSPADM

## 2025-05-15 RX ADMIN — CHLORHEXIDINE GLUCONATE 15 ML: 1.2 SOLUTION ORAL at 10:03

## 2025-05-15 RX ADMIN — MAGNESIUM SULFATE HEPTAHYDRATE 1 G: 1 INJECTION, SOLUTION INTRAVENOUS at 13:42

## 2025-05-15 RX ADMIN — OXYCODONE HYDROCHLORIDE 10 MG: 10 TABLET ORAL at 05:39

## 2025-05-15 RX ADMIN — LORATADINE 10 MG: 10 TABLET ORAL at 09:59

## 2025-05-15 RX ADMIN — FENTANYL CITRATE 25 MCG: 50 INJECTION INTRAMUSCULAR; INTRAVENOUS at 16:40

## 2025-05-15 RX ADMIN — GABAPENTIN 600 MG: 300 CAPSULE ORAL at 23:34

## 2025-05-15 RX ADMIN — VENLAFAXINE HYDROCHLORIDE 150 MG: 150 CAPSULE, EXTENDED RELEASE ORAL at 09:59

## 2025-05-15 RX ADMIN — OXYCODONE HYDROCHLORIDE 5 MG: 5 TABLET ORAL at 23:34

## 2025-05-15 RX ADMIN — VANCOMYCIN HYDROCHLORIDE 750 MG: 10 INJECTION, POWDER, LYOPHILIZED, FOR SOLUTION INTRAVENOUS at 11:07

## 2025-05-15 RX ADMIN — PRAVASTATIN SODIUM 40 MG: 40 TABLET ORAL at 23:35

## 2025-05-15 RX ADMIN — TRAZODONE HYDROCHLORIDE 100 MG: 100 TABLET ORAL at 23:35

## 2025-05-15 RX ADMIN — PANTOPRAZOLE SODIUM 40 MG: 40 TABLET, DELAYED RELEASE ORAL at 05:39

## 2025-05-15 RX ADMIN — Medication 10 ML: at 16:59

## 2025-05-15 RX ADMIN — FENTANYL CITRATE 25 MCG: 50 INJECTION INTRAMUSCULAR; INTRAVENOUS at 16:55

## 2025-05-15 RX ADMIN — VANCOMYCIN HYDROCHLORIDE 1000 MG: 1 INJECTION, SOLUTION INTRAVENOUS at 03:10

## 2025-05-15 RX ADMIN — MUPIROCIN: 20 OINTMENT TOPICAL at 10:00

## 2025-05-15 RX ADMIN — VANCOMYCIN HYDROCHLORIDE 750 MG: 10 INJECTION, POWDER, LYOPHILIZED, FOR SOLUTION INTRAVENOUS at 23:35

## 2025-05-15 RX ADMIN — SODIUM CHLORIDE 75 ML/HR: 0.9 INJECTION, SOLUTION INTRAVENOUS at 11:11

## 2025-05-15 RX ADMIN — CEFAZOLIN SODIUM 2000 MG: 2 SOLUTION INTRAVENOUS at 16:09

## 2025-05-15 NOTE — ASSESSMENT & PLAN NOTE
History of chronic low back pain, cervical/lumbar radiculopathy.       - Continue care per primary team

## 2025-05-15 NOTE — ASSESSMENT & PLAN NOTE
Patient presented to [facility] on 5/13 with tachycardia, tachypnea, leukocytosis.  Source likely right hip abscess.  Started on IV vancomycin/Zosyn, now just on vancomycin per ID recommendations at [facility]     - Continue antibiotics as above   - Follow-up results from IR procedure today

## 2025-05-15 NOTE — ASSESSMENT & PLAN NOTE
Sodium 132 today   Possibly in setting of sepsis, volume depletion   Place back on IVF hydration   Monitor sodium levels closely   If no improvement or worsening would check urine sodium, serum osmolality and urine osmolality   Trend BMP

## 2025-05-15 NOTE — TELEPHONE ENCOUNTER
Ana from  hospital calling to get Dx codes for aspiration. Provided codes per last OVN-no additional questions

## 2025-05-15 NOTE — CONSULTS
"e-Consult (IPC)  - Interventional Radiology  Sharon Vega 74 y.o. 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 with persistent right hip pain and ambulatory dysfunction.     CT imaging demonstrating \"Organized collection of fluid and gas in the right iliac is muscle and similar collection surrounding the right hip arthroplasty in the deep gluteal muscles measuring up to 10 cm, concerning for   Infection\"    General Surgery, Orthopedics and IR consulted. Please see IR consult note Mendel Ward PA-C and Dr Cevallos for more information.    Patient was ultimately transferred to [facility] for further evaluation and management.     Orthopedic Surgery consulted and following. IR consulted for evaluation of fluid collections and possible aspiration/drain placement. Othro requesting synovasure testing of aspirate.     /80   Pulse (!) 107   Temp 98.4 °F (36.9 °C)   Resp 15   Ht 5' 3\" (1.6 m)   Wt 86.4 kg (190 lb 7.6 oz)   SpO2 (!) 89%   BMI 33.74 kg/m²       Recent Labs     05/15/25  0653   HGB 10.1*      INR 1.16   WBC 23.16*       -Case discussed and imaging reviewed with Dr Chapa and Dr Saunders   -Plan for IR aspiration and possible drain placement today pending IR schedule  -maintain NPO  -continue to hold enoxaparin  -remainder of care per Primary team    31 + minutes, >50% of the total time devoted to " medical consultative verbal/EMR discussion between providers. Written report will be generated in the EMR.     Thank you for allowing Interventional Radiology to participate in the care of Sharon Vega. Please don't hesitate to call or Message us with any questions.     LOTUS Camargo

## 2025-05-15 NOTE — ASSESSMENT & PLAN NOTE
POA at [facility] as evidenced by leukocytosis and tachycardia   In setting of right hip intramuscular abscess as above   Continue IV abx and appreciate ID consultation   Start on IVF hydration   BC from [facility] negative x 24 hours, repeat BC obtained here pending, follow up  Lactic negative   Procal slowly improving  WBC with improving counts to 23.16 today, trend CBC  See plan as above

## 2025-05-15 NOTE — NURSING NOTE
Patient cleared for transfer to [facility] under Dr. Augustin. Report given to receiving staff. Patient transferd via [facility] on stretcher with transport team. IVs intact, normal saline running at 100ml/hr, and reported on. Patient on 1L O2 nasal cannula.

## 2025-05-15 NOTE — BRIEF OP NOTE (RAD/CATH)
INTERVENTIONAL RADIOLOGY PROCEDURE NOTE    Date: 5/15/2025    Procedure: IR DRAINAGE TUBE PLACEMENT     Preoperative diagnosis:   1. Abscess of right hip         Postoperative diagnosis: Same.    Surgeon: Leatha Marroquin MD     Assistant: None. No qualified resident was available.    Blood loss: Trace     Specimens: Sent for Synovasure    Findings: Abscess drainage x 2 of fluid collections around right hip. 10 fr drains placed. 40 ml pus from anterior drain, and 90 ml pus from lateral drain.There are multiple other undrained areas.    Complications: None immediate.    Anesthesia: conscious sedation

## 2025-05-15 NOTE — PLAN OF CARE
Problem: PAIN - ADULT  Goal: Verbalizes/displays adequate comfort level or baseline comfort level  Description: Interventions:  - Encourage patient to monitor pain and request assistance  - Assess pain using appropriate pain scale  - Administer analgesics based on type and severity of pain and evaluate response  - Implement non-pharmacological measures as appropriate and evaluate response  - Consider cultural and social influences on pain and pain management  - Notify physician/advanced practitioner if interventions unsuccessful or patient reports new pain  Outcome: Progressing     Problem: INFECTION - ADULT  Goal: Absence or prevention of progression during hospitalization  Description: INTERVENTIONS:  - Assess and monitor for signs and symptoms of infection  - Monitor lab/diagnostic results  - Monitor all insertion sites, i.e. indwelling lines, tubes, and drains  - Monitor endotracheal if appropriate and nasal secretions for changes in amount and color  - [place] appropriate cooling/warming therapies per order  - Administer medications as ordered  - Instruct and encourage patient and family to use good hand hygiene technique  - Identify and instruct in appropriate isolation precautions for identified infection/condition  Outcome: Progressing  Goal: Absence of fever/infection during neutropenic period  Description: INTERVENTIONS:  - Monitor WBC    Outcome: Progressing     Problem: SAFETY ADULT  Goal: Patient will remain free of falls  Description: INTERVENTIONS:  - Educate patient/family on patient safety including physical limitations  - Instruct patient to call for assistance with activity   - Consult OT/PT to assist with strengthening/mobility   - Keep Call bell within reach  - Keep bed low and locked with side rails adjusted as appropriate  - Keep care items and personal belongings within reach  - Initiate and maintain comfort rounds  - Make Fall Risk Sign visible to staff  - Offer Toileting every 2 Hours,  in advance of need  - Initiate/Maintain bed alarm  - Obtain necessary fall risk management equipment: yellow socks  - Apply yellow socks and bracelet for high fall risk patients  - Consider moving patient to room near nurses station  Outcome: Progressing  Goal: Maintain or return to baseline ADL function  Description: INTERVENTIONS:  -  Assess patient's ability to carry out ADLs; assess patient's baseline for ADL function and identify physical deficits which impact ability to perform ADLs (bathing, care of mouth/teeth, toileting, grooming, dressing, etc.)  - Assess/evaluate cause of self-care deficits   - Assess range of motion  - Assess patient's mobility; develop plan if impaired  - Assess patient's need for assistive devices and provide as appropriate  - Encourage maximum independence but intervene and supervise when necessary  - Involve family in performance of ADLs  - Assess for home care needs following discharge   - Consider OT consult to assist with ADL evaluation and planning for discharge  - Provide patient education as appropriate  Outcome: Progressing  Goal: Maintains/Returns to pre admission functional level  Description: INTERVENTIONS:  - Perform AM-PAC 6 Click Basic Mobility/ Daily Activity assessment daily.  - Set and communicate daily mobility goal to care team and patient/family/caregiver.   - Collaborate with rehabilitation services on mobility goals if consulted  - Perform Range of Motion 3 times a day.  - Reposition patient every 2 hours.  - Dangle patient 3 times a day  - Stand patient 3 times a day  - Ambulate patient 3 times a day  - Out of bed to chair 3 times a day   - Out of bed for meals 3 times a day  - Out of bed for toileting  - Record patient progress and toleration of activity level   Outcome: Progressing     Problem: DISCHARGE PLANNING  Goal: Discharge to home or other facility with appropriate resources  Description: INTERVENTIONS:  - Identify barriers to discharge w/patient and  caregiver  - Arrange for needed discharge resources and transportation as appropriate  - Identify discharge learning needs (meds, wound care, etc.)  - Arrange for interpretive services to assist at discharge as needed  - Refer to Case Management Department for coordinating discharge planning if the patient needs post-hospital services based on physician/advanced practitioner order or complex needs related to functional status, cognitive ability, or social support system  Outcome: Progressing     Problem: Knowledge Deficit  Goal: Patient/family/caregiver demonstrates understanding of disease process, treatment plan, medications, and discharge instructions  Description: Complete learning assessment and assess knowledge base.  Interventions:  - Provide teaching at level of understanding  - Provide teaching via preferred learning methods  Outcome: Progressing

## 2025-05-15 NOTE — PROGRESS NOTES
Sharon Vega is a 74 y.o. female who is currently ordered Vancomycin IV with management by the Pharmacy Consult service.  Relevant clinical data and objective / subjective history reviewed.  Vancomycin Assessment:  Indication and Goal AUC/Trough: Bone/joint infection (goal -600, trough >10), -600, trough >10  Clinical Status: stable  Micro:     Renal Function:  SCr: 0.61 mg/dL  CrCl: 83 mL/min  Renal replacement: Not on dialysis  Days of Therapy: 2  Current Dose: 1000 mg IV q12h  Vancomycin Plan:  New Dosin mg IV q12h  Estimated AUC: 416 mcg*hr/mL  Estimated Trough: 12.5 mcg/mL  Next Level:  with AM labs  Renal Function Monitoring: Daily BMP and UOP  Pharmacy will continue to follow closely for s/sx of nephrotoxicity, infusion reactions and appropriateness of therapy.  BMP and CBC will be ordered per protocol. We will continue to follow the patient’s culture results and clinical progress daily.    Edie Mariee, Pharmacist

## 2025-05-15 NOTE — PROGRESS NOTES
"Progress Note - Hospitalist   Name: Sharon Vega 74 y.o. female I MRN: [id number]  Unit/Bed#: -01 I Date of Admission: 5/14/2025   Date of Service: 5/15/2025 I Hospital Day: 1    Assessment & Plan  Abscess of right hip  Pt presented from [facility] with right hip pain   Had recent IR hip joint aspiration on 4/24, was scheduled for conversion of partial hip replacement to total hip on 5/14 but cancelled due to leukocytosis   CT scan obtained with evidence of \"post surgical change from right hip hemiarthroplasty. Organized collection of fluid and gas in the right iliac muscle and similar collection surrounding right hip arthroplasty in the deep gluteal muscles measuring up to 10 cm\"   Transferred to B for orthopedic evaluation   Orthopedics following,  Recommended IR consult for aspiration and possible drain placement today, continue NPO status   Additional surgical plans to be determined after IR intervention   ID consulted for abx management  Currently on IV Vancomycin   Will need to follow up cultures obtained by IR   As needed analgesics, currently on PRN oxycodone 5/10 mg Q4H for moderate and severe pain with PRN IV dilaudid 0.5 mg Q4H for breakthrough  Sepsis without acute organ dysfunction (HCC)  POA at [facility] as evidenced by leukocytosis and tachycardia   In setting of right hip intramuscular abscess as above   Continue IV abx and appreciate ID consultation   Start on IVF hydration   BC from [facility] negative x 24 hours, repeat BC obtained here pending, follow up  Lactic negative   Procal slowly improving  WBC with improving counts to 23.16 today, trend CBC  See plan as above  Primary hypertension  BP overall stable on review   Resume home amlodipine 10 mg daily tomorrow if pressures continue to improve  Monitor closely   Hyponatremia  Sodium 132 today   Possibly in setting of sepsis, volume depletion   Place back on IVF hydration   Monitor sodium levels closely   If no " improvement or worsening would check urine sodium, serum osmolality and urine osmolality   Trend BMP  Hypomagnesemia  Mag 1.6 today   Replete with 1 g IV mag sulfate   Obtain repeat mag in the AM  Chronic pain syndrome  Maintained on PRN oxycodone as an outpatient, continue with pain management as noted above  Also maintained on gabapentin 600 mg daily, continue here   Monitor closely     VTE Pharmacologic Prophylaxis:   Moderate Risk (Score 3-4) - Pharmacological DVT Prophylaxis Ordered: enoxaparin (Lovenox).    Mobility:   Basic Mobility Inpatient Raw Score: 14  JH-HLM Goal: 4: Move to chair/commode  JH-HLM Achieved: 4: Move to chair/commode  JH-HLM Goal NOT achieved. Continue with multidisciplinary rounding and encourage appropriate mobility to improve upon JH-HLM goals.    Patient Centered Rounds: I performed bedside rounds with nursing staff today.   Discussions with Specialists or Other Care Team Provider: Discussed with RN, CM, ortho and reviewed IR/ID notes     Education and Discussions with Family / Patient: Updated  (grand daughter) at bedside. Alexandra     Current Length of Stay: 1 day(s)  Current Patient Status: Inpatient   Certification Statement: The patient will continue to require additional inpatient hospital stay due to IR today for aspiration and possible drain placement, IV abx, following cultures, ongoing orthopedic recommendations   Discharge Plan: Anticipate discharge in >72 hrs to discharge location to be determined pending rehab evaluations.    Code Status: Level 1 - Full Code    Subjective   Pt reports that she has some pain in the right hip, about a 6/10. Denies any sweating/chills, chest pain, shortness of breath, abdominal pain, nausea or vomiting. No issues with bowel or bladder function.     Objective :  Temp:  [97.8 °F (36.6 °C)-98.9 °F (37.2 °C)] 98.3 °F (36.8 °C)  HR:  [] 101  BP: (128-138)/(73-92) 138/80  Resp:  [13-18] 13  SpO2:  [89 %-95 %] 93 %  O2 Device: None  (Room air)  Nasal Cannula O2 Flow Rate (L/min):  [2 L/min] 2 L/min    Body mass index is 33.74 kg/m².     Input and Output Summary (last 24 hours):     Intake/Output Summary (Last 24 hours) at 5/15/2025 1131  Last data filed at 5/14/2025 2201  Gross per 24 hour   Intake --   Output 275 ml   Net -275 ml       Physical Exam  Vitals reviewed.   Constitutional:       General: She is not in acute distress.     Comments: Pt is in no acute distress lying in her hospital bed resting comfortably      Cardiovascular:      Rate and Rhythm: Normal rate and regular rhythm.      Heart sounds: Murmur heard.   Pulmonary:      Effort: No respiratory distress.      Breath sounds: Normal breath sounds. No wheezing.   Abdominal:      General: There is no distension.      Palpations: Abdomen is soft.     Musculoskeletal:      Right lower leg: No edema.      Left lower leg: No edema.      Comments: Right lateral hip mildly tender to palpation      Skin:     General: Skin is warm and dry.     Neurological:      Mental Status: She is alert.           Lines/Drains:              Lab Results: I have reviewed the following results:   Results from last 7 days   Lab Units 05/15/25  0653 05/14/25  0629 05/13/25 2236 05/13/25  0400   WBC Thousand/uL 23.16*   < > 28.82* 28.58*   HEMOGLOBIN g/dL 10.1*   < > 12.0 12.1   HEMATOCRIT % 30.9*   < > 36.4 37.4   PLATELETS Thousands/uL 382   < > 603* 674*   BANDS PCT %  --   --   --  3   SEGS PCT %  --   --  84*  --    LYMPHO PCT %  --   --  7* 7*   MONO PCT %  --   --  7 7   EOS PCT %  --   --  0 1    < > = values in this interval not displayed.     Results from last 7 days   Lab Units 05/15/25  0653 05/14/25 0629 05/13/25 2206   SODIUM mmol/L 132*   < > 130*   POTASSIUM mmol/L 4.2   < > 4.0   CHLORIDE mmol/L 101   < > 94*   CO2 mmol/L 23   < > 26   BUN mg/dL 22   < > 25   CREATININE mg/dL 0.61   < > 0.75   ANION GAP mmol/L 8   < > 10   CALCIUM mg/dL 9.0   < > 10.7*   ALBUMIN g/dL  --   --  2.9*   TOTAL  BILIRUBIN mg/dL  --   --  1.26*   ALK PHOS U/L  --   --  132*   ALT U/L  --   --  34   AST U/L  --   --  31   GLUCOSE RANDOM mg/dL 94   < > 119    < > = values in this interval not displayed.     Results from last 7 days   Lab Units 05/15/25  0653   INR  1.16             Results from last 7 days   Lab Units 05/14/25  2350 05/14/25  0629 05/13/25  2206 05/13/25  0923   LACTIC ACID mmol/L 0.8  --  1.1  --    PROCALCITONIN ng/ml  --  0.69* 0.75* 0.58*       Recent Cultures (last 7 days):   Results from last 7 days   Lab Units 05/14/25  2351 05/14/25  2350 05/13/25  2225 05/13/25  2205 05/13/25  1240 05/13/25  1230 05/13/25  0923   BLOOD CULTURE  Received in Microbiology Lab. Culture in Progress. Received in Microbiology Lab. Culture in Progress. No Growth at 24 hrs. No Growth at 24 hrs. Received in Microbiology Lab. Culture in Progress. No Growth at 24 hrs.  --    URINE CULTURE   --   --   --   --   --   --  >100,000 cfu/ml       Imaging Results Review: I reviewed radiology reports from this admission including: CT chest, CT abdomen/pelvis, and xray(s).  Other Study Results Review: No additional pertinent studies reviewed.    Last 24 Hours Medication List:     Current Facility-Administered Medications:     acetaminophen (TYLENOL) tablet 650 mg, Q4H PRN    [START ON 5/16/2025] amLODIPine (NORVASC) tablet 10 mg, Daily    ascorbic acid (VITAMIN C) tablet 500 mg, BID    ceFAZolin (ANCEF) IVPB (premix in dextrose) 2,000 mg 50 mL, Once    chlorhexidine gluconate (HIBICLENS) 4 % topical liquid, Daily PRN    Cholecalciferol (VITAMIN D3) tablet 2,000 Units, Daily    [Held by provider] enoxaparin (LOVENOX) subcutaneous injection 40 mg, Daily    ferrous sulfate tablet 325 mg, BID With Meals    folic acid (FOLVITE) tablet 1 mg, Daily    gabapentin (NEURONTIN) capsule 600 mg, HS    HYDROmorphone (DILAUDID) injection 0.5 mg, Q4H PRN    loratadine (CLARITIN) tablet 10 mg, Daily    magnesium sulfate IVPB (premix) SOLN 1 g, Once     multivitamin-minerals (CENTRUM) tablet 1 tablet, Daily    mupirocin (BACTROBAN) 2 % ointment, Daily    ondansetron (ZOFRAN) injection 4 mg, Q6H PRN    oxyCODONE (ROXICODONE) immediate release tablet 10 mg, Q4H PRN    oxyCODONE (ROXICODONE) IR tablet 5 mg, Q4H PRN    pantoprazole (PROTONIX) EC tablet 40 mg, Early Morning    pravastatin (PRAVACHOL) tablet 40 mg, HS    sodium chloride 0.9 % infusion, Continuous, Last Rate: 75 mL/hr (05/15/25 1111)    traZODone (DESYREL) tablet 100 mg, HS    vancomycin 750 mg in sodium chloride 0.9% 250 mL, Q12H, Last Rate: 750 mg (05/15/25 1107)    venlafaxine (EFFEXOR-XR) 24 hr capsule 150 mg, Daily    Administrative Statements   Today, Patient Was Seen By: Zoey Garibay PA-C    **Please Note: This note may have been constructed using a voice recognition system.**

## 2025-05-15 NOTE — PLAN OF CARE
Problem: PAIN - ADULT  Goal: Verbalizes/displays adequate comfort level or baseline comfort level  Description: Interventions:  - Encourage patient to monitor pain and request assistance  - Assess pain using appropriate pain scale  - Administer analgesics as ordered based on type and severity of pain and evaluate response  - Implement non-pharmacological measures as appropriate and evaluate response  - Consider cultural and social influences on pain and pain management  - Notify physician/advanced practitioner if interventions unsuccessful or patient reports new pain  - Educate patient/family on pain management process including their role and importance of  reporting pain   - Provide non-pharmacologic/complimentary pain relief interventions  Outcome: Progressing     Problem: INFECTION - ADULT  Goal: Absence or prevention of progression during hospitalization  Description: INTERVENTIONS:  - Assess and monitor for signs and symptoms of infection  - Monitor lab/diagnostic results  - Monitor all insertion sites, i.e. indwelling lines, tubes, and drains  - Monitor endotracheal if appropriate and nasal secretions for changes in amount and color  - [place] appropriate cooling/warming therapies per order  - Administer medications as ordered  - Instruct and encourage patient and family to use good hand hygiene technique  - Identify and instruct in appropriate isolation precautions for identified infection/condition  Outcome: Progressing  Goal: Absence of fever/infection during neutropenic period  Description: INTERVENTIONS:  - Monitor WBC  - Perform strict hand hygiene  - Limit to healthy visitors only  - No plants, dried, fresh or silk flowers with feliciano in patient room  Outcome: Progressing     Problem: SAFETY ADULT  Goal: Patient will remain free of falls  Description: INTERVENTIONS:  - Educate patient/family on patient safety including physical limitations  - Instruct patient to call for assistance with activity   -  Consider consulting OT/PT to assist with strengthening/mobility based on AM PAC & JH-HLM score  - Consult OT/PT to assist with strengthening/mobility   - Keep Call bell within reach  - Keep bed low and locked with side rails adjusted as appropriate  - Keep care items and personal belongings within reach  - Initiate and maintain comfort rounds  - Make Fall Risk Sign visible to staff  - Offer Toileting every 2-4 Hours, in advance of need  - Initiate/Maintain bed/chair alarm  - Obtain necessary fall risk management equipment: nonskid footwear   - Apply yellow socks and bracelet for high fall risk patients  - Consider moving patient to room near nurses station  Outcome: Progressing  Goal: Maintain or return to baseline ADL function  Description: INTERVENTIONS:  -  Assess patient's ability to carry out ADLs; assess patient's baseline for ADL function and identify physical deficits which impact ability to perform ADLs (bathing, care of mouth/teeth, toileting, grooming, dressing, etc.)  - Assess/evaluate cause of self-care deficits   - Assess range of motion  - Assess patient's mobility; develop plan if impaired  - Assess patient's need for assistive devices and provide as appropriate  - Encourage maximum independence but intervene and supervise when necessary  - Involve family in performance of ADLs  - Assess for home care needs following discharge   - Consider OT consult to assist with ADL evaluation and planning for discharge  - Provide patient education as appropriate  - Monitor functional capacity and physical performance, use of AM PAC & JH-HLM   - Monitor gait, balance and fatigue with ambulation    Outcome: Progressing  Goal: Maintains/Returns to pre admission functional level  Description: INTERVENTIONS:  - Perform AM-PAC 6 Click Basic Mobility/ Daily Activity assessment daily.  - Set and communicate daily mobility goal to care team and patient/family/caregiver.   - Collaborate with rehabilitation services on  mobility goals if consulted  - Perform Range of Motion 3 times a day.  - Reposition patient every 2 hours.  - Dangle patient 3 times a day  - Stand patient 3 times a day  - Ambulate patient 3 times a day  - Out of bed to chair 3 times a day   - Out of bed for meals 3 times a day  - Out of bed for toileting  - Record patient progress and toleration of activity level   Outcome: Progressing     Problem: DISCHARGE PLANNING  Goal: Discharge to home or other facility with appropriate resources  Description: INTERVENTIONS:  - Identify barriers to discharge w/patient and caregiver  - Arrange for needed discharge resources and transportation as appropriate  - Identify discharge learning needs (meds, wound care, etc.)  - Arrange for interpretive services to assist at discharge as needed  - Refer to Case Management Department for coordinating discharge planning if the patient needs post-hospital services based on physician/advanced practitioner order or complex needs related to functional status, cognitive ability, or social support system  Outcome: Progressing     Problem: DISCHARGE PLANNING  Goal: Discharge to home or other facility with appropriate resources  Description: INTERVENTIONS:  - Identify barriers to discharge w/patient and caregiver  - Arrange for needed discharge resources and transportation as appropriate  - Identify discharge learning needs (meds, wound care, etc.)  - Arrange for interpretive services to assist at discharge as needed  - Refer to Case Management Department for coordinating discharge planning if the patient needs post-hospital services based on physician/advanced practitioner order or complex needs related to functional status, cognitive ability, or social support system  Outcome: Progressing

## 2025-05-15 NOTE — PROGRESS NOTES
"The history and physical were reviewed, along with progress notes, and the patient was examined. There are no changes since it was written.    /61   Pulse 105   Temp 99 °F (37.2 °C) (Oral)   Resp 18   Ht 5' 3\" (1.6 m)   Wt 86.4 kg (190 lb 7.6 oz)   SpO2 94%   BMI 33.74 kg/m²     "

## 2025-05-15 NOTE — CONSULTS
Consultation - Infectious Disease   Name: Sharon Vega 74 y.o. female I MRN: [id number]  Unit/Bed#: -01 I Date of Admission: 5/14/2025   Date of Service: 5/15/2025 I Hospital Day: 1   Inpatient consult to Infectious Diseases  Consult performed by: Niyah Benton DPM  Consult ordered by: Jhony Augustin MD        Physician Requesting Evaluation: Kate Hood MD   Reason for Evaluation / Principal Problem: Right hip abscess/concern for possible prosthetic joint infection    Assessment & Plan  Abscess of right hip  Presented to [facility] on 5/13 from rehab due to persistent pain in right hip, history of right hip hemiarthroplasty on 7/2/2022.  Patient recently admitted in late April for similar complaint, s/p right hip lR aspiration for concern of septic arthritis.  Right hip arthrogram and joint aspiration resulted in enough fluid for culture, resulted negative.  Recommend discontinuation of antibiotics per ID and orthopedic surgery recommended outpatient follow-up for total right hip replacement.  Patient was undergoing workup for subsequent procedure when she obtained a right hip XR and CT chest abdomen pelvis which revealed Postsurgical change from right hip hemiarthroplasty. Organized collection of fluid and gas in the right iliac is muscle and similar collection surrounding the right hip arthroplasty in the deep gluteal muscles measuring up to 10 cm, concerning for infection. No hematoma.  While being evaluated in the emergency room, she was started on Vanco/Zosyn with recommendation for admission given meeting sepsis criteria with tachycardia, leukocytosis.  Patient recommended transfer to [facility] for tertiary care with consult orthopedics.  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 Procedure to be  performed on 5/15.     - Continue IV Vancomycin for empiric coverage postprocedure  - Follow-up IR procedure findings and cultures  - Appreciate orthopedic surgery recommendations for further treatment recommendations  - Follow-up pending blood cultures: NGTD x 24 hours, follow-up repeat cultures  - MRSA cultures negative  - Trend fever curve/vitals  - Trend CBC/BMP   - Monitor exam for new/developing symptoms  - Final antibiotics plan pending clinical course and findings.  If concern for prosthetic joint infection, suspect prolonged antibiotic duration    Sepsis without acute organ dysfunction (HCC)  Patient presented to [facility] on 5/13 with tachycardia, tachypnea, leukocytosis.  Source likely right hip abscess.  Started on IV vancomycin/Zosyn, now just on vancomycin per ID recommendations at [facility]     - Continue antibiotics as above   - Follow-up results from IR procedure today  Chronic pain syndrome  History of chronic low back pain, cervical/lumbar radiculopathy.       - Continue care per primary team  I have discussed the above management plan in detail with the primary service.   Infectious Disease service will follow.    Antibiotics:  Vancomycin    History of Present Illness   Sharon Vega is a 74 y.o. year old female who originally presented to the [facility] on 5/13 with right hip pain.  Patient is status post right Braulio arthroplasty of right hip in July 2022.  Patient was recently admitted for previous complaint at the end of April, underwent a arthrogram and joint aspiration by IR which revealed no positive findings on cultures to be concerned about infection.  However when patient presented on 5/13, noted to have tachycardia and leukocytosis with CT findings concerning for abscesses/infection in the deep gluteal muscles.  Given proximity to prosthetic joint, patient was evaluated orthopedic team and transferred to [place] for higher level/tertiary care.  Patient was evaluated by  the orthopedic team and the interventional radiologist, plan for aspiration of abscesses and a joint for cultures to determine if true prosthetic joint infection is present.    Patient appeared comfortable at bedside.  She denied nausea, vomiting, shortness of breath, chest pain, fevers, chills.  Endorsed some moderate right hip pain, particularly posterior in the gluteal region along her previous incision line.    A complete review of systems is negative other than that noted in the HPI.    Medical History Review: I have reviewed the patient's PMH, PSH, Social History, Family History, Meds, and Allergies   Historical Information   Past Medical History:   Diagnosis Date    Anemia     Anxiety     Arthritis     Closed transcervical fracture of right femur (HCC) 2022    Colon polyp     Depression     Fracture of right wrist 2022    GERD (gastroesophageal reflux disease)     Hiatal hernia     Hyperlipidemia     Hypertension      Past Surgical History:   Procedure Laterality Date    APPENDECTOMY      CHOLECYSTECTOMY      COLONOSCOPY      FL INJECTION RIGHT HIP (NON ARTHROGRAM)  2025    FRACTURE SURGERY Right     arm with plate and pins    HAND SURGERY Bilateral     HYSTERECTOMY      IR ASPIRATION JOINT (SPECIFY LOCATION)  2025    JOINT REPLACEMENT Bilateral     knees    [place] HEMIARTHROPLASTY HIP PARTIAL Right 2022    Procedure: HEMIARTHROPLASTY HIP (BIPOLAR), closed reduction with splinting right wrist;  Surgeon: Leo Roblero;  Location: [facility];  Service: Orthopedics    [place] NEUROPLASTY &/TRANSPOSITION ULNAR NERVE ELBOW Right 2023    Procedure: RELEASE CUBITAL TUNNEL;  Surgeon: Cody Calles DO;  Location: [facility];  Service: Orthopedics    SHOULDER ARTHROSCOPY Left      Social History     Tobacco Use    Smoking status: Former     Current packs/day: 0.00     Types: Cigarettes     Quit date:      Years since quittin.3    Smokeless tobacco: Never   Vaping Use    Vaping  status: Never Used   Substance and Sexual Activity    Alcohol use: Not Currently    Drug use: Never    Sexual activity: Not Currently     E-Cigarette/Vaping    E-Cigarette Use Never User      E-Cigarette/Vaping Substances    Nicotine No     THC No     CBD No     Flavoring No     Other No     Unknown No      Family History   Problem Relation Age of Onset    No Known Problems Mother      Social History     Tobacco Use    Smoking status: Former     Current packs/day: 0.00     Types: Cigarettes     Quit date:      Years since quittin.3    Smokeless tobacco: Never   Vaping Use    Vaping status: Never Used   Substance and Sexual Activity    Alcohol use: Not Currently    Drug use: Never    Sexual activity: Not Currently       Current Facility-Administered Medications:     acetaminophen (TYLENOL) tablet 650 mg, Q4H PRN    [START ON 2025] amLODIPine (NORVASC) tablet 10 mg, Daily    ascorbic acid (VITAMIN C) tablet 500 mg, BID    ceFAZolin (ANCEF) IVPB (premix in dextrose) 2,000 mg 50 mL, Once    chlorhexidine gluconate (HIBICLENS) 4 % topical liquid, Daily PRN    Cholecalciferol (VITAMIN D3) tablet 2,000 Units, Daily    [Held by provider] enoxaparin (LOVENOX) subcutaneous injection 40 mg, Daily    ferrous sulfate tablet 325 mg, BID With Meals    folic acid (FOLVITE) tablet 1 mg, Daily    gabapentin (NEURONTIN) capsule 600 mg, HS    HYDROmorphone (DILAUDID) injection 0.5 mg, Q4H PRN    loratadine (CLARITIN) tablet 10 mg, Daily    multivitamin-minerals (CENTRUM) tablet 1 tablet, Daily    mupirocin (BACTROBAN) 2 % ointment, Daily    ondansetron (ZOFRAN) injection 4 mg, Q6H PRN    oxyCODONE (ROXICODONE) immediate release tablet 10 mg, Q4H PRN    oxyCODONE (ROXICODONE) IR tablet 5 mg, Q4H PRN    pantoprazole (PROTONIX) EC tablet 40 mg, Early Morning    pravastatin (PRAVACHOL) tablet 40 mg, HS    sodium chloride 0.9 % infusion, Continuous, Last Rate: Stopped (05/15/25 7958)    traZODone (DESYREL) tablet 100 mg, HS     vancomycin 750 mg in sodium chloride 0.9% 250 mL, Q12H, Last Rate: 750 mg (05/15/25 1107)    venlafaxine (EFFEXOR-XR) 24 hr capsule 150 mg, Daily  Patient has no known allergies.    Objective :  Temp:  [97.8 °F (36.6 °C)-99.4 °F (37.4 °C)] 97.8 °F (36.6 °C)  HR:  [] 98  BP: (128-137)/(73-92) 131/85  Resp:  [18] 18  SpO2:  [93 %-95 %] 93 %  O2 Device: Nasal cannula  Nasal Cannula O2 Flow Rate (L/min):  [2 L/min] 2 L/min    General:  No acute distress  Psychiatric:  Awake and alert  Pulmonary:  Normal respiratory excursion without accessory muscle use  Abdomen:  Soft, nontender  Extremities:  No edema.  Weakness of right lower extremity with flexion of the hip secondary to pain.  Gross motor function intact to right foot  Skin: Moderate tenderness to right posterior hip surgical scar.  No significant fluctuation.  No significant erythema/edema      Lab Results: I have reviewed the following results:  Results from last 7 days   Lab Units 05/15/25  0653 05/14/25  0629 05/13/25  2236   WBC Thousand/uL 23.16* 26.11* 28.82*   HEMOGLOBIN g/dL 10.1* 10.4* 12.0   PLATELETS Thousands/uL 382 524* 603*     Results from last 7 days   Lab Units 05/15/25  0653 05/14/25  0629 05/13/25  2206   SODIUM mmol/L 132* 134* 130*   POTASSIUM mmol/L 4.2 3.9 4.0   CHLORIDE mmol/L 101 101 94*   CO2 mmol/L 23 25 26   BUN mg/dL 22 22 25   CREATININE mg/dL 0.61 0.66 0.75   EGFR ml/min/1.73sq m 88 86 78   CALCIUM mg/dL 9.0 9.2 10.7*   AST U/L  --   --  31   ALT U/L  --   --  34   ALK PHOS U/L  --   --  132*   ALBUMIN g/dL  --   --  2.9*     Results from last 7 days   Lab Units 05/14/25  2351 05/14/25  2350 05/13/25  2225 05/13/25 2205 05/13/25  1240 05/13/25  1230 05/13/25  0923   BLOOD CULTURE  Received in Microbiology Lab. Culture in Progress. Received in Microbiology Lab. Culture in Progress. No Growth at 24 hrs. No Growth at 24 hrs. Received in Microbiology Lab. Culture in Progress. No Growth at 24 hrs.  --    URINE CULTURE   --   --    --   --   --   --  >100,000 cfu/ml     Results from last 7 days   Lab Units 05/14/25  0629 05/13/25  2206 05/13/25  0923   PROCALCITONIN ng/ml 0.69* 0.75* 0.58*     Results from last 7 days   Lab Units 05/13/25  2206   CRP mg/L 374.9*               Imaging Results Review: I reviewed radiology reports from this admission including: CT abdomen/pelvis.  Other Study Results Review: No additional pertinent studies reviewed.    Administrative Statements   I have spent a total time of 45 minutes in caring for this patient on the day of the visit/encounter including Diagnostic results, Documenting in the medical record, Reviewing/placing orders in the medical record (including tests, medications, and/or procedures), Obtaining or reviewing history  , and Communicating with other healthcare professionals .

## 2025-05-15 NOTE — ASSESSMENT & PLAN NOTE
BP overall stable on review   Resume home amlodipine 10 mg daily tomorrow if pressures continue to improve  Monitor closely

## 2025-05-15 NOTE — ASSESSMENT & PLAN NOTE
74 y.o.female with fluid collections in the right iliac muscle and deep gluteal muscles. Recommend IR aspiration and labs for further evaluation of fluid collections.    WBAT RLE  IR consultation recommended for aspiration of fluid collections  PT/OT  Pain control  DVT ppx: per primary  Medical management including antibiotics per primary team, currently on IV vancomycin  Dispo planning.

## 2025-05-15 NOTE — PLAN OF CARE
Problem: PAIN - ADULT  Goal: Verbalizes/displays adequate comfort level or baseline comfort level  Description: Interventions:  - Encourage patient to monitor pain and request assistance  - Assess pain using appropriate pain scale  - Administer analgesics based on type and severity of pain and evaluate response  - Implement non-pharmacological measures as appropriate and evaluate response  - Consider cultural and social influences on pain and pain management  - Notify physician/advanced practitioner if interventions unsuccessful or patient reports new pain  5/14/2025 2044 by Gonzales Mckenzie RN  Outcome: Adequate for Discharge  5/14/2025 2044 by Gonzales Mckenzie RN  Outcome: Progressing     Problem: INFECTION - ADULT  Goal: Absence or prevention of progression during hospitalization  Description: INTERVENTIONS:  - Assess and monitor for signs and symptoms of infection  - Monitor lab/diagnostic results  - Monitor all insertion sites, i.e. indwelling lines, tubes, and drains  - Monitor endotracheal if appropriate and nasal secretions for changes in amount and color  - [place] appropriate cooling/warming therapies per order  - Administer medications as ordered  - Instruct and encourage patient and family to use good hand hygiene technique  - Identify and instruct in appropriate isolation precautions for identified infection/condition  5/14/2025 2044 by Gonzales Mckenzie RN  Outcome: Adequate for Discharge  5/14/2025 2044 by Gonzales Mckenzie RN  Outcome: Progressing  Goal: Absence of fever/infection during neutropenic period  Description: INTERVENTIONS:  - Monitor WBC    5/14/2025 2044 by Gonzales Mckenzie RN  Outcome: Adequate for Discharge  5/14/2025 2044 by Gonzales Mckenzie RN  Outcome: Progressing     Problem: SAFETY ADULT  Goal: Patient will remain free of falls  Description: INTERVENTIONS:  - Educate patient/family on patient safety including physical limitations  - Instruct patient to call for assistance with activity   - Consult  OT/PT to assist with strengthening/mobility   - Keep Call bell within reach  - Keep bed low and locked with side rails adjusted as appropriate  - Keep care items and personal belongings within reach  - Initiate and maintain comfort rounds  - Make Fall Risk Sign visible to staff  - Offer Toileting every 2 Hours, in advance of need  - Initiate/Maintain bed alarm  - Obtain necessary fall risk management equipment: yellow socks  - Apply yellow socks and bracelet for high fall risk patients  - Consider moving patient to room near nurses station  5/14/2025 2044 by Gonzales Mckenzie RN  Outcome: Adequate for Discharge  5/14/2025 2044 by Gonzales Mckenzie RN  Outcome: Progressing  Goal: Maintain or return to baseline ADL function  Description: INTERVENTIONS:  -  Assess patient's ability to carry out ADLs; assess patient's baseline for ADL function and identify physical deficits which impact ability to perform ADLs (bathing, care of mouth/teeth, toileting, grooming, dressing, etc.)  - Assess/evaluate cause of self-care deficits   - Assess range of motion  - Assess patient's mobility; develop plan if impaired  - Assess patient's need for assistive devices and provide as appropriate  - Encourage maximum independence but intervene and supervise when necessary  - Involve family in performance of ADLs  - Assess for home care needs following discharge   - Consider OT consult to assist with ADL evaluation and planning for discharge  - Provide patient education as appropriate  5/14/2025 2044 by Gonzales Mckenzie RN  Outcome: Adequate for Discharge  5/14/2025 2044 by Gonzales Mckenzie RN  Outcome: Progressing  Goal: Maintains/Returns to pre admission functional level  Description: INTERVENTIONS:  - Perform AM-PAC 6 Click Basic Mobility/ Daily Activity assessment daily.  - Set and communicate daily mobility goal to care team and patient/family/caregiver.   - Collaborate with rehabilitation services on mobility goals if consulted  - Perform Range of  Motion 3 times a day.  - Reposition patient every 2 hours.  - Dangle patient 3 times a day  - Stand patient 3 times a day  - Ambulate patient 3 times a day  - Out of bed to chair 3 times a day   - Out of bed for meals 3 times a day  - Out of bed for toileting  - Record patient progress and toleration of activity level   5/14/2025 2044 by Gonzales Mckenzie RN  Outcome: Adequate for Discharge  5/14/2025 2044 by Gonzales Mckenzie RN  Outcome: Progressing     Problem: DISCHARGE PLANNING  Goal: Discharge to home or other facility with appropriate resources  Description: INTERVENTIONS:  - Identify barriers to discharge w/patient and caregiver  - Arrange for needed discharge resources and transportation as appropriate  - Identify discharge learning needs (meds, wound care, etc.)  - Arrange for interpretive services to assist at discharge as needed  - Refer to Case Management Department for coordinating discharge planning if the patient needs post-hospital services based on physician/advanced practitioner order or complex needs related to functional status, cognitive ability, or social support system  5/14/2025 2044 by Gonzales Mckenzie RN  Outcome: Adequate for Discharge  5/14/2025 2044 by Gonzales Mckenzie RN  Outcome: Progressing     Problem: Knowledge Deficit  Goal: Patient/family/caregiver demonstrates understanding of disease process, treatment plan, medications, and discharge instructions  Description: Complete learning assessment and assess knowledge base.  Interventions:  - Provide teaching at level of understanding  - Provide teaching via preferred learning methods  5/14/2025 2044 by Gonzales Mckenzie RN  Outcome: Adequate for Discharge  5/14/2025 2044 by Gonzales Mckenzie RN  Outcome: Progressing

## 2025-05-15 NOTE — ASSESSMENT & PLAN NOTE
Presented to [facility] on 5/13 from rehab due to persistent pain in right hip, history of right hip hemiarthroplasty on 7/2/2022.  Patient recently admitted in late April for similar complaint, s/p right hip lR aspiration for concern of septic arthritis.  Right hip arthrogram and joint aspiration resulted in enough fluid for culture, resulted negative.  Recommend discontinuation of antibiotics per ID and orthopedic surgery recommended outpatient follow-up for total right hip replacement.  Patient was undergoing workup for subsequent procedure when she obtained a right hip XR and CT chest abdomen pelvis which revealed Postsurgical change from right hip hemiarthroplasty. Organized collection of fluid and gas in the right iliac is muscle and similar collection surrounding the right hip arthroplasty in the deep gluteal muscles measuring up to 10 cm, concerning for infection. No hematoma.  While being evaluated in the emergency room, she was started on Vanco/Zosyn with recommendation for admission given meeting sepsis criteria with tachycardia, leukocytosis.  Patient recommended transfer to [facility] for tertiary care with consult orthopedics.  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 Procedure to be performed on 5/15.     - Continue IV Vancomycin for empiric coverage postprocedure  - Follow-up IR procedure findings and cultures  - Appreciate orthopedic surgery recommendations for further treatment recommendations  - Follow-up pending blood cultures: NGTD x 24 hours, follow-up repeat cultures  - MRSA cultures negative  - Trend fever curve/vitals  - Trend CBC/BMP   - Monitor exam for new/developing symptoms  - Final antibiotics plan pending clinical course and findings.  If concern for prosthetic joint infection, suspect  prolonged antibiotic duration

## 2025-05-15 NOTE — ASSESSMENT & PLAN NOTE
"Pt presented from [facility] with right hip pain   Had recent IR hip joint aspiration on 4/24, was scheduled for conversion of partial hip replacement to total hip on 5/14 but cancelled due to leukocytosis   CT scan obtained with evidence of \"post surgical change from right hip hemiarthroplasty. Organized collection of fluid and gas in the right iliac muscle and similar collection surrounding right hip arthroplasty in the deep gluteal muscles measuring up to 10 cm\"   Transferred to B for orthopedic evaluation   Orthopedics following,  Recommended IR consult for aspiration and possible drain placement today, continue NPO status   Additional surgical plans to be determined after IR intervention   ID consulted for abx management  Currently on IV Vancomycin   Will need to follow up cultures obtained by IR   As needed analgesics, currently on PRN oxycodone 5/10 mg Q4H for moderate and severe pain with PRN IV dilaudid 0.5 mg Q4H for breakthrough  "

## 2025-05-15 NOTE — PROGRESS NOTES
Sharon Vega is a 74 y.o. female who is currently ordered Vancomycin IV with management by the Pharmacy Consult service.  Relevant clinical data and objective / subjective history reviewed.  Vancomycin Assessment:  Indication and Goal AUC/Trough: Bone/joint infection (goal -600, trough >10), -600, trough >10  Clinical Status: worsening  Micro:   MRSA culture pending; blood cultures pending; urine culture >100,000 CFU/ml; COVID/RSV/Flu negative  Renal Function:  SCr: 0.66 mg/dL  CrCl: 75.1 mL/min  Renal replacement: Not on dialysis  Days of Therapy: 1  Current Dose: 1250 mg IV load; 1000 mg IV Q12H  Vancomycin Plan:  New Dosin mg IV Q12H  Estimated AUC: 548 mcg*hr/mL  Estimated Trough: 16.5 mcg/mL  Next Level: 2025 at 0600  Renal Function Monitoring: Daily BMP and UOP  Pharmacy will continue to follow closely for s/sx of nephrotoxicity, infusion reactions and appropriateness of therapy.  BMP and CBC will be ordered per protocol. We will continue to follow the patient’s culture results and clinical progress daily.    Franklyn Herron, Pharmacist

## 2025-05-15 NOTE — CONSULTS
Consultation - Orthopedics   Name: Sharon Vega 74 y.o. female I MRN: [id number]  Unit/Bed#: -01 I Date of Admission: 5/14/2025   Date of Service: 5/14/2025 I Hospital Day: 0   Inpatient consult to Orthopedic Surgery  Consult performed by: Sean Fabian MD  Consult ordered by: Jhony Augustin MD        Physician Requesting Evaluation: Jhony Augustin MD   Reason for Evaluation / Principal Problem: Abscess of right gluteus and iliacus muscles      Assessment & Plan  Abscess of right hip  74 y.o.female with fluid collections in the right iliac muscle and deep gluteal muscles. Recommend IR aspiration and labs for further evaluation of fluid collections.    WBAT RLE  IR consultation recommended for aspiration of fluid collections  PT/OT  Pain control  DVT ppx: per primary  Medical management including antibiotics per primary team, currently on IV vancomycin  Dispo planning.    Please contact the SecureChat role for the Orthopedics service with any questions/concerns.    History of Present Illness   HPI: Sharon Vega is a 74 y.o. female community ambulator at baseline who reports increasing right hip pain over the last few months. Past surgical history significant for right hip hemiarthroplasty on 7/2/2022 by Dr. Roblero. Patient was seen by Dr. Calles in clinic who ordered bone scan of the right hip which did not demonstrate evidence of hardware loosening, and intra-articular anesthetic-only injection which was completed by IR on 4/21 and initially provided relief. She subsequently had new onset right hip pain a few days later which prompted presentation to ED at that time. She underwent repeat IR aspiration with negative cultures at that time. Following discharge she was scheduled for conversion of hemiarthroplasty to total arthroplasty, however on preoperative labs she was found to have a leukocytosis prompting presentation to ED from SNF. She was further evaluated and found to have elevated inflammatory labs and  imaging discovered fluid collecitons around her right hip prompting transfer to [facility] for a higher level of care.    Review of Systems  I have reviewed the patient's available PMH, PSH, Social History, Family History, Meds, and Allergies  Historical Information   Past Medical History:   Diagnosis Date    Anemia     Anxiety     Arthritis     Closed transcervical fracture of right femur (HCC) 2022    Colon polyp     Depression     Fracture of right wrist 2022    GERD (gastroesophageal reflux disease)     Hiatal hernia     Hyperlipidemia     Hypertension      Past Surgical History:   Procedure Laterality Date    APPENDECTOMY      CHOLECYSTECTOMY      COLONOSCOPY      FL INJECTION RIGHT HIP (NON ARTHROGRAM)  2025    FRACTURE SURGERY Right     arm with plate and pins    HAND SURGERY Bilateral     HYSTERECTOMY      IR ASPIRATION JOINT (SPECIFY LOCATION)  2025    JOINT REPLACEMENT Bilateral     knees    [place] HEMIARTHROPLASTY HIP PARTIAL Right 2022    Procedure: HEMIARTHROPLASTY HIP (BIPOLAR), closed reduction with splinting right wrist;  Surgeon: Leo Roblero;  Location: [facility];  Service: Orthopedics    [place] NEUROPLASTY &/TRANSPOSITION ULNAR NERVE ELBOW Right 2023    Procedure: RELEASE CUBITAL TUNNEL;  Surgeon: Cody Calles DO;  Location: [facility];  Service: Orthopedics    SHOULDER ARTHROSCOPY Left      Social History     Tobacco Use    Smoking status: Former     Current packs/day: 0.00     Types: Cigarettes     Quit date:      Years since quittin.3    Smokeless tobacco: Never   Vaping Use    Vaping status: Never Used   Substance and Sexual Activity    Alcohol use: Not Currently    Drug use: Never    Sexual activity: Not Currently     E-Cigarette/Vaping    E-Cigarette Use Never User      E-Cigarette/Vaping Substances    Nicotine No     THC No     CBD No     Flavoring No     Other No     Unknown No        Social History     Tobacco Use    Smoking status: Former     Current  packs/day: 0.00     Types: Cigarettes     Quit date:      Years since quittin.3    Smokeless tobacco: Never   Vaping Use    Vaping status: Never Used   Substance and Sexual Activity    Alcohol use: Not Currently    Drug use: Never    Sexual activity: Not Currently       Current Facility-Administered Medications:     acetaminophen (TYLENOL) tablet 650 mg, Q4H PRN    [START ON 5/15/2025] ascorbic acid (VITAMIN C) tablet 500 mg, BID    [START ON 5/15/2025] Cholecalciferol (VITAMIN D3) tablet 2,000 Units, Daily    [Held by provider] enoxaparin (LOVENOX) subcutaneous injection 40 mg, Daily    [START ON 5/15/2025] ferrous sulfate tablet 325 mg, BID With Meals    [START ON 5/15/2025] folic acid (FOLVITE) tablet 1 mg, Daily    gabapentin (NEURONTIN) capsule 600 mg, HS    HYDROmorphone (DILAUDID) injection 0.5 mg, Q4H PRN    [START ON 5/15/2025] loratadine (CLARITIN) tablet 10 mg, Daily    [START ON 5/15/2025] multivitamin-minerals (CENTRUM) tablet 1 tablet, Daily    [START ON 5/15/2025] mupirocin (BACTROBAN) 2 % ointment, Daily    ondansetron (ZOFRAN) injection 4 mg, Q6H PRN    oxyCODONE (ROXICODONE) immediate release tablet 10 mg, Q4H PRN    oxyCODONE (ROXICODONE) IR tablet 5 mg, Q4H PRN    [START ON 5/15/2025] pantoprazole (PROTONIX) EC tablet 40 mg, Early Morning    pravastatin (PRAVACHOL) tablet 40 mg, HS    traZODone (DESYREL) tablet 100 mg, HS    [START ON 5/15/2025] vancomycin (VANCOCIN) IVPB (premix in dextrose) 1,000 mg 200 mL, Q12H    [START ON 5/15/2025] venlafaxine (EFFEXOR-XR) 24 hr capsule 150 mg, Daily  Prior to Admission Medications   Prescriptions Last Dose Informant Patient Reported? Taking?   Multiple Vitamins-Minerals (multivitamin with minerals) tablet   No No   Sig: Take 1 tablet by mouth daily   acetaminophen (TYLENOL) 325 mg tablet   No No   Sig: Take 2 tablets (650 mg total) by mouth every 6 (six) hours as needed for mild pain   amLODIPine (NORVASC) 10 mg tablet   Yes No   Sig: Take 10 mg  by mouth daily   ascorbic acid (VITAMIN C) 500 MG tablet   No No   Sig: Take 1 tablet (500 mg total) by mouth 2 (two) times a day   calcium carbonate (OS-ALPESH) 600 MG tablet   Yes No   Sig: Take 600 mg by mouth daily Taking 2 tablets (1200 mg) daily   celecoxib (CeleBREX) 200 mg capsule   No No   Sig: Take 1 capsule (200 mg total) by mouth 2 (two) times a day As needed for joint pain   Patient taking differently: Take 200 mg by mouth 2 (two) times a day   cetirizine (ZyrTEC) 10 mg tablet   Yes No   Sig: Take 10 mg by mouth daily   cholecalciferol (VITAMIN D3) 1,000 units tablet   Yes No   Sig: Take 1,000 Units by mouth daily 2,000 units daily   ferrous sulfate 324 (65 Fe) mg   No No   Sig: Take 1 tablet (324 mg total) by mouth 2 (two) times a day before meals   folic acid (FOLVITE) 1 mg tablet   No No   Sig: Take 1 tablet (1 mg total) by mouth daily   gabapentin (NEURONTIN) 600 MG tablet   Yes No   Sig: Take 600 mg by mouth daily at bedtime   methocarbamol (ROBAXIN) 500 mg tablet   No No   Sig: Take 1 tablet (500 mg total) by mouth every 6 (six) hours as needed for muscle spasms   mupirocin (BACTROBAN) 2 % ointment   No No   Sig: Apply topically to the inside of the left and right nostrils twice daily for 5 days before surgery, including the morning of surgery.   omeprazole (PriLOSEC) 40 MG capsule   Yes No   Sig: Take 40 mg by mouth daily   oxyCODONE (OXY-IR) 5 MG capsule   Yes No   Sig: Take 5 mg by mouth every 4 (four) hours as needed for moderate pain   pravastatin (PRAVACHOL) 40 mg tablet   Yes No   Sig: Take 40 mg by mouth daily at bedtime   traZODone (DESYREL) 100 mg tablet   Yes No   Sig: Take 100 mg by mouth daily at bedtime   venlafaxine (EFFEXOR-XR) 150 mg 24 hr capsule   Yes No   Sig: Take 150 mg by mouth daily      Facility-Administered Medications: None     Patient has no known allergies.    Objective      Temp:  [97.3 °F (36.3 °C)-100 °F (37.8 °C)] 98.8 °F (37.1 °C)  HR:  [] 117  BP:  "(103-138)/(63-92) 137/92  Resp:  [12-22] 18  SpO2:  [88 %-97 %] 95 %  O2 Device: Nasal cannula  Nasal Cannula O2 Flow Rate (L/min):  [1 L/min-2 L/min] 1 L/min             I/O       None            Physical Exam   Ortho Exam   Musculoskeletal: Right Lower Extremity  No obvious deformity, no open wounds, no erythema or ecchymosis  TTP about the right hip  Sensation intact in GRANDA/SA/SP/DP/Tibial distributions  Motor intact to ankle plantarflexion/dorsiflexion, EHL/FHL  Extremity is warm and well perfused with capillary refill < 2 seconds      No other areas of tenderness on tertiary exam.        Imaging:  Imaging demonstrates fluid collection and gas in the right iliacus muscle and musculature surrounding the right hip including the right iliacus and gluteus muscles.        Lab Results: I have reviewed the following results:   Recent Labs     05/13/25  0400 05/13/25  0923 05/13/25  2206 05/13/25  2225 05/13/25  2236 05/14/25  0629   WBC 28.58*  --   --   --  28.82* 26.11*   HGB 12.1  --   --   --  12.0 10.4*   HCT 37.4  --   --   --  36.4 31.6*   *  --   --   --  603* 524*   BANDSPCT 3  --   --   --   --   --    BUN  --  23 25  --   --  22   CREATININE  --  0.71 0.75  --   --  0.66   PTT  --   --   --  28  --   --    INR  --   --   --  1.24*  --   --    ESR  --   --   --   --  >130*  --    CRP  --   --  374.9*  --   --   --      Blood Culture:   Lab Results   Component Value Date    BLOODCX Received in Microbiology Lab. Culture in Progress. 05/13/2025     Wound Culture: No results found for: \"WOUNDCULT\"    "

## 2025-05-15 NOTE — SEDATION DOCUMENTATION
IR drainage tube x2 placement performed by . Drainage fluid collected and sent to [employer] kit. Both drains connected to godfrey bulbs. Patient tolerated procedure well. Report called to MS-4 RN.

## 2025-05-16 PROBLEM — D64.9 ANEMIA: Status: ACTIVE | Noted: 2025-05-16

## 2025-05-16 LAB
ANION GAP SERPL CALCULATED.3IONS-SCNC: 5 MMOL/L (ref 4–13)
ATRIAL RATE: 106 BPM
ATRIAL RATE: 116 BPM
BASOPHILS # BLD AUTO: 0.06 THOUSANDS/ÂΜL (ref 0–0.1)
BASOPHILS NFR BLD AUTO: 0 % (ref 0–1)
BUN SERPL-MCNC: 22 MG/DL (ref 5–25)
CALCIUM SERPL-MCNC: 9 MG/DL (ref 8.4–10.2)
CHLORIDE SERPL-SCNC: 100 MMOL/L (ref 96–108)
CO2 SERPL-SCNC: 27 MMOL/L (ref 21–32)
CREAT SERPL-MCNC: 0.72 MG/DL (ref 0.6–1.3)
EOSINOPHIL # BLD AUTO: 0.08 THOUSAND/ÂΜL (ref 0–0.61)
EOSINOPHIL NFR BLD AUTO: 0 % (ref 0–6)
ERYTHROCYTE [DISTWIDTH] IN BLOOD BY AUTOMATED COUNT: 14.7 % (ref 11.6–15.1)
GFR SERPL CREATININE-BSD FRML MDRD: 82 ML/MIN/1.73SQ M
GLUCOSE SERPL-MCNC: 110 MG/DL (ref 65–140)
HCT VFR BLD AUTO: 30.9 % (ref 34.8–46.1)
HGB BLD-MCNC: 9.9 G/DL (ref 11.5–15.4)
IMM GRANULOCYTES # BLD AUTO: 0.38 THOUSAND/UL (ref 0–0.2)
IMM GRANULOCYTES NFR BLD AUTO: 2 % (ref 0–2)
LYMPHOCYTES # BLD AUTO: 1.72 THOUSANDS/ÂΜL (ref 0.6–4.47)
LYMPHOCYTES NFR BLD AUTO: 7 % (ref 14–44)
MAGNESIUM SERPL-MCNC: 1.9 MG/DL (ref 1.9–2.7)
MCH RBC QN AUTO: 29.7 PG (ref 26.8–34.3)
MCHC RBC AUTO-ENTMCNC: 32 G/DL (ref 31.4–37.4)
MCV RBC AUTO: 93 FL (ref 82–98)
MONOCYTES # BLD AUTO: 1.67 THOUSAND/ÂΜL (ref 0.17–1.22)
MONOCYTES NFR BLD AUTO: 7 % (ref 4–12)
NEUTROPHILS # BLD AUTO: 19.51 THOUSANDS/ÂΜL (ref 1.85–7.62)
NEUTS SEG NFR BLD AUTO: 84 % (ref 43–75)
NRBC BLD AUTO-RTO: 0 /100 WBCS
P AXIS: 19 DEGREES
P AXIS: 40 DEGREES
PLATELET # BLD AUTO: 461 THOUSANDS/UL (ref 149–390)
PMV BLD AUTO: 10 FL (ref 8.9–12.7)
POTASSIUM SERPL-SCNC: 4 MMOL/L (ref 3.5–5.3)
PR INTERVAL: 192 MS
PR INTERVAL: 232 MS
QRS AXIS: -40 DEGREES
QRS AXIS: -46 DEGREES
QRSD INTERVAL: 110 MS
QRSD INTERVAL: 110 MS
QT INTERVAL: 318 MS
QT INTERVAL: 338 MS
QTC INTERVAL: 442 MS
QTC INTERVAL: 449 MS
RBC # BLD AUTO: 3.33 MILLION/UL (ref 3.81–5.12)
SODIUM SERPL-SCNC: 132 MMOL/L (ref 135–147)
T WAVE AXIS: 44 DEGREES
T WAVE AXIS: 52 DEGREES
VANCOMYCIN TROUGH SERPL-MCNC: 14.2 UG/ML (ref 10–20)
VENTRICULAR RATE: 106 BPM
VENTRICULAR RATE: 116 BPM
WBC # BLD AUTO: 23.42 THOUSAND/UL (ref 4.31–10.16)

## 2025-05-16 PROCEDURE — 80202 ASSAY OF VANCOMYCIN: CPT | Performed by: INTERNAL MEDICINE

## 2025-05-16 PROCEDURE — 87070 CULTURE OTHR SPECIMN AEROBIC: CPT | Performed by: STUDENT IN AN ORGANIZED HEALTH CARE EDUCATION/TRAINING PROGRAM

## 2025-05-16 PROCEDURE — 99232 SBSQ HOSP IP/OBS MODERATE 35: CPT | Performed by: NURSE PRACTITIONER

## 2025-05-16 PROCEDURE — 93010 ELECTROCARDIOGRAM REPORT: CPT | Performed by: INTERNAL MEDICINE

## 2025-05-16 PROCEDURE — 80048 BASIC METABOLIC PNL TOTAL CA: CPT | Performed by: PHYSICIAN ASSISTANT

## 2025-05-16 PROCEDURE — NC001 PR NO CHARGE: Performed by: ORTHOPAEDIC SURGERY

## 2025-05-16 PROCEDURE — 99232 SBSQ HOSP IP/OBS MODERATE 35: CPT | Performed by: STUDENT IN AN ORGANIZED HEALTH CARE EDUCATION/TRAINING PROGRAM

## 2025-05-16 PROCEDURE — 87205 SMEAR GRAM STAIN: CPT | Performed by: STUDENT IN AN ORGANIZED HEALTH CARE EDUCATION/TRAINING PROGRAM

## 2025-05-16 PROCEDURE — 87075 CULTR BACTERIA EXCEPT BLOOD: CPT | Performed by: STUDENT IN AN ORGANIZED HEALTH CARE EDUCATION/TRAINING PROGRAM

## 2025-05-16 PROCEDURE — 85025 COMPLETE CBC W/AUTO DIFF WBC: CPT | Performed by: PHYSICIAN ASSISTANT

## 2025-05-16 PROCEDURE — 87076 CULTURE ANAEROBE IDENT EACH: CPT | Performed by: STUDENT IN AN ORGANIZED HEALTH CARE EDUCATION/TRAINING PROGRAM

## 2025-05-16 PROCEDURE — 83735 ASSAY OF MAGNESIUM: CPT | Performed by: PHYSICIAN ASSISTANT

## 2025-05-16 PROCEDURE — 99232 SBSQ HOSP IP/OBS MODERATE 35: CPT | Performed by: PHYSICIAN ASSISTANT

## 2025-05-16 PROCEDURE — G0545 PR INHERENT VISIT TO INPT: HCPCS | Performed by: STUDENT IN AN ORGANIZED HEALTH CARE EDUCATION/TRAINING PROGRAM

## 2025-05-16 RX ADMIN — ACETAMINOPHEN 650 MG: 325 TABLET ORAL at 22:24

## 2025-05-16 RX ADMIN — HYDROMORPHONE HYDROCHLORIDE 0.5 MG: 1 INJECTION, SOLUTION INTRAMUSCULAR; INTRAVENOUS; SUBCUTANEOUS at 20:13

## 2025-05-16 RX ADMIN — OXYCODONE HYDROCHLORIDE 5 MG: 5 TABLET ORAL at 05:23

## 2025-05-16 RX ADMIN — PANTOPRAZOLE SODIUM 40 MG: 40 TABLET, DELAYED RELEASE ORAL at 05:23

## 2025-05-16 RX ADMIN — GABAPENTIN 600 MG: 300 CAPSULE ORAL at 22:24

## 2025-05-16 RX ADMIN — Medication 2000 UNITS: at 09:24

## 2025-05-16 RX ADMIN — VANCOMYCIN HYDROCHLORIDE 750 MG: 10 INJECTION, POWDER, LYOPHILIZED, FOR SOLUTION INTRAVENOUS at 20:16

## 2025-05-16 RX ADMIN — PRAVASTATIN SODIUM 40 MG: 40 TABLET ORAL at 22:24

## 2025-05-16 RX ADMIN — MUPIROCIN: 20 OINTMENT TOPICAL at 09:28

## 2025-05-16 RX ADMIN — OXYCODONE HYDROCHLORIDE AND ACETAMINOPHEN 500 MG: 500 TABLET ORAL at 09:24

## 2025-05-16 RX ADMIN — VANCOMYCIN HYDROCHLORIDE 750 MG: 10 INJECTION, POWDER, LYOPHILIZED, FOR SOLUTION INTRAVENOUS at 09:36

## 2025-05-16 RX ADMIN — Medication 1 TABLET: at 09:24

## 2025-05-16 RX ADMIN — FOLIC ACID 1 MG: 1 TABLET ORAL at 09:24

## 2025-05-16 RX ADMIN — ENOXAPARIN SODIUM 40 MG: 40 INJECTION SUBCUTANEOUS at 12:27

## 2025-05-16 RX ADMIN — LORATADINE 10 MG: 10 TABLET ORAL at 09:24

## 2025-05-16 RX ADMIN — OXYCODONE HYDROCHLORIDE 10 MG: 10 TABLET ORAL at 17:33

## 2025-05-16 RX ADMIN — VENLAFAXINE HYDROCHLORIDE 150 MG: 150 CAPSULE, EXTENDED RELEASE ORAL at 09:27

## 2025-05-16 RX ADMIN — FERROUS SULFATE TAB 325 MG (65 MG ELEMENTAL FE) 325 MG: 325 (65 FE) TAB at 09:24

## 2025-05-16 RX ADMIN — AMLODIPINE BESYLATE 10 MG: 10 TABLET ORAL at 09:24

## 2025-05-16 RX ADMIN — OXYCODONE HYDROCHLORIDE AND ACETAMINOPHEN 500 MG: 500 TABLET ORAL at 17:19

## 2025-05-16 RX ADMIN — TRAZODONE HYDROCHLORIDE 100 MG: 100 TABLET ORAL at 22:24

## 2025-05-16 RX ADMIN — FERROUS SULFATE TAB 325 MG (65 MG ELEMENTAL FE) 325 MG: 325 (65 FE) TAB at 16:53

## 2025-05-16 NOTE — ASSESSMENT & PLAN NOTE
Sodium 132 today   Possibly in setting of sepsis, volume depletion   Continue IVF hydration   Monitor sodium levels closely   Trend BMP

## 2025-05-16 NOTE — ASSESSMENT & PLAN NOTE
74 y.o.female with fluid collections in the right iliac muscle and deep gluteal muscles. Now s/p IR aspiration of two fluid collections around right hip (40 ml pus from anterior drain, 90 ml pus from lateral drain). Will further discuss treatment plan on AM rounds.   WBAT RLE  NPO  PT/OT  Pain control  DVT ppx: per primary  Medical management including antibiotics per primary team, currently on IV vancomycin  Dispo planning.

## 2025-05-16 NOTE — ASSESSMENT & PLAN NOTE
"Pt presented from [facility] with right hip pain   Had recent IR hip joint aspiration on 4/24, was scheduled for conversion of partial hip replacement to total hip on 5/14 but cancelled due to leukocytosis   CT scan obtained with evidence of \"post surgical change from right hip hemiarthroplasty. Organized collection of fluid and gas in the right iliac muscle and similar collection surrounding right hip arthroplasty in the deep gluteal muscles measuring up to 10 cm\"   Transferred to [facility] for orthopedic evaluation   Orthopedics following,  S/p IR aspiration and 2 x drain placement with synovasure culture on 5/15   Cultures to be obtained from RICK drain bulb, follow up   Appreciate ongoing orthopedic recommendations   ID following for abx management  Currently on IV Vancomycin   Follow up culture results and drain output   As needed analgesics, currently on PRN oxycodone 5/10 mg Q4H for moderate and severe pain with PRN IV dilaudid 0.5 mg Q4H for breakthrough  "

## 2025-05-16 NOTE — ASSESSMENT & PLAN NOTE
Previously hgb noted to be around 12   Slow decrease in hgb noted this hospitalization, hgb 9.9 today   Likely component of IVF dilution and reactive from infection   No evidence of acute bleeding noted at this time  Check iron panel   Trend CBC

## 2025-05-16 NOTE — PLAN OF CARE
Problem: PAIN - ADULT  Goal: Verbalizes/displays adequate comfort level or baseline comfort level  Description: Interventions:  - Encourage patient to monitor pain and request assistance  - Assess pain using appropriate pain scale  - Administer analgesics as ordered based on type and severity of pain and evaluate response  - Implement non-pharmacological measures as appropriate and evaluate response  - Consider cultural and social influences on pain and pain management  - Notify physician/advanced practitioner if interventions unsuccessful or patient reports new pain  - Educate patient/family on pain management process including their role and importance of  reporting pain   - Provide non-pharmacologic/complimentary pain relief interventions  Outcome: Progressing     Problem: INFECTION - ADULT  Goal: Absence or prevention of progression during hospitalization  Description: INTERVENTIONS:  - Assess and monitor for signs and symptoms of infection  - Monitor lab/diagnostic results  - Monitor all insertion sites, i.e. indwelling lines, tubes, and drains  - Monitor endotracheal if appropriate and nasal secretions for changes in amount and color  - [place] appropriate cooling/warming therapies per order  - Administer medications as ordered  - Instruct and encourage patient and family to use good hand hygiene technique  - Identify and instruct in appropriate isolation precautions for identified infection/condition  Outcome: Progressing  Goal: Absence of fever/infection during neutropenic period  Description: INTERVENTIONS:  - Monitor WBC  - Perform strict hand hygiene  - Limit to healthy visitors only  - No plants, dried, fresh or silk flowers with feliciano in patient room  Outcome: Progressing     Problem: SAFETY ADULT  Goal: Patient will remain free of falls  Description: INTERVENTIONS:  - Educate patient/family on patient safety including physical limitations  - Instruct patient to call for assistance with activity   -  Consider consulting OT/PT to assist with strengthening/mobility based on AM PAC & JH-HLM score  - Consult OT/PT to assist with strengthening/mobility   - Keep Call bell within reach  - Keep bed low and locked with side rails adjusted as appropriate  - Keep care items and personal belongings within reach  - Initiate and maintain comfort rounds  - Make Fall Risk Sign visible to staff  - Offer Toileting every 2 Hours, in advance of need  - Initiate/Maintain bed/chair alarm  - Obtain necessary fall risk management equipment: nonskid footwear  - Apply yellow socks and bracelet for high fall risk patients  - Consider moving patient to room near nurses station  Outcome: Progressing  Goal: Maintain or return to baseline ADL function  Description: INTERVENTIONS:  -  Assess patient's ability to carry out ADLs; assess patient's baseline for ADL function and identify physical deficits which impact ability to perform ADLs (bathing, care of mouth/teeth, toileting, grooming, dressing, etc.)  - Assess/evaluate cause of self-care deficits   - Assess range of motion  - Assess patient's mobility; develop plan if impaired  - Assess patient's need for assistive devices and provide as appropriate  - Encourage maximum independence but intervene and supervise when necessary  - Involve family in performance of ADLs  - Assess for home care needs following discharge   - Consider OT consult to assist with ADL evaluation and planning for discharge  - Provide patient education as appropriate  - Monitor functional capacity and physical performance, use of AM PAC & JH-HLM   - Monitor gait, balance and fatigue with ambulation    Outcome: Progressing  Goal: Maintains/Returns to pre admission functional level  Description: INTERVENTIONS:  - Perform AM-PAC 6 Click Basic Mobility/ Daily Activity assessment daily.  - Set and communicate daily mobility goal to care team and patient/family/caregiver.   - Collaborate with rehabilitation services on  mobility goals if consulted  - Perform Range of Motion 3 times a day.  - Reposition patient every 2 hours.  - Dangle patient 3 times a day  - Stand patient 3 times a day  - Ambulate patient 3 times a day  - Out of bed to chair 3 times a day   - Out of bed for meals 3 times a day  - Out of bed for toileting  - Record patient progress and toleration of activity level   Outcome: Progressing     Problem: DISCHARGE PLANNING  Goal: Discharge to home or other facility with appropriate resources  Description: INTERVENTIONS:  - Identify barriers to discharge w/patient and caregiver  - Arrange for needed discharge resources and transportation as appropriate  - Identify discharge learning needs (meds, wound care, etc.)  - Arrange for interpretive services to assist at discharge as needed  - Refer to Case Management Department for coordinating discharge planning if the patient needs post-hospital services based on physician/advanced practitioner order or complex needs related to functional status, cognitive ability, or social support system  Outcome: Progressing     Problem: Knowledge Deficit  Goal: Patient/family/caregiver demonstrates understanding of disease process, treatment plan, medications, and discharge instructions  Description: Complete learning assessment and assess knowledge base.  Interventions:  - Provide teaching at level of understanding  - Provide teaching via preferred learning methods  Outcome: Progressing     Problem: Prexisting or High Potential for Compromised Skin Integrity  Goal: Skin integrity is maintained or improved  Description: INTERVENTIONS:  - Identify patients at risk for skin breakdown  - Assess and monitor skin integrity including under and around medical devices   - Assess and monitor nutrition and hydration status  - Monitor labs  - Assess for incontinence   - Turn and reposition patient  - Assist with mobility/ambulation  - Relieve pressure over mane prominences   - Avoid friction and  shearing  - Provide appropriate hygiene as needed including keeping skin clean and dry  - Evaluate need for skin moisturizer/barrier cream  - Collaborate with interdisciplinary team  - Patient/family teaching  - Consider wound care consult

## 2025-05-16 NOTE — PROGRESS NOTES
"Progress Note - Hospitalist   Name: Sharon Vega 74 y.o. female I MRN: [id number]  Unit/Bed#: -01 I Date of Admission: 5/14/2025   Date of Service: 5/16/2025 I Hospital Day: 2    Assessment & Plan  Abscess of right hip  Pt presented from [facility] with right hip pain   Had recent IR hip joint aspiration on 4/24, was scheduled for conversion of partial hip replacement to total hip on 5/14 but cancelled due to leukocytosis   CT scan obtained with evidence of \"post surgical change from right hip hemiarthroplasty. Organized collection of fluid and gas in the right iliac muscle and similar collection surrounding right hip arthroplasty in the deep gluteal muscles measuring up to 10 cm\"   Transferred to B for orthopedic evaluation   Orthopedics following,  S/p IR aspiration and 2 x drain placement with synovasure culture on 5/15   Cultures to be obtained from RICK drain bulb, follow up   Appreciate ongoing orthopedic recommendations   ID following for abx management  Currently on IV Vancomycin   Follow up culture results and drain output   As needed analgesics, currently on PRN oxycodone 5/10 mg Q4H for moderate and severe pain with PRN IV dilaudid 0.5 mg Q4H for breakthrough  Sepsis without acute organ dysfunction (HCC)  POA at [facility] as evidenced by leukocytosis and tachycardia   In setting of right hip intramuscular abscess as above   Continue IV abx per ID recommendations  Continue IVF hydration  Tachycardia has resolved, WBC 23 again today   BC from [facility] negative x 48 hours, repeat BC obtained here negative x 24 hours  Lactic negative   Procal slowly improving  Trend CBC and temps closely   See plan as above  Primary hypertension  BP overall stable on review   Continue amlodipine 10 mg daily   Monitor closely   Hyponatremia  Sodium 132 today   Possibly in setting of sepsis, volume depletion   Continue IVF hydration   Monitor sodium levels closely   Trend " BMP  Hypomagnesemia  Resolved with IV repletion, repeat mag 1.9 today   Anemia  Previously hgb noted to be around 12   Slow decrease in hgb noted this hospitalization, hgb 9.9 today   Likely component of IVF dilution and reactive from infection   No evidence of acute bleeding noted at this time  Check iron panel   Trend CBC    VTE Pharmacologic Prophylaxis:   Moderate Risk (Score 3-4) - Pharmacological DVT Prophylaxis Ordered: enoxaparin (Lovenox).    Mobility:   Basic Mobility Inpatient Raw Score: 14  JH-HLM Goal: 4: Move to chair/commode  JH-HLM Achieved: 6: Walk 10 steps or more  JH-HLM Goal achieved. Continue to encourage appropriate mobility.    Patient Centered Rounds: I performed bedside rounds with nursing staff today.   Discussions with Specialists or Other Care Team Provider: Discussed with RN, CM, ID and reviewed previous notes     Education and Discussions with Family / Patient: Updated  (grand daughter) via phone. Alexandra     Current Length of Stay: 2 day(s)  Current Patient Status: Inpatient   Certification Statement: The patient will continue to require additional inpatient hospital stay due to monitoring drain output, IV abx, symptomatic improvement, PT/OT  Discharge Plan: Anticipate discharge in >72 hrs to discharge location to be determined pending rehab evaluations.    Code Status: Level 1 - Full Code    Subjective   Pt reports that she is feeling better today. Was able to get up and ambulate a bit in her room in the morning with less pain. No other complaints at this time.     Objective :  Temp:  [97.5 °F (36.4 °C)-98.5 °F (36.9 °C)] 98.1 °F (36.7 °C)  HR:  [] 87  BP: (114-166)/(58-86) 119/66  Resp:  [16-37] 16  SpO2:  [88 %-96 %] 92 %  O2 Device: None (Room air)  Nasal Cannula O2 Flow Rate (L/min):  [2 L/min-3 L/min] 3 L/min    Body mass index is 33.74 kg/m².     Input and Output Summary (last 24 hours):     Intake/Output Summary (Last 24 hours) at 5/16/2025 1459  Last data  filed at 5/16/2025 1401  Gross per 24 hour   Intake 110 ml   Output 360 ml   Net -250 ml       Physical Exam  Vitals reviewed.   Constitutional:       General: She is not in acute distress.     Comments: Pt is in no acute distress lying in her hospital bed resting comfortably      Cardiovascular:      Rate and Rhythm: Normal rate and regular rhythm.      Heart sounds: Murmur heard.   Pulmonary:      Effort: No respiratory distress.      Breath sounds: Normal breath sounds. No wheezing.   Genitourinary:     Comments: Right hip RICK drain x 2 in place draining purulent fluid     Musculoskeletal:      Right lower leg: No edema.      Left lower leg: No edema.     Neurological:      Mental Status: She is alert.     Psychiatric:         Mood and Affect: Mood normal.           Lines/Drains:  Lines/Drains/Airways       Active Status       Name Placement date Placement time Site Days    Abscess Drain Hip 05/15/25  1705  Hip  less than 1    Abscess Drain Hip 05/15/25  1706  Hip  less than 1                            Lab Results: I have reviewed the following results:   Results from last 7 days   Lab Units 05/16/25 0447 05/13/25  2236 05/13/25  0400   WBC Thousand/uL 23.42*   < > 28.58*   HEMOGLOBIN g/dL 9.9*   < > 12.1   HEMATOCRIT % 30.9*   < > 37.4   PLATELETS Thousands/uL 461*   < > 674*   BANDS PCT %  --   --  3   SEGS PCT % 84*   < >  --    LYMPHO PCT % 7*   < > 7*   MONO PCT % 7   < > 7   EOS PCT % 0   < > 1    < > = values in this interval not displayed.     Results from last 7 days   Lab Units 05/16/25  0447 05/14/25  0629 05/13/25  2206   SODIUM mmol/L 132*   < > 130*   POTASSIUM mmol/L 4.0   < > 4.0   CHLORIDE mmol/L 100   < > 94*   CO2 mmol/L 27   < > 26   BUN mg/dL 22   < > 25   CREATININE mg/dL 0.72   < > 0.75   ANION GAP mmol/L 5   < > 10   CALCIUM mg/dL 9.0   < > 10.7*   ALBUMIN g/dL  --   --  2.9*   TOTAL BILIRUBIN mg/dL  --   --  1.26*   ALK PHOS U/L  --   --  132*   ALT U/L  --   --  34   AST U/L  --   --   31   GLUCOSE RANDOM mg/dL 110   < > 119    < > = values in this interval not displayed.     Results from last 7 days   Lab Units 05/15/25  0653   INR  1.16             Results from last 7 days   Lab Units 05/14/25  2350 05/14/25  0629 05/13/25  2206 05/13/25  0923   LACTIC ACID mmol/L 0.8  --  1.1  --    PROCALCITONIN ng/ml  --  0.69* 0.75* 0.58*       Recent Cultures (last 7 days):   Results from last 7 days   Lab Units 05/14/25  2351 05/14/25  2350 05/13/25  2225 05/13/25  2205 05/13/25  1240 05/13/25  1230 05/13/25  0923   BLOOD CULTURE  No Growth at 24 hrs. No Growth at 24 hrs. No Growth at 48 hrs. No Growth at 48 hrs. Received in Microbiology Lab. Culture in Progress. No Growth at 48 hrs.  --    URINE CULTURE   --   --   --   --   --   --  >100,000 cfu/ml       Imaging Results Review: No pertinent imaging studies reviewed.  Other Study Results Review: No additional pertinent studies reviewed.    Last 24 Hours Medication List:     Current Facility-Administered Medications:     acetaminophen (TYLENOL) tablet 650 mg, Q4H PRN    amLODIPine (NORVASC) tablet 10 mg, Daily    ascorbic acid (VITAMIN C) tablet 500 mg, BID    chlorhexidine gluconate (HIBICLENS) 4 % topical liquid, Daily PRN    Cholecalciferol (VITAMIN D3) tablet 2,000 Units, Daily    enoxaparin (LOVENOX) subcutaneous injection 40 mg, Daily    ferrous sulfate tablet 325 mg, BID With Meals    folic acid (FOLVITE) tablet 1 mg, Daily    gabapentin (NEURONTIN) capsule 600 mg, HS    HYDROmorphone (DILAUDID) injection 0.5 mg, Q4H PRN    loratadine (CLARITIN) tablet 10 mg, Daily    multivitamin-minerals (CENTRUM) tablet 1 tablet, Daily    mupirocin (BACTROBAN) 2 % ointment, Daily    ondansetron (ZOFRAN) injection 4 mg, Q6H PRN    oxyCODONE (ROXICODONE) immediate release tablet 10 mg, Q4H PRN    oxyCODONE (ROXICODONE) IR tablet 5 mg, Q4H PRN    pantoprazole (PROTONIX) EC tablet 40 mg, Early Morning    pravastatin (PRAVACHOL) tablet 40 mg, HS    sodium chloride 0.9  % infusion, Continuous, Last Rate: Stopped (05/15/25 3679)    traZODone (DESYREL) tablet 100 mg, HS    vancomycin 750 mg in sodium chloride 0.9% 250 mL, Q12H, Last Rate: 750 mg (05/16/25 7504)    venlafaxine (EFFEXOR-XR) 24 hr capsule 150 mg, Daily    Administrative Statements   Today, Patient Was Seen By: Zoey Garibay PA-C    **Please Note: This note may have been constructed using a voice recognition system.**

## 2025-05-16 NOTE — ASSESSMENT & PLAN NOTE
POA at [facility] as evidenced by leukocytosis and tachycardia   In setting of right hip intramuscular abscess as above   Continue IV abx per ID recommendations  Continue IVF hydration  Tachycardia has resolved, WBC 23 again today   BC from [facility] negative x 48 hours, repeat BC obtained here negative x 24 hours  Lactic negative   Procal slowly improving  Trend CBC and temps closely   See plan as above

## 2025-05-16 NOTE — ASSESSMENT & PLAN NOTE
Presented to [facility] on 5/13 from rehab due to persistent pain in right hip, history of right hip hemiarthroplasty on 7/2/2022.  Patient recently admitted in late April for similar complaint, s/p right hip lR aspiration for concern of septic arthritis.  Right hip arthrogram and joint aspiration resulted in enough fluid for culture, resulted negative.  Recommend discontinuation of antibiotics per ID and orthopedic surgery recommended outpatient follow-up for total right hip replacement.  Patient was undergoing workup for subsequent procedure when she obtained a right hip XR and CT chest abdomen pelvis which revealed Postsurgical change from right hip hemiarthroplasty. Organized collection of fluid and gas in the right iliac is muscle and similar collection surrounding the right hip arthroplasty in the deep gluteal muscles measuring up to 10 cm, concerning for infection. No hematoma.  While being evaluated in the emergency room, she was started on Vanco/Zosyn with recommendation for admission given meeting sepsis criteria with tachycardia, leukocytosis.  Patient recommended transfer to [facility] for tertiary care with consult orthopedics.  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 Procedure performed on 5/15: Abscess drainage x 2 of fluid collections around right hip. 10 fr drains placed. 40 ml pus from anterior drain, and 90 ml pus from lateral drain.There are multiple other undrained areas     - Continue IV Vancomycin for empiric coverage   - Per IR, drained 2 fluid collections/abscesses with synovasure culture of abscess fluid vs. true synovial fluid. Currently has RICK drain x2 with purulent drainage  - Follow up culture from RICK drain bulb  - Appreciate orthopedic surgery recommendations for further treatment  recommendations.    - Follow-up pending blood cultures: NGTD x 48 hours  - MRSA cultures negative  - Trend fever curve/vitals  - Trend CBC/BMP   - Monitor exam for new/developing symptoms  - Final antibiotics plan pending clinical course and findings.  If concern for prosthetic joint infection, suspect prolonged antibiotic duration. Patient would benefit from revisional joint procedure for source control

## 2025-05-16 NOTE — PROGRESS NOTES
Progress Note - Orthopedics   Name: Sharon Vega 74 y.o. female I MRN: [id number]  Unit/Bed#: -01 I Date of Admission: 5/14/2025   Date of Service: 5/16/2025 I Hospital Day: 2    Assessment & Plan  Abscess of right hip  74 y.o.female with fluid collections in the right iliac muscle and deep gluteal muscles. Now s/p IR aspiration of two fluid collections around right hip (40 ml pus from anterior drain, 90 ml pus from lateral drain). Will further discuss treatment plan on AM rounds.   WBAT RLE  NPO  PT/OT  Pain control  DVT ppx: per primary  Medical management including antibiotics per primary team, currently on IV vancomycin  Dispo planning.          Subjective   74 y.o.female  No acute events, no new complaints. Pain well controlled. She feels hip pain feels unchanged.    Objective :  Temp:  [97.8 °F (36.6 °C)-99 °F (37.2 °C)] 97.8 °F (36.6 °C)  HR:  [] 84  BP: (127-166)/(58-86) 127/66  Resp:  [13-37] 18  SpO2:  [88 %-96 %] 95 %  O2 Device: None (Room air)  Nasal Cannula O2 Flow Rate (L/min):  [2 L/min-3 L/min] 3 L/min    Physical Exam  Musculoskeletal: RLE  Drains in place with purulent output  Non tender to palpation  No pain with hip micromotion  Hip motion to 80 flexion, 15 internal and 15 external rotation, limited by pain  SILT s/s/t/sp/dp  Motor intact EHL/FHL, ankle df/pf      Lab Results: I have reviewed the following results:  Recent Labs     05/13/25 2206 05/13/25 2225 05/13/25 2236 05/13/25  2236 05/14/25  0629 05/15/25  0653   WBC  --   --  28.82*  --  26.11* 23.16*   HGB  --   --  12.0  --  10.4* 10.1*   HCT  --   --  36.4  --  31.6* 30.9*   PLT  --   --  603*   < > 524* 382   BUN 25  --   --   --  22 22   CREATININE 0.75  --   --   --  0.66 0.61   PTT  --  28  --   --   --   --    INR  --  1.24*  --   --   --  1.16   ESR  --   --  >130*  --   --   --    .9*  --   --   --   --   --     < > = values in this interval not displayed.     Blood Culture:    Lab Results   Component  "Value Date    BLOODCX Received in Microbiology Lab. Culture in Progress. 05/14/2025     Wound Culture: No results found for: \"WOUNDCULT\"      "

## 2025-05-16 NOTE — PROGRESS NOTES
Progress Note - Infectious Disease   Name: Sharon Vega 74 y.o. female I MRN: [id number]  Unit/Bed#: -01 I Date of Admission: 5/14/2025   Date of Service: 5/16/2025 I Hospital Day: 2    Assessment & Plan  Abscess of right hip  Presented to [facility] on 5/13 from rehab due to persistent pain in right hip, history of right hip hemiarthroplasty on 7/2/2022.  Patient recently admitted in late April for similar complaint, s/p right hip lR aspiration for concern of septic arthritis.  Right hip arthrogram and joint aspiration resulted in enough fluid for culture, resulted negative.  Recommend discontinuation of antibiotics per ID and orthopedic surgery recommended outpatient follow-up for total right hip replacement.  Patient was undergoing workup for subsequent procedure when she obtained a right hip XR and CT chest abdomen pelvis which revealed Postsurgical change from right hip hemiarthroplasty. Organized collection of fluid and gas in the right iliac is muscle and similar collection surrounding the right hip arthroplasty in the deep gluteal muscles measuring up to 10 cm, concerning for infection. No hematoma.  While being evaluated in the emergency room, she was started on Vanco/Zosyn with recommendation for admission given meeting sepsis criteria with tachycardia, leukocytosis.  Patient recommended transfer to [facility] for tertiary care with consult orthopedics.  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 Procedure performed on 5/15: Abscess drainage x 2 of fluid collections around right hip. 10 fr drains placed. 40 ml pus from anterior drain, and 90 ml pus from lateral drain.There are multiple other undrained areas     - Continue IV Vancomycin for empiric coverage   - Per IR, drained 2 fluid  collections/abscesses with synovasure culture of abscess fluid vs. true synovial fluid. Currently has RICK drain x2 with purulent drainage  - Follow up culture from RICK drain bulb  - Appreciate orthopedic surgery recommendations for further treatment recommendations.    - Follow-up pending blood cultures: NGTD x 48 hours  - MRSA cultures negative  - Trend fever curve/vitals  - Trend CBC/BMP   - Monitor exam for new/developing symptoms  - Final antibiotics plan pending clinical course and findings.  If concern for prosthetic joint infection, suspect prolonged antibiotic duration. Patient would benefit from revisional joint procedure for source control    Sepsis without acute organ dysfunction (HCC)  Patient presented to [facility] on 5/13 with tachycardia, leukocytosis.  Source likely right hip abscess.  Started on IV vancomycin/Zosyn, now just on vancomycin per ID recommendations at [facility]     - Continue antibiotics as above  Chronic pain syndrome  History of chronic low back pain, cervical/lumbar radiculopathy.       - Continue care per primary team    I have discussed the above management plan in detail with the primary service.   Infectious Disease service will follow.    Antibiotics:  Vancomycin    Subjective   Patient has no fever, chills, sweats; no nausea, vomiting, diarrhea; no cough, shortness of breath; no pain. No new symptoms.  Slightly confused on date/timing of certain parts of history, such as last time walking.  Otherwise is comfortable in bed.  She is able to passively flex her right hip without pain, is unable to actively move it.      Objective :  Temp:  [97.5 °F (36.4 °C)-99 °F (37.2 °C)] 97.5 °F (36.4 °C)  HR:  [] 79  BP: (114-166)/(58-86) 118/58  Resp:  [13-37] 16  SpO2:  [88 %-96 %] 92 %  O2 Device: None (Room air)  Nasal Cannula O2 Flow Rate (L/min):  [2 L/min-3 L/min] 3 L/min    General:  No acute distress  Psychiatric:  Awake and alert  Pulmonary:  Normal respiratory excursion without  accessory muscle use  Abdomen:  Soft, nontender  Extremities:  No edema.  Unable to actively flex right hip due to pain, hip passive mobilization achieved  Skin: 2 drains located at right hip surgical site with purulent drainage.  No evidence of strikethrough at IR site      Lab Results: I have reviewed the following results:  Results from last 7 days   Lab Units 05/16/25  0447 05/15/25  0653 05/14/25  0629   WBC Thousand/uL 23.42* 23.16* 26.11*   HEMOGLOBIN g/dL 9.9* 10.1* 10.4*   PLATELETS Thousands/uL 461* 382 524*     Results from last 7 days   Lab Units 05/16/25  0447 05/15/25  0653 05/14/25  0629 05/13/25  2206   SODIUM mmol/L 132* 132* 134* 130*   POTASSIUM mmol/L 4.0 4.2 3.9 4.0   CHLORIDE mmol/L 100 101 101 94*   CO2 mmol/L 27 23 25 26   BUN mg/dL 22 22 22 25   CREATININE mg/dL 0.72 0.61 0.66 0.75   EGFR ml/min/1.73sq m 82 88 86 78   CALCIUM mg/dL 9.0 9.0 9.2 10.7*   AST U/L  --   --   --  31   ALT U/L  --   --   --  34   ALK PHOS U/L  --   --   --  132*   ALBUMIN g/dL  --   --   --  2.9*     Results from last 7 days   Lab Units 05/14/25 2351 05/14/25  2350 05/14/25  0115 05/13/25  2225 05/13/25  2205 05/13/25  1240 05/13/25  1230 05/13/25  0923   BLOOD CULTURE  No Growth at 24 hrs. No Growth at 24 hrs.  --  No Growth at 48 hrs. No Growth at 48 hrs. Received in Microbiology Lab. Culture in Progress. No Growth at 48 hrs.  --    URINE CULTURE   --   --   --   --   --   --   --  >100,000 cfu/ml   MRSA CULTURE ONLY   --   --  No Methicillin Resistant Staphlyococcus aureus (MRSA) isolated  --   --   --   --   --      Results from last 7 days   Lab Units 05/14/25 0629 05/13/25 2206 05/13/25  0923   PROCALCITONIN ng/ml 0.69* 0.75* 0.58*     Results from last 7 days   Lab Units 05/13/25 2206   CRP mg/L 374.9*             Imaging Results Review: I reviewed radiology reports from this admission including: IR procedure.  Other Study Results Review: No additional pertinent studies reviewed.    Administrative  Statements   I have spent a total time of 30 minutes in caring for this patient on the day of the visit/encounter including Diagnostic results, Patient and family education, Documenting in the medical record, Reviewing/placing orders in the medical record (including tests, medications, and/or procedures), Obtaining or reviewing history  , and Communicating with other healthcare professionals .

## 2025-05-16 NOTE — CASE MANAGEMENT
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) 2025    Class 2 severe obesity due to excess calories with serious comorbidity and body mass index (BMI) of 35.0 to 35.9 in adult (HCC) 2025    Right hip pain 2025    Sepsis without acute organ dysfunction (HCC) 2025    Sacroiliitis (HCC) 2025    Lumbar radiculopathy     Chronic pain syndrome 2023    Primary hypertension 2022    Lumbar degenerative disc disease 2022    Lumbar spondylosis 2022    Cervical radiculopathy 2022    Cervical spondylosis 2022    Rheumatoid arthritis involving both hands (HCC) 2022    Spinal stenosis of lumbar region without neurogenic claudication 2022      LOS (days): 2  Geometric Mean LOS (GMLOS) (days): 3.5  Days to GMLOS:1.8     OBJECTIVE:    Risk of Unplanned Readmission Score: 15.21         Current admission status: Inpatient       Preferred Pharmacy:   [id number] - [place] - [address] [place]  [address] [place]  [place] [id number]  Phone: [phone] Fax: [phone]    [facility] - [place], [place] - [address]  [address]  [place] [address]  Phone: [phone] Fax: [phone]    Primary Care Provider: Danielito Antunez DO    Primary Insurance: MEDICARE  Secondary Insurance: AARP    ASSESSMENT:  Active Health Care Proxies       Perfecto Vega Health Care Representative - Son   Primary Phone:  [phone] (Mobile)                         Patient Information  Mental Status: Alert       DISCHARGE DETAILS:    Discharge planning discussed with:: Patient  Freedom of Choice: Yes  Comments - Freedom of Choice: Discussed FOC  CM contacted family/caregiver?: Yes  Were Treatment Team discharge recommendations reviewed with patient/caregiver?: Yes          Contacts  Patient Contacts: dtr - Alexandra/Son - Jose Armando  Relationship to Patient:: Family  Contact Method: Phone  Phone Number: [phone]  Reason/Outcome: Referral, Discharge Planning, Continuity of Care, Emergency Contact          Per chart review/am rounds: pt transferred to [facility] from [facility] for IR consult.  Pt w/2 RICK drains. See CM open completed by Naomi ALICIA On 5/14/2025 @ 1836.     This CM met w/pt at bedside to introduce self & CM role. Confirmed demographics & PCP.  Pt was independent prior to hospitalization at end April. Was discharged to [facility] for STR on 4/29/2025.  Returned to [facility] 5/13/2025. Transferred to [facility] 5/14/2025.  Discussed possible dcp pending medical stability - pt unsure of plan for rehab.  CM offered to call pt's granddtr to discuss dcp  - pt agreeable, pt also requests to speak to her son Jose Armando.     CM called pt's granddtr Alexandra to discuss dcp.  Per Alexandra - pt was [facility] for Rehab - pt was not happy there - would like pt to go home if possible but that would be up to pt's son Jose Armando who she lives with.  Pt lives on 2nd floor - approximately 20 steps to 2nd level.  1/2 bath available 1st floor. If unable to go home - ok for blanket referral to other SNF's in [facility] for review.    CM called pt's son Jose Armando to f/u on possible DCP pending medical course.  Confirmed w/pt's son - pt was IADL's prior to hospitalization.  Per son if pt were to come home - pt would need to be able to ambulate to bathroom from the Living Room. CM d/w son steps for pt to navigate. Per son Bill - pt would be able to stay on 1st floor  that has 1/2 bath.  They would be able to be a bed on 1st floor (may be interested in a hospital bed). Pt would need to be able to ambulate the 3 Steps into the home and ambulate about 20 feet from LR to bathroom.  Would like home w/HH for pt if able.  CM discussed IPR vs SNF - son does not want pt to go back to [facility].  Agreeable to referral to IPR for review as pt was completely independent prior. CM to follow for dcp needs pending medical course.      CM notified provider and bedside nurse of above. No therapy orders noted at the time - requested therapy orders from provider. Orders placed.  CM sent referral in [place] to IPR's pending therapy evals.

## 2025-05-16 NOTE — PROGRESS NOTES
Sharon Vega is a 74 y.o. female who is currently ordered Vancomycin IV with management by the Pharmacy Consult service.  Relevant clinical data and objective / subjective history reviewed.  Vancomycin Assessment:  Indication and Goal AUC/Trough: Soft tissue (goal -600, trough >10), -600, trough >10  Clinical Status: stable  Micro:     Renal Function:  SCr: 0.72 mg/dL  CrCl: 70 mL/min  Renal replacement: Not on dialysis  Days of Therapy: 3  Current Dose: 750 mg IV q12h  Vancomycin Plan:  New Dosing: continue current dose  Estimated AUC: 407 mcg*hr/mL  Estimated Trough: 12.2 mcg/mL  Next Level: 5/23 with AM labs  Renal Function Monitoring: Daily BMP and UOP  Pharmacy will continue to follow closely for s/sx of nephrotoxicity, infusion reactions and appropriateness of therapy.  BMP and CBC will be ordered per protocol. We will continue to follow the patient’s culture results and clinical progress daily.    Edie Mariee, Pharmacist

## 2025-05-16 NOTE — PROGRESS NOTES
"Progress Note -Interventional Radiology NP  Sharon Vega 74 y.o. female MRN: [id number]  Unit/Bed#: -Amanda Encounter: [id number]    Assessment/Plan:  73 yo female with HTN, Chronic back pain and prior right hemiarthroplasty (7/2022) and recent admission for septic arthritis s/p IR hip join aspiration 4/24/25 who transferred from [facility] with persistent right hip pain and complex right hip collections with fluid/gas in gluteal and iliacus muscle.     Patient s/p IR 10F abscess drain placement x 2 right hip on 5/15/25. 40 ml of pus aspirated from anterior drain and 90 ml of pus from lateral drain.Please see detailed report from Dr Marroquin    Patient seen in follow up:  Reviewed vital signs  Leukocytosis persists (WBC 23.42 today)  Body fluid sent for Synovasure pending  Continued on antibiotics per ID recs  IR Anterior abscess drain:  65 ml milky tan fluid since placement. Moderate amount of milky tan fluid present in RICK . Flushed with 10 ml NSS without difficulty  IR Lateral abscess drain: 90 ml milky tan fluid reported since placement. Moderate amount of milky tan fluid present in drain. Flushed with 10 ml NSS without difficulty    Plan:  Flush each drain with 10 mL NSS every 8 hours, record output. Catheter to RICK bulb compression   Please notify IR for leaking around catheter, catheter damage, drain not maintaining suction, or possible infection at insertion site  If/when patient has less than 10 mL of drainage/day for 2 days, please reach out to IR   Plan for IR drain x 2 check/change in 1 month if still in place  Remainder of care per primary care service team       Subjective: patient reports she continues with right hip pain but it is improved since drains were placed. She has no new complaints at this time      Problem List[1]       Objective:    Vitals:  /58   Pulse 79   Temp 97.5 °F (36.4 °C)   Resp 16   Ht 5' 3\" (1.6 m)   Wt 86.4 kg (190 lb 7.6 oz)   SpO2 92%   BMI 33.74 kg/m²   Body " mass index is 33.74 kg/m².  Weight (last 2 days)       Date/Time Weight    05/14/25 2200 86.4 (190.48)            I/Os:    Intake/Output Summary (Last 24 hours) at 5/16/2025 0942  Last data filed at 5/16/2025 0754  Gross per 24 hour   Intake 110 ml   Output 635 ml   Net -525 ml       Invasive Devices       Peripheral Intravenous Line  Duration             Peripheral IV 05/13/25 Left Antecubital 2 days    Peripheral IV 05/13/25 Right Antecubital 2 days              Drain  Duration             Abscess Drain Hip <1 day    Abscess Drain Hip <1 day                    Physical Exam:  Physical Exam  Vitals reviewed.   HENT:      Head: Normocephalic.     Eyes:      Extraocular Movements: Extraocular movements intact.       Cardiovascular:      Rate and Rhythm: Regular rhythm.   Pulmonary:      Effort: Pulmonary effort is normal. No respiratory distress.   Abdominal:      General: There is no distension.     Musculoskeletal:      Comments: Right hip pain, IR drains x 2     Skin:     General: Skin is warm and dry.     Neurological:      Mental Status: She is alert. Mental status is at baseline.     Psychiatric:         Mood and Affect: Mood normal.         Behavior: Behavior normal.          Lab Results and Cultures:   CBC with diff:   Lab Results   Component Value Date    WBC 23.42 (H) 05/16/2025    HGB 9.9 (L) 05/16/2025    HCT 30.9 (L) 05/16/2025    MCV 93 05/16/2025     (H) 05/16/2025    RBC 3.33 (L) 05/16/2025    MCH 29.7 05/16/2025    MCHC 32.0 05/16/2025    RDW 14.7 05/16/2025    MPV 10.0 05/16/2025    NRBC 0 05/16/2025      BMP/CMP:  Lab Results   Component Value Date    K 4.0 05/16/2025    K 4.1 03/18/2024     05/16/2025     03/18/2024    CO2 27 05/16/2025    CO2 26 03/18/2024    BUN 22 05/16/2025    BUN 23 03/18/2024    CREATININE 0.72 05/16/2025    CREATININE 0.93 03/18/2024    CALCIUM 9.0 05/16/2025    CALCIUM 9.6 03/18/2024    AST 31 05/13/2025    AST 19 03/18/2024    ALT 34 05/13/2025    ALT  "16 03/18/2024    ALKPHOS 132 (H) 05/13/2025    ALKPHOS 36 03/18/2024    EGFR 82 05/16/2025    EGFR 64 03/18/2024    EGFR 58 (L) 10/20/2020   ,     Coags:   Lab Results   Component Value Date    PTT 28 05/13/2025    INR 1.16 05/15/2025   ,   Results from last 7 days   Lab Units 05/15/25  0653 05/13/25  2225   PTT seconds  --  28   INR  1.16 1.24*        Lipid Panel: No results found for: \"CHOL\"  No results found for: \"HDL\"  No results found for: \"HDL\"  No results found for: \"LDLCALC\"  No results found for: \"TRIG\"    HgbA1c: No results found for: \"HGBA1C\"    Blood Culture:   Lab Results   Component Value Date    BLOODCX No Growth at 24 hrs. 05/14/2025   ,   Urinalysis:   Lab Results   Component Value Date    COLORU Yellow 05/13/2025    CLARITYU Turbid (A) 05/13/2025    SPECGRAV 1.020 05/13/2025    PHUR 6.0 05/13/2025    LEUKOCYTESUR Negative 05/13/2025    NITRITE Negative 05/13/2025    GLUCOSEU Negative 05/13/2025    KETONESU Negative 05/13/2025    BILIRUBINUR Negative 05/13/2025    BLOODU Negative 05/13/2025   ,   Urine Culture:   Lab Results   Component Value Date    URINECX >100,000 cfu/ml 05/13/2025   ,   Wound Culure:  No results found for: \"WOUNDCULT\"    VTE Pharmacologic Prophylaxis: Enoxaparin (Lovenox)    Total floor / unit time spent today 25 minutes.  Greater than 50% of total time was spent face to face with the patient and / or family counseling and / or coordination of care: Drain care and plan for f/u drain check    Thank you for allowing me to participate in the care of Sharon LALY Gary. Please don't hesitate to call, text, email, or Message us with any questions.             [1]   Patient Active Problem List  Diagnosis    Lumbar degenerative disc disease    Lumbar spondylosis    Cervical radiculopathy    Cervical spondylosis    Rheumatoid arthritis involving both hands (HCC)    Spinal stenosis of lumbar region without neurogenic claudication    Primary hypertension    Chronic pain syndrome    Lumbar " radiculopathy    Sacroiliitis (HCC)    Right hip pain    Sepsis without acute organ dysfunction (HCC)    Class 2 severe obesity due to excess calories with serious comorbidity and body mass index (BMI) of 35.0 to 35.9 in adult (HCC)    Abscess of right hip    History of hemiarthroplasty of right hip    Heart murmur    Thrombocytosis    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

## 2025-05-16 NOTE — ASSESSMENT & PLAN NOTE
Patient presented to [facility] on 5/13 with tachycardia, leukocytosis.  Source likely right hip abscess.  Started on IV vancomycin/Zosyn, now just on vancomycin per ID recommendations at [facility]     - Continue antibiotics as above

## 2025-05-16 NOTE — PLAN OF CARE
Problem: PAIN - ADULT  Goal: Verbalizes/displays adequate comfort level or baseline comfort level  Description: Interventions:  - Encourage patient to monitor pain and request assistance  - Assess pain using appropriate pain scale  - Administer analgesics as ordered based on type and severity of pain and evaluate response  - Implement non-pharmacological measures as appropriate and evaluate response  - Consider cultural and social influences on pain and pain management  - Notify physician/advanced practitioner if interventions unsuccessful or patient reports new pain  - Educate patient/family on pain management process including their role and importance of  reporting pain   - Provide non-pharmacologic/complimentary pain relief interventions  Outcome: Progressing     Problem: INFECTION - ADULT  Goal: Absence or prevention of progression during hospitalization  Description: INTERVENTIONS:  - Assess and monitor for signs and symptoms of infection  - Monitor lab/diagnostic results  - Monitor all insertion sites, i.e. indwelling lines, tubes, and drains  - Monitor endotracheal if appropriate and nasal secretions for changes in amount and color  - [place] appropriate cooling/warming therapies per order  - Administer medications as ordered  - Instruct and encourage patient and family to use good hand hygiene technique  - Identify and instruct in appropriate isolation precautions for identified infection/condition  Outcome: Progressing  Goal: Absence of fever/infection during neutropenic period  Description: INTERVENTIONS:  - Monitor WBC  - Perform strict hand hygiene  - Limit to healthy visitors only  - No plants, dried, fresh or silk flowers with feliciano in patient room  Outcome: Progressing

## 2025-05-17 LAB
ANION GAP SERPL CALCULATED.3IONS-SCNC: 5 MMOL/L (ref 4–13)
BUN SERPL-MCNC: 21 MG/DL (ref 5–25)
CALCIUM SERPL-MCNC: 9.5 MG/DL (ref 8.4–10.2)
CHLORIDE SERPL-SCNC: 101 MMOL/L (ref 96–108)
CO2 SERPL-SCNC: 27 MMOL/L (ref 21–32)
CREAT SERPL-MCNC: 0.66 MG/DL (ref 0.6–1.3)
ERYTHROCYTE [DISTWIDTH] IN BLOOD BY AUTOMATED COUNT: 14.9 % (ref 11.6–15.1)
FERRITIN SERPL-MCNC: 1949 NG/ML (ref 30–307)
GFR SERPL CREATININE-BSD FRML MDRD: 86 ML/MIN/1.73SQ M
GLUCOSE SERPL-MCNC: 102 MG/DL (ref 65–140)
HCT VFR BLD AUTO: 36.2 % (ref 34.8–46.1)
HGB BLD-MCNC: 11.2 G/DL (ref 11.5–15.4)
IRON SATN MFR SERPL: 16 % (ref 15–50)
IRON SERPL-MCNC: 28 UG/DL (ref 50–212)
MCH RBC QN AUTO: 29.2 PG (ref 26.8–34.3)
MCHC RBC AUTO-ENTMCNC: 30.9 G/DL (ref 31.4–37.4)
MCV RBC AUTO: 95 FL (ref 82–98)
PLATELET # BLD AUTO: 330 THOUSANDS/UL (ref 149–390)
PMV BLD AUTO: 10 FL (ref 8.9–12.7)
POTASSIUM SERPL-SCNC: 3.6 MMOL/L (ref 3.5–5.3)
RBC # BLD AUTO: 3.83 MILLION/UL (ref 3.81–5.12)
SODIUM SERPL-SCNC: 133 MMOL/L (ref 135–147)
TIBC SERPL-MCNC: 179.2 UG/DL (ref 250–450)
TRANSFERRIN SERPL-MCNC: 128 MG/DL (ref 203–362)
UIBC SERPL-MCNC: 151 UG/DL (ref 155–355)
WBC # BLD AUTO: 20.02 THOUSAND/UL (ref 4.31–10.16)

## 2025-05-17 PROCEDURE — 83540 ASSAY OF IRON: CPT | Performed by: PHYSICIAN ASSISTANT

## 2025-05-17 PROCEDURE — 80048 BASIC METABOLIC PNL TOTAL CA: CPT | Performed by: PHYSICIAN ASSISTANT

## 2025-05-17 PROCEDURE — 99232 SBSQ HOSP IP/OBS MODERATE 35: CPT | Performed by: PHYSICIAN ASSISTANT

## 2025-05-17 PROCEDURE — 85027 COMPLETE CBC AUTOMATED: CPT | Performed by: PHYSICIAN ASSISTANT

## 2025-05-17 PROCEDURE — 97167 OT EVAL HIGH COMPLEX 60 MIN: CPT

## 2025-05-17 PROCEDURE — NC001 PR NO CHARGE: Performed by: ORTHOPAEDIC SURGERY

## 2025-05-17 PROCEDURE — 82728 ASSAY OF FERRITIN: CPT | Performed by: PHYSICIAN ASSISTANT

## 2025-05-17 PROCEDURE — 83550 IRON BINDING TEST: CPT | Performed by: PHYSICIAN ASSISTANT

## 2025-05-17 PROCEDURE — 97163 PT EVAL HIGH COMPLEX 45 MIN: CPT

## 2025-05-17 RX ADMIN — FERROUS SULFATE TAB 325 MG (65 MG ELEMENTAL FE) 325 MG: 325 (65 FE) TAB at 08:44

## 2025-05-17 RX ADMIN — LORATADINE 10 MG: 10 TABLET ORAL at 08:44

## 2025-05-17 RX ADMIN — OXYCODONE HYDROCHLORIDE 5 MG: 5 TABLET ORAL at 16:04

## 2025-05-17 RX ADMIN — OXYCODONE HYDROCHLORIDE 10 MG: 10 TABLET ORAL at 05:28

## 2025-05-17 RX ADMIN — OXYCODONE HYDROCHLORIDE 10 MG: 10 TABLET ORAL at 22:44

## 2025-05-17 RX ADMIN — ENOXAPARIN SODIUM 40 MG: 40 INJECTION SUBCUTANEOUS at 11:04

## 2025-05-17 RX ADMIN — GABAPENTIN 600 MG: 300 CAPSULE ORAL at 22:42

## 2025-05-17 RX ADMIN — VENLAFAXINE HYDROCHLORIDE 150 MG: 150 CAPSULE, EXTENDED RELEASE ORAL at 08:45

## 2025-05-17 RX ADMIN — VANCOMYCIN HYDROCHLORIDE 750 MG: 10 INJECTION, POWDER, LYOPHILIZED, FOR SOLUTION INTRAVENOUS at 22:49

## 2025-05-17 RX ADMIN — PRAVASTATIN SODIUM 40 MG: 40 TABLET ORAL at 22:42

## 2025-05-17 RX ADMIN — Medication 1 TABLET: at 08:44

## 2025-05-17 RX ADMIN — OXYCODONE HYDROCHLORIDE AND ACETAMINOPHEN 500 MG: 500 TABLET ORAL at 17:16

## 2025-05-17 RX ADMIN — FERROUS SULFATE TAB 325 MG (65 MG ELEMENTAL FE) 325 MG: 325 (65 FE) TAB at 16:04

## 2025-05-17 RX ADMIN — VANCOMYCIN HYDROCHLORIDE 750 MG: 10 INJECTION, POWDER, LYOPHILIZED, FOR SOLUTION INTRAVENOUS at 08:48

## 2025-05-17 RX ADMIN — OXYCODONE HYDROCHLORIDE AND ACETAMINOPHEN 500 MG: 500 TABLET ORAL at 08:44

## 2025-05-17 RX ADMIN — TRAZODONE HYDROCHLORIDE 100 MG: 100 TABLET ORAL at 22:42

## 2025-05-17 RX ADMIN — Medication 2000 UNITS: at 08:44

## 2025-05-17 RX ADMIN — MUPIROCIN: 20 OINTMENT TOPICAL at 08:45

## 2025-05-17 RX ADMIN — FOLIC ACID 1 MG: 1 TABLET ORAL at 08:44

## 2025-05-17 RX ADMIN — AMLODIPINE BESYLATE 10 MG: 10 TABLET ORAL at 08:44

## 2025-05-17 RX ADMIN — PANTOPRAZOLE SODIUM 40 MG: 40 TABLET, DELAYED RELEASE ORAL at 05:28

## 2025-05-17 NOTE — ASSESSMENT & PLAN NOTE
"Pt presented from [facility] with right hip pain   Had recent IR hip joint aspiration on 4/24, was scheduled for conversion of partial hip replacement to total hip on 5/14 but cancelled due to leukocytosis   CT scan obtained with evidence of \"post surgical change from right hip hemiarthroplasty. Organized collection of fluid and gas in the right iliac muscle and similar collection surrounding right hip arthroplasty in the deep gluteal muscles measuring up to 10 cm\"   Transferred to [facility] for orthopedic evaluation   Orthopedics following,  S/p IR aspiration and 2 x drain placement with synovasure culture on 5/15   Cultures to be obtained from RICK drain bulb, follow up   Appreciate ongoing orthopedic recommendations -- will need OR intervention, timing TBD   ID following for abx management   Currently on IV Vancomycin   Follow up culture results and drain output   As needed analgesics, currently on PRN oxycodone 5/10 mg Q4H for moderate and severe pain with PRN IV dilaudid 0.5 mg Q4H for breakthrough  "

## 2025-05-17 NOTE — PLAN OF CARE
Problem: PAIN - ADULT  Goal: Verbalizes/displays adequate comfort level or baseline comfort level  Description: Interventions:  - Encourage patient to monitor pain and request assistance  - Assess pain using appropriate pain scale  - Administer analgesics as ordered based on type and severity of pain and evaluate response  - Implement non-pharmacological measures as appropriate and evaluate response  - Consider cultural and social influences on pain and pain management  - Notify physician/advanced practitioner if interventions unsuccessful or patient reports new pain  - Educate patient/family on pain management process including their role and importance of  reporting pain   - Provide non-pharmacologic/complimentary pain relief interventions  Outcome: Progressing     Problem: INFECTION - ADULT  Goal: Absence or prevention of progression during hospitalization  Description: INTERVENTIONS:  - Assess and monitor for signs and symptoms of infection  - Monitor lab/diagnostic results  - Monitor all insertion sites, i.e. indwelling lines, tubes, and drains  - Monitor endotracheal if appropriate and nasal secretions for changes in amount and color  - [place] appropriate cooling/warming therapies per order  - Administer medications as ordered  - Instruct and encourage patient and family to use good hand hygiene technique  - Identify and instruct in appropriate isolation precautions for identified infection/condition  Outcome: Progressing  Goal: Absence of fever/infection during neutropenic period  Description: INTERVENTIONS:  - Monitor WBC  - Perform strict hand hygiene  - Limit to healthy visitors only  - No plants, dried, fresh or silk flowers with feliciano in patient room  Outcome: Progressing     Problem: SAFETY ADULT  Goal: Patient will remain free of falls  Description: INTERVENTIONS:  - Educate patient/family on patient safety including physical limitations  - Instruct patient to call for assistance with activity   -  Consider consulting OT/PT to assist with strengthening/mobility based on AM PAC & JH-HLM score  - Consult OT/PT to assist with strengthening/mobility   - Keep Call bell within reach  - Keep bed low and locked with side rails adjusted as appropriate  - Keep care items and personal belongings within reach  - Initiate and maintain comfort rounds  - Make Fall Risk Sign visible to staff  - Offer Toileting every 2 Hours, in advance of need  - Initiate/Maintain  alarm  - Obtain necessary fall risk management equipment:    - Apply yellow socks and bracelet for high fall risk patients  - Consider moving patient to room near nurses station  Outcome: Progressing  Goal: Maintain or return to baseline ADL function  Description: INTERVENTIONS:  -  Assess patient's ability to carry out ADLs; assess patient's baseline for ADL function and identify physical deficits which impact ability to perform ADLs (bathing, care of mouth/teeth, toileting, grooming, dressing, etc.)  - Assess/evaluate cause of self-care deficits   - Assess range of motion  - Assess patient's mobility; develop plan if impaired  - Assess patient's need for assistive devices and provide as appropriate  - Encourage maximum independence but intervene and supervise when necessary  - Involve family in performance of ADLs  - Assess for home care needs following discharge   - Consider OT consult to assist with ADL evaluation and planning for discharge  - Provide patient education as appropriate  - Monitor functional capacity and physical performance, use of AM PAC & JH-HLM   - Monitor gait, balance and fatigue with ambulation    Outcome: Progressing     Problem: DISCHARGE PLANNING  Goal: Discharge to home or other facility with appropriate resources  Description: INTERVENTIONS:  - Identify barriers to discharge w/patient and caregiver  - Arrange for needed discharge resources and transportation as appropriate  - Identify discharge learning needs (meds, wound care,  etc.)  - Arrange for interpretive services to assist at discharge as needed  - Refer to Case Management Department for coordinating discharge planning if the patient needs post-hospital services based on physician/advanced practitioner order or complex needs related to functional status, cognitive ability, or social support system  Outcome: Progressing     Problem: Knowledge Deficit  Goal: Patient/family/caregiver demonstrates understanding of disease process, treatment plan, medications, and discharge instructions  Description: Complete learning assessment and assess knowledge base.  Interventions:  - Provide teaching at level of understanding  - Provide teaching via preferred learning methods  Outcome: Progressing     Problem: Prexisting or High Potential for Compromised Skin Integrity  Goal: Skin integrity is maintained or improved  Description: INTERVENTIONS:  - Identify patients at risk for skin breakdown  - Assess and monitor skin integrity including under and around medical devices   - Assess and monitor nutrition and hydration status  - Monitor labs  - Assess for incontinence   - Turn and reposition patient  - Assist with mobility/ambulation  - Relieve pressure over mane prominences   - Avoid friction and shearing  - Provide appropriate hygiene as needed including keeping skin clean and dry  - Evaluate need for skin moisturizer/barrier cream  - Collaborate with interdisciplinary team  - Patient/family teaching  - Consider wound care consult

## 2025-05-17 NOTE — ASSESSMENT & PLAN NOTE
POA at [facility] as evidenced by leukocytosis and tachycardia   In setting of right hip intramuscular abscess as above   Continue IV abx per ID recommendations  S/p IV fluid hydration   Tachycardia has resolved, WBC down to 20 from 28  BC from [facility] negative x 48 hours, repeat BC obtained here negative x 24 hours  Lactic negative   Procal slowly improving  Trend CBC and temps closely   See plan as above

## 2025-05-17 NOTE — PHYSICAL THERAPY NOTE
Physical Therapy Evaluation     Patient's Name: Sharon Vega    Admitting Diagnosis  Abscess of hip [L02.419]    Problem List  Problem List[1]    Past Medical History  Past Medical History:   Diagnosis Date    Anemia     Anxiety     Arthritis     Closed transcervical fracture of right femur (HCC) 07/01/2022    Colon polyp     Depression     Fracture of right wrist 07/01/2022    GERD (gastroesophageal reflux disease)     Hiatal hernia     Hyperlipidemia     Hypertension        Past Surgical History  Past Surgical History:   Procedure Laterality Date    APPENDECTOMY      CHOLECYSTECTOMY      COLONOSCOPY      FL INJECTION RIGHT HIP (NON ARTHROGRAM)  04/21/2025    FRACTURE SURGERY Right     arm with plate and pins    HAND SURGERY Bilateral     HYSTERECTOMY      IR ASPIRATION JOINT (SPECIFY LOCATION)  4/24/2025    IR DRAINAGE TUBE PLACEMENT  5/15/2025    JOINT REPLACEMENT Bilateral     knees    TN HEMIARTHROPLASTY HIP PARTIAL Right 07/02/2022    Procedure: HEMIARTHROPLASTY HIP (BIPOLAR), closed reduction with splinting right wrist;  Surgeon: Leo Roblero;  Location: [facility];  Service: Orthopedics    TN NEUROPLASTY &/TRANSPOSITION ULNAR NERVE ELBOW Right 02/08/2023    Procedure: RELEASE CUBITAL TUNNEL;  Surgeon: Cody Calles DO;  Location: [facility];  Service: Orthopedics    SHOULDER ARTHROSCOPY Left           05/17/25 0820   PT Last Visit   PT Visit Date 05/17/25   Note Type   Note type Evaluation   Pain Assessment   Pain Assessment Tool 0-10   Pain Score 4   Pain Location/Orientation Orientation: Right;Location: Hip   Hospital Pain Intervention(s) Repositioned;Ambulation/increased activity   Restrictions/Precautions   Weight Bearing Precautions Per Order Yes   RLE Weight Bearing Per Order WBAT   Other Precautions Chair Alarm;Bed Alarm;WBS;Multiple lines;Fall Risk;Pain   Home Living   Type of Home House  (2 JOANNE from garage 4+1 front)   Home Layout Two level;1/2 bath on main level;Bed/bath upstairs;Performs  ADLs on one level;Able to live on main level with bedroom/bathroom   Bathroom Shower/Tub Tub/shower unit   Bathroom Toilet Raised   Bathroom Equipment Grab bars in shower   Bathroom Accessibility Accessible   Home Equipment Walker   Additional Comments PTA admission pt reports walking 2 blocks w/o an AD   Prior Function   Level of [place] Independent with ADLs;Independent with functional mobility;Independent with IADLS   Lives With Family;Son   Receives Help From Family   IADLs Independent with driving;Independent with meal prep;Independent with medication management   Falls in the last 6 months 0   Vocational Retired   Cognition   Overall Cognitive Status WFL   Attention Within functional limits   Orientation Level Oriented X4   Memory Within functional limits   Following Commands Follows all commands and directions without difficulty   RLE Assessment   RLE Assessment X  (Grossly 4-/5 MMT)   LLE Assessment   LLE Assessment WFL   Bed Mobility   Supine to Sit 3  Moderate assistance   Additional items Assist x 1;HOB elevated   Sit to Supine Unable to assess   Transfers   Sit to Stand 4  Minimal assistance   Additional items Assist x 1;Increased time required   Stand to Sit 4  Minimal assistance   Additional items Assist x 1;Increased time required   Additional Comments w/ RW   Ambulation/Elevation   Gait pattern Decreased R stance;Inconsistent leonel;Short stride;Step to   Assistive Device Rolling walker   Distance 3'   Stair Management Assistance 4  Minimal assist   Additional items Assist x 1;Verbal cues   Balance   Static Sitting Fair +   Dynamic Sitting Fair   Static Standing Fair -   Dynamic Standing Poor +   Ambulatory Poor +   Activity Tolerance   Activity Tolerance Patient tolerated treatment well;Patient limited by fatigue;Patient limited by pain   Medical Staff Made Aware OT   Nurse Made Aware RN cleared   Assessment   Prognosis Good   Problem List Decreased strength;Decreased range of motion;Impaired  balance;Decreased mobility;Pain   Assessment Pt seen for PT evaluation. Pt with active PT eval/treat orders. Pt is a 74 y.o. female who was admitted to [facility] on 5/14/25. Pt's current dx/ problem list include: abscess of right hip. Comorbidities affecting pt's physical performance at time of assessment are as follows: has a past medical history of Anemia, Anxiety, Arthritis, Closed transcervical fracture of right femur (HCC), Colon polyp, Depression, Fracture of right wrist, GERD (gastroesophageal reflux disease), Hiatal hernia, Hyperlipidemia, and Hypertension. Personal factors affecting pt at time of initial examination include: steps to enter environment, limited home support, past experience, inability to perform IADLs, inability to perform ADLs, inability to ambulate household distances. Due to acute medical issues, ongoing medical workup for primary dx; pain, fall risk, increased reliance on more restrictive AD compared to baseline;  decreased activity tolerance compared to baseline, increased assistance needed from caregiver at current time, multiple lines, decline in overall functional mobility status; health management issues. At baseline, pt resides alone in a [facility] with 2 [facility] and was independent with ADLs/ IADLs. Currently, upon initial examination, pt is requiring mod Ax1 for supine to sit and min A x 1 for sit to stand and ambulation. PT to continue to follow and assess functional transfers and ambulation as appropriate and able. Currently, pt presents functioning below baseline and with overall mobility deficits 2* to: decreased LE strength/AROM; limited flexibility;  generalized weakness/ deconditioning; decreased endurance; decreased activity tolerance; impaired balance; gait deviations. Pt currently at increased risk for falls. At the end of evaluation, pt was left seated in bed side recliner with all needs in reach. Pt would benefit from continued skilled PT services while in hospital  to address remaining limitations and maximize functional potential. Recommend level III resource intensity. Please also refer to the recommendation of the Physical Therapist for safe discharge planning.   Goals   Patient Goals To go home   STG Expiration Date 05/31/25   Short Term Goal #1 STG 1. Pt will be able to perform bed mobility with mod I in order to improve overall functional mobility and assist in safe d/c. STG 2. Pt will be able to perform functional transfer with mod I in order to improve overall functional mobility and assist in safe d/c. STG 3. Pt will be able to ambulate at least 300 feet with least restrictive device with mod I A in order to improve overall functional mobility and assist in safe d/c. STG 4. Pt will improve sitting/standing static/dynamic balance 1 grade in order to improve functional mobility and assist in safe d/c. STG 5. Pt will improve LE strength by one grade in order to improve functional mobility and assist in safe d/c. STG 6. Pt will negotiate at least 2 steps at mod I level A in order to improve overall funcitonal mobility and assist in safe d/c   Plan   Treatment/Interventions Functional transfer training;LE strengthening/ROM;Therapeutic exercise;Bed mobility;Gait training;Continued evaluation;OT;Spoke to nursing   PT Frequency 2-3x/wk   Discharge Recommendation   Rehab Resource Intensity Level, PT III (Minimum Resource Intensity)   Equipment Recommended Walker  (pt owns)   AM-PAC Basic Mobility Inpatient   Turning in Flat Bed Without Bedrails 3   Lying on Back to Sitting on Edge of Flat Bed Without Bedrails 2   Moving Bed to Chair 3   Standing Up From Chair Using Arms 3   Walk in Room 2   Climb 3-5 Stairs With Railing 2   Basic Mobility Inpatient Raw Score 15   Basic Mobility Standardized Score 36.97   [facility] Highest Level Of Mobility   -[facility] Goal 4: Move to chair/commode   -[facility] Achieved 4: Move to chair/commode         Hoda Gomes, PT         [1]   Patient Active  Problem List  Diagnosis    Lumbar degenerative disc disease    Lumbar spondylosis    Cervical radiculopathy    Cervical spondylosis    Rheumatoid arthritis involving both hands (HCC)    Spinal stenosis of lumbar region without neurogenic claudication    Primary hypertension    Chronic pain syndrome    Lumbar radiculopathy    Sacroiliitis (HCC)    Right hip pain    Sepsis without acute organ dysfunction (HCC)    Class 2 severe obesity due to excess calories with serious comorbidity and body mass index (BMI) of 35.0 to 35.9 in adult (HCC)    Abscess of right hip    History of hemiarthroplasty of right hip    Heart murmur    Thrombocytosis    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

## 2025-05-17 NOTE — PLAN OF CARE
Problem: OCCUPATIONAL THERAPY ADULT  Goal: Performs self-care activities at highest level of function for planned discharge setting.  See evaluation for individualized goals.  Description: Treatment Interventions: ADL retraining, Functional transfer training, Endurance training, Patient/family training, Equipment evaluation/education, Compensatory technique education, Continued evaluation, Energy conservation, Activityengagement          See flowsheet documentation for full assessment, interventions and recommendations.   Note: Limitation: Decreased ADL status, Decreased endurance, Decreased self-care trans, Decreased high-level ADLs  Prognosis: Fair  Assessment: Pt is a 75 yo female who originally presented from [facility] with worsening right hip pain in which pt transferred to [facility] for orthopedic surgery evaluation in which pt dx w/ abscess of right hip. Pt s/p IR aspiration with drains and is now WBAT in E. Pt  has a past medical history of Anemia, Anxiety, Arthritis, Closed transcervical fracture of right femur (HCC) (07/01/2022), Colon polyp, Depression, Fracture of right wrist (07/01/2022), GERD (gastroesophageal reflux disease), Hiatal hernia, Hyperlipidemia, and Hypertension. Pt with active OT orders in which OT consulted to assess pt's functional status and occupational performance to determine safe d/c needs. Pt seen with PT to increase safety, decrease fall risk, and maximize functional/occupational performance 2* medical complexity which is a regression from pt's functional baseline. Pt lives with her son, daughter in law, and grandchildren in a 2  with 4+1 vs 3 JOANNE. PTA, pt was independent in ADLs/IADLs and uses no DME for functional mobility. (+) driving. Currently, pt performing bed mobility w/ Mod A, functional transfers/mobility w/ Min A x1 w/ RW, UB ADLs @ supervision, and LB ADLs w/ Min A. Pt demonstrates the following limitations/impairments which impact the pt's ability to engage in  valued occupations: balance, endurance/activity tolerance, standing tolerance, functional reach, postural/trunk control, strength, and pain. From an OT standpoint, recommend discharge w/ Level 3 resources once medically stable.   The patient's raw score on the -PAC Daily Activity Inpatient Short Form is 20. A raw score of greater than or equal to 19 suggests the patient may benefit from discharge to home. Please refer to the recommendation of the Occupational Therapist for safe discharge planning.  Pt would benefit from skilled OT services 2-3x/wk to address acute care needs and underlying performance skills to promote safety, decrease fall risk, and enhance occupational performance to return to PLOF. Goals to be met within the next 10-14 days.     Rehab Resource Intensity Level, OT: III (Minimum Resource Intensity)

## 2025-05-17 NOTE — ASSESSMENT & PLAN NOTE
74 y.o.female with right hip PJI, iliacus and gluteal abscesses s/p IR aspiration with drains. Possible 1st stage of antibiotic spacer this coming week pending OR and surgeon availability.    F/u synovasure  WBAT RLE  PT/OT  Pain control  DVT ppx: per primary  Medical management including antibiotics per primary team, currently on IV vancomycin  Dispo planning.

## 2025-05-17 NOTE — PLAN OF CARE
Problem: PHYSICAL THERAPY ADULT  Goal: Performs mobility at highest level of function for planned discharge setting.  See evaluation for individualized goals.  Description: Treatment/Interventions: Functional transfer training, LE strengthening/ROM, Therapeutic exercise, Bed mobility, Gait training, Continued evaluation, OT, Spoke to nursing  Equipment Recommended: Walker (pt owns)       See flowsheet documentation for full assessment, interventions and recommendations.  5/17/2025 1058 by Hoda Gomes PT  Note: Prognosis: Good  Problem List: Decreased strength, Decreased range of motion, Impaired balance, Decreased mobility, Pain  Assessment: Pt seen for PT evaluation. Pt with active PT eval/treat orders. Pt is a 74 y.o. female who was admitted to [facility] on 5/14/25. Pt's current dx/ problem list include: abscess of right hip. Comorbidities affecting pt's physical performance at time of assessment are as follows: has a past medical history of Anemia, Anxiety, Arthritis, Closed transcervical fracture of right femur (HCC), Colon polyp, Depression, Fracture of right wrist, GERD (gastroesophageal reflux disease), Hiatal hernia, Hyperlipidemia, and Hypertension. Personal factors affecting pt at time of initial examination include: steps to enter environment, limited home support, past experience, inability to perform IADLs, inability to perform ADLs, inability to ambulate household distances. Due to acute medical issues, ongoing medical workup for primary dx; pain, fall risk, increased reliance on more restrictive AD compared to baseline;  decreased activity tolerance compared to baseline, increased assistance needed from caregiver at current time, multiple lines, decline in overall functional mobility status; health management issues. At baseline, pt resides alone in a 2  with 2 [facility] and was independent with ADLs/ IADLs. Currently, upon initial examination, pt is requiring mod Ax1 for supine to sit and  min A x 1 for sit to stand and ambulation. PT to continue to follow and assess functional transfers and ambulation as appropriate and able. Currently, pt presents functioning below baseline and with overall mobility deficits 2* to: decreased LE strength/AROM; limited flexibility;  generalized weakness/ deconditioning; decreased endurance; decreased activity tolerance; impaired balance; gait deviations. Pt currently at increased risk for falls. At the end of evaluation, pt was left seated in bed side recliner with all needs in reach. Pt would benefit from continued skilled PT services while in hospital to address remaining limitations and maximize functional potential. Recommend level III resource intensity. Please also refer to the recommendation of the Physical Therapist for safe discharge planning.        Rehab Resource Intensity Level, PT: III (Minimum Resource Intensity)    See flowsheet documentation for full assessment.     5/17/2025 1047 by Hoda Gomes PT  Note: Prognosis: Good  Problem List: Decreased strength, Decreased range of motion, Impaired balance, Decreased mobility, Pain  Assessment: Pt seen for PT evaluation. Pt with active PT eval/treat orders. Pt is a 74 y.o. female who was admitted to [facility] on 5/14/25. Pt's current dx/ problem list include: abscess of right hip. Comorbidities affecting pt's physical performance at time of assessment are as follows: has a past medical history of Anemia, Anxiety, Arthritis, Closed transcervical fracture of right femur (HCC), Colon polyp, Depression, Fracture of right wrist, GERD (gastroesophageal reflux disease), Hiatal hernia, Hyperlipidemia, and Hypertension. Personal factors affecting pt at time of initial examination include: steps to enter environment, limited home support, past experience, inability to perform IADLs, inability to perform ADLs, inability to ambulate household distances. Due to acute medical issues, ongoing medical workup for  primary dx; pain, fall risk, increased reliance on more restrictive AD compared to baseline;  decreased activity tolerance compared to baseline, increased assistance needed from caregiver at current time, multiple lines, decline in overall functional mobility status; health management issues. At baseline, pt resides alone in a 2  with 2 JOANNE and was independent with ADLs/ IADLs. Currently, upon initial examination, pt is requiring mod Ax1 for supine to sit and min A x 1 for sit to stand and ambulation. PT to continue to follow and assess functional transfers and ambulation as appropriate and able. Currently, pt presents functioning below baseline and with overall mobility deficits 2* to: decreased LE strength/AROM; limited flexibility;  generalized weakness/ deconditioning; decreased endurance; decreased activity tolerance; impaired balance; gait deviations. Pt currently at increased risk for falls. At the end of evaluation, pt was left seated in bed side recliner with all needs in reach. Pt would benefit from continued skilled PT services while in hospital to address remaining limitations and maximize functional potential. Recommend level III resource intensity. Please also refer to the recommendation of the Physical Therapist for safe discharge planning.        Rehab Resource Intensity Level, PT: III (Minimum Resource Intensity)    See flowsheet documentation for full assessment.     5/17/2025 1047 by Hoda Gomes PT  Outcome: Progressing  Note: Prognosis: Good  Problem List: Decreased strength, Decreased range of motion, Impaired balance, Decreased mobility, Pain  Assessment: Pt seen for PT evaluation. Pt with active PT eval/treat orders. Pt is a 74 y.o. female who was admitted to [facility] on 5/14/25. Pt's current dx/ problem list include: abscess of right hip. Comorbidities affecting pt's physical performance at time of assessment are as follows: has a past medical history of Anemia, Anxiety,  Arthritis, Closed transcervical fracture of right femur (HCC), Colon polyp, Depression, Fracture of right wrist, GERD (gastroesophageal reflux disease), Hiatal hernia, Hyperlipidemia, and Hypertension. Personal factors affecting pt at time of initial examination include: steps to enter environment, limited home support, past experience, inability to perform IADLs, inability to perform ADLs, inability to ambulate household distances. Due to acute medical issues, ongoing medical workup for primary dx; pain, fall risk, increased reliance on more restrictive AD compared to baseline;  decreased activity tolerance compared to baseline, increased assistance needed from caregiver at current time, multiple lines, decline in overall functional mobility status; health management issues. At baseline, pt resides alone in a 2  with 2 JOANNE and was independent with ADLs/ IADLs. Currently, upon initial examination, pt is requiring mod Ax1 for supine to sit and min A x 1 for sit to stand and ambulation. PT to continue to follow and assess functional transfers and ambulation as appropriate and able. Currently, pt presents functioning below baseline and with overall mobility deficits 2* to: decreased LE strength/AROM; limited flexibility;  generalized weakness/ deconditioning; decreased endurance; decreased activity tolerance; impaired balance; gait deviations. Pt currently at increased risk for falls. At the end of evaluation, pt was left seated in bed side recliner with all needs in reach. Pt would benefit from continued skilled PT services while in hospital to address remaining limitations and maximize functional potential. Recommend level III resource intensity. Please also refer to the recommendation of the Physical Therapist for safe discharge planning.        Rehab Resource Intensity Level, PT: III (Minimum Resource Intensity)    See flowsheet documentation for full assessment.

## 2025-05-17 NOTE — ASSESSMENT & PLAN NOTE
Previously hgb noted to be around 12   Slow decrease in hgb noted this hospitalization.  Likely component of IVF dilution and reactive from infection   No evidence of acute bleeding noted at this time  Check iron panel   Trend CBC  -- now improved

## 2025-05-17 NOTE — PROGRESS NOTES
"Progress Note - Orthopedics   Name: Sharon Vega 74 y.o. female I MRN: [id number]  Unit/Bed#: -01 I Date of Admission: 5/14/2025   Date of Service: 5/17/2025 I Hospital Day: 3    Assessment & Plan  Abscess of right hip  74 y.o.female with right hip PJI, iliacus and gluteal abscesses s/p IR aspiration with drains. Possible 1st stage of antibiotic spacer this coming week pending OR and surgeon availability.    F/u synovasure  WBAT RLE  PT/OT  Pain control  DVT ppx: per primary  Medical management including antibiotics per primary team, currently on IV vancomycin  Dispo planning.    Subjective   74 y.o.female with right hip PJI. No acute events, no new complaints. Pain well controlled. Denies fevers, chills, CP, SOB, N/V, numbness or tingling. Patient reports no issues with urination or bowel movements. Patient states her hip pain is worse than yesterday.     Objective :  Temp:  [97.5 °F (36.4 °C)-98.5 °F (36.9 °C)] 97.5 °F (36.4 °C)  HR:  [] 86  BP: (109-127)/(58-76) 109/61  Resp:  [16-20] 16  SpO2:  [90 %-95 %] 95 %  O2 Device: None (Room air)    Physical Exam  Musculoskeletal: Right hip  Dressing intact.   2 RICK drains with purulent drainage    Motor intact to +FHL/EHL, +ankle dorsi/plantar flexion  Sensation intact to saphenous, sural, tibial, superficial peroneal nerve, and deep peroneal  2+ DP pulse  No calf swelling or tenderness to palpation      Lab Results: I have reviewed the following results:  Recent Labs     05/15/25  0653 05/16/25  0447 05/17/25  0507   WBC 23.16* 23.42* 20.02*   HGB 10.1* 9.9* 11.2*   HCT 30.9* 30.9* 36.2    461* 330   BUN 22 22  --    CREATININE 0.61 0.72  --    INR 1.16  --   --      Blood Culture:    Lab Results   Component Value Date    BLOODCX No Growth at 24 hrs. 05/14/2025     Wound Culture: No results found for: \"WOUNDCULT\"    Output by Drain (mL) 05/15/25 0701 - 05/15/25 1900 05/15/25 1901 - 05/16/25 0700 05/16/25 0701 - 05/16/25 1900 05/16/25 1901 - " 05/17/25 0636   Abscess Drain Hip 40 40 45    Abscess Drain Hip 90 50 95

## 2025-05-17 NOTE — ARC ADMISSION
Upon review of patient's case, patient appears ARC appropriate at this time pending medical readiness / continued functional need / bed availability

## 2025-05-17 NOTE — OCCUPATIONAL THERAPY NOTE
Occupational Therapy Evaluation     Patient Name: Sharon Vega  Today's Date: 5/17/2025  Problem List  Principal Problem:    Abscess of right hip  Active Problems:    Primary hypertension    Chronic pain syndrome    Sepsis without acute organ dysfunction (HCC)    Hypomagnesemia    Hyponatremia    Anemia    Past Medical History  Past Medical History:   Diagnosis Date    Anemia     Anxiety     Arthritis     Closed transcervical fracture of right femur (HCC) 07/01/2022    Colon polyp     Depression     Fracture of right wrist 07/01/2022    GERD (gastroesophageal reflux disease)     Hiatal hernia     Hyperlipidemia     Hypertension      Past Surgical History  Past Surgical History:   Procedure Laterality Date    APPENDECTOMY      CHOLECYSTECTOMY      COLONOSCOPY      FL INJECTION RIGHT HIP (NON ARTHROGRAM)  04/21/2025    FRACTURE SURGERY Right     arm with plate and pins    HAND SURGERY Bilateral     HYSTERECTOMY      IR ASPIRATION JOINT (SPECIFY LOCATION)  4/24/2025    IR DRAINAGE TUBE PLACEMENT  5/15/2025    JOINT REPLACEMENT Bilateral     knees    DC HEMIARTHROPLASTY HIP PARTIAL Right 07/02/2022    Procedure: HEMIARTHROPLASTY HIP (BIPOLAR), closed reduction with splinting right wrist;  Surgeon: Leo Roblero;  Location: [facility];  Service: Orthopedics    DC NEUROPLASTY &/TRANSPOSITION ULNAR NERVE ELBOW Right 02/08/2023    Procedure: RELEASE CUBITAL TUNNEL;  Surgeon: Cody Calles DO;  Location: [facility];  Service: Orthopedics    SHOULDER ARTHROSCOPY Left            05/17/25 0819   OT Last Visit   OT Visit Date 05/17/25   Note Type   Note type Evaluation   Additional Comments Pt seen w/ PT to increase safety, decrease fall risk, and maximize functional/occupational performance 2* medical complexity which is a regression from pt's functional baseline.   Pain Assessment   Pain Assessment Tool 0-10   Pain Score 4   Pain Location/Orientation Orientation: Right;Location: Hip   Effect of Pain on Daily  "Activities Impacts ability to engage in valued occupations   Hospital Pain Intervention(s) Repositioned;Ambulation/increased activity;Emotional support   Restrictions/Precautions   Weight Bearing Precautions Per Order Yes   RLE Weight Bearing Per Order WBAT   Other Precautions Chair Alarm;Bed Alarm;WBS;Multiple lines;Fall Risk;Pain  (2 RICK drains)   Home Living   Type of Home House  (2  with 2 JOANNE through gargae vs 4+1 JOANNE in front)   Home Layout Two level;1/2 bath on main level;Bed/bath upstairs;Performs ADLs on one level;Able to live on main level with bedroom/bathroom;Access   Bathroom Shower/Tub Tub/shower unit   Bathroom Toilet Raised   Bathroom Equipment Grab bars in shower   Bathroom Accessibility Accessible   Home Equipment Walker   Additional Comments Pt reports living with her son, daughter in law, and grandchildren in a 2  with 2 JOANNE in garage vs 4+1 JOANNE in front in which she reports using both entrances; no DME PTA, but has access to RW for functional mobility prn   Prior Function   Level of [place] Independent with ADLs;Independent with functional mobility;Independent with IADLS   Lives With Family;Son  (Daughter in law, grandchildren)   Receives Help From Family   IADLs Independent with driving;Independent with meal prep;Independent with medication management   Falls in the last 6 months 0   Vocational Retired   Comments (+) driving   Lifestyle   Autonomy PTA, pt was independent in ADLs/IADLs and uses no DME for functional mobility; (+) driving   Reciprocal Relationships Lives with her son, daughter in law, and grandchildren reporting that her daughter in law is home during the day and can assist with functional needs prn   Service to Others Retired   Intrinsic Gratification Enjoys crossword puzzles and being active   Subjective   Subjective \"Feels good to be moving.\"   ADL   Where Assessed Chair   Eating Assistance 5  Supervision/Setup   Grooming Assistance 5  Supervision/Setup   UB Bathing " Assistance 5  Supervision/Setup   LB Bathing Assistance 4  Minimal Assistance   UB Dressing Assistance 5  Supervision/Setup   LB Dressing Assistance 4  Minimal Assistance   Toileting Assistance  4  Minimal Assistance   Functional Assistance 4  Minimal Assistance   Bed Mobility   Supine to Sit 3  Moderate assistance   Additional items Assist x 1;HOB elevated;Bedrails;Increased time required;Verbal cues;LE management   Sit to Supine Unable to assess   Additional Comments Pt performing supine <> sit with Mod A x1 for UB postural support/LE management and sat EOB with fair+ static sitting balance; @ end of session, pt left sitting upright in recliner with all functional needs in reach with chair alarm activated   Transfers   Sit to Stand 4  Minimal assistance   Additional items Assist x 1;Increased time required;Verbal cues   Stand to Sit 4  Minimal assistance   Additional items Assist x 1;Increased time required;Verbal cues   Additional Comments w/ RW + verbal cues for proper hand placement and RW technique   Functional Mobility   Functional Mobility 4  Minimal assistance   Additional Comments Pt completed few small steps from EOB <> recliner w/ Min A x1 w/ RW for safety and support with increased time   Additional items Rolling walker   Balance   Static Sitting Fair +   Dynamic Sitting Fair   Static Standing Fair -   Dynamic Standing Poor +   Ambulatory Poor +   Activity Tolerance   Activity Tolerance Patient tolerated treatment well;Patient limited by fatigue;Patient limited by pain   Medical Staff Made Aware KYLE Drummond   Nurse Made Aware RN cleared   RUE Assessment   RUE Assessment WFL   LUE Assessment   LUE Assessment WFL   Hand Function   Gross Motor Coordination Functional   Fine Motor Coordination Functional   Cognition   Overall Cognitive Status WFL   Arousal/Participation Alert;Responsive;Arousable;Cooperative   Attention Within functional limits   Orientation Level Oriented X4   Memory Within functional  limits   Following Commands Follows all commands and directions without difficulty   Comments Pt very pleasant and cooperative   Assessment   Limitation Decreased ADL status;Decreased endurance;Decreased self-care trans;Decreased high-level ADLs   Prognosis Fair   Assessment Pt is a 75 yo female who originally presented [facility] with worsening right hip pain in which pt transferred to [facility] for orthopedic surgery evaluation in which pt dx w/ abscess of right hip. Pt s/p IR aspiration with drains and is now WBAT in RLE. Pt  has a past medical history of Anemia, Anxiety, Arthritis, Closed transcervical fracture of right femur (HCC) (07/01/2022), Colon polyp, Depression, Fracture of right wrist (07/01/2022), GERD (gastroesophageal reflux disease), Hiatal hernia, Hyperlipidemia, and Hypertension. Pt with active OT orders in which OT consulted to assess pt's functional status and occupational performance to determine safe d/c needs. Pt seen with PT to increase safety, decrease fall risk, and maximize functional/occupational performance 2* medical complexity which is a regression from pt's functional baseline. Pt lives with her son, daughter in law, and grandchildren in a 2  with 4+1 vs 3 JOANNE. PTA, pt was independent in ADLs/IADLs and uses no DME for functional mobility. (+) driving. Currently, pt performing bed mobility w/ Mod A, functional transfers/mobility w/ Min A x1 w/ RW, UB ADLs @ supervision, and LB ADLs w/ Min A. Pt demonstrates the following limitations/impairments which impact the pt's ability to engage in valued occupations: balance, endurance/activity tolerance, standing tolerance, functional reach, postural/trunk control, strength, and pain. From an OT standpoint, recommend discharge w/ Level 3 resources once medically stable.   The patient's raw score on the AM-PAC Daily Activity Inpatient Short Form is 20. A raw score of greater than or equal to 19 suggests the patient may benefit from  discharge to home. Please refer to the recommendation of the Occupational Therapist for safe discharge planning.  Pt would benefit from skilled OT services 2-3x/wk to address acute care needs and underlying performance skills to promote safety, decrease fall risk, and enhance occupational performance to return to PLOF. Goals to be met within the next 10-14 days.   Goals   Patient Goals To go home   LTG Time Frame 10-14   Long Term Goal #1 See OT goals listed below   Plan   Treatment Interventions ADL retraining;Functional transfer training;Endurance training;Patient/family training;Equipment evaluation/education;Compensatory technique education;Continued evaluation;Energy conservation;Activityengagement   Goal Expiration Date 05/31/25   OT Frequency 2-3x/wk   Discharge Recommendation   Rehab Resource Intensity Level, OT III (Minimum Resource Intensity)   AM-PAC Daily Activity Inpatient   Lower Body Dressing 3   Bathing 3   Toileting 3   Upper Body Dressing 3   Grooming 4   Eating 4   Daily Activity Raw Score 20   Daily Activity Standardized Score (Calc for Raw Score >=11) 42.03   AM-PAC Applied Cognition Inpatient   Following a Speech/Presentation 4   Understanding Ordinary Conversation 4   Taking Medications 4   Remembering Where Things Are Placed or Put Away 4   Remembering List of 4-5 Errands 4   Taking Care of Complicated Tasks 4   Applied Cognition Raw Score 24   Applied Cognition Standardized Score 62.21   End of Consult   Education Provided Yes   Patient Position at End of Consult Bedside chair;Bed/Chair alarm activated;All needs within reach   Nurse Communication Nurse aware of consult     OT GOALS:    Pt will improve functional mobility during ADL/IADL/leisure tasks with Mod I using AE/DME prn.    Pt will improve activity tolerance/functional endurance during ADL/IADL/leisure tasks for at least 20 minutes to improve occupational performance and engagement in valued occupations using AE/DME prn.    Pt will  engage in ongoing functional/formal cognitive assessments to assist with safe d/c planning and increase safety during functional tasks.    Pt will participate in simulated IADL management task w/ Mod I to increase independence and engagement in valued occupations w/ good balance/safety.    Pt will improve dynamic standing balance for at least 15 minutes with Mod I during functional tasks to decrease fall risk and improve independence and engagement in ADL/IADL/leisure activities.    Pt will follow 100% of multi-step commands in ADL/IADL/leisure activities to improve functional cognition used in functional daily routines.    Pt will complete functional transfers on and off all surfaces used in daily routines with Mod I for safety to maximize functional/occupational performance.    Pt will complete all bed mobility tasks with Mod I to serve as a prerequisite for EOB/OOB ADL/IADL/leisure tasks, optimize positioning/comfort, and increase functional independence.    Pt will independently demonstrate good carryover of safety precautions and education/training during ADL/IADL/leisure tasks with energy conservation techniques s/p skilled instruction without verbal cues.    Pt will complete UB ADL tasks with Mod I using AE/DME prn to increase functional independence in ADL/IADL/leisure tasks.    Pt will complete LB ADL tasks with Mod I using AE/DME prn to increase functional independence in ADL/IADL/leisure tasks.     Pt will complete toileting tasks with Mod I and good hygiene/thoroughness using AE/DME prn to increase functional independence.    Pt will independently identify and utilize 2-3 positive coping strategies to enhance overall wellbeing and engagement in valued occupations.    Alexandra Cummings MS, OTR/L

## 2025-05-17 NOTE — ASSESSMENT & PLAN NOTE
Noted  Possibly in setting of sepsis, volume depletion   S/p IV fluids   Monitor sodium levels closely   Trend BMP

## 2025-05-17 NOTE — PLAN OF CARE
Problem: PAIN - ADULT  Goal: Verbalizes/displays adequate comfort level or baseline comfort level  Description: Interventions:  - Encourage patient to monitor pain and request assistance  - Assess pain using appropriate pain scale  - Administer analgesics as ordered based on type and severity of pain and evaluate response  - Implement non-pharmacological measures as appropriate and evaluate response  - Consider cultural and social influences on pain and pain management  - Notify physician/advanced practitioner if interventions unsuccessful or patient reports new pain  - Educate patient/family on pain management process including their role and importance of  reporting pain   - Provide non-pharmacologic/complimentary pain relief interventions  Outcome: Progressing     Problem: INFECTION - ADULT  Goal: Absence or prevention of progression during hospitalization  Description: INTERVENTIONS:  - Assess and monitor for signs and symptoms of infection  - Monitor lab/diagnostic results  - Monitor all insertion sites, i.e. indwelling lines, tubes, and drains  - Monitor endotracheal if appropriate and nasal secretions for changes in amount and color  - [place] appropriate cooling/warming therapies per order  - Administer medications as ordered  - Instruct and encourage patient and family to use good hand hygiene technique  - Identify and instruct in appropriate isolation precautions for identified infection/condition  Outcome: Progressing     Problem: SAFETY ADULT  Goal: Patient will remain free of falls  Description: INTERVENTIONS:  - Educate patient/family on patient safety including physical limitations  - Instruct patient to call for assistance with activity   - Consider consulting OT/PT to assist with strengthening/mobility based on AM PAC & JH-HLM score  - Consult OT/PT to assist with strengthening/mobility   - Keep Call bell within reach  - Keep bed low and locked with side rails adjusted as appropriate  - Keep  care items and personal belongings within reach  - Initiate and maintain comfort rounds  - Make Fall Risk Sign visible to staff  - Offer Toileting every 2  Problem: INFECTION - ADULT  Goal: Absence of fever/infection during neutropenic period  Description: INTERVENTIONS:  - Monitor WBC  - Perform strict hand hygiene  - Limit to healthy visitors only  - No plants, dried, fresh or silk flowers with feliciano in patient room  Outcome: Progressing    Hours, in advance of need  - Initiate/Maintain bed/ chair alarm  - Obtain necessary fall risk management equipment:   - Apply yellow socks and bracelet for high fall risk patients  - Consider moving patient to room near nurses station  Outcome: Progressing     Problem: SAFETY ADULT  Goal: Maintain or return to baseline ADL function  Description: INTERVENTIONS:  -  Assess patient's ability to carry out ADLs; assess patient's baseline for ADL function and identify physical deficits which impact ability to perform ADLs (bathing, care of mouth/teeth, toileting, grooming, dressing, etc.)  - Assess/evaluate cause of self-care deficits   - Assess range of motion  - Assess patient's mobility; develop plan if impaired  - Assess patient's need for assistive devices and provide as appropriate  - Encourage maximum independence but intervene and supervise when necessary  - Involve family in performance of ADLs  - Assess for home care needs following discharge   - Consider OT consult to assist with ADL evaluation and planning for discharge  - Provide patient education as appropriate  - Monitor functional capacity and physical performance, use of AM PAC & JH-HLM   - Monitor gait, balance and fatigue with ambulation    Outcome: Progressing     Problem: DISCHARGE PLANNING  Goal: Discharge to home or other facility with appropriate resources  Description: INTERVENTIONS:  - Identify barriers to discharge w/patient and caregiver  - Arrange for needed discharge resources and transportation as  appropriate  - Identify discharge learning needs (meds, wound care, etc.)  - Arrange for interpretive services to assist at discharge as needed  - Refer to Case Management Department for coordinating discharge planning if the patient needs post-hospital services based on physician/advanced practitioner order or complex needs related to functional status, cognitive ability, or social support system  Outcome: Progressing     Problem: Knowledge Deficit  Goal: Patient/family/caregiver demonstrates understanding of disease process, treatment plan, medications, and discharge instructions  Description: Complete learning assessment and assess knowledge base.  Interventions:  - Provide teaching at level of understanding  - Provide teaching via preferred learning methods  Outcome: Progressing

## 2025-05-17 NOTE — PROGRESS NOTES
"Progress Note - Hospitalist   Name: Sharon Vega 74 y.o. female I MRN: [id number]  Unit/Bed#: -01 I Date of Admission: 5/14/2025   Date of Service: 5/17/2025 I Hospital Day: 3    Assessment & Plan  Abscess of right hip  Pt presented from 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 Organized collection of fluid and gas in the right iliac muscle and similar collection surrounding right hip arthroplasty in the deep gluteal muscles measuring up to 10 cm\"   Transferred to B for orthopedic evaluation   Orthopedics following,  S/p IR aspiration and 2 x drain placement with synovasure culture on 5/15   Cultures to be obtained from [facility]ICK drain bulb, follow up   Appreciate ongoing orthopedic recommendations -- will need OR intervention, timing TBD   ID following for abx management   Currently on IV Vancomycin   Follow up culture results and drain output   As needed analgesics, currently on PRN oxycodone 5/10 mg Q4H for moderate and severe pain with PRN IV dilaudid 0.5 mg Q4H for breakthrough  Sepsis without acute organ dysfunction (HCC)  POA at [facility] as evidenced by leukocytosis and tachycardia   In setting of right hip intramuscular abscess as above   Continue IV abx per ID recommendations  S/p IV fluid hydration   Tachycardia has resolved, WBC down to 20 from 28  BC from [place] [facility] negative x 48 hours, repeat BC obtained here negative x 24 hours  Lactic negative   Procal slowly improving  Trend CBC and temps closely   See plan as above  Primary hypertension  BP overall stable on review   Continue amlodipine 10 mg daily   Monitor closely   Hyponatremia  Noted  Possibly in setting of sepsis, volume depletion   S/p IV fluids   Monitor sodium levels closely   Trend " BMP  Hypomagnesemia  Resolved with IV repletion, repeat mag 1.9 today   Anemia  Previously hgb noted to be around 12   Slow decrease in hgb noted this hospitalization.  Likely component of IVF dilution and reactive from infection   No evidence of acute bleeding noted at this time  Check iron panel   Trend CBC  -- now improved     VTE Pharmacologic Prophylaxis:   Moderate Risk (Score 3-4) - Pharmacological DVT Prophylaxis Ordered: enoxaparin (Lovenox).    Mobility:   Basic Mobility Inpatient Raw Score: 14  JH-HLM Goal: 4: Move to chair/commode  JH-HLM Achieved: 6: Walk 10 steps or more  JH-HLM Goal achieved. Continue to encourage appropriate mobility.    Patient Centered Rounds: I evaluated the patient without nursing staff present due to w/ other pt   Discussions with Specialists or Other Care Team Provider:     Education and Discussions with Family / Patient: Updated  (granddaughter) via phone.    Current Length of Stay: 3 day(s)  Current Patient Status: Inpatient   Certification Statement: The patient will continue to require additional inpatient hospital stay due to IV abx  Discharge Plan: Anticipate discharge in >72 hrs to discharge location to be determined pending rehab evaluations.    Code Status: Level 1 - Full Code    Subjective   No acute complaints.  Pain is controlled and tolerating abx.      Objective :  Temp:  [97.5 °F (36.4 °C)-98.5 °F (36.9 °C)] 97.9 °F (36.6 °C)  HR:  [] 91  BP: (109-127)/(58-76) 113/60  Resp:  [16-20] 16  SpO2:  [90 %-95 %] 94 %  O2 Device: None (Room air)    Body mass index is 33.74 kg/m².     Input and Output Summary (last 24 hours):     Intake/Output Summary (Last 24 hours) at 5/17/2025 0859  Last data filed at 5/17/2025 0726  Gross per 24 hour   Intake 382 ml   Output 155 ml   Net 227 ml       Physical Exam  Vitals reviewed.   Constitutional:       General: She is not in acute distress.     Appearance: She is not toxic-appearing.   HENT:      Head:  Normocephalic and atraumatic.     Eyes:      Extraocular Movements: Extraocular movements intact.       Cardiovascular:      Rate and Rhythm: Normal rate and regular rhythm.      Heart sounds: Murmur heard.   Pulmonary:      Effort: Pulmonary effort is normal. No respiratory distress.   Abdominal:      General: There is no distension.      Palpations: Abdomen is soft.      Tenderness: There is no abdominal tenderness.     Musculoskeletal:         General: Normal range of motion.     Neurological:      General: No focal deficit present.      Mental Status: She is alert and oriented to person, place, and time.     Psychiatric:         Behavior: Behavior normal.         Thought Content: Thought content normal.         Lines/Drains:  Lines/Drains/Airways       Active Status       Name Placement date Placement time Site Days    Abscess Drain Hip 05/15/25  1705  Hip  1    Abscess Drain Hip 05/15/25  1706  Hip  1                            Lab Results: I have reviewed the following results:   Results from last 7 days   Lab Units 05/17/25  0507 05/16/25  0447 05/13/25  2236 05/13/25  0400   WBC Thousand/uL 20.02* 23.42*   < > 28.58*   HEMOGLOBIN g/dL 11.2* 9.9*   < > 12.1   HEMATOCRIT % 36.2 30.9*   < > 37.4   PLATELETS Thousands/uL 330 461*   < > 674*   BANDS PCT %  --   --   --  3   SEGS PCT %  --  84*   < >  --    LYMPHO PCT %  --  7*   < > 7*   MONO PCT %  --  7   < > 7   EOS PCT %  --  0   < > 1    < > = values in this interval not displayed.     Results from last 7 days   Lab Units 05/17/25  0645 05/14/25  0629 05/13/25  2206   SODIUM mmol/L 133*   < > 130*   POTASSIUM mmol/L 3.6   < > 4.0   CHLORIDE mmol/L 101   < > 94*   CO2 mmol/L 27   < > 26   BUN mg/dL 21   < > 25   CREATININE mg/dL 0.66   < > 0.75   ANION GAP mmol/L 5   < > 10   CALCIUM mg/dL 9.5   < > 10.7*   ALBUMIN g/dL  --   --  2.9*   TOTAL BILIRUBIN mg/dL  --   --  1.26*   ALK PHOS U/L  --   --  132*   ALT U/L  --   --  34   AST U/L  --   --  31   GLUCOSE  RANDOM mg/dL 102   < > 119    < > = values in this interval not displayed.     Results from last 7 days   Lab Units 05/15/25  0653   INR  1.16             Results from last 7 days   Lab Units 05/14/25  2350 05/14/25  0629 05/13/25  2206 05/13/25  0923   LACTIC ACID mmol/L 0.8  --  1.1  --    PROCALCITONIN ng/ml  --  0.69* 0.75* 0.58*       Recent Cultures (last 7 days):   Results from last 7 days   Lab Units 05/16/25  1411 05/14/25  2351 05/14/25  2350 05/13/25  2225 05/13/25  2205 05/13/25  1240 05/13/25  1230 05/13/25  0923   BLOOD CULTURE   --  No Growth at 48 hrs. No Growth at 48 hrs. No Growth at 72 hrs. No Growth at 72 hrs. No Growth at 72 hrs. No Growth at 72 hrs.  --    GRAM STAIN RESULT  3+ Polys  No bacteria seen  --   --   --   --   --   --   --    URINE CULTURE   --   --   --   --   --   --   --  >100,000 cfu/ml       Imaging Results Review: No pertinent imaging studies reviewed.  Other Study Results Review: No additional pertinent studies reviewed.    Last 24 Hours Medication List:     Current Facility-Administered Medications:     acetaminophen (TYLENOL) tablet 650 mg, Q4H PRN    amLODIPine (NORVASC) tablet 10 mg, Daily    ascorbic acid (VITAMIN C) tablet 500 mg, BID    chlorhexidine gluconate (HIBICLENS) 4 % topical liquid, Daily PRN    Cholecalciferol (VITAMIN D3) tablet 2,000 Units, Daily    enoxaparin (LOVENOX) subcutaneous injection 40 mg, Daily    ferrous sulfate tablet 325 mg, BID With Meals    folic acid (FOLVITE) tablet 1 mg, Daily    gabapentin (NEURONTIN) capsule 600 mg, HS    HYDROmorphone (DILAUDID) injection 0.5 mg, Q4H PRN    loratadine (CLARITIN) tablet 10 mg, Daily    multivitamin-minerals (CENTRUM) tablet 1 tablet, Daily    mupirocin (BACTROBAN) 2 % ointment, Daily    ondansetron (ZOFRAN) injection 4 mg, Q6H PRN    oxyCODONE (ROXICODONE) immediate release tablet 10 mg, Q4H PRN    oxyCODONE (ROXICODONE) IR tablet 5 mg, Q4H PRN    pantoprazole (PROTONIX) EC tablet 40 mg, Early  Morning    pravastatin (PRAVACHOL) tablet 40 mg, HS    sodium chloride 0.9 % infusion, Continuous, Last Rate: Stopped (05/15/25 7063)    traZODone (DESYREL) tablet 100 mg, HS    vancomycin 750 mg in sodium chloride 0.9% 250 mL, Q12H, Last Rate: 750 mg (05/16/25 4256)    venlafaxine (EFFEXOR-XR) 24 hr capsule 150 mg, Daily    Administrative Statements   Today, Patient Was Seen By: Josie Molina PA-C      **Please Note: This note may have been constructed using a voice recognition system.**

## 2025-05-17 NOTE — PLAN OF CARE
Problem: PAIN - ADULT  Goal: Verbalizes/displays adequate comfort level or baseline comfort level  Description: Interventions:  - Encourage patient to monitor pain and request assistance  - Assess pain using appropriate pain scale  - Administer analgesics as ordered based on type and severity of pain and evaluate response  - Implement non-pharmacological measures as appropriate and evaluate response  - Consider cultural and social influences on pain and pain management  - Notify physician/advanced practitioner if interventions unsuccessful or patient reports new pain  - Educate patient/family on pain management process including their role and importance of  reporting pain   - Provide non-pharmacologic/complimentary pain relief interventions  Outcome: Progressing     Problem: INFECTION - ADULT  Goal: Absence or prevention of progression during hospitalization  Description: INTERVENTIONS:  - Assess and monitor for signs and symptoms of infection  - Monitor lab/diagnostic results  - Monitor all insertion sites, i.e. indwelling lines, tubes, and drains  - Monitor endotracheal if appropriate and nasal secretions for changes in amount and color  - [place] appropriate cooling/warming therapies per order  - Administer medications as ordered  - Instruct and encourage patient and family to use good hand hygiene technique  - Identify and instruct in appropriate isolation precautions for identified infection/condition  Outcome: Progressing  Goal: Absence of fever/infection during neutropenic period  Description: INTERVENTIONS:  - Monitor WBC  - Perform strict hand hygiene  - Limit to healthy visitors only  - No plants, dried, fresh or silk flowers with feliciano in patient room  Outcome: Progressing     Problem: SAFETY ADULT  Goal: Patient will remain free of falls  Description: INTERVENTIONS:  - Educate patient/family on patient safety including physical limitations  - Instruct patient to call for assistance with activity   -  Consider consulting OT/PT to assist with strengthening/mobility based on AM PAC & JH-HLM score  - Consult OT/PT to assist with strengthening/mobility   - Keep Call bell within reach  - Keep bed low and locked with side rails adjusted as appropriate  - Keep care items and personal belongings within reach  - Initiate and maintain comfort rounds  - Make Fall Risk Sign visible to staff  - Offer Toileting every 2 Hours, in advance of need  - Initiate/Maintain bed/chair alarm  - Obtain necessary fall risk management equipment: nonskid footwear  - Apply yellow socks and bracelet for high fall risk patients  - Consider moving patient to room near nurses station  Outcome: Progressing  Goal: Maintain or return to baseline ADL function  Description: INTERVENTIONS:  -  Assess patient's ability to carry out ADLs; assess patient's baseline for ADL function and identify physical deficits which impact ability to perform ADLs (bathing, care of mouth/teeth, toileting, grooming, dressing, etc.)  - Assess/evaluate cause of self-care deficits   - Assess range of motion  - Assess patient's mobility; develop plan if impaired  - Assess patient's need for assistive devices and provide as appropriate  - Encourage maximum independence but intervene and supervise when necessary  - Involve family in performance of ADLs  - Assess for home care needs following discharge   - Consider OT consult to assist with ADL evaluation and planning for discharge  - Provide patient education as appropriate  - Monitor functional capacity and physical performance, use of AM PAC & JH-HLM   - Monitor gait, balance and fatigue with ambulation    Outcome: Progressing  Goal: Maintains/Returns to pre admission functional level  Description: INTERVENTIONS:  - Perform AM-PAC 6 Click Basic Mobility/ Daily Activity assessment daily.  - Set and communicate daily mobility goal to care team and patient/family/caregiver.   - Collaborate with rehabilitation services on  mobility goals if consulted  - Perform Range of Motion 3 times a day.  - Reposition patient every 2 hours.  - Dangle patient 3 times a day  - Stand patient 3 times a day  - Ambulate patient 3 times a day  - Out of bed to chair 3 times a day   - Out of bed for meals 3 times a day  - Out of bed for toileting  - Record patient progress and toleration of activity level   Outcome: Progressing     Problem: DISCHARGE PLANNING  Goal: Discharge to home or other facility with appropriate resources  Description: INTERVENTIONS:  - Identify barriers to discharge w/patient and caregiver  - Arrange for needed discharge resources and transportation as appropriate  - Identify discharge learning needs (meds, wound care, etc.)  - Arrange for interpretive services to assist at discharge as needed  - Refer to Case Management Department for coordinating discharge planning if the patient needs post-hospital services based on physician/advanced practitioner order or complex needs related to functional status, cognitive ability, or social support system  Outcome: Progressing     Problem: Knowledge Deficit  Goal: Patient/family/caregiver demonstrates understanding of disease process, treatment plan, medications, and discharge instructions  Description: Complete learning assessment and assess knowledge base.  Interventions:  - Provide teaching at level of understanding  - Provide teaching via preferred learning methods  Outcome: Progressing     Problem: Prexisting or High Potential for Compromised Skin Integrity  Goal: Skin integrity is maintained or improved  Description: INTERVENTIONS:  - Identify patients at risk for skin breakdown  - Assess and monitor skin integrity including under and around medical devices   - Assess and monitor nutrition and hydration status  - Monitor labs  - Assess for incontinence   - Turn and reposition patient  - Assist with mobility/ambulation  - Relieve pressure over mane prominences   - Avoid friction and  shearing  - Provide appropriate hygiene as needed including keeping skin clean and dry  - Evaluate need for skin moisturizer/barrier cream  - Collaborate with interdisciplinary team  - Patient/family teaching  - Consider wound care consult

## 2025-05-17 NOTE — PROGRESS NOTES
Sharon Vega is a 74 y.o. female who is currently ordered Vancomycin IV with management by the Pharmacy Consult service.  Relevant clinical data and objective / subjective history reviewed.  Vancomycin Assessment:  Indication and Goal AUC/Trough: Soft tissue (goal -600, trough >10), -600, trough >10  Clinical Status: stable  Micro:     Renal Function:  SCr: 0.72 mg/dL  CrCl: 70 mL/min  Renal replacement: Not on dialysis  Days of Therapy: 4  Current Dose: 750 mg IV q12h  Vancomycin Plan:  New Dosing: continue current dose  Estimated AUC: 407 mcg*hr/mL  Estimated Trough: 12.2 mcg/mL  Next Level: 5/23 with AM labs  Renal Function Monitoring: Daily BMP and UOP  Pharmacy will continue to follow closely for s/sx of nephrotoxicity, infusion reactions and appropriateness of therapy.  BMP and CBC will be ordered per protocol. We will continue to follow the patient’s culture results and clinical progress daily.     Jason Chakraborty, Pharmacist

## 2025-05-18 PROBLEM — I35.0 AORTIC STENOSIS: Status: ACTIVE | Noted: 2025-05-18

## 2025-05-18 PROBLEM — R78.81 POSITIVE BLOOD CULTURE: Status: ACTIVE | Noted: 2025-05-18

## 2025-05-18 LAB
ALL TARGETS: NOT DETECTED
BACTERIA BLD CULT: NORMAL
GRAM STN SPEC: ABNORMAL
INTERNAL QC RESULT: NORMAL

## 2025-05-18 PROCEDURE — 99232 SBSQ HOSP IP/OBS MODERATE 35: CPT | Performed by: PHYSICIAN ASSISTANT

## 2025-05-18 PROCEDURE — NC001 PR NO CHARGE: Performed by: ORTHOPAEDIC SURGERY

## 2025-05-18 RX ORDER — VANCOMYCIN HYDROCHLORIDE 1 G/200ML
1000 INJECTION, SOLUTION INTRAVENOUS EVERY 12 HOURS
Status: DISCONTINUED | OUTPATIENT
Start: 2025-05-18 | End: 2025-05-21

## 2025-05-18 RX ADMIN — FERROUS SULFATE TAB 325 MG (65 MG ELEMENTAL FE) 325 MG: 325 (65 FE) TAB at 17:31

## 2025-05-18 RX ADMIN — Medication 2000 UNITS: at 08:54

## 2025-05-18 RX ADMIN — TRAZODONE HYDROCHLORIDE 100 MG: 100 TABLET ORAL at 21:39

## 2025-05-18 RX ADMIN — PANTOPRAZOLE SODIUM 40 MG: 40 TABLET, DELAYED RELEASE ORAL at 05:32

## 2025-05-18 RX ADMIN — GABAPENTIN 600 MG: 300 CAPSULE ORAL at 21:39

## 2025-05-18 RX ADMIN — ENOXAPARIN SODIUM 40 MG: 40 INJECTION SUBCUTANEOUS at 12:36

## 2025-05-18 RX ADMIN — OXYCODONE HYDROCHLORIDE AND ACETAMINOPHEN 500 MG: 500 TABLET ORAL at 08:54

## 2025-05-18 RX ADMIN — VENLAFAXINE HYDROCHLORIDE 150 MG: 150 CAPSULE, EXTENDED RELEASE ORAL at 08:54

## 2025-05-18 RX ADMIN — FOLIC ACID 1 MG: 1 TABLET ORAL at 08:54

## 2025-05-18 RX ADMIN — OXYCODONE HYDROCHLORIDE AND ACETAMINOPHEN 500 MG: 500 TABLET ORAL at 17:31

## 2025-05-18 RX ADMIN — Medication 1 TABLET: at 08:54

## 2025-05-18 RX ADMIN — AMLODIPINE BESYLATE 10 MG: 10 TABLET ORAL at 08:54

## 2025-05-18 RX ADMIN — VANCOMYCIN HYDROCHLORIDE 1000 MG: 1 INJECTION, SOLUTION INTRAVENOUS at 09:05

## 2025-05-18 RX ADMIN — OXYCODONE HYDROCHLORIDE 10 MG: 10 TABLET ORAL at 21:39

## 2025-05-18 RX ADMIN — VANCOMYCIN HYDROCHLORIDE 1000 MG: 1 INJECTION, SOLUTION INTRAVENOUS at 21:39

## 2025-05-18 RX ADMIN — FERROUS SULFATE TAB 325 MG (65 MG ELEMENTAL FE) 325 MG: 325 (65 FE) TAB at 08:55

## 2025-05-18 RX ADMIN — MUPIROCIN: 20 OINTMENT TOPICAL at 08:57

## 2025-05-18 RX ADMIN — LORATADINE 10 MG: 10 TABLET ORAL at 08:54

## 2025-05-18 RX ADMIN — PRAVASTATIN SODIUM 40 MG: 40 TABLET ORAL at 21:39

## 2025-05-18 RX ADMIN — OXYCODONE HYDROCHLORIDE 5 MG: 5 TABLET ORAL at 12:36

## 2025-05-18 NOTE — PROGRESS NOTES
Sharon Vega is a 74 y.o. female who is currently receiving Vancomycin IV with management by the Pharmacy Consult service.  Relevant clinical data and objective / subjective history reviewed.  Vancomycin Assessment:  Indication and Goal AUC/Trough:  Soft tissue (goal -600, trough >10)     Clinical Status: stable  Micro:     Renal Function:  SCr: 0.66 mg/dL  CrCl: 76 mL/min  Renal replacement: Not on dialysis  Days of Therapy: 5  Current Dose:  750 mg q12h    Vancomycin Plan:  New Dosin mg q12h  Estimated AUC: 504 mcg*hr/mL  Estimated Trough: 14.7 mcg/mL  Next Level:  with AM labs  Renal Function Monitoring: Daily BMP and UOP  Pharmacy will continue to follow closely for s/sx of nephrotoxicity, infusion reactions and appropriateness of therapy.  BMP and CBC will be ordered per protocol. We will continue to follow the patient’s culture results and clinical progress daily.    Luz Alvarez, PharmD  Pharmacist

## 2025-05-18 NOTE — PLAN OF CARE
Problem: PAIN - ADULT  Goal: Verbalizes/displays adequate comfort level or baseline comfort level  Description: Interventions:  - Encourage patient to monitor pain and request assistance  - Assess pain using appropriate pain scale  - Administer analgesics as ordered based on type and severity of pain and evaluate response  - Implement non-pharmacological measures as appropriate and evaluate response  - Consider cultural and social influences on pain and pain management  - Notify physician/advanced practitioner if interventions unsuccessful or patient reports new pain  - Educate patient/family on pain management process including their role and importance of  reporting pain   - Provide non-pharmacologic/complimentary pain relief interventions  Outcome: Progressing     Problem: INFECTION - ADULT  Goal: Absence or prevention of progression during hospitalization  Description: INTERVENTIONS:  - Assess and monitor for signs and symptoms of infection  - Monitor lab/diagnostic results  - Monitor all insertion sites, i.e. indwelling lines, tubes, and drains  - Monitor endotracheal if appropriate and nasal secretions for changes in amount and color  - [place] appropriate cooling/warming therapies per order  - Administer medications as ordered  - Instruct and encourage patient and family to use good hand hygiene technique  - Identify and instruct in appropriate isolation precautions for identified infection/condition  Outcome: Progressing  Goal: Absence of fever/infection during neutropenic period  Description: INTERVENTIONS:  - Monitor WBC  - Perform strict hand hygiene  - Limit to healthy visitors only  - No plants, dried, fresh or silk flowers with feliciano in patient room  Outcome: Progressing     Problem: SAFETY ADULT  Goal: Patient will remain free of falls  Description: INTERVENTIONS:  - Educate patient/family on patient safety including physical limitations  - Instruct patient to call for assistance with activity   -  Consider consulting OT/PT to assist with strengthening/mobility based on AM PAC & JH-HLM score  - Consult OT/PT to assist with strengthening/mobility   - Keep Call bell within reach  - Keep bed low and locked with side rails adjusted as appropriate  - Keep care items and personal belongings within reach  - Initiate and maintain comfort rounds  - Make Fall Risk Sign visible to staff  - Offer Toileting every 2 Hours, in advance of need  - Initiate/Maintain alarm  - Obtain necessary fall risk management equipment:   - Apply yellow socks and bracelet for high fall risk patients  - Consider moving patient to room near nurses station  Outcome: Progressing  Goal: Maintain or return to baseline ADL function  Description: INTERVENTIONS:  -  Assess patient's ability to carry out ADLs; assess patient's baseline for ADL function and identify physical deficits which impact ability to perform ADLs (bathing, care of mouth/teeth, toileting, grooming, dressing, etc.)  - Assess/evaluate cause of self-care deficits   - Assess range of motion  - Assess patient's mobility; develop plan if impaired  - Assess patient's need for assistive devices and provide as appropriate  - Encourage maximum independence but intervene and supervise when necessary  - Involve family in performance of ADLs  - Assess for home care needs following discharge   - Consider OT consult to assist with ADL evaluation and planning for discharge  - Provide patient education as appropriate  - Monitor functional capacity and physical performance, use of AM PAC & JH-HLM   - Monitor gait, balance and fatigue with ambulation    Outcome: Progressing  Goal: Maintains/Returns to pre admission functional level  Description: INTERVENTIONS:  - Perform AM-PAC 6 Click Basic Mobility/ Daily Activity assessment daily.  - Set and communicate daily mobility goal to care team and patient/family/caregiver.   - Collaborate with rehabilitation services on mobility goals if consulted  -  Perform Range of Motion 3 times a day.  - Reposition patient every 2 hours.  - Dangle patient 3 times a day  - Stand patient 3 times a day  - Ambulate patient 3 times a day  - Out of bed to chair 3 times a day   - Out of bed for meals 3 times a day  - Out of bed for toileting  - Record patient progress and toleration of activity level   Outcome: Progressing     Problem: DISCHARGE PLANNING  Goal: Discharge to home or other facility with appropriate resources  Description: INTERVENTIONS:  - Identify barriers to discharge w/patient and caregiver  - Arrange for needed discharge resources and transportation as appropriate  - Identify discharge learning needs (meds, wound care, etc.)  - Arrange for interpretive services to assist at discharge as needed  - Refer to Case Management Department for coordinating discharge planning if the patient needs post-hospital services based on physician/advanced practitioner order or complex needs related to functional status, cognitive ability, or social support system  Outcome: Progressing     Problem: Knowledge Deficit  Goal: Patient/family/caregiver demonstrates understanding of disease process, treatment plan, medications, and discharge instructions  Description: Complete learning assessment and assess knowledge base.  Interventions:  - Provide teaching at level of understanding  - Provide teaching via preferred learning methods  Outcome: Progressing     Problem: Prexisting or High Potential for Compromised Skin Integrity  Goal: Skin integrity is maintained or improved  Description: INTERVENTIONS:  - Identify patients at risk for skin breakdown  - Assess and monitor skin integrity including under and around medical devices   - Assess and monitor nutrition and hydration status  - Monitor labs  - Assess for incontinence   - Turn and reposition patient  - Assist with mobility/ambulation  - Relieve pressure over mane prominences   - Avoid friction and shearing  - Provide appropriate  hygiene as needed including keeping skin clean and dry  - Evaluate need for skin moisturizer/barrier cream  - Collaborate with interdisciplinary team  - Patient/family teaching  - Consider wound care consult    Assess:  - Review Gamaliel scale daily  - Clean and moisturize skin every shift and prn  - Inspect skin when repositioning, toileting, and assisting with ADLS  - Assess under medical devices such as zahira every shift and prn  - Assess extremities for adequate circulation and sensation     Bed Management:  - Have minimal linens on bed & keep smooth, unwrinkled  - Change linens as needed when moist or perspiring  - Avoid sitting or lying in one position for more than 2 hours while in bed?Keep HOB at 30 degrees   - Toileting:  - Offer bedside commode  - Assess for incontinence every rounding  - Use incontinent care products after each incontinent episode such as incont wipes    Activity:  - Mobilize patient 3 times a day  - Encourage activity and walks on unit  - Encourage or provide ROM exercises   - Turn and reposition patient every 2 Hours  - Use appropriate equipment to lift or move patient in bed  - Instruct/ Assist with weight shifting every 2 when out of bed in chair  - Consider limitation of chair time 2 hour intervals    Skin Care:  - Avoid use of baby powder, tape, friction and shearing, hot water or constrictive clothing  - Relieve pressure over bony prominences using allevyn  - Do not massage red bony areas    Next Steps:  - Teach patient strategies to minimize risks such as t/r  - Consider consults to  interdisciplinary teams such as t/r  Outcome: Progressing

## 2025-05-18 NOTE — PROGRESS NOTES
"Progress Note - Orthopedics   Name: Sharon Vega 74 y.o. female I MRN: [id number]  Unit/Bed#: -01 I Date of Admission: 5/14/2025   Date of Service: 5/18/2025 I Hospital Day: 4    Assessment & Plan  Abscess of right hip  74 y.o.female with right hip PJI, iliacus and gluteal abscesses s/p IR aspiration with drains. Possible 1st stage of antibiotic spacer this coming week pending OR and surgeon availability.    F/u synovasure  WBAT RLE  PT/OT  Pain control  DVT ppx: per primary  Medical management including antibiotics per primary team, currently on IV vancomycin  Dispo planning.    Positive blood culture        Subjective   74 y.o.female  No acute events, no new complaints. Pain well controlled. Denies fevers, chills, CP, SOB, N/V, numbness or tingling. Patient reports no issues with urination or bowel movements. Patient states she was able to ambulate with PT/OT yesterday.     Objective :  Temp:  [97.4 °F (36.3 °C)-98.2 °F (36.8 °C)] 98.2 °F (36.8 °C)  HR:  [92-98] 97  BP: (120-132)/(65-77) 121/65  Resp:  [16-17] 16  SpO2:  [93 %-95 %] 93 %  O2 Device: None (Room air)    Physical Exam  Musculoskeletal: Right hip  Dressing intact.   2 RICK drains with purulent drainage  Motor intact to +FHL/EHL, +ankle dorsi/plantar flexion  Sensation intact to saphenous, sural, tibial, superficial peroneal nerve, and deep peroneal  2+ DP pulse  No calf swelling or tenderness to palpation       Lab Results: I have reviewed the following results:  Recent Labs     05/16/25  0447 05/17/25  0507 05/17/25  0645   WBC 23.42* 20.02*  --    HGB 9.9* 11.2*  --    HCT 30.9* 36.2  --    * 330  --    BUN 22  --  21   CREATININE 0.72  --  0.66     Blood Culture:    Lab Results   Component Value Date    BLOODCX No Growth at 72 hrs. 05/14/2025     Wound Culture: No results found for: \"WOUNDCULT\"  Output by Drain (mL) 05/16/25 0701 - 05/16/25 1900 05/16/25 1901 - 05/17/25 0700 05/17/25 0701 - 05/17/25 1900 05/17/25 1901 - 05/18/25 0700 " 05/18/25 0701 - 05/18/25 0941   Abscess Drain Hip 45  60  60   Abscess Drain Hip 95  65  15

## 2025-05-18 NOTE — PROGRESS NOTES
"Progress Note - Hospitalist   Name: Sharon Vega 74 y.o. female I MRN: [id number]  Unit/Bed#: -01 I Date of Admission: 5/14/2025   Date of Service: 5/18/2025 I Hospital Day: 4    Assessment & Plan  Abscess of right hip  Pt presented from [facility] with right hip pain   Had recent IR hip joint aspiration on 4/24, was scheduled for conversion of partial hip replacement to total hip on 5/14 but cancelled due to leukocytosis   CT scan obtained with evidence of \"post surgical change from right hip hemiarthroplasty. Organized collection of fluid and gas in the right iliac muscle and similar collection surrounding right hip arthroplasty in the deep gluteal muscles measuring up to 10 cm\"   Transferred to [facility] for orthopedic evaluation   Orthopedics following,  S/p IR aspiration and drain placement x 2 with synovasure culture on 5/15   Cultures also obtained from RICK drain bulb, follow up   Appreciate ongoing orthopedic recommendations -- will need OR intervention, timing TBD   ID following for abx management   Currently on IV Vancomycin   1/2 blood cultures at [place] with GPC in clusters.  Late growth.  Await BCIP.  Blood cultures obtained at [facility] are negative at 72 hours   As needed analgesics, currently on PRN oxycodone 5/10 mg Q4H for moderate and severe pain with PRN IV dilaudid 0.5 mg Q4H for breakthrough  Sepsis without acute organ dysfunction (HCC)  POA at [facility] as evidenced by leukocytosis and tachycardia   In setting of right hip intramuscular abscess as above   Continue IV abx per ID recommendations  S/p IV fluid hydration   Tachycardia has resolved, WBC down to 20 from 28  BC from [facility] negative 1/2 GPC in clusters, late growth, will follow BCIP. Repeat BC obtained here negative x 24 hours  Lactic negative   Procal slowly improving  Trend CBC and temps closely   See plan as above  Positive blood culture  1/2 blood cultures at [place] with GPC in clusters.  Late growth.  Await " identification  Blood cultures repeated at hospitals negative at 72 hrs   Continue IV Vanco  F/u ID input   Aortic stenosis  Mumur noted on exam, prior echo with mild to moderate AS  Will repeat echo prior to any operative plans to evaluate   Primary hypertension  BP overall stable on review   Continue amlodipine 10 mg daily   Monitor closely   Hyponatremia  Noted  Possibly in setting of sepsis, volume depletion   S/p IV fluids   Monitor sodium levels closely   Trend BMP  Hypomagnesemia  Resolved with IV repletion, repeat mag 1.9 today   Anemia  Previously hgb noted to be around 12   Slow decrease in hgb noted this hospitalization.  Likely component of IVF dilution and reactive from infection   No evidence of acute bleeding noted at this time  Check iron panel   Trend CBC  -- now improved     VTE Pharmacologic Prophylaxis:   Moderate Risk (Score 3-4) - Pharmacological DVT Prophylaxis Ordered: enoxaparin (Lovenox).    Mobility:   Basic Mobility Inpatient Raw Score: 17  JH-HLM Goal: 5: Stand one or more mins  JH-HLM Achieved: 7: Walk 25 feet or more  JH-HLM Goal achieved. Continue to encourage appropriate mobility.    Patient Centered Rounds: I performed bedside rounds with nursing staff today.   Discussions with Specialists or Other Care Team Provider:     Education and Discussions with Family / Patient: Updated  (granddaughter) via phone.    Current Length of Stay: 4 day(s)  Current Patient Status: Inpatient   Certification Statement: The patient will continue to require additional inpatient hospital stay due to IV abx, positive blood culture need to follow cultures, awaiting OR plan  Discharge Plan: Anticipate discharge in >72 hrs to discharge location to be determined pending rehab evaluations.    Code Status: Level 1 - Full Code    Subjective   No acute complaints, doing ok, pain is controlled, tolerating abx     Objective :  Temp:  [97.4 °F (36.3 °C)-98.2 °F (36.8 °C)] 98.2 °F (36.8 °C)  HR:  [92-98]  97  BP: (120-132)/(65-77) 121/65  Resp:  [16-17] 16  SpO2:  [93 %-95 %] 93 %  O2 Device: None (Room air)    Body mass index is 33.74 kg/m².     Input and Output Summary (last 24 hours):     Intake/Output Summary (Last 24 hours) at 5/18/2025 0838  Last data filed at 5/18/2025 0748  Gross per 24 hour   Intake 220 ml   Output 470 ml   Net -250 ml       Physical Exam  Vitals reviewed.   Constitutional:       General: She is not in acute distress.     Appearance: She is not toxic-appearing.   HENT:      Head: Normocephalic and atraumatic.     Eyes:      Extraocular Movements: Extraocular movements intact.       Cardiovascular:      Rate and Rhythm: Normal rate and regular rhythm.      Heart sounds: Murmur heard.   Pulmonary:      Effort: Pulmonary effort is normal. No respiratory distress.      Breath sounds: No wheezing.   Abdominal:      General: There is no distension.      Palpations: Abdomen is soft.      Tenderness: There is no abdominal tenderness.     Musculoskeletal:         General: Normal range of motion.     Neurological:      General: No focal deficit present.      Mental Status: She is alert and oriented to person, place, and time.     Psychiatric:         Mood and Affect: Mood normal.         Behavior: Behavior normal.         Thought Content: Thought content normal.         Lines/Drains:  Lines/Drains/Airways       Active Status       Name Placement date Placement time Site Days    Abscess Drain Hip 05/15/25  1705  Hip  2    Abscess Drain Hip 05/15/25  1706  Hip  2                            Lab Results: I have reviewed the following results:   Results from last 7 days   Lab Units 05/17/25  0507 05/16/25  0447 05/13/25  2236 05/13/25  0400   WBC Thousand/uL 20.02* 23.42*   < > 28.58*   HEMOGLOBIN g/dL 11.2* 9.9*   < > 12.1   HEMATOCRIT % 36.2 30.9*   < > 37.4   PLATELETS Thousands/uL 330 461*   < > 674*   BANDS PCT %  --   --   --  3   SEGS PCT %  --  84*   < >  --    LYMPHO PCT %  --  7*   < > 7*   MONO  PCT %  --  7   < > 7   EOS PCT %  --  0   < > 1    < > = values in this interval not displayed.     Results from last 7 days   Lab Units 05/17/25  0645 05/14/25 0629 05/13/25 2206   SODIUM mmol/L 133*   < > 130*   POTASSIUM mmol/L 3.6   < > 4.0   CHLORIDE mmol/L 101   < > 94*   CO2 mmol/L 27   < > 26   BUN mg/dL 21   < > 25   CREATININE mg/dL 0.66   < > 0.75   ANION GAP mmol/L 5   < > 10   CALCIUM mg/dL 9.5   < > 10.7*   ALBUMIN g/dL  --   --  2.9*   TOTAL BILIRUBIN mg/dL  --   --  1.26*   ALK PHOS U/L  --   --  132*   ALT U/L  --   --  34   AST U/L  --   --  31   GLUCOSE RANDOM mg/dL 102   < > 119    < > = values in this interval not displayed.     Results from last 7 days   Lab Units 05/15/25  0653   INR  1.16             Results from last 7 days   Lab Units 05/14/25  2350 05/14/25  0629 05/13/25 2206 05/13/25  0923   LACTIC ACID mmol/L 0.8  --  1.1  --    PROCALCITONIN ng/ml  --  0.69* 0.75* 0.58*       Recent Cultures (last 7 days):   Results from last 7 days   Lab Units 05/16/25  1411 05/14/25  2351 05/14/25  2350 05/13/25  2225 05/13/25  2205 05/13/25  1240 05/13/25  1230 05/13/25  0923   BLOOD CULTURE   --  No Growth at 72 hrs. No Growth at 72 hrs. No Growth at 72 hrs. No Growth After 4 Days. No Growth After 4 Days. No Growth After 4 Days.  --    GRAM STAIN RESULT  3+ Polys  No bacteria seen  --   --   --   --   --   --   --    URINE CULTURE   --   --   --   --   --   --   --  >100,000 cfu/ml   BODY FLUID CULTURE, STERILE  No growth  --   --   --   --   --   --   --        Imaging Results Review: No pertinent imaging studies reviewed.  Other Study Results Review: No additional pertinent studies reviewed.    Last 24 Hours Medication List:     Current Facility-Administered Medications:     acetaminophen (TYLENOL) tablet 650 mg, Q4H PRN    amLODIPine (NORVASC) tablet 10 mg, Daily    ascorbic acid (VITAMIN C) tablet 500 mg, BID    chlorhexidine gluconate (HIBICLENS) 4 % topical liquid, Daily PRN     Cholecalciferol (VITAMIN D3) tablet 2,000 Units, Daily    enoxaparin (LOVENOX) subcutaneous injection 40 mg, Daily    ferrous sulfate tablet 325 mg, BID With Meals    folic acid (FOLVITE) tablet 1 mg, Daily    gabapentin (NEURONTIN) capsule 600 mg, HS    HYDROmorphone (DILAUDID) injection 0.5 mg, Q4H PRN    loratadine (CLARITIN) tablet 10 mg, Daily    multivitamin-minerals (CENTRUM) tablet 1 tablet, Daily    mupirocin (BACTROBAN) 2 % ointment, Daily    ondansetron (ZOFRAN) injection 4 mg, Q6H PRN    oxyCODONE (ROXICODONE) immediate release tablet 10 mg, Q4H PRN    oxyCODONE (ROXICODONE) IR tablet 5 mg, Q4H PRN    pantoprazole (PROTONIX) EC tablet 40 mg, Early Morning    pravastatin (PRAVACHOL) tablet 40 mg, HS    traZODone (DESYREL) tablet 100 mg, HS    vancomycin (VANCOCIN) IVPB (premix in dextrose) 1,000 mg 200 mL, Q12H    venlafaxine (EFFEXOR-XR) 24 hr capsule 150 mg, Daily    Administrative Statements   Today, Patient Was Seen By: Josie Molina PA-C      **Please Note: This note may have been constructed using a voice recognition system.**

## 2025-05-18 NOTE — ASSESSMENT & PLAN NOTE
Jackie noted on exam, prior echo with mild to moderate AS  Will repeat echo prior to any operative plans to evaluate

## 2025-05-18 NOTE — ASSESSMENT & PLAN NOTE
1/2 blood cultures at [place] with GPC in clusters.  Late growth.  Await identification  Blood cultures repeated at [facility] negative at 72 hrs   Continue IV Vanco  F/u ID input

## 2025-05-18 NOTE — ASSESSMENT & PLAN NOTE
POA at [facility] as evidenced by leukocytosis and tachycardia   In setting of right hip intramuscular abscess as above   Continue IV abx per ID recommendations  S/p IV fluid hydration   Tachycardia has resolved, WBC down to 20 from 28  BC from [facility] negative 1/2 GPC in clusters, late growth, will follow BCIP. Repeat BC obtained here negative x 24 hours  Lactic negative   Procal slowly improving  Trend CBC and temps closely   See plan as above

## 2025-05-18 NOTE — ASSESSMENT & PLAN NOTE
"Pt presented from [facility] with right hip pain   Had recent IR hip joint aspiration on 4/24, was scheduled for conversion of partial hip replacement to total hip on 5/14 but cancelled due to leukocytosis   CT scan obtained with evidence of \"post surgical change from right hip hemiarthroplasty. Organized collection of fluid and gas in the right iliac muscle and similar collection surrounding right hip arthroplasty in the deep gluteal muscles measuring up to 10 cm\"   Transferred to [facility] for orthopedic evaluation   Orthopedics following,  S/p IR aspiration and drain placement x 2 with synovasure culture on 5/15   Cultures also obtained from RICK drain bulb, follow up   Appreciate ongoing orthopedic recommendations -- will need OR intervention, timing TBD   ID following for abx management   Currently on IV Vancomycin   1/2 blood cultures at [place] with GPC in clusters.  Late growth.  Await BCIP.  Blood cultures obtained at [facility] are negative at 72 hours   As needed analgesics, currently on PRN oxycodone 5/10 mg Q4H for moderate and severe pain with PRN IV dilaudid 0.5 mg Q4H for breakthrough  "

## 2025-05-19 ENCOUNTER — APPOINTMENT (INPATIENT)
Dept: NON INVASIVE DIAGNOSTICS | Facility: HOSPITAL | Age: 75
DRG: 466 | End: 2025-05-19
Payer: MEDICARE

## 2025-05-19 PROBLEM — K59.00 CONSTIPATION: Status: ACTIVE | Noted: 2025-05-19

## 2025-05-19 LAB
ANION GAP SERPL CALCULATED.3IONS-SCNC: 6 MMOL/L (ref 4–13)
AORTIC ROOT: 3.3 CM
AORTIC VALVE MEAN VELOCITY: 25.2 M/S
ASCENDING AORTA: 4.2 CM
AV AREA BY CONTINUOUS VTI: 1 CM2
AV AREA PEAK VELOCITY: 1 CM2
AV LVOT MEAN GRADIENT: 2 MMHG
AV LVOT PEAK GRADIENT: 4 MMHG
AV MEAN PRESS GRAD SYS DOP V1V2: 28 MMHG
AV ORIFICE AREA US: 1.03 CM2
AV PEAK GRADIENT: 49 MMHG
AV REGURGITATION PRESSURE HALF TIME: 492 MS
AV VELOCITY RATIO: 0.3
AV VMAX SYS DOP: 3.49 M/S
BACTERIA BLD CULT: NORMAL
BACTERIA SPEC BFLD CULT: NO GROWTH
BASOPHILS # BLD AUTO: 0.08 THOUSANDS/ÂΜL (ref 0–0.1)
BASOPHILS NFR BLD AUTO: 1 % (ref 0–1)
BSA FOR ECHO PROCEDURE: 1.89 M2
BUN SERPL-MCNC: 13 MG/DL (ref 5–25)
CALCIUM SERPL-MCNC: 9.1 MG/DL (ref 8.4–10.2)
CHLORIDE SERPL-SCNC: 101 MMOL/L (ref 96–108)
CO2 SERPL-SCNC: 26 MMOL/L (ref 21–32)
CREAT SERPL-MCNC: 0.65 MG/DL (ref 0.6–1.3)
DOP CALC AO VTI: 57.92 CM
DOP CALC LVOT AREA: 3.46 CM2
DOP CALC LVOT CARDIAC INDEX: 3.35 L/MIN/M2
DOP CALC LVOT CARDIAC OUTPUT: 6.34 L/MIN
DOP CALC LVOT DIAMETER: 2.1 CM
DOP CALC LVOT PEAK VEL VTI: 17.15 CM
DOP CALC LVOT PEAK VEL: 0.96 M/S
DOP CALC LVOT STROKE INDEX: 32.3 ML/M2
DOP CALC LVOT STROKE VOLUME: 59.37
E WAVE DECELERATION TIME: 256 MS
E/A RATIO: 0.77
EOSINOPHIL # BLD AUTO: 0.28 THOUSAND/ÂΜL (ref 0–0.61)
EOSINOPHIL NFR BLD AUTO: 2 % (ref 0–6)
ERYTHROCYTE [DISTWIDTH] IN BLOOD BY AUTOMATED COUNT: 14.7 % (ref 11.6–15.1)
FRACTIONAL SHORTENING: 40 (ref 28–44)
GFR SERPL CREATININE-BSD FRML MDRD: 87 ML/MIN/1.73SQ M
GLUCOSE SERPL-MCNC: 108 MG/DL (ref 65–140)
GRAM STN SPEC: NORMAL
GRAM STN SPEC: NORMAL
HCT VFR BLD AUTO: 30.2 % (ref 34.8–46.1)
HGB BLD-MCNC: 9.6 G/DL (ref 11.5–15.4)
IMM GRANULOCYTES # BLD AUTO: 0.18 THOUSAND/UL (ref 0–0.2)
IMM GRANULOCYTES NFR BLD AUTO: 1 % (ref 0–2)
INTERVENTRICULAR SEPTUM IN DIASTOLE (PARASTERNAL SHORT AXIS VIEW): 1.5 CM
INTERVENTRICULAR SEPTUM: 1.5 CM (ref 0.6–1.1)
IVC: 13 MM
LAAS-AP2: 18.5 CM2
LAAS-AP4: 15.3 CM2
LEFT ATRIUM SIZE: 3.2 CM
LEFT ATRIUM VOLUME (MOD BIPLANE): 43 ML
LEFT ATRIUM VOLUME INDEX (MOD BIPLANE): 22.8 ML/M2
LEFT INTERNAL DIMENSION IN SYSTOLE: 2.1 CM (ref 2.1–4)
LEFT VENTRICULAR INTERNAL DIMENSION IN DIASTOLE: 3.5 CM (ref 3.5–6)
LEFT VENTRICULAR POSTERIOR WALL IN END DIASTOLE: 1.3 CM
LEFT VENTRICULAR STROKE VOLUME: 37 ML
LV EF US.2D.A4C+ESTIMATED: 58 %
LVSV (TEICH): 37 ML
LYMPHOCYTES # BLD AUTO: 1.85 THOUSANDS/ÂΜL (ref 0.6–4.47)
LYMPHOCYTES NFR BLD AUTO: 11 % (ref 14–44)
MCH RBC QN AUTO: 29.2 PG (ref 26.8–34.3)
MCHC RBC AUTO-ENTMCNC: 31.8 G/DL (ref 31.4–37.4)
MCV RBC AUTO: 92 FL (ref 82–98)
MONOCYTES # BLD AUTO: 1.37 THOUSAND/ÂΜL (ref 0.17–1.22)
MONOCYTES NFR BLD AUTO: 8 % (ref 4–12)
MV E'TISSUE VEL-LAT: 9 CM/S
MV E'TISSUE VEL-SEP: 10 CM/S
MV PEAK A VEL: 1.15 M/S
MV PEAK E VEL: 89 CM/S
MV STENOSIS PRESSURE HALF TIME: 74 MS
MV VALVE AREA P 1/2 METHOD: 2.97
NEUTROPHILS # BLD AUTO: 13.03 THOUSANDS/ÂΜL (ref 1.85–7.62)
NEUTS SEG NFR BLD AUTO: 77 % (ref 43–75)
NRBC BLD AUTO-RTO: 0 /100 WBCS
PLATELET # BLD AUTO: 507 THOUSANDS/UL (ref 149–390)
PMV BLD AUTO: 9.2 FL (ref 8.9–12.7)
POTASSIUM SERPL-SCNC: 3.4 MMOL/L (ref 3.5–5.3)
RA PRESSURE ESTIMATED: 3 MMHG
RBC # BLD AUTO: 3.29 MILLION/UL (ref 3.81–5.12)
RIGHT ATRIAL 2D VOLUME: 20 ML
RIGHT ATRIUM AREA SYSTOLE A4C: 11 CM2
RIGHT VENTRICLE ID DIMENSION: 2.9 CM
SINOTUBULAR JUNCTION: 3.1 CM
SL CV AV DECELERATION TIME RETROGRADE: 1697 MS
SL CV AV PEAK GRADIENT RETROGRADE: 58 MMHG
SL CV LEFT ATRIUM LENGTH A2C: 5.7 CM
SL CV LV EF: 65
SL CV PED ECHO LEFT VENTRICLE DIASTOLIC VOLUME (MOD BIPLANE) 2D: 51 ML
SL CV PED ECHO LEFT VENTRICLE SYSTOLIC VOLUME (MOD BIPLANE) 2D: 14 ML
SL CV SINUS OF VALSALVA 2D: 3.3 CM
SODIUM SERPL-SCNC: 133 MMOL/L (ref 135–147)
STJ: 3.1 CM
TRICUSPID ANNULAR PLANE SYSTOLIC EXCURSION: 1.8 CM
WBC # BLD AUTO: 16.79 THOUSAND/UL (ref 4.31–10.16)

## 2025-05-19 PROCEDURE — 99232 SBSQ HOSP IP/OBS MODERATE 35: CPT | Performed by: PHYSICIAN ASSISTANT

## 2025-05-19 PROCEDURE — 99232 SBSQ HOSP IP/OBS MODERATE 35: CPT | Performed by: STUDENT IN AN ORGANIZED HEALTH CARE EDUCATION/TRAINING PROGRAM

## 2025-05-19 PROCEDURE — 97530 THERAPEUTIC ACTIVITIES: CPT

## 2025-05-19 PROCEDURE — 93306 TTE W/DOPPLER COMPLETE: CPT | Performed by: INTERNAL MEDICINE

## 2025-05-19 PROCEDURE — 97116 GAIT TRAINING THERAPY: CPT

## 2025-05-19 PROCEDURE — G0545 PR INHERENT VISIT TO INPT: HCPCS | Performed by: STUDENT IN AN ORGANIZED HEALTH CARE EDUCATION/TRAINING PROGRAM

## 2025-05-19 PROCEDURE — 80048 BASIC METABOLIC PNL TOTAL CA: CPT | Performed by: PHYSICIAN ASSISTANT

## 2025-05-19 PROCEDURE — 93306 TTE W/DOPPLER COMPLETE: CPT

## 2025-05-19 PROCEDURE — 85025 COMPLETE CBC W/AUTO DIFF WBC: CPT | Performed by: PHYSICIAN ASSISTANT

## 2025-05-19 RX ORDER — SENNOSIDES 8.6 MG
1 TABLET ORAL
Status: DISCONTINUED | OUTPATIENT
Start: 2025-05-19 | End: 2025-05-27 | Stop reason: HOSPADM

## 2025-05-19 RX ORDER — POTASSIUM CHLORIDE 1500 MG/1
40 TABLET, EXTENDED RELEASE ORAL ONCE
Status: COMPLETED | OUTPATIENT
Start: 2025-05-19 | End: 2025-05-19

## 2025-05-19 RX ORDER — POLYETHYLENE GLYCOL 3350 17 G/17G
17 POWDER, FOR SOLUTION ORAL DAILY
Status: DISCONTINUED | OUTPATIENT
Start: 2025-05-19 | End: 2025-05-27 | Stop reason: HOSPADM

## 2025-05-19 RX ORDER — METRONIDAZOLE 500 MG/1
500 TABLET ORAL EVERY 12 HOURS SCHEDULED
Status: DISCONTINUED | OUTPATIENT
Start: 2025-05-19 | End: 2025-05-27 | Stop reason: HOSPADM

## 2025-05-19 RX ADMIN — SENNOSIDES 8.6 MG: 8.6 TABLET, FILM COATED ORAL at 21:20

## 2025-05-19 RX ADMIN — VANCOMYCIN HYDROCHLORIDE 1000 MG: 1 INJECTION, SOLUTION INTRAVENOUS at 21:37

## 2025-05-19 RX ADMIN — OXYCODONE HYDROCHLORIDE 10 MG: 10 TABLET ORAL at 04:49

## 2025-05-19 RX ADMIN — ENOXAPARIN SODIUM 40 MG: 40 INJECTION SUBCUTANEOUS at 12:10

## 2025-05-19 RX ADMIN — METRONIDAZOLE 500 MG: 500 TABLET ORAL at 21:20

## 2025-05-19 RX ADMIN — PANTOPRAZOLE SODIUM 40 MG: 40 TABLET, DELAYED RELEASE ORAL at 04:45

## 2025-05-19 RX ADMIN — AMLODIPINE BESYLATE 10 MG: 10 TABLET ORAL at 08:06

## 2025-05-19 RX ADMIN — OXYCODONE HYDROCHLORIDE 10 MG: 10 TABLET ORAL at 19:32

## 2025-05-19 RX ADMIN — MUPIROCIN: 20 OINTMENT TOPICAL at 08:09

## 2025-05-19 RX ADMIN — VANCOMYCIN HYDROCHLORIDE 1000 MG: 1 INJECTION, SOLUTION INTRAVENOUS at 08:46

## 2025-05-19 RX ADMIN — TRAZODONE HYDROCHLORIDE 100 MG: 100 TABLET ORAL at 21:20

## 2025-05-19 RX ADMIN — LORATADINE 10 MG: 10 TABLET ORAL at 08:07

## 2025-05-19 RX ADMIN — OXYCODONE HYDROCHLORIDE AND ACETAMINOPHEN 500 MG: 500 TABLET ORAL at 17:31

## 2025-05-19 RX ADMIN — POLYETHYLENE GLYCOL 3350 17 G: 17 POWDER, FOR SOLUTION ORAL at 13:29

## 2025-05-19 RX ADMIN — Medication 2000 UNITS: at 08:06

## 2025-05-19 RX ADMIN — OXYCODONE HYDROCHLORIDE AND ACETAMINOPHEN 500 MG: 500 TABLET ORAL at 08:06

## 2025-05-19 RX ADMIN — GABAPENTIN 600 MG: 300 CAPSULE ORAL at 21:19

## 2025-05-19 RX ADMIN — FERROUS SULFATE TAB 325 MG (65 MG ELEMENTAL FE) 325 MG: 325 (65 FE) TAB at 17:31

## 2025-05-19 RX ADMIN — FOLIC ACID 1 MG: 1 TABLET ORAL at 08:07

## 2025-05-19 RX ADMIN — Medication 1 TABLET: at 08:06

## 2025-05-19 RX ADMIN — PRAVASTATIN SODIUM 40 MG: 40 TABLET ORAL at 21:20

## 2025-05-19 RX ADMIN — FERROUS SULFATE TAB 325 MG (65 MG ELEMENTAL FE) 325 MG: 325 (65 FE) TAB at 08:06

## 2025-05-19 RX ADMIN — VENLAFAXINE HYDROCHLORIDE 150 MG: 150 CAPSULE, EXTENDED RELEASE ORAL at 08:07

## 2025-05-19 RX ADMIN — POTASSIUM CHLORIDE 40 MEQ: 1500 TABLET, EXTENDED RELEASE ORAL at 13:29

## 2025-05-19 NOTE — ASSESSMENT & PLAN NOTE
Presented to [facility] on 5/13 from rehab due to persistent pain in right hip, history of right hip hemiarthroplasty on 7/2/2022.  Patient recently admitted in late April for similar complaint, s/p right hip lR aspiration for concern of septic arthritis.  Right hip arthrogram and joint aspiration resulted in enough fluid for culture, resulted negative.  Recommend discontinuation of antibiotics per ID and orthopedic surgery recommended outpatient follow-up for total right hip replacement.  Patient was undergoing workup for subsequent procedure when she obtained a right hip XR and CT chest abdomen pelvis which revealed Postsurgical change from right hip hemiarthroplasty. Organized collection of fluid and gas in the right iliac is muscle and similar collection surrounding the right hip arthroplasty in the deep gluteal muscles measuring up to 10 cm, concerning for infection. No hematoma.  While being evaluated in the emergency room, she was started on Vanco/Zosyn with recommendation for admission given meeting sepsis criteria with tachycardia, leukocytosis.  Patient recommended transfer to [facility] for tertiary care with consult orthopedics.  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 Procedure performed on 5/15: Abscess drainage x 2 of fluid collections around right hip. 10 fr drains placed. 40 ml pus from anterior drain, and 90 ml pus from lateral drain.There are multiple other undrained areas     - Continue IV Vancomycin for empiric coverage, dosing per pharmacy  - Add p.o. Flagyl q12 hours  - Pending synovasure culture  - RICK drain culture: 2+ growth of Finegoldia magna, 2+ peptoniphilus harei  - Appreciate orthopedic surgery recommendations: Plan for first stage of revision with antibiotic spacer placement in the OR  tentatively 5/21  - Follow-up pending blood cultures: Blood culture 1 out of 2 from 5/13 now positive for anaerobes, likely true bacteremia in setting of anaerobe finding in collected drain abscess cultures  - MRSA cultures negative  - Trend fever curve/vitals  - Trend CBC/BMP   - Monitor exam for new/developing symptoms  - Final antibiotics plan pending clinical course and findings.

## 2025-05-19 NOTE — ASSESSMENT & PLAN NOTE
Previously hgb noted to be around 12   Slow decrease in hgb noted this hospitalization.  Likely component of IVF dilution and reactive from infection   No evidence of acute bleeding noted at this time  Check iron panel -- iron low, would benefit from supplementation.  Avoid IV venofer at this time 2/2 acute infection, avoiding PO given constipation.  Can supplement once improved from infectious and/or constipation standpoint   Trend CBC  -- stable

## 2025-05-19 NOTE — CASE MANAGEMENT
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) 2025    Class 2 severe obesity due to excess calories with serious comorbidity and body mass index (BMI) of 35.0 to 35.9 in adult (HCC) 2025    Right hip pain 2025    Sepsis without acute organ dysfunction (HCC) 2025    Sacroiliitis (HCC) 2025    Lumbar radiculopathy     Chronic pain syndrome 2023    Primary hypertension 2022    Lumbar degenerative disc disease 2022    Lumbar spondylosis 2022    Cervical radiculopathy 2022    Cervical spondylosis 2022    Rheumatoid arthritis involving both hands (HCC) 2022    Spinal stenosis of lumbar region without neurogenic claudication 2022      LOS (days): 5  Geometric Mean LOS (GMLOS) (days): 3.5  Days to GMLOS:-1.1     OBJECTIVE:  Risk of Unplanned Readmission Score: 14.74         Current admission status: Inpatient   Preferred Pharmacy:   [id number] - [place] - [address]  [address]  [place] [id number]  Phone: [phone] Fax: [phone]    [facility] - [place] - [address]  [address]  [place] [address]  Phone: [phone] Fax: [phone]    Primary Care Provider: Danielito Sharp, DO    Primary Insurance: MEDICARE  Secondary Insurance: A[employer]    DISCHARGE DETAILS:     Per am rounds: plan for  OR Wednesday 5/21 w/ortho. CM to follow for dcp needs pending medical course.

## 2025-05-19 NOTE — PROGRESS NOTES
Progress Note - Infectious Disease   Name: Sharon Vega 74 y.o. 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  Patient recently admitted in late April for similar complaint, s/p right hip lR aspiration for concern of septic arthritis.  Right hip arthrogram and joint aspiration resulted in enough fluid for culture, resulted negative.  Recommend discontinuation of antibiotics per ID and orthopedic surgery recommended outpatient follow-up for total right hip replacement.  Patient was undergoing workup for subsequent procedure when she obtained a right hip XR and CT chest abdomen pelvis which revealed Postsurgical change from right hip hemiarthroplasty. Organized collection of fluid and gas in the right iliac is muscle and similar collection surrounding the right hip arthroplasty in the deep gluteal muscles measuring up to 10 cm, concerning for infection. No hematoma.  While being evaluated in the emergency room, she was started on Vanco/Zosyn with recommendation for admission given meeting sepsis criteria with tachycardia, leukocytosis.  Patient recommended transfer to [facility] for tertiary care with consult orthopedics.  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 Procedure performed on 5/15: Abscess drainage x 2 of fluid collections around right hip. 10 fr drains placed. 40 ml pus from anterior drain, and 90 ml pus from lateral drain.There are multiple other undrained areas     - Continue IV Vancomycin for empiric coverage, dosing per pharmacy  - Add p.o. Flagyl q12  hours  - Pending synovasure culture  - RICK drain culture: 2+ growth of Finegoldia magna, 2+ peptoniphilus harei  - Appreciate orthopedic surgery recommendations: Plan for first stage of revision with antibiotic spacer placement in the OR tentatively 5/21  - Follow-up pending blood cultures: Blood culture 1 out of 2 from 5/13 now positive for anaerobes, likely true bacteremia in setting of anaerobe finding in collected drain abscess cultures  - MRSA cultures negative  - Trend fever curve/vitals  - Trend CBC/BMP   - Monitor exam for new/developing symptoms  - Final antibiotics plan pending clinical course and findings.    Sepsis without acute organ dysfunction (HCC)  Patient presented to [facility] on 5/13 with tachycardia, leukocytosis.  Source likely right hip abscess.  Started on IV vancomycin/Zosyn, transition to vancomycin per ID recommendations at [facility], now on additional p.o. Flagyl given bacteremia and drain abscess cultures     - Continue antibiotics as above  Chronic pain syndrome  History of chronic low back pain, cervical/lumbar radiculopathy.       - Continue care per primary team  Positive blood culture  Blood cultures obtained on 5/13, 1 out of 2 positive for preliminary finding of anaerobe organism, gram-positive cocci in clusters     - Continue antibiotics as above    I have discussed the above management plan in detail with the primary service.   Infectious Disease service will follow.    Antibiotics:  Vancomycin    Subjective   Patient has no fever, chills, sweats; no nausea, vomiting, diarrhea; no cough, shortness of breath; no pain. No new symptoms.  She continues to endorse some pain around her right hip area and gluteal region, still has 2 drains in place    Objective :  Temp:  [98 °F (36.7 °C)-98.5 °F (36.9 °C)] 98.5 °F (36.9 °C)  HR:  [] 100  BP: (123-125)/(72-90) 125/72  Resp:  [16-18] 17  SpO2:  [91 %-93 %] 93 %  O2 Device: None (Room air)    General:  No acute  distress  Psychiatric:  Awake and alert  Pulmonary:  Normal respiratory excursion without accessory muscle use  Abdomen:  Soft, nontender  Extremities:  No edema  Skin:  No rashes.  Tenderness to right hip and gluteal region, 2 RICK drains in place with mostly serous fluid.  No strikethrough of dressings      Lab Results: I have reviewed the following results:  Results from last 7 days   Lab Units 05/19/25  0439 05/17/25  0507 05/16/25  0447   WBC Thousand/uL 16.79* 20.02* 23.42*   HEMOGLOBIN g/dL 9.6* 11.2* 9.9*   PLATELETS Thousands/uL 507* 330 461*     Results from last 7 days   Lab Units 05/19/25  0439 05/17/25  0645 05/16/25  0447 05/14/25  0629 05/13/25  2206   SODIUM mmol/L 133* 133* 132*   < > 130*   POTASSIUM mmol/L 3.4* 3.6 4.0   < > 4.0   CHLORIDE mmol/L 101 101 100   < > 94*   CO2 mmol/L 26 27 27   < > 26   BUN mg/dL 13 21 22   < > 25   CREATININE mg/dL 0.65 0.66 0.72   < > 0.75   EGFR ml/min/1.73sq m 87 86 82   < > 78   CALCIUM mg/dL 9.1 9.5 9.0   < > 10.7*   AST U/L  --   --   --   --  31   ALT U/L  --   --   --   --  34   ALK PHOS U/L  --   --   --   --  132*   ALBUMIN g/dL  --   --   --   --  2.9*    < > = values in this interval not displayed.     Results from last 7 days   Lab Units 05/16/25  1411 05/14/25  2351 05/14/25  2350 05/14/25  0115 05/13/25  2225 05/13/25  2205 05/13/25  1240 05/13/25  1230 05/13/25  0923   BLOOD CULTURE   --  No Growth After 4 Days. No Growth After 4 Days.  --   --  No Growth After 5 Days. No Growth After 5 Days. No Growth After 5 Days.  --    GRAM STAIN RESULT  3+ Polys  No bacteria seen  --   --   --  Gram positive cocci in clusters*  --   --   --   --    URINE CULTURE   --   --   --   --   --   --   --   --  >100,000 cfu/ml   BODY FLUID CULTURE, STERILE  No growth  --   --   --   --   --   --   --   --    MRSA CULTURE ONLY   --   --   --  No Methicillin Resistant Staphlyococcus aureus (MRSA) isolated  --   --   --   --   --      Results from last 7 days   Lab Units  05/14/25  0629 05/13/25  2206 05/13/25  0923   PROCALCITONIN ng/ml 0.69* 0.75* 0.58*     Results from last 7 days   Lab Units 05/13/25  2206   CRP mg/L 374.9*     Results from last 7 days   Lab Units 05/17/25  0645   FERRITIN ng/mL 1,949*         Imaging Results Review: No pertinent imaging studies reviewed.  Other Study Results Review: No additional pertinent studies reviewed.    Administrative Statements   I have spent a total time of 30 minutes in caring for this patient on the day of the visit/encounter including Risks and benefits of tx options, Documenting in the medical record, Reviewing/placing orders in the medical record (including tests, medications, and/or procedures), Obtaining or reviewing history  , and Communicating with other healthcare professionals .

## 2025-05-19 NOTE — ASSESSMENT & PLAN NOTE
1/2 blood cultures at [facility] with GPC in clusters.  Late growth.  Await identification, prelim anaerobe, c/w cultures growing from drain   Blood cultures repeated at [facility] negative at 72 hrs   Continue IV Vanco  F/u ID input

## 2025-05-19 NOTE — PLAN OF CARE
Problem: PAIN - ADULT  Goal: Verbalizes/displays adequate comfort level or baseline comfort level  Description: Interventions:  - Encourage patient to monitor pain and request assistance  - Assess pain using appropriate pain scale  - Administer analgesics as ordered based on type and severity of pain and evaluate response  - Implement non-pharmacological measures as appropriate and evaluate response  - Consider cultural and social influences on pain and pain management  - Notify physician/advanced practitioner if interventions unsuccessful or patient reports new pain  - Educate patient/family on pain management process including their role and importance of  reporting pain   - Provide non-pharmacologic/complimentary pain relief interventions  Outcome: Progressing     Problem: INFECTION - ADULT  Goal: Absence or prevention of progression during hospitalization  Description: INTERVENTIONS:  - Assess and monitor for signs and symptoms of infection  - Monitor lab/diagnostic results  - Monitor all insertion sites, i.e. indwelling lines, tubes, and drains  - Monitor endotracheal if appropriate and nasal secretions for changes in amount and color  - [place] appropriate cooling/warming therapies per order  - Administer medications as ordered  - Instruct and encourage patient and family to use good hand hygiene technique  - Identify and instruct in appropriate isolation precautions for identified infection/condition  Outcome: Progressing  Goal: Absence of fever/infection during neutropenic period  Description: INTERVENTIONS:  - Monitor WBC  - Perform strict hand hygiene  - Limit to healthy visitors only  - No plants, dried, fresh or silk flowers with feliciano in patient room  Outcome: Progressing     Problem: SAFETY ADULT  Goal: Patient will remain free of falls  Description: INTERVENTIONS:  - Educate patient/family on patient safety including physical limitations  - Instruct patient to call for assistance with activity   -  Consider consulting OT/PT to assist with strengthening/mobility based on AM PAC & JH-HLM score  - Consult OT/PT to assist with strengthening/mobility   - Keep Call bell within reach  - Keep bed low and locked with side rails adjusted as appropriate  - Keep care items and personal belongings within reach  - Initiate and maintain comfort rounds  - Make Fall Risk Sign visible to staff  - Offer Toileting every 2 Hours, in advance of need  - Initiate/Maintain alarm  - Obtain necessary fall risk management equipment:   - Apply yellow socks and bracelet for high fall risk patients  - Consider moving patient to room near nurses station  Outcome: Progressing  Goal: Maintain or return to baseline ADL function  Description: INTERVENTIONS:  -  Assess patient's ability to carry out ADLs; assess patient's baseline for ADL function and identify physical deficits which impact ability to perform ADLs (bathing, care of mouth/teeth, toileting, grooming, dressing, etc.)  - Assess/evaluate cause of self-care deficits   - Assess range of motion  - Assess patient's mobility; develop plan if impaired  - Assess patient's need for assistive devices and provide as appropriate  - Encourage maximum independence but intervene and supervise when necessary  - Involve family in performance of ADLs  - Assess for home care needs following discharge   - Consider OT consult to assist with ADL evaluation and planning for discharge  - Provide patient education as appropriate  - Monitor functional capacity and physical performance, use of AM PAC & JH-HLM   - Monitor gait, balance and fatigue with ambulation    Outcome: Progressing  Goal: Maintains/Returns to pre admission functional level  Description: INTERVENTIONS:  - Perform AM-PAC 6 Click Basic Mobility/ Daily Activity assessment daily.  - Set and communicate daily mobility goal to care team and patient/family/caregiver.   - Collaborate with rehabilitation services on mobility goals if consulted  -  Perform Range of Motion 3 times a day.  - Reposition patient every 2 hours.  - Dangle patient 3 times a day  - Stand patient 3 times a day  - Ambulate patient 3 times a day  - Out of bed to chair 3 times a day   - Out of bed for meals 3 times a day  - Out of bed for toileting  - Record patient progress and toleration of activity level   Outcome: Progressing     Problem: DISCHARGE PLANNING  Goal: Discharge to home or other facility with appropriate resources  Description: INTERVENTIONS:  - Identify barriers to discharge w/patient and caregiver  - Arrange for needed discharge resources and transportation as appropriate  - Identify discharge learning needs (meds, wound care, etc.)  - Arrange for interpretive services to assist at discharge as needed  - Refer to Case Management Department for coordinating discharge planning if the patient needs post-hospital services based on physician/advanced practitioner order or complex needs related to functional status, cognitive ability, or social support system  Outcome: Progressing     Problem: Knowledge Deficit  Goal: Patient/family/caregiver demonstrates understanding of disease process, treatment plan, medications, and discharge instructions  Description: Complete learning assessment and assess knowledge base.  Interventions:  - Provide teaching at level of understanding  - Provide teaching via preferred learning methods  Outcome: Progressing     Problem: Prexisting or High Potential for Compromised Skin Integrity  Goal: Skin integrity is maintained or improved  Description: INTERVENTIONS:  - Identify patients at risk for skin breakdown  - Assess and monitor skin integrity including under and around medical devices   - Assess and monitor nutrition and hydration status  - Monitor labs  - Assess for incontinence   - Turn and reposition patient  - Assist with mobility/ambulation  - Relieve pressure over mane prominences   - Avoid friction and shearing  - Provide appropriate  hygiene as needed including keeping skin clean and dry  - Evaluate need for skin moisturizer/barrier cream  - Collaborate with interdisciplinary team  - Patient/family teaching  - Consider wound care consult    Assess:  - Review Gamaliel scale daily  - Clean and moisturize skin every shift and prn  - Inspect skin when repositioning, toileting, and assisting with ADLS  - Assess under medical devices such as zahira every shift and prn  - Assess extremities for adequate circulation and sensation     Bed Management:  - Have minimal linens on bed & keep smooth, unwrinkled  - Change linens as needed when moist or perspiring  - Avoid sitting or lying in one position for more than 2 hours while in bed?Keep HOB at 30 degrees   - Toileting:  - Offer bedside commode  - Assess for incontinence every rounds  - Use incontinent care products after each incontinent episode such as incont wipes    Activity:  - Mobilize patient 3 times a day  - Encourage activity and walks on unit  - Encourage or provide ROM exercises   - Turn and reposition patient every 2 Hours  - Use appropriate equipment to lift or move patient in bed  - Instruct/ Assist with weight shifting every hour when out of bed in chair  - Consider limitation of chair time 2 hour intervals    Skin Care:  - Avoid use of baby powder, tape, friction and shearing, hot water or constrictive clothing  - Relieve pressure over bony prominences using allevyn  - Do not massage red bony areas    Next Steps:  - Teach patient strategies to minimize risks such as t/r  - Consider consults to  interdisciplinary teams such as t/r  Outcome: Progressing

## 2025-05-19 NOTE — PHYSICAL THERAPY NOTE
Physical Therapy Progress Note     05/19/25 1025   PT Last Visit   PT Visit Date 05/19/25   Note Type   Note Type Treatment   Pain Assessment   Pain Assessment Tool FLACC   Pain Rating: FLACC (Rest) - Face 1   Pain Rating: FLACC (Rest) - Legs 0   Pain Rating: FLACC (Rest) - Activity 0   Pain Rating: FLACC (Rest) - Cry 1   Pain Rating: FLACC (Rest) - Consolability 0   Score: FLACC (Rest) 2   Pain Rating: FLACC (Activity) - Face 1   Pain Rating: FLACC (Activity) - Legs 1   Pain Rating: FLACC (Activity) - Activity 1   Pain Rating: FLACC (Activity) - Cry 1   Pain Rating: FLACC (Activity) - Consolability 0   Score: FLACC (Activity) 4   Restrictions/Precautions   RLE Weight Bearing Per Order WBAT   Other Precautions Chair Alarm;Bed Alarm;Pain;Fall Risk;WBS;Multiple lines  (x2 RICK drains)   Subjective   Subjective Pt encountered supine in bed, pleasant and agreeable to treatment.  Reports controlled pain & no new complaints at rest.  She did report increased pain with activity, but this resided with seated rest.   Bed Mobility   Supine to Sit 3  Moderate assistance   Additional items Assist x 1   Transfers   Sit to Stand 4  Minimal assistance   Additional items Assist x 1   Stand to Sit 4  Minimal assistance   Additional items Assist x 1   Ambulation/Elevation   Gait pattern Excessively slow;Step to;Short stride;Foward flexed;Inconsistent leonel;Shuffling;Decreased R stance;Improper Weight shift   Gait Assistance 4  Minimal assist   Additional items Assist x 1   Assistive Device Rolling walker   Distance 30', 35', 15'   Balance   Static Sitting Fair +   Static Standing Fair -   Ambulatory Poor +   Activity Tolerance   Activity Tolerance Patient tolerated treatment well;Patient limited by fatigue;Patient limited by pain   Nurse Made Aware MARION Santos   Assessment   Prognosis Good   Problem List Decreased strength;Decreased range of motion;Impaired balance;Decreased mobility;Pain   Assessment Pt demosntrated improved  functional endurance & mobiilty today compared to eval, but remains limited compared to her baseline.  she completes transfers in similar manner with excessive time, and performed ambulatory tasks with controlled pain overall despite gait deviations noted above.  Upon return to room, pt requested to use toilet & completed this task without incident.  PT remaine din recliner post activity with all needs in reach.  Ortho team arrived at this time to discuss upcoming surgery information in greater detail.  PT will continue to follow and treat at this time to maximize abilities in advance of said procedure, then will reassess pending results at that time.   Goals   Patient Goals to get surgery done & get better   STG Expiration Date 05/31/25   PT Treatment Day 1   Plan   Treatment/Interventions Functional transfer training;LE strengthening/ROM;Elevations;Endurance training;Therapeutic exercise;Patient/family training;Equipment eval/education;Bed mobility;Gait training   Progress Progressing toward goals   PT Frequency 2-3x/wk   Discharge Recommendation   Rehab Resource Intensity Level, PT III (Minimum Resource Intensity)   Equipment Recommended Walker   AM-PAC Basic Mobility Inpatient   Turning in Flat Bed Without Bedrails 3   Lying on Back to Sitting on Edge of Flat Bed Without Bedrails 3   Moving Bed to Chair 3   Standing Up From Chair Using Arms 3   Walk in Room 3   Climb 3-5 Stairs With Railing 2   Basic Mobility Inpatient Raw Score 17   Basic Mobility Standardized Score 39.67   [facility] Highest Level Of Mobility   -HLM Goal 5: Stand one or more mins   JH-HLM Achieved 7: Walk 25 feet or more       Celio Melgar PTA    An [facility] Basic Mobility Raw Score less than 17 suggests pt would benefit from post acute rehab.  Please also refer to the recommendation of the Physical Therapist for safe discharge planning.

## 2025-05-19 NOTE — RESTORATIVE TECHNICIAN NOTE
Restorative Technician Note      Patient Name: Sharon Vega     Restorative Tech Visit Date: 05/19/25  Note Type: Mobility  Patient Position Upon Consult: Supine  Activity Performed: Ambulated  Assistive Device: Roller walker  Patient Position at End of Consult: Supine; All needs within reach  Nurse Communication: Nurse aware of consult, application of brace    Dionicio Crowley, Restorative Technician

## 2025-05-19 NOTE — PLAN OF CARE
Problem: PAIN - ADULT  Goal: Verbalizes/displays adequate comfort level or baseline comfort level  Description: Interventions:  - Encourage patient to monitor pain and request assistance  - Assess pain using appropriate pain scale  - Administer analgesics as ordered based on type and severity of pain and evaluate response  - Implement non-pharmacological measures as appropriate and evaluate response  - Consider cultural and social influences on pain and pain management  - Notify physician/advanced practitioner if interventions unsuccessful or patient reports new pain  - Educate patient/family on pain management process including their role and importance of  reporting pain   - Provide non-pharmacologic/complimentary pain relief interventions  Outcome: Progressing     Problem: INFECTION - ADULT  Goal: Absence or prevention of progression during hospitalization  Description: INTERVENTIONS:  - Assess and monitor for signs and symptoms of infection  - Monitor lab/diagnostic results  - Monitor all insertion sites, i.e. indwelling lines, tubes, and drains  - Monitor endotracheal if appropriate and nasal secretions for changes in amount and color  - [place] appropriate cooling/warming therapies per order  - Administer medications as ordered  - Instruct and encourage patient and family to use good hand hygiene technique  - Identify and instruct in appropriate isolation precautions for identified infection/condition  Outcome: Progressing  Goal: Absence of fever/infection during neutropenic period  Description: INTERVENTIONS:  - Monitor WBC  - Perform strict hand hygiene  - Limit to healthy visitors only  - No plants, dried, fresh or silk flowers with feliciano in patient room  Outcome: Progressing     Problem: SAFETY ADULT  Goal: Patient will remain free of falls  Description: INTERVENTIONS:  - Educate patient/family on patient safety including physical limitations  - Instruct patient to call for assistance with activity   -  Consider consulting OT/PT to assist with strengthening/mobility based on AM PAC & JH-HLM score  - Consult OT/PT to assist with strengthening/mobility   - Keep Call bell within reach  - Keep bed low and locked with side rails adjusted as appropriate  - Keep care items and personal belongings within reach  - Initiate and maintain comfort rounds  - Make Fall Risk Sign visible to staff  - Offer Toileting every 2 Hours, in advance of need  - Initiate/Maintain bed alarm  - Obtain necessary fall risk management equipment:   - Apply yellow socks and bracelet for high fall risk patients  - Consider moving patient to room near nurses station  Outcome: Progressing  Goal: Maintain or return to baseline ADL function  Description: INTERVENTIONS:  -  Assess patient's ability to carry out ADLs; assess patient's baseline for ADL function and identify physical deficits which impact ability to perform ADLs (bathing, care of mouth/teeth, toileting, grooming, dressing, etc.)  - Assess/evaluate cause of self-care deficits   - Assess range of motion  - Assess patient's mobility; develop plan if impaired  - Assess patient's need for assistive devices and provide as appropriate  - Encourage maximum independence but intervene and supervise when necessary  - Involve family in performance of ADLs  - Assess for home care needs following discharge   - Consider OT consult to assist with ADL evaluation and planning for discharge  - Provide patient education as appropriate  - Monitor functional capacity and physical performance, use of AM PAC & JH-HLM   - Monitor gait, balance and fatigue with ambulation    Outcome: Progressing  Goal: Maintains/Returns to pre admission functional level  Description: INTERVENTIONS:  - Perform AM-PAC 6 Click Basic Mobility/ Daily Activity assessment daily.  - Set and communicate daily mobility goal to care team and patient/family/caregiver.   - Collaborate with rehabilitation services on mobility goals if  consulted  - Perform Range of Motion 3 times a day.  - Reposition patient every 2 hours.  - Dangle patient 3 times a day  - Stand patient 3 times a day  - Ambulate patient 3 times a day  - Out of bed to chair 3 times a day   - Out of bed for meals 3 times a day  - Out of bed for toileting  - Record patient progress and toleration of activity level   Outcome: Progressing     Problem: DISCHARGE PLANNING  Goal: Discharge to home or other facility with appropriate resources  Description: INTERVENTIONS:  - Identify barriers to discharge w/patient and caregiver  - Arrange for needed discharge resources and transportation as appropriate  - Identify discharge learning needs (meds, wound care, etc.)  - Arrange for interpretive services to assist at discharge as needed  - Refer to Case Management Department for coordinating discharge planning if the patient needs post-hospital services based on physician/advanced practitioner order or complex needs related to functional status, cognitive ability, or social support system  Outcome: Progressing     Problem: Knowledge Deficit  Goal: Patient/family/caregiver demonstrates understanding of disease process, treatment plan, medications, and discharge instructions  Description: Complete learning assessment and assess knowledge base.  Interventions:  - Provide teaching at level of understanding  - Provide teaching via preferred learning methods  Outcome: Progressing     Problem: Prexisting or High Potential for Compromised Skin Integrity  Goal: Skin integrity is maintained or improved  Description: INTERVENTIONS:  - Identify patients at risk for skin breakdown  - Assess and monitor skin integrity including under and around medical devices   - Assess and monitor nutrition and hydration status  - Monitor labs  - Assess for incontinence   - Turn and reposition patient  - Assist with mobility/ambulation  - Relieve pressure over mane prominences   - Avoid friction and shearing  - Provide  appropriate hygiene as needed including keeping skin clean and dry  - Evaluate need for skin moisturizer/barrier cream  - Collaborate with interdisciplinary team  - Patient/family teaching  - Consider wound care consult    Assess:  - Review Gamaliel scale daily  - Clean and moisturize skin every bell  - Inspect skin when repositioning, toileting, and assisting with ADLS  - Assess under medical devices such as  every   - Assess extremities for adequate circulation and sensation     Bed Management:  - Have minimal linens on bed & keep smooth, unwrinkled  - Change linens as needed when moist or perspiring  - Avoid sitting or lying in one position for more than hours while in bed?Keep HOB at degrees   - Toileting:  - Offer bedside commode  - Assess for incontinence every   - Use incontinent care products after each incontinent episode such as     Activity:  - Mobilize patient 3 times a day  - Encourage activity and walks on unit  - Encourage or provide ROM exercises   - Turn and reposition patient every 2 Hours  - Use appropriate equipment to lift or move patient in bed  - Instruct/ Assist with weight shifting every 2 when out of bed in chair  - Consider limitation of chair time 2 hour intervals    Skin Care:  - Avoid use of baby powder, tape, friction and shearing, hot water or constrictive clothing  - Relieve pressure over bony prominences using alevyn  - Do not massage red bony areas    Next Steps:  - Teach patient strategies to minimize risks such as   - Consider consults to  interdisciplinary teams such as   Outcome: Progressing

## 2025-05-19 NOTE — ASSESSMENT & PLAN NOTE
"Pt presented from [facility] with right hip pain   Had recent IR hip joint aspiration on 4/24, was scheduled for conversion of partial hip replacement to total hip on 5/14 but cancelled due to leukocytosis   CT scan obtained with evidence of \"post surgical change from right hip hemiarthroplasty. Organized collection of fluid and gas in the right iliac muscle and similar collection surrounding right hip arthroplasty in the deep gluteal muscles measuring up to 10 cm\"   Transferred to [facility] for orthopedic evaluation   Orthopedics following,  S/p IR aspiration and drain placement x 2 with synovasure culture on 5/15   Cultures also obtained from RICK drain bulb -- preliminary with Edd Parisi    Appreciate ongoing orthopedic recommendations -- plan for OR Weds 5/21 for I&D, removal of hardware, placement of antibiotic spacer   ID following for abx management   Currently on IV Vancomycin   Cx from drain preliminary with Edd Parisi.  F/u antibiotic recs  1/2 blood cultures at [place] with GPC in clusters.  Late growth.  Await identification, prelim with anaerobe, c/w culture from drains.  Blood cultures obtained at [facility] are negative at 4 days   As needed analgesics, currently on PRN oxycodone 5/10 mg Q4H for moderate and severe pain with PRN IV dilaudid 0.5 mg Q4H for breakthrough  "

## 2025-05-19 NOTE — APP STUDENT NOTE
"  TALI STUDENT  Inpatient Progress Note for TRAINING ONLY  Not Part of Legal Medical Record     Progress Note - Sharon Vega 74 y.o. female MRN: [id number]  Unit/Bed#: -Amanda Encounter: [id number]    Assessment:    Principal Problem:    Abscess of right hip  Active Problems:    Primary hypertension    Chronic pain syndrome    Sepsis without acute organ dysfunction (HCC)    Hypomagnesemia    Hyponatremia    Anemia    Positive blood culture    Aortic stenosis    Abscess of Right Hip  -- 74 y.o. female with a PMH of anxiety, depression, RA, HTN, HLD, GERD, DDD s/p R hip hemiarthroplasty (2022, Dr. Roblero) who presented to [facility] with worsening right hip pain.   S/p R hip lidocaine injection (4/21) and R hip joint effusion aspiration (4/24)  CT scan (5/13) revealed \"Postsurgical change from right hip hemiarthroplasty. Organized collection of fluid and gas in the right iliac is muscle and similar collection surrounding the right hip arthroplasty in the deep gluteal muscles measuring up to 10 cm, concerning for infection. No hematoma.\"  S/p IR 10F abscess drain placement x 2 on R hip (5/15)  IV vancomycin for empiric coverage per ID  Culture from RICK drain growing 2+ Finegoldia magna as prelim result  Antibiotics per ID recs appreciated  Possible 1st stage of antibiotic spacer this week pending OR/surgeon availability per Ortho  Appreciate ongoing orthopedic recommendations   As needed analgesics:  PRN Acetaminophen 650mg Q4H for mild pain  PRN oxycodone 5mg Q4H for moderate pain  PRN oxycodone 10mg Q4H for severe pain  PRN IV dilaudid 0.5 mg Q4H for breakthrough      Sepsis without Acute Organ Dysfunction  -- Presented to [facility] with tachycardia + leukocytosis + source R hip abscess   WBC initially 28.58 on 5/13, decreased to 16.79 today  Remains slightly tachycardic between 95 - 100 bpm  Blood Culture #2 from [place] revealing gram positive cocci in clusters   Repeat blood cultures NGTD x 4 days as " prelim result  On IV vanco per ID recs   Ongoing recommendations appreciated  Continue to trend CBCs/vitals      Positive Blood Culture  Blood Culture #2 from [place] revealing gram positive cocci in clusters   Repeat blood cultures NGTD x 4 days as prelim result  On IV vanco per ID recs   Ongoing recommendations appreciated      Primary Hypertension  BP stable upon review  Maintained on amlodipine 10 mg daily   Continue to monitor     Hypomagnesemia   1.6 on 5/15  1.9 on 5/16 s/p IV fluids  Recheck with AM labs      Hyponatremia  Na 133 today, as low as 129 on 5/13  Possibly due to volume depletion s/p IV fluids  Continue to trend BMP and monitor closely    Anemia  Hgb 9.6 today, noted decrease from 11.2 yesterday  Likely anemia of chronic disease likely in setting of infection  Low TIBC (179.2) + Low Iron (28) + High ferritin (1949) on 5/17  Continue to monitor Hgb closely + symptoms  Transfuse if hgb <7    Aortic Stenosis  Murmur auscultated on exam  Prior echo demonstrating mild to moderate stenosis (2023)  Repeat echo reveals mild regurgitation and moderate stenosis  Asymptomatic at this time, continue to monitor symptoms        VTE Pharmacologic Prophylaxis:   Pharmacologic: Enoxaparin (Lovenox)  Mechanical VTE Prophylaxis in Place: No      Time Spent for Care: 15 minutes.  More than 50% of total time spent on counseling and coordination of care as described above.    Current Length of Stay: 5 day(s)    Current Patient Status: Inpatient   Certification Statement: The patient will continue to require additional inpatient hospital stay due to pending antibiotic spacer this week    Discharge Plan: Anticipate discharge >72 hours pending clinical course and upcoming procedure    Code Status: Level 1 - Full Code    Subjective:   The patient was examined at bedside. She went for a walk and could not complete the entirety of the week due to weakness in her R hip. At this time she complains of pain after exertion but  none at rest. It sometimes resolves with tylenol. She has additional analgesics ordered if needed. She feels as though her hip ROM is improving. She denies fevers, LH, dizziness, CP, SOB, N/V, paresthesias, LE edema.     Objective:     Vitals:   Temp (24hrs), Av.2 °F (36.8 °C), Min:98 °F (36.7 °C), Max:98.5 °F (36.9 °C)    Temp:  [98 °F (36.7 °C)-98.5 °F (36.9 °C)] 98.5 °F (36.9 °C)  HR:  [] 100  Resp:  [16-18] 17  BP: (123-125)/(72-90) 125/72  SpO2:  [91 %-93 %] 93 %  Body mass index is 33.66 kg/m².     Input and Output Summary (last 24 hours):       Intake/Output Summary (Last 24 hours) at 2025 1101  Last data filed at 2025 0457  Gross per 24 hour   Intake 600 ml   Output 86 ml   Net 514 ml       Physical Exam:     Physical Exam  Constitutional:       Appearance: Normal appearance. She is not toxic-appearing.   HENT:      Head: Normocephalic and atraumatic.     Cardiovascular:      Rate and Rhythm: Normal rate.      Heart sounds: Murmur heard.   Pulmonary:      Effort: Pulmonary effort is normal.      Breath sounds: Normal breath sounds. No wheezing.     Musculoskeletal:      Right lower leg: Normal. No edema.      Left lower leg: Normal. No edema.      Right ankle: Normal.      Left ankle: Normal.      Comments: RICK drain x 2 on R hip     Skin:     General: Skin is warm.     Neurological:      Mental Status: She is alert and oriented to person, place, and time.     Psychiatric:         Mood and Affect: Mood normal.         Behavior: Behavior normal.         Historical Information   Past Medical History:   Diagnosis Date    Anemia     Anxiety     Arthritis     Closed transcervical fracture of right femur (HCC) 2022    Colon polyp     Depression     Fracture of right wrist 2022    GERD (gastroesophageal reflux disease)     Hiatal hernia     Hyperlipidemia     Hypertension      Past Surgical History:   Procedure Laterality Date    APPENDECTOMY      CHOLECYSTECTOMY      COLONOSCOPY       FL INJECTION RIGHT HIP (NON ARTHROGRAM)  04/21/2025    FRACTURE SURGERY Right     arm with plate and pins    HAND SURGERY Bilateral     HYSTERECTOMY      IR ASPIRATION JOINT (SPECIFY LOCATION)  4/24/2025    IR DRAINAGE TUBE PLACEMENT  5/15/2025    JOINT REPLACEMENT Bilateral     knees    TX HEMIARTHROPLASTY HIP PARTIAL Right 07/02/2022    Procedure: HEMIARTHROPLASTY HIP (BIPOLAR), closed reduction with splinting right wrist;  Surgeon: Leo Roblero;  Location: [facility];  Service: Orthopedics    TX NEUROPLASTY &/TRANSPOSITION ULNAR NERVE ELBOW Right 02/08/2023    Procedure: RELEASE CUBITAL TUNNEL;  Surgeon: Cody Calles DO;  Location: [facility];  Service: Orthopedics    SHOULDER ARTHROSCOPY Left      Social History   Social History     Substance and Sexual Activity   Alcohol Use Not Currently     Social History     Substance and Sexual Activity   Drug Use Never     Tobacco Use History[1]  Family History: {SL IP FAM HISTORY SMARTLIST:195915650}    Meds/Allergies   all medications and allergies reviewed  Allergies[2]    Additional Data:     Labs:    Results from last 7 days   Lab Units 05/19/25  0439 05/13/25  2236 05/13/25  0400   WBC Thousand/uL 16.79*   < > 28.58*   HEMOGLOBIN g/dL 9.6*   < > 12.1   HEMATOCRIT % 30.2*   < > 37.4   PLATELETS Thousands/uL 507*   < > 674*   BANDS PCT %  --   --  3   SEGS PCT % 77*   < >  --    LYMPHO PCT % 11*   < > 7*   MONO PCT % 8   < > 7   EOS PCT % 2   < > 1    < > = values in this interval not displayed.     Results from last 7 days   Lab Units 05/19/25  0439 05/14/25  0629 05/13/25  2206   SODIUM mmol/L 133*   < > 130*   POTASSIUM mmol/L 3.4*   < > 4.0   CHLORIDE mmol/L 101   < > 94*   CO2 mmol/L 26   < > 26   BUN mg/dL 13   < > 25   CREATININE mg/dL 0.65   < > 0.75   ANION GAP mmol/L 6   < > 10   CALCIUM mg/dL 9.1   < > 10.7*   ALBUMIN g/dL  --   --  2.9*   TOTAL BILIRUBIN mg/dL  --   --  1.26*   ALK PHOS U/L  --   --  132*   ALT U/L  --   --  34   AST U/L  --   --   31   GLUCOSE RANDOM mg/dL 108   < > 119    < > = values in this interval not displayed.     Results from last 7 days   Lab Units 05/15/25  0653   INR  1.16             Results from last 7 days   Lab Units 05/14/25  2350 05/14/25  0629 05/13/25  2206 05/13/25  0923   LACTIC ACID mmol/L 0.8  --  1.1  --    PROCALCITONIN ng/ml  --  0.69* 0.75* 0.58*         * I Have Reviewed All Lab Data Listed Above.  * Additional Pertinent Lab Tests Reviewed: All Labs Within Last 24 Hours Reviewed    Imaging:    Imaging Reports Reviewed Today Include:   Imaging Personally Reviewed by Myself Includes:      Recent Cultures (last 7 days):     Results from last 7 days   Lab Units 05/16/25  1411 05/14/25  2351 05/14/25  2350 05/13/25  2225 05/13/25  2205 05/13/25  1240 05/13/25  1230 05/13/25  0923   BLOOD CULTURE   --  No Growth After 4 Days. No Growth After 4 Days.  --  No Growth After 5 Days. No Growth After 5 Days. No Growth After 5 Days.  --    GRAM STAIN RESULT  3+ Polys  No bacteria seen  --   --  Gram positive cocci in clusters*  --   --   --   --    URINE CULTURE   --   --   --   --   --   --   --  >100,000 cfu/ml   BODY FLUID CULTURE, STERILE  No growth  --   --   --   --   --   --   --        Last 24 Hours Medication List:   Current Facility-Administered Medications   Medication Dose Route Frequency Provider Last Rate    acetaminophen  650 mg Oral Q4H PRN Jhony Augustin MD      amLODIPine  10 mg Oral Daily Zoey Garibay PA-C      ascorbic acid  500 mg Oral BID Jhony Augustin MD      chlorhexidine gluconate   Topical Daily PRN Osbaldo Strange PA-C      cholecalciferol  2,000 Units Oral Daily Jhony Augustin MD      enoxaparin  40 mg Subcutaneous Daily Zoey Garibay PA-C      ferrous sulfate  325 mg Oral BID With Meals Jhony Augustin MD      folic acid  1 mg Oral Daily Jhony Augustin MD      gabapentin  600 mg Oral HS Jhony Augustin MD      HYDROmorphone  0.5 mg Intravenous Q4H PRN Jhony Augustin MD      loratadine  10 mg Oral Daily  Jhony Augustin MD      multivitamin-minerals  1 tablet Oral Daily Jhony Augustin MD      mupirocin   Topical Daily Jhony Augustin MD      ondansetron  4 mg Intravenous Q6H PRN Jhony Augustin MD      oxyCODONE  10 mg Oral Q4H PRN Jhony Augustin MD      oxyCODONE  5 mg Oral Q4H PRN Jhony Augustin MD      pantoprazole  40 mg Oral Early Morning Jhony Augustin MD      pravastatin  40 mg Oral HS Jhony Augustin MD      traZODone  100 mg Oral HS Jhony Augustin MD      vancomycin  1,000 mg Intravenous Q12H Steph Manning MD 1,000 mg ([phone])    venlafaxine  150 mg Oral Daily Jhony Augustin MD          Today, Patient Was Seen By: Rohan Jennings    ** Please Note: Dictation voice to text software may have been used in the creation of this document. **       [1]   Social History  Tobacco Use   Smoking Status Former    Current packs/day: 0.00    Types: Cigarettes    Quit date:     Years since quittin.4   Smokeless Tobacco Never   [2] No Known Allergies

## 2025-05-19 NOTE — PLAN OF CARE
Problem: PHYSICAL THERAPY ADULT  Goal: Performs mobility at highest level of function for planned discharge setting.  See evaluation for individualized goals.  Description: Treatment/Interventions: Functional transfer training, LE strengthening/ROM, Elevations, Endurance training, Therapeutic exercise, Patient/family training, Equipment eval/education, Bed mobility, Gait training  Equipment Recommended: Walker       See flowsheet documentation for full assessment, interventions and recommendations.  5/19/2025 1624 by Celio Melgar PTA  Outcome: Progressing  Note: Prognosis: Good  Problem List: Decreased strength, Decreased range of motion, Impaired balance, Decreased mobility, Pain  Assessment: Pt demosntrated improved functional endurance & mobiilty today compared to eval, but remains limited compared to her baseline.  she completes transfers in similar manner with excessive time, and performed ambulatory tasks with controlled pain overall despite gait deviations noted above.  Upon return to room, pt requested to use toilet & completed this task without incident.  PT remaine din recliner post activity with all needs in reach.  Ortho team arrived at this time to discuss upcoming surgery information in greater detail.  PT will continue to follow and treat at this time to maximize abilities in advance of said procedure, then will reassess pending results at that time.        Rehab Resource Intensity Level, PT: III (Minimum Resource Intensity)    See flowsheet documentation for full assessment.     5/19/2025 1621 by Celio Melgar PTA  Outcome: Progressing  Note: Prognosis: Good  Problem List: Decreased strength, Decreased range of motion, Impaired balance, Decreased mobility, Pain  Assessment: Pt demosntrated improved functional endurance & mobiilty today compared to eval, but remains limited compared to her baseline.  she completes transfers in similar manner with excessive time, and performed ambulatory  tasks with controlled pain overall despite gait deviations noted above.  Upon return to room, pt requested to use toilet & completed this task without incident.  PT remaine din recliner post activity with all needs in reach.  Ortho team arrived at this time to discuss upcoming surgery information in greater detail.  PT will continue to follow and treat at this time to maximize abilities in advance of said procedure, then will reassess pending results at that time.        Rehab Resource Intensity Level, PT: III (Minimum Resource Intensity)    See flowsheet documentation for full assessment.

## 2025-05-19 NOTE — ASSESSMENT & PLAN NOTE
Blood cultures obtained on 5/13, 1 out of 2 positive for preliminary finding of anaerobe organism, gram-positive cocci in clusters     - Continue antibiotics as above

## 2025-05-19 NOTE — ASSESSMENT & PLAN NOTE
Patient presented to [facility] on 5/13 with tachycardia, leukocytosis.  Source likely right hip abscess.  Started on IV vancomycin/Zosyn, transition to vancomycin per ID recommendations at [facility], now on additional p.o. Flagyl given bacteremia and drain abscess cultures     - Continue antibiotics as above

## 2025-05-19 NOTE — PROGRESS NOTES
Sharon Vega is a 74 y.o. female who is currently ordered Vancomycin IV with management by the Pharmacy Consult service.  Relevant clinical data and objective / subjective history reviewed.  Vancomycin Assessment:  Indication and Goal AUC/Trough: Soft tissue (goal -600, trough >10), -600, trough >10  Clinical Status: stable  Micro: ID following     Renal Function:  SCr: 0.65 mg/dL  CrCl: 77 mL/min  Renal replacement: Not on dialysis  Days of Therapy: 6  Current Dose: 1000 mg IV q12h  Vancomycin Plan:  New Dosing: continue current dose  Estimated AUC: 510 mcg*hr/mL  Estimated Trough: 14.9 mcg/mL  Next Level: 5/23 am labs   Renal Function Monitoring: Daily BMP and UOP  Pharmacy will continue to follow closely for s/sx of nephrotoxicity, infusion reactions and appropriateness of therapy.  BMP and CBC will be ordered per protocol. We will continue to follow the patient’s culture results and clinical progress daily.    Joel Arce, Pharmacist

## 2025-05-19 NOTE — ASSESSMENT & PLAN NOTE
Jackie noted on exam, prior echo with mild to moderate AS  Obtained repeat echo prior to any operative plans to evaluate -- moderate AS

## 2025-05-19 NOTE — ASSESSMENT & PLAN NOTE
POA at [facility] as evidenced by leukocytosis and tachycardia   In setting of right hip intramuscular abscess as above   Continue IV abx per ID recommendations  S/p IV fluid hydration   Tachycardia has resolved, WBC down to 20 from 28  BC from [facility] 1/2 GPC in clusters, see related plan.  BC obtained here negative x 24 hours  Lactic negative   Procal, WBC improving   Trend CBC and temps closely   See plan as above

## 2025-05-19 NOTE — PROGRESS NOTES
"Progress Note - Hospitalist   Name: Sharon Vega 74 y.o. female I MRN: [id number]  Unit/Bed#: -01 I Date of Admission: 5/14/2025   Date of Service: 5/19/2025 I Hospital Day: 5    Assessment & Plan  Abscess of right hip  Pt presented from [facility] with right hip pain   Had recent IR hip joint aspiration on 4/24, was scheduled for conversion of partial hip replacement to total hip on 5/14 but cancelled due to leukocytosis   CT scan obtained with evidence of \"post surgical change from right hip hemiarthroplasty. Organized collection of fluid and gas in the right iliac muscle and similar collection surrounding right hip arthroplasty in the deep gluteal muscles measuring up to 10 cm\"   Transferred to [facility] for orthopedic evaluation   Orthopedics following,  S/p IR aspiration and drain placement x 2 with synovasure culture on 5/15   Cultures also obtained from RICK drain bulb -- preliminary with Edd Parisi    Appreciate ongoing orthopedic recommendations -- plan for OR Weds 5/21 for I&D, removal of hardware, placement of antibiotic spacer   ID following for abx management   Currently on IV Vancomycin   Cx from drain preliminary with Edd Parisi.  F/u antibiotic recs  1/2 blood cultures at [place] with GPC in clusters.  Late growth.  Await identification, prelim with anaerobe, c/w culture from drains.  Blood cultures obtained at [facility] are negative at 4 days   As needed analgesics, currently on PRN oxycodone 5/10 mg Q4H for moderate and severe pain with PRN IV dilaudid 0.5 mg Q4H for breakthrough  Sepsis without acute organ dysfunction (HCC)  POA at [facility] as evidenced by leukocytosis and tachycardia   In setting of right hip intramuscular abscess as above   Continue IV abx per ID recommendations  S/p IV fluid hydration   Tachycardia has resolved, WBC down to 20 from 28  BC from [facility] 1/2 GPC in clusters, see related plan.  BC obtained " here negative x 24 hours  Lactic negative   Procal, WBC improving   Trend CBC and temps closely   See plan as above  Positive blood culture  1/2 blood cultures at [place] with GPC in clusters.  Late growth.  Await identification, prelim anaerobe, c/w cultures growing from drain   Blood cultures repeated at [facility] negative at 72 hrs   Continue IV Vanco  F/u ID input   Anemia  Previously hgb noted to be around 12   Slow decrease in hgb noted this hospitalization.  Likely component of IVF dilution and reactive from infection   No evidence of acute bleeding noted at this time  Check iron panel -- iron low, would benefit from supplementation.  Avoid IV venofer at this time 2/2 acute infection, avoiding PO given constipation.  Can supplement once improved from infectious and/or constipation standpoint   Trend CBC  -- stable   Aortic stenosis  [place] noted on exam, prior echo with mild to moderate AS  Obtained repeat echo prior to any operative plans to evaluate -- moderate AS   Constipation  Ordered bowel regimen   Primary hypertension  BP overall stable on review   Continue amlodipine 10 mg daily   Monitor closely   Hyponatremia  Noted  Possibly in setting of sepsis, volume depletion   S/p IV fluids   Monitor sodium levels closely   Trend BMP  Hypomagnesemia  Resolved with IV replacement     VTE Pharmacologic Prophylaxis:   Moderate Risk (Score 3-4) - Pharmacological DVT Prophylaxis Ordered: enoxaparin (Lovenox).    Mobility:   Basic Mobility Inpatient Raw Score: 17  JH-HLM Goal: 5: Stand one or more mins  JH-HLM Achieved: 7: Walk 25 feet or more  JH-HLM Goal achieved. Continue to encourage appropriate mobility.    Patient Centered Rounds: I performed bedside rounds with nursing staff today.   Discussions with Specialists or Other Care Team Provider: ANITHA.    Education and Discussions with Family / Patient: Attempted to update  (granddaughter) via phone. Left voicemail.     Current Length of Stay: 5  day(s)  Current Patient Status: Inpatient   Certification Statement: The patient will continue to require additional inpatient hospital stay due to IV antibotics, OR planning on Weds  Discharge Plan: Anticipate discharge in >72 hrs to discharge location to be determined pending rehab evaluations.    Code Status: Level 1 - Full Code    Subjective   No acute complaints offered, other than no BM in a few days.  Appetite is slowly improving . Tolerating abx.  In good spirits.  Aware of plans for OR on Wednesday     Objective :  Temp:  [98.2 °F (36.8 °C)-98.5 °F (36.9 °C)] 98.5 °F (36.9 °C)  HR:  [] 100  BP: (123-125)/(72) 125/72  Resp:  [17-18] 17  SpO2:  [91 %-93 %] 93 %  O2 Device: None (Room air)    Body mass index is 33.66 kg/m².     Input and Output Summary (last 24 hours):     Intake/Output Summary (Last 24 hours) at 5/19/2025 1416  Last data filed at 5/19/2025 0457  Gross per 24 hour   Intake 600 ml   Output 86 ml   Net 514 ml       Physical Exam  Vitals reviewed.   Constitutional:       General: She is not in acute distress.     Appearance: She is not toxic-appearing.   HENT:      Head: Normocephalic and atraumatic.     Eyes:      Extraocular Movements: Extraocular movements intact.       Cardiovascular:      Rate and Rhythm: Normal rate and regular rhythm.   Pulmonary:      Effort: Pulmonary effort is normal. No respiratory distress.   Abdominal:      General: There is no distension.      Palpations: Abdomen is soft.      Tenderness: There is no abdominal tenderness.     Musculoskeletal:         General: Normal range of motion.     Neurological:      General: No focal deficit present.      Mental Status: She is alert and oriented to person, place, and time.     Psychiatric:         Mood and Affect: Mood normal.         Behavior: Behavior normal.         Thought Content: Thought content normal.         Lines/Drains:  Lines/Drains/Airways       Active Status       Name Placement date Placement time Site  Days    Abscess Drain Hip 05/15/25  1705  Hip  3    Abscess Drain Hip 05/15/25  1706  Hip  3                            Lab Results: I have reviewed the following results:   Results from last 7 days   Lab Units 05/19/25 0439 05/13/25 2236 05/13/25  0400   WBC Thousand/uL 16.79*   < > 28.58*   HEMOGLOBIN g/dL 9.6*   < > 12.1   HEMATOCRIT % 30.2*   < > 37.4   PLATELETS Thousands/uL 507*   < > 674*   BANDS PCT %  --   --  3   SEGS PCT % 77*   < >  --    LYMPHO PCT % 11*   < > 7*   MONO PCT % 8   < > 7   EOS PCT % 2   < > 1    < > = values in this interval not displayed.     Results from last 7 days   Lab Units 05/19/25 0439 05/14/25 0629 05/13/25 2206   SODIUM mmol/L 133*   < > 130*   POTASSIUM mmol/L 3.4*   < > 4.0   CHLORIDE mmol/L 101   < > 94*   CO2 mmol/L 26   < > 26   BUN mg/dL 13   < > 25   CREATININE mg/dL 0.65   < > 0.75   ANION GAP mmol/L 6   < > 10   CALCIUM mg/dL 9.1   < > 10.7*   ALBUMIN g/dL  --   --  2.9*   TOTAL BILIRUBIN mg/dL  --   --  1.26*   ALK PHOS U/L  --   --  132*   ALT U/L  --   --  34   AST U/L  --   --  31   GLUCOSE RANDOM mg/dL 108   < > 119    < > = values in this interval not displayed.     Results from last 7 days   Lab Units 05/15/25  0653   INR  1.16             Results from last 7 days   Lab Units 05/14/25 2350 05/14/25  0629 05/13/25 2206 05/13/25  0923   LACTIC ACID mmol/L 0.8  --  1.1  --    PROCALCITONIN ng/ml  --  0.69* 0.75* 0.58*       Recent Cultures (last 7 days):   Results from last 7 days   Lab Units 05/16/25  1411 05/14/25  2351 05/14/25  2350 05/13/25  2225 05/13/25  2205 05/13/25  1240 05/13/25  1230 05/13/25  0923   BLOOD CULTURE   --  No Growth After 4 Days. No Growth After 4 Days. Anaerobe (Organism type)* No Growth After 5 Days. No Growth After 5 Days. No Growth After 5 Days.  --    GRAM STAIN RESULT  3+ Polys  No bacteria seen  --   --  Gram positive cocci in clusters*  --   --   --   --    URINE CULTURE   --   --   --   --   --   --   --  >100,000 cfu/ml    BODY FLUID CULTURE, STERILE  No growth  --   --   --   --   --   --   --              Last 24 Hours Medication List:     Current Facility-Administered Medications:     acetaminophen (TYLENOL) tablet 650 mg, Q4H PRN    amLODIPine (NORVASC) tablet 10 mg, Daily    ascorbic acid (VITAMIN C) tablet 500 mg, BID    chlorhexidine gluconate (HIBICLENS) 4 % topical liquid, Daily PRN    Cholecalciferol (VITAMIN D3) tablet 2,000 Units, Daily    enoxaparin (LOVENOX) subcutaneous injection 40 mg, Daily    ferrous sulfate tablet 325 mg, BID With Meals    folic acid (FOLVITE) tablet 1 mg, Daily    gabapentin (NEURONTIN) capsule 600 mg, HS    HYDROmorphone (DILAUDID) injection 0.5 mg, Q4H PRN    loratadine (CLARITIN) tablet 10 mg, Daily    multivitamin-minerals (CENTRUM) tablet 1 tablet, Daily    mupirocin (BACTROBAN) 2 % ointment, Daily    ondansetron (ZOFRAN) injection 4 mg, Q6H PRN    oxyCODONE (ROXICODONE) immediate release tablet 10 mg, Q4H PRN    oxyCODONE (ROXICODONE) IR tablet 5 mg, Q4H PRN    pantoprazole (PROTONIX) EC tablet 40 mg, Early Morning    polyethylene glycol (MIRALAX) packet 17 g, Daily    pravastatin (PRAVACHOL) tablet 40 mg, HS    senna (SENOKOT) tablet 8.6 mg, HS    traZODone (DESYREL) tablet 100 mg, HS    vancomycin (VANCOCIN) IVPB (premix in dextrose) 1,000 mg 200 mL, Q12H, Last Rate: 1,000 mg (05/19/25 0846)    venlafaxine (EFFEXOR-XR) 24 hr capsule 150 mg, Daily    Administrative Statements   Today, Patient Was Seen By: Josie Molina PA-C      **Please Note: This note may have been constructed using a voice recognition system.**

## 2025-05-20 ENCOUNTER — RESULTS FOLLOW-UP (OUTPATIENT)
Dept: EMERGENCY DEPT | Facility: HOSPITAL | Age: 75
End: 2025-05-20

## 2025-05-20 ENCOUNTER — APPOINTMENT (INPATIENT)
Dept: RADIOLOGY | Facility: HOSPITAL | Age: 75
DRG: 466 | End: 2025-05-20
Payer: MEDICARE

## 2025-05-20 ENCOUNTER — ANESTHESIA EVENT (INPATIENT)
Dept: PERIOP | Facility: HOSPITAL | Age: 75
End: 2025-05-20
Payer: MEDICARE

## 2025-05-20 LAB
ABO GROUP BLD: NORMAL
ABO GROUP BLD: NORMAL
ANION GAP SERPL CALCULATED.3IONS-SCNC: 6 MMOL/L (ref 4–13)
APTT PPP: 26 SECONDS (ref 23–34)
ATRIAL RATE: 100 BPM
BACTERIA BLD CULT: NORMAL
BACTERIA BLD CULT: NORMAL
BACTERIA SPEC ANAEROBE CULT: ABNORMAL
BACTERIA SPEC ANAEROBE CULT: ABNORMAL
BASOPHILS # BLD AUTO: 0.1 THOUSANDS/ÂΜL (ref 0–0.1)
BASOPHILS NFR BLD AUTO: 1 % (ref 0–1)
BLD GP AB SCN SERPL QL: NEGATIVE
BUN SERPL-MCNC: 10 MG/DL (ref 5–25)
CALCIUM SERPL-MCNC: 9.3 MG/DL (ref 8.4–10.2)
CHLORIDE SERPL-SCNC: 103 MMOL/L (ref 96–108)
CO2 SERPL-SCNC: 25 MMOL/L (ref 21–32)
CREAT SERPL-MCNC: 0.64 MG/DL (ref 0.6–1.3)
EOSINOPHIL # BLD AUTO: 0.28 THOUSAND/ÂΜL (ref 0–0.61)
EOSINOPHIL NFR BLD AUTO: 2 % (ref 0–6)
ERYTHROCYTE [DISTWIDTH] IN BLOOD BY AUTOMATED COUNT: 14.7 % (ref 11.6–15.1)
GFR SERPL CREATININE-BSD FRML MDRD: 87 ML/MIN/1.73SQ M
GLUCOSE SERPL-MCNC: 111 MG/DL (ref 65–140)
HCT VFR BLD AUTO: 34.4 % (ref 34.8–46.1)
HGB BLD-MCNC: 10.8 G/DL (ref 11.5–15.4)
IMM GRANULOCYTES # BLD AUTO: 0.2 THOUSAND/UL (ref 0–0.2)
IMM GRANULOCYTES NFR BLD AUTO: 1 % (ref 0–2)
INR PPP: 1.19 (ref 0.85–1.19)
LYMPHOCYTES # BLD AUTO: 1.8 THOUSANDS/ÂΜL (ref 0.6–4.47)
LYMPHOCYTES NFR BLD AUTO: 12 % (ref 14–44)
MAGNESIUM SERPL-MCNC: 1.8 MG/DL (ref 1.9–2.7)
MCH RBC QN AUTO: 29.3 PG (ref 26.8–34.3)
MCHC RBC AUTO-ENTMCNC: 31.4 G/DL (ref 31.4–37.4)
MCV RBC AUTO: 94 FL (ref 82–98)
MONOCYTES # BLD AUTO: 1.38 THOUSAND/ÂΜL (ref 0.17–1.22)
MONOCYTES NFR BLD AUTO: 9 % (ref 4–12)
NEUTROPHILS # BLD AUTO: 11.56 THOUSANDS/ÂΜL (ref 1.85–7.62)
NEUTS SEG NFR BLD AUTO: 75 % (ref 43–75)
NRBC BLD AUTO-RTO: 0 /100 WBCS
P AXIS: 41 DEGREES
PLATELET # BLD AUTO: 506 THOUSANDS/UL (ref 149–390)
PMV BLD AUTO: 9.4 FL (ref 8.9–12.7)
POTASSIUM SERPL-SCNC: 4.2 MMOL/L (ref 3.5–5.3)
PR INTERVAL: 208 MS
PROTHROMBIN TIME: 15.4 SECONDS (ref 12.3–15)
QRS AXIS: -41 DEGREES
QRSD INTERVAL: 110 MS
QT INTERVAL: 342 MS
QTC INTERVAL: 442 MS
RBC # BLD AUTO: 3.68 MILLION/UL (ref 3.81–5.12)
RH BLD: POSITIVE
RH BLD: POSITIVE
SODIUM SERPL-SCNC: 134 MMOL/L (ref 135–147)
SPECIMEN EXPIRATION DATE: NORMAL
T WAVE AXIS: 50 DEGREES
VENTRICULAR RATE: 100 BPM
WBC # BLD AUTO: 15.32 THOUSAND/UL (ref 4.31–10.16)

## 2025-05-20 PROCEDURE — 86900 BLOOD TYPING SEROLOGIC ABO: CPT

## 2025-05-20 PROCEDURE — 86923 COMPATIBILITY TEST ELECTRIC: CPT

## 2025-05-20 PROCEDURE — 86850 RBC ANTIBODY SCREEN: CPT

## 2025-05-20 PROCEDURE — 85730 THROMBOPLASTIN TIME PARTIAL: CPT

## 2025-05-20 PROCEDURE — 99232 SBSQ HOSP IP/OBS MODERATE 35: CPT | Performed by: PHYSICIAN ASSISTANT

## 2025-05-20 PROCEDURE — 86901 BLOOD TYPING SEROLOGIC RH(D): CPT

## 2025-05-20 PROCEDURE — 93010 ELECTROCARDIOGRAM REPORT: CPT | Performed by: STUDENT IN AN ORGANIZED HEALTH CARE EDUCATION/TRAINING PROGRAM

## 2025-05-20 PROCEDURE — G0545 PR INHERENT VISIT TO INPT: HCPCS | Performed by: STUDENT IN AN ORGANIZED HEALTH CARE EDUCATION/TRAINING PROGRAM

## 2025-05-20 PROCEDURE — 85610 PROTHROMBIN TIME: CPT

## 2025-05-20 PROCEDURE — 85025 COMPLETE CBC W/AUTO DIFF WBC: CPT | Performed by: PHYSICIAN ASSISTANT

## 2025-05-20 PROCEDURE — 93005 ELECTROCARDIOGRAM TRACING: CPT

## 2025-05-20 PROCEDURE — 99232 SBSQ HOSP IP/OBS MODERATE 35: CPT | Performed by: STUDENT IN AN ORGANIZED HEALTH CARE EDUCATION/TRAINING PROGRAM

## 2025-05-20 PROCEDURE — 71045 X-RAY EXAM CHEST 1 VIEW: CPT

## 2025-05-20 PROCEDURE — 80048 BASIC METABOLIC PNL TOTAL CA: CPT | Performed by: STUDENT IN AN ORGANIZED HEALTH CARE EDUCATION/TRAINING PROGRAM

## 2025-05-20 PROCEDURE — 83735 ASSAY OF MAGNESIUM: CPT | Performed by: PHYSICIAN ASSISTANT

## 2025-05-20 PROCEDURE — 99233 SBSQ HOSP IP/OBS HIGH 50: CPT | Performed by: ORTHOPAEDIC SURGERY

## 2025-05-20 RX ORDER — MAGNESIUM SULFATE HEPTAHYDRATE 40 MG/ML
2 INJECTION, SOLUTION INTRAVENOUS ONCE
Status: COMPLETED | OUTPATIENT
Start: 2025-05-20 | End: 2025-05-20

## 2025-05-20 RX ADMIN — TRAZODONE HYDROCHLORIDE 100 MG: 100 TABLET ORAL at 22:32

## 2025-05-20 RX ADMIN — PRAVASTATIN SODIUM 40 MG: 40 TABLET ORAL at 22:32

## 2025-05-20 RX ADMIN — OXYCODONE HYDROCHLORIDE AND ACETAMINOPHEN 500 MG: 500 TABLET ORAL at 08:18

## 2025-05-20 RX ADMIN — METRONIDAZOLE 500 MG: 500 TABLET ORAL at 08:17

## 2025-05-20 RX ADMIN — Medication 1 TABLET: at 08:17

## 2025-05-20 RX ADMIN — OXYCODONE HYDROCHLORIDE 10 MG: 10 TABLET ORAL at 15:00

## 2025-05-20 RX ADMIN — PANTOPRAZOLE SODIUM 40 MG: 40 TABLET, DELAYED RELEASE ORAL at 05:31

## 2025-05-20 RX ADMIN — OXYCODONE HYDROCHLORIDE 10 MG: 10 TABLET ORAL at 01:46

## 2025-05-20 RX ADMIN — POLYETHYLENE GLYCOL 3350 17 G: 17 POWDER, FOR SOLUTION ORAL at 08:17

## 2025-05-20 RX ADMIN — SENNOSIDES 8.6 MG: 8.6 TABLET, FILM COATED ORAL at 22:32

## 2025-05-20 RX ADMIN — MUPIROCIN: 20 OINTMENT TOPICAL at 08:26

## 2025-05-20 RX ADMIN — FOLIC ACID 1 MG: 1 TABLET ORAL at 08:18

## 2025-05-20 RX ADMIN — VANCOMYCIN HYDROCHLORIDE 1000 MG: 1 INJECTION, SOLUTION INTRAVENOUS at 22:35

## 2025-05-20 RX ADMIN — MAGNESIUM SULFATE HEPTAHYDRATE 2 G: 40 INJECTION, SOLUTION INTRAVENOUS at 08:37

## 2025-05-20 RX ADMIN — METRONIDAZOLE 500 MG: 500 TABLET ORAL at 22:32

## 2025-05-20 RX ADMIN — OXYCODONE HYDROCHLORIDE AND ACETAMINOPHEN 500 MG: 500 TABLET ORAL at 18:20

## 2025-05-20 RX ADMIN — OXYCODONE HYDROCHLORIDE 10 MG: 10 TABLET ORAL at 08:25

## 2025-05-20 RX ADMIN — ENOXAPARIN SODIUM 40 MG: 40 INJECTION SUBCUTANEOUS at 12:31

## 2025-05-20 RX ADMIN — Medication 2000 UNITS: at 08:17

## 2025-05-20 RX ADMIN — FERROUS SULFATE TAB 325 MG (65 MG ELEMENTAL FE) 325 MG: 325 (65 FE) TAB at 08:17

## 2025-05-20 RX ADMIN — FERROUS SULFATE TAB 325 MG (65 MG ELEMENTAL FE) 325 MG: 325 (65 FE) TAB at 18:20

## 2025-05-20 RX ADMIN — LORATADINE 10 MG: 10 TABLET ORAL at 08:24

## 2025-05-20 RX ADMIN — VANCOMYCIN HYDROCHLORIDE 1000 MG: 1 INJECTION, SOLUTION INTRAVENOUS at 08:17

## 2025-05-20 RX ADMIN — OXYCODONE HYDROCHLORIDE 10 MG: 10 TABLET ORAL at 22:35

## 2025-05-20 RX ADMIN — GABAPENTIN 600 MG: 300 CAPSULE ORAL at 22:32

## 2025-05-20 RX ADMIN — AMLODIPINE BESYLATE 10 MG: 10 TABLET ORAL at 08:17

## 2025-05-20 RX ADMIN — VENLAFAXINE HYDROCHLORIDE 150 MG: 150 CAPSULE, EXTENDED RELEASE ORAL at 08:25

## 2025-05-20 NOTE — RESTORATIVE TECHNICIAN NOTE
Restorative Technician Note      Patient Name: Sharon Vega     Restorative Tech Visit Date: 05/20/25  Note Type: Mobility  Patient Position Upon Consult: Supine  Activity Performed: Ambulated  Assistive Device: Roller walker  Patient Position at End of Consult: Supine; All needs within reach  Nurse Communication: Nurse aware of consult, application of brace    Dionicio Crowley, Restorative Technician

## 2025-05-20 NOTE — PROGRESS NOTES
Sharon Vega is a 74 y.o. female who is currently ordered Vancomycin IV with management by the Pharmacy Consult service.  Relevant clinical data and objective / subjective history reviewed.  Vancomycin Assessment:  Indication and Goal AUC/Trough: Soft tissue (goal -600, trough >10), -600, trough >10  Clinical Status: stable  Micro: ID following        Renal Function:  SCr: 0.64 mg/dL  CrCl: 79 mL/min  Renal replacement: Not on dialysis  Days of Therapy: 7  Current Dose: 1000 mg IV q12h  Vancomycin Plan:  New Dosing: continue current dose  Estimated AUC: 505 mcg*hr/mL  Estimated Trough: 14.7 mcg/mL  Next Level: 5/23 am labs   Renal Function Monitoring: Daily BMP and UOP  Pharmacy will continue to follow closely for s/sx of nephrotoxicity, infusion reactions and appropriateness of therapy.  BMP and CBC will be ordered per protocol. We will continue to follow the patient’s culture results and clinical progress daily.

## 2025-05-20 NOTE — ASSESSMENT & PLAN NOTE
POA at [facility] as evidenced by leukocytosis and tachycardia   In setting of right hip intramuscular abscess as above   Continue IV vancomycin and p.o. Flagyl per ID recommendations  S/p IV fluid hydration   Tachycardia has resolved, WBC down to 15 from 28  BC from [facility] 1/2 GPC in clusters, see related plan.  BC obtained here with NGTD  Lactic negative   Procal, WBC improving   Trend CBC and temps closely   See plan as above

## 2025-05-20 NOTE — PROGRESS NOTES
Progress Note - Infectious Disease   Name: Sharon Vega 74 y.o. 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  Patient recently admitted in late April for similar complaint, s/p right hip lR aspiration for concern of septic arthritis.  Right hip arthrogram and joint aspiration resulted in enough fluid for culture, resulted negative.  Recommend discontinuation of antibiotics per ID and orthopedic surgery recommended outpatient follow-up for total right hip replacement.  Patient was undergoing workup for subsequent procedure when she obtained a right hip XR and CT chest abdomen pelvis which revealed Postsurgical change from right hip hemiarthroplasty. Organized collection of fluid and gas in the right iliac is muscle and similar collection surrounding the right hip arthroplasty in the deep gluteal muscles measuring up to 10 cm, concerning for infection. No hematoma.  While being evaluated in the emergency room, she was started on Vanco/Zosyn with recommendation for admission given meeting sepsis criteria with tachycardia, leukocytosis.  Patient recommended transfer to [facility] for tertiary care with consult orthopedics.  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 Procedure performed on 5/15: Abscess drainage x 2 of fluid collections around right hip. 10 fr drains placed. 40 ml pus from anterior drain, and 90 ml pus from lateral drain.There are multiple other undrained areas     - Continue IV Vancomycin for empiric coverage, dosing per pharmacy  - Continue p.o.  Flagyl 500 mg q12 hours  - Pending synovasure culture  - RICK drain culture: 2+ growth of Finegoldia magna, 2+ peptoniphilus harei  - Appreciate orthopedic surgery recommendations: Plan for first stage of revision with antibiotic spacer placement in the OR tentatively 5/21  - Follow-up pending blood cultures: Blood culture 1 out of 2 from 5/13 now positive for anaerobes, likely true bacteremia in setting of anaerobe finding in collected drain abscess cultures  - Trend fever curve/vitals  - Trend CBC/BMP   - Monitor exam for new/developing symptoms  - Final antibiotics plan pending clinical course and findings.  Likely will require 6 weeks of antibiotics after removal of infected prosthetic hip    Sepsis without acute organ dysfunction (HCC)  Patient presented to [facility] on 5/13 with tachycardia, leukocytosis.  Source likely right hip abscess.  Started on IV vancomycin/Zosyn, transition to vancomycin per ID recommendations at [facility], now on additional p.o. Flagyl given bacteremia and drain abscess cultures     - Continue antibiotics as above  Chronic pain syndrome  History of chronic low back pain, cervical/lumbar radiculopathy.       - Continue care per primary team  Positive blood culture  Blood cultures obtained on 5/13, 1 out of 2 positive for preliminary finding of anaerobe organism, gram-positive cocci in clusters     - Continue antibiotics as above    I have discussed the above management plan in detail with the primary service.   Infectious Disease service will follow.    Antibiotics:  Vancomycin Day 7  Flagyl Day 2    Subjective   Patient has no fever, chills, sweats; no nausea, vomiting, diarrhea; no cough, shortness of breath; No new symptoms.  She has been able to get up and walk around, has some mild right hip pain in the area of the drains.  Mild serous drainage within her RICK drains.  Otherwise is resting comfortably in bed    Objective :  Temp:  [97.8 °F (36.6 °C)-98.2 °F (36.8 °C)] 98 °F  (36.7 °C)  HR:  [89-95] 89  BP: (110-138)/(69-78) 110/70  Resp:  [16-20] 17  SpO2:  [92 %-96 %] 93 %  O2 Device: None (Room air)    General:  No acute distress  Psychiatric:  Awake and alert  Pulmonary:  Normal respiratory excursion without accessory muscle use  Abdomen:  Soft, nontender  Extremities:  No edema.  Moderate tenderness over right lateral hip and gluteal region, no significant erythema.  2 RICK drains with minimal serous drainage  Skin:  No rashes        Lab Results: I have reviewed the following results:  Results from last 7 days   Lab Units 05/20/25  0509 05/19/25  0439 05/17/25  0507   WBC Thousand/uL 15.32* 16.79* 20.02*   HEMOGLOBIN g/dL 10.8* 9.6* 11.2*   PLATELETS Thousands/uL 506* 507* 330     Results from last 7 days   Lab Units 05/20/25  0509 05/19/25  0439 05/17/25  0645 05/14/25  0629 05/13/25  2206   SODIUM mmol/L 134* 133* 133*   < > 130*   POTASSIUM mmol/L 4.2 3.4* 3.6   < > 4.0   CHLORIDE mmol/L 103 101 101   < > 94*   CO2 mmol/L 25 26 27   < > 26   BUN mg/dL 10 13 21   < > 25   CREATININE mg/dL 0.64 0.65 0.66   < > 0.75   EGFR ml/min/1.73sq m 87 87 86   < > 78   CALCIUM mg/dL 9.3 9.1 9.5   < > 10.7*   AST U/L  --   --   --   --  31   ALT U/L  --   --   --   --  34   ALK PHOS U/L  --   --   --   --  132*   ALBUMIN g/dL  --   --   --   --  2.9*    < > = values in this interval not displayed.     Results from last 7 days   Lab Units 05/16/25  1411 05/14/25  2351 05/14/25  2350 05/14/25  0115 05/13/25  2225 05/13/25  2205   BLOOD CULTURE   --  No Growth After 5 Days. No Growth After 5 Days.  --  Finegoldia magna* No Growth After 5 Days.   GRAM STAIN RESULT  3+ Polys  No bacteria seen  --   --   --  Gram positive cocci in clusters*  --    BODY FLUID CULTURE, STERILE  No growth  --   --   --   --   --    MRSA CULTURE ONLY   --   --   --  No Methicillin Resistant Staphlyococcus aureus (MRSA) isolated  --   --      Results from last 7 days   Lab Units 05/14/25  0629 05/13/25  6246    PROCALCITONIN ng/ml 0.69* 0.75*     Results from last 7 days   Lab Units 05/13/25  2206   CRP mg/L 374.9*     Results from last 7 days   Lab Units 05/17/25  0645   FERRITIN ng/mL 1,949*         Imaging Results Review: No pertinent imaging studies reviewed.  Other Study Results Review: No additional pertinent studies reviewed.    Administrative Statements   I have spent a total time of 30 minutes in caring for this patient on the day of the visit/encounter including Instructions for management, Documenting in the medical record, Reviewing/placing orders in the medical record (including tests, medications, and/or procedures), Obtaining or reviewing history  , and Communicating with other healthcare professionals .

## 2025-05-20 NOTE — ASSESSMENT & PLAN NOTE
1/2 blood cultures at [place] with GPC in clusters.  Late growth.  Await identification, prelim anaerobe, c/w cultures growing from drain   Blood cultures repeated at [facility] with NGTD  Continue IV Vanco and PO Flagyl  F/u ID input

## 2025-05-20 NOTE — ASSESSMENT & PLAN NOTE
Patient presented to [facility] on 5/13 with tachycardia, leukocytosis.  Source likely right hip abscess.  Started on IV vancomycin/Zosyn, transition to vancomycin per ID recommendations at [facility], now on additional p.o. Flagyl given bacteremia and drain abscess cultures     - Continue antibiotics as above

## 2025-05-20 NOTE — PROGRESS NOTES
"Progress Note - Hospitalist   Name: Sharon Vega 74 y.o. female I MRN: [id number]  Unit/Bed#: -01 I Date of Admission: 5/14/2025   Date of Service: 5/20/2025 I Hospital Day: 6    Assessment & Plan  Abscess of right hip  Patient presented from [facility] with right hip pain   Had recent IR hip joint aspiration on 4/24, was scheduled for conversion of partial hip replacement to total hip on 5/14 but cancelled due to leukocytosis   CT scan obtained with evidence of \"post surgical change from right hip hemiarthroplasty. Organized collection of fluid and gas in the right iliac muscle and similar collection surrounding right hip arthroplasty in the deep gluteal muscles measuring up to 10 cm\"   Transferred to [facility] for orthopedic evaluation   Orthopedics following,  S/p IR aspiration and drain placement x 2 with synovasure culture on 5/15   Cultures also obtained from RICK drain bulb -- preliminary with Edd Parisi    Appreciate ongoing orthopedic recommendations -- plan for OR Weds 5/21 for stage I antibiotic spacer placement  ID following for abx management   Currently on IV Vancomycin and p.o. Flagyl  Cx from drain preliminary with Edd Parisi.  F/u antibiotic recs  1/2 blood cultures at [place] with GPC in clusters.  Late growth.  Await identification, prelim with anaerobe, c/w culture from drains.  Blood cultures obtained at [facility] are negative at 5 days   As needed analgesics, currently on PRN oxycodone 5/10 mg Q4H for moderate and severe pain with PRN IV dilaudid 0.5 mg Q4H for breakthrough  Sepsis without acute organ dysfunction (HCC)  POA at [facility] as evidenced by leukocytosis and tachycardia   In setting of right hip intramuscular abscess as above   Continue IV vancomycin and p.o. Flagyl per ID recommendations  S/p IV fluid hydration   Tachycardia has resolved, WBC down to 15 from 28  BC from [facility] 1/2 GPC in clusters, see related " plan.  BC obtained here with NGTD  Lactic negative   Procal, WBC improving   Trend CBC and temps closely   See plan as above  Positive blood culture  1/2 blood cultures at [place] with GPC in clusters.  Late growth.  Await identification, prelim anaerobe, c/w cultures growing from drain   Blood cultures repeated at [facility] with NGTD  Continue IV Vanco and PO Flagyl  F/u ID input   Anemia  Previously hgb noted to be around 12   Slow decrease in hgb noted this hospitalization.  Likely component of IVF dilution and reactive from infection   No evidence of acute bleeding noted at this time  Check iron panel -- iron low, would benefit from supplementation.  Avoid IV venofer at this time 2/2 acute infection, avoiding PO given constipation.  Can supplement once improved from infectious and/or constipation standpoint   Trend CBC  -- stable   Aortic stenosis  [place] noted on exam, prior echo with mild to moderate AS  Obtained repeat echo prior to any operative plans to evaluate -- moderate AS   Constipation  Continue bowel regimen   Primary hypertension  BP overall stable on review   Continue amlodipine 10 mg daily   Monitor closely   Hyponatremia  Noted  Possibly in setting of sepsis, volume depletion   S/p IV fluids   Monitor sodium levels closely   Trend BMP  Stable  Hypomagnesemia  Replete and monitor    VTE Pharmacologic Prophylaxis:   lovenox    Mobility:   Basic Mobility Inpatient Raw Score: 17  JH-HLM Goal: 5: Stand one or more mins  JH-HLM Achieved: 7: Walk 25 feet or more  JH-HLM Goal NOT achieved. Continue with multidisciplinary rounding and encourage appropriate mobility to improve upon JH-HLM goals.    Patient Centered Rounds: I performed bedside rounds with nursing staff today.   Discussions with Specialists or Other Care Team Provider:     Education and Discussions with Family / Patient: Updated  (Granddaughter, Alexandra) via phone.    Current Length of Stay: 6 day(s)  Current Patient Status: Inpatient    Certification Statement: The patient will continue to require additional inpatient hospital stay due to OR tomorrow, IV abx  Discharge Plan: Anticipate discharge in >72 hrs to rehab facility.    Code Status: Level 1 - Full Code    Subjective   Ms. Vega reports that her hip pain is an 8/10 and she is waiting for RN to come back with pain meds. Otherwise denies complaint     Objective :  Temp:  [97.8 °F (36.6 °C)-98.2 °F (36.8 °C)] 97.8 °F (36.6 °C)  HR:  [91-95] 91  BP: (123-138)/(70-78) 126/76  Resp:  [16-20] 16  SpO2:  [93 %-96 %] 95 %  O2 Device: None (Room air)    Body mass index is 33.66 kg/m².     Input and Output Summary (last 24 hours):     Intake/Output Summary (Last 24 hours) at 5/20/2025 0986  Last data filed at 5/20/2025 0538  Gross per 24 hour   Intake 120 ml   Output 395 ml   Net -275 ml       Physical Exam  Vitals reviewed.   Constitutional:       Comments: Patient seen sitting in bed, NAD, comfortably resting watching TV     Cardiovascular:      Rate and Rhythm: Normal rate and regular rhythm.   Pulmonary:      Effort: Pulmonary effort is normal.      Breath sounds: Normal breath sounds.   Abdominal:      Palpations: Abdomen is soft.     Musculoskeletal:      Comments: 2 drains     Neurological:      Mental Status: She is alert and oriented to person, place, and time.     Psychiatric:         Mood and Affect: Mood normal.           Lines/Drains:  Lines/Drains/Airways       Active Status       Name Placement date Placement time Site Days    Abscess Drain Hip 05/15/25  1705  Hip  4    Abscess Drain Hip 05/15/25  1706  Hip  4                            Lab Results: I have reviewed the following results:   Results from last 7 days   Lab Units 05/20/25  0509   WBC Thousand/uL 15.32*   HEMOGLOBIN g/dL 10.8*   HEMATOCRIT % 34.4*   PLATELETS Thousands/uL 506*   SEGS PCT % 75   LYMPHO PCT % 12*   MONO PCT % 9   EOS PCT % 2     Results from last 7 days   Lab Units 05/20/25  0509 05/14/25  0629  05/13/25  2206   SODIUM mmol/L 134*   < > 130*   POTASSIUM mmol/L 4.2   < > 4.0   CHLORIDE mmol/L 103   < > 94*   CO2 mmol/L 25   < > 26   BUN mg/dL 10   < > 25   CREATININE mg/dL 0.64   < > 0.75   ANION GAP mmol/L 6   < > 10   CALCIUM mg/dL 9.3   < > 10.7*   ALBUMIN g/dL  --   --  2.9*   TOTAL BILIRUBIN mg/dL  --   --  1.26*   ALK PHOS U/L  --   --  132*   ALT U/L  --   --  34   AST U/L  --   --  31   GLUCOSE RANDOM mg/dL 111   < > 119    < > = values in this interval not displayed.     Results from last 7 days   Lab Units 05/20/25  0847   INR  1.19             Results from last 7 days   Lab Units 05/14/25  2350 05/14/25  0629 05/13/25  2206   LACTIC ACID mmol/L 0.8  --  1.1   PROCALCITONIN ng/ml  --  0.69* 0.75*       Recent Cultures (last 7 days):   Results from last 7 days   Lab Units 05/16/25  1411 05/14/25  2351 05/14/25  2350 05/13/25  2225 05/13/25  2205 05/13/25  1240 05/13/25  1230   BLOOD CULTURE   --  No Growth After 5 Days. No Growth After 5 Days. Anaerobe (Organism type)* No Growth After 5 Days. No Growth After 5 Days. No Growth After 5 Days.   GRAM STAIN RESULT  3+ Polys  No bacteria seen  --   --  Gram positive cocci in clusters*  --   --   --    BODY FLUID CULTURE, STERILE  No growth  --   --   --   --   --   --        Imaging Results Review: I reviewed radiology reports from this admission including: chest xray, CT chest, and CT abdomen/pelvis.      Last 24 Hours Medication List:     Current Facility-Administered Medications:     acetaminophen (TYLENOL) tablet 650 mg, Q4H PRN    amLODIPine (NORVASC) tablet 10 mg, Daily    ascorbic acid (VITAMIN C) tablet 500 mg, BID    chlorhexidine gluconate (HIBICLENS) 4 % topical liquid, Daily PRN    Cholecalciferol (VITAMIN D3) tablet 2,000 Units, Daily    enoxaparin (LOVENOX) subcutaneous injection 40 mg, Daily    ferrous sulfate tablet 325 mg, BID With Meals    folic acid (FOLVITE) tablet 1 mg, Daily    gabapentin (NEURONTIN) capsule 600 mg, HS     HYDROmorphone (DILAUDID) injection 0.5 mg, Q4H PRN    loratadine (CLARITIN) tablet 10 mg, Daily    magnesium sulfate 2 g/50 mL IVPB (premix) 2 g, Once, Last Rate: 2 g (05/20/25 0837)    metroNIDAZOLE (FLAGYL) tablet 500 mg, Q12H FELIPA    multivitamin-minerals (CENTRUM) tablet 1 tablet, Daily    mupirocin (BACTROBAN) 2 % ointment, Daily    ondansetron (ZOFRAN) injection 4 mg, Q6H PRN    oxyCODONE (ROXICODONE) immediate release tablet 10 mg, Q4H PRN    oxyCODONE (ROXICODONE) IR tablet 5 mg, Q4H PRN    pantoprazole (PROTONIX) EC tablet 40 mg, Early Morning    polyethylene glycol (MIRALAX) packet 17 g, Daily    pravastatin (PRAVACHOL) tablet 40 mg, HS    senna (SENOKOT) tablet 8.6 mg, HS    traZODone (DESYREL) tablet 100 mg, HS    vancomycin (VANCOCIN) IVPB (premix in dextrose) 1,000 mg 200 mL, Q12H, Last Rate: 1,000 mg (05/20/25 0817)    venlafaxine (EFFEXOR-XR) 24 hr capsule 150 mg, Daily    Administrative Statements   Today, Patient Was Seen By: Dionicio Garibay PA-C      **Please Note: This note may have been constructed using a voice recognition system.**

## 2025-05-20 NOTE — ASSESSMENT & PLAN NOTE
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  Patient recently admitted in late April for similar complaint, s/p right hip lR aspiration for concern of septic arthritis.  Right hip arthrogram and joint aspiration resulted in enough fluid for culture, resulted negative.  Recommend discontinuation of antibiotics per ID and orthopedic surgery recommended outpatient follow-up for total right hip replacement.  Patient was undergoing workup for subsequent procedure when she obtained a right hip XR and CT chest abdomen pelvis which revealed Postsurgical change from right hip hemiarthroplasty. Organized collection of fluid and gas in the right iliac is muscle and similar collection surrounding the right hip arthroplasty in the deep gluteal muscles measuring up to 10 cm, concerning for infection. No hematoma.  While being evaluated in the emergency room, she was started on Vanco/Zosyn with recommendation for admission given meeting sepsis criteria with tachycardia, leukocytosis.  Patient recommended transfer to [facility] for tertiary care with consult orthopedics.  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 Procedure performed on 5/15: Abscess drainage x 2 of fluid collections around right hip. 10 fr drains placed. 40 ml pus from anterior drain, and 90 ml pus from lateral drain.There are multiple other undrained areas     - Continue IV Vancomycin for empiric coverage, dosing per pharmacy  - Continue p.o. Flagyl 500 mg q12 hours  - Pending synovasure culture  -  drain culture: 2+ growth of Finegoldia magna, 2+ peptoniphilus harei  - Appreciate orthopedic surgery recommendations: Plan for first stage of revision with antibiotic spacer  placement in the OR tentatively 5/21  - Follow-up pending blood cultures: Blood culture 1 out of 2 from 5/13 now positive for anaerobes, likely true bacteremia in setting of anaerobe finding in collected drain abscess cultures  - Trend fever curve/vitals  - Trend CBC/BMP   - Monitor exam for new/developing symptoms  - Final antibiotics plan pending clinical course and findings.  Likely will require 6 weeks of antibiotics after removal of infected prosthetic hip

## 2025-05-20 NOTE — PLAN OF CARE
Problem: PAIN - ADULT  Goal: Verbalizes/displays adequate comfort level or baseline comfort level  Description: Interventions:  - Encourage patient to monitor pain and request assistance  - Assess pain using appropriate pain scale  - Administer analgesics as ordered based on type and severity of pain and evaluate response  - Implement non-pharmacological measures as appropriate and evaluate response  - Consider cultural and social influences on pain and pain management  - Notify physician/advanced practitioner if interventions unsuccessful or patient reports new pain  - Educate patient/family on pain management process including their role and importance of  reporting pain   - Provide non-pharmacologic/complimentary pain relief interventions  Outcome: Progressing     Problem: INFECTION - ADULT  Goal: Absence or prevention of progression during hospitalization  Description: INTERVENTIONS:  - Assess and monitor for signs and symptoms of infection  - Monitor lab/diagnostic results  - Monitor all insertion sites, i.e. indwelling lines, tubes, and drains  - Monitor endotracheal if appropriate and nasal secretions for changes in amount and color  - [place] appropriate cooling/warming therapies per order  - Administer medications as ordered  - Instruct and encourage patient and family to use good hand hygiene technique  - Identify and instruct in appropriate isolation precautions for identified infection/condition  Outcome: Progressing     Problem: SAFETY ADULT  Goal: Patient will remain free of falls  Description: INTERVENTIONS:  - Educate patient/family on patient safety including physical limitations  - Instruct patient to call for assistance with activity   - Consider consulting OT/PT to assist with strengthening/mobility based on AM PAC & JH-HLM score  - Consult OT/PT to assist with strengthening/mobility   - Keep Call bell within reach  - Keep bed low and locked with side rails adjusted as appropriate  - Keep  care items and personal belongings within reach  - Initiate and maintain comfort rounds  - Make Fall Risk Sign visible to staff  - Offer Toileting every 2 Hours, in advance of need  - Initiate/Maintain alarm  - Obtain necessary fall risk management equipment:   - Apply yellow socks and bracelet for high fall risk patients  - Consider moving patient to room near nurses station  Outcome: Progressing

## 2025-05-20 NOTE — ASSESSMENT & PLAN NOTE
74 y.o.female with right hip PJI, iliacus and gluteal abscesses s/p IR aspiration with drains. Possible 1st stage of antibiotic spacer this coming week pending OR and surgeon availability. Plan for OR Wednesday 5/21. Preop today and npo at [place].    Plan for Stage 1 abx spacer placement tomorrow  Npo [place]  Preop today  WBAT RLE  PT/OT  Pain control  DVT ppx: per primary  Medical management including antibiotics per primary team, currently on IV vancomycin  Dispo planning.

## 2025-05-20 NOTE — PLAN OF CARE
Problem: PAIN - ADULT  Goal: Verbalizes/displays adequate comfort level or baseline comfort level  Description: Interventions:  - Encourage patient to monitor pain and request assistance  - Assess pain using appropriate pain scale  - Administer analgesics as ordered based on type and severity of pain and evaluate response  - Implement non-pharmacological measures as appropriate and evaluate response  - Consider cultural and social influences on pain and pain management  - Notify physician/advanced practitioner if interventions unsuccessful or patient reports new pain  - Educate patient/family on pain management process including their role and importance of  reporting pain   - Provide non-pharmacologic/complimentary pain relief interventions  Outcome: Progressing     Problem: INFECTION - ADULT  Goal: Absence or prevention of progression during hospitalization  Description: INTERVENTIONS:  - Assess and monitor for signs and symptoms of infection  - Monitor lab/diagnostic results  - Monitor all insertion sites, i.e. indwelling lines, tubes, and drains  - Monitor endotracheal if appropriate and nasal secretions for changes in amount and color  - [place] appropriate cooling/warming therapies per order  - Administer medications as ordered  - Instruct and encourage patient and family to use good hand hygiene technique  - Identify and instruct in appropriate isolation precautions for identified infection/condition  Outcome: Progressing  Goal: Absence of fever/infection during neutropenic period  Description: INTERVENTIONS:  - Monitor WBC  - Perform strict hand hygiene  - Limit to healthy visitors only  - No plants, dried, fresh or silk flowers with feliciano in patient room  Outcome: Progressing     Problem: DISCHARGE PLANNING  Goal: Discharge to home or other facility with appropriate resources  Description: INTERVENTIONS:  - Identify barriers to discharge w/patient and caregiver  - Arrange for needed discharge resources  and transportation as appropriate  - Identify discharge learning needs (meds, wound care, etc.)  - Arrange for interpretive services to assist at discharge as needed  - Refer to Case Management Department for coordinating discharge planning if the patient needs post-hospital services based on physician/advanced practitioner order or complex needs related to functional status, cognitive ability, or social support system  Outcome: Progressing     Problem: Knowledge Deficit  Goal: Patient/family/caregiver demonstrates understanding of disease process, treatment plan, medications, and discharge instructions  Description: Complete learning assessment and assess knowledge base.  Interventions:  - Provide teaching at level of understanding  - Provide teaching via preferred learning methods  Outcome: Progressing     Problem: Prexisting or High Potential for Compromised Skin Integrity  Goal: Skin integrity is maintained or improved  Description: INTERVENTIONS:  - Identify patients at risk for skin breakdown  - Assess and monitor skin integrity including under and around medical devices   - Assess and monitor nutrition and hydration status  - Monitor labs  - Assess for incontinence   - Turn and reposition patient  - Assist with mobility/ambulation  - Relieve pressure over mane prominences   - Avoid friction and shearing  - Provide appropriate hygiene as needed including keeping skin clean and dry  - Evaluate need for skin moisturizer/barrier cream  - Collaborate with interdisciplinary team  - Patient/family teaching  - Consider wound care consult    Assess:  - Review Gamaliel scale daily  - Clean and moisturize skin every day  - Inspect skin when repositioning, toileting, and assisting with ADLS  - Assess under medical devices such as  every   - Assess extremities for adequate circulation and sensation     Bed Management:  - Have minimal linens on bed & keep smooth, unwrinkled  - Change linens as needed when moist or  perspiring  - Avoid sitting or lying in one position for more than 2 hours while in bed?Keep HOB at degrees   - Toileting:  - Offer bedside commode  - Assess for incontinence every   - Use incontinent care products after each incontinent episode such as     Activity:  - Mobilize patient 3 times a day  - Encourage activity and walks on unit  - Encourage or provide ROM exercises   - Turn and reposition patient every 2 Hours  - Use appropriate equipment to lift or move patient in bed  - Instruct/ Assist with weight shifting every  when out of bed in chair  - Consider limitation of chair time 2 hour intervals    Skin Care:  - Avoid use of baby powder, tape, friction and shearing, hot water or constrictive clothing  - Relieve pressure over bony prominences using   - Do not massage red bony areas    Next Steps:  - Teach patient strategies to minimize risks such as   - Consider consults to  interdisciplinary teams such as   Outcome: Progressing

## 2025-05-20 NOTE — PROGRESS NOTES
"Progress Note - Orthopedics   Name: Sharon Vega 74 y.o. female I MRN: [id number]  Unit/Bed#: -01 I Date of Admission: 5/14/2025   Date of Service: 5/20/2025 I Hospital Day: 6    Assessment & Plan  Abscess of right hip  74 y.o.female with right hip PJI, iliacus and gluteal abscesses s/p IR aspiration with drains. Possible 1st stage of antibiotic spacer this coming week pending OR and surgeon availability. Plan for OR Wednesday 5/21. Preop today and npo at [place].    Plan for Stage 1 abx spacer placement tomorrow  Npo [place]  Preop today  WBAT RLE  PT/OT  Pain control  DVT ppx: per primary  Medical management including antibiotics per primary team, currently on IV vancomycin  Dispo planning.    Positive blood culture    Aortic stenosis    Constipation        Subjective   74 y.o.female doing well. No acute events, no new complaints. Pain well controlled. Denies fevers, chills, CP, SOB, N/V, numbness or tingling. Patient reports no issues with urination or bowel movements.     Objective :  Temp:  [97.9 °F (36.6 °C)-98.2 °F (36.8 °C)] 97.9 °F (36.6 °C)  HR:  [] 95  BP: (123-138)/(70-78) 130/78  Resp:  [19-20] 19  SpO2:  [93 %-96 %] 94 %  O2 Device: None (Room air)    Physical Exam  Musculoskeletal: Right hip  Dressing intact.   2 RICK drains with purulent drainage  Motor intact to +FHL/EHL, +ankle dorsi/plantar flexion  Sensation intact to saphenous, sural, tibial, superficial peroneal nerve, and deep peroneal  2+ DP pulse  No calf swelling or tenderness to palpation      Lab Results: I have reviewed the following results:  Recent Labs     05/19/25  0439 05/20/25  0509   WBC 16.79* 15.32*   HGB 9.6* 10.8*   HCT 30.2* 34.4*   * 506*   BUN 13 10   CREATININE 0.65 0.64     Blood Culture:    Lab Results   Component Value Date    BLOODCX No Growth After 5 Days. 05/14/2025     Wound Culture: No results found for: \"WOUNDCULT\"        "

## 2025-05-20 NOTE — ASSESSMENT & PLAN NOTE
"Patient presented from [facility] with right hip pain   Had recent IR hip joint aspiration on 4/24, was scheduled for conversion of partial hip replacement to total hip on 5/14 but cancelled due to leukocytosis   CT scan obtained with evidence of \"post surgical change from right hip hemiarthroplasty. Organized collection of fluid and gas in the right iliac muscle and similar collection surrounding right hip arthroplasty in the deep gluteal muscles measuring up to 10 cm\"   Transferred to [facility] for orthopedic evaluation   Orthopedics following,  S/p IR aspiration and drain placement x 2 with synovasure culture on 5/15   Cultures also obtained from RICK drain bulb -- preliminary with Edd Parisi    Appreciate ongoing orthopedic recommendations -- plan for OR Weds 5/21 for stage I antibiotic spacer placement  ID following for abx management   Currently on IV Vancomycin and p.o. Flagyl  Cx from drain preliminary with Fingegoldia magna, Edd carter.  F/u antibiotic recs  1/2 blood cultures at [place] with GPC in clusters.  Late growth.  Await identification, prelim with anaerobe, c/w culture from drains.  Blood cultures obtained at [facility] are negative at 5 days   As needed analgesics, currently on PRN oxycodone 5/10 mg Q4H for moderate and severe pain with PRN IV dilaudid 0.5 mg Q4H for breakthrough  "

## 2025-05-20 NOTE — ASSESSMENT & PLAN NOTE
Noted  Possibly in setting of sepsis, volume depletion   S/p IV fluids   Monitor sodium levels closely   Trend BMP  Stable

## 2025-05-21 ENCOUNTER — APPOINTMENT (INPATIENT)
Dept: RADIOLOGY | Facility: HOSPITAL | Age: 75
DRG: 466 | End: 2025-05-21
Payer: MEDICARE

## 2025-05-21 ENCOUNTER — ANESTHESIA (INPATIENT)
Dept: PERIOP | Facility: HOSPITAL | Age: 75
End: 2025-05-21
Payer: MEDICARE

## 2025-05-21 LAB
ALL TARGETS: NOT DETECTED
ANION GAP SERPL CALCULATED.3IONS-SCNC: 11 MMOL/L (ref 4–13)
BACTERIA BLD CULT: ABNORMAL
BUN SERPL-MCNC: 13 MG/DL (ref 5–25)
CALCIUM SERPL-MCNC: 9.3 MG/DL (ref 8.4–10.2)
CHLORIDE SERPL-SCNC: 100 MMOL/L (ref 96–108)
CO2 SERPL-SCNC: 24 MMOL/L (ref 21–32)
CREAT SERPL-MCNC: 0.93 MG/DL (ref 0.6–1.3)
ERYTHROCYTE [DISTWIDTH] IN BLOOD BY AUTOMATED COUNT: 15 % (ref 11.6–15.1)
GFR SERPL CREATININE-BSD FRML MDRD: 60 ML/MIN/1.73SQ M
GLUCOSE SERPL-MCNC: 101 MG/DL (ref 65–140)
GRAM STN SPEC: ABNORMAL
HCT VFR BLD AUTO: 20.8 % (ref 34.8–46.1)
HCT VFR BLD AUTO: 33.3 % (ref 34.8–46.1)
HGB BLD-MCNC: 10.5 G/DL (ref 11.5–15.4)
HGB BLD-MCNC: 6.4 G/DL (ref 11.5–15.4)
INTERNAL QC RESULT: NORMAL
MAGNESIUM SERPL-MCNC: 2.1 MG/DL (ref 1.9–2.7)
MCH RBC QN AUTO: 29.8 PG (ref 26.8–34.3)
MCHC RBC AUTO-ENTMCNC: 31.5 G/DL (ref 31.4–37.4)
MCV RBC AUTO: 95 FL (ref 82–98)
PLATELET # BLD AUTO: 537 THOUSANDS/UL (ref 149–390)
PMV BLD AUTO: 9.8 FL (ref 8.9–12.7)
POTASSIUM SERPL-SCNC: 4.9 MMOL/L (ref 3.5–5.3)
RBC # BLD AUTO: 3.52 MILLION/UL (ref 3.81–5.12)
SODIUM SERPL-SCNC: 135 MMOL/L (ref 135–147)
WBC # BLD AUTO: 14.74 THOUSAND/UL (ref 4.31–10.16)

## 2025-05-21 PROCEDURE — 85014 HEMATOCRIT: CPT

## 2025-05-21 PROCEDURE — 85018 HEMOGLOBIN: CPT

## 2025-05-21 PROCEDURE — 99233 SBSQ HOSP IP/OBS HIGH 50: CPT | Performed by: ORTHOPAEDIC SURGERY

## 2025-05-21 PROCEDURE — 99232 SBSQ HOSP IP/OBS MODERATE 35: CPT | Performed by: PHYSICIAN ASSISTANT

## 2025-05-21 PROCEDURE — 83735 ASSAY OF MAGNESIUM: CPT | Performed by: PHYSICIAN ASSISTANT

## 2025-05-21 PROCEDURE — 0SP90JZ REMOVAL OF SYNTHETIC SUBSTITUTE FROM RIGHT HIP JOINT, OPEN APPROACH: ICD-10-PCS | Performed by: ORTHOPAEDIC SURGERY

## 2025-05-21 PROCEDURE — C1776 JOINT DEVICE (IMPLANTABLE): HCPCS | Performed by: ORTHOPAEDIC SURGERY

## 2025-05-21 PROCEDURE — C1713 ANCHOR/SCREW BN/BN,TIS/BN: HCPCS | Performed by: ORTHOPAEDIC SURGERY

## 2025-05-21 PROCEDURE — NC001 PR NO CHARGE: Performed by: ORTHOPAEDIC SURGERY

## 2025-05-21 PROCEDURE — G0545 PR INHERENT VISIT TO INPT: HCPCS | Performed by: STUDENT IN AN ORGANIZED HEALTH CARE EDUCATION/TRAINING PROGRAM

## 2025-05-21 PROCEDURE — 87070 CULTURE OTHR SPECIMN AEROBIC: CPT | Performed by: ORTHOPAEDIC SURGERY

## 2025-05-21 PROCEDURE — 80048 BASIC METABOLIC PNL TOTAL CA: CPT | Performed by: FAMILY MEDICINE

## 2025-05-21 PROCEDURE — 87176 TISSUE HOMOGENIZATION CULTR: CPT | Performed by: ORTHOPAEDIC SURGERY

## 2025-05-21 PROCEDURE — 99232 SBSQ HOSP IP/OBS MODERATE 35: CPT | Performed by: STUDENT IN AN ORGANIZED HEALTH CARE EDUCATION/TRAINING PROGRAM

## 2025-05-21 PROCEDURE — 73502 X-RAY EXAM HIP UNI 2-3 VIEWS: CPT

## 2025-05-21 PROCEDURE — 87205 SMEAR GRAM STAIN: CPT | Performed by: ORTHOPAEDIC SURGERY

## 2025-05-21 PROCEDURE — 0SR90EZ REPLACEMENT OF RIGHT HIP JOINT WITH ARTICULATING SPACER, OPEN APPROACH: ICD-10-PCS | Performed by: ORTHOPAEDIC SURGERY

## 2025-05-21 PROCEDURE — 87076 CULTURE ANAEROBE IDENT EACH: CPT | Performed by: ORTHOPAEDIC SURGERY

## 2025-05-21 PROCEDURE — 85027 COMPLETE CBC AUTOMATED: CPT | Performed by: PHYSICIAN ASSISTANT

## 2025-05-21 PROCEDURE — 87075 CULTR BACTERIA EXCEPT BLOOD: CPT | Performed by: ORTHOPAEDIC SURGERY

## 2025-05-21 PROCEDURE — 27132 TOTAL HIP ARTHROPLASTY: CPT | Performed by: ORTHOPAEDIC SURGERY

## 2025-05-21 PROCEDURE — A6250 SKIN SEAL PROTECT MOISTURIZR: HCPCS | Performed by: ORTHOPAEDIC SURGERY

## 2025-05-21 DEVICE — IMPLANTABLE DEVICE: Type: IMPLANTABLE DEVICE | Site: HIP | Status: FUNCTIONAL

## 2025-05-21 DEVICE — CUP ACTB CMNT 42MM PROSTALAC: Type: IMPLANTABLE DEVICE | Site: HIP | Status: FUNCTIONAL

## 2025-05-21 DEVICE — CEMENTRALIZER STEM CENTRALIZER 11.0MM CEMENTED
Type: IMPLANTABLE DEVICE | Site: HIP | Status: FUNCTIONAL
Brand: CEMENTRALIZER

## 2025-05-21 DEVICE — 1.7MM COCR CABLE WITH TI CRIMP 750MM-STERILE: Type: IMPLANTABLE DEVICE | Site: HIP | Status: FUNCTIONAL

## 2025-05-21 DEVICE — ARTICUL/EZE FEMORAL HEAD DIAMETER 32MM +1 12/14 TAPER
Type: IMPLANTABLE DEVICE | Site: HIP | Status: FUNCTIONAL
Brand: ARTICUL/EZE

## 2025-05-21 RX ORDER — MAGNESIUM HYDROXIDE 1200 MG/15ML
LIQUID ORAL AS NEEDED
Status: DISCONTINUED | OUTPATIENT
Start: 2025-05-21 | End: 2025-05-21 | Stop reason: HOSPADM

## 2025-05-21 RX ORDER — SODIUM CHLORIDE, SODIUM LACTATE, POTASSIUM CHLORIDE, CALCIUM CHLORIDE 600; 310; 30; 20 MG/100ML; MG/100ML; MG/100ML; MG/100ML
125 INJECTION, SOLUTION INTRAVENOUS CONTINUOUS
Status: DISCONTINUED | OUTPATIENT
Start: 2025-05-21 | End: 2025-05-24

## 2025-05-21 RX ORDER — PROPOFOL 10 MG/ML
INJECTION, EMULSION INTRAVENOUS AS NEEDED
Status: DISCONTINUED | OUTPATIENT
Start: 2025-05-21 | End: 2025-05-21

## 2025-05-21 RX ORDER — CEFAZOLIN SODIUM 2 G/50ML
2000 SOLUTION INTRAVENOUS ONCE
Status: COMPLETED | OUTPATIENT
Start: 2025-05-21 | End: 2025-05-21

## 2025-05-21 RX ORDER — TRANEXAMIC ACID 100 MG/ML
INJECTION, SOLUTION INTRAVENOUS AS NEEDED
Status: DISCONTINUED | OUTPATIENT
Start: 2025-05-21 | End: 2025-05-21

## 2025-05-21 RX ORDER — ONDANSETRON 2 MG/ML
INJECTION INTRAMUSCULAR; INTRAVENOUS AS NEEDED
Status: DISCONTINUED | OUTPATIENT
Start: 2025-05-21 | End: 2025-05-21

## 2025-05-21 RX ORDER — TOBRAMYCIN 1.2 G/30ML
7200 INJECTION, POWDER, LYOPHILIZED, FOR SOLUTION INTRAVENOUS ONCE
Status: COMPLETED | OUTPATIENT
Start: 2025-05-21 | End: 2025-05-21

## 2025-05-21 RX ORDER — CALCIUM CARBONATE 500 MG/1
1000 TABLET, CHEWABLE ORAL DAILY PRN
Status: DISCONTINUED | OUTPATIENT
Start: 2025-05-21 | End: 2025-05-27 | Stop reason: HOSPADM

## 2025-05-21 RX ORDER — ONDANSETRON 2 MG/ML
4 INJECTION INTRAMUSCULAR; INTRAVENOUS ONCE AS NEEDED
Status: COMPLETED | OUTPATIENT
Start: 2025-05-21 | End: 2025-05-21

## 2025-05-21 RX ORDER — ACETAMINOPHEN 10 MG/ML
INJECTION, SOLUTION INTRAVENOUS AS NEEDED
Status: DISCONTINUED | OUTPATIENT
Start: 2025-05-21 | End: 2025-05-21

## 2025-05-21 RX ORDER — FENTANYL CITRATE 50 UG/ML
INJECTION, SOLUTION INTRAMUSCULAR; INTRAVENOUS AS NEEDED
Status: DISCONTINUED | OUTPATIENT
Start: 2025-05-21 | End: 2025-05-21

## 2025-05-21 RX ORDER — ROCURONIUM BROMIDE 10 MG/ML
INJECTION, SOLUTION INTRAVENOUS AS NEEDED
Status: DISCONTINUED | OUTPATIENT
Start: 2025-05-21 | End: 2025-05-21

## 2025-05-21 RX ORDER — VANCOMYCIN HYDROCHLORIDE 1 G/200ML
1000 INJECTION, SOLUTION INTRAVENOUS EVERY 24 HOURS
Status: DISCONTINUED | OUTPATIENT
Start: 2025-05-22 | End: 2025-05-27 | Stop reason: HOSPADM

## 2025-05-21 RX ORDER — HYDROMORPHONE HYDROCHLORIDE 1 MG/ML
INJECTION, SOLUTION INTRAMUSCULAR; INTRAVENOUS; SUBCUTANEOUS AS NEEDED
Status: DISCONTINUED | OUTPATIENT
Start: 2025-05-21 | End: 2025-05-21

## 2025-05-21 RX ORDER — ENOXAPARIN SODIUM 100 MG/ML
40 INJECTION SUBCUTANEOUS DAILY
Status: DISCONTINUED | OUTPATIENT
Start: 2025-05-22 | End: 2025-05-23

## 2025-05-21 RX ORDER — LIDOCAINE HYDROCHLORIDE 10 MG/ML
INJECTION, SOLUTION EPIDURAL; INFILTRATION; INTRACAUDAL; PERINEURAL AS NEEDED
Status: DISCONTINUED | OUTPATIENT
Start: 2025-05-21 | End: 2025-05-21

## 2025-05-21 RX ORDER — METHOCARBAMOL 500 MG/1
500 TABLET, FILM COATED ORAL EVERY 6 HOURS SCHEDULED
Status: DISCONTINUED | OUTPATIENT
Start: 2025-05-21 | End: 2025-05-23

## 2025-05-21 RX ORDER — SODIUM CHLORIDE, SODIUM LACTATE, POTASSIUM CHLORIDE, CALCIUM CHLORIDE 600; 310; 30; 20 MG/100ML; MG/100ML; MG/100ML; MG/100ML
INJECTION, SOLUTION INTRAVENOUS CONTINUOUS PRN
Status: DISCONTINUED | OUTPATIENT
Start: 2025-05-21 | End: 2025-05-21

## 2025-05-21 RX ORDER — FENTANYL CITRATE/PF 50 MCG/ML
25 SYRINGE (ML) INJECTION
Status: DISCONTINUED | OUTPATIENT
Start: 2025-05-21 | End: 2025-05-21 | Stop reason: HOSPADM

## 2025-05-21 RX ORDER — SODIUM CHLORIDE 9 MG/ML
INJECTION, SOLUTION INTRAVENOUS CONTINUOUS PRN
Status: DISCONTINUED | OUTPATIENT
Start: 2025-05-21 | End: 2025-05-21

## 2025-05-21 RX ORDER — HYDROMORPHONE HCL IN WATER/PF 6 MG/30 ML
0.2 PATIENT CONTROLLED ANALGESIA SYRINGE INTRAVENOUS
Status: DISCONTINUED | OUTPATIENT
Start: 2025-05-21 | End: 2025-05-21 | Stop reason: HOSPADM

## 2025-05-21 RX ORDER — ALBUMIN HUMAN 50 G/1000ML
SOLUTION INTRAVENOUS CONTINUOUS PRN
Status: DISCONTINUED | OUTPATIENT
Start: 2025-05-21 | End: 2025-05-21

## 2025-05-21 RX ORDER — DOCUSATE SODIUM 100 MG/1
100 CAPSULE, LIQUID FILLED ORAL 2 TIMES DAILY
Status: DISCONTINUED | OUTPATIENT
Start: 2025-05-21 | End: 2025-05-27 | Stop reason: HOSPADM

## 2025-05-21 RX ADMIN — METRONIDAZOLE 500 MG: 500 TABLET ORAL at 22:20

## 2025-05-21 RX ADMIN — FENTANYL CITRATE 50 MCG: 50 INJECTION INTRAMUSCULAR; INTRAVENOUS at 13:46

## 2025-05-21 RX ADMIN — SUGAMMADEX 200 MG: 100 INJECTION, SOLUTION INTRAVENOUS at 16:47

## 2025-05-21 RX ADMIN — FENTANYL CITRATE 25 MCG: 50 INJECTION INTRAMUSCULAR; INTRAVENOUS at 17:15

## 2025-05-21 RX ADMIN — VENLAFAXINE HYDROCHLORIDE 150 MG: 150 CAPSULE, EXTENDED RELEASE ORAL at 07:47

## 2025-05-21 RX ADMIN — ACETAMINOPHEN 1000 MG: 10 INJECTION, SOLUTION INTRAVENOUS at 16:09

## 2025-05-21 RX ADMIN — ONDANSETRON 4 MG: 2 INJECTION INTRAMUSCULAR; INTRAVENOUS at 16:27

## 2025-05-21 RX ADMIN — TRANEXAMIC ACID 1 G: 100 INJECTION INTRAVENOUS at 13:24

## 2025-05-21 RX ADMIN — ROCURONIUM BROMIDE 20 MG: 10 INJECTION, SOLUTION INTRAVENOUS at 16:03

## 2025-05-21 RX ADMIN — PHENYLEPHRINE HYDROCHLORIDE 40 MCG/MIN: 50 INJECTION INTRAVENOUS at 13:05

## 2025-05-21 RX ADMIN — SODIUM CHLORIDE 1000 ML: 0.9 INJECTION, SOLUTION INTRAVENOUS at 18:04

## 2025-05-21 RX ADMIN — FENTANYL CITRATE 25 MCG: 50 INJECTION INTRAMUSCULAR; INTRAVENOUS at 17:11

## 2025-05-21 RX ADMIN — SENNOSIDES 8.6 MG: 8.6 TABLET, FILM COATED ORAL at 22:20

## 2025-05-21 RX ADMIN — LIDOCAINE HYDROCHLORIDE 100 MG: 10 INJECTION, SOLUTION EPIDURAL; INFILTRATION; INTRACAUDAL; PERINEURAL at 12:54

## 2025-05-21 RX ADMIN — ONDANSETRON 4 MG: 2 INJECTION INTRAMUSCULAR; INTRAVENOUS at 17:11

## 2025-05-21 RX ADMIN — PROPOFOL 150 MG: 10 INJECTION, EMULSION INTRAVENOUS at 12:54

## 2025-05-21 RX ADMIN — GABAPENTIN 600 MG: 300 CAPSULE ORAL at 22:20

## 2025-05-21 RX ADMIN — PANTOPRAZOLE SODIUM 40 MG: 40 TABLET, DELAYED RELEASE ORAL at 06:06

## 2025-05-21 RX ADMIN — VANCOMYCIN HYDROCHLORIDE 1000 MG: 1 INJECTION, SOLUTION INTRAVENOUS at 08:05

## 2025-05-21 RX ADMIN — METHOCARBAMOL 500 MG: 500 TABLET ORAL at 22:26

## 2025-05-21 RX ADMIN — ROCURONIUM BROMIDE 50 MG: 10 INJECTION, SOLUTION INTRAVENOUS at 12:54

## 2025-05-21 RX ADMIN — ROCURONIUM BROMIDE 10 MG: 10 INJECTION, SOLUTION INTRAVENOUS at 13:59

## 2025-05-21 RX ADMIN — AMLODIPINE BESYLATE 10 MG: 10 TABLET ORAL at 07:47

## 2025-05-21 RX ADMIN — ALBUMIN (HUMAN): 12.5 INJECTION, SOLUTION INTRAVENOUS at 15:03

## 2025-05-21 RX ADMIN — PRAVASTATIN SODIUM 40 MG: 40 TABLET ORAL at 22:20

## 2025-05-21 RX ADMIN — HYDROMORPHONE HYDROCHLORIDE 0.5 MG: 1 INJECTION, SOLUTION INTRAMUSCULAR; INTRAVENOUS; SUBCUTANEOUS at 16:39

## 2025-05-21 RX ADMIN — FENTANYL CITRATE 50 MCG: 50 INJECTION INTRAMUSCULAR; INTRAVENOUS at 14:00

## 2025-05-21 RX ADMIN — SODIUM CHLORIDE: 0.9 INJECTION, SOLUTION INTRAVENOUS at 13:15

## 2025-05-21 RX ADMIN — CEFAZOLIN SODIUM 2000 MG: 2 SOLUTION INTRAVENOUS at 13:09

## 2025-05-21 RX ADMIN — ROCURONIUM BROMIDE 10 MG: 10 INJECTION, SOLUTION INTRAVENOUS at 14:49

## 2025-05-21 RX ADMIN — FENTANYL CITRATE 100 MCG: 50 INJECTION INTRAMUSCULAR; INTRAVENOUS at 12:54

## 2025-05-21 RX ADMIN — ROCURONIUM BROMIDE 20 MG: 10 INJECTION, SOLUTION INTRAVENOUS at 14:26

## 2025-05-21 RX ADMIN — TRAZODONE HYDROCHLORIDE 100 MG: 100 TABLET ORAL at 22:20

## 2025-05-21 RX ADMIN — SODIUM CHLORIDE: 0.9 INJECTION, SOLUTION INTRAVENOUS at 15:05

## 2025-05-21 RX ADMIN — SODIUM CHLORIDE, SODIUM LACTATE, POTASSIUM CHLORIDE, AND CALCIUM CHLORIDE: .6; .31; .03; .02 INJECTION, SOLUTION INTRAVENOUS at 12:50

## 2025-05-21 RX ADMIN — SODIUM CHLORIDE, SODIUM LACTATE, POTASSIUM CHLORIDE, AND CALCIUM CHLORIDE 125 ML/HR: .6; .31; .03; .02 INJECTION, SOLUTION INTRAVENOUS at 22:27

## 2025-05-21 RX ADMIN — ALBUMIN (HUMAN): 12.5 INJECTION, SOLUTION INTRAVENOUS at 14:35

## 2025-05-21 RX ADMIN — METRONIDAZOLE 500 MG: 500 TABLET ORAL at 07:47

## 2025-05-21 RX ADMIN — ROCURONIUM BROMIDE 10 MG: 10 INJECTION, SOLUTION INTRAVENOUS at 13:40

## 2025-05-21 NOTE — PROGRESS NOTES
Sharon Vega is a 74 y.o. female who is currently ordered Vancomycin IV with management by the Pharmacy Consult service.  Relevant clinical data and objective / subjective history reviewed.  Vancomycin Assessment:  Indication and Goal AUC/Trough: Soft tissue (goal -600, trough >10), -600, trough >10  Clinical Status: stable  Micro: ID following        Renal Function:  SCr: 0.93 mg/dL  CrCl: 54.4 mL/min  Renal replacement: Not on dialysis  Days of Therapy: 8  Current Dose: 1000 mg IV q12h  Vancomycin Plan:  New Dosinmg IV q 24h  Estimated AUC: 421 mcg*hr/mL  Estimated Trough: 11.6 mcg/mL  Next Level: 5/23 am labs   Renal Function Monitoring: Daily BMP and UOP  Pharmacy will continue to follow closely for s/sx of nephrotoxicity, infusion reactions and appropriateness of therapy.  BMP and CBC will be ordered per protocol. We will continue to follow the patient’s culture results and clinical progress daily.

## 2025-05-21 NOTE — ASSESSMENT & PLAN NOTE
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  Stable postoperatively.    Partial weightbearing right lower extremity  Posterior hip precautions  Abduction pillow at all times when in bed and sitting  PT/OT  Pain control  DVT ppx: per primary  Medical management including antibiotics per primary team  Dispo planning

## 2025-05-21 NOTE — ASSESSMENT & PLAN NOTE
1/2 blood cultures at [place] with GPC in clusters.  Late growth.  Await identification, prelim anaerobe, c/w cultures growing from drain   Blood cultures repeated at [facility] with NGTD  Continue IV Vanco and PO Flagyl  F/u ID input

## 2025-05-21 NOTE — ASSESSMENT & PLAN NOTE
"Patient presented from [facility] with right hip pain   Had recent IR hip joint aspiration on 4/24, was scheduled for conversion of partial hip replacement to total hip on 5/14 but cancelled due to leukocytosis   CT scan obtained with evidence of \"post surgical change from right hip hemiarthroplasty. Organized collection of fluid and gas in the right iliac muscle and similar collection surrounding right hip arthroplasty in the deep gluteal muscles measuring up to 10 cm\"   Transferred to [facility] for orthopedic evaluation   Orthopedics following,  S/p IR aspiration and drain placement x 2 with synovasure culture on 5/15   Cultures also obtained from [facility]ICK drain bulb -- Timegoldia magna, Peptoniphilus raul    Appreciate ongoing orthopedic recommendations -- plan for OR today, Weds 5/21   ID following for abx management   Currently on IV Vancomycin and p.o. Flagyl  Cx from drain with Edd Parisi.  F/u antibiotic recs  1/2 blood cultures at [place] with GPC in clusters.  Late growth.  Await identification, prelim with anaerobe, c/w culture from drains.  Blood cultures obtained at [facility] are negative at 5 days   As needed analgesics, currently on PRN oxycodone 5/10 mg Q4H for moderate and severe pain with PRN IV dilaudid 0.5 mg Q4H for breakthrough  "

## 2025-05-21 NOTE — PROGRESS NOTES
"Progress Note - Orthopedics   Name: Sharon Vega 74 y.o. 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  Stable postoperatively.    Partial weightbearing right lower extremity  Posterior hip precautions  Abduction pillow at all times when in bed and sitting  PT/OT  Pain control  DVT ppx: per primary  Medical management including antibiotics per primary team  Dispo planning    Medical co-morbidities are being managed per primary team.    Please contact the [facility] role BE Ortho Floor for any questions/concerns.    Subjective   74 y.o.female, no acute events.  Patient hemodynamically stable.  Reports postoperative nausea.  No episodes of emesis.    Objective :  Temp:  [98 °F (36.7 °C)-98.5 °F (36.9 °C)] 98.3 °F (36.8 °C)  HR:  [] 103  BP: (107-115)/(57-72) 107/57  Resp:  [14-17] 14  SpO2:  [93 %-94 %] 94 %  O2 Device: Simple mask    Physical Exam  Musculoskeletal: Right Lower Extremity  Skin intact without ecchymosis around dressings  Dressings clean dry and intact with abduction pillow in place  Alejandra-incisional tenderness to palpation  Sensation intact to saphenous, SP, DP, sural, tibial nerve distributions  Motor intact to EHL/FHL and DF/PF  2+ DP pulse        Lab Results: I have reviewed the following results:  Recent Labs     05/19/25  0439 05/20/25  0509 05/20/25  0847 05/21/25  0436   WBC 16.79* 15.32*  --  14.74*   HGB 9.6* 10.8*  --  10.5*   HCT 30.2* 34.4*  --  33.3*   * 506*  --  537*   BUN 13 10  --  13   CREATININE 0.65 0.64  --  0.93   PTT  --   --  26  --    INR  --   --  1.19  --      Blood Culture:    Lab Results   Component Value Date    BLOODCX No Growth After 5 Days. 05/14/2025     Wound Culture: No results found for: \"WOUNDCULT\"  "

## 2025-05-21 NOTE — PROGRESS NOTES
"Progress Note - Hospitalist   Name: Sharon Vega 74 y.o. female I MRN: [id number]  Unit/Bed#: -01 I Date of Admission: 5/14/2025   Date of Service: 5/21/2025 I Hospital Day: 7    Assessment & Plan  Abscess of right hip  Patient presented from [facility] with right hip pain   Had recent IR hip joint aspiration on 4/24, was scheduled for conversion of partial hip replacement to total hip on 5/14 but cancelled due to leukocytosis   CT scan obtained with evidence of \"post surgical change from right hip hemiarthroplasty. Organized collection of fluid and gas in the right iliac muscle and similar collection surrounding right hip arthroplasty in the deep gluteal muscles measuring up to 10 cm\"   Transferred to [facility] for orthopedic evaluation   Orthopedics following,  S/p IR aspiration and drain placement x 2 with synovasure culture on 5/15   Cultures also obtained from [facility] drain bulb -- Fingegoldia magna, Jamesoniphitrishs raul    Appreciate ongoing orthopedic recommendations -- plan for OR today, Weds 5/21   ID following for abx management   Currently on IV Vancomycin and p.o. Flagyl  Cx from drain with Edd Parisi.  F/u antibiotic recs  1/2 blood cultures at [place] with GPC in clusters.  Late growth.  Await identification, prelim with anaerobe, c/w culture from drains.  Blood cultures obtained at [facility] are negative at 5 days   As needed analgesics, currently on PRN oxycodone 5/10 mg Q4H for moderate and severe pain with PRN IV dilaudid 0.5 mg Q4H for breakthrough  Sepsis without acute organ dysfunction (HCC)  POA at [facility] as evidenced by leukocytosis and tachycardia   In setting of right hip intramuscular abscess as above   Continue IV vancomycin and p.o. Flagyl per ID recommendations  S/p IV fluid hydration   Tachycardia has resolved, WBC down to 14 from 28  BC from [facility] 1/2 GPC in clusters, see related plan.  BC obtained here with NGTD  Lactic negative   Procal, " WBC improving   Trend CBC and temps closely   See plan as above  Positive blood culture  1/2 blood cultures at [place] with GPC in clusters.  Late growth.  Await identification, prelim anaerobe, c/w cultures growing from drain   Blood cultures repeated at [facility] with NGTD  Continue IV Vanco and PO Flagyl  F/u ID input   Anemia  Previously hgb noted to be around 12   Slow decrease in hgb noted this hospitalization.  Likely component of IVF dilution and reactive from infection   No evidence of acute bleeding noted at this time  Check iron panel -- iron low, would benefit from supplementation.  Avoid IV venofer at this time 2/2 acute infection, avoiding PO given constipation.  Can supplement once improved from infectious and/or constipation standpoint   Trend CBC  -- stable   Aortic stenosis  Mumur noted on exam, prior echo with mild to moderate AS  Obtained repeat echo prior to any operative plans to evaluate -- moderate AS   Constipation  Continue bowel regimen   Primary hypertension  BP overall stable on review   Continue amlodipine 10 mg daily   Monitor closely   Hyponatremia  Noted  Possibly in setting of sepsis, volume depletion   S/p IV fluids   Monitor sodium levels closely   Trend BMP  Stable  Hypomagnesemia  Resolved with repletion    VTE Pharmacologic Prophylaxis:   sc lovenox    Mobility:   Basic Mobility Inpatient Raw Score: 17  JH-HLM Goal: 5: Stand one or more mins  JH-HLM Achieved: 7: Walk 25 feet or more  JH-HLM Goal achieved. Continue to encourage appropriate mobility.    Patient Centered Rounds: I performed bedside rounds with nursing staff today. Syeda  Discussions with Specialists or Other Care Team Provider:     Education and Discussions with Family / Patient: Patient declined call to .     Current Length of Stay: 7 day(s)  Current Patient Status: Inpatient   Certification Statement: The patient will continue to require additional inpatient hospital stay due to OR today, IV  abx  Discharge Plan: Anticipate discharge in >72 hrs to rehab facility.    Code Status: Level 1 - Full Code    Subjective   Ms. Vega reports that her pain is controlled. She reports being hungry and thirsty. Denies CP or SOB    Objective :  Temp:  [97.8 °F (36.6 °C)-98.5 °F (36.9 °C)] 98.5 °F (36.9 °C)  HR:  [] 103  BP: (107-115)/(69-72) 115/69  Resp:  [16-17] 17  SpO2:  [92 %-94 %] 94 %  O2 Device: None (Room air)    Body mass index is 33.66 kg/m².     Input and Output Summary (last 24 hours):     Intake/Output Summary (Last 24 hours) at 5/21/2025 0939  Last data filed at 5/21/2025 0601  Gross per 24 hour   Intake --   Output 90 ml   Net -90 ml       Physical Exam  Vitals reviewed.   Constitutional:       Comments: Patient seen sitting in bed, NAD, RN present     Cardiovascular:      Rate and Rhythm: Normal rate and regular rhythm.      Heart sounds: Murmur heard.   Pulmonary:      Effort: Pulmonary effort is normal. No respiratory distress.      Breath sounds: Normal breath sounds.   Abdominal:      Palpations: Abdomen is soft.      Tenderness: There is no abdominal tenderness.     Musculoskeletal:      Right lower leg: No edema.      Left lower leg: No edema.      Comments: 2 drains     Neurological:      Mental Status: She is alert and oriented to person, place, and time.     Psychiatric:         Mood and Affect: Mood normal.           Lines/Drains:  Lines/Drains/Airways       Active Status       Name Placement date Placement time Site Days    Abscess Drain Hip 05/15/25  1705  Hip  5    Abscess Drain Hip 05/15/25  1706  Hip  5                            Lab Results: I have reviewed the following results:   Results from last 7 days   Lab Units 05/21/25  0436 05/20/25  0509   WBC Thousand/uL 14.74* 15.32*   HEMOGLOBIN g/dL 10.5* 10.8*   HEMATOCRIT % 33.3* 34.4*   PLATELETS Thousands/uL 537* 506*   SEGS PCT %  --  75   LYMPHO PCT %  --  12*   MONO PCT %  --  9   EOS PCT %  --  2     Results from last 7 days    Lab Units 05/21/25  0436   SODIUM mmol/L 135   POTASSIUM mmol/L 4.9   CHLORIDE mmol/L 100   CO2 mmol/L 24   BUN mg/dL 13   CREATININE mg/dL 0.93   ANION GAP mmol/L 11   CALCIUM mg/dL 9.3   GLUCOSE RANDOM mg/dL 101     Results from last 7 days   Lab Units 05/20/25  0847   INR  1.19             Results from last 7 days   Lab Units 05/14/25  2350   LACTIC ACID mmol/L 0.8       Recent Cultures (last 7 days):   Results from last 7 days   Lab Units 05/16/25  1411 05/14/25  2351 05/14/25  2350   BLOOD CULTURE   --  No Growth After 5 Days. No Growth After 5 Days.   GRAM STAIN RESULT  3+ Polys  No bacteria seen  --   --    BODY FLUID CULTURE, STERILE  No growth  --   --        Imaging Results Review: I reviewed radiology reports from this admission including: Echocardiogram.      Last 24 Hours Medication List:     Current Facility-Administered Medications:     acetaminophen (TYLENOL) tablet 650 mg, Q4H PRN    amLODIPine (NORVASC) tablet 10 mg, Daily    ascorbic acid (VITAMIN C) tablet 500 mg, BID    chlorhexidine gluconate (HIBICLENS) 4 % topical liquid, Daily PRN    Cholecalciferol (VITAMIN D3) tablet 2,000 Units, Daily    [Held by provider] enoxaparin (LOVENOX) subcutaneous injection 40 mg, Daily    ferrous sulfate tablet 325 mg, BID With Meals    folic acid (FOLVITE) tablet 1 mg, Daily    gabapentin (NEURONTIN) capsule 600 mg, HS    HYDROmorphone (DILAUDID) injection 0.5 mg, Q4H PRN    loratadine (CLARITIN) tablet 10 mg, Daily    metroNIDAZOLE (FLAGYL) tablet 500 mg, Q12H FELIPA    multivitamin-minerals (CENTRUM) tablet 1 tablet, Daily    mupirocin (BACTROBAN) 2 % ointment, Daily    ondansetron (ZOFRAN) injection 4 mg, Q6H PRN    oxyCODONE (ROXICODONE) immediate release tablet 10 mg, Q4H PRN    oxyCODONE (ROXICODONE) IR tablet 5 mg, Q4H PRN    pantoprazole (PROTONIX) EC tablet 40 mg, Early Morning    polyethylene glycol (MIRALAX) packet 17 g, Daily    pravastatin (PRAVACHOL) tablet 40 mg, HS    senna (SENOKOT) tablet 8.6  mg, HS    traZODone (DESYREL) tablet 100 mg, HS    [START ON 5/22/2025] vancomycin (VANCOCIN) IVPB (premix in dextrose) 1,000 mg 200 mL, Q24H    venlafaxine (EFFEXOR-XR) 24 hr capsule 150 mg, Daily    Administrative Statements   Today, Patient Was Seen By: Dionicio Garibay PA-C      **Please Note: This note may have been constructed using a voice recognition system.**

## 2025-05-21 NOTE — DISCHARGE INSTR - AVS FIRST PAGE
Next vancomycin level for dosing should be drawn on Friday, 25.            Discharge Instructions: Hip replacement with Dr. Daley    Weight Bearing Status:                                           Weight Bearing as tolerated to right lower extremity with assistive devices.     Be sure to maintain posterior hip precautions: no bending at waist, no crossing legs, on internally rotating surgical leg    Pain Management/Medications  Please take the following medications:  Antibiotics: Please take medications as instructed by Infectious Disease   Anti-coagulation (blood clot prevention) -please take aspirin 81 mg twice a day for 28 days from date of surgery  Pain medication:  Oxycodone 5 m tablet every 6 hours as needed for severe pain  Robaxin (Muscle relaxer) 500 m tablet up to 3 times a day as needed for mild pain and muscle discomfort  Tylenol 1000 mg: up to three times daily as needed for mild to moderate pain. Do not exceed 3000mg daily   The pain medications will likely cause constipation, in order to decrease this risk consider taking over the counter stool softeners or MiraLax    Continue applying ice to your hip on and off for about 20 minutes as needed.  Continue to elevate your operative leg, with ankle above the level of your heart when possible.  If you have questions or pain concerns, please contact the office.   If you need refills of your medications prior to your next office visit, please contact the office.      Showering/Dressing Instructions:   Do not shower until first follow up appointment.  Leave bandage covering surgical incision on until seen in office.   No baths, swimming, or submerging your leg until cleared to do so.      Driving Instructions:  No driving until cleared by Orthopaedic Surgery.    Appt Instructions:   Please call the clinic at [phone] to schedule follow up appointment with Dr. Daley in 2 weeks.     Please contact the office if you experience any of the  following:  Excessive bleeding (bleeding through your dressing)  Fever greater than 101 degrees F after 48 hours (low grade fevers the day or two after surgery are normal)  Persistent nausea or vomiting  Decreased sensation or discoloration of the operative limb  Pain or swelling that is getting worse and not better with medication

## 2025-05-21 NOTE — ASSESSMENT & PLAN NOTE
Presented to [facility] on 5/13 from rehab due to persistent pain in right hip, history of right hip hemiarthroplasty on 7/2/2022.  Patient recently admitted in late April for similar complaint, s/p right hip lR aspiration for concern of septic arthritis.  Right hip arthrogram and joint aspiration resulted in enough fluid for culture, resulted negative.  Recommend discontinuation of antibiotics per ID and orthopedic surgery recommended outpatient follow-up for total right hip replacement.  Patient was undergoing workup for subsequent procedure when she obtained a right hip XR and CT chest abdomen pelvis which revealed Postsurgical change from right hip hemiarthroplasty. Organized collection of fluid and gas in the right iliac is muscle and similar collection surrounding the right hip arthroplasty in the deep gluteal muscles measuring up to 10 cm, concerning for infection. No hematoma.  While being evaluated in the emergency room, she was started on Vanco/Zosyn with recommendation for admission given meeting sepsis criteria with tachycardia, leukocytosis.  Patient recommended transfer to [facility] for tertiary care with consult orthopedics.  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 Procedure performed on 5/15: Abscess drainage x 2 of fluid collections around right hip. 10 fr drains placed. 40 ml pus from anterior drain, and 90 ml pus from lateral drain.There are multiple other undrained areas     - Continue IV Vancomycin for empiric coverage, dosing per pharmacy  - Continue p.o. Flagyl 500 mg q12 hours  - Pending synovasure culture, would like final results prior to dc  - RICK drain culture: 2+ growth of Finegoldia magna, 2+ peptoniphilus harei  - Appreciate orthopedic surgery recommendations: Plan for first stage of  revision with antibiotic spacer placement in the OR tentatively today 5/21  - Follow-up pending blood cultures: Blood culture 1 out of 2 from 5/13 now positive for Finegoldia magna, likely true bacteremia in setting of anaerobe finding in collected drain abscess cultures  - Trend fever curve/vitals  - Trend CBC/BMP   - Monitor exam for new/developing symptoms  - Final antibiotics plan pending clinical course and findings.  Likely will require 6 weeks of antibiotics after removal of infected prosthetic hip

## 2025-05-21 NOTE — PLAN OF CARE
Problem: PAIN - ADULT  Goal: Verbalizes/displays adequate comfort level or baseline comfort level  Description: Interventions:  - Encourage patient to monitor pain and request assistance  - Assess pain using appropriate pain scale  - Administer analgesics as ordered based on type and severity of pain and evaluate response  - Implement non-pharmacological measures as appropriate and evaluate response  - Consider cultural and social influences on pain and pain management  - Notify physician/advanced practitioner if interventions unsuccessful or patient reports new pain  - Educate patient/family on pain management process including their role and importance of  reporting pain   - Provide non-pharmacologic/complimentary pain relief interventions  Outcome: Progressing     Problem: INFECTION - ADULT  Goal: Absence or prevention of progression during hospitalization  Description: INTERVENTIONS:  - Assess and monitor for signs and symptoms of infection  - Monitor lab/diagnostic results  - Monitor all insertion sites, i.e. indwelling lines, tubes, and drains  - Monitor endotracheal if appropriate and nasal secretions for changes in amount and color  - [place] appropriate cooling/warming therapies per order  - Administer medications as ordered  - Instruct and encourage patient and family to use good hand hygiene technique  - Identify and instruct in appropriate isolation precautions for identified infection/condition  Outcome: Progressing  Goal: Absence of fever/infection during neutropenic period  Description: INTERVENTIONS:  - Monitor WBC  - Perform strict hand hygiene  - Limit to healthy visitors only  - No plants, dried, fresh or silk flowers with feliciano in patient room  Outcome: Progressing     Problem: SAFETY ADULT  Goal: Patient will remain free of falls  Description: INTERVENTIONS:  - Educate patient/family on patient safety including physical limitations  - Instruct patient to call for assistance with activity   -  Consider consulting OT/PT to assist with strengthening/mobility based on AM PAC & JH-HLM score  - Consult OT/PT to assist with strengthening/mobility   - Keep Call bell within reach  - Keep bed low and locked with side rails adjusted as appropriate  - Keep care items and personal belongings within reach  - Initiate and maintain comfort rounds  - Make Fall Risk Sign visible to staff  - Offer Toileting every 2 Hours, in advance of need  - Initiate/Maintain alarm  - Obtain necessary fall risk management equipment:   - Apply yellow socks and bracelet for high fall risk patients  - Consider moving patient to room near nurses station  Outcome: Progressing  Goal: Maintain or return to baseline ADL function  Description: INTERVENTIONS:  -  Assess patient's ability to carry out ADLs; assess patient's baseline for ADL function and identify physical deficits which impact ability to perform ADLs (bathing, care of mouth/teeth, toileting, grooming, dressing, etc.)  - Assess/evaluate cause of self-care deficits   - Assess range of motion  - Assess patient's mobility; develop plan if impaired  - Assess patient's need for assistive devices and provide as appropriate  - Encourage maximum independence but intervene and supervise when necessary  - Involve family in performance of ADLs  - Assess for home care needs following discharge   - Consider OT consult to assist with ADL evaluation and planning for discharge  - Provide patient education as appropriate  - Monitor functional capacity and physical performance, use of AM PAC & JH-HLM   - Monitor gait, balance and fatigue with ambulation    Outcome: Progressing  Goal: Maintains/Returns to pre admission functional level  Description: INTERVENTIONS:  - Perform AM-PAC 6 Click Basic Mobility/ Daily Activity assessment daily.  - Set and communicate daily mobility goal to care team and patient/family/caregiver.   - Collaborate with rehabilitation services on mobility goals if consulted  -  Perform Range of Motion 3 times a day.  - Reposition patient every 2 hours.  - Dangle patient 3 times a day  - Stand patient 3 times a day  - Ambulate patient 3 times a day  - Out of bed to chair 3 times a day   - Out of bed for meals 3 times a day  - Out of bed for toileting  - Record patient progress and toleration of activity level   Outcome: Progressing     Problem: DISCHARGE PLANNING  Goal: Discharge to home or other facility with appropriate resources  Description: INTERVENTIONS:  - Identify barriers to discharge w/patient and caregiver  - Arrange for needed discharge resources and transportation as appropriate  - Identify discharge learning needs (meds, wound care, etc.)  - Arrange for interpretive services to assist at discharge as needed  - Refer to Case Management Department for coordinating discharge planning if the patient needs post-hospital services based on physician/advanced practitioner order or complex needs related to functional status, cognitive ability, or social support system  Outcome: Progressing     Problem: Knowledge Deficit  Goal: Patient/family/caregiver demonstrates understanding of disease process, treatment plan, medications, and discharge instructions  Description: Complete learning assessment and assess knowledge base.  Interventions:  - Provide teaching at level of understanding  - Provide teaching via preferred learning methods  Outcome: Progressing     Problem: Prexisting or High Potential for Compromised Skin Integrity  Goal: Skin integrity is maintained or improved  Description: INTERVENTIONS:  - Identify patients at risk for skin breakdown  - Assess and monitor skin integrity including under and around medical devices   - Assess and monitor nutrition and hydration status  - Monitor labs  - Assess for incontinence   - Turn and reposition patient  - Assist with mobility/ambulation  - Relieve pressure over mane prominences   - Avoid friction and shearing  - Provide appropriate  hygiene as needed including keeping skin clean and dry  - Evaluate need for skin moisturizer/barrier cream  - Collaborate with interdisciplinary team  - Patient/family teaching  - Consider wound care consult    Assess:  - Review Gamaliel scale daily  - Clean and moisturize skin  - Inspect skin when repositioning, toileting, and assisting with ADLS  - Assess under medical devices such  - Assess extremities for adequate circulation and sensation     Bed Management:  - Have minimal linens on bed & keep smooth, unwrinkled  - Change linens as needed when moist or perspiring  - Avoid sitting or lying in one position for more than 2 hours while in bed?Keep HOB at 30 degrees   - Toileting:  - Offer bedside commode  - Assess for incontinence  - Use incontinent care products after each incontinent episode     Activity:  - Mobilize patient 3 times a day  - Encourage activity and walks on unit  - Encourage or provide ROM exercises   - Turn and reposition patient every 2 Hours  - Use appropriate equipment to lift or move patient in bed  - Instruct/ Assist with weight shifting every 2h  when out of bed in chair  - Consider limitation of chair time 2 hour intervals    Skin Care:  - Avoid use of baby powder, tape, friction and shearing, hot water or constrictive clothing  - Relieve pressure over bony prominences  - Do not massage red bony areas    Next Steps:  - Teach patient strategies to minimize risks   - Consider consults to  interdisciplinary teams   Outcome: Progressing

## 2025-05-21 NOTE — OCCUPATIONAL THERAPY NOTE
Occupational Therapy Cancel        Patient Name: Sharon Vega  Today's Date: 5/21/2025 05/21/25 0801   OT Last Visit   OT Visit Date 05/21/25   Note Type   Note type Cancelled Session   Cancel Reasons Patient to operating room   Additional Comments Pt to OR today for removal and debridement R hip prothesis. Pt not appropriate for OT treatment at this time. OT will continue to follow as appropriate/able.     HERO Cardona, OTR/L

## 2025-05-21 NOTE — ASSESSMENT & PLAN NOTE
74 y.o.female with right hip PJI, iliacus and gluteal abscesses s/p IR aspiration with drains. 1st stage of antibiotic spacer today. npo till surgery.    Plan for Stage 1 abx spacer placement today  Npo   Preoped  Informed consent obtained  WBAT RLE  PT/OT  Pain control  DVT ppx: per primary  Medical management including antibiotics per primary team, currently on IV vancomycin  Dispo planning.

## 2025-05-21 NOTE — PROGRESS NOTES
"Progress Note - Orthopedics   Name: Sharon Vega 74 y.o. female I MRN: [id number]  Unit/Bed#: -01 I Date of Admission: 5/14/2025   Date of Service: 5/21/2025 I Hospital Day: 7    Assessment & Plan  Abscess of right hip  74 y.o.female with right hip PJI, iliacus and gluteal abscesses s/p IR aspiration with drains. 1st stage of antibiotic spacer today. npo till surgery.    Plan for Stage 1 abx spacer placement today  Npo   Preoped  Informed consent obtained  WBAT RLE  PT/OT  Pain control  DVT ppx: per primary  Medical management including antibiotics per primary team, currently on IV vancomycin  Dispo planning.    Positive blood culture    Aortic stenosis    Constipation          Subjective   74 y.o.female doing well. No acute events, no new complaints. Pain well controlled. Denies fevers, chills, CP, SOB, N/V, numbness or tingling. Patient reports no issues with urination or bowel movements.     Objective :  Temp:  [97.8 °F (36.6 °C)-98 °F (36.7 °C)] 98 °F (36.7 °C)  HR:  [89-97] 97  BP: (107-126)/(69-76) 111/72  Resp:  [16-17] 17  SpO2:  [92 %-95 %] 93 %  O2 Device: None (Room air)    Physical Exam  Musculoskeletal: Right hip  Dressing intact.   2 RCIK drains with purulent drainage  Motor intact to +FHL/EHL, +ankle dorsi/plantar flexion  Sensation intact to saphenous, sural, tibial, superficial peroneal nerve, and deep peroneal  2+ DP pulse  No calf swelling or tenderness to palpation      Lab Results: I have reviewed the following results:  Recent Labs     05/19/25  0439 05/20/25  0509 05/20/25  0847 05/21/25  0436   WBC 16.79* 15.32*  --  14.74*   HGB 9.6* 10.8*  --  10.5*   HCT 30.2* 34.4*  --  33.3*   * 506*  --  537*   BUN 13 10  --  13   CREATININE 0.65 0.64  --  0.93   PTT  --   --  26  --    INR  --   --  1.19  --      Blood Culture:    Lab Results   Component Value Date    BLOODCX No Growth After 5 Days. 05/14/2025     Wound Culture: No results found for: \"WOUNDCULT\"        "

## 2025-05-21 NOTE — PROGRESS NOTES
Patient:    MRN:  [id number]    [place] Request ID:  [id number]    Level of care reserved:  Inpatient Rehab Facility    Partner Reserved:  [place] [address] [phone]    Clinical needs requested:    Geography searched:  40 miles around [id number]    Start of Service:    Request sent:  2:57pm EDT on 5/16/2025 by Patti Diaz    Partner reserved:  3:15pm EDT on 5/21/2025 by Patti Diaz    Choice list shared:  1:50pm EDT on 5/21/2025 by Patti Diaz

## 2025-05-21 NOTE — ANESTHESIA POSTPROCEDURE EVALUATION
Post-Op Assessment Note    CV Status:  Stable    Pain management: adequate       Mental Status:  Alert and awake   Hydration Status:  Euvolemic   PONV Controlled:  Controlled   Airway Patency:  Patent     Post Op Vitals Reviewed: Yes    No anethesia notable event occurred.    Staff: Anesthesiologist           Last Filed PACU Vitals:  Vitals Value Taken Time   Temp 98.3 °F (36.8 °C) 05/21/25 16:58   Pulse 92 05/21/25 18:19   /51 05/21/25 18:15   Resp 19 05/21/25 18:19   SpO2 92 % 05/21/25 18:19   Vitals shown include unfiled device data.    Modified Hussain:     Vitals Value Taken Time   Activity 2 05/21/25 17:30   Respiration 2 05/21/25 17:30   Circulation 2 05/21/25 17:30   Consciousness 1 05/21/25 17:30   Oxygen Saturation 1 05/21/25 17:30     Modified Hussain Score: 8

## 2025-05-21 NOTE — ASSESSMENT & PLAN NOTE
POA at [facility] as evidenced by leukocytosis and tachycardia   In setting of right hip intramuscular abscess as above   Continue IV vancomycin and p.o. Flagyl per ID recommendations  S/p IV fluid hydration   Tachycardia has resolved, WBC down to 14 from 28  BC from [facility] 1/2 [facility] in clusters, see related plan.  BC obtained here with NGTD  Lactic negative   Procal, WBC improving   Trend CBC and temps closely   See plan as above

## 2025-05-21 NOTE — OP NOTE
OPERATIVE REPORT  PATIENT NAME: Sharon Vega  : 1950  MRN: [id number]  Pt Location:  [facility] OR ROOM 04    Surgery Date: 2025    Surgeons and Role:     * Kit Daley MD - Primary     * Joel Duggan MD - Assisting     * Shey Florence PA-C - Assisting     Preop Diagnosis:  Infection associated with internal right hip prosthesis, initial encounter (Formerly [facility]) [T84.51XA]    Post-Op Diagnosis Codes:     * Infection associated with internal right hip prosthesis, initial encounter (Formerly [facility]) [T84.51XA]    Procedure(s):  Right - removal/debridement prothesis hip prostalac  Right hip:  1: Explant of infected right hip hemiarthroplasty  2.  Insertion of antibiotic impregnated cement delivery system  3.  Conversion right total hip replacement  4.  Creation of extended trochanteric osteotomy and subsequent repair  Specimens:  ID Type Source Tests Collected by Time Destination   A : right hip tissue Tissue Soft Tissue, Other ANAEROBIC CULTURE AND GRAM STAIN, CULTURE, TISSUE AND GRAM STAIN Kit Daley MD 2025 1454    B : right hip fluids Tissue Soft Tissue, Other ANAEROBIC CULTURE AND GRAM STAIN, CULTURE, TISSUE AND GRAM STAIN Kit Daley MD 2025 1352        Estimated Blood Loss:   900 mL    Drains:  Abscess Drain Hip (Active)   Site Description Unable to view 25 06   Dressing Status Clean;Dry;Intact 25 06   Drainage Appearance Yellow;Milky 25 0601   Status Drainage catheter connected to vacutainer 25 06   Output (mL) 40 mL 25 0601   Number of days: 6       Anesthesia Type:   General     Operative Indications:  Infection associated with internal right hip prosthesis, initial encounter (Former[facility]) [T84.51XA]    Operative Findings:  Femoral stem removed without difficulty, removal of cement mantle required extended trochanteric osteotomy with repair  Prostalac revision hip replacement including  Cemented all poly liner  32+1 femoral head metal  Long #3  cemented stem    Complications:   None    Hip Technique: Extended Trochanteric Osteotomy  Hip Approach: Posterior    Chronic Narcotic Use:  No      Procedure and Technique:  Following the induction of adequate level of general anesthesia, this patient was then placed in the left lateral cubitus, right side up position.  Her pre-existing drains were removed.  Right hip and lateral thigh were then prepped sterilely.  Antibiotics administered.  Her pre-existing posterolateral hip scar was opened up again access to the hip.  Full-thickness flaps were raised to get to the tensor fascia.  This was split, expose the deep layer of the hip.  With the hip in internal rotation, the posterior lateral soft tissue sleeve that opened up open going up the hip joint.  There was egress of fluid which was cultured.  There is fibrofatty tissue which appeared chronic in nature.  This was excised.  The femoral head was then dislocated.  The femoral component was removed with relative ease.  Unfortunately the cement mantle remained.  Despite a number of techniques, the cement mantle could only be removed with an extended trochanteric osteotomy which was created through drill holes, and completed with an osteotome.  Having remove the cement mantle, the femoral tube was reconstituted.  The osteotomy was repaired with a number of tension cables.  Tension was directed towards preparation the acetabulum.  I started with a 46 mm reamer and went up to 50 mm we tried to have a bit of bleeding cancellous bone was encountered.  2 drill holes was placed superior to maximize surface area.  A cemented all poly liner was then inserted.  The cement had been mixed with antibiotic on the back table.  Same time, the #3 mold was created for the #3 longstem component.  When the cemented cured, the prosthesis taken out of the mold, introduced in standard fashion.  When instructed appropriate depth, the appropriate sized femoral head was attached.  The hip was  located, the semiconstrained was deployed.  The fluoroscope was brought in, final fluoroscopic films document a well aligned arthroplasty and good hardware position.  Some of the deep tissue from the hip joint was then sent to pathology for specimen.  Wounds then flushed with saline and closed.  A Betadine soak initiated.  The posterolateral soft tissue sleeve was closed with #1 PDS.  The gluteus severiano and tensor fascia was closed in 1 PDS.  The subcu tissue was closed with a mixture of #1 Vicryl for the deep layer, 2-0 Vicryl for the subcutaneous tissues, and skin staples the skin.  Sterile dressings were applied.  She is placed in abduction pillow.  She was then awakened from general anesthesia, taken recovery in stable condition with plans include physical therapy for partial weightbearing on the right lower extremity.  She will require ongoing antibiotics in accordance with infectious disease.  In addition DVT prophylaxis will be a strong consideration   I was present for the entire procedure.    Patient Disposition:  PACU               SIGNATURE: Kit aDley MD  DATE: May 21, 2025  TIME: 4:21 PM

## 2025-05-21 NOTE — ANESTHESIA POSTPROCEDURE EVALUATION
Post-Op Assessment Note    CV Status:  Stable    Pain management: adequate       Mental Status:  Alert and awake   Hydration Status:  Euvolemic   PONV Controlled:  Controlled   Airway Patency:  Patent     Post Op Vitals Reviewed: Yes    No anethesia notable event occurred.    Staff: Anesthesiologist, CRNA           Last Filed PACU Vitals:  Vitals Value Taken Time   Temp 98.3    Pulse 96 05/21/25 16:59   /57 05/21/25 16:57   Resp 12 05/21/25 16:59   SpO2 98 % 05/21/25 16:59   Vitals shown include unfiled device data.

## 2025-05-21 NOTE — ANESTHESIA PREPROCEDURE EVALUATION
Procedure:  removal/debridement prothesis hip prostalac (Right: Hip)    Relevant Problems   CARDIO   (+) Aortic stenosis   (+) Heart murmur   (+) Primary hypertension      HEMATOLOGY   (+) Anemia      MUSCULOSKELETAL   (+) Rheumatoid arthritis involving both hands (HCC)      NEURO/PSYCH   (+) Chronic pain syndrome      Orthopedic/Musculoskeletal   (+) Cervical radiculopathy   (+) History of hemiarthroplasty of right hip      Other   (+) Abscess of right hip   (+) Hyponatremia   (+) Positive blood culture   (+) Sepsis without acute organ dysfunction (HCC)      Hb=10.5  Cr=wnl    TTE  5/19/25:    Left Ventricle: Left ventricular cavity size is normal. Wall thickness is mildly increased. There is concentric remodeling. The left ventricular ejection fraction is 65% by visual estimation. Systolic function is normal. Wall motion is normal.    Atrial Septum: There is a small septal aneurysm.  It is predominately within the left atrial cavity.    Aortic Valve: The aortic valve is trileaflet. The leaflets are moderately thickened. The leaflets are moderately calcified. There is mild regurgitation. There is moderate stenosis. The aortic valve mean gradient is 28 mmHg. The dimensionless velocity index is 0.30. The aortic valve area is 1.03 cm2.    Aorta: The aortic root is mildly dilated. The ascending aorta is mildly dilated. Ao root is 3.30 cm. Asc Ao is 4.2 cm.    EKG:  Sinus rhythm with Premature atrial complexes  Left axis deviation  Anteroseptal infarct (cited on or before 31-Jan-2023)  Abnormal ECG  When compared with ECG of 15-May-2025 16:36,  Questionable change in initial forces of Anterior leads  ST now depressed in Inferior leads  ST now depressed in Anterior leads  Confirmed by Tiffanie Álvarez ([id number]) on 5/20/2025 9:00:03 PM    Physical Exam    Airway     Mallampati score: III    Neck ROM: limited extension  Mouth opening: >= 4 cm      Cardiovascular      Dental       Pulmonary      Neurological      Other  Findings  post-pubertal.      Anesthesia Plan  ASA Score- 4     Anesthesia Type- general with ASA Monitors.         Additional Monitors:     Airway Plan: Oral ETT.           Plan Factors-    Chart reviewed. EKG reviewed. Imaging results reviewed. Existing labs reviewed. Patient summary reviewed.    Patient is not a current smoker.  Patient did not smoke on day of surgery.            Induction- intravenous.    Postoperative Plan- Plan for postoperative opioid use.   Monitoring Plan - Monitoring plan - standard ASA monitoring  Post Operative Pain Plan - non-opiod analgesics and plan for postoperative opioid use    Perioperative Resuscitation Plan - Level 1 - Full Code.       Informed Consent- Anesthetic plan and risks discussed with patient.  I personally reviewed this patient with the CRNA. Discussed and agreed on the Anesthesia Plan with the CRNA..      NPO Status:  Vitals Value Taken Time   Date of last liquid 05/21/25 05/21/25 11:44   Time of last liquid 0000 05/21/25 11:44   Date of last solid 05/21/25 05/21/25 11:44   Time of last solid 0000 05/21/25 11:44

## 2025-05-21 NOTE — PROGRESS NOTES
Progress Note - Infectious Disease   Name: Sharon Vega 74 y.o. female I MRN: [id number]  Unit/Bed#: -01 I Date of Admission: 5/14/2025   Date of Service: 5/21/2025 I Hospital Day: 7    Assessment & Plan  Abscess of right hip  Presented to [facility] on 5/13 from rehab due to persistent pain in right hip, history of right hip hemiarthroplasty on 7/2/2022.  Patient recently admitted in late April for similar complaint, s/p right hip lR aspiration for concern of septic arthritis.  Right hip arthrogram and joint aspiration resulted in enough fluid for culture, resulted negative.  Recommend discontinuation of antibiotics per ID and orthopedic surgery recommended outpatient follow-up for total right hip replacement.  Patient was undergoing workup for subsequent procedure when she obtained a right hip XR and CT chest abdomen pelvis which revealed Postsurgical change from right hip hemiarthroplasty. Organized collection of fluid and gas in the right iliac is muscle and similar collection surrounding the right hip arthroplasty in the deep gluteal muscles measuring up to 10 cm, concerning for infection. No hematoma.  While being evaluated in the emergency room, she was started on Vanco/Zosyn with recommendation for admission given meeting sepsis criteria with tachycardia, leukocytosis.  Patient recommended transfer to [facility] for tertiary care with consult orthopedics.  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 Procedure performed on 5/15: Abscess drainage x 2 of fluid collections around right hip. 10 fr drains placed. 40 ml pus from anterior drain, and 90 ml pus from lateral drain.There are multiple other undrained areas     - Continue IV Vancomycin for empiric coverage, dosing per pharmacy  - Continue p.o. Flagyl  500 mg q12 hours  - Pending synovasure culture, would like final results prior to dc  - RICK drain culture: 2+ growth of Finegoldia magna, 2+ peptoniphilus harei  - Appreciate orthopedic surgery recommendations: Plan for first stage of revision with antibiotic spacer placement in the OR tentatively today 5/21  - Follow-up pending blood cultures: Blood culture 1 out of 2 from 5/13 now positive for Finegoldia magna, likely true bacteremia in setting of anaerobe finding in collected drain abscess cultures  - Trend fever curve/vitals  - Trend CBC/BMP   - Monitor exam for new/developing symptoms  - Final antibiotics plan pending clinical course and findings.  Likely will require 6 weeks of antibiotics after removal of infected prosthetic hip    Sepsis without acute organ dysfunction (HCC)  Patient presented to [facility] on 5/13 with tachycardia, leukocytosis.  Source likely right hip abscess.  Started on IV vancomycin/Zosyn, transition to vancomycin per ID recommendations at [facility], now on additional p.o. Flagyl given bacteremia and drain abscess cultures     - Continue antibiotics as above  Chronic pain syndrome  History of chronic low back pain, cervical/lumbar radiculopathy.       - Continue care per primary team  Positive blood culture  Blood cultures obtained on 5/13, 1 out of 2 positive for preliminary finding of anaerobe organism, gram-positive cocci in clusters     - Continue antibiotics as above    I have discussed the above management plan in detail with the primary service.   Infectious Disease service will follow.    Antibiotics:  Vancomycin Day 8  Flagyl Day 3    Subjective   Patient has no fever, chills, sweats; no nausea, vomiting, diarrhea; no cough, shortness of breath; no pain. No new symptoms.    Objective :  Temp:  [97.8 °F (36.6 °C)-98.5 °F (36.9 °C)] 98.5 °F (36.9 °C)  HR:  [] 103  BP: (107-115)/(69-72) 115/69  Resp:  [16-17] 17  SpO2:  [92 %-94 %] 94 %  O2 Device: None (Room  air)    General:  No acute distress  Psychiatric:  Awake and alert  Pulmonary:  Normal respiratory excursion without accessory muscle use  Abdomen:  Soft, nontender  Extremities:  No edema. Moderate tenderness over right lateral hip and gluteal region, no significant erythema. 2 RICK drains with minimal serous drainage   Skin:  No rashes      Lab Results: I have reviewed the following results:  Results from last 7 days   Lab Units 05/21/25  0436 05/20/25  0509 05/19/25  0439   WBC Thousand/uL 14.74* 15.32* 16.79*   HEMOGLOBIN g/dL 10.5* 10.8* 9.6*   PLATELETS Thousands/uL 537* 506* 507*     Results from last 7 days   Lab Units 05/21/25  0436 05/20/25  0509 05/19/25  0439   SODIUM mmol/L 135 134* 133*   POTASSIUM mmol/L 4.9 4.2 3.4*   CHLORIDE mmol/L 100 103 101   CO2 mmol/L 24 25 26   BUN mg/dL 13 10 13   CREATININE mg/dL 0.93 0.64 0.65   EGFR ml/min/1.73sq m 60 87 87   CALCIUM mg/dL 9.3 9.3 9.1     Results from last 7 days   Lab Units 05/16/25  1411 05/14/25  2351 05/14/25  2350   BLOOD CULTURE   --  No Growth After 5 Days. No Growth After 5 Days.   GRAM STAIN RESULT  3+ Polys  No bacteria seen  --   --    BODY FLUID CULTURE, STERILE  No growth  --   --              Results from last 7 days   Lab Units 05/17/25  0645   FERRITIN ng/mL 1,949*         Imaging Results Review: No pertinent imaging studies reviewed.  Other Study Results Review: No additional pertinent studies reviewed.    Administrative Statements   I have spent a total time of 30 minutes in caring for this patient on the day of the visit/encounter including Diagnostic results, Counseling / Coordination of care, Documenting in the medical record, Reviewing/placing orders in the medical record (including tests, medications, and/or procedures), Obtaining or reviewing history  , and Communicating with other healthcare professionals .

## 2025-05-22 LAB
ABO GROUP BLD BPU: NORMAL
ANION GAP SERPL CALCULATED.3IONS-SCNC: 5 MMOL/L (ref 4–13)
BPU ID: NORMAL
BUN SERPL-MCNC: 16 MG/DL (ref 5–25)
CALCIUM SERPL-MCNC: 8.1 MG/DL (ref 8.4–10.2)
CHLORIDE SERPL-SCNC: 104 MMOL/L (ref 96–108)
CO2 SERPL-SCNC: 26 MMOL/L (ref 21–32)
CREAT SERPL-MCNC: 1.03 MG/DL (ref 0.6–1.3)
CROSSMATCH: NORMAL
ERYTHROCYTE [DISTWIDTH] IN BLOOD BY AUTOMATED COUNT: 15.2 % (ref 11.6–15.1)
GFR SERPL CREATININE-BSD FRML MDRD: 53 ML/MIN/1.73SQ M
GLUCOSE SERPL-MCNC: 127 MG/DL (ref 65–140)
HCT VFR BLD AUTO: 25.9 % (ref 34.8–46.1)
HGB BLD-MCNC: 8.5 G/DL (ref 11.5–15.4)
MAGNESIUM SERPL-MCNC: 1.6 MG/DL (ref 1.9–2.7)
MCH RBC QN AUTO: 29.7 PG (ref 26.8–34.3)
MCHC RBC AUTO-ENTMCNC: 32.8 G/DL (ref 31.4–37.4)
MCV RBC AUTO: 91 FL (ref 82–98)
PLATELET # BLD AUTO: 463 THOUSANDS/UL (ref 149–390)
PMV BLD AUTO: 10 FL (ref 8.9–12.7)
POTASSIUM SERPL-SCNC: 4.5 MMOL/L (ref 3.5–5.3)
RBC # BLD AUTO: 2.86 MILLION/UL (ref 3.81–5.12)
SODIUM SERPL-SCNC: 135 MMOL/L (ref 135–147)
UNIT DISPENSE STATUS: NORMAL
UNIT PRODUCT CODE: NORMAL
UNIT PRODUCT VOLUME: 350 ML
UNIT RH: NORMAL
WBC # BLD AUTO: 16.21 THOUSAND/UL (ref 4.31–10.16)

## 2025-05-22 PROCEDURE — NC001 PR NO CHARGE: Performed by: ORTHOPAEDIC SURGERY

## 2025-05-22 PROCEDURE — G0545 PR INHERENT VISIT TO INPT: HCPCS | Performed by: STUDENT IN AN ORGANIZED HEALTH CARE EDUCATION/TRAINING PROGRAM

## 2025-05-22 PROCEDURE — 99232 SBSQ HOSP IP/OBS MODERATE 35: CPT | Performed by: PHYSICIAN ASSISTANT

## 2025-05-22 PROCEDURE — 97164 PT RE-EVAL EST PLAN CARE: CPT

## 2025-05-22 PROCEDURE — 80048 BASIC METABOLIC PNL TOTAL CA: CPT | Performed by: FAMILY MEDICINE

## 2025-05-22 PROCEDURE — 99232 SBSQ HOSP IP/OBS MODERATE 35: CPT | Performed by: STUDENT IN AN ORGANIZED HEALTH CARE EDUCATION/TRAINING PROGRAM

## 2025-05-22 PROCEDURE — 02HV33Z INSERTION OF INFUSION DEVICE INTO SUPERIOR VENA CAVA, PERCUTANEOUS APPROACH: ICD-10-PCS | Performed by: FAMILY MEDICINE

## 2025-05-22 PROCEDURE — 83735 ASSAY OF MAGNESIUM: CPT | Performed by: FAMILY MEDICINE

## 2025-05-22 PROCEDURE — 97168 OT RE-EVAL EST PLAN CARE: CPT

## 2025-05-22 PROCEDURE — 85027 COMPLETE CBC AUTOMATED: CPT | Performed by: FAMILY MEDICINE

## 2025-05-22 PROCEDURE — P9016 RBC LEUKOCYTES REDUCED: HCPCS

## 2025-05-22 PROCEDURE — 30233N1 TRANSFUSION OF NONAUTOLOGOUS RED BLOOD CELLS INTO PERIPHERAL VEIN, PERCUTANEOUS APPROACH: ICD-10-PCS | Performed by: FAMILY MEDICINE

## 2025-05-22 RX ORDER — MAGNESIUM SULFATE HEPTAHYDRATE 40 MG/ML
2 INJECTION, SOLUTION INTRAVENOUS ONCE
Status: COMPLETED | OUTPATIENT
Start: 2025-05-22 | End: 2025-05-22

## 2025-05-22 RX ORDER — BISACODYL 10 MG
10 SUPPOSITORY, RECTAL RECTAL DAILY PRN
Status: DISCONTINUED | OUTPATIENT
Start: 2025-05-22 | End: 2025-05-27 | Stop reason: HOSPADM

## 2025-05-22 RX ADMIN — FOLIC ACID 1 MG: 1 TABLET ORAL at 08:42

## 2025-05-22 RX ADMIN — ENOXAPARIN SODIUM 40 MG: 40 INJECTION SUBCUTANEOUS at 02:55

## 2025-05-22 RX ADMIN — DOCUSATE SODIUM 100 MG: 100 CAPSULE, LIQUID FILLED ORAL at 08:42

## 2025-05-22 RX ADMIN — VANCOMYCIN HYDROCHLORIDE 1000 MG: 1 INJECTION, SOLUTION INTRAVENOUS at 10:13

## 2025-05-22 RX ADMIN — OXYCODONE HYDROCHLORIDE AND ACETAMINOPHEN 500 MG: 500 TABLET ORAL at 18:19

## 2025-05-22 RX ADMIN — FERROUS SULFATE TAB 325 MG (65 MG ELEMENTAL FE) 325 MG: 325 (65 FE) TAB at 15:39

## 2025-05-22 RX ADMIN — OXYCODONE HYDROCHLORIDE 10 MG: 10 TABLET ORAL at 15:39

## 2025-05-22 RX ADMIN — OXYCODONE HYDROCHLORIDE 10 MG: 10 TABLET ORAL at 21:13

## 2025-05-22 RX ADMIN — FERROUS SULFATE TAB 325 MG (65 MG ELEMENTAL FE) 325 MG: 325 (65 FE) TAB at 08:42

## 2025-05-22 RX ADMIN — METHOCARBAMOL 500 MG: 500 TABLET ORAL at 23:44

## 2025-05-22 RX ADMIN — Medication 2000 UNITS: at 08:42

## 2025-05-22 RX ADMIN — DOCUSATE SODIUM 100 MG: 100 CAPSULE, LIQUID FILLED ORAL at 18:19

## 2025-05-22 RX ADMIN — TRAZODONE HYDROCHLORIDE 100 MG: 100 TABLET ORAL at 21:12

## 2025-05-22 RX ADMIN — Medication 1 TABLET: at 08:42

## 2025-05-22 RX ADMIN — OXYCODONE HYDROCHLORIDE 10 MG: 10 TABLET ORAL at 08:45

## 2025-05-22 RX ADMIN — MAGNESIUM SULFATE HEPTAHYDRATE 2 G: 40 INJECTION, SOLUTION INTRAVENOUS at 11:40

## 2025-05-22 RX ADMIN — LORATADINE 10 MG: 10 TABLET ORAL at 08:42

## 2025-05-22 RX ADMIN — METRONIDAZOLE 500 MG: 500 TABLET ORAL at 08:47

## 2025-05-22 RX ADMIN — PANTOPRAZOLE SODIUM 40 MG: 40 TABLET, DELAYED RELEASE ORAL at 05:52

## 2025-05-22 RX ADMIN — OXYCODONE HYDROCHLORIDE AND ACETAMINOPHEN 500 MG: 500 TABLET ORAL at 08:42

## 2025-05-22 RX ADMIN — VENLAFAXINE HYDROCHLORIDE 150 MG: 150 CAPSULE, EXTENDED RELEASE ORAL at 08:45

## 2025-05-22 RX ADMIN — PRAVASTATIN SODIUM 40 MG: 40 TABLET ORAL at 21:13

## 2025-05-22 RX ADMIN — POLYETHYLENE GLYCOL 3350 17 G: 17 POWDER, FOR SOLUTION ORAL at 08:50

## 2025-05-22 RX ADMIN — GABAPENTIN 600 MG: 300 CAPSULE ORAL at 21:12

## 2025-05-22 RX ADMIN — AMLODIPINE BESYLATE 10 MG: 10 TABLET ORAL at 08:42

## 2025-05-22 RX ADMIN — METRONIDAZOLE 500 MG: 500 TABLET ORAL at 21:13

## 2025-05-22 RX ADMIN — METHOCARBAMOL 500 MG: 500 TABLET ORAL at 18:19

## 2025-05-22 RX ADMIN — SENNOSIDES 8.6 MG: 8.6 TABLET, FILM COATED ORAL at 21:13

## 2025-05-22 RX ADMIN — METHOCARBAMOL 500 MG: 500 TABLET ORAL at 05:52

## 2025-05-22 NOTE — ASSESSMENT & PLAN NOTE
POA at [facility] as evidenced by leukocytosis and tachycardia   In setting of right hip intramuscular abscess as above   Continue IV vancomycin and p.o. Flagyl per ID recommendations  S/p IV fluid hydration   BC from [facility] 1/2 GPC in clusters, see related plan.  BC obtained here with NGTD  Lactic negative   Procal, WBC had been improving but bumped today - suspect may be reactive given surgery 5/21   Trend CBC and temps closely   See plan as above

## 2025-05-22 NOTE — ASSESSMENT & PLAN NOTE
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 Post op hgb 6.4, received 1u prbc overnight. Hgb this am 8.5. doing well post op    Partial weightbearing right lower extremity  Posterior hip precautions  Abduction pillow at all times when in bed and sitting  PT/OT  Pain control  DVT ppx: per primary  Medical management including antibiotics per primary team  ID consulted, appreciate recs  Dispo planning

## 2025-05-22 NOTE — ASSESSMENT & PLAN NOTE
"Patient presented from [facility] with right hip pain   Had recent IR hip joint aspiration on 4/24, was scheduled for conversion of partial hip replacement to total hip on 5/14 but cancelled due to leukocytosis   CT scan obtained with evidence of \"post surgical change from right hip hemiarthroplasty. Organized collection of fluid and gas in the right iliac muscle and similar collection surrounding right hip arthroplasty in the deep gluteal muscles measuring up to 10 cm\"   Transferred to [facility] for orthopedic evaluation   Orthopedics following,  S/p IR aspiration and drain placement x 2 with synovasure culture on 5/15   Cultures also obtained from RICK drain bulb -- Timegoldia magna, Peptoniphilus raul    Appreciate ongoing orthopedic recommendations -- s/p revision right total hip arthoplasty with antibiotic spacer 5/21  ID following for abx management   Currently on IV Vancomycin and p.o. Flagyl  Cx from drain with Edd Parisi.  F/u antibiotic recs  1/2 blood cultures at [place] with GPC in clusters.  Late growth.  Await identification, prelim with anaerobe, c/w culture from drains.  Blood cultures obtained at [facility] are negative at 5 days   As needed analgesics, currently on PRN oxycodone 5/10 mg Q4H for moderate and severe pain with PRN IV dilaudid 0.5 mg Q4H for breakthrough  "

## 2025-05-22 NOTE — OCCUPATIONAL THERAPY NOTE
Occupational Therapy Re-Evaluation     Patient Name: Sharon Vega  Today's Date: 5/22/2025  Problem List  Principal Problem:    Abscess of right hip  Active Problems:    Primary hypertension    Chronic pain syndrome    Sepsis without acute organ dysfunction (HCC)    Hypomagnesemia    Hyponatremia    Anemia    Positive blood culture    Aortic stenosis    Constipation    Past Medical History  Past Medical History[1]  Past Surgical History  Past Surgical History[2]      05/22/25 0927   OT Last Visit   OT Visit Date 05/22/25   Note Type   Note type Re-Evaluation   Pain Assessment   Pain Assessment Tool 0-10   Pain Score 8   Pain Location/Orientation Orientation: Right;Location: Hip   Hospital Pain Intervention(s) Repositioned;Ambulation/increased activity   Restrictions/Precautions   Weight Bearing Precautions Per Order Yes   RLE Weight Bearing Per Order (S)  PWB   Other Precautions Bed Alarm;Restraints;WBS;THR;Multiple lines;Fall Risk;Pain  (Posterior hip precautions)   Home Living   Additional Comments Please refer to initial evaluation for home living   Prior Function   Comments Please refer to initial evaluation for PLOF   Lifestyle   Autonomy PTA, pt was independent in ADLs/IADLs and uses no DME for functional mobility; (+) driving   Reciprocal Relationships Lives with her son, daughter in law, and grandchildren reporting that her daughter in law is home during the day and can assist with functional needs prn   Service to Others Retired   Intrinsic Gratification Enjoys crossword puzzles and being active   ADL   Where Assessed Edge of bed   Eating Assistance 3  Moderate Assistance   Grooming Assistance 3  Moderate Assistance   UB Bathing Assistance 2  Maximal Assistance   LB Bathing Assistance 2  Maximal Assistance   UB Dressing Assistance 2  Maximal Assistance   LB Dressing Assistance 2  Maximal Assistance   Toileting Assistance  2  Maximal Assistance   Functional Assistance 3  Moderate Assistance   Bed  Mobility   Supine to Sit 3  Moderate assistance   Additional items Assist x 2;Increased time required;Verbal cues;LE management   Sit to Supine 3  Moderate assistance   Additional items Assist x 2;Increased time required;Verbal cues;LE management   Transfers   Sit to Stand 3  Moderate assistance   Additional items Assist x 2;Increased time required;Verbal cues   Stand to Sit 3  Moderate assistance   Additional items Assist x 2;Increased time required;Verbal cues   Additional Comments b/l HHA   Balance   Static Sitting Poor   Dynamic Sitting Poor -   Static Standing Poor -   Dynamic Standing Poor -   Activity Tolerance   Activity Tolerance Patient limited by fatigue;Patient limited by pain   Medical Staff Made Aware PT   Nurse Made Aware RN Cleared   RUE Assessment   RUE Assessment WFL   LUE Assessment   LUE Assessment WFL   Hand Function   Gross Motor Coordination Functional   Fine Motor Coordination Functional   Cognition   Overall Cognitive Status Impaired   Arousal/Participation Alert;Responsive;Cooperative   Attention Attends with cues to redirect   Orientation Level Oriented X4   Memory Decreased recall of precautions   Following Commands Follows one step commands with increased time or repetition   Comments Pt agreeable to therapy. Pt requires cues for safety, WBS, precautions, and redirection.   Assessment   Limitation Decreased ADL status;Decreased cognition;Decreased endurance;Decreased Safe judgement during ADL;Decreased self-care trans;Decreased high-level ADLs   Prognosis Fair   Assessment Pt re-evaluated 5/22/2025. Pt s/p R total him arthroplasty revision. Pt PWB R LE and with posterior hip precautions. Pt with active OT and actvity orders. Please refer to initial evaluation for home living and prior function. Pt currently requires MOD A to complete seated grooming and eating and MOD Ax2 to complete bed mobility and functional transfers with HHA. Pt requires MAX A to complete UB ADLs, LB ADLs, and  toileting. Pt limited by decreased ADL status, functional transfers, functional mobility, and activity tolerance. Pt supine in bed at begning of session, pt supine in bed at end of session with alarm set and items within reach. The patient's raw score on the AM-PAC Daily Activity Inpatient Short Form is 13. A raw score of less than 19 suggests the patient may benefit from discharge to post-acute rehabilitation services. Please refer to the recommendation of the Occupational Therapist for safe discharge planning. Recommend Level II moderate intensity OT servicesat d/c to maximize pt function.   Goals   Patient Goals To have less pain   LTG Time Frame 10-14   Plan   Treatment Interventions ADL retraining;UE strengthening/ROM;Functional transfer training;Endurance training;Cognitive reorientation;Patient/family training;Equipment evaluation/education;Neuromuscular reeducation;Compensatory technique education;Continued evaluation;Energy conservation;Activityengagement   Goal Expiration Date 06/05/25   OT Frequency 2-3x/wk   Discharge Recommendation   Rehab Resource Intensity Level, OT (S)  II (Moderate Resource Intensity)  (Change in recommendation due to change in functional status)   AM-PAC Daily Activity Inpatient   Lower Body Dressing 2   Bathing 2   Toileting 2   Upper Body Dressing 2   Grooming 2   Eating 3   Daily Activity Raw Score 13   Daily Activity Standardized Score (Calc for Raw Score >=11) 32.03   AM-[facility] Applied Cognition Inpatient   Following a Speech/Presentation 3   Understanding Ordinary Conversation 4   Taking Medications 3   Remembering Where Things Are Placed or Put Away 3   Remembering List of 4-5 Errands 2   Taking Care of Complicated Tasks 2   Applied Cognition Raw Score 17   Applied Cognition Standardized Score 36.52   End of Consult   Education Provided Yes   Patient Position at End of Consult Supine;Bed/Chair alarm activated;All needs within reach   Nurse Communication Nurse aware of consult      Goals:    Pt will complete functional transfers with MOD IND and appropriate AD to maximize pt safety.    Pt will complete bed mobility with MOD IND  to maximize pt safety.    Pt will complete grooming tasks with MOD IND to maximize pt independence.    Pt will complete LB ADLs with MOD IND  to maximize pt independence.    Pt will complete UB ADLs with MOD IND to maximize pt independence.    Pt will complete toileting with MOD IND to maximize pt independence.    Pt will complete functional household distance mobility with MOD IND and appropriate AD to maximize pt safety.    Pt will complete simulated IADL tasks with MOD IND to maximize pt independence.     Pt will be able to tolerate 30 minutes of functional activity during therapy session.    Pt will follow multistep directions with increased time or repeating directions 2X to maximize pt safety.     Pt will participate in continued cognitive assessment for safe discharge planning.    Pt will be able to recall and implement all posterior hip precautions into functional activity to maximize pt safety.    Pt will be able to recall and implement PWB into functional activity to maximize pt safety.        HERO Cardona, OTR/L         [1]   Past Medical History:  Diagnosis Date    Anemia     Anxiety     Arthritis     Closed transcervical fracture of right femur (HCC) 07/01/2022    Colon polyp     Depression     Fracture of right wrist 07/01/2022    GERD (gastroesophageal reflux disease)     Hiatal hernia     Hyperlipidemia     Hypertension    [2]   Past Surgical History:  Procedure Laterality Date    APPENDECTOMY      CHOLECYSTECTOMY      COLONOSCOPY      FL INJECTION RIGHT HIP (NON ARTHROGRAM)  04/21/2025    FRACTURE SURGERY Right     arm with plate and pins    HAND SURGERY Bilateral     HYSTERECTOMY      IR ASPIRATION JOINT (SPECIFY LOCATION)  4/24/2025    IR DRAINAGE TUBE PLACEMENT  5/15/2025    JOINT REPLACEMENT Bilateral     knees    TN HEMIARTHROPLASTY  HIP PARTIAL Right 07/02/2022    Procedure: HEMIARTHROPLASTY HIP (BIPOLAR), closed reduction with splinting right wrist;  Surgeon: Leo Roblero;  Location: [facility];  Service: Orthopedics    [place] NEUROPLASTY &/TRANSPOSITION ULNAR NERVE ELBOW Right 02/08/2023    Procedure: RELEASE CUBITAL TUNNEL;  Surgeon: Cody Calles DO;  Location: [facility];  Service: Orthopedics    SHOULDER ARTHROSCOPY Left

## 2025-05-22 NOTE — PROGRESS NOTES
Vancomycin IV Pharmacy-to-Dose Consultation    Sharon Vega is a 74 y.o. female who is currently ordered Vancomycin IV with management by the Pharmacy Consult service.    Relevant clinical data and objective / subjective history reviewed.    Vancomycin Assessment:    Indication and Goal AUC/Trough: R gluteal/iliacus abscesses; suspected infected R hip hemiarthroplasty (goal -600, trough >10)    Clinical Status: stable    ID following: yes    Micro:   5/21 soft tissue anaerobic culture: pending  5/21 soft tissue culture: pending - no growth  5/16 body fluid culture, abscess: no growth  5/16 anaerobic culture, abscess: 2+ Finegoldia magna; 2+ Peptoniphilus harei group  5/14 BC x 2: no growth after 5 days  5/14 MRSA culture, nares: negative  5/13 BC 2 of 2: Finegoldia magna  5/13 BC 1 of 2: no growth after 5 days  5/13 BCID: not detected  5/13 BC x 2: no growth after 5 days  5/13 urine culture: mixed contaminants  5/13 COVID/FLU/RSV: negative  5/9 COVID/FLU/RSV: negative    Renal Function:  SCr: 1.03 mg/dL  CrCl: 44 mL/min  Renal replacement: none    Days of Therapy: 9    Current Dose: 1000 mg IV q24h    Vancomycin Plan:    Continue current dose    Estimated AUC: 430 mcg*hr/mL    Estimated Trough: 12.1 mcg/mL    Next Level: 5/23 at 0600    Renal Function Monitoring: Daily BMP and UOP    Pharmacy will continue to follow closely for s/sx of nephrotoxicity, infusion reactions and appropriateness of therapy.  BMP and CBC will be ordered per protocol. We will continue to follow the patient’s culture results and clinical progress daily.    [place], PharmD  Pharmacist

## 2025-05-22 NOTE — PLAN OF CARE
Problem: PAIN - ADULT  Goal: Verbalizes/displays adequate comfort level or baseline comfort level  Description: Interventions:  - Encourage patient to monitor pain and request assistance  - Assess pain using appropriate pain scale  - Administer analgesics as ordered based on type and severity of pain and evaluate response  - Implement non-pharmacological measures as appropriate and evaluate response  - Consider cultural and social influences on pain and pain management  - Notify physician/advanced practitioner if interventions unsuccessful or patient reports new pain  - Educate patient/family on pain management process including their role and importance of  reporting pain   - Provide non-pharmacologic/complimentary pain relief interventions  Outcome: Progressing     Problem: INFECTION - ADULT  Goal: Absence or prevention of progression during hospitalization  Description: INTERVENTIONS:  - Assess and monitor for signs and symptoms of infection  - Monitor lab/diagnostic results  - Monitor all insertion sites, i.e. indwelling lines, tubes, and drains  - Monitor endotracheal if appropriate and nasal secretions for changes in amount and color  - [place] appropriate cooling/warming therapies per order  - Administer medications as ordered  - Instruct and encourage patient and family to use good hand hygiene technique  - Identify and instruct in appropriate isolation precautions for identified infection/condition  Outcome: Progressing  Goal: Absence of fever/infection during neutropenic period  Description: INTERVENTIONS:  - Monitor WBC  - Perform strict hand hygiene  - Limit to healthy visitors only  - No plants, dried, fresh or silk flowers with feliciano in patient room  Outcome: Progressing     Problem: SAFETY ADULT  Goal: Patient will remain free of falls  Description: INTERVENTIONS:  - Educate patient/family on patient safety including physical limitations  - Instruct patient to call for assistance with activity   -  Consider consulting OT/PT to assist with strengthening/mobility based on AM PAC & JH-HLM score  - Consult OT/PT to assist with strengthening/mobility   - Keep Call bell within reach  - Keep bed low and locked with side rails adjusted as appropriate  - Keep care items and personal belongings within reach  - Initiate and maintain comfort rounds  - Make Fall Risk Sign visible to staff  - Offer Toileting every 2 Hours, in advance of need  - Initiate/Maintain alarm  - Obtain necessary fall risk management equipment:   - Apply yellow socks and bracelet for high fall risk patients  - Consider moving patient to room near nurses station  Outcome: Progressing  Goal: Maintain or return to baseline ADL function  Description: INTERVENTIONS:  -  Assess patient's ability to carry out ADLs; assess patient's baseline for ADL function and identify physical deficits which impact ability to perform ADLs (bathing, care of mouth/teeth, toileting, grooming, dressing, etc.)  - Assess/evaluate cause of self-care deficits   - Assess range of motion  - Assess patient's mobility; develop plan if impaired  - Assess patient's need for assistive devices and provide as appropriate  - Encourage maximum independence but intervene and supervise when necessary  - Involve family in performance of ADLs  - Assess for home care needs following discharge   - Consider OT consult to assist with ADL evaluation and planning for discharge  - Provide patient education as appropriate  - Monitor functional capacity and physical performance, use of AM PAC & JH-HLM   - Monitor gait, balance and fatigue with ambulation    Outcome: Progressing  Goal: Maintains/Returns to pre admission functional level  Description: INTERVENTIONS:  - Perform AM-PAC 6 Click Basic Mobility/ Daily Activity assessment daily.  - Set and communicate daily mobility goal to care team and patient/family/caregiver.   - Collaborate with rehabilitation services on mobility goals if consulted  -  Perform Range of Motion 3 times a day.  - Reposition patient every 2 hours.  - Dangle patient 3 times a day  - Stand patient 3 times a day  - Ambulate patient 3 times a day  - Out of bed to chair 3 times a day   - Out of bed for meals 3 times a day  - Out of bed for toileting  - Record patient progress and toleration of activity level   Outcome: Progressing     Problem: DISCHARGE PLANNING  Goal: Discharge to home or other facility with appropriate resources  Description: INTERVENTIONS:  - Identify barriers to discharge w/patient and caregiver  - Arrange for needed discharge resources and transportation as appropriate  - Identify discharge learning needs (meds, wound care, etc.)  - Arrange for interpretive services to assist at discharge as needed  - Refer to Case Management Department for coordinating discharge planning if the patient needs post-hospital services based on physician/advanced practitioner order or complex needs related to functional status, cognitive ability, or social support system  Outcome: Progressing     Problem: Knowledge Deficit  Goal: Patient/family/caregiver demonstrates understanding of disease process, treatment plan, medications, and discharge instructions  Description: Complete learning assessment and assess knowledge base.  Interventions:  - Provide teaching at level of understanding  - Provide teaching via preferred learning methods  Outcome: Progressing     Problem: Prexisting or High Potential for Compromised Skin Integrity  Goal: Skin integrity is maintained or improved  Description: INTERVENTIONS:  - Identify patients at risk for skin breakdown  - Assess and monitor skin integrity including under and around medical devices   - Assess and monitor nutrition and hydration status  - Monitor labs  - Assess for incontinence   - Turn and reposition patient  - Assist with mobility/ambulation  - Relieve pressure over mane prominences   - Avoid friction and shearing  - Provide appropriate  hygiene as needed including keeping skin clean and dry  - Evaluate need for skin moisturizer/barrier cream  - Collaborate with interdisciplinary team  - Patient/family teaching  - Consider wound care consult    Assess:  - Review Gamaliel scale daily  - Clean and moisturize skin every 3  - Inspect skin when repositioning, toileting, and assisting with ADLS  - Assess under medical devices such as  every   - Assess extremities for adequate circulation and sensation     Bed Management:  - Have minimal linens on bed & keep smooth, unwrinkled  - Change linens as needed when moist or perspiring  - Avoid sitting or lying in one position for more than 3 hours while in bed?Keep HOB at 45 degrees   - Toileting:  - Offer bedside commode  - Assess for incontinence every 3  - Use incontinent care products after each incontinent episode such as 3    Activity:  - Mobilize patient 3 times a day  - Encourage activity and walks on unit  - Encourage or provide ROM exercises   - Turn and reposition patient every 3 Hours  - Use appropriate equipment to lift or move patient in bed  - Instruct/ Assist with weight shifting every 3 when out of bed in chair  - Consider limitation of chair time 3 hour intervals    Skin Care:  - Avoid use of baby powder, tape, friction and shearing, hot water or constrictive clothing  - Relieve pressure over bony prominences using 3  - Do not massage red bony areas    Next Steps:  - Teach patient strategies to minimize risks such as 3  - Consider consults to  interdisciplinary teams such as 3  Outcome: Progressing     Problem: Nutrition/Hydration-ADULT  Goal: Nutrient/Hydration intake appropriate for improving, restoring or maintaining nutritional needs  Description: Monitor and assess patient's nutrition/hydration status for malnutrition. Collaborate with interdisciplinary team and initiate plan and interventions as ordered.  Monitor patient's weight and dietary intake as ordered or per policy. Utilize nutrition  screening tool and intervene as necessary. Determine patient's food preferences and provide high-protein, high-caloric foods as appropriate.     INTERVENTIONS:  - Monitor oral intake, urinary output, labs, and treatment plans  - Assess nutrition and hydration status and recommend course of action  - Evaluate amount of meals eaten  - Assist patient with eating if necessary   - Allow adequate time for meals  - Recommend/ encourage appropriate diets, oral nutritional supplements, and vitamin/mineral supplements  - Order, calculate, and assess calorie counts as needed  - Recommend, monitor, and adjust tube feedings and TPN/PPN based on assessed needs  - Assess need for intravenous fluids  - Provide specific nutrition/hydration education as appropriate  - Include patient/family/caregiver in decisions related to nutrition  Outcome: Progressing

## 2025-05-22 NOTE — CASE MANAGEMENT
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) 2025    Class 2 severe obesity due to excess calories with serious comorbidity and body mass index (BMI) of 35.0 to 35.9 in adult (HCC) 2025    Right hip pain 2025    Sepsis without acute organ dysfunction (HCC) 2025    Sacroiliitis (HCC) 2025    Lumbar radiculopathy     Chronic pain syndrome 2023    Primary hypertension 2022    Lumbar degenerative disc disease 2022    Lumbar spondylosis 2022    Cervical radiculopathy 2022    Cervical spondylosis 2022    Rheumatoid arthritis involving both hands (HCC) 2022    Spinal stenosis of lumbar region without neurogenic claudication 2022      LOS (days): 8  Geometric Mean LOS (GMLOS) (days): 7.1  Days to GMLOS:-0.6     OBJECTIVE:  Risk of Unplanned Readmission Score: 20.62         Current admission status: Inpatient   Preferred Pharmacy:   [id number] - [place] - [address]  [address]  [place] [address]  Phone: [phone] Fax: [phone]    [facility] - [place] - [address]  [address]  [place] [address]  Phone: [phone] Fax: [phone]    Primary Care Provider: Danielito Antunez DO    Primary Insurance: MEDICARE  Secondary Insurance: AARP    DISCHARGE  DETAILS:    Discharge planning discussed with:: Pt's son Jose Armando  Freedom of Choice: Yes  Comments - Freedom of Choice: Discussed FOC  CM contacted family/caregiver?: Yes  Were Treatment Team discharge recommendations reviewed with patient/caregiver?: Yes  Did patient/caregiver verbalize understanding of patient care needs?: N/A- going to facility  Were patient/caregiver advised of the risks associated with not following Treatment Team discharge recommendations?: Yes    Contacts  Patient Contacts: Son - Jose Armando  Relationship to Patient:: Family  Contact Method: Phone  Phone Number: [phone]    Treatment Team Recommendation: Acute Rehab  Discharge Destination Plan:: Acute Rehab  Transport at Discharge : [facility]      CM spoke to pt's son Jose Armando to discuss dcp when pt medically stable on 5/21.  Notified pt's son of IPR referral - notified of accepting facilities.  Per son - choice is [facility].  CM notified [facility] of same & reserved in [place].        Per am rounds/chart review: pending final cultures for AB recs from ID.  Pt's last BM 5/13/2025.

## 2025-05-22 NOTE — ASSESSMENT & PLAN NOTE
Patient presented to [facility] on 5/13 with tachycardia, leukocytosis.  Source likely right hip abscess.  Started on IV vancomycin/Zosyn, transition to vancomycin per ID recommendations at [facility], now on additional p.o. Flagyl given bacteremia and drain abscess cultures     - Continue antibiotics as above

## 2025-05-22 NOTE — ASSESSMENT & PLAN NOTE
History of chronic low back pain, cervical/lumbar radiculopathy.       - Continue care per primary team   Principal Discharge DX:	Epigastric pain  Secondary Diagnosis:	Gastritis

## 2025-05-22 NOTE — PROGRESS NOTES
"Progress Note - Hospitalist   Name: Sharon Vega 74 y.o. female I MRN: [id number]  Unit/Bed#: -01 I Date of Admission: 5/14/2025   Date of Service: 5/22/2025 I Hospital Day: 8    Assessment & Plan  Abscess of right hip  Patient presented from [facility] with right hip pain   Had recent IR hip joint aspiration on 4/24, was scheduled for conversion of partial hip replacement to total hip on 5/14 but cancelled due to leukocytosis   CT scan obtained with evidence of \"post surgical change from right hip hemiarthroplasty. Organized collection of fluid and gas in the right iliac muscle and similar collection surrounding right hip arthroplasty in the deep gluteal muscles measuring up to 10 cm\"   Transferred to [facility] for orthopedic evaluation   Orthopedics following,  S/p IR aspiration and drain placement x 2 with synovasure culture on 5/15   Cultures also obtained from [facility] drain bulb -- Timegoldia magna, Jamesoniphitrishs raul    Appreciate ongoing orthopedic recommendations -- s/p revision right total hip arthoplasty with antibiotic spacer 5/21  ID following for abx management   Currently on IV Vancomycin and p.o. Flagyl  Cx from drain with Edd Parisi.  F/u antibiotic recs  1/2 blood cultures at [place] with GPC in clusters.  Late growth.  Await identification, prelim with anaerobe, c/w culture from drains.  Blood cultures obtained at [facility] are negative at 5 days   As needed analgesics, currently on PRN oxycodone 5/10 mg Q4H for moderate and severe pain with PRN IV dilaudid 0.5 mg Q4H for breakthrough  Sepsis without acute organ dysfunction (HCC)  POA at [facility] as evidenced by leukocytosis and tachycardia   In setting of right hip intramuscular abscess as above   Continue IV vancomycin and p.o. Flagyl per ID recommendations  S/p IV fluid hydration   BC from [facility] 1/2 GPC in clusters, see related plan.  BC obtained here with NGTD  Lactic negative   Procal, WBC had " been improving but bumped today - suspect may be reactive given surgery 5/21   Trend CBC and temps closely   See plan as above  Positive blood culture  1/2 blood cultures at [place] with Finegoldia magna  Blood cultures repeated at [facility] with NGTD  Continue IV Vanco and PO Flagyl  F/u ID input   Anemia  Previously hgb noted to be around 12   Dropped to 6.4 with hypotension post op, received 1 unit PRBCs with improvement, most recent hemoglobin stable at 8.5  Aortic stenosis  Mumur noted on exam, prior echo with mild to moderate AS  Obtained repeat echo prior to any operative plans to evaluate -- moderate AS   Constipation  Continue bowel regimen   Primary hypertension  BP soft overnight, now improved  Continue amlodipine 10 mg daily   Monitor closely   Hyponatremia  Noted  Possibly in setting of sepsis, volume depletion   S/p IV fluids   Monitor sodium levels closely   Trend BMP  Stable  Hypomagnesemia  Replete and monitor    VTE Pharmacologic Prophylaxis:   lovenox    Mobility:   Basic Mobility Inpatient Raw Score: 9  -[facility] Goal: 3: Sit at edge of bed  -HL Achieved: 3: Sit at edge of bed  -HL Goal achieved. Continue to encourage appropriate mobility.    Patient Centered Rounds: I performed bedside rounds with nursing staff today.  Syeda  Discussions with Specialists or Other Care Team Provider:     Education and Discussions with Family / Patient: Patient declined call to .     Current Length of Stay: 8 day(s)  Current Patient Status: Inpatient   Certification Statement: The patient will continue to require additional inpatient hospital stay due to POD#1, IV abx  Discharge Plan: pending ID and Ortho recs/clearance and therapy evals post op    Code Status: Level 1 - Full Code    Subjective   Ms. Vega reports hip pain working with therapy. Denies CP or SOB    Objective :  Temp:  [92.2 °F (33.4 °C)-99.4 °F (37.4 °C)] 98 °F (36.7 °C)  HR:  [] 107  BP: ()/(44-71) 138/68  Resp:  [12-18]  16  SpO2:  [89 %-98 %] 91 %  O2 Device: Nasal cannula  Nasal Cannula O2 Flow Rate (L/min):  [2 L/min] 2 L/min    Body mass index is 33.66 kg/m².     Input and Output Summary (last 24 hours):     Intake/Output Summary (Last 24 hours) at 5/22/2025 1113  Last data filed at 5/22/2025 0601  Gross per 24 hour   Intake 3271.67 ml   Output 1810 ml   Net 1461.67 ml       Physical Exam  Vitals reviewed.   Constitutional:       Comments: Patient seen sitting in bed, PT/OT present, NAD     Cardiovascular:      Rate and Rhythm: Tachycardia present.      Heart sounds: Murmur heard.   Pulmonary:      Effort: Pulmonary effort is normal. No respiratory distress.      Breath sounds: Normal breath sounds.   Abdominal:      Palpations: Abdomen is soft.      Tenderness: There is no abdominal tenderness.     Musculoskeletal:      Right lower leg: No edema.      Left lower leg: No edema.     Skin:     Coloration: Skin is pale.     Neurological:      Mental Status: She is alert.     Psychiatric:         Mood and Affect: Mood normal.           Lines/Drains:  Lines/Drains/Airways       Active Status       Name Placement date Placement time Site Days    Abscess Drain Hip 05/15/25  1705  Hip  6                            Lab Results: I have reviewed the following results:   Results from last 7 days   Lab Units 05/22/25  0613 05/21/25  0436 05/20/25  0509   WBC Thousand/uL 16.21*   < > 15.32*   HEMOGLOBIN g/dL 8.5*   < > 10.8*   HEMATOCRIT % 25.9*   < > 34.4*   PLATELETS Thousands/uL 463*   < > 506*   SEGS PCT %  --   --  75   LYMPHO PCT %  --   --  12*   MONO PCT %  --   --  9   EOS PCT %  --   --  2    < > = values in this interval not displayed.     Results from last 7 days   Lab Units 05/22/25  0613   SODIUM mmol/L 135   POTASSIUM mmol/L 4.5   CHLORIDE mmol/L 104   CO2 mmol/L 26   BUN mg/dL 16   CREATININE mg/dL 1.03   ANION GAP mmol/L 5   CALCIUM mg/dL 8.1*   GLUCOSE RANDOM mg/dL 127     Results from last 7 days   Lab Units 05/20/25  0847    INR  1.19                   Recent Cultures (last 7 days):   Results from last 7 days   Lab Units 05/21/25  1352 05/16/25  1411   GRAM STAIN RESULT  1+ Polys  No bacteria seen 3+ Polys  No bacteria seen   BODY FLUID CULTURE, STERILE   --  No growth       Imaging Results Review: I reviewed radiology reports from this admission including: procedure reports.      Last 24 Hours Medication List:     Current Facility-Administered Medications:     acetaminophen (TYLENOL) tablet 650 mg, Q4H PRN    amLODIPine (NORVASC) tablet 10 mg, Daily    ascorbic acid (VITAMIN C) tablet 500 mg, BID    calcium carbonate (TUMS) chewable tablet 1,000 mg, Daily PRN    Cholecalciferol (VITAMIN D3) tablet 2,000 Units, Daily    docusate sodium (COLACE) capsule 100 mg, BID    [Held by provider] enoxaparin (LOVENOX) subcutaneous injection 40 mg, Daily    enoxaparin (LOVENOX) subcutaneous injection 40 mg, Daily    ferrous sulfate tablet 325 mg, BID With Meals    folic acid (FOLVITE) tablet 1 mg, Daily    gabapentin (NEURONTIN) capsule 600 mg, HS    HYDROmorphone (DILAUDID) injection 0.5 mg, Q4H PRN    lactated ringers bolus 1,000 mL, Once PRN **AND** lactated ringers bolus 1,000 mL, Once PRN    lactated ringers infusion, Continuous, Last Rate: 125 mL/hr (05/21/25 2227)    loratadine (CLARITIN) tablet 10 mg, Daily    magnesium sulfate 2 g/50 mL IVPB (premix) 2 g, Once    methocarbamol (ROBAXIN) tablet 500 mg, Q6H FELIPA    metroNIDAZOLE (FLAGYL) tablet 500 mg, Q12H FELIPA    multivitamin-minerals (CENTRUM) tablet 1 tablet, Daily    mupirocin (BACTROBAN) 2 % ointment, Daily    ondansetron (ZOFRAN) injection 4 mg, Q6H PRN    oxyCODONE (ROXICODONE) immediate release tablet 10 mg, Q4H PRN    oxyCODONE (ROXICODONE) IR tablet 5 mg, Q4H PRN    pantoprazole (PROTONIX) EC tablet 40 mg, Early Morning    polyethylene glycol (MIRALAX) packet 17 g, Daily    pravastatin (PRAVACHOL) tablet 40 mg, HS    senna (SENOKOT) tablet 8.6 mg, HS    sodium chloride 0.9 %  bolus 1,000 mL, Once PRN **AND** sodium chloride 0.9 % bolus 1,000 mL, Once PRN, Last Rate: 1,000 mL (05/21/25 1804)    traZODone (DESYREL) tablet 100 mg, HS    vancomycin (VANCOCIN) IVPB (premix in dextrose) 1,000 mg 200 mL, Q24H, Last Rate: 1,000 mg (05/22/25 1013)    venlafaxine (EFFEXOR-XR) 24 hr capsule 150 mg, Daily    Administrative Statements   Today, Patient Was Seen By: Dionicio Garibay PA-C      **Please Note: This note may have been constructed using a voice recognition system.**

## 2025-05-22 NOTE — PLAN OF CARE
Problem: PHYSICAL THERAPY ADULT  Goal: Performs mobility at highest level of function for planned discharge setting.  See evaluation for individualized goals.  Description: Treatment/Interventions: Functional transfer training, LE strengthening/ROM, Elevations, Endurance training, Therapeutic exercise, Patient/family training, Equipment eval/education, Bed mobility, Gait training  Equipment Recommended: Walker       See flowsheet documentation for full assessment, interventions and recommendations.  Outcome: Progressing  Note: Prognosis: Good  Problem List: Decreased range of motion, Decreased endurance, Decreased strength, Impaired balance, Decreased mobility, Pain, Orthopedic restrictions  Assessment: Pt is an 74 y.o. female presenting to [facility] on 5/14/25 for primary medical dx of abscess of right hip. Pt  has a past medical history of Anemia, Anxiety, Arthritis, Closed transcervical fracture of right femur (HCC), Colon polyp, Depression, Fracture of right wrist, GERD (gastroesophageal reflux disease), Hiatal hernia, Hyperlipidemia, and Hypertension. Pt presents as a high complexity evaluation due to Ongoing medical management for primary dx, Increased reliance on more restrictive AD compared to baseline, Decreased activity tolerance compared to baseline, Fall risk, Increased assistance needed from caregiver at current time, Current WBS, Trending lab values, Hip precautions, Continuous pulse oximetry monitoring , s/p surgical intervention. Pt currently requires mod Ax2 for bed mobility, mod Ax2 for transfers with b/l HHA and mod-max A to sit EOB. Pt is limited by pain and deficits in strength, endurance, balance, ROM, mobility and activity tolerance limiting their ability to be independent and ambulate at this time. Pt would benefit from continued skilled acute care PT services to address impairments and promote functional independence. Recommend level 2 resources to improve mobility and promote PLOF. The  patient's AM-PAC Basic Mobility Inpatient Short Form Raw Score is 9. A Raw score of less than 16 suggests the patient may benefit from discharge to post-acute rehabilitation services. Please also refer to the recommendation of the Physical Therapist for safe discharge planning. Pt left supine in bed with bed alarm donned, call bell and personal items within reach, hip abduction wedge in place and all needs met.  Barriers to Discharge: Decreased caregiver support, Inaccessible home environment     Rehab Resource Intensity Level, PT: II (Moderate Resource Intensity)    See flowsheet documentation for full assessment.

## 2025-05-22 NOTE — PROGRESS NOTES
Progress Note - Orthopedics   Name: Sharon Vega 74 y.o. female I MRN: [id number]  Unit/Bed#: -01 I Date of Admission: 5/14/2025   Date of Service: 5/22/2025 I Hospital Day: 8    Assessment & Plan  Abscess of right hip  74 y.o.female with right hip PJI status post revision right total hip arthroplasty with antibiotic spacer. Post op hgb 6.4, received 1u prbc overnight. Hgb this am 8.5. doing well post op    Partial weightbearing right lower extremity  Posterior hip precautions  Abduction pillow at all times when in bed and sitting  PT/OT  Pain control  DVT ppx: per primary  Medical management including antibiotics per primary team  ID consulted, appreciate recs  Dispo planning          Subjective   74 y.o.female doing well. No acute events, no new complaints. Pain well controlled. Denies fevers, chills, CP, SOB, N/V, numbness or tingling. Patient reports no issues with urination or bowel movements.     Objective :  Temp:  [92.2 °F (33.4 °C)-99.4 °F (37.4 °C)] 98 °F (36.7 °C)  HR:  [] 110  BP: ()/(44-71) 129/71  Resp:  [12-18] 16  SpO2:  [89 %-98 %] 95 %  O2 Device: Nasal cannula  Nasal Cannula O2 Flow Rate (L/min):  [2 L/min] 2 L/min    Physical Exam  Musculoskeletal: Right hip  Dressing intact.   Motor intact to +FHL/EHL, +ankle dorsi/plantar flexion  Sensation intact to saphenous, sural, tibial, superficial peroneal nerve, and deep peroneal  2+ DP pulse  No calf swelling or tenderness to palpation      Lab Results: I have reviewed the following results:  Recent Labs     05/20/25  0509 05/20/25  0847 05/21/25  0436 05/21/25  2325 05/22/25  0613   WBC 15.32*  --  14.74*  --  16.21*   HGB 10.8*  --  10.5* 6.4* 8.5*   HCT 34.4*  --  33.3* 20.8* 25.9*   *  --  537*  --  463*   BUN 10  --  13  --  16   CREATININE 0.64  --  0.93  --  1.03   PTT  --  26  --   --   --    INR  --  1.19  --   --   --      Blood Culture:    Lab Results   Component Value Date    BLOODCX No Growth After 5 Days.  "05/14/2025     Wound Culture: No results found for: \"WOUNDCULT\"        "

## 2025-05-22 NOTE — QUICK NOTE
Upon arrival to med surg floor, patient hypotensive  with BP of 72/44. She was seen and evaluated at bedside by orthopedic team and ordered IV fluid bolus. Upon my arrival to bedside, BP improved at 98/55. Bp now improved to 110/64. H&H checked. Unfortunately, hgb 6.4 down from 10.3 pre op. Patient consented for blood.     1 unit prbc ordered. Will follow up hgb following transfusion.

## 2025-05-22 NOTE — PLAN OF CARE
Problem: PAIN - ADULT  Goal: Verbalizes/displays adequate comfort level or baseline comfort level  Description: Interventions:  - Encourage patient to monitor pain and request assistance  - Assess pain using appropriate pain scale  - Administer analgesics as ordered based on type and severity of pain and evaluate response  - Implement non-pharmacological measures as appropriate and evaluate response  - Consider cultural and social influences on pain and pain management  - Notify physician/advanced practitioner if interventions unsuccessful or patient reports new pain  - Educate patient/family on pain management process including their role and importance of  reporting pain   - Provide non-pharmacologic/complimentary pain relief interventions  Outcome: Progressing     Problem: INFECTION - ADULT  Goal: Absence or prevention of progression during hospitalization  Description: INTERVENTIONS:  - Assess and monitor for signs and symptoms of infection  - Monitor lab/diagnostic results  - Monitor all insertion sites, i.e. indwelling lines, tubes, and drains  - Monitor endotracheal if appropriate and nasal secretions for changes in amount and color  - Asher appropriate cooling/warming therapies per order  - Administer medications as ordered  - Instruct and encourage patient and family to use good hand hygiene technique  - Identify and instruct in appropriate isolation precautions for identified infection/condition  Outcome: Progressing  Goal: Absence of fever/infection during neutropenic period  Description: INTERVENTIONS:  - Monitor WBC  - Perform strict hand hygiene  - Limit to healthy visitors only  - No plants, dried, fresh or silk flowers with feliciano in patient room  Outcome: Progressing     Problem: SAFETY ADULT  Goal: Patient will remain free of falls  Description: INTERVENTIONS:  - Educate patient/family on patient safety including physical limitations  - Instruct patient to call for assistance with activity   -  Consider consulting OT/PT to assist with strengthening/mobility based on AM PAC & JH-HLM score  - Consult OT/PT to assist with strengthening/mobility   - Keep Call bell within reach  - Keep bed low and locked with side rails adjusted as appropriate  - Keep care items and personal belongings within reach  - Initiate and maintain comfort rounds  - Make Fall Risk Sign visible to staff  - Offer Toileting every 2 Hours, in advance of need  - Initiate/Maintain alarm  - Obtain necessary fall risk management equipment:   - Apply yellow socks and bracelet for high fall risk patients  - Consider moving patient to room near nurses station  Outcome: Progressing  Goal: Maintain or return to baseline ADL function  Description: INTERVENTIONS:  -  Assess patient's ability to carry out ADLs; assess patient's baseline for ADL function and identify physical deficits which impact ability to perform ADLs (bathing, care of mouth/teeth, toileting, grooming, dressing, etc.)  - Assess/evaluate cause of self-care deficits   - Assess range of motion  - Assess patient's mobility; develop plan if impaired  - Assess patient's need for assistive devices and provide as appropriate  - Encourage maximum independence but intervene and supervise when necessary  - Involve family in performance of ADLs  - Assess for home care needs following discharge   - Consider OT consult to assist with ADL evaluation and planning for discharge  - Provide patient education as appropriate  - Monitor functional capacity and physical performance, use of AM PAC & JH-HLM   - Monitor gait, balance and fatigue with ambulation    Outcome: Progressing  Goal: Maintains/Returns to pre admission functional level  Description: INTERVENTIONS:  - Perform AM-PAC 6 Click Basic Mobility/ Daily Activity assessment daily.  - Set and communicate daily mobility goal to care team and patient/family/caregiver.   - Collaborate with rehabilitation services on mobility goals if consulted  -  Perform Range of Motion 3 times a day.  - Reposition patient every 2 hours.  - Dangle patient 3 times a day  - Stand patient 3 times a day  - Ambulate patient 3 times a day  - Out of bed to chair 3 times a day   - Out of bed for meals 3 times a day  - Out of bed for toileting  - Record patient progress and toleration of activity level   Outcome: Progressing     Problem: DISCHARGE PLANNING  Goal: Discharge to home or other facility with appropriate resources  Description: INTERVENTIONS:  - Identify barriers to discharge w/patient and caregiver  - Arrange for needed discharge resources and transportation as appropriate  - Identify discharge learning needs (meds, wound care, etc.)  - Arrange for interpretive services to assist at discharge as needed  - Refer to Case Management Department for coordinating discharge planning if the patient needs post-hospital services based on physician/advanced practitioner order or complex needs related to functional status, cognitive ability, or social support system  Outcome: Progressing     Problem: Knowledge Deficit  Goal: Patient/family/caregiver demonstrates understanding of disease process, treatment plan, medications, and discharge instructions  Description: Complete learning assessment and assess knowledge base.  Interventions:  - Provide teaching at level of understanding  - Provide teaching via preferred learning methods  Outcome: Progressing     Problem: Prexisting or High Potential for Compromised Skin Integrity  Goal: Skin integrity is maintained or improved  Description: INTERVENTIONS:  - Identify patients at risk for skin breakdown  - Assess and monitor skin integrity including under and around medical devices   - Assess and monitor nutrition and hydration status  - Monitor labs  - Assess for incontinence   - Turn and reposition patient  - Assist with mobility/ambulation  - Relieve pressure over mane prominences   - Avoid friction and shearing  - Provide appropriate  hygiene as needed including keeping skin clean and dry  - Evaluate need for skin moisturizer/barrier cream  - Collaborate with interdisciplinary team  - Patient/family teaching  - Consider wound care consult    Assess:  - Review Gamaliel scale daily  - Clean and moisturize skin   - Inspect skin when repositioning, toileting, and assisting with ADLS  - Assess under medical devices   - Assess extremities for adequate circulation and sensation     Bed Management:  - Have minimal linens on bed & keep smooth, unwrinkled  - Change linens as needed when moist or perspiring  - Avoid sitting or lying in one position for more than 2 hours while in bed?Keep HOB at 30degrees   - Toileting:  - Offer bedside commode  - Assess for incontinence   - Use incontinent care products after each incontinent episode     Activity:  - Mobilize patient 3 times a day  - Encourage activity and walks on unit  - Encourage or provide ROM exercises   - Turn and reposition patient every 2 Hours  - Use appropriate equipment to lift or move patient in bed  - Instruct/ Assist with weight shifting every 2H when out of bed in chair  - Consider limitation of chair time 2 hour intervals    Skin Care:  - Avoid use of baby powder, tape, friction and shearing, hot water or constrictive clothing  - Relieve pressure over bony prominences  - Do not massage red bony areas    Next Steps:  - Teach patient strategies to minimize risks   - Consider consults to  interdisciplinary teams   Outcome: Progressing     Problem: Nutrition/Hydration-ADULT  Goal: Nutrient/Hydration intake appropriate for improving, restoring or maintaining nutritional needs  Description: Monitor and assess patient's nutrition/hydration status for malnutrition. Collaborate with interdisciplinary team and initiate plan and interventions as ordered.  Monitor patient's weight and dietary intake as ordered or per policy. Utilize nutrition screening tool and intervene as necessary. Determine patient's  food preferences and provide high-protein, high-caloric foods as appropriate.     INTERVENTIONS:  - Monitor oral intake, urinary output, labs, and treatment plans  - Assess nutrition and hydration status and recommend course of action  - Evaluate amount of meals eaten  - Assist patient with eating if necessary   - Allow adequate time for meals  - Recommend/ encourage appropriate diets, oral nutritional supplements, and vitamin/mineral supplements  - Order, calculate, and assess calorie counts as needed  - Recommend, monitor, and adjust tube feedings and TPN/PPN based on assessed needs  - Assess need for intravenous fluids  - Provide specific nutrition/hydration education as appropriate  - Include patient/family/caregiver in decisions related to nutrition  Outcome: Progressing

## 2025-05-22 NOTE — ASSESSMENT & PLAN NOTE
Previously hgb noted to be around 12   Dropped to 6.4 with hypotension post op, received 1 unit PRBCs with improvement, most recent hemoglobin stable at 8.5

## 2025-05-22 NOTE — PROCEDURES
Insert Complex Venous Access Line    Date/Time: 5/22/2025 2:21 PM    Performed by: Khalida Ferris RN  Authorized by: Dionicio Garibay PA-C    Patient location:  Bedside  Other Assisting Provider: Yes (comment) (Bernie MARIE)    Consent:     Consent obtained:  Verbal and written    Consent given by:  Patient    Risks discussed:  Arterial puncture, bleeding, infection, incorrect placement, pneumothorax and nerve damage    Alternatives discussed:  No treatment, delayed treatment and alternative treatment  Universal protocol:     Procedure explained and questions answered to patient or proxy's satisfaction: yes      Immediately prior to procedure, a time out was called: yes      Site/side marked: yes      Patient identity confirmed:  Verbally with patient, arm band, provided demographic data and hospital-assigned identification number  Pre-procedure details:     Hand hygiene: Hand hygiene performed prior to insertion      Sterile barrier technique: All elements of maximal sterile technique followed      Skin preparation:  ChloraPrep    Skin preparation agent: Skin preparation agent completely dried prior to procedure    Procedure details:     Complex Venous Access Line Type: PICC      Complex Venous Access Line Indications: long term antibiotics      Catheter tip vessel location: atriocaval junction      Orientation:  Right    Location:  Basilic    Procedural supplies:  Single lumen    Catheter size:  4 Fr ([id number] ex 3-31-26)    Total catheter length (cm):  44    Catheter out on skin (cm):  0    Max flow rate:  999    Arm circumference:  34    Patient evaluated for contraindications to access (i.e. fistula, thrombosis, etc): Yes      Site selection rationale:  Largest most available vein    Approach: percutaneous technique used      Patient position:  Flat    Ultrasound image availability:  Not saved    Sterile ultrasound techniques: Sterile gel and sterile probe covers were used      Number of attempts:  1    Successful  placement: yes      Landmarks identified: yes      Vessel of catheter tip end:  Sherlock 3CG confirmed  Anesthesia (see MAR for exact dosages):     Anesthesia method:  Local infiltration    Local anesthetic:  Lidocaine 1% w/o epi  Post-procedure details:     Post-procedure:  Dressing applied and securement device placed    Assessment:  Blood return through all ports and free fluid flow    Post-procedure complications: none      Patient tolerance of procedure:  Tolerated well, no immediate complications

## 2025-05-22 NOTE — PROGRESS NOTES
Progress Note - Infectious Disease   Name: Sharon Vega 74 y.o. female I MRN: [id number]  Unit/Bed#: -01 I Date of Admission: 5/14/2025   Date of Service: 5/22/2025 I Hospital Day: 8     Assessment & Plan  Abscess of right hip  Presented to [facility] on 5/13 from rehab due to persistent pain in right hip, history of right hip hemiarthroplasty on 7/2/2022.  Patient recently admitted in late April for similar complaint, s/p right hip lR aspiration for concern of septic arthritis.  Right hip arthrogram and joint aspiration resulted in enough fluid for culture, resulted negative.  Recommend discontinuation of antibiotics per ID and orthopedic surgery recommended outpatient follow-up for total right hip replacement.  Patient was undergoing workup for subsequent procedure when she obtained a right hip XR and CT chest abdomen pelvis which revealed Postsurgical change from right hip hemiarthroplasty. Organized collection of fluid and gas in the right iliac is muscle and similar collection surrounding the right hip arthroplasty in the deep gluteal muscles measuring up to 10 cm, concerning for infection. No hematoma.  While being evaluated in the emergency room, she was started on Vanco/Zosyn with recommendation for admission given meeting sepsis criteria with tachycardia, leukocytosis.  Patient recommended transfer to [facility] for tertiary care with consult orthopedics.  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 Procedure performed on 5/15: Abscess drainage x 2 of fluid collections around right hip. 10 fr drains placed. 40 ml pus from anterior drain, and 90 ml pus from lateral drain.There are multiple other undrained areas. RICK drain culture: 2+ growth of Finegoldia magna, 2+ peptoniphilus harei. Blood culture 1 out  of 2 from 5/13 now positive for Finegoldia magna, likely true bacteremia in setting of anaerobe finding in collected drain abscess cultures.  Went to the OR with orthopedic surgery on 5/21 for removal of right prosthetic hip implant with conversion of right hip/insertion of ABX impregnated cement    - Continue IV Vancomycin for empiric coverage, dosing per pharmacy  - Continue p.o. Flagyl 500 mg q12 hours  - Pending synovasure culture  - Okay to place PICC  - POD1 today pending intra-operative cultures, would like results to finalize prior to discharge  Trend fever curve/vitals  - Trend CBC/BMP   - Monitor exam for new/developing symptoms  - Final antibiotics plan pending clinical course and findings.  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  Source likely right hip abscess.  Started on IV vancomycin/Zosyn, transition to vancomycin per ID recommendations at [facility], now on additional p.o. Flagyl given bacteremia and drain abscess cultures     - Continue antibiotics as above  Chronic pain syndrome  History of chronic low back pain, cervical/lumbar radiculopathy.       - Continue care per primary team  Positive blood culture  Blood cultures obtained on 5/13, 1 out of 2 positive for preliminary finding of anaerobe organism, gram-positive cocci in clusters     - Continue antibiotics as above    I have discussed the above management plan in detail with the primary service.   Infectious Disease service will follow.    Antibiotics:  Vancomycin Day 9  Flagyl Day 4    Subjective   Patient has no fever, chills, sweats; no nausea, vomiting, diarrhea; no cough, shortness of breath; she does endorse some generalized fatigue after surgery yesterday, also right hip pain in the galindo-incisional area.  Of note,  her hemoglobin did drop overnight to 6.4, received PRBC with hemoglobin stable    Objective :  Temp:  [92.2 °F (33.4 °C)-99.4 °F (37.4 °C)] 98 °F (36.7 °C)  HR:  [] 109  BP: ()/(44-71) 129/68  Resp:  [12-18] 16  SpO2:  [89 %-98 %] 93 %  O2 Device: Nasal cannula  Nasal Cannula O2 Flow Rate (L/min):  [2 L/min] 2 L/min    General:  No acute distress  Psychiatric:  Awake and alert  Pulmonary:  Normal respiratory excursion without accessory muscle use  Abdomen:  Soft, nontender  Extremities:  No edema, right hip pain secondary to postoperative state  Skin: Right hip incision dressings intact, mild serosanguineous drainage.  No proximal or distal erythema along extremity      Lab Results: I have reviewed the following results:  Results from last 7 days   Lab Units 05/22/25  0613 05/21/25  2325 05/21/25  0436 05/20/25  0509   WBC Thousand/uL 16.21*  --  14.74* 15.32*   HEMOGLOBIN g/dL 8.5* 6.4* 10.5* 10.8*   PLATELETS Thousands/uL 463*  --  537* 506*     Results from last 7 days   Lab Units 05/22/25  0613 05/21/25  0436 05/20/25  0509   SODIUM mmol/L 135 135 134*   POTASSIUM mmol/L 4.5 4.9 4.2   CHLORIDE mmol/L 104 100 103   CO2 mmol/L 26 24 25   BUN mg/dL 16 13 10   CREATININE mg/dL 1.03 0.93 0.64   EGFR ml/min/1.73sq m 53 60 87   CALCIUM mg/dL 8.1* 9.3 9.3     Results from last 7 days   Lab Units 05/21/25  1352 05/16/25  1411   GRAM STAIN RESULT  1+ Polys  No bacteria seen 3+ Polys  No bacteria seen   BODY FLUID CULTURE, STERILE   --  No growth             Results from last 7 days   Lab Units 05/17/25  0645   FERRITIN ng/mL 1,949*         Imaging Results Review: No pertinent imaging studies reviewed.  Other Study Results Review: No additional pertinent studies reviewed.    Administrative Statements   I have spent a total time of 30 minutes in caring for this patient on the day of the visit/encounter including Diagnostic results, Risks and benefits of tx options, Instructions for management,  Reviewing/placing orders in the medical record (including tests, medications, and/or procedures), Obtaining or reviewing history  , and Communicating with other healthcare professionals .

## 2025-05-22 NOTE — PHYSICAL THERAPY NOTE
Physical Therapy Evaluation    Patient Name: Sharon Vega    Today's Date: 5/22/2025     Problem List  Principal Problem:    Abscess of right hip  Active Problems:    Primary hypertension    Chronic pain syndrome    Sepsis without acute organ dysfunction (HCC)    Hypomagnesemia    Hyponatremia    Anemia    Positive blood culture    Aortic stenosis    Constipation       Past Medical History  Past Medical History[1]     Past Surgical History  Past Surgical History[2]      05/22/25 0901   PT Last Visit   PT Visit Date 05/22/25   Note Type   Note type Re-Evaluation   Pain Assessment   Pain Assessment Tool 0-10   Pain Score 8   Pain Location/Orientation Orientation: Right;Location: Hip   Pain Onset/Description Onset: Ongoing   Patient's Stated Pain Goal No pain   Hospital Pain Intervention(s) Repositioned;Ambulation/increased activity;Emotional support   Restrictions/Precautions   Weight Bearing Precautions Per Order Yes   RLE Weight Bearing Per Order (S)  PWB   Braces or Orthoses Other (Comment)  (hip abduction wedge)   Other Precautions Chair Alarm;Bed Alarm;Multiple lines;Fall Risk;Pain  (posterior hip precautions, hip abduction wedge)   Home Living   Type of Home House   Home Layout Two level;1/2 bath on main level;Able to live on main level with bedroom/bathroom;Performs ADLs on one level;Stairs to enter with rails  (2 vs 4+1 JOANNE)   Bathroom Shower/Tub Tub/shower unit   Bathroom Toilet Raised   Bathroom Equipment Grab bars in shower   Bathroom Accessibility Accessible   Home Equipment Walker   Additional Comments pt lives with son and family in 2  with JOANNE and FFSU available.   Prior Function   Level of [place] Independent with functional mobility;Independent with ADLs;Independent with IADLS   Lives With Family;Son   Receives Help From Family   IADLs Independent with driving;Independent with meal prep;Independent with medication management   Falls in  "the last 6 months 0   Vocational Retired   Comments pt reports independence PTA. per chart review pt ambulated without AD PTA   General   Family/Caregiver Present No   Cognition   Overall Cognitive Status WFL   Arousal/Participation Cooperative   Attention Attends with cues to redirect   Orientation Level Oriented X4   Memory Decreased recall of precautions   Following Commands Follows one step commands with increased time or repetition   Comments pt pleasant and cooperative   Subjective   Subjective \"I feel really tired\"   RLE Assessment   RLE Assessment X   Strength RLE   R Knee Extension 2/5  (+pain)   R Ankle Dorsiflexion 3+/5   R Ankle Plantar Flexion 3+/5   LLE Assessment   LLE Assessment X   Strength LLE   L Knee Extension 4+/5   L Ankle Dorsiflexion 4/5   L Ankle Plantar Flexion 4/5   Light Touch   RLE Light Touch Grossly intact   LLE Light Touch Grossly intact   Bed Mobility   Supine to Sit 3  Moderate assistance   Additional items Assist x 2;HOB elevated;Increased time required;Verbal cues;LE management   Sit to Supine 3  Moderate assistance   Additional items Assist x 2;Increased time required;Verbal cues;LE management   Additional Comments pt requires mod-max A sitting EOB. demonstrates heavy posterior lean   Transfers   Sit to Stand 3  Moderate assistance   Additional items Assist x 2;Increased time required;Verbal cues  (bed elevated)   Stand to Sit 3  Moderate assistance   Additional items Assist x 2;Increased time required;Verbal cues;Bed elevated   Additional Comments b/l HHA   Ambulation/Elevation   Ambulation/Elevation Additional Comments pt declining further mobility at this time   Balance   Static Sitting Poor   Dynamic Sitting Poor -   Static Standing Poor -   Dynamic Standing Poor -   Endurance Deficit   Endurance Deficit Yes   Endurance Deficit Description pt limited by pain, fatigue, weakness and decreased activity tolerance   Activity Tolerance   Activity Tolerance Patient limited by " fatigue;Patient limited by pain   Medical Staff Made Aware BABAK Sharp   Nurse Made Aware yes-RN cleared   Assessment   Prognosis Good   Problem List Decreased range of motion;Decreased endurance;Decreased strength;Impaired balance;Decreased mobility;Pain;Orthopedic restrictions   Assessment Pt is an 74 y.o. female presenting to [facility] on 5/14/25 for primary medical dx of abscess of right hip. Pt  has a past medical history of Anemia, Anxiety, Arthritis, Closed transcervical fracture of right femur (HCC), Colon polyp, Depression, Fracture of right wrist, GERD (gastroesophageal reflux disease), Hiatal hernia, Hyperlipidemia, and Hypertension. Pt presents as a high complexity evaluation due to Ongoing medical management for primary dx, Increased reliance on more restrictive AD compared to baseline, Decreased activity tolerance compared to baseline, Fall risk, Increased assistance needed from caregiver at current time, Current WBS, Trending lab values, Hip precautions, Continuous pulse oximetry monitoring , s/p surgical intervention. Pt currently requires mod Ax2 for bed mobility, mod Ax2 for transfers with b/l HHA and mod-max A to sit EOB. Pt is limited by pain and deficits in strength, endurance, balance, ROM, mobility and activity tolerance limiting their ability to be independent and ambulate at this time. Pt would benefit from continued skilled acute care PT services to address impairments and promote functional independence. Recommend level 2 resources to improve mobility and promote PLOF. The patient's AM-PAC Basic Mobility Inpatient Short Form Raw Score is 9. A Raw score of less than 16 suggests the patient may benefit from discharge to post-acute rehabilitation services. Please also refer to the recommendation of the Physical Therapist for safe discharge planning. Pt left supine in bed with bed alarm donned, call bell and personal items within reach, hip abduction wedge in place and all needs met.   Barriers to  Discharge Decreased caregiver support;Inaccessible home environment   Goals   Patient Goals to have less pain   STG Expiration Date 06/05/25   Short Term Goal #1 In 14 days pt will complete bed mobility at mod I to increase independence and decrease caregiver burden. Pt will complete transfers at mod I to improve safety and independence. Pt will ambulate 200' at S to promote safe access to home. Pt will improve b/l LE strength by 1/2 grade to improve efficiency of transfers and decrease falls risk. Pt will sit EOB for 25 minutes independently to improve trunk control and upright tolerance.   PT Treatment Day 2   Plan   Treatment/Interventions Functional transfer training;LE strengthening/ROM;Therapeutic exercise;Patient/family training;Endurance training;Equipment eval/education;Bed mobility;Gait training;Compensatory technique education;Continued evaluation;Spoke to nursing;OT   PT Frequency 3-5x/wk   Discharge Recommendation   Rehab Resource Intensity Level, PT II (Moderate Resource Intensity)   AM-PAC Basic Mobility Inpatient   Turning in Flat Bed Without Bedrails 2   Lying on Back to Sitting on Edge of Flat Bed Without Bedrails 2   Moving Bed to Chair 1   Standing Up From Chair Using Arms 2   Walk in Room 1   Climb 3-5 Stairs With Railing 1   Basic Mobility Inpatient Raw Score 9   Turning Head Towards Sound 4   Follow Simple Instructions 4   Low Function Basic Mobility Raw Score  17   Low Function Basic Mobility Standardized Score  27.46   [facility] Highest Level Of Mobility   -HL Goal 3: Sit at edge of bed   -[facility] Achieved 3: Sit at edge of bed   Modified Morgan Scale   Modified Morgan Scale 4   Nataly Melton DPT         [1]   Past Medical History:  Diagnosis Date    Anemia     Anxiety     Arthritis     Closed transcervical fracture of right femur (HCC) 07/01/2022    Colon polyp     Depression     Fracture of right wrist 07/01/2022    GERD (gastroesophageal reflux disease)     Hiatal hernia      Hyperlipidemia     Hypertension    [2]   Past Surgical History:  Procedure Laterality Date    APPENDECTOMY      CHOLECYSTECTOMY      COLONOSCOPY      FL INJECTION RIGHT HIP (NON ARTHROGRAM)  04/21/2025    FRACTURE SURGERY Right     arm with plate and pins    HAND SURGERY Bilateral     HIP ARTHROPLASTY Right 5/21/2025    Procedure: removal/debridement prothesis hip prostalac;  Surgeon: Kit Daley MD;  Location: [facility];  Service: Orthopedics    HYSTERECTOMY      IR ASPIRATION JOINT (SPECIFY LOCATION)  4/24/2025    IR DRAINAGE TUBE PLACEMENT  5/15/2025    JOINT REPLACEMENT Bilateral     knees    FL HEMIARTHROPLASTY HIP PARTIAL Right 07/02/2022    Procedure: HEMIARTHROPLASTY HIP (BIPOLAR), closed reduction with splinting right wrist;  Surgeon: Leo Roblero;  Location: [facility];  Service: Orthopedics    FL NEUROPLASTY &/TRANSPOSITION ULNAR NERVE ELBOW Right 02/08/2023    Procedure: RELEASE CUBITAL TUNNEL;  Surgeon: Cody Calles DO;  Location: [facility];  Service: Orthopedics    SHOULDER ARTHROSCOPY Left

## 2025-05-22 NOTE — PLAN OF CARE
Problem: OCCUPATIONAL THERAPY ADULT  Goal: Performs self-care activities at highest level of function for planned discharge setting.  See evaluation for individualized goals.  Description: Treatment Interventions: ADL retraining, Functional transfer training, Endurance training, Patient/family training, Equipment evaluation/education, Compensatory technique education, Continued evaluation, Energy conservation, Activityengagement          See flowsheet documentation for full assessment, interventions and recommendations.   5/22/2025 1048 by Akilah Torres OT  Outcome: Progressing  Note: Limitation: Decreased ADL status, Decreased cognition, Decreased endurance, Decreased Safe judgement during ADL, Decreased self-care trans, Decreased high-level ADLs  Prognosis: Fair  Assessment: Pt re-evaluated 5/22/2025. Pt s/p R total him arthroplasty revision. Pt PWB R LE and with posterior hip precautions. Pt with active OT and actvity orders. Please refer to initial evaluation for home living and prior function. Pt currently requires MOD A to complete seated grooming and eating and MOD Ax2 to complete bed mobility and functional transfers with HHA. Pt requires MAX A to complete UB ADLs, LB ADLs, and toileting. Pt limited by decreased ADL status, functional transfers, functional mobility, and activity tolerance. Pt supine in bed at begning of session, pt supine in bed at end of session with alarm set and items within reach. The patient's raw score on the AM-PAC Daily Activity Inpatient Short Form is 13. A raw score of less than 19 suggests the patient may benefit from discharge to post-acute rehabilitation services. Please refer to the recommendation of the Occupational Therapist for safe discharge planning. Recommend Level II moderate intensity OT servicesat d/c to maximize pt function.     Rehab Resource Intensity Level, OT: (S) II (Moderate Resource Intensity) (Change in recommendation due to change in functional status)        5/22/2025 1047 by Akilah Torres OT  Note: Limitation: Decreased ADL status, Decreased cognition, Decreased endurance, Decreased Safe judgement during ADL, Decreased self-care trans, Decreased high-level ADLs  Prognosis: Fair  Assessment: Pt re-evaluated 5/22/2025. Pt s/p R total him arthroplasty revision. Pt PWB R LE and with posterior hip precautions. Pt with active OT and actvity orders. Please refer to initial evaluation for home living and prior function. Pt currently requires MOD A to complete seated grooming and eating and MOD Ax2 to complete bed mobility and functional transfers with HHA. Pt requires MAX A to complete UB ADLs, LB ADLs, and toileting. Pt limited by decreased ADL status, functional transfers, functional mobility, and activity tolerance. Pt supine in bed at begning of session, pt supine in bed at end of session with alarm set and items within reach. The patient's raw score on the AM-PAC Daily Activity Inpatient Short Form is 13. A raw score of less than 19 suggests the patient may benefit from discharge to post-acute rehabilitation services. Please refer to the recommendation of the Occupational Therapist for safe discharge planning. Recommend Level II moderate intensity OT servicesat d/c to maximize pt function.     Rehab Resource Intensity Level, OT: (S) II (Moderate Resource Intensity) (Change in recommendation due to change in functional status)

## 2025-05-22 NOTE — ASSESSMENT & PLAN NOTE
Presented to [facility] on 5/13 from rehab due to persistent pain in right hip, history of right hip hemiarthroplasty on 7/2/2022.  Patient recently admitted in late April for similar complaint, s/p right hip lR aspiration for concern of septic arthritis.  Right hip arthrogram and joint aspiration resulted in enough fluid for culture, resulted negative.  Recommend discontinuation of antibiotics per ID and orthopedic surgery recommended outpatient follow-up for total right hip replacement.  Patient was undergoing workup for subsequent procedure when she obtained a right hip XR and CT chest abdomen pelvis which revealed Postsurgical change from right hip hemiarthroplasty. Organized collection of fluid and gas in the right iliac is muscle and similar collection surrounding the right hip arthroplasty in the deep gluteal muscles measuring up to 10 cm, concerning for infection. No hematoma.  While being evaluated in the emergency room, she was started on Vanco/Zosyn with recommendation for admission given meeting sepsis criteria with tachycardia, leukocytosis.  Patient recommended transfer to [facility] for tertiary care with consult orthopedics.  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 Procedure performed on 5/15: Abscess drainage x 2 of fluid collections around right hip. 10 fr drains placed. 40 ml pus from anterior drain, and 90 ml pus from lateral drain.There are multiple other undrained areas. IRCK drain culture: 2+ growth of Finegoldia magna, 2+ peptoniphilus harei. Blood culture 1 out of 2 from 5/13 now positive for Finegoldia magna, likely true bacteremia in setting of anaerobe finding in collected drain abscess cultures.  Went to the OR with orthopedic surgery on 5/21 for removal of right prosthetic hip implant  with conversion of right hip/insertion of ABX impregnated cement    - Continue IV Vancomycin for empiric coverage, dosing per pharmacy  - Continue p.o. Flagyl 500 mg q12 hours  - Pending synovasure culture  - Okay to place PICC  - POD1 today pending intra-operative cultures, would like results to finalize prior to discharge  Trend fever curve/vitals  - Trend CBC/BMP   - Monitor exam for new/developing symptoms  - Final antibiotics plan pending clinical course and findings.  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

## 2025-05-22 NOTE — ASSESSMENT & PLAN NOTE
1/2 blood cultures at [place] with Finegoldia magna  Blood cultures repeated at \[facility]\"" with NGTD  Continue IV Vanco and PO Flagyl  F/u ID input

## 2025-05-23 LAB
ANION GAP SERPL CALCULATED.3IONS-SCNC: 7 MMOL/L (ref 4–13)
BASOPHILS # BLD AUTO: 0.06 THOUSANDS/ÂΜL (ref 0–0.1)
BASOPHILS NFR BLD AUTO: 0 % (ref 0–1)
BUN SERPL-MCNC: 16 MG/DL (ref 5–25)
CALCIUM SERPL-MCNC: 8.2 MG/DL (ref 8.4–10.2)
CHLORIDE SERPL-SCNC: 100 MMOL/L (ref 96–108)
CO2 SERPL-SCNC: 26 MMOL/L (ref 21–32)
CREAT SERPL-MCNC: 1.17 MG/DL (ref 0.6–1.3)
EOSINOPHIL # BLD AUTO: 0.08 THOUSAND/ÂΜL (ref 0–0.61)
EOSINOPHIL NFR BLD AUTO: 0 % (ref 0–6)
ERYTHROCYTE [DISTWIDTH] IN BLOOD BY AUTOMATED COUNT: 15.6 % (ref 11.6–15.1)
GFR SERPL CREATININE-BSD FRML MDRD: 45 ML/MIN/1.73SQ M
GLUCOSE SERPL-MCNC: 101 MG/DL (ref 65–140)
HCT VFR BLD AUTO: 23.2 % (ref 34.8–46.1)
HGB BLD-MCNC: 7.5 G/DL (ref 11.5–15.4)
HGB BLD-MCNC: 8.8 G/DL (ref 11.5–15.4)
IMM GRANULOCYTES # BLD AUTO: 0.29 THOUSAND/UL (ref 0–0.2)
IMM GRANULOCYTES NFR BLD AUTO: 2 % (ref 0–2)
LYMPHOCYTES # BLD AUTO: 1.61 THOUSANDS/ÂΜL (ref 0.6–4.47)
LYMPHOCYTES NFR BLD AUTO: 8 % (ref 14–44)
MAGNESIUM SERPL-MCNC: 2 MG/DL (ref 1.9–2.7)
MCH RBC QN AUTO: 29.6 PG (ref 26.8–34.3)
MCHC RBC AUTO-ENTMCNC: 32.3 G/DL (ref 31.4–37.4)
MCV RBC AUTO: 92 FL (ref 82–98)
MONOCYTES # BLD AUTO: 1.54 THOUSAND/ÂΜL (ref 0.17–1.22)
MONOCYTES NFR BLD AUTO: 8 % (ref 4–12)
NEUTROPHILS # BLD AUTO: 15.57 THOUSANDS/ÂΜL (ref 1.85–7.62)
NEUTS SEG NFR BLD AUTO: 82 % (ref 43–75)
NRBC BLD AUTO-RTO: 0 /100 WBCS
PLATELET # BLD AUTO: 447 THOUSANDS/UL (ref 149–390)
PMV BLD AUTO: 9.2 FL (ref 8.9–12.7)
POTASSIUM SERPL-SCNC: 4.5 MMOL/L (ref 3.5–5.3)
RBC # BLD AUTO: 2.53 MILLION/UL (ref 3.81–5.12)
SODIUM SERPL-SCNC: 133 MMOL/L (ref 135–147)
VANCOMYCIN SERPL-MCNC: 17.4 UG/ML (ref 10–20)
WBC # BLD AUTO: 19.15 THOUSAND/UL (ref 4.31–10.16)

## 2025-05-23 PROCEDURE — G0545 PR INHERENT VISIT TO INPT: HCPCS | Performed by: STUDENT IN AN ORGANIZED HEALTH CARE EDUCATION/TRAINING PROGRAM

## 2025-05-23 PROCEDURE — 80202 ASSAY OF VANCOMYCIN: CPT | Performed by: FAMILY MEDICINE

## 2025-05-23 PROCEDURE — 99232 SBSQ HOSP IP/OBS MODERATE 35: CPT | Performed by: STUDENT IN AN ORGANIZED HEALTH CARE EDUCATION/TRAINING PROGRAM

## 2025-05-23 PROCEDURE — 85025 COMPLETE CBC W/AUTO DIFF WBC: CPT

## 2025-05-23 PROCEDURE — 85018 HEMOGLOBIN: CPT

## 2025-05-23 PROCEDURE — 97112 NEUROMUSCULAR REEDUCATION: CPT

## 2025-05-23 PROCEDURE — 97530 THERAPEUTIC ACTIVITIES: CPT

## 2025-05-23 PROCEDURE — 99232 SBSQ HOSP IP/OBS MODERATE 35: CPT | Performed by: NURSE PRACTITIONER

## 2025-05-23 PROCEDURE — 30243N1 TRANSFUSION OF NONAUTOLOGOUS RED BLOOD CELLS INTO CENTRAL VEIN, PERCUTANEOUS APPROACH: ICD-10-PCS | Performed by: FAMILY MEDICINE

## 2025-05-23 PROCEDURE — 99024 POSTOP FOLLOW-UP VISIT: CPT | Performed by: ORTHOPAEDIC SURGERY

## 2025-05-23 PROCEDURE — 83735 ASSAY OF MAGNESIUM: CPT | Performed by: PHYSICIAN ASSISTANT

## 2025-05-23 PROCEDURE — 80048 BASIC METABOLIC PNL TOTAL CA: CPT | Performed by: PHYSICIAN ASSISTANT

## 2025-05-23 PROCEDURE — P9016 RBC LEUKOCYTES REDUCED: HCPCS

## 2025-05-23 RX ORDER — LACTULOSE 10 G/15ML
30 SOLUTION ORAL ONCE
Status: COMPLETED | OUTPATIENT
Start: 2025-05-23 | End: 2025-05-23

## 2025-05-23 RX ADMIN — TRAZODONE HYDROCHLORIDE 100 MG: 100 TABLET ORAL at 21:41

## 2025-05-23 RX ADMIN — METRONIDAZOLE 500 MG: 500 TABLET ORAL at 21:41

## 2025-05-23 RX ADMIN — LORATADINE 10 MG: 10 TABLET ORAL at 09:33

## 2025-05-23 RX ADMIN — FOLIC ACID 1 MG: 1 TABLET ORAL at 09:34

## 2025-05-23 RX ADMIN — PANTOPRAZOLE SODIUM 40 MG: 40 TABLET, DELAYED RELEASE ORAL at 05:29

## 2025-05-23 RX ADMIN — SENNOSIDES 8.6 MG: 8.6 TABLET, FILM COATED ORAL at 21:41

## 2025-05-23 RX ADMIN — OXYCODONE HYDROCHLORIDE AND ACETAMINOPHEN 500 MG: 500 TABLET ORAL at 18:04

## 2025-05-23 RX ADMIN — OXYCODONE HYDROCHLORIDE 10 MG: 10 TABLET ORAL at 05:30

## 2025-05-23 RX ADMIN — DOCUSATE SODIUM 100 MG: 100 CAPSULE, LIQUID FILLED ORAL at 18:04

## 2025-05-23 RX ADMIN — DOCUSATE SODIUM 100 MG: 100 CAPSULE, LIQUID FILLED ORAL at 09:34

## 2025-05-23 RX ADMIN — VANCOMYCIN HYDROCHLORIDE 1000 MG: 1 INJECTION, SOLUTION INTRAVENOUS at 10:39

## 2025-05-23 RX ADMIN — METRONIDAZOLE 500 MG: 500 TABLET ORAL at 09:34

## 2025-05-23 RX ADMIN — METHOCARBAMOL 500 MG: 500 TABLET ORAL at 12:25

## 2025-05-23 RX ADMIN — GABAPENTIN 600 MG: 300 CAPSULE ORAL at 21:41

## 2025-05-23 RX ADMIN — FERROUS SULFATE TAB 325 MG (65 MG ELEMENTAL FE) 325 MG: 325 (65 FE) TAB at 16:41

## 2025-05-23 RX ADMIN — Medication 1 TABLET: at 09:34

## 2025-05-23 RX ADMIN — ENOXAPARIN SODIUM 40 MG: 40 INJECTION SUBCUTANEOUS at 09:30

## 2025-05-23 RX ADMIN — FERROUS SULFATE TAB 325 MG (65 MG ELEMENTAL FE) 325 MG: 325 (65 FE) TAB at 09:34

## 2025-05-23 RX ADMIN — POLYETHYLENE GLYCOL 3350 17 G: 17 POWDER, FOR SOLUTION ORAL at 09:34

## 2025-05-23 RX ADMIN — OXYCODONE HYDROCHLORIDE 5 MG: 5 TABLET ORAL at 18:04

## 2025-05-23 RX ADMIN — Medication 2000 UNITS: at 09:33

## 2025-05-23 RX ADMIN — LACTULOSE 30 G: 20 SOLUTION ORAL at 10:38

## 2025-05-23 RX ADMIN — METHOCARBAMOL 500 MG: 500 TABLET ORAL at 05:29

## 2025-05-23 RX ADMIN — VENLAFAXINE HYDROCHLORIDE 150 MG: 150 CAPSULE, EXTENDED RELEASE ORAL at 09:34

## 2025-05-23 RX ADMIN — OXYCODONE HYDROCHLORIDE AND ACETAMINOPHEN 500 MG: 500 TABLET ORAL at 09:34

## 2025-05-23 RX ADMIN — PRAVASTATIN SODIUM 40 MG: 40 TABLET ORAL at 21:41

## 2025-05-23 NOTE — PHYSICAL THERAPY NOTE
PHYSICAL THERAPY NOTE          Patient Name: Sharon Vega  Today's Date: 5/23/2025 05/23/25 1429   PT Last Visit   PT Visit Date 05/23/25   Note Type   Note Type Treatment   Pain Assessment   Pain Assessment Tool 0-10   Pain Score 9  (increases with mobility)   Pain Location/Orientation Orientation: Right;Location: Hip   Pain Onset/Description Onset: Ongoing   Effect of Pain on Daily Activities limits mobility and comfort   Patient's Stated Pain Goal No pain   Hospital Pain Intervention(s) Repositioned;Ambulation/increased activity;Emotional support   Restrictions/Precautions   Weight Bearing Precautions Per Order Yes   RLE Weight Bearing Per Order (S)  PWB   Braces or Orthoses Other (Comment)  (hip abduction wedge)   Other Precautions Cognitive;Chair Alarm;Bed Alarm;Multiple lines;Fall Risk;Pain;O2;WBS  (1.5L O2, posterior hip precautions)   General   Chart Reviewed Yes   Response to Previous Treatment Patient reporting fatigue but able to participate.   Family/Caregiver Present No   Cognition   Arousal/Participation Cooperative;Alert;Responsive   Attention Attends with cues to redirect   Orientation Level Oriented to person;Oriented to place;Disoriented to time;Disoriented to situation  (month: September)   Memory Decreased recall of recent events;Decreased short term memory;Decreased recall of precautions   Following Commands Follows one step commands with increased time or repetition   Comments pt pleasant and cooperative. reports her memory fluctuates day to day   Subjective   Subjective pt agreeable to mobilize   Bed Mobility   Supine to Sit 3  Moderate assistance   Additional items Assist x 2;HOB elevated;Increased time required;Verbal cues;LE management   Sit to Supine Unable to assess   Additional Comments pt OOB in chair at end of session. pt reports dizziness and lightheadedness initially sitting EOB however BP  128/82.   Transfers   Sit to Stand 3  Moderate assistance   Additional items Assist x 2;Increased time required;Verbal cues   Stand to Sit 3  Moderate assistance   Additional items Assist x 2;Increased time required;Verbal cues   Stand pivot 3  Moderate assistance   Additional items Assist x 2;Increased time required;Verbal cues   Additional Comments x2 STS c b/l HHA. to drop arm chair   Ambulation/Elevation   Gait pattern Not appropriate;Not tested   Ambulation/Elevation Additional Comments pt unable to take steps at this time   Balance   Static Sitting Fair -   Dynamic Sitting Poor +   Static Standing Poor   Dynamic Standing Poor -   Endurance Deficit   Endurance Deficit Yes   Endurance Deficit Description pt limited by pain, fatigue, WBS, weakness and decreased activity tolerance   Activity Tolerance   Activity Tolerance Patient limited by pain;Patient limited by fatigue   Medical Staff Made Aware restorative Arun   Nurse Made Aware yes-RN cleared   Exercises   Heelslides Supine;Left;AROM  (x2)   Ankle Pumps Supine;5 reps;AROM;Bilateral   Balance training  pt sitting EOB for 10 minutes, initally mod A progressing to CS   Assessment   Prognosis Good   Problem List Decreased strength;Decreased endurance;Decreased range of motion;Impaired balance;Decreased mobility;Decreased cognition;Pain;Orthopedic restrictions   Assessment Pt pleasant and agreeable to participate in PT session. Completes mobility and therapeutic activity as outlined above. Initially began session on RA however pt's SpO2 declined to 84% with bed mobility, therefore reapplied 1.5L O2. Pt demonstrates improvements in EOB sitting tolerance/balance compared to previous session. Pt able to sit upright with CS. Pt continues to require mod Ax2 to complete STS and SPT to drop arm chair. Pt requires frequent cues and directions to complete task. Pt very limited by pain and completes activities with increased time. Pt's SpO2>90% on 1/5 L O2. Pt is  progressing towards her goals and will continue to benefit from skilled acute PT services to address deficits and promote mobility. Pt left upright in chair with chair alarm donned, call bell and personal items within reach and all needs met.   Barriers to Discharge Inaccessible home environment;Decreased caregiver support   Goals   Patient Goals to get better   STG Expiration Date 06/05/25   PT Treatment Day 3   Plan   Treatment/Interventions Functional transfer training;LE strengthening/ROM;Endurance training;Therapeutic exercise;Patient/family training;Cognitive reorientation;Equipment eval/education;Gait training;Compensatory technique education;Bed mobility;Continued evaluation;Spoke to nursing   Progress Progressing toward goals   PT Frequency 3-5x/wk   Discharge Recommendation   Rehab Resource Intensity Level, PT II (Moderate Resource Intensity)   AM-PAC Basic Mobility Inpatient   Turning in Flat Bed Without Bedrails 2   Lying on Back to Sitting on Edge of Flat Bed Without Bedrails 2   Moving Bed to Chair 1   Standing Up From Chair Using Arms 2   Walk in Room 1   Climb 3-5 Stairs With Railing 1   Basic Mobility Inpatient Raw Score 9   Turning Head Towards Sound 4   Follow Simple Instructions 3   Low Function Basic Mobility Raw Score  16   Low Function Basic Mobility Standardized Score  25.72   [facility] Highest Level Of Mobility   -[facility] Goal 3: Sit at edge of bed   -[facility] Achieved 4: Move to chair/commode   Education   Education Provided Mobility training;Home exercise program   Patient Demonstrates acceptance/verbal understanding   End of Consult   Patient Position at End of Consult Bedside chair;Bed/Chair alarm activated;All needs within reach   Nataly Melton DPT

## 2025-05-23 NOTE — ASSESSMENT & PLAN NOTE
POA at [facility] as evidenced by leukocytosis and tachycardia   In setting of right hip intramuscular abscess as above   Continue IV vancomycin and p.o. Flagyl per ID recommendations  S/p IV fluid hydration - will order prbc today monitor for overload in setting of loud murmur  BC from [facility] 1/2 [facility] in clusters, see related plan.  BC obtained here with NGTD  Lactic negative   Procal, WBC had been improving but bumped today continue to trend   Trend CBC and temps closely   See plan as above

## 2025-05-23 NOTE — ASSESSMENT & PLAN NOTE
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  Patient recently admitted in late April for similar complaint, s/p right hip lR aspiration for concern of septic arthritis.  Right hip arthrogram and joint aspiration resulted in enough fluid for culture, resulted negative.  Recommend discontinuation of antibiotics per ID and orthopedic surgery recommended outpatient follow-up for total right hip replacement.  Patient was undergoing workup for subsequent procedure when she obtained a right hip XR and CT chest abdomen pelvis which revealed Postsurgical change from right hip hemiarthroplasty. Organized collection of fluid and gas in the right iliac is muscle and similar collection surrounding the right hip arthroplasty in the deep gluteal muscles measuring up to 10 cm, concerning for infection. No hematoma.  While being evaluated in the emergency room, she was started on Vanco/Zosyn with recommendation for admission given meeting sepsis criteria with tachycardia, leukocytosis.  Patient recommended transfer to [facility] for tertiary care with consult orthopedics.  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 Procedure performed on 5/15: Abscess drainage x 2 of fluid collections around right hip. 10 fr drains placed. 40 ml pus from anterior drain, and 90 ml pus from lateral drain.There are multiple other undrained areas. RICK drain culture: 2+ growth of Finegoldia magna, 2+ peptoniphilus harei. Blood culture 1 out of 2 from 5/13 now positive for Finegoldia magna, likely true bacteremia in setting of anaerobe finding in collected drain abscess cultures.  Went to the OR with orthopedic surgery on 5/21 for removal of right prosthetic hip implant  with conversion of right hip/insertion of ABX impregnated cement    - Continue IV Vancomycin for empiric coverage, dosing per pharmacy  - Continue p.o. Flagyl 500 mg q12 hours  - Pending synovasure culture  - PICC in place, RUE  - POD2 today pending intra-operative cultures, would like results to finalize prior to discharge  - Trend fever curve/vitals  - Trend CBC/BMP   - Monitor exam for new/developing symptoms  - Final antibiotics plan pending clinical course and findings.  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

## 2025-05-23 NOTE — PROGRESS NOTES
"Progress Note - Orthopedics   Name: Sharon Vega 74 y.o. 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 Picc line placed, ID following with recommendations for antibiotics.  Cultures show no growth thus far.    Partial weightbearing right lower extremity  Posterior hip precautions  Abduction pillow at all times when in bed and sitting  PT/OT  Pain control  DVT ppx: per primary  Medical management including antibiotics per primary team  ID consulted, appreciate recs  Dispo planning          Subjective   74 y.o.female doing well. No acute events, no new complaints. Pain well controlled. Denies fevers, chills, CP, SOB, N/V, numbness or tingling. Patient reports no issues with urination or bowel movements.     Objective :  Temp:  [98 °F (36.7 °C)-99.8 °F (37.7 °C)] 99.8 °F (37.7 °C)  HR:  [106-109] 107  BP: (108-132)/(66-68) 108/68  Resp:  [16-18] 16  SpO2:  [92 %-95 %] 95 %  O2 Device: Nasal cannula  Nasal Cannula O2 Flow Rate (L/min):  [2 L/min] 2 L/min    Physical Exam  Musculoskeletal: Right hip  Dressing intact.   Motor intact to +FHL/EHL, +ankle dorsi/plantar flexion  Sensation intact to saphenous, sural, tibial, superficial peroneal nerve, and deep peroneal  2+ DP pulse  No calf swelling or tenderness to palpation      Lab Results: I have reviewed the following results:  Recent Labs     05/21/25  0436 05/21/25  2325 05/22/25  0613 05/23/25  0522   WBC 14.74*  --  16.21* 19.15*   HGB 10.5* 6.4* 8.5* 7.5*   HCT 33.3* 20.8* 25.9* 23.2*   *  --  463* 447*   BUN 13  --  16 16   CREATININE 0.93  --  1.03 1.17     Blood Culture:    Lab Results   Component Value Date    BLOODCX No Growth After 5 Days. 05/14/2025     Wound Culture: No results found for: \"WOUNDCULT\"        "

## 2025-05-23 NOTE — ASSESSMENT & PLAN NOTE
"Patient presented from [facility] with right hip pain   Had recent IR hip joint aspiration on 4/24, was scheduled for conversion of partial hip replacement to total hip on 5/14 but cancelled due to leukocytosis   CT scan obtained with evidence of \"post surgical change from right hip hemiarthroplasty. Organized collection of fluid and gas in the right iliac muscle and similar collection surrounding right hip arthroplasty in the deep gluteal muscles measuring up to 10 cm\"   Transferred to [facility] for orthopedic evaluation   Orthopedics following,  S/p IR aspiration and drain placement x 2 with synovasure culture on 5/15   Cultures also obtained from [facility] drain bulb -- Timegoldia magna, Peptoniphilus raul    Appreciate ongoing orthopedic recommendations -- s/p revision right total hip arthoplasty with antibiotic spacer 5/21  ID following for abx management   Currently on IV Vancomycin and p.o. Flagyl  Cx from drain with Edd Parisi.  F/u antibiotic recs  1/2 blood cultures at [facility] with [facility] in clusters.  Late growth.  Await identification, prelim with anaerobe, c/w culture from drains.  Blood cultures obtained at [facility] are negative at 5 days   As needed analgesics, currently on PRN oxycodone 5/10 mg Q4H for moderate and severe pain with PRN IV dilaudid 0.5 mg Q4H for breakthrough  "

## 2025-05-23 NOTE — PROGRESS NOTES
Vancomycin Discharge Planning     Sharon Vega is a good fit with the [employer] Vancomycin Bayesian model.  A surrogate trough range at q24h dosing that correlates with an AUC range of 400-600 for this patient is 12-17.5.     For questions regarding this range, or for recommendations for a surrogate trough range at an alternative dosing frequency, please contact the ID pharmacy team via [employer] Secure Chat.     Chau Becerra, PharmD, [facility]  Infectious Diseases Clinical Pharmacy Specialist  [phone]

## 2025-05-23 NOTE — PLAN OF CARE
Problem: PHYSICAL THERAPY ADULT  Goal: Performs mobility at highest level of function for planned discharge setting.  See evaluation for individualized goals.  Description: Treatment/Interventions: Functional transfer training, LE strengthening/ROM, Elevations, Endurance training, Therapeutic exercise, Patient/family training, Equipment eval/education, Bed mobility, Gait training  Equipment Recommended: Walker       See flowsheet documentation for full assessment, interventions and recommendations.  Outcome: Progressing  Note: Prognosis: Good  Problem List: Decreased strength, Decreased endurance, Decreased range of motion, Impaired balance, Decreased mobility, Decreased cognition, Pain, Orthopedic restrictions  Assessment: Pt pleasant and agreeable to participate in PT session. Completes mobility and therapeutic activity as outlined above. Initially began session on RA however pt's SpO2 declined to 84% with bed mobility, therefore reapplied 1.5L O2. Pt demonstrates improvements in EOB sitting tolerance/balance compared to previous session. Pt able to sit upright with CS. Pt continues to require mod Ax2 to complete STS and SPT to drop arm chair. Pt requires frequent cues and directions to complete task. Pt very limited by pain and completes activities with increased time. Pt's SpO2>90% on 1/5 L O2. Pt is progressing towards her goals and will continue to benefit from skilled acute PT services to address deficits and promote mobility. Pt left upright in chair with chair alarm donned, call bell and personal items within reach and all needs met.  Barriers to Discharge: Inaccessible home environment, Decreased caregiver support     Rehab Resource Intensity Level, PT: II (Moderate Resource Intensity)    See flowsheet documentation for full assessment.

## 2025-05-23 NOTE — ASSESSMENT & PLAN NOTE
Patient presented to [facility] on 5/13 with tachycardia, leukocytosis.  Source likely right hip abscess.  Started on IV vancomycin/Zosyn, transition to vancomycin per ID recommendations at [facility], now on additional p.o. Flagyl given bacteremia and drain abscess cultures     - Continue antibiotics as above

## 2025-05-23 NOTE — ASSESSMENT & PLAN NOTE
1/2 blood cultures at [facility] with Finegoldia magna  Blood cultures repeated at [facility] with NGTD  Continue IV Vanco and PO Flagyl  F/u ID input   Pt now with picc line in left arm 5/22 noted edema in hand continue to monitor

## 2025-05-23 NOTE — PROGRESS NOTES
Sharon Vega is a 74 y.o. female who is currently ordered Vancomycin IV with management by the Pharmacy Consult service.  Relevant clinical data and objective / subjective history reviewed.  Vancomycin Assessment:  Indication and Goal AUC/Trough: Bone/joint infection (goal -600, trough >10), -600, trough >10  Clinical Status: stable  Micro:     Renal Function:  SCr: 1.17 mg/dL  CrCl: 42.6 mL/min  Renal replacement: Not on dialysis  Days of Therapy: 10  Current Dose: 1000mg IV Q 12h  Vancomycin Plan:  New Dosinmg IV Q 24h  Estimated AUC: 507 mcg*hr/mL  Estimated Trough: 15 mcg/mL  Vancomycin random level=17.4 (25)  Next Level: 25  Renal Function Monitoring: Daily BMP and UOP  Pharmacy will continue to follow closely for s/sx of nephrotoxicity, infusion reactions and appropriateness of therapy.  BMP and CBC will be ordered per protocol. We will continue to follow the patient’s culture results and clinical progress daily.    Wei Dye, Pharmacist

## 2025-05-23 NOTE — CASE MANAGEMENT
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) 2025    Class 2 severe obesity due to excess calories with serious comorbidity and body mass index (BMI) of 35.0 to 35.9 in adult (HCC) 2025    Right hip pain 2025    Sepsis without acute organ dysfunction (HCC) 2025    Sacroiliitis (HCC) 2025    Lumbar radiculopathy     Chronic pain syndrome 2023    Primary hypertension 2022    Lumbar degenerative disc disease 2022    Lumbar spondylosis 2022    Cervical radiculopathy 2022    Cervical spondylosis 2022    Rheumatoid arthritis involving both hands (HCC) 2022    Spinal stenosis of lumbar region without neurogenic claudication 2022      LOS (days): 9  Geometric Mean LOS (GMLOS) (days): 7.1  Days to GMLOS:-1.7     OBJECTIVE:  Risk of Unplanned Readmission Score: 20.49         Current admission status: Inpatient   Preferred Pharmacy:   [id number] - [place] - [address]  [address]  [place] [address]  Phone: [phone] Fax: [phone]    [facility] - [place], [place] - [address]  [address]  [place] [address]  Phone: [phone] Fax: [phone]    Primary Care Provider: Danielito Antunez DO    Primary Insurance: MEDICARE  Secondary Insurance: AARP    DISCHARGE  DETAILS:    Discharge planning discussed with:: Patient  Freedom of Choice: Yes  Comments - Freedom of Choice: Discussed FOC  CM contacted family/caregiver?: Yes  Were Treatment Team discharge recommendations reviewed with patient/caregiver?: Yes  Did patient/caregiver verbalize understanding of patient care needs?: N/A- going to facility  Were patient/caregiver advised of the risks associated with not following Treatment Team discharge recommendations?: Yes        Treatment Team Recommendation: Acute Rehab  Discharge Destination Plan:: Acute Rehab        This CM met w/pt at bedside to update on dcp pending medical course. Discussed w/pt choices for IPR - pt does not want [facility] in [place]. Updated pt on prior discussion w/her son re: choice. Pt in agreement w/dcp & GS.     CM sent message in [facility] updated [facility] re: pt's anticiapted dc.  Per am rounds: Pt to get PRBC's today - Hgb 7.5. BM still pending - last one noted as 5/13.  Pending final cultures for AB plan for DC.  CM to follow for dcp needs pending medical course.

## 2025-05-23 NOTE — PLAN OF CARE
Problem: PAIN - ADULT  Goal: Verbalizes/displays adequate comfort level or baseline comfort level  Description: Interventions:  - Encourage patient to monitor pain and request assistance  - Assess pain using appropriate pain scale  - Administer analgesics as ordered based on type and severity of pain and evaluate response  - Implement non-pharmacological measures as appropriate and evaluate response  - Consider cultural and social influences on pain and pain management  - Notify physician/advanced practitioner if interventions unsuccessful or patient reports new pain  - Educate patient/family on pain management process including their role and importance of  reporting pain   - Provide non-pharmacologic/complimentary pain relief interventions  Outcome: Progressing     Problem: INFECTION - ADULT  Goal: Absence or prevention of progression during hospitalization  Description: INTERVENTIONS:  - Assess and monitor for signs and symptoms of infection  - Monitor lab/diagnostic results  - Monitor all insertion sites, i.e. indwelling lines, tubes, and drains  - Monitor endotracheal if appropriate and nasal secretions for changes in amount and color  - [place] appropriate cooling/warming therapies per order  - Administer medications as ordered  - Instruct and encourage patient and family to use good hand hygiene technique  - Identify and instruct in appropriate isolation precautions for identified infection/condition  Outcome: Progressing  Goal: Absence of fever/infection during neutropenic period  Description: INTERVENTIONS:  - Monitor WBC  - Perform strict hand hygiene  - Limit to healthy visitors only  - No plants, dried, fresh or silk flowers with feliciano in patient room  Outcome: Progressing     Problem: SAFETY ADULT  Goal: Patient will remain free of falls  Description: INTERVENTIONS:  - Educate patient/family on patient safety including physical limitations  - Instruct patient to call for assistance with activity   -  Consider consulting OT/PT to assist with strengthening/mobility based on AM PAC & JH-HLM score  - Consult OT/PT to assist with strengthening/mobility   - Keep Call bell within reach  - Keep bed low and locked with side rails adjusted as appropriate  - Keep care items and personal belongings within reach  - Initiate and maintain comfort rounds  - Make Fall Risk Sign visible to staff  - Offer Toileting every 2 Hours, in advance of need  - Initiate/Maintain alarm  - Obtain necessary fall risk management equipment:   - Apply yellow socks and bracelet for high fall risk patients  - Consider moving patient to room near nurses station  Outcome: Progressing  Goal: Maintain or return to baseline ADL function  Description: INTERVENTIONS:  -  Assess patient's ability to carry out ADLs; assess patient's baseline for ADL function and identify physical deficits which impact ability to perform ADLs (bathing, care of mouth/teeth, toileting, grooming, dressing, etc.)  - Assess/evaluate cause of self-care deficits   - Assess range of motion  - Assess patient's mobility; develop plan if impaired  - Assess patient's need for assistive devices and provide as appropriate  - Encourage maximum independence but intervene and supervise when necessary  - Involve family in performance of ADLs  - Assess for home care needs following discharge   - Consider OT consult to assist with ADL evaluation and planning for discharge  - Provide patient education as appropriate  - Monitor functional capacity and physical performance, use of AM PAC & JH-HLM   - Monitor gait, balance and fatigue with ambulation    Outcome: Progressing  Goal: Maintains/Returns to pre admission functional level  Description: INTERVENTIONS:  - Perform AM-PAC 6 Click Basic Mobility/ Daily Activity assessment daily.  - Set and communicate daily mobility goal to care team and patient/family/caregiver.   - Collaborate with rehabilitation services on mobility goals if consulted  -  Perform Range of Motion 3 times a day.  - Reposition patient every 2 hours.  - Dangle patient 3 times a day  - Stand patient 3 times a day  - Ambulate patient 3 times a day  - Out of bed to chair 3 times a day   - Out of bed for meals 3 times a day  - Out of bed for toileting  - Record patient progress and toleration of activity level   Outcome: Progressing     Problem: DISCHARGE PLANNING  Goal: Discharge to home or other facility with appropriate resources  Description: INTERVENTIONS:  - Identify barriers to discharge w/patient and caregiver  - Arrange for needed discharge resources and transportation as appropriate  - Identify discharge learning needs (meds, wound care, etc.)  - Arrange for interpretive services to assist at discharge as needed  - Refer to Case Management Department for coordinating discharge planning if the patient needs post-hospital services based on physician/advanced practitioner order or complex needs related to functional status, cognitive ability, or social support system  Outcome: Progressing     Problem: Knowledge Deficit  Goal: Patient/family/caregiver demonstrates understanding of disease process, treatment plan, medications, and discharge instructions  Description: Complete learning assessment and assess knowledge base.  Interventions:  - Provide teaching at level of understanding  - Provide teaching via preferred learning methods  Outcome: Progressing     Problem: Prexisting or High Potential for Compromised Skin Integrity  Goal: Skin integrity is maintained or improved  Description: INTERVENTIONS:  - Identify patients at risk for skin breakdown  - Assess and monitor skin integrity including under and around medical devices   - Assess and monitor nutrition and hydration status  - Monitor labs  - Assess for incontinence   - Turn and reposition patient  - Assist with mobility/ambulation  - Relieve pressure over mane prominences   - Avoid friction and shearing  - Provide appropriate  hygiene as needed including keeping skin clean and dry  - Evaluate need for skin moisturizer/barrier cream  - Collaborate with interdisciplinary team  - Patient/family teaching  - Consider wound care consult    Assess:  - Review Gamaliel scale daily  - Clean and moisturize skin   - Inspect skin when repositioning, toileting, and assisting with ADLS  - Assess under medical devices  - Assess extremities for adequate circulation and sensation     Bed Management:  - Have minimal linens on bed & keep smooth, unwrinkled  - Change linens as needed when moist or perspiring  - Avoid sitting or lying in one position for more than 2 hours while in bed?Keep HOB at 30degrees   - Toileting:  - Offer bedside commode  - Assess for incontinence  - Use incontinent care products after each incontinent episode3         Problem: Nutrition/Hydration-ADULT  Goal: Nutrient/Hydration intake appropriate for improving, restoring or maintaining nutritional needs  Description: Monitor and assess patient's nutrition/hydration status for malnutrition. Collaborate with interdisciplinary team and initiate plan and interventions as ordered.  Monitor patient's weight and dietary intake as ordered or per policy. Utilize nutrition screening tool and intervene as necessary. Determine patient's food preferences and provide high-protein, high-caloric foods as appropriate.     INTERVENTIONS:  - Monitor oral intake, urinary output, labs, and treatment plans  - Assess nutrition and hydration status and recommend course of action  - Evaluate amount of meals eaten  - Assist patient with eating if necessary   - Allow adequate time for meals  - Recommend/ encourage appropriate diets, oral nutritional supplements, and vitamin/mineral supplements  - Order, calculate, and assess calorie counts as needed  - Recommend, monitor, and adjust tube feedings and TPN/PPN based on assessed needs  - Assess need for intravenous fluids  - Provide specific nutrition/hydration  education as appropriate  - Include patient/family/caregiver in decisions related to nutrition  Outcome: Progressing

## 2025-05-23 NOTE — ASSESSMENT & PLAN NOTE
Previously hgb noted to be around 12   Dropped to 6.4 with hypotension post op, received 1 unit PRBCs with improvement,   Today 5/23 pt with hh of 7.5/23.2 will order another unit PRBC now [facility] obtained consent placed in chart at  /23

## 2025-05-23 NOTE — ASSESSMENT & PLAN NOTE
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 Picc line placed, ID following with recommendations for antibiotics.  Cultures show no growth thus far.    Partial weightbearing right lower extremity  Posterior hip precautions  Abduction pillow at all times when in bed and sitting  PT/OT  Pain control  DVT ppx: per primary  Medical management including antibiotics per primary team  ID consulted, appreciate recs  Dispo planning

## 2025-05-23 NOTE — ASSESSMENT & PLAN NOTE
Continue bowel regimen   On colace bid and senna daily at hs   Will add daily miralax and prn lactulose now

## 2025-05-23 NOTE — PROGRESS NOTES
Progress Note - Infectious Disease   Name: Sharon Vega 74 y.o. 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  Patient recently admitted in late April for similar complaint, s/p right hip lR aspiration for concern of septic arthritis.  Right hip arthrogram and joint aspiration resulted in enough fluid for culture, resulted negative.  Recommend discontinuation of antibiotics per ID and orthopedic surgery recommended outpatient follow-up for total right hip replacement.  Patient was undergoing workup for subsequent procedure when she obtained a right hip XR and CT chest abdomen pelvis which revealed Postsurgical change from right hip hemiarthroplasty. Organized collection of fluid and gas in the right iliac is muscle and similar collection surrounding the right hip arthroplasty in the deep gluteal muscles measuring up to 10 cm, concerning for infection. No hematoma.  While being evaluated in the emergency room, she was started on Vanco/Zosyn with recommendation for admission given meeting sepsis criteria with tachycardia, leukocytosis.  Patient recommended transfer to [facility] for tertiary care with consult orthopedics.  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 Procedure performed on 5/15: Abscess drainage x 2 of fluid collections around right hip. 10 fr drains placed. 40 ml pus from anterior drain, and 90 ml pus from lateral drain.There are multiple other undrained areas. RICK drain culture: 2+ growth of Finegoldia magna, 2+ peptoniphilus harei. Blood culture 1 out  of 2 from 5/13 now positive for Finegoldia magna, likely true bacteremia in setting of anaerobe finding in collected drain abscess cultures.  Went to the OR with orthopedic surgery on 5/21 for removal of right prosthetic hip implant with conversion of right hip/insertion of ABX impregnated cement    - Continue IV Vancomycin for empiric coverage, dosing per pharmacy  - Continue p.o. Flagyl 500 mg q12 hours  - Pending synovasure culture  - PICC in place, RUE  - POD2 today pending intra-operative cultures, would like results to finalize prior to discharge  - Trend fever curve/vitals  - Trend CBC/BMP   - Monitor exam for new/developing symptoms  - Final antibiotics plan pending clinical course and findings.  Likely will require 6 weeks of antibiotics after removal of infected prosthetic hip secondary to bacteremia, infected prosthetic joint    Sepsis without acute organ dysfunction (HCC)  Patient presented to [facility] on 5/13 with tachycardia, leukocytosis.  Source likely right hip abscess.  Started on IV vancomycin/Zosyn, transition to vancomycin per ID recommendations at [facility], now on additional p.o. Flagyl given bacteremia and drain abscess cultures     - Continue antibiotics as above  Chronic pain syndrome  History of chronic low back pain, cervical/lumbar radiculopathy.       - Continue care per primary team  Positive blood culture  Blood cultures obtained on 5/13, 1 out of 2 positive for preliminary finding of anaerobe organism, gram-positive cocci in clusters     - Continue antibiotics as above    I have discussed the above management plan in detail with the primary service.   Infectious Disease service will follow peripherally over the weekend    Antibiotics:  Vancomycin Day 10  Flagyl Day 5    Subjective   Patient has no fever, chills, sweats; no nausea, vomiting, diarrhea; no cough, shortness of breath.  She endorses an 8 out of 10 pain in her right hip postoperatively.  She also states she is  quite fatigued and tired today.  She is not been out of bed and walking it after the procedure    Objective :  Temp:  [98 °F (36.7 °C)-99.8 °F (37.7 °C)] 98.1 °F (36.7 °C)  HR:  [100-109] 107  BP: (105-132)/(66-71) 107/69  Resp:  [15-18] 15  SpO2:  [91 %-95 %] 91 %  O2 Device: Nasal cannula  Nasal Cannula O2 Flow Rate (L/min):  [2 L/min] 2 L/min    General:  No acute distress, fatigued  Psychiatric:  Awake and alert  Pulmonary:  Normal respiratory excursion without accessory muscle use  Abdomen:  Soft, nontender  Extremities:  Right lower extremity edema  Skin:  No rashes. Right hip incision dressings intact,  no drainage.  No proximal or distal erythema along extremity. Mild edema along right lower extremity consistent with post operative state.      Lab Results: I have reviewed the following results:  Results from last 7 days   Lab Units 05/23/25 0522 05/22/25  0613 05/21/25  2325 05/21/25  0436   WBC Thousand/uL 19.15* 16.21*  --  14.74*   HEMOGLOBIN g/dL 7.5* 8.5* 6.4* 10.5*   PLATELETS Thousands/uL 447* 463*  --  537*     Results from last 7 days   Lab Units 05/23/25  0522 05/22/25  0613 05/21/25  0436   SODIUM mmol/L 133* 135 135   POTASSIUM mmol/L 4.5 4.5 4.9   CHLORIDE mmol/L 100 104 100   CO2 mmol/L 26 26 24   BUN mg/dL 16 16 13   CREATININE mg/dL 1.17 1.03 0.93   EGFR ml/min/1.73sq m 45 53 60   CALCIUM mg/dL 8.2* 8.1* 9.3     Results from last 7 days   Lab Units 05/21/25  1352 05/16/25  1411   GRAM STAIN RESULT  1+ Polys  No bacteria seen 3+ Polys  No bacteria seen   BODY FLUID CULTURE, STERILE   --  No growth             Results from last 7 days   Lab Units 05/17/25  0645   FERRITIN ng/mL 1,949*

## 2025-05-23 NOTE — PROGRESS NOTES
"Progress Note - Hospitalist   Name: Sharon Vega 74 y.o. female I MRN: [id number]  Unit/Bed#: -01 I Date of Admission: 5/14/2025   Date of Service: 5/23/2025 I Hospital Day: 9    Assessment & Plan  Abscess of right hip  Patient presented from [facility] with right hip pain   Had recent IR hip joint aspiration on 4/24, was scheduled for conversion of partial hip replacement to total hip on 5/14 but cancelled due to leukocytosis   CT scan obtained with evidence of \"post surgical change from right hip hemiarthroplasty. Organized collection of fluid and gas in the right iliac muscle and similar collection surrounding right hip arthroplasty in the deep gluteal muscles measuring up to 10 cm\"   Transferred to [facility] for orthopedic evaluation   Orthopedics following,  S/p IR aspiration and drain placement x 2 with synovasure culture on 5/15   Cultures also obtained from RICK drain bulb -- Fingegoldia magna, Jamesoniphitrishs raul    Appreciate ongoing orthopedic recommendations -- s/p revision right total hip arthoplasty with antibiotic spacer 5/21  ID following for abx management   Currently on IV Vancomycin and p.o. Flagyl  Cx from drain with Edd Parisi.  F/u antibiotic recs  1/2 blood cultures at [place] with GPC in clusters.  Late growth.  Await identification, prelim with anaerobe, c/w culture from drains.  Blood cultures obtained at [facility] are negative at 5 days   As needed analgesics, currently on PRN oxycodone 5/10 mg Q4H for moderate and severe pain with PRN IV dilaudid 0.5 mg Q4H for breakthrough  Sepsis without acute organ dysfunction (HCC)  POA at [facility] as evidenced by leukocytosis and tachycardia   In setting of right hip intramuscular abscess as above   Continue IV vancomycin and p.o. Flagyl per ID recommendations  S/p IV fluid hydration - will order prbc today monitor for overload in setting of loud murmur  BC from [facility] 1/2 GPC in clusters, see related " plan.  BC obtained here with NGTD  Lactic negative   Procal, WBC had been improving but bumped today continue to trend   Trend CBC and temps closely   See plan as above  Positive blood culture  1/2 blood cultures at [place] with Finegoldia magna  Blood cultures repeated at [facility] with NGTD  Continue IV Vanco and PO Flagyl  F/u ID input   Pt now with picc line in left arm 5/22 noted edema in hand continue to monitor   Anemia  Previously hgb noted to be around 12   Dropped to 6.4 with hypotension post op, received 1 unit PRBCs with improvement,   Today 5/23 pt with hh of 7.5/23.2 will order another unit PRBC now SLIM obtained consent placed in chart at  /23  Aortic stenosis  Mumur noted on exam, prior echo with mild to moderate AS  Obtained repeat echo prior to any operative plans to evaluate -- moderate AS   Constipation  Continue bowel regimen   On colace bid and senna daily at hs   Will add daily miralax and prn lactulose now   Primary hypertension  BP soft overnight, now improved  Continue amlodipine 10 mg daily   Monitor closely   Hyponatremia  Noted  Possibly in setting of sepsis, volume depletion   S/p IV fluids   Monitor sodium levels closely   Trend BMP  Stable  Hypomagnesemia  Replete and monitor    VTE Pharmacologic Prophylaxis:   High Risk (Score >/= 5) - Pharmacological DVT Prophylaxis Ordered: enoxaparin (Lovenox). Sequential Compression Devices Ordered.    Mobility:   Basic Mobility Inpatient Raw Score: 10  JH-HLM Goal: 4: Move to chair/commode  JH-HLM Achieved: 1: Laying in bed  JH-HLM Goal achieved. Continue to encourage appropriate mobility.    Patient Centered Rounds: I performed bedside rounds with nursing staff today.   Discussions with Specialists or Other Care Team Provider: nursing    Education and Discussions with Family / Patient: Updated  (granddaughter neptali monte) via phone.    Current Length of Stay: 9 day(s)  Current Patient Status: Inpatient   Certification  Statement: The patient will continue to require additional inpatient hospital stay due to need for blood transfusion post op mgmt tachycardia   Discharge Plan: Anticipate discharge in >72 hrs to rehab facility.    Code Status: Level 1 - Full Code    Subjective   Pt takes time to answer questions some pain in right hip if moves right now ok. She has no sob no dizziness and doesn't feel constipated even though she has not yet had a bm . She has no nausea. She states she can't even stand up right now was active prior to admission     Objective :  Temp:  [98 °F (36.7 °C)-99.8 °F (37.7 °C)] 99.8 °F (37.7 °C)  HR:  [106-109] 107  BP: (108-132)/(66-68) 108/68  Resp:  [16-18] 16  SpO2:  [92 %-95 %] 95 %  O2 Device: Nasal cannula  Nasal Cannula O2 Flow Rate (L/min):  [2 L/min] 2 L/min    Body mass index is 33.66 kg/m².     Input and Output Summary (last 24 hours):     Intake/Output Summary (Last 24 hours) at 5/23/2025 0989  Last data filed at 5/23/2025 0733  Gross per 24 hour   Intake 450 ml   Output 600 ml   Net -150 ml       Physical Exam  Constitutional:       General: She is not in acute distress.     Appearance: She is not ill-appearing, toxic-appearing or diaphoretic.   HENT:      Head: Normocephalic and atraumatic.      Nose: No congestion.     Eyes:      General:         Right eye: No discharge.         Left eye: No discharge.       Cardiovascular:      Rate and Rhythm: Normal rate.      Heart sounds: Murmur heard.      No friction rub. No gallop.   Pulmonary:      Effort: No respiratory distress.      Breath sounds: No stridor. No wheezing, rhonchi or rales.   Chest:      Chest wall: No tenderness.   Abdominal:      General: There is no distension.      Palpations: There is no mass.      Tenderness: There is no abdominal tenderness. There is no guarding or rebound.      Hernia: No hernia is present.     Musculoskeletal:         General: Swelling (right hand) and tenderness present. No deformity or signs of injury.       Right lower leg: Edema (lateral upper hip area with dsd intact and abd pad no drainaged noted postiive edema) present.      Left lower leg: No edema.     Skin:     Coloration: Skin is pale. Skin is not jaundiced.      Findings: No bruising, erythema, lesion or rash.     Neurological:      Mental Status: She is alert and oriented to person, place, and time.     Psychiatric:         Behavior: Behavior normal.           Lines/Drains:  Lines/Drains/Airways       Active Status       Name Placement date Placement time Site Days    PICC Line 05/22/25 Right Basilic 05/22/25  1422  Basilic  less than 1                    Central Line:  Goal for removal: N/A - Discharging with PICC for IV ABX/medications               Lab Results: I have reviewed the following results:   Results from last 7 days   Lab Units 05/23/25  0522   WBC Thousand/uL 19.15*   HEMOGLOBIN g/dL 7.5*   HEMATOCRIT % 23.2*   PLATELETS Thousands/uL 447*   SEGS PCT % 82*   LYMPHO PCT % 8*   MONO PCT % 8   EOS PCT % 0     Results from last 7 days   Lab Units 05/23/25  0522   SODIUM mmol/L 133*   POTASSIUM mmol/L 4.5   CHLORIDE mmol/L 100   CO2 mmol/L 26   BUN mg/dL 16   CREATININE mg/dL 1.17   ANION GAP mmol/L 7   CALCIUM mg/dL 8.2*   GLUCOSE RANDOM mg/dL 101     Results from last 7 days   Lab Units 05/20/25  0847   INR  1.19                   Recent Cultures (last 7 days):   Results from last 7 days   Lab Units 05/21/25  1352 05/16/25  1411   GRAM STAIN RESULT  1+ Polys  No bacteria seen 3+ Polys  No bacteria seen   BODY FLUID CULTURE, STERILE   --  No growth       Imaging Results Review: No pertinent imaging studies reviewed.  Other Study Results Review: No additional pertinent studies reviewed.    Last 24 Hours Medication List:     Current Facility-Administered Medications:     acetaminophen (TYLENOL) tablet 650 mg, Q4H PRN    amLODIPine (NORVASC) tablet 10 mg, Daily    ascorbic acid (VITAMIN C) tablet 500 mg, BID    bisacodyl (DULCOLAX) rectal  suppository 10 mg, Daily PRN    calcium carbonate (TUMS) chewable tablet 1,000 mg, Daily PRN    Cholecalciferol (VITAMIN D3) tablet 2,000 Units, Daily    docusate sodium (COLACE) capsule 100 mg, BID    [Held by provider] enoxaparin (LOVENOX) subcutaneous injection 40 mg, Daily    enoxaparin (LOVENOX) subcutaneous injection 40 mg, Daily    ferrous sulfate tablet 325 mg, BID With Meals    folic acid (FOLVITE) tablet 1 mg, Daily    gabapentin (NEURONTIN) capsule 600 mg, HS    HYDROmorphone (DILAUDID) injection 0.5 mg, Q4H PRN    lactated ringers infusion, Continuous, Last Rate: Stopped (05/22/25 2300)    loratadine (CLARITIN) tablet 10 mg, Daily    methocarbamol (ROBAXIN) tablet 500 mg, Q6H FELIPA    metroNIDAZOLE (FLAGYL) tablet 500 mg, Q12H FELIPA    multivitamin-minerals (CENTRUM) tablet 1 tablet, Daily    mupirocin (BACTROBAN) 2 % ointment, Daily    ondansetron (ZOFRAN) injection 4 mg, Q6H PRN    oxyCODONE (ROXICODONE) immediate release tablet 10 mg, Q4H PRN    oxyCODONE (ROXICODONE) IR tablet 5 mg, Q4H PRN    pantoprazole (PROTONIX) EC tablet 40 mg, Early Morning    polyethylene glycol (MIRALAX) packet 17 g, Daily    pravastatin (PRAVACHOL) tablet 40 mg, HS    senna (SENOKOT) tablet 8.6 mg, HS    traZODone (DESYREL) tablet 100 mg, HS    vancomycin (VANCOCIN) IVPB (premix in dextrose) 1,000 mg 200 mL, Q24H, Last Rate: 1,000 mg (05/22/25 1013)    venlafaxine (EFFEXOR-XR) 24 hr capsule 150 mg, Daily    Administrative Statements   Today, Patient Was Seen By: LOTUS Bazan      **Please Note: This note may have been constructed using a voice recognition system.**

## 2025-05-24 LAB
ABO GROUP BLD BPU: NORMAL
ABO GROUP BLD: NORMAL
ALBUMIN SERPL BCG-MCNC: 2.2 G/DL (ref 3.5–5)
ALP SERPL-CCNC: 41 U/L (ref 34–104)
ALT SERPL W P-5'-P-CCNC: 11 U/L (ref 7–52)
ANION GAP SERPL CALCULATED.3IONS-SCNC: 5 MMOL/L (ref 4–13)
AST SERPL W P-5'-P-CCNC: 16 U/L (ref 13–39)
BACTERIA SPEC ANAEROBE CULT: ABNORMAL
BACTERIA SPEC ANAEROBE CULT: ABNORMAL
BACTERIA TISS AEROBE CULT: NO GROWTH
BASOPHILS # BLD AUTO: 0.05 THOUSANDS/ÂΜL (ref 0–0.1)
BASOPHILS NFR BLD AUTO: 0 % (ref 0–1)
BILIRUB SERPL-MCNC: 0.58 MG/DL (ref 0.2–1)
BLD GP AB SCN SERPL QL: NEGATIVE
BPU ID: NORMAL
BUN SERPL-MCNC: 20 MG/DL (ref 5–25)
CALCIUM ALBUM COR SERPL-MCNC: 9.2 MG/DL (ref 8.3–10.1)
CALCIUM SERPL-MCNC: 7.8 MG/DL (ref 8.4–10.2)
CHLORIDE SERPL-SCNC: 99 MMOL/L (ref 96–108)
CO2 SERPL-SCNC: 26 MMOL/L (ref 21–32)
CREAT SERPL-MCNC: 1.21 MG/DL (ref 0.6–1.3)
CROSSMATCH: NORMAL
EOSINOPHIL # BLD AUTO: 0.2 THOUSAND/ÂΜL (ref 0–0.61)
EOSINOPHIL NFR BLD AUTO: 1 % (ref 0–6)
ERYTHROCYTE [DISTWIDTH] IN BLOOD BY AUTOMATED COUNT: 15.4 % (ref 11.6–15.1)
GFR SERPL CREATININE-BSD FRML MDRD: 43 ML/MIN/1.73SQ M
GLUCOSE SERPL-MCNC: 96 MG/DL (ref 65–140)
GRAM STN SPEC: NORMAL
GRAM STN SPEC: NORMAL
HCT VFR BLD AUTO: 23.3 % (ref 34.8–46.1)
HGB BLD-MCNC: 7.6 G/DL (ref 11.5–15.4)
IMM GRANULOCYTES # BLD AUTO: 0.22 THOUSAND/UL (ref 0–0.2)
IMM GRANULOCYTES NFR BLD AUTO: 1 % (ref 0–2)
LYMPHOCYTES # BLD AUTO: 1.51 THOUSANDS/ÂΜL (ref 0.6–4.47)
LYMPHOCYTES NFR BLD AUTO: 9 % (ref 14–44)
MCH RBC QN AUTO: 30 PG (ref 26.8–34.3)
MCHC RBC AUTO-ENTMCNC: 32.6 G/DL (ref 31.4–37.4)
MCV RBC AUTO: 92 FL (ref 82–98)
MONOCYTES # BLD AUTO: 1.18 THOUSAND/ÂΜL (ref 0.17–1.22)
MONOCYTES NFR BLD AUTO: 7 % (ref 4–12)
NEUTROPHILS # BLD AUTO: 13.15 THOUSANDS/ÂΜL (ref 1.85–7.62)
NEUTS SEG NFR BLD AUTO: 82 % (ref 43–75)
NRBC BLD AUTO-RTO: 0 /100 WBCS
PLATELET # BLD AUTO: 376 THOUSANDS/UL (ref 149–390)
PMV BLD AUTO: 9 FL (ref 8.9–12.7)
POTASSIUM SERPL-SCNC: 4.2 MMOL/L (ref 3.5–5.3)
PROT SERPL-MCNC: 4.6 G/DL (ref 6.4–8.4)
RBC # BLD AUTO: 2.53 MILLION/UL (ref 3.81–5.12)
RH BLD: POSITIVE
SODIUM SERPL-SCNC: 130 MMOL/L (ref 135–147)
SPECIMEN EXPIRATION DATE: NORMAL
UNIT DISPENSE STATUS: NORMAL
UNIT PRODUCT CODE: NORMAL
UNIT PRODUCT VOLUME: 350 ML
UNIT RH: NORMAL
WBC # BLD AUTO: 16.31 THOUSAND/UL (ref 4.31–10.16)

## 2025-05-24 PROCEDURE — 85025 COMPLETE CBC W/AUTO DIFF WBC: CPT | Performed by: NURSE PRACTITIONER

## 2025-05-24 PROCEDURE — 99232 SBSQ HOSP IP/OBS MODERATE 35: CPT | Performed by: NURSE PRACTITIONER

## 2025-05-24 PROCEDURE — 86850 RBC ANTIBODY SCREEN: CPT | Performed by: NURSE PRACTITIONER

## 2025-05-24 PROCEDURE — P9016 RBC LEUKOCYTES REDUCED: HCPCS

## 2025-05-24 PROCEDURE — 80053 COMPREHEN METABOLIC PANEL: CPT | Performed by: NURSE PRACTITIONER

## 2025-05-24 PROCEDURE — NC001 PR NO CHARGE: Performed by: ORTHOPAEDIC SURGERY

## 2025-05-24 PROCEDURE — 86900 BLOOD TYPING SEROLOGIC ABO: CPT | Performed by: NURSE PRACTITIONER

## 2025-05-24 PROCEDURE — 86923 COMPATIBILITY TEST ELECTRIC: CPT

## 2025-05-24 PROCEDURE — 86901 BLOOD TYPING SEROLOGIC RH(D): CPT | Performed by: NURSE PRACTITIONER

## 2025-05-24 RX ADMIN — POLYETHYLENE GLYCOL 3350 17 G: 17 POWDER, FOR SOLUTION ORAL at 08:39

## 2025-05-24 RX ADMIN — METRONIDAZOLE 500 MG: 500 TABLET ORAL at 21:40

## 2025-05-24 RX ADMIN — PRAVASTATIN SODIUM 40 MG: 40 TABLET ORAL at 21:40

## 2025-05-24 RX ADMIN — AMLODIPINE BESYLATE 10 MG: 10 TABLET ORAL at 08:39

## 2025-05-24 RX ADMIN — TRAZODONE HYDROCHLORIDE 100 MG: 100 TABLET ORAL at 21:40

## 2025-05-24 RX ADMIN — FERROUS SULFATE TAB 325 MG (65 MG ELEMENTAL FE) 325 MG: 325 (65 FE) TAB at 08:39

## 2025-05-24 RX ADMIN — Medication 1 TABLET: at 08:39

## 2025-05-24 RX ADMIN — OXYCODONE HYDROCHLORIDE AND ACETAMINOPHEN 500 MG: 500 TABLET ORAL at 17:29

## 2025-05-24 RX ADMIN — OXYCODONE HYDROCHLORIDE 10 MG: 10 TABLET ORAL at 21:40

## 2025-05-24 RX ADMIN — VANCOMYCIN HYDROCHLORIDE 1000 MG: 1 INJECTION, SOLUTION INTRAVENOUS at 09:05

## 2025-05-24 RX ADMIN — METRONIDAZOLE 500 MG: 500 TABLET ORAL at 08:39

## 2025-05-24 RX ADMIN — DOCUSATE SODIUM 100 MG: 100 CAPSULE, LIQUID FILLED ORAL at 08:39

## 2025-05-24 RX ADMIN — FERROUS SULFATE TAB 325 MG (65 MG ELEMENTAL FE) 325 MG: 325 (65 FE) TAB at 17:29

## 2025-05-24 RX ADMIN — MUPIROCIN: 20 OINTMENT TOPICAL at 08:52

## 2025-05-24 RX ADMIN — SENNOSIDES 8.6 MG: 8.6 TABLET, FILM COATED ORAL at 21:40

## 2025-05-24 RX ADMIN — Medication 2000 UNITS: at 08:39

## 2025-05-24 RX ADMIN — ENOXAPARIN SODIUM 40 MG: 40 INJECTION SUBCUTANEOUS at 12:59

## 2025-05-24 RX ADMIN — GABAPENTIN 600 MG: 300 CAPSULE ORAL at 21:40

## 2025-05-24 RX ADMIN — DOCUSATE SODIUM 100 MG: 100 CAPSULE, LIQUID FILLED ORAL at 17:29

## 2025-05-24 RX ADMIN — VENLAFAXINE HYDROCHLORIDE 150 MG: 150 CAPSULE, EXTENDED RELEASE ORAL at 08:39

## 2025-05-24 RX ADMIN — FOLIC ACID 1 MG: 1 TABLET ORAL at 08:39

## 2025-05-24 RX ADMIN — OXYCODONE HYDROCHLORIDE AND ACETAMINOPHEN 500 MG: 500 TABLET ORAL at 08:39

## 2025-05-24 RX ADMIN — PANTOPRAZOLE SODIUM 40 MG: 40 TABLET, DELAYED RELEASE ORAL at 05:31

## 2025-05-24 RX ADMIN — LORATADINE 10 MG: 10 TABLET ORAL at 08:39

## 2025-05-24 NOTE — PLAN OF CARE
Problem: PAIN - ADULT  Goal: Verbalizes/displays adequate comfort level or baseline comfort level  Description: Interventions:  - Encourage patient to monitor pain and request assistance  - Assess pain using appropriate pain scale  - Administer analgesics as ordered based on type and severity of pain and evaluate response  - Implement non-pharmacological measures as appropriate and evaluate response  - Consider cultural and social influences on pain and pain management  - Notify physician/advanced practitioner if interventions unsuccessful or patient reports new pain  - Educate patient/family on pain management process including their role and importance of  reporting pain   - Provide non-pharmacologic/complimentary pain relief interventions  Outcome: Progressing     Problem: INFECTION - ADULT  Goal: Absence or prevention of progression during hospitalization  Description: INTERVENTIONS:  - Assess and monitor for signs and symptoms of infection  - Monitor lab/diagnostic results  - Monitor all insertion sites, i.e. indwelling lines, tubes, and drains  - Monitor endotracheal if appropriate and nasal secretions for changes in amount and color  - Roslyn appropriate cooling/warming therapies per order  - Administer medications as ordered  - Instruct and encourage patient and family to use good hand hygiene technique  - Identify and instruct in appropriate isolation precautions for identified infection/condition  Outcome: Progressing  Goal: Absence of fever/infection during neutropenic period  Description: INTERVENTIONS:  - Monitor WBC  - Perform strict hand hygiene  - Limit to healthy visitors only  - No plants, dried, fresh or silk flowers with feliciano in patient room  Outcome: Progressing     Problem: SAFETY ADULT  Goal: Patient will remain free of falls  Description: INTERVENTIONS:  - Educate patient/family on patient safety including physical limitations  - Instruct patient to call for assistance with activity   -  Consider consulting OT/PT to assist with strengthening/mobility based on AM PAC & JH-HLM score  - Consult OT/PT to assist with strengthening/mobility   - Keep Call bell within reach  - Keep bed low and locked with side rails adjusted as appropriate  - Keep care items and personal belongings within reach  - Initiate and maintain comfort rounds  - Make Fall Risk Sign visible to staff  - Offer Toileting every 2 Hours, in advance of need  - Initiate/Maintain alarm  - Obtain necessary fall risk management equipment:   - Apply yellow socks and bracelet for high fall risk patients  - Consider moving patient to room near nurses station  Outcome: Progressing  Goal: Maintain or return to baseline ADL function  Description: INTERVENTIONS:  -  Assess patient's ability to carry out ADLs; assess patient's baseline for ADL function and identify physical deficits which impact ability to perform ADLs (bathing, care of mouth/teeth, toileting, grooming, dressing, etc.)  - Assess/evaluate cause of self-care deficits   - Assess range of motion  - Assess patient's mobility; develop plan if impaired  - Assess patient's need for assistive devices and provide as appropriate  - Encourage maximum independence but intervene and supervise when necessary  - Involve family in performance of ADLs  - Assess for home care needs following discharge   - Consider OT consult to assist with ADL evaluation and planning for discharge  - Provide patient education as appropriate  - Monitor functional capacity and physical performance, use of AM PAC & JH-HLM   - Monitor gait, balance and fatigue with ambulation    Outcome: Progressing  Goal: Maintains/Returns to pre admission functional level  Description: INTERVENTIONS:  - Perform AM-PAC 6 Click Basic Mobility/ Daily Activity assessment daily.  - Set and communicate daily mobility goal to care team and patient/family/caregiver.   - Collaborate with rehabilitation services on mobility goals if consulted  -  Perform Range of Motion 3 times a day.  - Reposition patient every 2 hours.  - Dangle patient 3 times a day  - Stand patient 3 times a day  - Ambulate patient 3 times a day  - Out of bed to chair 3 times a day   - Out of bed for meals 3 times a day  - Out of bed for toileting  - Record patient progress and toleration of activity level   Outcome: Progressing     Problem: DISCHARGE PLANNING  Goal: Discharge to home or other facility with appropriate resources  Description: INTERVENTIONS:  - Identify barriers to discharge w/patient and caregiver  - Arrange for needed discharge resources and transportation as appropriate  - Identify discharge learning needs (meds, wound care, etc.)  - Arrange for interpretive services to assist at discharge as needed  - Refer to Case Management Department for coordinating discharge planning if the patient needs post-hospital services based on physician/advanced practitioner order or complex needs related to functional status, cognitive ability, or social support system  Outcome: Progressing     Problem: Knowledge Deficit  Goal: Patient/family/caregiver demonstrates understanding of disease process, treatment plan, medications, and discharge instructions  Description: Complete learning assessment and assess knowledge base.  Interventions:  - Provide teaching at level of understanding  - Provide teaching via preferred learning methods  Outcome: Progressing     Problem: Prexisting or High Potential for Compromised Skin Integrity  Goal: Skin integrity is maintained or improved  Description: INTERVENTIONS:  - Identify patients at risk for skin breakdown  - Assess and monitor skin integrity including under and around medical devices   - Assess and monitor nutrition and hydration status  - Monitor labs  - Assess for incontinence   - Turn and reposition patient  - Assist with mobility/ambulation  - Relieve pressure over mane prominences   - Avoid friction and shearing  - Provide appropriate  hygiene as needed including keeping skin clean and dry  - Evaluate need for skin moisturizer/barrier cream  - Collaborate with interdisciplinary team  - Patient/family teaching  - Consider wound care consult    Assess:  - Review Gamaliel scale daily  - Clean and moisturize skin every 2  - Inspect skin when repositioning, toileting, and assisting with ADLS  - Assess under medical devices such as  every   - Assess extremities for adequate circulation and sensation     Bed Management:  - Have minimal linens on bed & keep smooth, unwrinkled  - Change linens as needed when moist or perspiring  - Avoid sitting or lying in one position for more than 3 hours while in bed?Keep HOB at 45 degrees   - Toileting:  - Offer bedside commode  - Assess for incontinence every 3  - Use incontinent care products after each incontinent episode such as 3    Activity:  - Mobilize patient 3 times a day  - Encourage activity and walks on unit  - Encourage or provide ROM exercises   - Turn and reposition patient every 3 Hours  - Use appropriate equipment to lift or move patient in bed  - Instruct/ Assist with weight shifting every 3 when out of bed in chair  - Consider limitation of chair time 3 hour intervals    Skin Care:  - Avoid use of baby powder, tape, friction and shearing, hot water or constrictive clothing  - Relieve pressure over bony prominences using 3  - Do not massage red bony areas    Next Steps:  - Teach patient strategies to minimize risks such as 3  - Consider consults to  interdisciplinary teams such as 3  Outcome: Progressing     Problem: Nutrition/Hydration-ADULT  Goal: Nutrient/Hydration intake appropriate for improving, restoring or maintaining nutritional needs  Description: Monitor and assess patient's nutrition/hydration status for malnutrition. Collaborate with interdisciplinary team and initiate plan and interventions as ordered.  Monitor patient's weight and dietary intake as ordered or per policy. Utilize nutrition  screening tool and intervene as necessary. Determine patient's food preferences and provide high-protein, high-caloric foods as appropriate.     INTERVENTIONS:  - Monitor oral intake, urinary output, labs, and treatment plans  - Assess nutrition and hydration status and recommend course of action  - Evaluate amount of meals eaten  - Assist patient with eating if necessary   - Allow adequate time for meals  - Recommend/ encourage appropriate diets, oral nutritional supplements, and vitamin/mineral supplements  - Order, calculate, and assess calorie counts as needed  - Recommend, monitor, and adjust tube feedings and TPN/PPN based on assessed needs  - Assess need for intravenous fluids  - Provide specific nutrition/hydration education as appropriate  - Include patient/family/caregiver in decisions related to nutrition  Outcome: Progressing

## 2025-05-24 NOTE — ASSESSMENT & PLAN NOTE
Continued now 130, glucose stable   Possibly in setting of sepsis, volume depletion   S/p IV fluids   Monitor sodium levels closely   Trend BMP  Stable

## 2025-05-24 NOTE — PLAN OF CARE
Problem: PAIN - ADULT  Goal: Verbalizes/displays adequate comfort level or baseline comfort level  Description: Interventions:  - Encourage patient to monitor pain and request assistance  - Assess pain using appropriate pain scale  - Administer analgesics as ordered based on type and severity of pain and evaluate response  - Implement non-pharmacological measures as appropriate and evaluate response  - Consider cultural and social influences on pain and pain management  - Notify physician/advanced practitioner if interventions unsuccessful or patient reports new pain  - Educate patient/family on pain management process including their role and importance of  reporting pain   - Provide non-pharmacologic/complimentary pain relief interventions  5/24/2025 0617 by Mishel Hernandez RN  Outcome: Progressing  5/23/2025 2053 by Mishel Hernandez RN  Outcome: Progressing     Problem: INFECTION - ADULT  Goal: Absence or prevention of progression during hospitalization  Description: INTERVENTIONS:  - Assess and monitor for signs and symptoms of infection  - Monitor lab/diagnostic results  - Monitor all insertion sites, i.e. indwelling lines, tubes, and drains  - Monitor endotracheal if appropriate and nasal secretions for changes in amount and color  - Freeborn appropriate cooling/warming therapies per order  - Administer medications as ordered  - Instruct and encourage patient and family to use good hand hygiene technique  - Identify and instruct in appropriate isolation precautions for identified infection/condition  5/24/2025 0617 by Mishel Hernandez RN  Outcome: Progressing  5/23/2025 2053 by Mishel Hernandez RN  Outcome: Progressing  Goal: Absence of fever/infection during neutropenic period  Description: INTERVENTIONS:  - Monitor WBC  - Perform strict hand hygiene  - Limit to healthy visitors only  - No plants, dried, fresh or silk flowers with feliciano in patient room  5/24/2025 0617 by Mishel Hernandez RN  Outcome:  Progressing  5/23/2025 2053 by Mishel Hernandez RN  Outcome: Progressing     Problem: SAFETY ADULT  Goal: Patient will remain free of falls  Description: INTERVENTIONS:  - Educate patient/family on patient safety including physical limitations  - Instruct patient to call for assistance with activity   - Consider consulting OT/PT to assist with strengthening/mobility based on AM PAC & JH-HLM score  - Consult OT/PT to assist with strengthening/mobility   - Keep Call bell within reach  - Keep bed low and locked with side rails adjusted as appropriate  - Keep care items and personal belongings within reach  - Initiate and maintain comfort rounds  - Make Fall Risk Sign visible to staff  - Offer Toileting every 3 Hours, in advance of need  - Initiate/Maintain alarm  - Obtain necessary fall risk management equipment:   - Apply yellow socks and bracelet for high fall risk patients  - Consider moving patient to room near nurses station  5/24/2025 0617 by Mishel Hernandez RN  Outcome: Progressing  5/23/2025 2053 by Mishel Hernandez RN  Outcome: Progressing  Goal: Maintain or return to baseline ADL function  Description: INTERVENTIONS:  -  Assess patient's ability to carry out ADLs; assess patient's baseline for ADL function and identify physical deficits which impact ability to perform ADLs (bathing, care of mouth/teeth, toileting, grooming, dressing, etc.)  - Assess/evaluate cause of self-care deficits   - Assess range of motion  - Assess patient's mobility; develop plan if impaired  - Assess patient's need for assistive devices and provide as appropriate  - Encourage maximum independence but intervene and supervise when necessary  - Involve family in performance of ADLs  - Assess for home care needs following discharge   - Consider OT consult to assist with ADL evaluation and planning for discharge  - Provide patient education as appropriate  - Monitor functional capacity and physical performance, use of AM PAC & JH-HLM   - Monitor  gait, balance and fatigue with ambulation    5/24/2025 0617 by Mishel Hernandez RN  Outcome: Progressing  5/23/2025 2053 by Mishel Hernandez RN  Outcome: Progressing  Goal: Maintains/Returns to pre admission functional level  Description: INTERVENTIONS:  - Perform AM-PAC 6 Click Basic Mobility/ Daily Activity assessment daily.  - Set and communicate daily mobility goal to care team and patient/family/caregiver.   - Collaborate with rehabilitation services on mobility goals if consulted  - Perform Range of Motion 3 times a day.  - Reposition patient every 2 hours.  - Dangle patient 3 times a day  - Stand patient 3 times a day  - Ambulate patient 3 times a day  - Out of bed to chair 3 times a day   - Out of bed for meals 3 times a day  - Out of bed for toileting  - Record patient progress and toleration of activity level   5/24/2025 0617 by Mishel Hernandez RN  Outcome: Progressing  5/23/2025 2053 by Mishel Hernandez RN  Outcome: Progressing     Problem: DISCHARGE PLANNING  Goal: Discharge to home or other facility with appropriate resources  Description: INTERVENTIONS:  - Identify barriers to discharge w/patient and caregiver  - Arrange for needed discharge resources and transportation as appropriate  - Identify discharge learning needs (meds, wound care, etc.)  - Arrange for interpretive services to assist at discharge as needed  - Refer to Case Management Department for coordinating discharge planning if the patient needs post-hospital services based on physician/advanced practitioner order or complex needs related to functional status, cognitive ability, or social support system  5/24/2025 0617 by Mishel Hernandez RN  Outcome: Progressing  5/23/2025 2053 by Mishel Hernandez RN  Outcome: Progressing     Problem: Knowledge Deficit  Goal: Patient/family/caregiver demonstrates understanding of disease process, treatment plan, medications, and discharge instructions  Description: Complete learning assessment and assess knowledge  base.  Interventions:  - Provide teaching at level of understanding  - Provide teaching via preferred learning methods  5/24/2025 0617 by Mishel Hernandez RN  Outcome: Progressing  5/23/2025 2053 by Mishel Hernandez RN  Outcome: Progressing     Problem: Prexisting or High Potential for Compromised Skin Integrity  Goal: Skin integrity is maintained or improved  Description: INTERVENTIONS:  - Identify patients at risk for skin breakdown  - Assess and monitor skin integrity including under and around medical devices   - Assess and monitor nutrition and hydration status  - Monitor labs  - Assess for incontinence   - Turn and reposition patient  - Assist with mobility/ambulation  - Relieve pressure over mane prominences   - Avoid friction and shearing  - Provide appropriate hygiene as needed including keeping skin clean and dry  - Evaluate need for skin moisturizer/barrier cream  - Collaborate with interdisciplinary team  - Patient/family teaching  - Consider wound care consult    Assess:  - Review Gamaliel scale daily  - Clean and moisturize skin every 3  - Inspect skin when repositioning, toileting, and assisting with ADLS  - Assess under medical devices such as  every   - Assess extremities for adequate circulation and sensation     Bed Management:  - Have minimal linens on bed & keep smooth, unwrinkled  - Change linens as needed when moist or perspiring  - Avoid sitting or lying in one position for more than 3 hours while in bed?Keep HOB at 45 degrees   - Toileting:  - Offer bedside commode  - Assess for incontinence every 3  - Use incontinent care products after each incontinent episode such as     Activity:  - Mobilize patient 3 times a day  - Encourage activity and walks on unit  - Encourage or provide ROM exercises   - Turn and reposition patient every 2 Hours  - Use appropriate equipment to lift or move patient in bed  - Instruct/ Assist with weight shifting every 3 when out of bed in chair  - Consider limitation of  chair time 3 hour intervals    Skin Care:  - Avoid use of baby powder, tape, friction and shearing, hot water or constrictive clothing  - Relieve pressure over bony prominences using 3  - Do not massage red bony areas    Next Steps:  - Teach patient strategies to minimize risks such as 3  - Consider consults to  interdisciplinary teams such as 3  5/24/2025 0617 by Mishel Hernandez RN  Outcome: Progressing  5/23/2025 2053 by Mishel Hernandez RN  Outcome: Progressing     Problem: Nutrition/Hydration-ADULT  Goal: Nutrient/Hydration intake appropriate for improving, restoring or maintaining nutritional needs  Description: Monitor and assess patient's nutrition/hydration status for malnutrition. Collaborate with interdisciplinary team and initiate plan and interventions as ordered.  Monitor patient's weight and dietary intake as ordered or per policy. Utilize nutrition screening tool and intervene as necessary. Determine patient's food preferences and provide high-protein, high-caloric foods as appropriate.     INTERVENTIONS:  - Monitor oral intake, urinary output, labs, and treatment plans  - Assess nutrition and hydration status and recommend course of action  - Evaluate amount of meals eaten  - Assist patient with eating if necessary   - Allow adequate time for meals  - Recommend/ encourage appropriate diets, oral nutritional supplements, and vitamin/mineral supplements  - Order, calculate, and assess calorie counts as needed  - Recommend, monitor, and adjust tube feedings and TPN/PPN based on assessed needs  - Assess need for intravenous fluids  - Provide specific nutrition/hydration education as appropriate  - Include patient/family/caregiver in decisions related to nutrition  5/24/2025 0617 by Mishel Hernandez RN  Outcome: Progressing  5/23/2025 2053 by Mishel Hernandez RN  Outcome: Progressing

## 2025-05-24 NOTE — PROGRESS NOTES
Sharon Vega is a 74 y.o. female who is currently ordered Vancomycin IV with management by the Pharmacy Consult service.  Relevant clinical data and objective / subjective history reviewed.  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  BMP and CBC will be ordered per protocol. We will continue to follow the patient’s culture results and clinical progress daily.    Annalise Lozano, Pharmacist

## 2025-05-24 NOTE — ASSESSMENT & PLAN NOTE
POA at [facility] as evidenced by leukocytosis and tachycardia   In setting of right hip intramuscular abscess as above   Continue IV vancomycin and p.o. Flagyl per ID recommendations  S/p IV fluid hydration -   Sp 1 unit PRBC's will order additional 1 unit PRBC's today   BC from [facility] 1/2 GPC in clusters, see related plan.  BC obtained here with NGTD  Lactic negative   Procal, WBC now down trending 16.31   Trend CBC and temps closely   See plan as above

## 2025-05-24 NOTE — PROGRESS NOTES
"Progress Note - Hospitalist   Name: Sharon Vega 74 y.o. female I MRN: [id number]  Unit/Bed#: -01 I Date of Admission: 5/14/2025   Date of Service: 5/24/2025 I Hospital Day: 10    Assessment & Plan  Abscess of right hip  Patient presented from [facility] with right hip pain   Had recent IR hip joint aspiration on 4/24, was scheduled for conversion of partial hip replacement to total hip on 5/14 but cancelled due to leukocytosis   CT scan obtained with evidence of \"post surgical change from right hip hemiarthroplasty. Organized collection of fluid and gas in the right iliac muscle and similar collection surrounding right hip arthroplasty in the deep gluteal muscles measuring up to 10 cm\"    Transferred to [facility] for orthopedic evaluation   Orthopedics following   S/p IR aspiration and drain placement x 2 with synovasure culture on 5/15   Cultures also obtained from [facility] drain bulb -- Timegoldia magna, Jamesoniphilus raul    Appreciate ongoing orthopedic recommendations -- s/p revision right total hip arthoplasty with antibiotic spacer 5/21   Partial weight bearing right lower extremity   Posterior hip precautions   ID following for abx management   Currently on IV Vancomycin and p.o. Flagyl  Cx from drain with Edd Parisi.  F/u antibiotic recs  1/2 blood cultures at [place] with [facility] in clusters.  Late growth.  Await identification, prelim with anaerobe, c/w culture from drains.   Blood cultures obtained at [facility] are negative at 5 days   As needed analgesics, currently on PRN oxycodone 5/10 mg Q4H for moderate and severe pain with PRN IV dilaudid 0.5 mg Q4H for breakthrough   Sepsis without acute organ dysfunction (HCC)  POA at [facility] as evidenced by leukocytosis and tachycardia   In setting of right hip intramuscular abscess as above   Continue IV vancomycin and p.o. Flagyl per ID recommendations  S/p IV fluid hydration -   Sp 1 unit PRBC's will order additional 1 " unit PRBC's today   BC from [facility] 1/2 [facility] in clusters, see related plan.  BC obtained here with NGTD  Lactic negative   Procal, WBC now down trending 16.31   Trend CBC and temps closely   See plan as above  Positive blood culture  1/2 blood cultures at [place] with Finegoldia magna  Blood cultures repeated at [facility] with no growth at 5 days   Continue IV Vanco and PO Flagyl  F/u ID input   Pt now with picc line in left arm 5/22 noted edema in hand continue to monitor   Elevate hand/arm   Anemia  Previously hgb noted to be around 12   Dropped to 6.4 with hypotension post op, received 1 unit PRBCs with improvement,   5/23 pt with hh of 7.5/23.2 will order another unit PRBC now SLIM obtained consent placed in chart at   5/23 5/24 Additional unit PRBC's now / stop iv fluids   Aortic stenosis  Mumur noted on exam, prior echo with mild to moderate AS  Obtained repeat echo prior to any operative plans to evaluate -- moderate AS   Constipation  Continue bowel regimen   On colace bid and senna daily at hs   Added daily miralax and prn lactulose now   Primary hypertension  BP soft overnight, now improved  Continue amlodipine 10 mg daily   Monitor closely   Hyponatremia  Continued now 130, glucose stable   Possibly in setting of sepsis, volume depletion   S/p IV fluids   Monitor sodium levels closely   Trend BMP  Stable  Hypomagnesemia  Replete and monitor    VTE Pharmacologic Prophylaxis:   High Risk (Score >/= 5) - Pharmacological DVT Prophylaxis Ordered: enoxaparin (Lovenox). Sequential Compression Devices Ordered.    Mobility:   Basic Mobility Inpatient Raw Score: 10  JH-HLM Goal: 4: Move to chair/commode  JH-HLM Achieved: 5: Stand (1 or more minutes)  JH-HLM Goal achieved. Continue to encourage appropriate mobility.    Patient Centered Rounds: I performed bedside rounds with nursing staff today.   Discussions with Specialists or Other Care Team Provider: nursing     Education and Discussions with Family /  Patient: Updated  (son and daughter in law) at bedside.     Current Length of Stay: 10 day(s)  Current Patient Status: Inpatient   Certification Statement: The patient will continue to require additional inpatient hospital stay due to ongoing iv abx and pending official intra-op cultures before discharge to rehab [facility]  Discharge Plan: Anticipate discharge in 48-72 hrs to rehab facility.    Code Status: Level 1 - Full Code    Subjective   Pt reports she has pain is slow to respond when answering questions but seems more alert and responsive then day prior. No n/v/d no shortness of breath . We discussed getting oob to work with therapy . She said she has a lot of pain with movement we discussed requesting pain medications     Objective :  Temp:  [97.9 °F (36.6 °C)-98.1 °F (36.7 °C)] 98 °F (36.7 °C)  HR:  [] 95  BP: (107-128)/(68-82) 115/68  Resp:  [14-16] 16  SpO2:  [91 %-96 %] 92 %  O2 Device: None (Room air)    Body mass index is 33.66 kg/m².     Input and Output Summary (last 24 hours):     Intake/Output Summary (Last 24 hours) at 5/24/2025 1212  Last data filed at 5/23/2025 1830  Gross per 24 hour   Intake 703.33 ml   Output 200 ml   Net 503.33 ml       Physical Exam  Constitutional:       General: She is not in acute distress.     Appearance: She is not ill-appearing, toxic-appearing or diaphoretic.   HENT:      Head: Normocephalic and atraumatic.      Nose: No congestion.     Eyes:      General:         Right eye: No discharge.         Left eye: No discharge.       Cardiovascular:      Rate and Rhythm: Normal rate.      Heart sounds: Murmur heard.      No friction rub. No gallop.   Pulmonary:      Effort: No respiratory distress.      Breath sounds: No stridor. No wheezing, rhonchi or rales.   Chest:      Chest wall: No tenderness.   Abdominal:      General: There is no distension.      Palpations: There is no mass.      Tenderness: There is no abdominal tenderness. There is no guarding or  rebound.      Hernia: No hernia is present.     Musculoskeletal:         General: Swelling (right hand) and tenderness (right lateral thigh tight and tender) present. No deformity or signs of injury.      Right lower leg: Edema (right lateral thigh dressing DSD intact with abd pad drssing intact no drainage) present.      Left lower leg: No edema.     Skin:     Coloration: Skin is not jaundiced or pale.      Findings: No bruising, erythema, lesion or rash.     Neurological:      Mental Status: She is alert and oriented to person, place, and time.      Comments: Can be forgetful    Psychiatric:         Behavior: Behavior normal.           Lines/Drains:  Lines/Drains/Airways       Active Status       Name Placement date Placement time Site Days    PICC Line 05/22/25 Right Basilic 05/22/25  1422  Basilic  1    External Urinary Catheter 05/23/25  1035  -- 1                    Central Line:  Goal for removal: N/A - Discharging with PICC for IV ABX/medications               Lab Results: I have reviewed the following results:   Results from last 7 days   Lab Units 05/24/25  0531   WBC Thousand/uL 16.31*   HEMOGLOBIN g/dL 7.6*   HEMATOCRIT % 23.3*   PLATELETS Thousands/uL 376   SEGS PCT % 82*   LYMPHO PCT % 9*   MONO PCT % 7   EOS PCT % 1     Results from last 7 days   Lab Units 05/24/25  0531   SODIUM mmol/L 130*   POTASSIUM mmol/L 4.2   CHLORIDE mmol/L 99   CO2 mmol/L 26   BUN mg/dL 20   CREATININE mg/dL 1.21   ANION GAP mmol/L 5   CALCIUM mg/dL 7.8*   ALBUMIN g/dL 2.2*   TOTAL BILIRUBIN mg/dL 0.58   ALK PHOS U/L 41   ALT U/L 11   AST U/L 16   GLUCOSE RANDOM mg/dL 96     Results from last 7 days   Lab Units 05/20/25  0847   INR  1.19                   Recent Cultures (last 7 days):   Results from last 7 days   Lab Units 05/21/25  1352   GRAM STAIN RESULT  1+ Polys  No bacteria seen       Imaging Results Review: No pertinent imaging studies reviewed.  Other Study Results Review: No additional pertinent studies  reviewed.    Last 24 Hours Medication List:     Current Facility-Administered Medications:     acetaminophen (TYLENOL) tablet 650 mg, Q4H PRN    amLODIPine (NORVASC) tablet 10 mg, Daily    ascorbic acid (VITAMIN C) tablet 500 mg, BID    bisacodyl (DULCOLAX) rectal suppository 10 mg, Daily PRN    calcium carbonate (TUMS) chewable tablet 1,000 mg, Daily PRN    Cholecalciferol (VITAMIN D3) tablet 2,000 Units, Daily    docusate sodium (COLACE) capsule 100 mg, BID    enoxaparin (LOVENOX) subcutaneous injection 40 mg, Daily    ferrous sulfate tablet 325 mg, BID With Meals    folic acid (FOLVITE) tablet 1 mg, Daily    gabapentin (NEURONTIN) capsule 600 mg, HS    HYDROmorphone (DILAUDID) injection 0.5 mg, Q4H PRN    lactated ringers infusion, Continuous, Last Rate: Stopped (05/22/25 2300)    loratadine (CLARITIN) tablet 10 mg, Daily    metroNIDAZOLE (FLAGYL) tablet 500 mg, Q12H FELIPA    multivitamin-minerals (CENTRUM) tablet 1 tablet, Daily    mupirocin (BACTROBAN) 2 % ointment, Daily    ondansetron (ZOFRAN) injection 4 mg, Q6H PRN    oxyCODONE (ROXICODONE) immediate release tablet 10 mg, Q4H PRN    oxyCODONE (ROXICODONE) IR tablet 5 mg, Q4H PRN    pantoprazole (PROTONIX) EC tablet 40 mg, Early Morning    polyethylene glycol (MIRALAX) packet 17 g, Daily    pravastatin (PRAVACHOL) tablet 40 mg, HS    senna (SENOKOT) tablet 8.6 mg, HS    traZODone (DESYREL) tablet 100 mg, HS    vancomycin (VANCOCIN) IVPB (premix in dextrose) 1,000 mg 200 mL, Q24H, Last Rate: 1,000 mg (05/24/25 0905)    venlafaxine (EFFEXOR-XR) 24 hr capsule 150 mg, Daily    Administrative Statements   Today, Patient Was Seen By: LOTUS Bazan      **Please Note: This note may have been constructed using a voice recognition system.**

## 2025-05-24 NOTE — PROGRESS NOTES
"Progress Note - Orthopedics   Name: Sharon Vega 74 y.o. female I MRN: [id number]  Unit/Bed#: -01 I Date of Admission: 5/14/2025   Date of Service: 5/24/2025 I Hospital Day: 10    Assessment & Plan  Abscess of right hip  74 y.o.female with right hip PJI now POD 3 right total hip arthroplasty with antibiotic spacer. Picc line placed, ID following with recommendations for antibiotics.  Cultures show no growth thus far.    Partial weightbearing right lower extremity  Posterior hip precautions  Abduction pillow at all times when in bed and sitting  PT/OT  Pain control  DVT ppx: per primary  Medical management including antibiotics per primary team  ID consulted, appreciate recs  Dispo planning        Subjective   74 y.o.female  No acute events, no new complaints. Pain well controlled. Denies fevers, chills, CP, SOB, N/V, numbness or tingling. Patient reports no issues with urination or bowel movements.    Objective :  Temp:  [97.9 °F (36.6 °C)-99.8 °F (37.7 °C)] 98 °F (36.7 °C)  HR:  [100-110] 109  BP: (105-128)/(66-82) 119/70  Resp:  [14-16] 14  SpO2:  [91 %-96 %] 92 %  O2 Device: Nasal cannula  Nasal Cannula O2 Flow Rate (L/min):  [2 L/min] 2 L/min    Physical Exam  Musculoskeletal: RLE  Dressing c/d/i  TTP galindo incisionally  SILT s/s/t/sp/dp  Motor intact EHL/FHL, ankle df/pf  2+ DP pulse      Lab Results: I have reviewed the following results:  Recent Labs     05/22/25  0613 05/23/25  0522 05/23/25  1808 05/24/25  0531   WBC 16.21* 19.15*  --  16.31*   HGB 8.5* 7.5* 8.8* 7.6*   HCT 25.9* 23.2*  --  23.3*   * 447*  --  376   BUN 16 16  --   --    CREATININE 1.03 1.17  --   --      Blood Culture:    Lab Results   Component Value Date    BLOODCX No Growth After 5 Days. 05/14/2025     Wound Culture: No results found for: \"WOUNDCULT\"    "

## 2025-05-24 NOTE — ASSESSMENT & PLAN NOTE
1/2 blood cultures at [place] with Finegoldia magna  Blood cultures repeated at [facility] with no growth at 5 days   Continue IV Vanco and PO Flagyl  F/u ID input   Pt now with picc line in left arm 5/22 noted edema in hand continue to monitor   Elevate hand/arm

## 2025-05-24 NOTE — ASSESSMENT & PLAN NOTE
"Patient presented from [facility] with right hip pain   Had recent IR hip joint aspiration on 4/24, was scheduled for conversion of partial hip replacement to total hip on 5/14 but cancelled due to leukocytosis   CT scan obtained with evidence of \"post surgical change from right hip hemiarthroplasty. Organized collection of fluid and gas in the right iliac muscle and similar collection surrounding right hip arthroplasty in the deep gluteal muscles measuring up to 10 cm\"    Transferred to [facility] for orthopedic evaluation   Orthopedics following   S/p IR aspiration and drain placement x 2 with synovasure culture on 5/15   Cultures also obtained from [facility] drain bulb -- Fingegoldia magna, Peptoniphilus raul    Appreciate ongoing orthopedic recommendations -- s/p revision right total hip arthoplasty with antibiotic spacer 5/21   Partial weight bearing right lower extremity   Posterior hip precautions   ID following for abx management   Currently on IV Vancomycin and p.o. Flagyl  Cx from drain with Fingegoldia magna, Venecias raul.  F/u antibiotic recs  1/2 blood cultures at [place] with [facility]PC in clusters.  Late growth.  Await identification, prelim with anaerobe, c/w culture from drains.   Blood cultures obtained at [facility] are negative at 5 days   As needed analgesics, currently on PRN oxycodone 5/10 mg Q4H for moderate and severe pain with PRN IV dilaudid 0.5 mg Q4H for breakthrough   "

## 2025-05-24 NOTE — ASSESSMENT & PLAN NOTE
Previously hgb noted to be around 12   Dropped to 6.4 with hypotension post op, received 1 unit PRBCs with improvement,   5/23 pt with hh of 7.5/23.2 will order another unit PRBC now SLIM obtained consent placed in chart at   5/23 5/24 Additional unit PRBC's now / stop iv fluids

## 2025-05-24 NOTE — ASSESSMENT & PLAN NOTE
74 y.o.female with right hip PJI now POD 3 right total hip arthroplasty with antibiotic spacer. Picc line placed, ID following with recommendations for antibiotics.  Cultures show no growth thus far.    Partial weightbearing right lower extremity  Posterior hip precautions  Abduction pillow at all times when in bed and sitting  PT/OT  Pain control  DVT ppx: per primary  Medical management including antibiotics per primary team  ID consulted, appreciate recs  Dispo planning

## 2025-05-24 NOTE — PLAN OF CARE
Problem: PAIN - ADULT  Goal: Verbalizes/displays adequate comfort level or baseline comfort level  Description: Interventions:  - Encourage patient to monitor pain and request assistance  - Assess pain using appropriate pain scale  - Administer analgesics as ordered based on type and severity of pain and evaluate response  - Implement non-pharmacological measures as appropriate and evaluate response  - Consider cultural and social influences on pain and pain management  - Notify physician/advanced practitioner if interventions unsuccessful or patient reports new pain  - Educate patient/family on pain management process including their role and importance of  reporting pain   - Provide non-pharmacologic/complimentary pain relief interventions  Outcome: Progressing     Problem: INFECTION - ADULT  Goal: Absence or prevention of progression during hospitalization  Description: INTERVENTIONS:  - Assess and monitor for signs and symptoms of infection  - Monitor lab/diagnostic results  - Monitor all insertion sites, i.e. indwelling lines, tubes, and drains  - Monitor endotracheal if appropriate and nasal secretions for changes in amount and color  - [place] appropriate cooling/warming therapies per order  - Administer medications as ordered  - Instruct and encourage patient and family to use good hand hygiene technique  - Identify and instruct in appropriate isolation precautions for identified infection/condition  Outcome: Progressing     Problem: INFECTION - ADULT  Goal: Absence of fever/infection during neutropenic period  Description: INTERVENTIONS:  - Monitor WBC  - Perform strict hand hygiene  - Limit to healthy visitors only  - No plants, dried, fresh or silk flowers with feliciano in patient room  Outcome: Progressing     Problem: SAFETY ADULT  Goal: Patient will remain free of falls  Description: INTERVENTIONS:  - Educate patient/family on patient safety including physical limitations  - Instruct patient to call  for assistance with activity   - Consider consulting OT/PT to assist with strengthening/mobility based on AM PAC & JH-HLM score  - Consult OT/PT to assist with strengthening/mobility   - Keep Call bell within reach  - Keep bed low and locked with side rails adjusted as appropriate  - Keep care items and personal belongings within reach  - Initiate and maintain comfort rounds  - Make Fall Risk Sign visible to staff  - Offer Toileting every 2 Hours, in advance of need  - Initiate/Maintain bed/chair alarm  - Obtain necessary fall risk management equipment:   - Apply yellow socks and bracelet for high fall risk patients  - Consider moving patient to room near nurses station  Outcome: Progressing     Problem: SAFETY ADULT  Goal: Maintain or return to baseline ADL function  Description: INTERVENTIONS:  -  Assess patient's ability to carry out ADLs; assess patient's baseline for ADL function and identify physical deficits which impact ability to perform ADLs (bathing, care of mouth/teeth, toileting, grooming, dressing, etc.)  - Assess/evaluate cause of self-care deficits   - Assess range of motion  - Assess patient's mobility; develop plan if impaired  - Assess patient's need for assistive devices and provide as appropriate  - Encourage maximum independence but intervene and supervise when necessary  - Involve family in performance of ADLs  - Assess for home care needs following discharge   - Consider OT consult to assist with ADL evaluation and planning for discharge  - Provide patient education as appropriate  - Monitor functional capacity and physical performance, use of AM PAC & JH-HLM   - Monitor gait, balance and fatigue with ambulation    Outcome: Progressing     Problem: SAFETY ADULT  Goal: Maintains/Returns to pre admission functional level  Description: INTERVENTIONS:  - Perform AM-PAC 6 Click Basic Mobility/ Daily Activity assessment daily.  - Set and communicate daily mobility goal to care team and  patient/family/caregiver.   - Collaborate with rehabilitation services on mobility goals if consulted  - Perform Range of Motion 3 times a day.  - Reposition patient every 2 hours.  - Dangle patient 3 times a day  - Stand patient 3 times a day  - Ambulate patient 3 times a day  - Out of bed to chair 3 times a day   - Out of bed for meals 3 times a day  - Out of bed for toileting  - Record patient progress and toleration of activity level   Outcome: Progressing     Problem: DISCHARGE PLANNING  Goal: Discharge to home or other facility with appropriate resources  Description: INTERVENTIONS:  - Identify barriers to discharge w/patient and caregiver  - Arrange for needed discharge resources and transportation as appropriate  - Identify discharge learning needs (meds, wound care, etc.)  - Arrange for interpretive services to assist at discharge as needed  - Refer to Case Management Department for coordinating discharge planning if the patient needs post-hospital services based on physician/advanced practitioner order or complex needs related to functional status, cognitive ability, or social support system  Outcome: Progressing

## 2025-05-24 NOTE — PHYSICAL THERAPY NOTE
Physical Therapy Cancellation Note    The patient was attempted, but she reports not feeling up to any mobility today. Provided her two ice packs to help reduce her pain. Will re-attempt tomorrow.    Yash Jacobs, PTA

## 2025-05-24 NOTE — ASSESSMENT & PLAN NOTE
Continue bowel regimen   On colace bid and senna daily at hs   Added daily miralax and prn lactulose now

## 2025-05-25 PROBLEM — E16.2 LOW BLOOD SUGAR: Status: ACTIVE | Noted: 2025-05-25

## 2025-05-25 LAB
ABO GROUP BLD BPU: NORMAL
ALBUMIN SERPL BCG-MCNC: 2.2 G/DL (ref 3.5–5)
ALP SERPL-CCNC: 48 U/L (ref 34–104)
ALT SERPL W P-5'-P-CCNC: 10 U/L (ref 7–52)
ANION GAP SERPL CALCULATED.3IONS-SCNC: 6 MMOL/L (ref 4–13)
AST SERPL W P-5'-P-CCNC: 15 U/L (ref 13–39)
BASOPHILS # BLD AUTO: 0.06 THOUSANDS/ÂΜL (ref 0–0.1)
BASOPHILS NFR BLD AUTO: 0 % (ref 0–1)
BILIRUB SERPL-MCNC: 0.51 MG/DL (ref 0.2–1)
BPU ID: NORMAL
BUN SERPL-MCNC: 18 MG/DL (ref 5–25)
CALCIUM ALBUM COR SERPL-MCNC: 9.2 MG/DL (ref 8.3–10.1)
CALCIUM SERPL-MCNC: 7.8 MG/DL (ref 8.4–10.2)
CHLORIDE SERPL-SCNC: 100 MMOL/L (ref 96–108)
CO2 SERPL-SCNC: 27 MMOL/L (ref 21–32)
CREAT SERPL-MCNC: 1.14 MG/DL (ref 0.6–1.3)
CROSSMATCH: NORMAL
EOSINOPHIL # BLD AUTO: 0.22 THOUSAND/ÂΜL (ref 0–0.61)
EOSINOPHIL NFR BLD AUTO: 2 % (ref 0–6)
ERYTHROCYTE [DISTWIDTH] IN BLOOD BY AUTOMATED COUNT: 15.3 % (ref 11.6–15.1)
GFR SERPL CREATININE-BSD FRML MDRD: 47 ML/MIN/1.73SQ M
GLUCOSE SERPL-MCNC: 104 MG/DL (ref 65–140)
GLUCOSE SERPL-MCNC: 121 MG/DL (ref 65–140)
GLUCOSE SERPL-MCNC: 179 MG/DL (ref 65–140)
GLUCOSE SERPL-MCNC: 64 MG/DL (ref 65–140)
GLUCOSE SERPL-MCNC: 91 MG/DL (ref 65–140)
GLUCOSE SERPL-MCNC: 96 MG/DL (ref 65–140)
HCT VFR BLD AUTO: 26.4 % (ref 34.8–46.1)
HGB BLD-MCNC: 8.7 G/DL (ref 11.5–15.4)
IMM GRANULOCYTES # BLD AUTO: 0.2 THOUSAND/UL (ref 0–0.2)
IMM GRANULOCYTES NFR BLD AUTO: 1 % (ref 0–2)
LYMPHOCYTES # BLD AUTO: 1.47 THOUSANDS/ÂΜL (ref 0.6–4.47)
LYMPHOCYTES NFR BLD AUTO: 10 % (ref 14–44)
MAGNESIUM SERPL-MCNC: 1.7 MG/DL (ref 1.9–2.7)
MCH RBC QN AUTO: 30.1 PG (ref 26.8–34.3)
MCHC RBC AUTO-ENTMCNC: 33 G/DL (ref 31.4–37.4)
MCV RBC AUTO: 91 FL (ref 82–98)
MONOCYTES # BLD AUTO: 1.35 THOUSAND/ÂΜL (ref 0.17–1.22)
MONOCYTES NFR BLD AUTO: 9 % (ref 4–12)
NEUTROPHILS # BLD AUTO: 11.44 THOUSANDS/ÂΜL (ref 1.85–7.62)
NEUTS SEG NFR BLD AUTO: 78 % (ref 43–75)
NRBC BLD AUTO-RTO: 0 /100 WBCS
PLATELET # BLD AUTO: 445 THOUSANDS/UL (ref 149–390)
PMV BLD AUTO: 9 FL (ref 8.9–12.7)
POTASSIUM SERPL-SCNC: 4.1 MMOL/L (ref 3.5–5.3)
PROT SERPL-MCNC: 4.8 G/DL (ref 6.4–8.4)
RBC # BLD AUTO: 2.89 MILLION/UL (ref 3.81–5.12)
SODIUM SERPL-SCNC: 133 MMOL/L (ref 135–147)
UNIT DISPENSE STATUS: NORMAL
UNIT PRODUCT CODE: NORMAL
UNIT PRODUCT VOLUME: 350 ML
UNIT RH: NORMAL
WBC # BLD AUTO: 14.74 THOUSAND/UL (ref 4.31–10.16)

## 2025-05-25 PROCEDURE — NC001 PR NO CHARGE: Performed by: ORTHOPAEDIC SURGERY

## 2025-05-25 PROCEDURE — 80053 COMPREHEN METABOLIC PANEL: CPT | Performed by: NURSE PRACTITIONER

## 2025-05-25 PROCEDURE — 85025 COMPLETE CBC W/AUTO DIFF WBC: CPT | Performed by: NURSE PRACTITIONER

## 2025-05-25 PROCEDURE — 99232 SBSQ HOSP IP/OBS MODERATE 35: CPT | Performed by: NURSE PRACTITIONER

## 2025-05-25 PROCEDURE — 82948 REAGENT STRIP/BLOOD GLUCOSE: CPT

## 2025-05-25 PROCEDURE — 83735 ASSAY OF MAGNESIUM: CPT | Performed by: NURSE PRACTITIONER

## 2025-05-25 RX ORDER — INSULIN LISPRO 100 [IU]/ML
1-5 INJECTION, SOLUTION INTRAVENOUS; SUBCUTANEOUS
Status: DISCONTINUED | OUTPATIENT
Start: 2025-05-25 | End: 2025-05-27 | Stop reason: HOSPADM

## 2025-05-25 RX ORDER — LACTULOSE 10 G/15ML
30 SOLUTION ORAL DAILY PRN
Status: DISCONTINUED | OUTPATIENT
Start: 2025-05-25 | End: 2025-05-27 | Stop reason: HOSPADM

## 2025-05-25 RX ADMIN — METRONIDAZOLE 500 MG: 500 TABLET ORAL at 20:46

## 2025-05-25 RX ADMIN — OXYCODONE HYDROCHLORIDE AND ACETAMINOPHEN 500 MG: 500 TABLET ORAL at 08:30

## 2025-05-25 RX ADMIN — ENOXAPARIN SODIUM 40 MG: 40 INJECTION SUBCUTANEOUS at 11:15

## 2025-05-25 RX ADMIN — MUPIROCIN: 20 OINTMENT TOPICAL at 09:40

## 2025-05-25 RX ADMIN — OXYCODONE HYDROCHLORIDE AND ACETAMINOPHEN 500 MG: 500 TABLET ORAL at 17:20

## 2025-05-25 RX ADMIN — PRAVASTATIN SODIUM 40 MG: 40 TABLET ORAL at 20:46

## 2025-05-25 RX ADMIN — Medication 1 TABLET: at 08:30

## 2025-05-25 RX ADMIN — INSULIN LISPRO 1 UNITS: 100 INJECTION, SOLUTION INTRAVENOUS; SUBCUTANEOUS at 11:15

## 2025-05-25 RX ADMIN — OXYCODONE HYDROCHLORIDE 10 MG: 10 TABLET ORAL at 20:46

## 2025-05-25 RX ADMIN — FOLIC ACID 1 MG: 1 TABLET ORAL at 08:30

## 2025-05-25 RX ADMIN — VENLAFAXINE HYDROCHLORIDE 150 MG: 150 CAPSULE, EXTENDED RELEASE ORAL at 08:30

## 2025-05-25 RX ADMIN — AMLODIPINE BESYLATE 10 MG: 10 TABLET ORAL at 08:30

## 2025-05-25 RX ADMIN — FERROUS SULFATE TAB 325 MG (65 MG ELEMENTAL FE) 325 MG: 325 (65 FE) TAB at 08:30

## 2025-05-25 RX ADMIN — GABAPENTIN 600 MG: 300 CAPSULE ORAL at 20:46

## 2025-05-25 RX ADMIN — TRAZODONE HYDROCHLORIDE 100 MG: 100 TABLET ORAL at 20:46

## 2025-05-25 RX ADMIN — METRONIDAZOLE 500 MG: 500 TABLET ORAL at 08:30

## 2025-05-25 RX ADMIN — PANTOPRAZOLE SODIUM 40 MG: 40 TABLET, DELAYED RELEASE ORAL at 05:23

## 2025-05-25 RX ADMIN — DOCUSATE SODIUM 100 MG: 100 CAPSULE, LIQUID FILLED ORAL at 08:30

## 2025-05-25 RX ADMIN — VANCOMYCIN HYDROCHLORIDE 1000 MG: 1 INJECTION, SOLUTION INTRAVENOUS at 09:40

## 2025-05-25 RX ADMIN — Medication 2000 UNITS: at 08:30

## 2025-05-25 RX ADMIN — DOCUSATE SODIUM 100 MG: 100 CAPSULE, LIQUID FILLED ORAL at 17:20

## 2025-05-25 RX ADMIN — POLYETHYLENE GLYCOL 3350 17 G: 17 POWDER, FOR SOLUTION ORAL at 08:31

## 2025-05-25 RX ADMIN — OXYCODONE HYDROCHLORIDE 10 MG: 10 TABLET ORAL at 09:40

## 2025-05-25 RX ADMIN — FERROUS SULFATE TAB 325 MG (65 MG ELEMENTAL FE) 325 MG: 325 (65 FE) TAB at 17:20

## 2025-05-25 RX ADMIN — SENNOSIDES 8.6 MG: 8.6 TABLET, FILM COATED ORAL at 20:46

## 2025-05-25 RX ADMIN — LORATADINE 10 MG: 10 TABLET ORAL at 08:30

## 2025-05-25 NOTE — PLAN OF CARE
Problem: PAIN - ADULT  Goal: Verbalizes/displays adequate comfort level or baseline comfort level  Description: Interventions:  - Encourage patient to monitor pain and request assistance  - Assess pain using appropriate pain scale  - Administer analgesics as ordered based on type and severity of pain and evaluate response  - Implement non-pharmacological measures as appropriate and evaluate response  - Consider cultural and social influences on pain and pain management  - Notify physician/advanced practitioner if interventions unsuccessful or patient reports new pain  - Educate patient/family on pain management process including their role and importance of  reporting pain   - Provide non-pharmacologic/complimentary pain relief interventions  Outcome: Progressing     Problem: INFECTION - ADULT  Goal: Absence or prevention of progression during hospitalization  Description: INTERVENTIONS:  - Assess and monitor for signs and symptoms of infection  - Monitor lab/diagnostic results  - Monitor all insertion sites, i.e. indwelling lines, tubes, and drains  - Monitor endotracheal if appropriate and nasal secretions for changes in amount and color  - [place] appropriate cooling/warming therapies per order  - Administer medications as ordered  - Instruct and encourage patient and family to use good hand hygiene technique  - Identify and instruct in appropriate isolation precautions for identified infection/condition  Outcome: Progressing  Goal: Absence of fever/infection during neutropenic period  Description: INTERVENTIONS:  - Monitor WBC  - Perform strict hand hygiene  - Limit to healthy visitors only  - No plants, dried, fresh or silk flowers with feliciano in patient room  Outcome: Progressing     Problem: DISCHARGE PLANNING  Goal: Discharge to home or other facility with appropriate resources  Description: INTERVENTIONS:  - Identify barriers to discharge w/patient and caregiver  - Arrange for needed discharge resources  and transportation as appropriate  - Identify discharge learning needs (meds, wound care, etc.)  - Arrange for interpretive services to assist at discharge as needed  - Refer to Case Management Department for coordinating discharge planning if the patient needs post-hospital services based on physician/advanced practitioner order or complex needs related to functional status, cognitive ability, or social support system  Outcome: Progressing     Problem: Knowledge Deficit  Goal: Patient/family/caregiver demonstrates understanding of disease process, treatment plan, medications, and discharge instructions  Description: Complete learning assessment and assess knowledge base.  Interventions:  - Provide teaching at level of understanding  - Provide teaching via preferred learning methods  Outcome: Progressing     Problem: Prexisting or High Potential for Compromised Skin Integrity  Goal: Skin integrity is maintained or improved  Description: INTERVENTIONS:  - Identify patients at risk for skin breakdown  - Assess and monitor skin integrity including under and around medical devices   - Assess and monitor nutrition and hydration status  - Monitor labs  - Assess for incontinence   - Turn and reposition patient  - Assist with mobility/ambulation  - Relieve pressure over mane prominences   - Avoid friction and shearing  - Provide appropriate hygiene as needed including keeping skin clean and dry  - Evaluate need for skin moisturizer/barrier cream  - Collaborate with interdisciplinary team  - Patient/family teaching  - Consider wound care consult    Assess:  - Review Gamaliel scale daily  - Clean and moisturize skin every daY  - Inspect skin when repositioning, toileting, and assisting with ADLS  - Assess under medical devices such as  every   - Assess extremities for adequate circulation and sensation     Bed Management:  - Have minimal linens on bed & keep smooth, unwrinkled  - Change linens as needed when moist or  perspiring  - Avoid sitting or lying in one position for more than 2 hours while in bed?Keep HOB at 30degrees   - Toileting:  - Offer bedside commode  - Assess for incontinence every 2H  - Use incontinent care products after each incontinent episode such as     Activity:  - Mobilize patient 2 times a day  - Encourage activity and walks on unit  - Encourage or provide ROM exercises   - Turn and reposition patient every 3 Hours  - Use appropriate equipment to lift or move patient in bed  - Instruct/ Assist with weight shifting every 3 when out of bed in chair  - Consider limitation of chair time 3 hour intervals    Skin Care:  - Avoid use of baby powder, tape, friction and shearing, hot water or constrictive clothing  - Relieve pressure over bony prominences using ALLEVYN  - Do not massage red bony areas    Next Steps:  - Teach patient strategies to minimize risks such as   - Consider consults to  interdisciplinary teams such as   Outcome: Progressing     Problem: Nutrition/Hydration-ADULT  Goal: Nutrient/Hydration intake appropriate for improving, restoring or maintaining nutritional needs  Description: Monitor and assess patient's nutrition/hydration status for malnutrition. Collaborate with interdisciplinary team and initiate plan and interventions as ordered.  Monitor patient's weight and dietary intake as ordered or per policy. Utilize nutrition screening tool and intervene as necessary. Determine patient's food preferences and provide high-protein, high-caloric foods as appropriate.     INTERVENTIONS:  - Monitor oral intake, urinary output, labs, and treatment plans  - Assess nutrition and hydration status and recommend course of action  - Evaluate amount of meals eaten  - Assist patient with eating if necessary   - Allow adequate time for meals  - Recommend/ encourage appropriate diets, oral nutritional supplements, and vitamin/mineral supplements  - Order, calculate, and assess calorie counts as needed  -  Recommend, monitor, and adjust tube feedings and TPN/PPN based on assessed needs  - Assess need for intravenous fluids  - Provide specific nutrition/hydration education as appropriate  - Include patient/family/caregiver in decisions related to nutrition  Outcome: Progressing

## 2025-05-25 NOTE — ASSESSMENT & PLAN NOTE
Patient has low blood sugars this am fasting 64   Encouraged patient to eat - has ensure shakes  Will add SSI TID and HS at this time just to monitor blood sugars at this time   Will chk A1 c in the am

## 2025-05-25 NOTE — ASSESSMENT & PLAN NOTE
Mumur noted on exam, prior echo with mild to moderate AS  Obtained repeat echo with ef of 65% and moderate aortic stenosis mildly dilated

## 2025-05-25 NOTE — PLAN OF CARE
Problem: PAIN - ADULT  Goal: Verbalizes/displays adequate comfort level or baseline comfort level  Description: Interventions:  - Encourage patient to monitor pain and request assistance  - Assess pain using appropriate pain scale  - Administer analgesics as ordered based on type and severity of pain and evaluate response  - Implement non-pharmacological measures as appropriate and evaluate response  - Consider cultural and social influences on pain and pain management  - Notify physician/advanced practitioner if interventions unsuccessful or patient reports new pain  - Educate patient/family on pain management process including their role and importance of  reporting pain   - Provide non-pharmacologic/complimentary pain relief interventions  Outcome: Progressing     Problem: INFECTION - ADULT  Goal: Absence or prevention of progression during hospitalization  Description: INTERVENTIONS:  - Assess and monitor for signs and symptoms of infection  - Monitor lab/diagnostic results  - Monitor all insertion sites, i.e. indwelling lines, tubes, and drains  - Monitor endotracheal if appropriate and nasal secretions for changes in amount and color  - [place] appropriate cooling/warming therapies per order  - Administer medications as ordered  - Instruct and encourage patient and family to use good hand hygiene technique  - Identify and instruct in appropriate isolation precautions for identified infection/condition  Outcome: Progressing     Problem: INFECTION - ADULT  Goal: Absence of fever/infection during neutropenic period  Description: INTERVENTIONS:  - Monitor WBC  - Perform strict hand hygiene  - Limit to healthy visitors only  - No plants, dried, fresh or silk flowers with feliciano in patient room  Outcome: Progressing     Problem: SAFETY ADULT  Goal: Patient will remain free of falls  Description: INTERVENTIONS:  - Educate patient/family on patient safety including physical limitations  - Instruct patient to call  for assistance with activity   - Consider consulting OT/PT to assist with strengthening/mobility based on AM PAC & JH-HLM score  - Consult OT/PT to assist with strengthening/mobility   - Keep Call bell within reach  - Keep bed low and locked with side rails adjusted as appropriate  - Keep care items and personal belongings within reach  - Initiate and maintain comfort rounds  - Make Fall Risk Sign visible to staff  - Offer Toileting every 2 Hours, in advance of need  - Initiate/Maintain bed/ chair alarm  - Obtain necessary fall risk management equipment:   - Apply yellow socks and bracelet for high fall risk patients  - Consider moving patient to room near nurses station  Outcome: Progressing     Problem: SAFETY ADULT  Goal: Maintain or return to baseline ADL function  Description: INTERVENTIONS:  -  Assess patient's ability to carry out ADLs; assess patient's baseline for ADL function and identify physical deficits which impact ability to perform ADLs (bathing, care of mouth/teeth, toileting, grooming, dressing, etc.)  - Assess/evaluate cause of self-care deficits   - Assess range of motion  - Assess patient's mobility; develop plan if impaired  - Assess patient's need for assistive devices and provide as appropriate  - Encourage maximum independence but intervene and supervise when necessary  - Involve family in performance of ADLs  - Assess for home care needs following discharge   - Consider OT consult to assist with ADL evaluation and planning for discharge  - Provide patient education as appropriate  - Monitor functional capacity and physical performance, use of AM PAC & JH-HLM   - Monitor gait, balance and fatigue with ambulation    Outcome: Progressing     Problem: SAFETY ADULT  Goal: Maintains/Returns to pre admission functional level  Description: INTERVENTIONS:  - Perform AM-PAC 6 Click Basic Mobility/ Daily Activity assessment daily.  - Set and communicate daily mobility goal to care team and  patient/family/caregiver.   - Collaborate with rehabilitation services on mobility goals if consulted  - Perform Range of Motion 3 times a day.  - Reposition patient every 2 hours.  - Dangle patient 3 times a day  - Stand patient 3 times a day  - Ambulate patient 3 times a day  - Out of bed to chair 3 times a day   - Out of bed for meals 3 times a day  - Out of bed for toileting  - Record patient progress and toleration of activity level   Outcome: Progressing     Problem: DISCHARGE PLANNING  Goal: Discharge to home or other facility with appropriate resources  Description: INTERVENTIONS:  - Identify barriers to discharge w/patient and caregiver  - Arrange for needed discharge resources and transportation as appropriate  - Identify discharge learning needs (meds, wound care, etc.)  - Arrange for interpretive services to assist at discharge as needed  - Refer to Case Management Department for coordinating discharge planning if the patient needs post-hospital services based on physician/advanced practitioner order or complex needs related to functional status, cognitive ability, or social support system  Outcome: Progressing

## 2025-05-25 NOTE — ASSESSMENT & PLAN NOTE
Continue bowel regimen   On colace bid and senna daily at    Added daily miralax and prn lactulose now   Now attempting to go 5/25

## 2025-05-25 NOTE — PROGRESS NOTES
"Progress Note - Orthopedics   Name: Sharon Vega 74 y.o. 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 Picc line placed, ID following with recommendations for antibiotics.     Partial weightbearing right lower extremity  Posterior hip precautions  Abduction pillow at all times when in bed and sitting  PT/OT  Pain control  DVT ppx: per primary  Medical management including antibiotics per primary team  ID consulted, appreciate recs - culture growing Finegoldia magna and peptoniphilus harei from OR culture  Dispo planning      Subjective   74 y.o.female  No acute events, no new complaints. Pain well controlled. Denies fevers, chills, CP, SOB, N/V, numbness or tingling.     Objective :  Temp:  [97.7 °F (36.5 °C)-99 °F (37.2 °C)] 98.5 °F (36.9 °C)  HR:  [] 102  BP: (108-124)/(62-71) 124/71  Resp:  [16-17] 16  SpO2:  [90 %-92 %] 90 %  O2 Device: None (Room air)    Physical Exam  Musculoskeletal: RLE  Dressing c/d/i  TTP galindo incisionally  SILT s/s/t/sp/dp  Motor intact EHL/FHL, ankle df/pf  2+ DP pulse      Lab Results: I have reviewed the following results:  Recent Labs     05/23/25  0522 05/23/25  1808 05/24/25  0531 05/25/25  0522   WBC 19.15*  --  16.31* 14.74*   HGB 7.5* 8.8* 7.6* 8.7*   HCT 23.2*  --  23.3* 26.4*   *  --  376 445*   BUN 16  --  20 18   CREATININE 1.17  --  1.21 1.14     Blood Culture:    Lab Results   Component Value Date    BLOODCX No Growth After 5 Days. 05/14/2025     Wound Culture: No results found for: \"WOUNDCULT\"    "

## 2025-05-25 NOTE — ASSESSMENT & PLAN NOTE
Continued now 130--> 133  Possibly in setting of sepsis, volume depletion   S/p IV fluids no further fluids at this time   Monitor sodium levels closely   Trend BMP  Stable

## 2025-05-25 NOTE — ASSESSMENT & PLAN NOTE
"Patient presented from [facility] with right hip pain   Had recent IR hip joint aspiration on 4/24, was scheduled for conversion of partial hip replacement to total hip on 5/14 but cancelled due to leukocytosis   CT scan obtained with evidence of \"post surgical change from right hip hemiarthroplasty. Organized collection of fluid and gas in the right iliac muscle and similar collection surrounding right hip arthroplasty in the deep gluteal muscles measuring up to 10 cm\"    Transferred to [facility] for orthopedic evaluation   Orthopedics following   S/p IR aspiration and drain placement x 2 with synovasure culture on 5/15   Cultures also obtained from [facility] drain bulb -- Fingegoldia magna, Peptoniphilus harei    Appreciate ongoing orthopedic recommendations -- s/p revision right total hip arthoplasty with antibiotic spacer 5/21   Partial weight bearing right lower extremity   Posterior hip precautions   ID following for abx management   Currently on IV Vancomycin and p.o. Flagyl  Cx from drain with Yania magna, Peptisaiasphitrishs hartiny.  F/u antibiotic recs  1/2 blood cultures at [place] with Finegoldia magna.  Late growth.   c/w culture from drains.   Blood cultures obtained at [facility] are negative at 5 days   Perioperative cultures obtained 5/21: Consistent with all previous cultures noted few colonies of peptoniphilus harei group and few colonies of finegocobyia magna   Will be good for transfer to rehab tomorrow per ID   As needed analgesics, currently on PRN oxycodone 5/10 mg Q4H for moderate and severe pain with PRN IV dilaudid 0.5 mg Q4H for breakthrough   "

## 2025-05-25 NOTE — ASSESSMENT & PLAN NOTE
1/2 blood cultures at [place] with Finegoldia magna  Blood cultures repeated at [facility] with no growth at 5 days   Continue IV Vanco and PO Flagyl  F/u ID input   Pt now with picc line in left arm 5/22 noted edema in hand , improved today 5/25  Continue to elevate hand/arm

## 2025-05-25 NOTE — PROGRESS NOTES
Sharon Vega is a 74 y.o. female who is currently ordered Vancomycin IV with management by the Pharmacy Consult service.  Relevant clinical data and objective / subjective history reviewed.  Vancomycin Assessment:  Indication and Goal AUC/Trough: Bone/joint infection (goal -600, trough >10); Soft tissue (goal -600, trough >10), -600, trough >10  Clinical Status: stable  Micro:     Renal Function:  SCr: 1.14 mg/dL  CrCl: 43.7 mL/min  Renal replacement: Not on dialysis  Days of Therapy: 12  Current Dose: 1000mg IV q24h  Vancomycin Plan:  New Dosing: continue current regimen  Estimated AUC: 497 mcg*hr/mL  Estimated Trough: 14.6 mcg/mL  Next Level: Random 5/30 at 0600  Renal Function Monitoring: Daily BMP and UOP  Pharmacy will continue to follow closely for s/sx of nephrotoxicity, infusion reactions and appropriateness of therapy.  BMP and CBC will be ordered per protocol. We will continue to follow the patient’s culture results and clinical progress daily.    Annalise Lozano, Pharmacist

## 2025-05-25 NOTE — PROGRESS NOTES
"Progress Note - Hospitalist   Name: Sharon Vega 74 y.o. female I MRN: [id number]  Unit/Bed#: -01 I Date of Admission: 5/14/2025   Date of Service: 5/25/2025 I Hospital Day: 11    Assessment & Plan  Abscess of right hip  Patient presented from [facility] with right hip pain   Had recent IR hip joint aspiration on 4/24, was scheduled for conversion of partial hip replacement to total hip on 5/14 but cancelled due to leukocytosis   CT scan obtained with evidence of \"post surgical change from right hip hemiarthroplasty. Organized collection of fluid and gas in the right iliac muscle and similar collection surrounding right hip arthroplasty in the deep gluteal muscles measuring up to 10 cm\"    Transferred to [facility] for orthopedic evaluation   Orthopedics following   S/p IR aspiration and drain placement x 2 with synovasure culture on 5/15   Cultures also obtained from [facility] drain bulb -- Fingegoldia magna, Peptoniphilus hartiny    Appreciate ongoing orthopedic recommendations -- s/p revision right total hip arthoplasty with antibiotic spacer 5/21   Partial weight bearing right lower extremity   Posterior hip precautions   ID following for abx management   Currently on IV Vancomycin and p.o. Flagyl  Cx from drain with Yania magna, Peptoniphilus raul.  F/u antibiotic recs  1/2 blood cultures at [facility] with Shawnaia magna.  Late growth.   c/w culture from drains.   Blood cultures obtained at [facility] are negative at 5 days   Perioperative cultures obtained 5/21: Consistent with all previous cultures noted few colonies of peptoniphilus harei group and few colonies of finegoldia magna   Will be good for transfer to rehab tomorrow per ID   As needed analgesics, currently on PRN oxycodone 5/10 mg Q4H for moderate and severe pain with PRN IV dilaudid 0.5 mg Q4H for breakthrough   Low blood sugar  Patient has low blood sugars this am fasting 64   Encouraged patient to eat - has ensure shakes  Will add SSI TID " and HS at this time just to monitor blood sugars at this time   Will chk A1 c in the am   Sepsis without acute organ dysfunction (HCC)  POA at [facility] as evidenced by leukocytosis and tachycardia   In setting of right hip intramuscular abscess as above   Continue IV vancomycin and p.o. Flagyl per ID recommendations  S/p IV fluid hydration -   Sp 1 unit PRBC's will order additional 1 unit PRBC's today   BC from [facility] 1/2 [facility] in clusters, see related plan.  BC obtained here with NGTD  Lactic negative   Procal, WBC now down trending 16.31 --> 14.74  Trend CBC and temps closely   See plan as above  Positive blood culture  1/2 blood cultures at [facility] with Finegoldia magna  Blood cultures repeated at [facility] with no growth at 5 days   Continue IV Vanco and PO Flagyl  F/u ID input   Pt now with picc line in left arm 5/22 noted edema in hand , improved today 5/25  Continue to elevate hand/arm   Anemia  Previously hgb noted to be around 12   Dropped to 6.4 with hypotension post op, received 1 unit PRBCs with improvement,   5/23 pt with hh of 7.5/23.2 will order another unit PRBC now SLIM obtained consent placed in chart at   5/23 5/24 Additional unit PRBC's now / stop iv fluids   5/25 Stable will monitor in am before discharge to rehab 8.7/26.4  Aortic stenosis  Mumur noted on exam, prior echo with mild to moderate AS  Obtained repeat echo with ef of 65% and moderate aortic stenosis mildly dilated   Constipation  Continue bowel regimen   On colace bid and senna daily at    Added daily miralax and prn lactulose now   Now attempting to go 5/25  Primary hypertension  BP soft overnight, now improved  Continue amlodipine 10 mg daily   Monitor closely   Hyponatremia  Continued now 130--> 133  Possibly in setting of sepsis, volume depletion   S/p IV fluids no further fluids at this time   Monitor sodium levels closely   Trend BMP  Stable  Hypomagnesemia  Replete and monitor    VTE Pharmacologic Prophylaxis:    High Risk (Score >/= 5) - Pharmacological DVT Prophylaxis Ordered: enoxaparin (Lovenox). Sequential Compression Devices Ordered.    Mobility:   Basic Mobility Inpatient Raw Score: 10  JH-HLM Goal: 4: Move to chair/commode  JH-HLM Achieved: 2: Bed activities/Dependent transfer  JH-HLM Goal achieved. Continue to encourage appropriate mobility.    Patient Centered Rounds: I performed bedside rounds with nursing staff today.   Discussions with Specialists or Other Care Team Provider: nursing     Education and Discussions with Family / Patient: Updated  (granddaughter) at bedside.    Current Length of Stay: 11 day(s)  Current Patient Status: Inpatient   Certification Statement: The patient will continue to require additional inpatient hospital stay due to need for final cultures  plan for rehab likely Monday   Discharge Plan: Anticipate discharge in 48-72 hrs to rehab facility. Plan for [facility]     Code Status: Level 1 - Full Code    Subjective   Patient is having ongoing discomfort was just rolled back-and-forth for repositioning in bed.  Does feel very constipated and has not had a bowel movement.  Was about to increase bowel regimen but reportedly now while I am charting patient is attempting to have a bowel movement.  No nausea or vomiting discussed getting up out of bed today discussed eating as her blood sugars are running lower she is agreeable.    Objective :  Temp:  [97.7 °F (36.5 °C)-99 °F (37.2 °C)] 98.5 °F (36.9 °C)  HR:  [] 102  BP: (108-124)/(62-71) 119/71  Resp:  [16-17] 16  SpO2:  [90 %-92 %] 92 %  O2 Device: None (Room air)    Body mass index is 33.66 kg/m².     Input and Output Summary (last 24 hours):     Intake/Output Summary (Last 24 hours) at 5/25/2025 0901  Last data filed at 5/25/2025 0501  Gross per 24 hour   Intake 1113.33 ml   Output 1200 ml   Net -86.67 ml       Physical Exam  Constitutional:       General: She is not in acute distress.     Appearance: She is  obese. She is not ill-appearing, toxic-appearing or diaphoretic.   HENT:      Head: Normocephalic and atraumatic.      Nose: No congestion.     Eyes:      General:         Right eye: No discharge.         Left eye: No discharge.       Cardiovascular:      Rate and Rhythm: Tachycardia present.      Heart sounds: No murmur heard.     No friction rub. No gallop.   Pulmonary:      Effort: No respiratory distress.      Breath sounds: No stridor. No wheezing, rhonchi or rales.   Chest:      Chest wall: No tenderness.   Abdominal:      General: There is no distension.      Palpations: There is no mass.      Tenderness: There is no abdominal tenderness. There is no guarding or rebound.      Hernia: No hernia is present.     Musculoskeletal:         General: Swelling (right hand swelling improved from day prior) and tenderness (right lateral leg) present. No deformity or signs of injury.      Right lower leg: Edema (improving swelling not as tight. right lateral dsd and abd pad intact no drainage noted) present.      Left lower leg: No edema.      Comments: Picc line in place and no signs of infection     Skin:     Coloration: Skin is pale. Skin is not jaundiced.      Findings: No bruising, erythema, lesion or rash.     Neurological:      Mental Status: She is alert and oriented to person, place, and time.     Psychiatric:         Behavior: Behavior normal.           Lines/Drains:  Lines/Drains/Airways       Active Status       Name Placement date Placement time Site Days    PICC Line 05/22/25 Right Basilic 05/22/25  1422  Basilic  2    External Urinary Catheter 05/23/25  1035  -- 1                    Central Line:  Goal for removal: N/A - Discharging with PICC for IV ABX/medications               Lab Results: I have reviewed the following results:   Results from last 7 days   Lab Units 05/25/25  0522   WBC Thousand/uL 14.74*   HEMOGLOBIN g/dL 8.7*   HEMATOCRIT % 26.4*   PLATELETS Thousands/uL 445*   SEGS PCT % 78*   LYMPHO  PCT % 10*   MONO PCT % 9   EOS PCT % 2     Results from last 7 days   Lab Units 05/25/25  0522   SODIUM mmol/L 133*   POTASSIUM mmol/L 4.1   CHLORIDE mmol/L 100   CO2 mmol/L 27   BUN mg/dL 18   CREATININE mg/dL 1.14   ANION GAP mmol/L 6   CALCIUM mg/dL 7.8*   ALBUMIN g/dL 2.2*   TOTAL BILIRUBIN mg/dL 0.51   ALK PHOS U/L 48   ALT U/L 10   AST U/L 15   GLUCOSE RANDOM mg/dL 91     Results from last 7 days   Lab Units 05/20/25  0847   INR  1.19     Results from last 7 days   Lab Units 05/25/25  0529 05/25/25  0527   POC GLUCOSE mg/dl 96 64*               Recent Cultures (last 7 days):   Results from last 7 days   Lab Units 05/21/25  1352   GRAM STAIN RESULT  1+ Polys  No bacteria seen       Imaging Results Review: No pertinent imaging studies reviewed.  Other Study Results Review: No additional pertinent studies reviewed.    Last 24 Hours Medication List:     Current Facility-Administered Medications:     acetaminophen (TYLENOL) tablet 650 mg, Q4H PRN    amLODIPine (NORVASC) tablet 10 mg, Daily    ascorbic acid (VITAMIN C) tablet 500 mg, BID    bisacodyl (DULCOLAX) rectal suppository 10 mg, Daily PRN    calcium carbonate (TUMS) chewable tablet 1,000 mg, Daily PRN    Cholecalciferol (VITAMIN D3) tablet 2,000 Units, Daily    docusate sodium (COLACE) capsule 100 mg, BID    enoxaparin (LOVENOX) subcutaneous injection 40 mg, Daily    ferrous sulfate tablet 325 mg, BID With Meals    folic acid (FOLVITE) tablet 1 mg, Daily    gabapentin (NEURONTIN) capsule 600 mg, HS    HYDROmorphone (DILAUDID) injection 0.5 mg, Q4H PRN    loratadine (CLARITIN) tablet 10 mg, Daily    metroNIDAZOLE (FLAGYL) tablet 500 mg, Q12H FELIPA    multivitamin-minerals (CENTRUM) tablet 1 tablet, Daily    mupirocin (BACTROBAN) 2 % ointment, Daily    ondansetron (ZOFRAN) injection 4 mg, Q6H PRN    oxyCODONE (ROXICODONE) immediate release tablet 10 mg, Q4H PRN    oxyCODONE (ROXICODONE) IR tablet 5 mg, Q4H PRN    pantoprazole (PROTONIX) EC tablet 40 mg, Early  Morning    polyethylene glycol (MIRALAX) packet 17 g, Daily    pravastatin (PRAVACHOL) tablet 40 mg, HS    senna (SENOKOT) tablet 8.6 mg, HS    traZODone (DESYREL) tablet 100 mg, HS    vancomycin (VANCOCIN) IVPB (premix in dextrose) 1,000 mg 200 mL, Q24H, Last Rate: 1,000 mg (05/24/25 0905)    venlafaxine (EFFEXOR-XR) 24 hr capsule 150 mg, Daily    Administrative Statements   Today, Patient Was Seen By: LOTUS Bazan      **Please Note: This note may have been constructed using a voice recognition system.**

## 2025-05-25 NOTE — ASSESSMENT & PLAN NOTE
74 y.o.female with right hip PJI now POD 4 right total hip arthroplasty with antibiotic spacer. Picc line placed, ID following with recommendations for antibiotics.     Partial weightbearing right lower extremity  Posterior hip precautions  Abduction pillow at all times when in bed and sitting  PT/OT  Pain control  DVT ppx: per primary  Medical management including antibiotics per primary team  ID consulted, appreciate recs - culture growing Finegoldia magna and peptoniphilus harei from OR culture  Dispo planning

## 2025-05-25 NOTE — ASSESSMENT & PLAN NOTE
Previously hgb noted to be around 12   Dropped to 6.4 with hypotension post op, received 1 unit PRBCs with improvement,   5/23 pt with hh of 7.5/23.2 will order another unit PRBC now SLIM obtained consent placed in chart at   5/23 5/24 Additional unit PRBC's now / stop iv fluids   5/25 Stable will monitor in am before discharge to rehab 8.7/26.4

## 2025-05-26 PROBLEM — R73.03 PRE-DIABETES: Status: ACTIVE | Noted: 2025-05-25

## 2025-05-26 LAB
ALBUMIN SERPL BCG-MCNC: 2.4 G/DL (ref 3.5–5)
ALP SERPL-CCNC: 49 U/L (ref 34–104)
ALT SERPL W P-5'-P-CCNC: 11 U/L (ref 7–52)
ANION GAP SERPL CALCULATED.3IONS-SCNC: 7 MMOL/L (ref 4–13)
AST SERPL W P-5'-P-CCNC: 17 U/L (ref 13–39)
BASOPHILS # BLD AUTO: 0.08 THOUSANDS/ÂΜL (ref 0–0.1)
BASOPHILS NFR BLD AUTO: 1 % (ref 0–1)
BILIRUB SERPL-MCNC: 0.41 MG/DL (ref 0.2–1)
BUN SERPL-MCNC: 18 MG/DL (ref 5–25)
CALCIUM ALBUM COR SERPL-MCNC: 9.6 MG/DL (ref 8.3–10.1)
CALCIUM SERPL-MCNC: 8.3 MG/DL (ref 8.4–10.2)
CHLORIDE SERPL-SCNC: 98 MMOL/L (ref 96–108)
CO2 SERPL-SCNC: 29 MMOL/L (ref 21–32)
CREAT SERPL-MCNC: 1.03 MG/DL (ref 0.6–1.3)
EOSINOPHIL # BLD AUTO: 0.3 THOUSAND/ÂΜL (ref 0–0.61)
EOSINOPHIL NFR BLD AUTO: 2 % (ref 0–6)
ERYTHROCYTE [DISTWIDTH] IN BLOOD BY AUTOMATED COUNT: 15.2 % (ref 11.6–15.1)
EST. AVERAGE GLUCOSE BLD GHB EST-MCNC: 134 MG/DL
GFR SERPL CREATININE-BSD FRML MDRD: 53 ML/MIN/1.73SQ M
GLUCOSE SERPL-MCNC: 136 MG/DL (ref 65–140)
GLUCOSE SERPL-MCNC: 148 MG/DL (ref 65–140)
GLUCOSE SERPL-MCNC: 156 MG/DL (ref 65–140)
GLUCOSE SERPL-MCNC: 98 MG/DL (ref 65–140)
GLUCOSE SERPL-MCNC: 98 MG/DL (ref 65–140)
HBA1C MFR BLD: 6.3 %
HCT VFR BLD AUTO: 28.6 % (ref 34.8–46.1)
HGB BLD-MCNC: 9.2 G/DL (ref 11.5–15.4)
IMM GRANULOCYTES # BLD AUTO: 0.15 THOUSAND/UL (ref 0–0.2)
IMM GRANULOCYTES NFR BLD AUTO: 1 % (ref 0–2)
LYMPHOCYTES # BLD AUTO: 1.69 THOUSANDS/ÂΜL (ref 0.6–4.47)
LYMPHOCYTES NFR BLD AUTO: 12 % (ref 14–44)
MCH RBC QN AUTO: 29.9 PG (ref 26.8–34.3)
MCHC RBC AUTO-ENTMCNC: 32.2 G/DL (ref 31.4–37.4)
MCV RBC AUTO: 93 FL (ref 82–98)
MONOCYTES # BLD AUTO: 1.49 THOUSAND/ÂΜL (ref 0.17–1.22)
MONOCYTES NFR BLD AUTO: 10 % (ref 4–12)
NEUTROPHILS # BLD AUTO: 10.71 THOUSANDS/ÂΜL (ref 1.85–7.62)
NEUTS SEG NFR BLD AUTO: 74 % (ref 43–75)
NRBC BLD AUTO-RTO: 0 /100 WBCS
PLATELET # BLD AUTO: 477 THOUSANDS/UL (ref 149–390)
PMV BLD AUTO: 8.5 FL (ref 8.9–12.7)
POTASSIUM SERPL-SCNC: 4.1 MMOL/L (ref 3.5–5.3)
PROT SERPL-MCNC: 5 G/DL (ref 6.4–8.4)
RBC # BLD AUTO: 3.08 MILLION/UL (ref 3.81–5.12)
SODIUM SERPL-SCNC: 134 MMOL/L (ref 135–147)
WBC # BLD AUTO: 14.42 THOUSAND/UL (ref 4.31–10.16)

## 2025-05-26 PROCEDURE — 80053 COMPREHEN METABOLIC PANEL: CPT | Performed by: NURSE PRACTITIONER

## 2025-05-26 PROCEDURE — 85025 COMPLETE CBC W/AUTO DIFF WBC: CPT | Performed by: NURSE PRACTITIONER

## 2025-05-26 PROCEDURE — 82948 REAGENT STRIP/BLOOD GLUCOSE: CPT

## 2025-05-26 PROCEDURE — 83036 HEMOGLOBIN GLYCOSYLATED A1C: CPT | Performed by: NURSE PRACTITIONER

## 2025-05-26 PROCEDURE — 99232 SBSQ HOSP IP/OBS MODERATE 35: CPT | Performed by: NURSE PRACTITIONER

## 2025-05-26 RX ADMIN — METRONIDAZOLE 500 MG: 500 TABLET ORAL at 08:37

## 2025-05-26 RX ADMIN — Medication 1 TABLET: at 08:37

## 2025-05-26 RX ADMIN — Medication 2000 UNITS: at 08:37

## 2025-05-26 RX ADMIN — AMLODIPINE BESYLATE 10 MG: 10 TABLET ORAL at 08:37

## 2025-05-26 RX ADMIN — ENOXAPARIN SODIUM 40 MG: 40 INJECTION SUBCUTANEOUS at 11:09

## 2025-05-26 RX ADMIN — DOCUSATE SODIUM 100 MG: 100 CAPSULE, LIQUID FILLED ORAL at 17:17

## 2025-05-26 RX ADMIN — FERROUS SULFATE TAB 325 MG (65 MG ELEMENTAL FE) 325 MG: 325 (65 FE) TAB at 17:17

## 2025-05-26 RX ADMIN — FOLIC ACID 1 MG: 1 TABLET ORAL at 08:37

## 2025-05-26 RX ADMIN — SENNOSIDES 8.6 MG: 8.6 TABLET, FILM COATED ORAL at 21:23

## 2025-05-26 RX ADMIN — VANCOMYCIN HYDROCHLORIDE 1000 MG: 1 INJECTION, SOLUTION INTRAVENOUS at 10:10

## 2025-05-26 RX ADMIN — TRAZODONE HYDROCHLORIDE 100 MG: 100 TABLET ORAL at 21:23

## 2025-05-26 RX ADMIN — LORATADINE 10 MG: 10 TABLET ORAL at 08:37

## 2025-05-26 RX ADMIN — MUPIROCIN: 20 OINTMENT TOPICAL at 08:40

## 2025-05-26 RX ADMIN — OXYCODONE HYDROCHLORIDE AND ACETAMINOPHEN 500 MG: 500 TABLET ORAL at 08:37

## 2025-05-26 RX ADMIN — FERROUS SULFATE TAB 325 MG (65 MG ELEMENTAL FE) 325 MG: 325 (65 FE) TAB at 08:37

## 2025-05-26 RX ADMIN — METRONIDAZOLE 500 MG: 500 TABLET ORAL at 21:23

## 2025-05-26 RX ADMIN — OXYCODONE HYDROCHLORIDE 5 MG: 5 TABLET ORAL at 11:10

## 2025-05-26 RX ADMIN — POLYETHYLENE GLYCOL 3350 17 G: 17 POWDER, FOR SOLUTION ORAL at 08:37

## 2025-05-26 RX ADMIN — INSULIN LISPRO 1 UNITS: 100 INJECTION, SOLUTION INTRAVENOUS; SUBCUTANEOUS at 11:09

## 2025-05-26 RX ADMIN — OXYCODONE HYDROCHLORIDE 10 MG: 10 TABLET ORAL at 21:22

## 2025-05-26 RX ADMIN — OXYCODONE HYDROCHLORIDE 10 MG: 10 TABLET ORAL at 05:36

## 2025-05-26 RX ADMIN — VENLAFAXINE HYDROCHLORIDE 150 MG: 150 CAPSULE, EXTENDED RELEASE ORAL at 08:37

## 2025-05-26 RX ADMIN — PANTOPRAZOLE SODIUM 40 MG: 40 TABLET, DELAYED RELEASE ORAL at 05:36

## 2025-05-26 RX ADMIN — GABAPENTIN 600 MG: 300 CAPSULE ORAL at 21:23

## 2025-05-26 RX ADMIN — PRAVASTATIN SODIUM 40 MG: 40 TABLET ORAL at 21:23

## 2025-05-26 RX ADMIN — DOCUSATE SODIUM 100 MG: 100 CAPSULE, LIQUID FILLED ORAL at 08:37

## 2025-05-26 RX ADMIN — OXYCODONE HYDROCHLORIDE AND ACETAMINOPHEN 500 MG: 500 TABLET ORAL at 17:17

## 2025-05-26 NOTE — ASSESSMENT & PLAN NOTE
Patient has low blood sugars as low as fasting 64   Encouraged patient to eat - has ensure shakes does seem to be improving   Will add SSI TID and HS at this time just to monitor blood sugars at this time   A1 c obtained noting 6.3 encouraged

## 2025-05-26 NOTE — ASSESSMENT & PLAN NOTE
POA at [facility] as evidenced by leukocytosis and tachycardia   In setting of right hip intramuscular abscess as above   Continue IV vancomycin and p.o. Flagyl per ID recommendations  S/p IV fluid hydration -   Sp 1 unit PRBC's will order additional 1 unit PRBC's today   BC from [facility] 1/2 [facility] in clusters, see related plan.  BC obtained here with NGTD  Lactic negative   Procal, WBC now down trending 16.31 --> 14.74--> 14.42  Trend CBC and temps closely   See plan as above

## 2025-05-26 NOTE — ASSESSMENT & PLAN NOTE
Continue bowel regimen   On colace bid and senna daily at hs   Added daily miralax and prn lactulose now   Very large bm completed 5/25

## 2025-05-26 NOTE — PLAN OF CARE
Problem: PAIN - ADULT  Goal: Verbalizes/displays adequate comfort level or baseline comfort level  Description: Interventions:  - Encourage patient to monitor pain and request assistance  - Assess pain using appropriate pain scale  - Administer analgesics as ordered based on type and severity of pain and evaluate response  - Implement non-pharmacological measures as appropriate and evaluate response  - Consider cultural and social influences on pain and pain management  - Notify physician/advanced practitioner if interventions unsuccessful or patient reports new pain  - Educate patient/family on pain management process including their role and importance of  reporting pain   - Provide non-pharmacologic/complimentary pain relief interventions  Outcome: Progressing     Problem: INFECTION - ADULT  Goal: Absence or prevention of progression during hospitalization  Description: INTERVENTIONS:  - Assess and monitor for signs and symptoms of infection  - Monitor lab/diagnostic results  - Monitor all insertion sites, i.e. indwelling lines, tubes, and drains  - Monitor endotracheal if appropriate and nasal secretions for changes in amount and color  - [place] appropriate cooling/warming therapies per order  - Administer medications as ordered  - Instruct and encourage patient and family to use good hand hygiene technique  - Identify and instruct in appropriate isolation precautions for identified infection/condition  Outcome: Progressing  Goal: Absence of fever/infection during neutropenic period  Description: INTERVENTIONS:  - Monitor WBC  - Perform strict hand hygiene  - Limit to healthy visitors only  - No plants, dried, fresh or silk flowers with feliciano in patient room  Outcome: Progressing     Problem: DISCHARGE PLANNING  Goal: Discharge to home or other facility with appropriate resources  Description: INTERVENTIONS:  - Identify barriers to discharge w/patient and caregiver  - Arrange for needed discharge resources  and transportation as appropriate  - Identify discharge learning needs (meds, wound care, etc.)  - Arrange for interpretive services to assist at discharge as needed  - Refer to Case Management Department for coordinating discharge planning if the patient needs post-hospital services based on physician/advanced practitioner order or complex needs related to functional status, cognitive ability, or social support system  Outcome: Progressing     Problem: Knowledge Deficit  Goal: Patient/family/caregiver demonstrates understanding of disease process, treatment plan, medications, and discharge instructions  Description: Complete learning assessment and assess knowledge base.  Interventions:  - Provide teaching at level of understanding  - Provide teaching via preferred learning methods  Outcome: Progressing

## 2025-05-26 NOTE — PROGRESS NOTES
Sharon Vega is a 74 y.o. female who is currently ordered Vancomycin IV with management by the Pharmacy Consult service.  Relevant clinical data and objective / subjective history reviewed.  Vancomycin Assessment:  Indication and Goal AUC/Trough: Bone/joint infection (goal -600, trough >10); Soft tissue (goal -600, trough >10), -600, trough >10  Clinical Status: stable  Micro: ID following     Renal Function:  SCr: 1.03 mg/dL  CrCl: 48.4 mL/min  Renal replacement: Not on dialysis  Days of Therapy: 13  Current Dose: 1000mg IV q24h  Vancomycin Plan:  New Dosing: continue current regimen  Estimated AUC: 453 mcg*hr/mL  Estimated Trough: 12.8 mcg/mL  Next Level: Random 5/30 at 0600  Renal Function Monitoring: Daily BMP and UOP  Pharmacy will continue to follow closely for s/sx of nephrotoxicity, infusion reactions and appropriateness of therapy.  BMP and CBC will be ordered per protocol. We will continue to follow the patient’s culture results and clinical progress daily.    Annalise Lozano, Pharmacist

## 2025-05-26 NOTE — ASSESSMENT & PLAN NOTE
Continued now 130--> 133--> 134  Possibly in setting of sepsis, volume depletion   S/p IV fluids no further fluids at this time   Monitor sodium levels closely   Trend BMP  Stable

## 2025-05-26 NOTE — ASSESSMENT & PLAN NOTE
"Patient presented from [facility] with right hip pain   Had recent IR hip joint aspiration on 4/24, was scheduled for conversion of partial hip replacement to total hip on 5/14 but cancelled due to leukocytosis   CT scan obtained with evidence of \"post surgical change from right hip hemiarthroplasty. Organized collection of fluid and gas in the right iliac muscle and similar collection surrounding right hip arthroplasty in the deep gluteal muscles measuring up to 10 cm\"    Transferred to [facility] for orthopedic evaluation   Orthopedics following   S/p IR aspiration and drain placement x 2 with synovasure culture on 5/15   Cultures also obtained from [facility] drain bulb -- Fingegoldia magna, Peptoniphilus harei    Appreciate ongoing orthopedic recommendations -- s/p revision right total hip arthoplasty with antibiotic spacer 5/21   Partial weight bearing right lower extremity   Posterior hip precautions   ID following for abx management   Currently on IV Vancomycin and p.o. Flagyl  Cx from drain with Yania magna, Peptoniphilus harei.  F/u antibiotic recs  1/2 blood cultures at [facility] with Finegoldia magna.  Late growth.   c/w culture from drains.   Blood cultures obtained at [facility] are negative at 5 days   Perioperative cultures obtained 5/21: Consistent with all previous cultures noted few colonies of peptoniphilus harei group and few colonies of finegocobyia magna   Will be good for transfer to rehab tomorrow per ID   As needed analgesics, currently on PRN oxycodone 5/10 mg Q4H for moderate and severe pain with   PRN IV dilaudid 0.5 mg Q4H for breakthrough discontinued today for plan for discharge to rehab likely Monday   "

## 2025-05-26 NOTE — PROGRESS NOTES
"Progress Note - Hospitalist   Name: Sharon Vega 74 y.o. female I MRN: [id number]  Unit/Bed#: -01 I Date of Admission: 5/14/2025   Date of Service: 5/26/2025 I Hospital Day: 12    Assessment & Plan  Abscess of right hip  Patient presented from [facility] with right hip pain   Had recent IR hip joint aspiration on 4/24, was scheduled for conversion of partial hip replacement to total hip on 5/14 but cancelled due to leukocytosis   CT scan obtained with evidence of \"post surgical change from right hip hemiarthroplasty. Organized collection of fluid and gas in the right iliac muscle and similar collection surrounding right hip arthroplasty in the deep gluteal muscles measuring up to 10 cm\"    Transferred to [facility] for orthopedic evaluation   Orthopedics following   S/p IR aspiration and drain placement x 2 with synovasure culture on 5/15   Cultures also obtained from [facility] drain bulb -- Fingegoldia magna, Peptoniphilus hartiny    Appreciate ongoing orthopedic recommendations -- s/p revision right total hip arthoplasty with antibiotic spacer 5/21   Partial weight bearing right lower extremity   Posterior hip precautions   ID following for abx management   Currently on IV Vancomycin and p.o. Flagyl  Cx from drain with Yania prashant, Janellphilus raul.  F/u antibiotic recs  1/2 blood cultures at [facility] with Shawnaia magna.  Late growth.   c/w culture from drains.   Blood cultures obtained at [facility] are negative at 5 days   Perioperative cultures obtained 5/21: Consistent with all previous cultures noted few colonies of peptoniphilus harei group and few colonies of finegocobyia magna   Will be good for transfer to rehab tomorrow per ID   As needed analgesics, currently on PRN oxycodone 5/10 mg Q4H for moderate and severe pain with   PRN IV dilaudid 0.5 mg Q4H for breakthrough discontinued today for plan for discharge to rehab likely Monday   Pre-diabetes  Patient has low blood sugars as low as fasting 64 "   Encouraged patient to eat - has ensure shakes does seem to be improving   Will add SSI TID and HS at this time just to monitor blood sugars at this time   A1 c obtained noting 6.3 encouraged   Sepsis without acute organ dysfunction (HCC)  POA at [facility] as evidenced by leukocytosis and tachycardia   In setting of right hip intramuscular abscess as above   Continue IV vancomycin and p.o. Flagyl per ID recommendations  S/p IV fluid hydration -   Sp 1 unit PRBC's will order additional 1 unit PRBC's today   BC from [facility] 1/2 [facility] in clusters, see related plan.  BC obtained here with NGTD  Lactic negative   Procal, WBC now down trending 16.31 --> 14.74--> 14.42  Trend CBC and temps closely   See plan as above  Positive blood culture  1/2 blood cultures at [facility] with Finegoldia magna  Blood cultures repeated at [facility] with no growth at 5 days   Continue IV Vanco and PO Flagyl  F/u ID input   Pt now with picc line in left arm 5/22 noted edema in hand , improved today 5/26  Continue to elevate hand/arm   Anemia  Previously hgb noted to be around 12   Dropped to 6.4 with hypotension post op, received 1 unit PRBCs with improvement,   5/23 pt with hh of 7.5/23.2 will order another unit PRBC now SLIM obtained consent placed in chart at   5/23 5/24 Additional unit PRBC's now / stop iv fluids   5/25 Stable will monitor in am before discharge to rehab 8.7/26.4  Aortic stenosis  Mumur noted on exam, prior echo with mild to moderate AS  Obtained repeat echo with ef of 65% and moderate aortic stenosis mildly dilated   Constipation  Continue bowel regimen   On colace bid and senna daily at hs   Added daily miralax and prn lactulose now   Very large bm completed 5/25  Primary hypertension  BP soft overnight, now improved  Continue amlodipine 10 mg daily   Monitor closely   Hyponatremia  Continued now 130--> 133--> 134  Possibly in setting of sepsis, volume depletion   S/p IV fluids no further fluids at this time    Monitor sodium levels closely   Trend BMP  Stable  Hypomagnesemia  Replete and monitor    VTE Pharmacologic Prophylaxis:   High Risk (Score >/= 5) - Pharmacological DVT Prophylaxis Ordered: enoxaparin (Lovenox). Sequential Compression Devices Ordered.    Mobility:   Basic Mobility Inpatient Raw Score: 14  JH-HLM Goal: 4: Move to chair/commode  JH-HLM Achieved: 2: Bed activities/Dependent transfer  JH-HLM Goal achieved. Continue to encourage appropriate mobility.    Patient Centered Rounds: I performed bedside rounds with nursing staff today.   Discussions with Specialists or Other Care Team Provider: nursing     Education and Discussions with Family / Patient: Updated  (son) via phone.    Current Length of Stay: 12 day(s)  Current Patient Status: Inpatient   Certification Statement: The patient will continue to require additional inpatient hospital stay due to need for iv   Discharge Plan: Anticipate discharge tomorrow to rehab facility.    Code Status: Level 1 - Full Code    Subjective   Pt is oob in the chair. She reports it was a very hard thing to do. She said she struggled but is determined and knows she needs to push herself to do better. She had a very large bm yesterday where she felt better.     Objective :  Temp:  [98 °F (36.7 °C)-98.6 °F (37 °C)] 98.2 °F (36.8 °C)  HR:  [] 101  BP: (117-125)/(71-75) 117/71  Resp:  [16-19] 16  SpO2:  [91 %-95 %] 91 %  O2 Device: None (Room air)    Body mass index is 33.66 kg/m².     Input and Output Summary (last 24 hours):     Intake/Output Summary (Last 24 hours) at 5/26/2025 1457  Last data filed at 5/26/2025 1000  Gross per 24 hour   Intake 600 ml   Output 1500 ml   Net -900 ml       Physical Exam  Constitutional:       General: She is not in acute distress.     Appearance: She is not ill-appearing, toxic-appearing or diaphoretic.   HENT:      Head: Normocephalic and atraumatic.      Nose: No congestion.     Eyes:      General:         Right eye:  No discharge.         Left eye: No discharge.       Cardiovascular:      Rate and Rhythm: Normal rate.      Heart sounds: No murmur heard.     No friction rub. No gallop.   Pulmonary:      Effort: No respiratory distress.      Breath sounds: No stridor. No wheezing, rhonchi or rales.   Chest:      Chest wall: No tenderness.   Abdominal:      General: There is no distension.      Palpations: There is no mass.      Tenderness: There is no abdominal tenderness. There is no guarding or rebound.      Hernia: No hernia is present.     Musculoskeletal:         General: No swelling, tenderness, deformity or signs of injury.      Right lower leg: No edema.      Left lower leg: No edema.     Skin:     Coloration: Skin is not jaundiced or pale.      Findings: No bruising, erythema, lesion or rash.     Neurological:      Mental Status: She is alert and oriented to person, place, and time.     Psychiatric:         Behavior: Behavior normal.           Lines/Drains:  Lines/Drains/Airways       Active Status       Name Placement date Placement time Site Days    PICC Line 05/22/25 Right Basilic 05/22/25  1422  Basilic  4    External Urinary Catheter 05/23/25  1035  -- 3                    Central Line:  Goal for removal: N/A - Discharging with PICC for IV ABX/medications               Lab Results: I have reviewed the following results:   Results from last 7 days   Lab Units 05/26/25  0531   WBC Thousand/uL 14.42*   HEMOGLOBIN g/dL 9.2*   HEMATOCRIT % 28.6*   PLATELETS Thousands/uL 477*   SEGS PCT % 74   LYMPHO PCT % 12*   MONO PCT % 10   EOS PCT % 2     Results from last 7 days   Lab Units 05/26/25  0531   SODIUM mmol/L 134*   POTASSIUM mmol/L 4.1   CHLORIDE mmol/L 98   CO2 mmol/L 29   BUN mg/dL 18   CREATININE mg/dL 1.03   ANION GAP mmol/L 7   CALCIUM mg/dL 8.3*   ALBUMIN g/dL 2.4*   TOTAL BILIRUBIN mg/dL 0.41   ALK PHOS U/L 49   ALT U/L 11   AST U/L 17   GLUCOSE RANDOM mg/dL 98     Results from last 7 days   Lab Units  05/20/25  0847   INR  1.19     Results from last 7 days   Lab Units 05/26/25  1053 05/26/25  0600 05/25/25  2124 05/25/25  1533 05/25/25  1044 05/25/25  0529 05/25/25  0527   POC GLUCOSE mg/dl 156* 98 121 104 179* 96 64*     Results from last 7 days   Lab Units 05/26/25  0531   HEMOGLOBIN A1C % 6.3*           Recent Cultures (last 7 days):   Results from last 7 days   Lab Units 05/21/25  1352   GRAM STAIN RESULT  1+ Polys  No bacteria seen       Imaging Results Review: No pertinent imaging studies reviewed.  Other Study Results Review: No additional pertinent studies reviewed.    Last 24 Hours Medication List:     Current Facility-Administered Medications:     acetaminophen (TYLENOL) tablet 650 mg, Q4H PRN    amLODIPine (NORVASC) tablet 10 mg, Daily    ascorbic acid (VITAMIN C) tablet 500 mg, BID    bisacodyl (DULCOLAX) rectal suppository 10 mg, Daily PRN    calcium carbonate (TUMS) chewable tablet 1,000 mg, Daily PRN    Cholecalciferol (VITAMIN D3) tablet 2,000 Units, Daily    docusate sodium (COLACE) capsule 100 mg, BID    enoxaparin (LOVENOX) subcutaneous injection 40 mg, Daily    ferrous sulfate tablet 325 mg, BID With Meals    folic acid (FOLVITE) tablet 1 mg, Daily    gabapentin (NEURONTIN) capsule 600 mg, HS    HYDROmorphone (DILAUDID) injection 0.5 mg, Q4H PRN    insulin lispro (HumALOG/ADMELOG) 100 units/mL subcutaneous injection 1-5 Units, TID AC **AND** Fingerstick Glucose (POCT), TID AC    insulin lispro (HumALOG/ADMELOG) 100 units/mL subcutaneous injection 1-5 Units, HS    lactulose (CHRONULAC) oral solution 30 g, Daily PRN    loratadine (CLARITIN) tablet 10 mg, Daily    metroNIDAZOLE (FLAGYL) tablet 500 mg, Q12H FELIPA    multivitamin-minerals (CENTRUM) tablet 1 tablet, Daily    mupirocin (BACTROBAN) 2 % ointment, Daily    ondansetron (ZOFRAN) injection 4 mg, Q6H PRN    oxyCODONE (ROXICODONE) immediate release tablet 10 mg, Q4H PRN    oxyCODONE (ROXICODONE) IR tablet 5 mg, Q4H PRN    pantoprazole  (PROTONIX) EC tablet 40 mg, Early Morning    polyethylene glycol (MIRALAX) packet 17 g, Daily    pravastatin (PRAVACHOL) tablet 40 mg, HS    senna (SENOKOT) tablet 8.6 mg, HS    traZODone (DESYREL) tablet 100 mg, HS    vancomycin (VANCOCIN) IVPB (premix in dextrose) 1,000 mg 200 mL, Q24H, Last Rate: 1,000 mg (05/26/25 1010)    venlafaxine (EFFEXOR-XR) 24 hr capsule 150 mg, Daily    Administrative Statements   Today, Patient Was Seen By: LOTUS Bazan      **Please Note: This note may have been constructed using a voice recognition system.**

## 2025-05-26 NOTE — CASE MANAGEMENT
Case Management Progress Note    Patient name Sharon Vega  Location /-01 MRN [id number]  : 1950 Date 2025       LOS (days): 12  Geometric Mean LOS (GMLOS) (days): 7.1  Days to GMLOS:-4.6        OBJECTIVE:        Current admission status: Inpatient  Preferred Pharmacy:   [facility] - [place], [place] - [address]  [address]  [place] [address]  Phone: [phone] Fax: [phone]    Primary Care Provider: Danielito Antunez DO    Primary Insurance: MEDICARE  Secondary Insurance: AARP    PROGRESS NOTE:    [facility] able to accept and reserved in aidin. [facility] requesting therapy work with pt to assess ability to tolerate therapy while off IV pain meds. CM update therapy and awaiting updated notes. CM will continue to follow.

## 2025-05-26 NOTE — ASSESSMENT & PLAN NOTE
1/2 blood cultures at [facility] with Finegoldia magna  Blood cultures repeated at [facility] with no growth at 5 days   Continue IV Vanco and PO Flagyl  F/u ID input   Pt now with picc line in left arm 5/22 noted edema in hand , improved today 5/26  Continue to elevate hand/arm

## 2025-05-26 NOTE — PLAN OF CARE
Problem: PAIN - ADULT  Goal: Verbalizes/displays adequate comfort level or baseline comfort level  Description: Interventions:  - Encourage patient to monitor pain and request assistance  - Assess pain using appropriate pain scale  - Administer analgesics as ordered based on type and severity of pain and evaluate response  - Implement non-pharmacological measures as appropriate and evaluate response  - Consider cultural and social influences on pain and pain management  - Notify physician/advanced practitioner if interventions unsuccessful or patient reports new pain  - Educate patient/family on pain management process including their role and importance of  reporting pain   - Provide non-pharmacologic/complimentary pain relief interventions  Outcome: Progressing     Problem: INFECTION - ADULT  Goal: Absence or prevention of progression during hospitalization  Description: INTERVENTIONS:  - Assess and monitor for signs and symptoms of infection  - Monitor lab/diagnostic results  - Monitor all insertion sites, i.e. indwelling lines, tubes, and drains  - Monitor endotracheal if appropriate and nasal secretions for changes in amount and color  - [place] appropriate cooling/warming therapies per order  - Administer medications as ordered  - Instruct and encourage patient and family to use good hand hygiene technique  - Identify and instruct in appropriate isolation precautions for identified infection/condition  Outcome: Progressing     Problem: INFECTION - ADULT  Goal: Absence of fever/infection during neutropenic period  Description: INTERVENTIONS:  - Monitor WBC  - Perform strict hand hygiene  - Limit to healthy visitors only  - No plants, dried, fresh or silk flowers with feliciano in patient room  Outcome: Progressing     Problem: SAFETY ADULT  Goal: Patient will remain free of falls  Description: INTERVENTIONS:  - Educate patient/family on patient safety including physical limitations  - Instruct patient to call  for assistance with activity   - Consider consulting OT/PT to assist with strengthening/mobility based on AM PAC & JH-HLM score  - Consult OT/PT to assist with strengthening/mobility   - Keep Call bell within reach  - Keep bed low and locked with side rails adjusted as appropriate  - Keep care items and personal belongings within reach  - Initiate and maintain comfort rounds  - Make Fall Risk Sign visible to staff  - Offer Toileting every 2 Hours, in advance of need  - Initiate/Maintain bed/ chair alarm  - Obtain necessary fall risk management equipment:   - Apply yellow socks and bracelet for high fall risk patients  - Consider moving patient to room near nurses station  Outcome: Progressing     Problem: SAFETY ADULT  Goal: Maintain or return to baseline ADL function  Description: INTERVENTIONS:  -  Assess patient's ability to carry out ADLs; assess patient's baseline for ADL function and identify physical deficits which impact ability to perform ADLs (bathing, care of mouth/teeth, toileting, grooming, dressing, etc.)  - Assess/evaluate cause of self-care deficits   - Assess range of motion  - Assess patient's mobility; develop plan if impaired  - Assess patient's need for assistive devices and provide as appropriate  - Encourage maximum independence but intervene and supervise when necessary  - Involve family in performance of ADLs  - Assess for home care needs following discharge   - Consider OT consult to assist with ADL evaluation and planning for discharge  - Provide patient education as appropriate  - Monitor functional capacity and physical performance, use of AM PAC & JH-HLM   - Monitor gait, balance and fatigue with ambulation    Outcome: Progressing     Problem: SAFETY ADULT  Goal: Maintains/Returns to pre admission functional level  Description: INTERVENTIONS:  - Perform AM-PAC 6 Click Basic Mobility/ Daily Activity assessment daily.  - Set and communicate daily mobility goal to care team and  patient/family/caregiver.   - Collaborate with rehabilitation services on mobility goals if consulted  - Perform Range of Motion 3 times a day.  - Reposition patient every 2 hours.  - Dangle patient 3 times a day  - Stand patient 3 times a day  - Ambulate patient 3 times a day  - Out of bed to chair 3 times a day   - Out of bed for meals 3 times a day  - Out of bed for toileting  - Record patient progress and toleration of activity level   Outcome: Progressing     Problem: DISCHARGE PLANNING  Goal: Discharge to home or other facility with appropriate resources  Description: INTERVENTIONS:  - Identify barriers to discharge w/patient and caregiver  - Arrange for needed discharge resources and transportation as appropriate  - Identify discharge learning needs (meds, wound care, etc.)  - Arrange for interpretive services to assist at discharge as needed  - Refer to Case Management Department for coordinating discharge planning if the patient needs post-hospital services based on physician/advanced practitioner order or complex needs related to functional status, cognitive ability, or social support system  Outcome: Progressing

## 2025-05-27 VITALS
HEART RATE: 95 BPM | HEIGHT: 63 IN | BODY MASS INDEX: 33.66 KG/M2 | RESPIRATION RATE: 16 BRPM | OXYGEN SATURATION: 97 % | TEMPERATURE: 97.9 F | WEIGHT: 190 LBS | SYSTOLIC BLOOD PRESSURE: 104 MMHG | DIASTOLIC BLOOD PRESSURE: 66 MMHG

## 2025-05-27 LAB
GLUCOSE SERPL-MCNC: 107 MG/DL (ref 65–140)
GLUCOSE SERPL-MCNC: 116 MG/DL (ref 65–140)
GLUCOSE SERPL-MCNC: 143 MG/DL (ref 65–140)

## 2025-05-27 PROCEDURE — 82948 REAGENT STRIP/BLOOD GLUCOSE: CPT

## 2025-05-27 PROCEDURE — 97530 THERAPEUTIC ACTIVITIES: CPT

## 2025-05-27 PROCEDURE — 97535 SELF CARE MNGMENT TRAINING: CPT

## 2025-05-27 PROCEDURE — 99233 SBSQ HOSP IP/OBS HIGH 50: CPT | Performed by: STUDENT IN AN ORGANIZED HEALTH CARE EDUCATION/TRAINING PROGRAM

## 2025-05-27 PROCEDURE — G0545 PR INHERENT VISIT TO INPT: HCPCS | Performed by: STUDENT IN AN ORGANIZED HEALTH CARE EDUCATION/TRAINING PROGRAM

## 2025-05-27 PROCEDURE — 99024 POSTOP FOLLOW-UP VISIT: CPT | Performed by: ORTHOPAEDIC SURGERY

## 2025-05-27 PROCEDURE — 99239 HOSP IP/OBS DSCHRG MGMT >30: CPT | Performed by: PHYSICIAN ASSISTANT

## 2025-05-27 RX ORDER — METRONIDAZOLE 500 MG/1
500 TABLET ORAL EVERY 12 HOURS SCHEDULED
Start: 2025-05-27 | End: 2025-07-01

## 2025-05-27 RX ORDER — BISACODYL 10 MG
10 SUPPOSITORY, RECTAL RECTAL DAILY PRN
Start: 2025-05-27

## 2025-05-27 RX ORDER — OXYCODONE HYDROCHLORIDE 5 MG/1
5 TABLET ORAL EVERY 4 HOURS PRN
Qty: 10 TABLET | Refills: 0 | Status: SHIPPED | OUTPATIENT
Start: 2025-05-27

## 2025-05-27 RX ORDER — ASPIRIN 81 MG/1
81 TABLET, CHEWABLE ORAL 2 TIMES DAILY
Start: 2025-05-27 | End: 2025-06-18

## 2025-05-27 RX ORDER — POLYETHYLENE GLYCOL 3350 17 G/17G
17 POWDER, FOR SOLUTION ORAL DAILY
Start: 2025-05-28

## 2025-05-27 RX ORDER — VANCOMYCIN HYDROCHLORIDE 1 G/200ML
1000 INJECTION, SOLUTION INTRAVENOUS EVERY 24 HOURS
Start: 2025-05-28 | End: 2025-07-01

## 2025-05-27 RX ORDER — SENNOSIDES 8.6 MG
8.6 TABLET ORAL
Start: 2025-05-27

## 2025-05-27 RX ORDER — DOCUSATE SODIUM 100 MG/1
100 CAPSULE, LIQUID FILLED ORAL 2 TIMES DAILY
Start: 2025-05-27

## 2025-05-27 RX ADMIN — VANCOMYCIN HYDROCHLORIDE 1000 MG: 1 INJECTION, SOLUTION INTRAVENOUS at 10:02

## 2025-05-27 RX ADMIN — OXYCODONE HYDROCHLORIDE AND ACETAMINOPHEN 500 MG: 500 TABLET ORAL at 08:27

## 2025-05-27 RX ADMIN — OXYCODONE HYDROCHLORIDE 10 MG: 10 TABLET ORAL at 06:13

## 2025-05-27 RX ADMIN — OXYCODONE HYDROCHLORIDE 10 MG: 10 TABLET ORAL at 10:49

## 2025-05-27 RX ADMIN — AMLODIPINE BESYLATE 10 MG: 10 TABLET ORAL at 08:27

## 2025-05-27 RX ADMIN — ENOXAPARIN SODIUM 40 MG: 40 INJECTION SUBCUTANEOUS at 10:49

## 2025-05-27 RX ADMIN — FOLIC ACID 1 MG: 1 TABLET ORAL at 08:27

## 2025-05-27 RX ADMIN — OXYCODONE HYDROCHLORIDE 10 MG: 10 TABLET ORAL at 15:17

## 2025-05-27 RX ADMIN — LORATADINE 10 MG: 10 TABLET ORAL at 08:27

## 2025-05-27 RX ADMIN — FERROUS SULFATE TAB 325 MG (65 MG ELEMENTAL FE) 325 MG: 325 (65 FE) TAB at 08:27

## 2025-05-27 RX ADMIN — PANTOPRAZOLE SODIUM 40 MG: 40 TABLET, DELAYED RELEASE ORAL at 06:06

## 2025-05-27 RX ADMIN — VENLAFAXINE HYDROCHLORIDE 150 MG: 150 CAPSULE, EXTENDED RELEASE ORAL at 08:28

## 2025-05-27 RX ADMIN — Medication 2000 UNITS: at 08:27

## 2025-05-27 RX ADMIN — DOCUSATE SODIUM 100 MG: 100 CAPSULE, LIQUID FILLED ORAL at 08:27

## 2025-05-27 RX ADMIN — METRONIDAZOLE 500 MG: 500 TABLET ORAL at 08:27

## 2025-05-27 RX ADMIN — Medication 1 TABLET: at 08:27

## 2025-05-27 NOTE — PLAN OF CARE
Problem: PAIN - ADULT  Goal: Verbalizes/displays adequate comfort level or baseline comfort level  Description: Interventions:  - Encourage patient to monitor pain and request assistance  - Assess pain using appropriate pain scale  - Administer analgesics as ordered based on type and severity of pain and evaluate response  - Implement non-pharmacological measures as appropriate and evaluate response  - Consider cultural and social influences on pain and pain management  - Notify physician/advanced practitioner if interventions unsuccessful or patient reports new pain  - Educate patient/family on pain management process including their role and importance of  reporting pain   - Provide non-pharmacologic/complimentary pain relief interventions  Outcome: Progressing     Problem: INFECTION - ADULT  Goal: Absence or prevention of progression during hospitalization  Description: INTERVENTIONS:  - Assess and monitor for signs and symptoms of infection  - Monitor lab/diagnostic results  - Monitor all insertion sites, i.e. indwelling lines, tubes, and drains  - Monitor endotracheal if appropriate and nasal secretions for changes in amount and color  - [place] appropriate cooling/warming therapies per order  - Administer medications as ordered  - Instruct and encourage patient and family to use good hand hygiene technique  - Identify and instruct in appropriate isolation precautions for identified infection/condition  Outcome: Progressing  Goal: Absence of fever/infection during neutropenic period  Description: INTERVENTIONS:  - Monitor WBC  - Perform strict hand hygiene  - Limit to healthy visitors only  - No plants, dried, fresh or silk flowers with feliciano in patient room  Outcome: Progressing     Problem: Knowledge Deficit  Goal: Patient/family/caregiver demonstrates understanding of disease process, treatment plan, medications, and discharge instructions  Description: Complete learning assessment and assess knowledge  base.  Interventions:  - Provide teaching at level of understanding  - Provide teaching via preferred learning methods  Outcome: Progressing

## 2025-05-27 NOTE — PROGRESS NOTES
Sharon Vega is a 74 y.o. female who is currently receiving Vancomycin IV with management by the Pharmacy Consult service.  Relevant clinical data and objective / subjective history reviewed.  Vancomycin Assessment:  Indication and Goal AUC/Trough:  Bone/joint infection (goal -600, trough >10), Soft tissue (goal -600, trough >10)     Clinical Status: stable  Micro:     Renal Function:  SCr: 1.03 mg/dL  CrCl: 48.4 mL/min  Renal replacement: Not on dialysis  Days of Therapy: 14  Current Dose:  1000mg IV q24h    Vancomycin Plan:  Dosing: continue current regimen  Estimated AUC: 453 mcg*hr/mL  Estimated Trough: 12.8 mcg/mL  Next Level: 5/30 @ 0600  Renal Function Monitoring: Daily BMP and UOP  Pharmacy will continue to follow closely for s/sx of nephrotoxicity, infusion reactions and appropriateness of therapy.  BMP and CBC will be ordered per protocol. We will continue to follow the patient’s culture results and clinical progress daily.    Luz Alvarez, PharmD  Pharmacist

## 2025-05-27 NOTE — PLAN OF CARE
Problem: OCCUPATIONAL THERAPY ADULT  Goal: Performs self-care activities at highest level of function for planned discharge setting.  See evaluation for individualized goals.  Description: Treatment Interventions: ADL retraining, Functional transfer training, Endurance training, Patient/family training, Equipment evaluation/education, Compensatory technique education, Continued evaluation, Energy conservation, Activityengagement          See flowsheet documentation for full assessment, interventions and recommendations.   Outcome: Progressing  Note: Limitation: Decreased ADL status, Decreased cognition, Decreased endurance, Decreased Safe judgement during ADL, Decreased self-care trans, Decreased high-level ADLs  Prognosis: Fair  Assessment: pt participated in am ot session and was seen focusing on activity tolertence, use of lhae for lb dressing, pt unsure if she owns devices or how to use them. pt required max asst lb dressing with dressing stick, reacher and sock aide. pt with swollen legs and has inability to lift r le o0ff ground during adls. pt was able to stand and pivot with mod asst x 1 and increased time also needing mod vc's/  pt is pleasant and cooperative this session     Rehab Resource Intensity Level, OT: II (Moderate Resource Intensity)

## 2025-05-27 NOTE — PLAN OF CARE
Problem: PAIN - ADULT  Goal: Verbalizes/displays adequate comfort level or baseline comfort level  Description: Interventions:  - Encourage patient to monitor pain and request assistance  - Assess pain using appropriate pain scale  - Administer analgesics as ordered based on type and severity of pain and evaluate response  - Implement non-pharmacological measures as appropriate and evaluate response  - Consider cultural and social influences on pain and pain management  - Notify physician/advanced practitioner if interventions unsuccessful or patient reports new pain  - Educate patient/family on pain management process including their role and importance of  reporting pain   - Provide non-pharmacologic/complimentary pain relief interventions  Outcome: Progressing     Problem: INFECTION - ADULT  Goal: Absence or prevention of progression during hospitalization  Description: INTERVENTIONS:  - Assess and monitor for signs and symptoms of infection  - Monitor lab/diagnostic results  - Monitor all insertion sites, i.e. indwelling lines, tubes, and drains  - Monitor endotracheal if appropriate and nasal secretions for changes in amount and color  - Gregory appropriate cooling/warming therapies per order  - Administer medications as ordered  - Instruct and encourage patient and family to use good hand hygiene technique  - Identify and instruct in appropriate isolation precautions for identified infection/condition  Outcome: Progressing  Goal: Absence of fever/infection during neutropenic period  Description: INTERVENTIONS:  - Monitor WBC  - Perform strict hand hygiene  - Limit to healthy visitors only  - No plants, dried, fresh or silk flowers with feliciano in patient room  Outcome: Progressing     Problem: SAFETY ADULT  Goal: Patient will remain free of falls  Description: INTERVENTIONS:  - Educate patient/family on patient safety including physical limitations  - Instruct patient to call for assistance with activity   -  Consider consulting OT/PT to assist with strengthening/mobility based on AM PAC & JH-HLM score  - Consult OT/PT to assist with strengthening/mobility   - Keep Call bell within reach  - Keep bed low and locked with side rails adjusted as appropriate  - Keep care items and personal belongings within reach  - Initiate and maintain comfort rounds  - Make Fall Risk Sign visible to staff  - Offer Toileting every 2-4 Hours, in advance of need  - Initiate/Maintain bed/chair alarm  - Obtain necessary fall risk management equipment: nonskid footwera   - Apply yellow socks and bracelet for high fall risk patients  - Consider moving patient to room near nurses station  Outcome: Progressing  Goal: Maintain or return to baseline ADL function  Description: INTERVENTIONS:  -  Assess patient's ability to carry out ADLs; assess patient's baseline for ADL function and identify physical deficits which impact ability to perform ADLs (bathing, care of mouth/teeth, toileting, grooming, dressing, etc.)  - Assess/evaluate cause of self-care deficits   - Assess range of motion  - Assess patient's mobility; develop plan if impaired  - Assess patient's need for assistive devices and provide as appropriate  - Encourage maximum independence but intervene and supervise when necessary  - Involve family in performance of ADLs  - Assess for home care needs following discharge   - Consider OT consult to assist with ADL evaluation and planning for discharge  - Provide patient education as appropriate  - Monitor functional capacity and physical performance, use of AM PAC & JH-HLM   - Monitor gait, balance and fatigue with ambulation    Outcome: Progressing  Goal: Maintains/Returns to pre admission functional level  Description: INTERVENTIONS:  - Perform AM-PAC 6 Click Basic Mobility/ Daily Activity assessment daily.  - Set and communicate daily mobility goal to care team and patient/family/caregiver.   - Collaborate with rehabilitation services on  mobility goals if consulted  - Perform Range of Motion 3 times a day.  - Reposition patient every 2 hours.  - Dangle patient 3 times a day  - Stand patient 3 times a day  - Ambulate patient 3 times a day  - Out of bed to chair 3 times a day   - Out of bed for meals 3 times a day  - Out of bed for toileting  - Record patient progress and toleration of activity level   Outcome: Progressing     Problem: DISCHARGE PLANNING  Goal: Discharge to home or other facility with appropriate resources  Description: INTERVENTIONS:  - Identify barriers to discharge w/patient and caregiver  - Arrange for needed discharge resources and transportation as appropriate  - Identify discharge learning needs (meds, wound care, etc.)  - Arrange for interpretive services to assist at discharge as needed  - Refer to Case Management Department for coordinating discharge planning if the patient needs post-hospital services based on physician/advanced practitioner order or complex needs related to functional status, cognitive ability, or social support system  Outcome: Progressing     Problem: Knowledge Deficit  Goal: Patient/family/caregiver demonstrates understanding of disease process, treatment plan, medications, and discharge instructions  Description: Complete learning assessment and assess knowledge base.  Interventions:  - Provide teaching at level of understanding  - Provide teaching via preferred learning methods  Outcome: Progressing     Problem: Prexisting or High Potential for Compromised Skin Integrity  Goal: Skin integrity is maintained or improved  Description: INTERVENTIONS:  - Identify patients at risk for skin breakdown  - Assess and monitor skin integrity including under and around medical devices   - Assess and monitor nutrition and hydration status  - Monitor labs  - Assess for incontinence   - Turn and reposition patient  - Assist with mobility/ambulation  - Relieve pressure over mane prominences   - Avoid friction and  shearing  - Provide appropriate hygiene as needed including keeping skin clean and dry  - Evaluate need for skin moisturizer/barrier cream  - Collaborate with interdisciplinary team  - Patient/family teaching  - Consider wound care consult    Assess:  - Review Gamaliel scale daily  - Clean and moisturize skin every shift   - Inspect skin when repositioning, toileting, and assisting with ADLS  - Assess under medical devices such as NC every shift   - Assess extremities for adequate circulation and sensation     Bed Management:  - Have minimal linens on bed & keep smooth, unwrinkled  - Change linens as needed when moist or perspiring  - Avoid sitting or lying in one position for more than 2-4 hours while in bed?Keep HOB at degrees   - Toileting:  - Offer bedside commode  - Assess for incontinence every   - Use incontinent care products after each incontinent episode such as     Activity:  - Mobilize patient  times a day  - Encourage activity and walks on unit  - Encourage or provide ROM exercises   - Turn and reposition patient every  Hours  - Use appropriate equipment to lift or move patient in bed  - Instruct/ Assist with weight shifting every  when out of bed in chair  - Consider limitation of chair time  hour intervals    Skin Care:  - Avoid use of baby powder, tape, friction and shearing, hot water or constrictive clothing  - Relieve pressure over bony prominences using   - Do not massage red bony areas    Next Steps:  - Teach patient strategies to minimize risks such as   - Consider consults to  interdisciplinary teams such as  Outcome: Progressing     Problem: Nutrition/Hydration-ADULT  Goal: Nutrient/Hydration intake appropriate for improving, restoring or maintaining nutritional needs  Description: Monitor and assess patient's nutrition/hydration status for malnutrition. Collaborate with interdisciplinary team and initiate plan and interventions as ordered.  Monitor patient's weight and dietary intake as  ordered or per policy. Utilize nutrition screening tool and intervene as necessary. Determine patient's food preferences and provide high-protein, high-caloric foods as appropriate.     INTERVENTIONS:  - Monitor oral intake, urinary output, labs, and treatment plans  - Assess nutrition and hydration status and recommend course of action  - Evaluate amount of meals eaten  - Assist patient with eating if necessary   - Allow adequate time for meals  - Recommend/ encourage appropriate diets, oral nutritional supplements, and vitamin/mineral supplements  - Order, calculate, and assess calorie counts as needed  - Recommend, monitor, and adjust tube feedings and TPN/PPN based on assessed needs  - Assess need for intravenous fluids  - Provide specific nutrition/hydration education as appropriate  - Include patient/family/caregiver in decisions related to nutrition  Outcome: Progressing

## 2025-05-27 NOTE — CASE MANAGEMENT
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) 2025    Class 2 severe obesity due to excess calories with serious comorbidity and body mass index (BMI) of 35.0 to 35.9 in adult (HCC) 2025    Right hip pain 2025    Sepsis without acute organ dysfunction (HCC) 2025    Sacroiliitis (HCC) 2025    Lumbar radiculopathy     Chronic pain syndrome 2023    Primary hypertension 2022    Lumbar degenerative disc disease 2022    Lumbar spondylosis 2022    Cervical radiculopathy 2022    Cervical spondylosis 2022    Rheumatoid arthritis involving both hands (HCC) 2022    Spinal stenosis of lumbar region without neurogenic claudication 2022      LOS (days): 13  Geometric Mean LOS (GMLOS) (days): 7.1  Days to GMLOS:-5.6     OBJECTIVE:  Risk of Unplanned Readmission Score: 20.41         Current admission status: Inpatient   Preferred Pharmacy:   [facility] - [place], [place] - [address]  [address]  [place] [address]  Phone: [phone] Fax: [phone]    Primary Care Provider: Danielito Antunez DO    Primary Insurance: MEDICARE  Secondary Insurance: AARP    DISCHARGE DETAILS:    Discharge planning discussed with:: Patient     Comments - Freedom of Choice: Discussed FOC  CM contacted  family/caregiver?: Yes  Were Treatment Team discharge recommendations reviewed with patient/caregiver?: Yes  Did patient/caregiver verbalize understanding of patient care needs?: N/A- going to facility  Were patient/caregiver advised of the risks associated with not following Treatment Team discharge recommendations?: Yes    Contacts  Patient Contacts: Son - Bill  Contact Method: Phone  Phone Number: [phone]  Reason/Outcome: Discharge Planning, Continuity of Care        Treatment Team Recommendation: Acute Rehab  Discharge Destination Plan:: Acute Rehab  Transport at Discharge : [facility]     Number/Name of Dispatcher: DAYANARA [phone]  Transported by (Company and Unit #): [employer]  ETA of Transport (Date): 05/27/25  ETA of Transport (Time): 1600         Accepting Facility Name, City & State : [facility]  Receiving Facility/Agency Phone Number: [phone]  Facility/Agency Fax Number: [phone]       CM met w/pt at bedside to discuss dcp to [facility]. Pt verbalized understanding and in agreement w/dc & accepting facility. Pt notified of transport time.  CM called pt's son - update of same - in agreement w/above.  CM notified bedside nurse, providers and [facility] of transport time.

## 2025-05-27 NOTE — PROGRESS NOTES
Progress Note - Infectious Disease   Name: Sharon Vega 74 y.o. female I MRN: [id number]  Unit/Bed#: -01 I Date of Admission: 5/14/2025   Date of Service: 5/27/2025 I Hospital Day: 13     Assessment & Plan  Abscess of right hip  Presented to [facility] on 5/13 from rehab due to persistent pain in right hip, history of right hip hemiarthroplasty on 7/2/2022.  Patient recently admitted in late April for similar complaint, s/p right hip lR aspiration for concern of septic arthritis.  Right hip arthrogram and joint aspiration resulted in enough fluid for culture, resulted negative.  Recommend discontinuation of antibiotics per ID and orthopedic surgery recommended outpatient follow-up for total right hip replacement.  Patient was undergoing workup for subsequent procedure when she obtained a right hip XR and CT chest abdomen pelvis which revealed Postsurgical change from right hip hemiarthroplasty. Organized collection of fluid and gas in the right iliac is muscle and similar collection surrounding the right hip arthroplasty in the deep gluteal muscles measuring up to 10 cm, concerning for infection. No hematoma.  While being evaluated in the emergency room, she was started on Vanco/Zosyn with recommendation for admission given meeting sepsis criteria with tachycardia, leukocytosis.  Patient recommended transfer to [facility] for tertiary care with consult orthopedics.  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 Procedure performed on 5/15: Abscess drainage x 2 of fluid collections around right hip. 10 fr drains placed. 40 ml pus from anterior drain, and 90 ml pus from lateral drain.There are multiple other undrained areas. RICK drain culture: 2+ growth of Finegoldia magna, 2+ peptoniphilus harei. Blood culture 1  out of 2 from 5/13 now positive for Finegoldia magna, likely true bacteremia in setting of anaerobe finding in collected drain abscess cultures.  Went to the OR with orthopedic surgery on 5/21 for removal of right prosthetic hip implant with conversion of right hip/insertion of ABX impregnated cement.  Reviewed intraoperative culture findings, anaerobic culture with few colonies of peptoniphilus harei and finegoldia magna.  Synovasure results uploaded to media, negative for bacterial panel.    - Continue IV Vancomycin 1g q 24 hours  - Continue p.o. Flagyl 500 mg q12 hours  - Treat x 6 weeks postoperatively, through 7/1/2025  - Weekly CBC with differential, CR, vancomycin trough and antibiotics (vancomycin trough goal 12-17.5)   - PICC in place, RUE.  Will require removal after last dose of IV antibiotics  - Trend fever curve/vitals  - Trend CBC/BMP   - Monitor exam for new/developing symptoms  - Postoperative care per orthopedic surgery  - Patient will require ID follow-up 2 weeks after discharge  - Stable for discharge from ID standpoint    Sepsis without acute organ dysfunction (HCC)  Patient presented to [facility] on 5/13 with tachycardia, leukocytosis.  Source likely right hip abscess.  Started on IV vancomycin/Zosyn, transition to vancomycin per ID recommendations at [facility], now on additional p.o. Flagyl given bacteremia and drain abscess cultures     - Continue antibiotics as above  Chronic pain syndrome  History of chronic low back pain, cervical/lumbar radiculopathy.       - Continue care per primary team  Positive blood culture  Blood cultures obtained on 5/13, 1 out of 2 positive for preliminary finding of anaerobe organism, gram-positive cocci in clusters     - Continue antibiotics as above    I have discussed the above management plan in detail with the primary service.   Ok for discharge from Infectious Disease service perspective.    Antibiotics:  Vancomycin  Flagyl    Subjective   Patient  has no fever, chills, sweats; no nausea, vomiting, diarrhea; no cough, shortness of breath. No new symptoms.     Objective :  Temp:  [97.6 °F (36.4 °C)-98.2 °F (36.8 °C)] 97.7 °F (36.5 °C)  HR:  [100-102] 100  BP: (115-121)/(70-73) 118/73  Resp:  [16-18] 16  SpO2:  [91 %-96 %] 93 %  O2 Device: None (Room air)    General:  No acute distress  Psychiatric:  Awake and alert  Pulmonary:  Normal respiratory excursion without accessory muscle use  Abdomen:  Soft, nontender  Extremities:  Right lower extremity hip pain   Skin: No rashes. Right hip incision dressings intact,  no drainage.  No proximal or distal erythema along extremity.      Lab Results: I have reviewed the following results:  Results from last 7 days   Lab Units 05/26/25  0531 05/25/25  0522 05/24/25  0531   WBC Thousand/uL 14.42* 14.74* 16.31*   HEMOGLOBIN g/dL 9.2* 8.7* 7.6*   PLATELETS Thousands/uL 477* 445* 376     Results from last 7 days   Lab Units 05/26/25  0531 05/25/25  0522 05/24/25  0531   SODIUM mmol/L 134* 133* 130*   POTASSIUM mmol/L 4.1 4.1 4.2   CHLORIDE mmol/L 98 100 99   CO2 mmol/L 29 27 26   BUN mg/dL 18 18 20   CREATININE mg/dL 1.03 1.14 1.21   EGFR ml/min/1.73sq m 53 47 43   CALCIUM mg/dL 8.3* 7.8* 7.8*   AST U/L 17 15 16   ALT U/L 11 10 11   ALK PHOS U/L 49 48 41   ALBUMIN g/dL 2.4* 2.2* 2.2*     Results from last 7 days   Lab Units 05/21/25  1352   GRAM STAIN RESULT  1+ Polys  No bacteria seen                     Imaging Results Review: No pertinent imaging studies reviewed.  Other Study Results Review: No additional pertinent studies reviewed.    Administrative Statements   I have spent a total time of 30 minutes in caring for this patient on the day of the visit/encounter including Instructions for management, Counseling / Coordination of care, Documenting in the medical record, Reviewing/placing orders in the medical record (including tests, medications, and/or procedures), and Communicating with other healthcare professionals  .

## 2025-05-27 NOTE — ASSESSMENT & PLAN NOTE
"Patient presented from [facility] with right hip pain   Had recent IR hip joint aspiration on 4/24, was scheduled for conversion of partial hip replacement to total hip on 5/14 but cancelled due to leukocytosis   CT scan obtained with evidence of \"post surgical change from right hip hemiarthroplasty. Organized collection of fluid and gas in the right iliac muscle and similar collection surrounding right hip arthroplasty in the deep gluteal muscles measuring up to 10 cm\"    Transferred to [facility] for orthopedic evaluation   Orthopedics following   S/p IR aspiration and drain placement x 2 with synovasure culture on 5/15   Cultures also obtained from RICK drain bulb -- Fingegoldia magna, Peptoniphilus harei    Appreciate ongoing orthopedic recommendations -- s/p revision right total hip arthoplasty with antibiotic spacer 5/21.  Cleared from ortho standpoint, will need f/u.  D/w ortho, recommend DVT ppx with ASA 81 mg BID x 28 days from surgery date.    ID following for abx management   Currently on IV Vancomycin and p.o. Flagyl  Cx from drain with Fingegoldia magna, Peptoniphilus harei.  F/u antibiotic recs  1/2 blood cultures at [facility] with Finegoldia magna.  Late growth.   c/w culture from drains.   Blood cultures obtained at [facility] are negative at 5 days   Perioperative cultures obtained 5/21: Consistent with all previous cultures noted few colonies of peptoniphilus harei group and few colonies of finegoldia magna   D/w ID, cleared for discharge to rehab.  Continue abx through 7/1.  PICC in place.  D/c PICC following last dose of IV antibiotics.    PRN pain control -- see AVS for dc med rec   "

## 2025-05-27 NOTE — ASSESSMENT & PLAN NOTE
Patient presented to [facility] on 5/13 with tachycardia, leukocytosis.  Source likely right hip abscess.  Started on IV vancomycin/Zosyn, transition to vancomycin per ID recommendations at [facility], now on additional p.o. Flagyl given bacteremia and drain abscess cultures     - Continue antibiotics as above

## 2025-05-27 NOTE — ASSESSMENT & PLAN NOTE
Previously hgb noted to be around 12   Dropped to 6.4 with hypotension post op, s/p 2 unit PRBCs total with now improvement up to 9.2

## 2025-05-27 NOTE — DISCHARGE SUMMARY
"Discharge Summary - Hospitalist   Name: Sharon Vega 74 y.o. female I MRN: [id number]  Unit/Bed#: -01 I Date of Admission: 5/14/2025   Date of Service: 5/27/2025 I Hospital Day: 13     Assessment & Plan  Abscess of right hip  Patient presented from [facility] with right hip pain   Had recent IR hip joint aspiration on 4/24, was scheduled for conversion of partial hip replacement to total hip on 5/14 but cancelled due to leukocytosis   CT scan obtained with evidence of \"post surgical change from right hip hemiarthroplasty. Organized collection of fluid and gas in the right iliac muscle and similar collection surrounding right hip arthroplasty in the deep gluteal muscles measuring up to 10 cm\"    Transferred to [facility] for orthopedic evaluation   Orthopedics following   S/p IR aspiration and drain placement x 2 with synovasure culture on 5/15   Cultures also obtained from RICK drain bulb -- Fingegoldia magna, Peptoniphilus hartiny    Appreciate ongoing orthopedic recommendations -- s/p revision right total hip arthoplasty with antibiotic spacer 5/21.  Cleared from ortho standpoint, will need f/u.  D/w ortho, recommend DVT ppx with ASA 81 mg BID x 28 days from surgery date.    ID following for abx management   Currently on IV Vancomycin and p.o. Flagyl  Cx from drain with Fingegoldia magna, Peptoniphilus raul.  F/u antibiotic recs  1/2 blood cultures at [place] with Finegoldia magna.  Late growth.   c/w culture from drains.   Blood cultures obtained at [facility] are negative at 5 days   Perioperative cultures obtained 5/21: Consistent with all previous cultures noted few colonies of peptoniphilus harei group and few colonies of finegoldia magna   D/w ID, cleared for discharge to rehab.  Continue abx through 7/1.  PICC in place.  D/c PICC following last dose of IV antibiotics.    PRN pain control -- see AVS for dc med rec   Sepsis without acute organ dysfunction (HCC)  POA at [facility] as evidenced by " leukocytosis and tachycardia   In setting of right hip intramuscular abscess as above   Continue IV vancomycin and p.o. Flagyl per ID recommendations, through 7/1   S/p IV fluid hydration   BC from [facility] 1/2 GPC in clusters, see related plan.  BC obtained here with NGTD  Lactic negative   Procal, WBC now down trending  Resolving/improving   Pre-diabetes  Glucose overall acceptable, no meds at d/c for this.  Diet mgmt.   Positive blood culture  1/2 blood cultures at [place] with Finegoldia magna.    Blood cultures repeated at [facility] with no growth at 5 days   Continue IV Vanco and PO Flagyl through 7/1 per ID.  PICC placed, d/c PICC once abx are complete   Anemia  Previously hgb noted to be around 12   Dropped to 6.4 with hypotension post op, s/p 2 unit PRBCs total with now improvement up to 9.2   Aortic stenosis  Mumur noted on exam, prior echo with mild to moderate AS  Obtained repeat echo with ef of 65% and moderate aortic stenosis mildly dilated   Constipation  Continue bowel regimen   On colace bid and senna daily at hs   Added daily miralax and prn lactulose now   Very large bm completed 5/25  Primary hypertension    Continue amlodipine 10 mg daily   Monitor closely   Hyponatremia  Continued now 130--> 133--> 134  Possibly in setting of sepsis, volume depletion   S/p IV fluids no further fluids at this time   Monitor sodium levels closely   Trend BMP  Stable  Hypomagnesemia  S/p replacement      Medical Problems       Resolved Problems  Date Reviewed: 5/22/2025   None       Discharging Physician / Practitioner: Josie Molina PA-C  PCP: Danielito Antunez DO  Admission Date:   Admission Orders (From admission, onward)       Ordered        05/14/25 2138  INPATIENT ADMISSION  Once                          Discharge Date: 05/27/25    Next Steps for Physician/AP Assuming Care:  Follow up labs, vanco level for dose adjustment as needed   ID and ortho outpt f/u     Medication Changes for Discharge & Rationale:   See  "above A/P and AVS.     Consultations During Hospital Stay:  ID  Ortho   IR     Procedures Performed:   As above     Significant Findings / Test Results:   As above     Incidental Findings:   None      Hospital Course:   Sharon Vega is a 74 y.o. female patient who originally presented to the hospital on 5/14/2025 due to R hip pain.  Found with R hip abscess, with bacteremia and sepsis as above.  See above A/P for details regarding this and individual progress notes.      Please see above list of diagnoses and related plan for additional information.     Discharge Day Visit / Exam:   Subjective:  Feeling well today, pain is controlled, got OOB to chair today.    Vitals: Blood Pressure: 104/66 (05/27/25 1453)  Pulse: 95 (05/27/25 1453)  Temperature: 97.9 °F (36.6 °C) (05/27/25 1453)  Temp Source: Oral (05/26/25 2000)  Respirations: 16 (05/27/25 1453)  Height: 5' 3\" (160 cm) (05/19/25 0930)  Weight - Scale: 86.2 kg (190 lb) (05/19/25 0930)  SpO2: 97 % (05/27/25 1453)  Physical Exam  Vitals reviewed.   Constitutional:       General: She is not in acute distress.     Appearance: She is not toxic-appearing.   HENT:      Head: Normocephalic and atraumatic.     Cardiovascular:      Rate and Rhythm: Normal rate and regular rhythm.   Pulmonary:      Effort: Pulmonary effort is normal. No respiratory distress.   Abdominal:      General: There is no distension.      Palpations: Abdomen is soft.     Musculoskeletal:         General: Normal range of motion.     Neurological:      General: No focal deficit present.      Mental Status: She is alert and oriented to person, place, and time.     Psychiatric:         Mood and Affect: Mood normal.         Behavior: Behavior normal.         Discussion with Family: Updated  (niece) at bedside.    Discharge instructions/Information to patient and family:   See after visit summary for information provided to patient and family.      Provisions for Follow-Up Care:  See after " visit summary for information related to follow-up care and any pertinent home health orders.      Mobility at time of Discharge:   Basic Mobility Inpatient Raw Score: 10  JH-HLM Goal: 4: Move to chair/commode  JH-HLM Achieved: 5: Stand (1 or more minutes)  HLM Goal achieved. Continue to encourage appropriate mobility.     Disposition:   [facility]     Planned Readmission: no    Administrative Statements   Discharge Statement:  I have spent a total time of 40 minutes in caring for this patient on the day of the visit/encounter. >30 minutes of time was spent on: Instructions for management, Patient and family education, Counseling / Coordination of care, Documenting in the medical record, Reviewing / ordering tests, medicine, procedures  , and Communicating with other healthcare professionals .    **Please Note: This note may have been constructed using a voice recognition system**

## 2025-05-27 NOTE — PHYSICAL THERAPY NOTE
Physical Therapy Progress Note     05/27/25 0915   PT Last Visit   PT Visit Date 05/27/25   Note Type   Note Type Treatment for insurance authorization   Pain Assessment   Pain Assessment Tool FLACC   Pain Rating: FLACC (Rest) - Face 1   Pain Rating: FLACC (Rest) - Legs 1   Pain Rating: FLACC (Rest) - Activity 0   Pain Rating: FLACC (Rest) - Cry 1   Pain Rating: FLACC (Rest) - Consolability 0   Score: FLACC (Rest) 3   Pain Rating: FLACC (Activity) - Face 2   Pain Rating: FLACC (Activity) - Legs 1   Pain Rating: FLACC (Activity) - Activity 1   Pain Rating: FLACC (Activity) - Cry 1   Pain Rating: FLACC (Activity) - Consolability 1   Score: FLACC (Activity) 6   Restrictions/Precautions   RLE Weight Bearing Per Order PWB   Other Precautions Chair Alarm;Bed Alarm;Pain;Fall Risk;WBS;THR   Subjective   Subjective pt encountered supine in bed, pleasant and agreeable to tratment.  Admits to anxiousness regarding mobiilty, but does report pain is better controleld recently on current meds.  She is expressive about her situation & began to cry upon sitting EOB due to the same, but responded well to emotional support.  Pt is motivated to gat better & regain her indpendence in current state.   Bed Mobility   Supine to Sit 3  Moderate assistance   Additional items Assist x 1;HOB elevated;Bedrails;Increased time required;Verbal cues   Transfers   Sit to Stand 4  Minimal assistance   Additional items Assist x 2   Stand to Sit 4  Minimal assistance   Additional items Assist x 2   Stand pivot 4  Minimal assistance   Additional items Assist x 2   Balance   Static Sitting Fair   Static Standing Poor +   Ambulatory Poor   Activity Tolerance   Activity Tolerance Patient tolerated treatment well;Patient limited by fatigue;Patient limited by pain   Nurse Made Aware MARION Padilla   Exercises   Knee AROM Long Arc Quad Sitting;15 reps;AAROM;Right;AROM;Left   Assessment   Prognosis Good   Problem List Decreased strength;Decreased  endurance;Decreased range of motion;Impaired balance;Decreased mobility;Decreased cognition;Pain;Orthopedic restrictions   Assessment Pt demosnrated considerable improvement in functional mobiilty today, performing all transfers with decreased assist, then completing OOB tasks with use of RW & no LOB, which, by defiinition redued requiesite assist for balance & reliance on staff for similar tasks.  She was able to maintain balance in static stance with min A primarily for guarding without LOB or LE instability.  She was able to shuffle to recliner as noted above, but does has difficulty advancing RLE to complete pivot towards affected limb.  She will likely do better pivoting to L side otherwise.  Gait trial deferred today due to this deficit, wth focus directed on performing LAQ in sitting to improve RLE quad strengthening & R knee AROM in anticipation of progressing to these tasks in upcoming sessions.  Pt expresses & demonstrates motivation to participate in tasks despite ongoing difficulites.  PT POC & d/c recommendations remain appropriate at this time.   Goals   Patient Goals to get better & regain her independence.   STG Expiration Date 06/05/25   PT Treatment Day 4   Plan   Treatment/Interventions Functional transfer training;LE strengthening/ROM;Therapeutic exercise;Endurance training;Patient/family training;Bed mobility;Equipment eval/education;Gait training   Progress Progressing toward goals   PT Frequency 3-5x/wk   Discharge Recommendation   Rehab Resource Intensity Level, PT II (Moderate Resource Intensity)   Equipment Recommended Walker   Walker Package Recommended Wheeled walker   AM-PAC Basic Mobility Inpatient   Turning in Flat Bed Without Bedrails 3   Lying on Back to Sitting on Edge of Flat Bed Without Bedrails 2   Moving Bed to Chair 1   Standing Up From Chair Using Arms 2   Walk in Room 1   Climb 3-5 Stairs With Railing 1   Basic Mobility Inpatient Raw Score 10   Turning Head Towards Sound 4    Follow Simple Instructions 4   Low Function Basic Mobility Raw Score  18   Low Function Basic Mobility Standardized Score  29.25   [facility] Highest Level Of Mobility   -[facility] Goal 4: Move to chair/commode   -[facility] Achieved 5: Stand (1 or more minutes)       Celio Melgar PTA    An [facility] Basic Mobility Raw Score less than 17 suggests pt would benefit from post acute rehab.  Please also refer to the recommendation of the Physical Therapist for safe discharge planning.

## 2025-05-27 NOTE — PLAN OF CARE
Problem: PHYSICAL THERAPY ADULT  Goal: Performs mobility at highest level of function for planned discharge setting.  See evaluation for individualized goals.  Description: Treatment/Interventions: Functional transfer training, LE strengthening/ROM, Elevations, Endurance training, Therapeutic exercise, Patient/family training, Equipment eval/education, Bed mobility, Gait training  Equipment Recommended: Walker       See flowsheet documentation for full assessment, interventions and recommendations.  Outcome: Progressing  Note: Prognosis: Good  Problem List: Decreased strength, Decreased endurance, Decreased range of motion, Impaired balance, Decreased mobility, Decreased cognition, Pain, Orthopedic restrictions  Assessment: Pt demosnrated considerable improvement in functional mobiilty today, performing all transfers with decreased assist, then completing OOB tasks with use of RW & no LOB, which, by defiinition redued requiesite assist for balance & reliance on staff for similar tasks.  She was able to maintain balance in static stance with min A primarily for guarding without LOB or LE instability.  She was able to shuffle to recliner as noted above, but does has difficulty advancing RLE to complete pivot towards affected limb.  She will likely do better pivoting to L side otherwise.  Gait trial deferred today due to this deficit, wth focus directed on performing LAQ in sitting to improve RLE quad strengthening & R knee AROM in anticipation of progressing to these tasks in upcoming sessions.  Pt expresses & demonstrates motivation to participate in tasks despite ongoing difficulites.  PT POC & d/c recommendations remain appropriate at this time.  Barriers to Discharge: Inaccessible home environment, Decreased caregiver support     Rehab Resource Intensity Level, PT: II (Moderate Resource Intensity)    See flowsheet documentation for full assessment.

## 2025-05-27 NOTE — ASSESSMENT & PLAN NOTE
1/2 blood cultures at [place] with Finegoldia magna.    Blood cultures repeated at [facility] with no growth at 5 days   Continue IV Vanco and PO Flagyl through 7/1 per ID.  PICC placed, d/c PICC once abx are complete

## 2025-05-27 NOTE — OCCUPATIONAL THERAPY NOTE
Occupational Therapy Treatment Note:      05/27/25 1438   OT Last Visit   OT Visit Date 05/27/25   Note Type   Note Type Treatment   Pain Assessment   Pain Assessment Tool 0-10   Pain Score 3   Restrictions/Precautions   RLE Weight Bearing Per Order PWB   Other Precautions Fall Risk;Chair Alarm;Bed Alarm;WBS  (swelling le's)   ADL   Grooming Assistance 5  Supervision/Setup   UB Bathing Assistance 2  Maximal Assistance   UB Dressing Assistance 5  Supervision/Setup   LB Dressing Assistance 3  Moderate Assistance   Toileting Assistance  2  Maximal Assistance   Functional Standing Tolerance   Time f minus balance   Transfers   Sit to Stand 3  Moderate assistance   Additional items Assist x 1   Stand to Sit 3  Moderate assistance   Additional items Assist x 1   Stand pivot 3  Moderate assistance   Additional items Assist x 1   Functional Mobility   Functional Mobility 3  Moderate assistance   Cognition   Comments pt could only name 1 /3 thp's   Activity Tolerance   Activity Tolerance Patient tolerated treatment well   Assessment   Assessment pt participated in am ot session and was seen focusing on activity tolertence, use of lhae for lb dressing, pt unsure if she owns devices or how to use them. pt required max asst lb dressing with dressing stick, reacher and sock aide. pt with swollen legs and has inability to lift r le o0ff ground during adls. pt was able to stand and pivot with mod asst x 1 and increased time also needing mod vc's/  pt is pleasant and cooperative this session   Plan   Treatment Interventions ADL retraining;Functional transfer training;Endurance training;Cognitive reorientation;Patient/family training;Equipment evaluation/education;Activityengagement   Goal Expiration Date 06/05/25   OT Treatment Day 1   OT Frequency 2-3x/wk   Discharge Recommendation   Rehab Resource Intensity Level, OT II (Moderate Resource Intensity)   AM-PAC Daily Activity Inpatient   Lower Body Dressing 2   Bathing 2    Toileting 2   Upper Body Dressing 3   Grooming 3   Eating 4   Daily Activity Raw Score 16   Daily Activity Standardized Score (Calc for Raw Score >=11) 35.96   AM-PAC Applied Cognition Inpatient   Following a Speech/Presentation 3   Understanding Ordinary Conversation 4   Taking Medications 2   Remembering Where Things Are Placed or Put Away 3   Remembering List of 4-5 Errands 2   Taking Care of Complicated Tasks 2   Applied Cognition Raw Score 16   Applied Cognition Standardized Score 35.03     April A Storm

## 2025-05-27 NOTE — ASSESSMENT & PLAN NOTE
74 y.o.female with right hip PJI now POD 6 right total hip arthroplasty with antibiotic spacer. Picc line placed, ID following with recommendations for antibiotics. Orthopaedically stable, awaiting rehab.    Partial weightbearing right lower extremity  Posterior hip precautions  Abduction pillow at all times when in bed and sitting  PT/OT  Pain control  DVT ppx: per primary  Medical management including antibiotics per primary team  ID consulted, appreciate recs - culture growing Finegoldia magna and peptoniphilus harei from OR culture  Dispo planning

## 2025-05-27 NOTE — ASSESSMENT & PLAN NOTE
Presented to [facility] on 5/13 from rehab due to persistent pain in right hip, history of right hip hemiarthroplasty on 7/2/2022.  Patient recently admitted in late April for similar complaint, s/p right hip lR aspiration for concern of septic arthritis.  Right hip arthrogram and joint aspiration resulted in enough fluid for culture, resulted negative.  Recommend discontinuation of antibiotics per ID and orthopedic surgery recommended outpatient follow-up for total right hip replacement.  Patient was undergoing workup for subsequent procedure when she obtained a right hip XR and CT chest abdomen pelvis which revealed Postsurgical change from right hip hemiarthroplasty. Organized collection of fluid and gas in the right iliac is muscle and similar collection surrounding the right hip arthroplasty in the deep gluteal muscles measuring up to 10 cm, concerning for infection. No hematoma.  While being evaluated in the emergency room, she was started on Vanco/Zosyn with recommendation for admission given meeting sepsis criteria with tachycardia, leukocytosis.  Patient recommended transfer to [facility] for tertiary care with consult orthopedics.  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 Procedure performed on 5/15: Abscess drainage x 2 of fluid collections around right hip. 10 fr drains placed. 40 ml pus from anterior drain, and 90 ml pus from lateral drain.There are multiple other undrained areas. RICK drain culture: 2+ growth of Finegoldia magna, 2+ peptoniphilus harei. Blood culture 1 out of 2 from 5/13 now positive for Finegoldia magna, likely true bacteremia in setting of anaerobe finding in collected drain abscess cultures.  Went to the OR with orthopedic surgery on 5/21 for removal of right prosthetic hip implant  with conversion of right hip/insertion of ABX impregnated cement.  Reviewed intraoperative culture findings, anaerobic culture with few colonies of peptoniphilus harei and finegoldia magna.  BrightEdgeasure results uploaded to media, negative for bacterial panel.    - Continue IV Vancomycin 1g q 24 hours  - Continue p.o. Flagyl 500 mg q12 hours  - Treat x 6 weeks postoperatively, through 7/1/2025  - Weekly CBC with differential, CR, vancomycin trough and antibiotics (vancomycin trough goal 12-17.5)   - PICC in place, RUE.  Will require removal after last dose of IV antibiotics  - Trend fever curve/vitals  - Trend CBC/BMP   - Monitor exam for new/developing symptoms  - Postoperative care per orthopedic surgery  - Patient will require ID follow-up 2 weeks after discharge  - Stable for discharge from ID standpoint

## 2025-05-27 NOTE — PROGRESS NOTES
"Progress Note - Orthopedics   Name: Sharon Vega 74 y.o. female I MRN: [id number]  Unit/Bed#: -01 I Date of Admission: 5/14/2025   Date of Service: 5/27/2025 I Hospital Day: 13    Assessment & Plan  Abscess of right hip  74 y.o.female with right hip PJI now POD 6 right total hip arthroplasty with antibiotic spacer. Picc line placed, ID following with recommendations for antibiotics. Orthopaedically stable, awaiting rehab.    Partial weightbearing right lower extremity  Posterior hip precautions  Abduction pillow at all times when in bed and sitting  PT/OT  Pain control  DVT ppx: per primary  Medical management including antibiotics per primary team  ID consulted, appreciate recs - culture growing Finegoldia magna and peptoniphilus harei from OR culture  Dispo planning    Pre-diabetes      Subjective   74 y.o.female doing well. No acute events, no new complaints. Pain well controlled. Denies fevers, chills, CP, SOB, N/V, numbness or tingling. Patient reports no issues with urination or bowel movements.    Objective :  Temp:  [97.6 °F (36.4 °C)-98.6 °F (37 °C)] 97.6 °F (36.4 °C)  HR:  [] 102  BP: (115-125)/(70-75) 121/73  Resp:  [16-18] 18  SpO2:  [91 %-96 %] 96 %  O2 Device: None (Room air)    Physical Exam  Musculoskeletal: RLE  Dressing c/d/i  TTP galindo incisionally  SILT s/s/t/sp/dp  Motor intact EHL/FHL, ankle df/pf  2+ DP pulse       Lab Results: I have reviewed the following results:  Recent Labs     05/25/25  0522 05/26/25  0531   WBC 14.74* 14.42*   HGB 8.7* 9.2*   HCT 26.4* 28.6*   * 477*   BUN 18 18   CREATININE 1.14 1.03     Blood Culture:    Lab Results   Component Value Date    BLOODCX No Growth After 5 Days. 05/14/2025     Wound Culture: No results found for: \"WOUNDCULT\"        "

## 2025-05-27 NOTE — ASSESSMENT & PLAN NOTE
POA at [facility] as evidenced by leukocytosis and tachycardia   In setting of right hip intramuscular abscess as above   Continue IV vancomycin and p.o. Flagyl per ID recommendations, through 7/1   S/p IV fluid hydration   BC from [facility] 1/2 GPC in clusters, see related plan.  BC obtained here with NGTD  Lactic negative   Procal, WBC now down trending  Resolving/improving

## 2025-05-28 ENCOUNTER — TELEPHONE (OUTPATIENT)
Dept: INFECTIOUS DISEASES | Facility: CLINIC | Age: 75
End: 2025-05-28

## 2025-05-28 DIAGNOSIS — T84.51XD INFECTION ASSOCIATED WITH INTERNAL RIGHT HIP PROSTHESIS, SUBSEQUENT ENCOUNTER: Primary | ICD-10-CM

## 2025-05-28 NOTE — PROGRESS NOTES
OPAT NOTE    AP ONLY CAMPUSES ARE: [place] and [place].   In these cases, physician is only cosigning notes.    Supervising/Discharge provider: Kerri    Diagnosis: Rt Hip Prosthetic Joint Infection    Drug:     Vancomycin 1g IV Q24  Metronidazole 500mg PO Q12    Labs/Frequency: CBCD Cre Vanco trough weekly (Start 5/30 prior to dose)    End Date:7/1    Vanco Trough Range: 12-17.5    Infusion/VNA/SNF contact: [facility]    Next appointment:  6/4 130PM, [place] for virtual

## 2025-05-28 NOTE — TELEPHONE ENCOUNTER
Reached out to [facility]. Spoke with Tomás. Confirmed receipt of fax and discussed follow up. They will call us if need to change appointment to virtual. Transferred to [facility] nurse who states she is dosing at 10 am daily. They will draw vanco trough Friday and fax us the results. Provided our phone and fax number to Bernie. No further questions/concerns at this time.

## 2025-06-02 NOTE — ASSESSMENT & PLAN NOTE
History of right hip hemiarthroplasty 7/2022 now status post hardware removal and antibiotic spacer placement as above.

## 2025-06-02 NOTE — ASSESSMENT & PLAN NOTE
Presented to [facility] on 5/13 from rehab due to persistent pain in right hip, history of right hip hemiarthroplasty on 7/2/2022.  Patient recently admitted in late April for similar complaint, s/p right hip lR aspiration for concern of septic arthritis.  Right hip arthrogram and joint aspiration resulted in enough fluid for culture, resulted negative.  Recommend discontinuation of antibiotics per ID and orthopedic surgery recommended outpatient follow-up for total right hip replacement.  Patient was undergoing workup for subsequent procedure when she obtained a right hip XR and CT chest abdomen pelvis which revealed an organized collection of fluid and gas in the right iliac is muscle and similar collection surrounding the right hip arthroplasty in the deep gluteal muscles measuring up to 10 cm, concerning for an abscess.  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  The patient was taken to IR 5/15 for abscess drain placement x 2 with drainage of 40 cc and 90 cc pus. The hip joint itself was not aspirated. The abscess fluid was unfortunately only sent for Synovasure not routine culture here, which showed elevated alpha defense and but ID panel was negative.  Culture sent from the drain less than 24 hours after placement grew Finegoldia magna and Peptoniphilus harei which are skin lizzie, but suspected to be true pathogens since blood culture from 5/13 is also growing Finegoldia. She is now status post right hip hardware removal and antibiotic spacer placement 5/21. Operative cultures are also growing Peptoniphilus and Finegoldia however she was on vancomycin for several days prior to any culture collection so we will continue vancomycin at this time due to polymicrobial infection. Most recent labs reviewed from 6/2: WBC WNL at 8.9, platelet count elevated at 661k, serum creatinine hs continued to uptrend from 1.28 to 1.34. Vancomycin trough therapeutic at 14.6.    - Continue IV  Vancomycin 1g q 24 hours  - Continue p.o. Flagyl 500 mg q12 hours  - Treat x 6 weeks postoperatively, through 7/1/2025  - Will have facility re-check serum creatinine tomorrow, 6/5, given uptrending on most recent labs. If it continues uptrending, will need a dose adjustment.   - Weekly CBC with differential, serum creatinine, vancomycin trough (vancomycin trough goal 12-17.5)   - PICC in place, RUE.  Will require removal after last dose of IV antibiotics. Will place orders once nearing end date of antibiotics.   - Ongoing follow-up with orthopedic surgery. Will need to ensure clearance of infection prior to second stage revision arthroplasty   - Return to care in 2 weeks while on IV antibiotics

## 2025-06-02 NOTE — PROGRESS NOTES
Name: Sharon Vega      : 1950      MRN: [id number]  Encounter Provider: Patti Cordova PA-C  Encounter Date: 2025   Encounter department: [facility]    Encounter provider Patti Cordova PA-C    The Patient is located at Other and in the following state in which I hold an active license [place].    The patient was identified by name and date of birth. Sharon Vega was informed that this is a telemedicine visit and that the visit is being conducted through the Epic Embedded platform. She agrees to proceed..  My office door was closed. No one else was in the room.  She acknowledged consent and understanding of privacy and security of the video platform. The patient has agreed to participate and understands they can discontinue the visit at any time.    I have spent a total time of 32 minutes in caring for this patient on the day of the visit/encounter including Diagnostic results, Prognosis, Risks and benefits of tx options, Instructions for management, Patient and family education, Importance of tx compliance, Risk factor reductions, Impressions, Counseling / Coordination of care, Documenting in the medical record, Reviewing/placing orders in the medical record (including tests, medications, and/or procedures), Obtaining or reviewing history  , and Communicating with other healthcare professionals , not including the time spent for establishing the audio/video connection.  Assessment & Plan  Abscess of right hip  Presented to [facility] on  from rehab due to persistent pain in right hip, history of right hip hemiarthroplasty on 2022.  Patient recently admitted in late April for similar complaint, s/p right hip lR aspiration for concern of septic arthritis.  Right hip arthrogram and joint aspiration resulted in enough fluid for culture, resulted negative.  Recommend discontinuation of antibiotics per ID and orthopedic surgery recommended outpatient follow-up for  total right hip replacement.  Patient was undergoing workup for subsequent procedure when she obtained a right hip XR and CT chest abdomen pelvis which revealed an organized collection of fluid and gas in the right iliac is muscle and similar collection surrounding the right hip arthroplasty in the deep gluteal muscles measuring up to 10 cm, concerning for an abscess.  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  The patient was taken to IR 5/15 for abscess drain placement x 2 with drainage of 40 cc and 90 cc pus. The hip joint itself was not aspirated. The abscess fluid was unfortunately only sent for Synovasure not routine culture here, which showed elevated alpha defense and but ID panel was negative.  Culture sent from the drain less than 24 hours after placement grew Finegoldia magna and Peptoniphilus harei which are skin lizzie, but suspected to be true pathogens since blood culture from 5/13 is also growing Finegoldia. She is now status post right hip hardware removal and antibiotic spacer placement 5/21. Operative cultures are also growing Peptoniphilus and Finegoldia however she was on vancomycin for several days prior to any culture collection so we will continue vancomycin at this time due to polymicrobial infection. Most recent labs reviewed from 6/2: WBC WNL at 8.9, platelet count elevated at 661k, serum creatinine hs continued to uptrend from 1.28 to 1.34. Vancomycin trough therapeutic at 14.6.    - Continue IV Vancomycin 1g q 24 hours  - Continue p.o. Flagyl 500 mg q12 hours  - Treat x 6 weeks postoperatively, through 7/1/2025  - Will have facility re-check serum creatinine tomorrow, 6/5, given uptrending on most recent labs. If it continues uptrending, will need a dose adjustment.   - Weekly CBC with differential, serum creatinine, vancomycin trough (vancomycin trough goal 12-17.5)   - PICC in place, RUE.  Will require removal after last dose of IV antibiotics. Will place orders  once nearing end date of antibiotics.   - Ongoing follow-up with orthopedic surgery. Will need to ensure clearance of infection prior to second stage revision arthroplasty   - Return to care in 2 weeks while on IV antibiotics       History of hemiarthroplasty of right hip  History of right hip hemiarthroplasty 7/2022 now status post hardware removal and antibiotic spacer placement as above.       Bacteremia  Finegoldia magna bacteremia.  1/2 sets blood cultures from 5/13 is growing Finegoldia magna.  TTE no vegetations.  Repeat blood cultures without growth. Continue antibiotics as above.            Antibiotics:   Vancomycin  Flagyl    History of Present Illness   Patient is a 75 y/o female presenting to ID clinic for follow-up of an infected right hip hemiarthroplasty c/b right hip abscess. C/c/b Finegoldia magna bacteremia. Patient is s/p OR for right hip hardware removal and antibiotic spacer placement 5/21. Operative cultures positive for Peptoniphilus and Finegoldia. She was discharged on a 6 week antibiotic course with IV vancomycin and PO Flagyl.     Patient reports she is feeling well. Has no acute complaints. Denies fever, chills, sweats; no nausea, vomiting, diarrhea; no cough, shortness of breath; no pain. No new symptoms. States her hip incision is clean and dry. No surrounding erythema, edema, or drainage. No significant pain. States her PICC site is CDI, no erythema, edema, or drainage. Flushing well and getting good blood return.     ROS:  A complete review of systems is negative other than that noted above in the HPI.    Medical History Reviewed by provider this encounter:  Tobacco  Allergies  Meds  Problems  Med Hx  Surg Hx  Fam Hx     .  Medications Ordered Prior to Encounter[1]   Social History[2]     Objective   There were no vitals taken for this visit.    Limited in setting of virtual visit:  General: Alert, interactive, appearing well, nontoxic, no acute distress.  Lungs: respirations  unlabored, no accessory muscle usage, on room air  Heart: unable to assess via virtual visit  Abdomen: does not appear visibly distended  Extremities: No clubbing, cyanosis or edema. Unable to visualize hip incision.   Skin: No new rashes or lesions noted to exposed skin.     Lab Results: I have personally reviewed pertinent labs.  Lab Results   Component Value Date    K 4.1 05/26/2025    CL 98 05/26/2025    CO2 29 05/26/2025    BUN 18 05/26/2025    CREATININE 1.03 05/26/2025    GLUF 116 (H) 04/25/2025    CALCIUM 8.3 (L) 05/26/2025    CORRECTEDCA 9.6 05/26/2025    AST 17 05/26/2025    ALT 11 05/26/2025    ALKPHOS 49 05/26/2025    EGFR 53 05/26/2025     Lab Results   Component Value Date    WBC 14.42 (H) 05/26/2025    HGB 9.2 (L) 05/26/2025    HCT 28.6 (L) 05/26/2025    MCV 93 05/26/2025     (H) 05/26/2025     Lab Results   Component Value Date    ESR >130 (H) 05/13/2025       Radiology Results Review : No pertinent imaging studies reviewed.      Patti Cordova PA-C  [facility]          [1]   Current Outpatient Medications on File Prior to Visit   Medication Sig Dispense Refill    acetaminophen (TYLENOL) 325 mg tablet Take 2 tablets (650 mg total) by mouth every 6 (six) hours as needed for mild pain  0    amLODIPine (NORVASC) 10 mg tablet Take 10 mg by mouth in the morning.      ascorbic acid (VITAMIN C) 500 MG tablet Take 1 tablet (500 mg total) by mouth 2 (two) times a day 60 tablet 1    aspirin 81 mg chewable tablet Chew 1 tablet (81 mg total) in the morning and 1 tablet (81 mg total) before bedtime. Do all this for 22 days.      bisacodyl (DULCOLAX) 10 mg suppository Insert 1 suppository (10 mg total) into the rectum daily as needed for constipation      cetirizine (ZyrTEC) 10 mg tablet Take 10 mg by mouth in the morning.      Cholecalciferol (VITAMIN D3) 1,000 units tablet Take 2 tablets (2,000 Units total) by mouth daily      docusate sodium (COLACE) 100 mg capsule Take 1 capsule  (100 mg total) by mouth 2 (two) times a day      ferrous sulfate 324 (65 Fe) mg Take 1 tablet (324 mg total) by mouth 2 (two) times a day before meals 60 tablet 1    folic acid (FOLVITE) 1 mg tablet Take 1 tablet (1 mg total) by mouth daily 30 tablet 1    gabapentin (NEURONTIN) 600 MG tablet Take 600 mg by mouth daily at bedtime      lidocaine (XYLOCAINE) 2 % topical gel Apply topically as needed for mild pain      metroNIDAZOLE (FLAGYL) 500 mg tablet Take 1 tablet (500 mg total) by mouth every 12 (twelve) hours      miconazole (MICOTIN) 2 % powder Apply topically as needed for itching      Multiple Vitamins-Minerals (multivitamin with minerals) tablet Take 1 tablet by mouth daily 30 tablet 1    oxyCODONE (ROXICODONE) 5 immediate release tablet Take 1 tablet (5 mg total) by mouth every 4 (four) hours as needed for moderate pain Max Daily Amount: 30 mg 10 tablet 0    pantoprazole (PROTONIX) 40 mg tablet Take 40 mg by mouth daily      polyethylene glycol (MIRALAX) 17 g packet Take 17 g by mouth daily      pravastatin (PRAVACHOL) 40 mg tablet Take 40 mg by mouth daily at bedtime      senna (SENOKOT) 8.6 mg Take 1 tablet (8.6 mg total) by mouth daily at bedtime      sodium chloride, PF, 0.9 % 10 mL by Intracatheter route daily for 120 doses Intracatheter flushing daily. May substitute prefilled syringe with normal saline 10 mL vials, 10 mL syringes, and 18 g blunt needles 300 mL 3    traZODone (DESYREL) 100 mg tablet Take 100 mg by mouth daily at bedtime      vancomycin (VANCOCIN) Inject 200 mL (1,000 mg total) into a catheter in a vein every 24 hours over 60 minutes at 200 mL/hr      venlafaxine (EFFEXOR-XR) 150 mg 24 hr capsule Take 150 mg by mouth in the morning.      calcium carbonate (OS-ALPESH) 600 MG tablet Take 600 mg by mouth daily Taking 2 tablets (1200 mg) daily (Patient not taking: Reported on 6/4/2025)      methocarbamol (ROBAXIN) 500 mg tablet Take 1 tablet (500 mg total) by mouth every 6 (six) hours as  needed for muscle spasms (Patient not taking: Reported on 2025) 10 tablet 0    mupirocin (BACTROBAN) 2 % ointment Apply topically to the inside of the left and right nostrils twice daily for 5 days before surgery, including the morning of surgery. (Patient not taking: Reported on 2025) 15 g 1    omeprazole (PriLOSEC) 40 MG capsule Take 40 mg by mouth daily (Patient not taking: Reported on 2025)       No current facility-administered medications on file prior to visit.   [2]   Social History  Tobacco Use    Smoking status: Former     Current packs/day: 0.00     Types: Cigarettes     Quit date: 1982     Years since quittin.4    Smokeless tobacco: Never   Vaping Use    Vaping status: Never Used   Substance and Sexual Activity    Alcohol use: Not Currently    Drug use: Never    Sexual activity: Not Currently

## 2025-06-03 NOTE — TELEPHONE ENCOUNTER
Communication back to facility. Confirmed virtual appointment is OK, but that appointment is tomorrow. She states she is aware. She will fax over vitals, labs, and MAR.

## 2025-06-03 NOTE — ASSESSMENT & PLAN NOTE
Continue bowel regimen    

## 2025-06-03 NOTE — TELEPHONE ENCOUNTER
[facility] for Today to [facility]. She request the link to be sent to Dr. Rigo Garcia number [phone]. Please advise

## 2025-06-04 ENCOUNTER — TELEPHONE (OUTPATIENT)
Dept: INFECTIOUS DISEASES | Facility: CLINIC | Age: 75
End: 2025-06-04

## 2025-06-04 ENCOUNTER — TELEMEDICINE (OUTPATIENT)
Dept: INFECTIOUS DISEASES | Facility: CLINIC | Age: 75
End: 2025-06-04
Payer: MEDICARE

## 2025-06-04 DIAGNOSIS — Z96.641 HISTORY OF HEMIARTHROPLASTY OF RIGHT HIP: ICD-10-CM

## 2025-06-04 DIAGNOSIS — R78.81 BACTEREMIA: ICD-10-CM

## 2025-06-04 DIAGNOSIS — L02.415 ABSCESS OF RIGHT HIP: Primary | ICD-10-CM

## 2025-06-04 PROCEDURE — G2211 COMPLEX E/M VISIT ADD ON: HCPCS | Performed by: PHYSICIAN ASSISTANT

## 2025-06-04 PROCEDURE — 99214 OFFICE O/P EST MOD 30 MIN: CPT | Performed by: PHYSICIAN ASSISTANT

## 2025-06-04 RX ORDER — LIDOCAINE HYDROCHLORIDE 20 MG/ML
1 JELLY TOPICAL AS NEEDED
COMMUNITY

## 2025-06-04 RX ORDER — PANTOPRAZOLE SODIUM 40 MG/1
40 TABLET, DELAYED RELEASE ORAL DAILY
COMMUNITY

## 2025-06-04 NOTE — TELEPHONE ENCOUNTER
Spoke to Yen.   Request that they fax these results to me so that we can review them at the visit today.  She confirms she will fax.

## 2025-06-04 NOTE — TELEPHONE ENCOUNTER
Spoke with Keri to confirm receipt of note. Tian confirms and does see on the note regarding this lab needing to be repeated. She is going to give this to the nurse right now. She took down our number to call back if questions.    Spoke with Paul at [facility], they are going to be responsible for patient's home antibiotic. Patient is discharging Friday. [facility] needs script. I did inform him that we have to wait for tomorrows labs before we can send him a script.

## 2025-06-05 NOTE — TELEPHONE ENCOUNTER
Received fax with cre of 1.33.  No change.  Continue vancomycin at same dose.       Script sent to [facility].    Weekly labs, Mondays.

## 2025-06-07 ENCOUNTER — HOME CARE VISIT (OUTPATIENT)
Dept: HOME HEALTH SERVICES | Facility: HOME HEALTHCARE | Age: 75
End: 2025-06-07

## 2025-06-07 VITALS
RESPIRATION RATE: 18 BRPM | SYSTOLIC BLOOD PRESSURE: 118 MMHG | TEMPERATURE: 97.3 F | OXYGEN SATURATION: 98 % | DIASTOLIC BLOOD PRESSURE: 70 MMHG | HEART RATE: 98 BPM

## 2025-06-07 NOTE — CASE COMMUNICATION
Notification of Assess not Admit    [facility] has assessed your patient for Home Health services and has determined the patient is not eligible for service due to the following  Open to another Home Health agency    half way through the home visit a nurse from [facility] arrived to patients home stating they will be opening the patient to HH services today. [facility] RN had orders, discharge papers from [facility] stated  patient was being admitted to [facility] services. Patients son owns [facility] and prefers patient be admitted to their services.     RN from [facility] states her orders are from [facility] and they will be obtaining home infusion supplies from [facility].     Patient is not being admitted to any VNA services at this time    Thank you  Adele Ramirez RN

## 2025-06-07 NOTE — CASE COMMUNICATION
AS PER SHYAM RAMIREZ RN   Notification of ASSESS NOT ADMIT   [facility] has assessed your patient for Home Health services and has determined the patient is not eligible for service due to the following Open to another Home Health agency   half way through the home visit a nurse from [facility] arrived to patients home stating they will be opening the patient to HH services today. [facility] RN had orders, discharge papers  from [facility] stated patient was being admitted to [facility]. Patients son owns [facility] and prefers patient be admitted to their services.   RN from [facility] states her orders are from [facility] and they will be obtaining home infusion supplies from [facility].   Patient is not being admitted to any VNA services at this time   Thank you   Shyam Ramirez RN

## 2025-06-09 ENCOUNTER — TELEPHONE (OUTPATIENT)
Dept: INFECTIOUS DISEASES | Facility: CLINIC | Age: 75
End: 2025-06-09

## 2025-06-09 NOTE — TELEPHONE ENCOUNTER
Spoke with [facility] nurse, Larisa. Confirmed they did SOC Saturday with patient. They state she is dosing at 10 AM and are scheduled to go out for labs including vanco trough Q Mondays, scheduled at 9 AM for proper trough. They do not need orders, as facility provided them orders. She has no further needs from us at this time.

## 2025-06-10 NOTE — TELEPHONE ENCOUNTER
Patient vanco trough came back low today. Per Dr. Manning, would increase to 1500mg IV Q24. Patient would need repeat labs Monday, which is already scheduled. Confirmed updated dose with Annemarie at [facility] and April with [facility].    Faxed to [facility] and to [facility] ( [phone] )    Reached out to Sharon to make her aware of this change. No answer. Left message to CB.

## 2025-06-16 ENCOUNTER — APPOINTMENT (INPATIENT)
Dept: NON INVASIVE DIAGNOSTICS | Facility: HOSPITAL | Age: 75
DRG: 559 | End: 2025-06-16
Payer: MEDICARE

## 2025-06-16 ENCOUNTER — HOSPITAL ENCOUNTER (INPATIENT)
Facility: HOSPITAL | Age: 75
LOS: 2 days | DRG: 559 | End: 2025-06-18
Attending: EMERGENCY MEDICINE | Admitting: INTERNAL MEDICINE
Payer: MEDICARE

## 2025-06-16 DIAGNOSIS — E87.6 HYPOKALEMIA: ICD-10-CM

## 2025-06-16 DIAGNOSIS — L02.415 ABSCESS OF RIGHT HIP: ICD-10-CM

## 2025-06-16 DIAGNOSIS — R29.898 MUSCULAR DECONDITIONING: ICD-10-CM

## 2025-06-16 DIAGNOSIS — Z13.9 ENCOUNTER FOR SCREENING INVOLVING SOCIAL DETERMINANTS OF HEALTH (SDOH): ICD-10-CM

## 2025-06-16 DIAGNOSIS — F41.9 ANXIETY: Primary | ICD-10-CM

## 2025-06-16 PROBLEM — R53.1 GENERALIZED WEAKNESS: Status: ACTIVE | Noted: 2025-06-16

## 2025-06-16 PROBLEM — S31.000A SACRAL WOUND: Status: ACTIVE | Noted: 2025-06-16

## 2025-06-16 PROBLEM — E87.8 ELECTROLYTE ABNORMALITY: Status: ACTIVE | Noted: 2025-06-16

## 2025-06-16 LAB
ALBUMIN SERPL BCG-MCNC: 3.2 G/DL (ref 3.5–5)
ALP SERPL-CCNC: 47 U/L (ref 34–104)
ALT SERPL W P-5'-P-CCNC: 8 U/L (ref 7–52)
ANION GAP SERPL CALCULATED.3IONS-SCNC: 14 MMOL/L (ref 4–13)
APTT PPP: 33 SECONDS (ref 23–34)
AST SERPL W P-5'-P-CCNC: 17 U/L (ref 13–39)
ATRIAL RATE: 127 BPM
BASOPHILS # BLD AUTO: 0.02 THOUSANDS/ÂΜL (ref 0–0.1)
BASOPHILS NFR BLD AUTO: 1 % (ref 0–1)
BILIRUB SERPL-MCNC: 0.54 MG/DL (ref 0.2–1)
BILIRUB UR QL STRIP: NEGATIVE
BUN SERPL-MCNC: 14 MG/DL (ref 5–25)
CALCIUM ALBUM COR SERPL-MCNC: 9.4 MG/DL (ref 8.3–10.1)
CALCIUM SERPL-MCNC: 8.8 MG/DL (ref 8.4–10.2)
CHLORIDE SERPL-SCNC: 102 MMOL/L (ref 96–108)
CLARITY UR: CLEAR
CO2 SERPL-SCNC: 25 MMOL/L (ref 21–32)
COLOR UR: ABNORMAL
CREAT SERPL-MCNC: 0.88 MG/DL (ref 0.6–1.3)
EOSINOPHIL # BLD AUTO: 0.01 THOUSAND/ÂΜL (ref 0–0.61)
EOSINOPHIL NFR BLD AUTO: 0 % (ref 0–6)
ERYTHROCYTE [DISTWIDTH] IN BLOOD BY AUTOMATED COUNT: 14.5 % (ref 11.6–15.1)
FLUAV AG UPPER RESP QL IA.RAPID: NEGATIVE
FLUBV AG UPPER RESP QL IA.RAPID: NEGATIVE
GFR SERPL CREATININE-BSD FRML MDRD: 64 ML/MIN/1.73SQ M
GLUCOSE SERPL-MCNC: 103 MG/DL (ref 65–140)
GLUCOSE SERPL-MCNC: 111 MG/DL (ref 65–140)
GLUCOSE UR STRIP-MCNC: NEGATIVE MG/DL
HCT VFR BLD AUTO: 29.4 % (ref 34.8–46.1)
HGB BLD-MCNC: 9.4 G/DL (ref 11.5–15.4)
HGB UR QL STRIP.AUTO: NEGATIVE
IMM GRANULOCYTES # BLD AUTO: 0.01 THOUSAND/UL (ref 0–0.2)
IMM GRANULOCYTES NFR BLD AUTO: 0 % (ref 0–2)
INR PPP: 1.19 (ref 0.85–1.19)
KETONES UR STRIP-MCNC: ABNORMAL MG/DL
LACTATE SERPL-SCNC: 1.8 MMOL/L (ref 0.5–2)
LEUKOCYTE ESTERASE UR QL STRIP: NEGATIVE
LYMPHOCYTES # BLD AUTO: 1.39 THOUSANDS/ÂΜL (ref 0.6–4.47)
LYMPHOCYTES NFR BLD AUTO: 43 % (ref 14–44)
MAGNESIUM SERPL-MCNC: 1.7 MG/DL (ref 1.9–2.7)
MCH RBC QN AUTO: 28.6 PG (ref 26.8–34.3)
MCHC RBC AUTO-ENTMCNC: 32 G/DL (ref 31.4–37.4)
MCV RBC AUTO: 89 FL (ref 82–98)
MONOCYTES # BLD AUTO: 0.93 THOUSAND/ÂΜL (ref 0.17–1.22)
MONOCYTES NFR BLD AUTO: 29 % (ref 4–12)
NEUTROPHILS # BLD AUTO: 0.86 THOUSANDS/ÂΜL (ref 1.85–7.62)
NEUTS SEG NFR BLD AUTO: 27 % (ref 43–75)
NITRITE UR QL STRIP: NEGATIVE
NRBC BLD AUTO-RTO: 0 /100 WBCS
P AXIS: 61 DEGREES
PH UR STRIP.AUTO: 7 [PH]
PLATELET # BLD AUTO: 656 THOUSANDS/UL (ref 149–390)
PMV BLD AUTO: 8.8 FL (ref 8.9–12.7)
POTASSIUM SERPL-SCNC: 2.8 MMOL/L (ref 3.5–5.3)
PR INTERVAL: 256 MS
PROCALCITONIN SERPL-MCNC: 0.15 NG/ML
PROT SERPL-MCNC: 6.4 G/DL (ref 6.4–8.4)
PROT UR STRIP-MCNC: NEGATIVE MG/DL
PROTHROMBIN TIME: 15.6 SECONDS (ref 12.3–15)
QRS AXIS: -61 DEGREES
QRSD INTERVAL: 106 MS
QT INTERVAL: 362 MS
QTC INTERVAL: 526 MS
RBC # BLD AUTO: 3.29 MILLION/UL (ref 3.81–5.12)
SARS-COV+SARS-COV-2 AG RESP QL IA.RAPID: NEGATIVE
SODIUM SERPL-SCNC: 141 MMOL/L (ref 135–147)
SP GR UR STRIP.AUTO: 1.02
T WAVE AXIS: 78 DEGREES
UROBILINOGEN UR QL STRIP.AUTO: 0.2 E.U./DL
VANCOMYCIN SERPL-MCNC: 17.9 UG/ML (ref 10–20)
VENTRICULAR RATE: 127 BPM
WBC # BLD AUTO: 3.22 THOUSAND/UL (ref 4.31–10.16)

## 2025-06-16 PROCEDURE — 99223 1ST HOSP IP/OBS HIGH 75: CPT | Performed by: INTERNAL MEDICINE

## 2025-06-16 PROCEDURE — 93970 EXTREMITY STUDY: CPT

## 2025-06-16 PROCEDURE — 82948 REAGENT STRIP/BLOOD GLUCOSE: CPT

## 2025-06-16 PROCEDURE — 36415 COLL VENOUS BLD VENIPUNCTURE: CPT | Performed by: INTERNAL MEDICINE

## 2025-06-16 PROCEDURE — 80053 COMPREHEN METABOLIC PANEL: CPT | Performed by: EMERGENCY MEDICINE

## 2025-06-16 PROCEDURE — 99285 EMERGENCY DEPT VISIT HI MDM: CPT

## 2025-06-16 PROCEDURE — 96375 TX/PRO/DX INJ NEW DRUG ADDON: CPT

## 2025-06-16 PROCEDURE — 99285 EMERGENCY DEPT VISIT HI MDM: CPT | Performed by: EMERGENCY MEDICINE

## 2025-06-16 PROCEDURE — 87040 BLOOD CULTURE FOR BACTERIA: CPT | Performed by: EMERGENCY MEDICINE

## 2025-06-16 PROCEDURE — 96365 THER/PROPH/DIAG IV INF INIT: CPT

## 2025-06-16 PROCEDURE — 87811 SARS-COV-2 COVID19 W/OPTIC: CPT | Performed by: EMERGENCY MEDICINE

## 2025-06-16 PROCEDURE — 83605 ASSAY OF LACTIC ACID: CPT | Performed by: EMERGENCY MEDICINE

## 2025-06-16 PROCEDURE — 85610 PROTHROMBIN TIME: CPT | Performed by: EMERGENCY MEDICINE

## 2025-06-16 PROCEDURE — 93005 ELECTROCARDIOGRAM TRACING: CPT

## 2025-06-16 PROCEDURE — 85730 THROMBOPLASTIN TIME PARTIAL: CPT | Performed by: EMERGENCY MEDICINE

## 2025-06-16 PROCEDURE — 81003 URINALYSIS AUTO W/O SCOPE: CPT | Performed by: INTERNAL MEDICINE

## 2025-06-16 PROCEDURE — 87804 INFLUENZA ASSAY W/OPTIC: CPT | Performed by: EMERGENCY MEDICINE

## 2025-06-16 PROCEDURE — 84145 PROCALCITONIN (PCT): CPT | Performed by: EMERGENCY MEDICINE

## 2025-06-16 PROCEDURE — 83735 ASSAY OF MAGNESIUM: CPT | Performed by: EMERGENCY MEDICINE

## 2025-06-16 PROCEDURE — 80202 ASSAY OF VANCOMYCIN: CPT | Performed by: INTERNAL MEDICINE

## 2025-06-16 PROCEDURE — 93010 ELECTROCARDIOGRAM REPORT: CPT | Performed by: INTERNAL MEDICINE

## 2025-06-16 PROCEDURE — 85025 COMPLETE CBC W/AUTO DIFF WBC: CPT | Performed by: EMERGENCY MEDICINE

## 2025-06-16 RX ORDER — ASPIRIN 81 MG/1
81 TABLET, CHEWABLE ORAL 2 TIMES DAILY
Status: DISCONTINUED | OUTPATIENT
Start: 2025-06-16 | End: 2025-06-18 | Stop reason: HOSPADM

## 2025-06-16 RX ORDER — FOLIC ACID 1 MG/1
1 TABLET ORAL DAILY
Status: DISCONTINUED | OUTPATIENT
Start: 2025-06-17 | End: 2025-06-18 | Stop reason: HOSPADM

## 2025-06-16 RX ORDER — PRAVASTATIN SODIUM 40 MG
40 TABLET ORAL
Status: DISCONTINUED | OUTPATIENT
Start: 2025-06-16 | End: 2025-06-18 | Stop reason: HOSPADM

## 2025-06-16 RX ORDER — SODIUM CHLORIDE, SODIUM GLUCONATE, SODIUM ACETATE, POTASSIUM CHLORIDE, MAGNESIUM CHLORIDE, SODIUM PHOSPHATE, DIBASIC, AND POTASSIUM PHOSPHATE .53; .5; .37; .037; .03; .012; .00082 G/100ML; G/100ML; G/100ML; G/100ML; G/100ML; G/100ML; G/100ML
75 INJECTION, SOLUTION INTRAVENOUS ONCE
Status: COMPLETED | OUTPATIENT
Start: 2025-06-16 | End: 2025-06-17

## 2025-06-16 RX ORDER — ONDANSETRON 2 MG/ML
4 INJECTION INTRAMUSCULAR; INTRAVENOUS ONCE
Status: COMPLETED | OUTPATIENT
Start: 2025-06-16 | End: 2025-06-16

## 2025-06-16 RX ORDER — FENTANYL CITRATE 50 UG/ML
75 INJECTION, SOLUTION INTRAMUSCULAR; INTRAVENOUS ONCE
Refills: 0 | Status: COMPLETED | OUTPATIENT
Start: 2025-06-16 | End: 2025-06-16

## 2025-06-16 RX ORDER — AMLODIPINE BESYLATE 10 MG/1
10 TABLET ORAL DAILY
Status: DISCONTINUED | OUTPATIENT
Start: 2025-06-17 | End: 2025-06-18 | Stop reason: HOSPADM

## 2025-06-16 RX ORDER — OLANZAPINE 10 MG/2ML
2.5 INJECTION, POWDER, FOR SOLUTION INTRAMUSCULAR ONCE
Status: COMPLETED | OUTPATIENT
Start: 2025-06-16 | End: 2025-06-16

## 2025-06-16 RX ORDER — ACETAMINOPHEN 325 MG/1
650 TABLET ORAL EVERY 6 HOURS PRN
Status: DISCONTINUED | OUTPATIENT
Start: 2025-06-16 | End: 2025-06-18 | Stop reason: HOSPADM

## 2025-06-16 RX ORDER — PANTOPRAZOLE SODIUM 40 MG/1
40 TABLET, DELAYED RELEASE ORAL DAILY
Status: DISCONTINUED | OUTPATIENT
Start: 2025-06-17 | End: 2025-06-18 | Stop reason: HOSPADM

## 2025-06-16 RX ORDER — POTASSIUM CHLORIDE 1500 MG/1
20 TABLET, EXTENDED RELEASE ORAL ONCE
Status: COMPLETED | OUTPATIENT
Start: 2025-06-16 | End: 2025-06-16

## 2025-06-16 RX ORDER — LORAZEPAM 2 MG/ML
0.5 INJECTION INTRAMUSCULAR ONCE
Status: COMPLETED | OUTPATIENT
Start: 2025-06-16 | End: 2025-06-16

## 2025-06-16 RX ORDER — ONDANSETRON 2 MG/ML
4 INJECTION INTRAMUSCULAR; INTRAVENOUS EVERY 6 HOURS PRN
Status: DISCONTINUED | OUTPATIENT
Start: 2025-06-16 | End: 2025-06-18 | Stop reason: HOSPADM

## 2025-06-16 RX ORDER — METRONIDAZOLE 500 MG/1
500 TABLET ORAL EVERY 12 HOURS SCHEDULED
Status: DISCONTINUED | OUTPATIENT
Start: 2025-06-16 | End: 2025-06-18 | Stop reason: HOSPADM

## 2025-06-16 RX ORDER — ASCORBIC ACID 500 MG
500 TABLET ORAL DAILY
Status: DISCONTINUED | OUTPATIENT
Start: 2025-06-17 | End: 2025-06-18 | Stop reason: HOSPADM

## 2025-06-16 RX ORDER — VENLAFAXINE HYDROCHLORIDE 150 MG/1
150 CAPSULE, EXTENDED RELEASE ORAL DAILY
Status: DISCONTINUED | OUTPATIENT
Start: 2025-06-17 | End: 2025-06-18 | Stop reason: HOSPADM

## 2025-06-16 RX ORDER — LORAZEPAM 2 MG/ML
1 INJECTION INTRAMUSCULAR ONCE
Status: COMPLETED | OUTPATIENT
Start: 2025-06-16 | End: 2025-06-16

## 2025-06-16 RX ORDER — GABAPENTIN 600 MG/1
600 TABLET ORAL
Status: DISCONTINUED | OUTPATIENT
Start: 2025-06-16 | End: 2025-06-18 | Stop reason: HOSPADM

## 2025-06-16 RX ORDER — ENOXAPARIN SODIUM 100 MG/ML
40 INJECTION SUBCUTANEOUS DAILY
Status: DISCONTINUED | OUTPATIENT
Start: 2025-06-17 | End: 2025-06-18 | Stop reason: HOSPADM

## 2025-06-16 RX ORDER — MAGNESIUM SULFATE HEPTAHYDRATE 40 MG/ML
2 INJECTION, SOLUTION INTRAVENOUS ONCE
Status: COMPLETED | OUTPATIENT
Start: 2025-06-16 | End: 2025-06-16

## 2025-06-16 RX ADMIN — FENTANYL CITRATE 75 MCG: 50 INJECTION INTRAMUSCULAR; INTRAVENOUS at 12:34

## 2025-06-16 RX ADMIN — MAGNESIUM SULFATE HEPTAHYDRATE 2 G: 40 INJECTION, SOLUTION INTRAVENOUS at 13:50

## 2025-06-16 RX ADMIN — OLANZAPINE 2.5 MG: 10 INJECTION, POWDER, FOR SOLUTION INTRAMUSCULAR at 23:05

## 2025-06-16 RX ADMIN — LORAZEPAM 1 MG: 2 INJECTION INTRAMUSCULAR; INTRAVENOUS at 13:47

## 2025-06-16 RX ADMIN — PRAVASTATIN SODIUM 40 MG: 40 TABLET ORAL at 21:19

## 2025-06-16 RX ADMIN — GABAPENTIN 600 MG: 600 TABLET, FILM COATED ORAL at 21:19

## 2025-06-16 RX ADMIN — ASPIRIN 81 MG: 81 TABLET, CHEWABLE ORAL at 21:17

## 2025-06-16 RX ADMIN — POTASSIUM CHLORIDE 20 MEQ: 1500 TABLET, EXTENDED RELEASE ORAL at 13:20

## 2025-06-16 RX ADMIN — SODIUM CHLORIDE, SODIUM GLUCONATE, SODIUM ACETATE, POTASSIUM CHLORIDE, MAGNESIUM CHLORIDE, SODIUM PHOSPHATE, DIBASIC, AND POTASSIUM PHOSPHATE 75 ML/HR: .53; .5; .37; .037; .03; .012; .00082 INJECTION, SOLUTION INTRAVENOUS at 19:58

## 2025-06-16 RX ADMIN — VANCOMYCIN HYDROCHLORIDE 1500 MG: 1 INJECTION, POWDER, LYOPHILIZED, FOR SOLUTION INTRAVENOUS at 22:53

## 2025-06-16 RX ADMIN — LORAZEPAM 0.5 MG: 2 INJECTION INTRAMUSCULAR; INTRAVENOUS at 18:31

## 2025-06-16 RX ADMIN — METRONIDAZOLE 500 MG: 500 TABLET ORAL at 21:17

## 2025-06-16 RX ADMIN — OLANZAPINE 2.5 MG: 10 INJECTION, POWDER, FOR SOLUTION INTRAMUSCULAR at 21:24

## 2025-06-16 RX ADMIN — ONDANSETRON 4 MG: 2 INJECTION INTRAMUSCULAR; INTRAVENOUS at 12:31

## 2025-06-16 NOTE — PROGRESS NOTES
Sharon Vega is a 74 y.o. female who is currently ordered Vancomycin IV with management by the Pharmacy Consult service.  Relevant clinical data and objective / subjective history reviewed.  Vancomycin Assessment:  Indication and Goal AUC/Trough: Soft tissue (goal -600, trough >10)  Clinical Status: stable  Micro:   Cx pending  Renal Function:  SCr: 0.88 mg/dL  CrCl: 57.5 mL/min  Renal replacement: Not on dialysis  Days of Therapy: 33  Current Dose: 1500 mg IV q24  Vancomycin Plan:  New Dosing: continue outpt 1500 mg IV q24  Estimated AUC: 494 mcg*hr/mL  Estimated Trough: 12.6 mcg/mL  Next Level: 6/19 am  Renal Function Monitoring: Daily BMP and UOP  Pharmacy will continue to follow closely for s/sx of nephrotoxicity, infusion reactions and appropriateness of therapy.  BMP and CBC will be ordered per protocol. We will continue to follow the patient’s culture results and clinical progress daily.    Davi Montoya, Pharmacist

## 2025-06-16 NOTE — H&P
H&P - Hospitalist   Name: Sharon Vega 74 y.o. 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  Continue local wound care.  Wound care consult      VTE Pharmacologic Prophylaxis:   Moderate Risk (Score 3-4) - Pharmacological DVT Prophylaxis Ordered: enoxaparin (Lovenox).  Code Status: Level 1 - Full Code   Discussion with family: Updated  (son) via phone.    Anticipated Length of Stay: Patient will be admitted on an inpatient basis with an anticipated length of stay of greater than 2 midnights secondary to generalized weakness.    History of Present Illness   Chief Complaint: Generalized weakness    Sharon Vega is a 74 y.o. female with a PMH of anxiety, depression, hypertension, obesity, recent hip infection currently on vancomycin/Flagyl who presents with generalized weakness.  Patient is unable to state why exactly she is in the hospital.  History is obtained from son over the phone who states that she has had what appears to be a failure to thrive.  She states that she does not feel well  however unable to verbalize what exactly is bothering her.  No recent fever or chills.  Patient has been taking her medications appropriately.  Has had poor oral intake.  Patient denies any pain complaints at this time.    Review of Systems   Constitutional:  Positive for activity change, chills and fatigue.   Musculoskeletal:  Positive for gait problem.   Neurological:  Positive for weakness.   All other systems reviewed and are negative.      Historical Information   Past Medical History[1]  Past Surgical History[2]  Social History[3]  E-Cigarette/Vaping    E-Cigarette Use Never User      E-Cigarette/Vaping Substances    Nicotine No     THC No     CBD No     Flavoring No     Other No     Unknown No      Family History[4]  Social History:  Marital Status: /Civil Union   Occupation: None  Patient Pre-hospital Living Situation: Home  Patient Pre-hospital Level of Mobility: walks with walker  Patient Pre-hospital Diet Restrictions: none    Meds/Allergies   I have reviewed home medications with patient family member.  Prior to Admission medications    Medication Sig Start Date End Date Taking? Authorizing Provider   acetaminophen (TYLENOL) 325 mg tablet Take 2 tablets (650 mg total) by mouth every 6 (six) hours as needed for mild pain 7/15/22  Yes Wilbert Ingram MD   amLODIPine (NORVASC) 10 mg tablet Take 10 mg by mouth in the morning.   Yes Historical Provider, MD   ascorbic acid (VITAMIN C) 500 MG tablet Take 1 tablet (500 mg total) by mouth 2 (two) times a day 5/2/25 7/1/25 Yes Osbaldo Strange PA-C   aspirin 81 mg chewable tablet Chew 1 tablet (81 mg total) in the morning and 1 tablet (81 mg total) before bedtime. Do all this for 22 days. 5/27/25 6/18/25 Yes Josie Molina PA-C   calcium carbonate (OS-ALPESH) 600 MG tablet Take 600 mg by mouth as needed Taking 2 tablets (1200 mg) daily   Yes Historical Provider, MD   cetirizine (ZyrTEC) 10 mg tablet Take 10 mg by mouth in the morning.   Yes Historical  Provider, MD   Cholecalciferol (VITAMIN D3) 1,000 units tablet Take 2 tablets (2,000 Units total) by mouth daily 5/28/25  Yes Josie Molina PA-C   docusate sodium (COLACE) 100 mg capsule Take 1 capsule (100 mg total) by mouth 2 (two) times a day  Patient taking differently: Take 100 mg by mouth daily at bedtime As needed 5/27/25  Yes Josie Molina PA-C   ferrous sulfate 324 (65 Fe) mg Take 1 tablet (324 mg total) by mouth 2 (two) times a day before meals 5/2/25 7/1/25 Yes Osbaldo Strange PA-C   folic acid (FOLVITE) 1 mg tablet Take 1 tablet (1 mg total) by mouth daily 5/2/25 7/1/25 Yes Osbaldo Strange PA-C   gabapentin (NEURONTIN) 600 MG tablet Take 600 mg by mouth daily at bedtime   Yes Historical Provider, MD   metroNIDAZOLE (FLAGYL) 500 mg tablet Take 1 tablet (500 mg total) by mouth every 12 (twelve) hours 5/27/25 7/1/25 Yes Josie Molina PA-C   miconazole (MICOTIN) 2 % powder Apply topically as needed for itching   Yes Historical Provider, MD   Multiple Vitamins-Minerals (multivitamin with minerals) tablet Take 1 tablet by mouth daily 5/2/25  Yes Osbaldo Strange PA-C   oxyCODONE (ROXICODONE) 5 immediate release tablet Take 1 tablet (5 mg total) by mouth every 4 (four) hours as needed for moderate pain Max Daily Amount: 30 mg 5/27/25  Yes Josie Molina PA-C   pantoprazole (PROTONIX) 40 mg tablet Take 40 mg by mouth daily   Yes Historical Provider, MD   polyethylene glycol (MIRALAX) 17 g packet Take 17 g by mouth daily  Patient taking differently: Take 17 g by mouth as needed 5/28/25  Yes Josie Molina PA-C   pravastatin (PRAVACHOL) 40 mg tablet Take 40 mg by mouth daily at bedtime   Yes Historical Provider, MD   traZODone (DESYREL) 100 mg tablet Take 100 mg by mouth daily at bedtime 2/25/25  Yes Historical Provider, MD   venlafaxine (EFFEXOR-XR) 150 mg 24 hr capsule Take 150 mg by mouth in the morning.   Yes Historical Provider, MD   bisacodyl (DULCOLAX) 10 mg suppository Insert 1  suppository (10 mg total) into the rectum daily as needed for constipation  Patient not taking: Reported on 6/16/2025 5/27/25   Josie Molina PA-C   lidocaine (XYLOCAINE) 2 % topical gel Apply 1 Application topically as needed for mild pain not using 06/07/2025    Historical Provider, MD   omeprazole (PriLOSEC) 40 MG capsule Take 40 mg by mouth daily  Patient not taking: Reported on 6/16/2025 4/24/25   Historical Provider, MD   senna (SENOKOT) 8.6 mg Take 1 tablet (8.6 mg total) by mouth daily at bedtime  Patient not taking: Reported on 6/16/2025 5/27/25   Josie Molina PA-C   Sodium Chloride Flush (Normal Saline Flush) 0.9 % SOLN Inject 10 mL into a catheter in a vein daily. 10ml flush prior to antibiotic administration and 10ml flush after antibiotic administration    Historical Provider, MD   sodium chloride, PF, 0.9 % 10 mL by Intracatheter route daily for 120 doses Intracatheter flushing daily. May substitute prefilled syringe with normal saline 10 mL vials, 10 mL syringes, and 18 g blunt needles 5/15/25 9/12/25  Leatha Marroquin MD   vancomycin (VANCOCIN) Inject 200 mL (1,000 mg total) into a catheter in a vein every 24 hours over 60 minutes at 200 mL/hr  Patient taking differently: Inject 1,500 mg into a catheter in a vein every 24 hours Infuse IV over 60 minutes every 24 hours. Flush Catheter with saline as directed. Dosage unit contains 1000mg Vancomycin . Infusion volume is 100ml 5/28/25 7/1/25  Josie Molina PA-C   methocarbamol (ROBAXIN) 500 mg tablet Take 1 tablet (500 mg total) by mouth every 6 (six) hours as needed for muscle spasms  Patient not taking: Reported on 6/16/2025 4/29/25 6/16/25  Keenan Fuller PA-C   mupirocin (BACTROBAN) 2 % ointment Apply topically to the inside of the left and right nostrils twice daily for 5 days before surgery, including the morning of surgery.  Patient not taking: Reported on 6/16/2025 5/2/25 6/16/25  Osbaldo Strange PA-C     No Known Allergies    Objective  :  Temp:  [97.5 °F (36.4 °C)] 97.5 °F (36.4 °C)  HR:  [] 107  BP: (124-144)/(60-99) 135/62  Resp:  [17-18] 18  SpO2:  [98 %-99 %] 98 %  O2 Device: None (Room air)    Physical Exam  Constitutional:       General: She is not in acute distress.     Appearance: She is obese.   HENT:      Head: Normocephalic and atraumatic.      Nose: Nose normal.      Mouth/Throat:      Mouth: Mucous membranes are moist.     Eyes:      Extraocular Movements: Extraocular movements intact.      Conjunctiva/sclera: Conjunctivae normal.       Cardiovascular:      Rate and Rhythm: Normal rate and regular rhythm.   Pulmonary:      Effort: Pulmonary effort is normal. No respiratory distress.   Abdominal:      Palpations: Abdomen is soft.      Tenderness: There is no abdominal tenderness.     Musculoskeletal:         General: Normal range of motion.      Cervical back: Normal range of motion and neck supple.      Comments: Generalized weakness     Skin:     General: Skin is warm and dry.     Neurological:      General: No focal deficit present.      Mental Status: She is alert. Mental status is at baseline.      Cranial Nerves: No cranial nerve deficit.     Psychiatric:         Mood and Affect: Mood normal.         Behavior: Behavior normal.          Lines/Drains:      Central Line:  Goal for removal: Will discontinue when meds requiring line are completed.             Lab Results: I have reviewed the following results:  Results from last 7 days   Lab Units 06/16/25  1218   WBC Thousand/uL 3.22*   HEMOGLOBIN g/dL 9.4*   HEMATOCRIT % 29.4*   PLATELETS Thousands/uL 656*   SEGS PCT % 27*   LYMPHO PCT % 43   MONO PCT % 29*   EOS PCT % 0     Results from last 7 days   Lab Units 06/16/25  1218   SODIUM mmol/L 141   POTASSIUM mmol/L 2.8*   CHLORIDE mmol/L 102   CO2 mmol/L 25   BUN mg/dL 14   CREATININE mg/dL 0.88   ANION GAP mmol/L 14*   CALCIUM mg/dL 8.8   ALBUMIN g/dL 3.2*   TOTAL BILIRUBIN mg/dL 0.54   ALK PHOS U/L 47   ALT U/L 8   AST U/L 17    GLUCOSE RANDOM mg/dL 111     Results from last 7 days   Lab Units 06/16/25  1218   INR  1.19         Lab Results   Component Value Date    HGBA1C 6.3 (H) 05/26/2025     Results from last 7 days   Lab Units 06/16/25  1218   LACTIC ACID mmol/L 1.8   PROCALCITONIN ng/ml 0.15       Imaging Results Review: No pertinent imaging studies reviewed.  Other Study Results Review: No additional pertinent studies reviewed.    Administrative Statements       ** Please Note: This note has been constructed using a voice recognition system. **         [1]   Past Medical History:  Diagnosis Date    Anemia     Anxiety     Arthritis     Closed transcervical fracture of right femur (HCC) 07/01/2022    Colon polyp     Depression     Fracture of right wrist 07/01/2022    GERD (gastroesophageal reflux disease)     Hiatal hernia     Hyperlipidemia     Hypertension    [2]   Past Surgical History:  Procedure Laterality Date    APPENDECTOMY      CHOLECYSTECTOMY      COLONOSCOPY      FL INJECTION RIGHT HIP (NON ARTHROGRAM)  04/21/2025    FRACTURE SURGERY Right     arm with plate and pins    HAND SURGERY Bilateral     HIP ARTHROPLASTY Right 5/21/2025    Procedure: removal/debridement prothesis hip prostalac;  Surgeon: Kit Daley MD;  Location: [facility];  Service: Orthopedics    HYSTERECTOMY      IR ASPIRATION JOINT (SPECIFY LOCATION)  4/24/2025    IR DRAINAGE TUBE PLACEMENT  5/15/2025    JOINT REPLACEMENT Bilateral     knees    CT HEMIARTHROPLASTY HIP PARTIAL Right 07/02/2022    Procedure: HEMIARTHROPLASTY HIP (BIPOLAR), closed reduction with splinting right wrist;  Surgeon: Leo Roblero;  Location: [facility];  Service: Orthopedics    CT NEUROPLASTY &/TRANSPOSITION ULNAR NERVE ELBOW Right 02/08/2023    Procedure: RELEASE CUBITAL TUNNEL;  Surgeon: Cody Calles DO;  Location: [facility];  Service: Orthopedics    SHOULDER ARTHROSCOPY Left    [3]   Social History  Tobacco Use    Smoking status: Former     Current packs/day:  0.00     Types: Cigarettes     Quit date:      Years since quittin.4    Smokeless tobacco: Never   Vaping Use    Vaping status: Never Used   Substance and Sexual Activity    Alcohol use: Not Currently    Drug use: Never    Sexual activity: Not Currently   [4]   Family History  Problem Relation Name Age of Onset    No Known Problems Mother

## 2025-06-16 NOTE — ED PROVIDER NOTES
Time reflects when diagnosis was documented in both MDM as applicable and the Disposition within this note       Time User Action Codes Description Comment    6/16/2025  2:15 PM Jose F Sparks Add [F41.9] Anxiety     6/16/2025  2:15 PM Jose F Sparks [E87.6] Hypokalemia     6/16/2025  2:15 PM Jose F Sparks Add [Z51.11] Conditioning chemotherapy prior to peripheral blood stem cell transplant     6/16/2025  2:15 PM Jose F Sparks Remove [Z51.11] Conditioning chemotherapy prior to peripheral blood stem cell transplant     6/16/2025  2:15 PM Jose F Sparks [R29.898] Muscular deconditioning     6/16/2025  2:55 PM Tiffanie Barksdale Add [Z13.9] Encounter for screening involving social determinants of health (SDoH)           ED Disposition       ED Disposition   Admit    Condition   Stable    Date/Time   Mon Jun 16, 2025  1:41 PM    Comment   Case was discussed with Jack and the patient's admission status was agreed to be Admission Status: observation status to the service of Dr. Norton .               Assessment & Plan       Medical Decision Making  74-year-old female with recent admission for infected right hip prosthesis presents with generalized weakness, malaise, anxiety and nausea.  She has persistent right-sided hip pain which is unchanged.  She is receiving IV vancomycin through PICC and is on oral Flagyl.  She denies fever chills nausea.  She does have a new sacral ulcer.    On exam she is very anxious appearing, tachycardic and irregular.  No abdominal pain tenderness.  Lungs are clear to auscultation bilaterally, intermittently hyperventilatory when discussing her symptoms.  She has a stage I sacral decubitus ulcer.  Her surgical wound appears well-healing without tenderness.    Differential includes electrolyte abnormalities, renal failure, physical deconditioning, doubt reoccurring infection        Problems Addressed:  Anxiety: acute illness or injury  Hypokalemia: acute illness  or injury with systemic symptoms  Muscular deconditioning: acute illness or injury    Amount and/or Complexity of Data Reviewed  Labs: ordered. Decision-making details documented in ED Course.    Risk  Prescription drug management.  Parenteral controlled substances.  Drug therapy requiring intensive monitoring for toxicity.  Decision regarding hospitalization.             Medications   fentaNYL injection 75 mcg (75 mcg Intravenous Given 6/16/25 1234)   ondansetron (ZOFRAN) injection 4 mg (4 mg Intravenous Given 6/16/25 1231)   potassium chloride (Klor-Con M20) CR tablet 20 mEq (20 mEq Oral Given 6/16/25 1320)   LORazepam (ATIVAN) injection 1 mg (1 mg Intravenous Given 6/16/25 1347)   magnesium sulfate 2 g/50 mL IVPB (premix) 2 g (0 g Intravenous Stopped 6/16/25 1410)       ED Risk Strat Scores                    (ISAR) Identification of Seniors at Risk  Before the illness or injury that brought you to the Emergency, did you need someone to help you on a regular basis?: 0  In the last 24 hours, have you needed more help than usual?: 1  Have you been hospitalized for one or more nights during the past 6 months?: 1  In general, do you see well?: 0  In general, do you have serious problems with your memory?: 0  Do you take more than three different medications every day?: 1  ISAR Score: 3            SBIRT 22yo+      Flowsheet Row Most Recent Value   Initial Alcohol Screen: US AUDIT-C     1. How often do you have a drink containing alcohol? 0 Filed at: 06/16/2025 1131   2. How many drinks containing alcohol do you have on a typical day you are drinking?  0 Filed at: 06/16/2025 1131   3a. Male UNDER 65: How often do you have five or more drinks on one occasion? 0 Filed at: 06/16/2025 1131   3b. FEMALE Any Age, or MALE 65+: How often do you have 4 or more drinks on one occassion? 0 Filed at: 06/16/2025 1131   Audit-C Score 0 Filed at: 06/16/2025 1131   PHYLLIS: How many times in the past year have you...    Used an illegal  drug or used a prescription medication for non-medical reasons? Never Filed at: 06/16/2025 1131                            History of Present Illness       Chief Complaint   Patient presents with    Anxiety    Vomiting    Hip Pain     Recent hip surgery was just discharged from rehab. Patient does not feel comfortable taking care of herself at home         Past Medical History[1]   Past Surgical History[2]   Family History[3]   Social History[4]   E-Cigarette/Vaping    E-Cigarette Use Never User       E-Cigarette/Vaping Substances    Nicotine No     THC No     CBD No     Flavoring No     Other No     Unknown No       I have reviewed and agree with the history as documented.     74F from home with generalized weakness       Anxiety  Vomiting  Hip Pain  Associated symptoms: vomiting        Review of Systems   Gastrointestinal:  Positive for vomiting.           Objective       ED Triage Vitals   Temperature Pulse Blood Pressure Respirations SpO2 Patient Position - Orthostatic VS   06/16/25 1130 06/16/25 1130 06/16/25 1132 06/16/25 1132 06/16/25 1130 06/16/25 1345   97.5 °F (36.4 °C) (!) 118 141/64 17 99 % Lying      Temp Source Heart Rate Source BP Location FiO2 (%) Pain Score    06/16/25 1130 06/16/25 1130 06/16/25 1515 -- 06/16/25 1130    Temporal Monitor Left arm  10 - Worst Possible Pain      Vitals      Date and Time Temp Pulse SpO2 Resp BP Pain Score FACES Pain Rating User   06/16/25 1515 -- 112 98 % 18 124/60 -- --    06/16/25 1430 -- 101 99 % -- 137/99 -- --    06/16/25 1400 -- 103 99 % 18 144/63 -- --    06/16/25 1345 -- 102 99 % 18 141/89 -- --    06/16/25 1234 -- -- -- -- -- 10 - Worst Possible Pain --    06/16/25 1145 -- 98 -- -- -- -- -- GS   06/16/25 1132 -- -- -- 17 141/64 -- -- NAD   06/16/25 1130 97.5 °F (36.4 °C) 118 99 % -- -- 10 - Worst Possible Pain -- NAD            Physical Exam  Vitals and nursing note reviewed.   Constitutional:       Appearance: Normal appearance. She is  well-developed. She is ill-appearing.   HENT:      Head: Normocephalic and atraumatic.     Eyes:      Conjunctiva/sclera: Conjunctivae normal.      Pupils: Pupils are equal, round, and reactive to light.     Neck:      Trachea: No tracheal deviation.     Cardiovascular:      Rate and Rhythm: Normal rate and regular rhythm.      Heart sounds: Normal heart sounds. No murmur heard.  Pulmonary:      Effort: Pulmonary effort is normal. No respiratory distress.      Breath sounds: Normal breath sounds. No wheezing or rales.   Abdominal:      General: Bowel sounds are normal. There is no distension.      Palpations: Abdomen is soft.      Tenderness: There is no abdominal tenderness.     Musculoskeletal:         General: No deformity.      Cervical back: Normal range of motion and neck supple.     Skin:     General: Skin is warm and dry.      Capillary Refill: Capillary refill takes less than 2 seconds.     Neurological:      General: No focal deficit present.      Mental Status: She is alert and oriented to person, place, and time.      Sensory: No sensory deficit.     Psychiatric:         Mood and Affect: Mood is anxious.         Judgment: Judgment normal.         Results Reviewed       Procedure Component Value Units Date/Time    FLU/COVID Rapid Antigen (30 min. TAT) - Preferred screening test in ED [[id number]]  (Normal) Collected: 06/16/25 1327    Lab Status: Final result Specimen: Nares from Nose Updated: 06/16/25 1357     SARS COV Rapid Antigen Negative     Influenza A Rapid Antigen Negative     Influenza B Rapid Antigen Negative    Narrative:      This test has been performed using the Quidel Leny 2 FLU+SARS Antigen test under the Emergency Use Authorization (EUA). This test has been validated by the  and verified by the performing laboratory. The Leny uses lateral flow immunofluorescent sandwich assay to detect SARS-COV, Influenza A and Influenza B Antigen.     The Quidel Leny 2 SARS Antigen test does  not differentiate between SARS-CoV and SARS-CoV-2.     Negative results are presumptive and may be confirmed with a molecular assay, if necessary, for patient management. Negative results do not rule out SARS-CoV-2 or influenza infection and should not be used as the sole basis for treatment or patient management decisions. A negative test result may occur if the level of antigen in a sample is below the limit of detection of this test.     Positive results are indicative of the presence of viral antigens, but do not rule out bacterial infection or co-infection with other viruses.     All test results should be used as an adjunct to clinical observations and other information available to the provider.    FOR PEDIATRIC PATIENTS - copy/paste COVID Guidelines URL to browser: [email or web address]    Magnesium [[id number]]  (Abnormal) Collected: 06/16/25 1218    Lab Status: Final result Specimen: Blood from Arm, Left Updated: 06/16/25 1301     Magnesium 1.7 mg/dL     Procalcitonin [[id number]]  (Normal) Collected: 06/16/25 1218    Lab Status: Final result Specimen: Blood from Arm, Left Updated: 06/16/25 1256     Procalcitonin 0.15 ng/ml     Protime-INR [[id number]]  (Abnormal) Collected: 06/16/25 1218    Lab Status: Final result Specimen: Blood from Arm, Left Updated: 06/16/25 1246     Protime 15.6 seconds      INR 1.19    Narrative:      INR Therapeutic Range    Indication                                             INR Range      Atrial Fibrillation                                               2.0-3.0  Hypercoagulable State                                    2.0.2.3  Left Ventricular Asist Device                            2.0-3.0  Mechanical Heart Valve                                  -    Aortic(with afib, MI, embolism, HF, LA enlargement,    and/or coagulopathy)                                     2.0-3.0 (2.5-3.5)     Mitral                                                              2.5-3.5  Prosthetic/Bioprosthetic Heart Valve               2.0-3.0  Venous thromboembolism (VTE: VT, PE        2.0-3.0    APTT [[id number]]  (Normal) Collected: 06/16/25 1218    Lab Status: Final result Specimen: Blood from Arm, Left Updated: 06/16/25 1246     PTT 33 seconds     Comprehensive metabolic panel [[id number]]  (Abnormal) Collected: 06/16/25 1218    Lab Status: Final result Specimen: Blood from Arm, Left Updated: 06/16/25 1245     Sodium 141 mmol/L      Potassium 2.8 mmol/L      Chloride 102 mmol/L      CO2 25 mmol/L      ANION GAP 14 mmol/L      BUN 14 mg/dL      Creatinine 0.88 mg/dL      Glucose 111 mg/dL      Calcium 8.8 mg/dL      Corrected Calcium 9.4 mg/dL      AST 17 U/L      ALT 8 U/L      Alkaline Phosphatase 47 U/L      Total Protein 6.4 g/dL      Albumin 3.2 g/dL      Total Bilirubin 0.54 mg/dL      eGFR 64 ml/min/1.73sq m     Narrative:      National Kidney Disease Foundation guidelines for Chronic Kidney Disease (CKD):     Stage 1 with normal or high GFR (GFR > 90 mL/min/1.73 square meters)    Stage 2 Mild CKD (GFR = 60-89 mL/min/1.73 square meters)    Stage 3A Moderate CKD (GFR = 45-59 mL/min/1.73 square meters)    Stage 3B Moderate CKD (GFR = 30-44 mL/min/1.73 square meters)    Stage 4 Severe CKD (GFR = 15-29 mL/min/1.73 square meters)    Stage 5 End Stage CKD (GFR <15 mL/min/1.73 square meters)  Note: GFR calculation is accurate only with a steady state creatinine    Lactic acid [[id number]]  (Normal) Collected: 06/16/25 1218    Lab Status: Final result Specimen: Blood from Arm, Left Updated: 06/16/25 1244     LACTIC ACID 1.8 mmol/L     Narrative:      Result may be elevated if tourniquet was used during collection.    CBC and differential [[id number]]  (Abnormal) Collected: 06/16/25 1218    Lab Status: Final result Specimen: Blood from Arm, Left Updated: 06/16/25 1228     WBC 3.22 Thousand/uL      RBC 3.29 Million/uL      Hemoglobin 9.4 g/dL      Hematocrit 29.4 %       MCV 89 fL      MCH 28.6 pg      MCHC 32.0 g/dL      RDW 14.5 %      MPV 8.8 fL      Platelets 656 Thousands/uL      nRBC 0 /100 WBCs      Segmented % 27 %      Immature Grans % 0 %      Lymphocytes % 43 %      Monocytes % 29 %      Eosinophils Relative 0 %      Basophils Relative 1 %      Absolute Neutrophils 0.86 Thousands/µL      Absolute Immature Grans 0.01 Thousand/uL      Absolute Lymphocytes 1.39 Thousands/µL      Absolute Monocytes 0.93 Thousand/µL      Eosinophils Absolute 0.01 Thousand/µL      Basophils Absolute 0.02 Thousands/µL     Blood culture #2 [[id number]] Collected: 06/16/25 1218    Lab Status: In process Specimen: Blood from Arm, Left Updated: 06/16/25 1224    Blood culture #1 [[id number]] Collected: 06/16/25 1218    Lab Status: In process Specimen: Blood from Arm, Left Updated: 06/16/25 1224    UA w Reflex to Microscopic w Reflex to Culture [[id number]]     Lab Status: No result Specimen: Urine             No orders to display       ECG 12 Lead Documentation Only    Date/Time: 6/16/2025 3:52 PM    Performed by: Jose F Sparks DO  Authorized by: Jose F Sparks DO    Indications / Diagnosis:  Weakness, hypokalemia  Patient location:  ED  Interpretation:     Interpretation: abnormal    Rate:     ECG rate:  127    ECG rate assessment: tachycardic    Rhythm:     Rhythm: sinus rhythm and sinus tachycardia    ST segments:     ST segments:  Normal  T waves:     T waves: non-specific        ED Medication and Procedure Management   Prior to Admission Medications   Prescriptions Last Dose Informant Patient Reported? Taking?   Cholecalciferol (VITAMIN D3) 1,000 units tablet 6/16/2025 Morning  No Yes   Sig: Take 2 tablets (2,000 Units total) by mouth daily   Multiple Vitamins-Minerals (multivitamin with minerals) tablet 6/16/2025 Morning  No Yes   Sig: Take 1 tablet by mouth daily   Sodium Chloride Flush (Normal Saline Flush) 0.9 % SOLN   Yes No   Sig: Inject 10 mL into a catheter in a vein daily.  10ml flush prior to antibiotic administration and 10ml flush after antibiotic administration   acetaminophen (TYLENOL) 325 mg tablet   No Yes   Sig: Take 2 tablets (650 mg total) by mouth every 6 (six) hours as needed for mild pain   amLODIPine (NORVASC) 10 mg tablet 6/16/2025  Yes Yes   Sig: Take 10 mg by mouth in the morning.   ascorbic acid (VITAMIN C) 500 MG tablet 6/16/2025  No Yes   Sig: Take 1 tablet (500 mg total) by mouth 2 (two) times a day   aspirin 81 mg chewable tablet 6/16/2025 Morning  No Yes   Sig: Chew 1 tablet (81 mg total) in the morning and 1 tablet (81 mg total) before bedtime. Do all this for 22 days.   bisacodyl (DULCOLAX) 10 mg suppository Not Taking  No No   Sig: Insert 1 suppository (10 mg total) into the rectum daily as needed for constipation   Patient not taking: Reported on 6/16/2025   calcium carbonate (OS-ALPESH) 600 MG tablet  Self Yes Yes   Sig: Take 600 mg by mouth as needed Taking 2 tablets (1200 mg) daily   cetirizine (ZyrTEC) 10 mg tablet 6/16/2025 Morning  Yes Yes   Sig: Take 10 mg by mouth in the morning.   docusate sodium (COLACE) 100 mg capsule  Family Member No Yes   Sig: Take 1 capsule (100 mg total) by mouth 2 (two) times a day   Patient taking differently: Take 100 mg by mouth daily at bedtime As needed   ferrous sulfate 324 (65 Fe) mg 6/16/2025  No Yes   Sig: Take 1 tablet (324 mg total) by mouth 2 (two) times a day before meals   folic acid (FOLVITE) 1 mg tablet 6/16/2025 Morning  No Yes   Sig: Take 1 tablet (1 mg total) by mouth daily   gabapentin (NEURONTIN) 600 MG tablet 6/15/2025 Bedtime  Yes Yes   Sig: Take 600 mg by mouth daily at bedtime   lidocaine (XYLOCAINE) 2 % topical gel   Yes No   Sig: Apply 1 Application topically as needed for mild pain not using 06/07/2025   metroNIDAZOLE (FLAGYL) 500 mg tablet 6/16/2025 Morning  No Yes   Sig: Take 1 tablet (500 mg total) by mouth every 12 (twelve) hours   miconazole (MICOTIN) 2 % powder   Yes Yes   Sig: Apply topically  as needed for itching   omeprazole (PriLOSEC) 40 MG capsule Not Taking  Yes No   Sig: Take 40 mg by mouth daily   Patient not taking: Reported on 6/16/2025   oxyCODONE (ROXICODONE) 5 immediate release tablet   No Yes   Sig: Take 1 tablet (5 mg total) by mouth every 4 (four) hours as needed for moderate pain Max Daily Amount: 30 mg   pantoprazole (PROTONIX) 40 mg tablet   Yes Yes   Sig: Take 40 mg by mouth daily   polyethylene glycol (MIRALAX) 17 g packet  Family Member No Yes   Sig: Take 17 g by mouth daily   Patient taking differently: Take 17 g by mouth as needed   pravastatin (PRAVACHOL) 40 mg tablet 6/15/2025 Bedtime  Yes Yes   Sig: Take 40 mg by mouth daily at bedtime   senna (SENOKOT) 8.6 mg Not Taking  No No   Sig: Take 1 tablet (8.6 mg total) by mouth daily at bedtime   Patient not taking: Reported on 6/16/2025   sodium chloride, PF, 0.9 %   No No   Sig: 10 mL by Intracatheter route daily for 120 doses Intracatheter flushing daily. May substitute prefilled syringe with normal saline 10 mL vials, 10 mL syringes, and 18 g blunt needles   traZODone (DESYREL) 100 mg tablet 6/15/2025 Bedtime  Yes Yes   Sig: Take 100 mg by mouth daily at bedtime   vancomycin (VANCOCIN)  Family Member No No   Sig: Inject 200 mL (1,000 mg total) into a catheter in a vein every 24 hours over 60 minutes at 200 mL/hr   Patient taking differently: Inject 1,500 mg into a catheter in a vein every 24 hours Infuse IV over 60 minutes every 24 hours. Flush Catheter with saline as directed. Dosage unit contains 1000mg Vancomycin . Infusion volume is 100ml   venlafaxine (EFFEXOR-XR) 150 mg 24 hr capsule 6/16/2025 Morning  Yes Yes   Sig: Take 150 mg by mouth in the morning.      Facility-Administered Medications: None     Patient's Medications   Discharge Prescriptions    No medications on file     No discharge procedures on file.  ED SEPSIS DOCUMENTATION   Time reflects when diagnosis was documented in both MDM as applicable and the Disposition  within this note       Time User Action Codes Description Comment    6/16/2025  2:15 PM Jose F Sparks Add [F41.9] Anxiety     6/16/2025  2:15 PM Jose F Sparks Add [E87.6] Hypokalemia     6/16/2025  2:15 PM Jose F Sparks Add [Z51.11] Conditioning chemotherapy prior to peripheral blood stem cell transplant     6/16/2025  2:15 PM Jose F Sparks Remove [Z51.11] Conditioning chemotherapy prior to peripheral blood stem cell transplant     6/16/2025  2:15 PM Jose F Sparks Add [R29.898] Muscular deconditioning     6/16/2025  2:55 PM Tiffanie Barksdale Add [Z13.9] Encounter for screening involving social determinants of health (SDoH)                      [1]   Past Medical History:  Diagnosis Date    Anemia     Anxiety     Arthritis     Closed transcervical fracture of right femur (HCC) 07/01/2022    Colon polyp     Depression     Fracture of right wrist 07/01/2022    GERD (gastroesophageal reflux disease)     Hiatal hernia     Hyperlipidemia     Hypertension    [2]   Past Surgical History:  Procedure Laterality Date    APPENDECTOMY      CHOLECYSTECTOMY      COLONOSCOPY      FL INJECTION RIGHT HIP (NON ARTHROGRAM)  04/21/2025    FRACTURE SURGERY Right     arm with plate and pins    HAND SURGERY Bilateral     HIP ARTHROPLASTY Right 5/21/2025    Procedure: removal/debridement prothesis hip prostalac;  Surgeon: Kit Daley MD;  Location: [facility];  Service: Orthopedics    HYSTERECTOMY      IR ASPIRATION JOINT (SPECIFY LOCATION)  4/24/2025    IR DRAINAGE TUBE PLACEMENT  5/15/2025    JOINT REPLACEMENT Bilateral     knees    [place] HEMIARTHROPLASTY HIP PARTIAL Right 07/02/2022    Procedure: HEMIARTHROPLASTY HIP (BIPOLAR), closed reduction with splinting right wrist;  Surgeon: Leo Roblero;  Location: [facility];  Service: Orthopedics    [place] NEUROPLASTY &/TRANSPOSITION ULNAR NERVE ELBOW Right 02/08/2023    Procedure: RELEASE CUBITAL TUNNEL;  Surgeon: Cody Calles DO;  Location: [facility];   Service: Orthopedics    SHOULDER ARTHROSCOPY Left    [3]   Family History  Problem Relation Name Age of Onset    No Known Problems Mother     [4]   Social History  Tobacco Use    Smoking status: Former     Current packs/day: 0.00     Types: Cigarettes     Quit date:      Years since quittin.4    Smokeless tobacco: Never   Vaping Use    Vaping status: Never Used   Substance Use Topics    Alcohol use: Not Currently    Drug use: Never        Jose F Sparks,   25 [phone]

## 2025-06-16 NOTE — ASSESSMENT & PLAN NOTE
Patient with generalized weakness and fatigue over the last week  Currently being treated for recent hip infection with IV vancomycin and oral Flagyl  Will get PT/OT eval  Case management for discharge planning  Replace electrolytes  Check lower extremity Doppler

## 2025-06-16 NOTE — ASSESSMENT & PLAN NOTE
Hypomagnesemia and hypokalemia noted in the ER  Supplemented with IV magnesium and oral potassium  Repeat electrolyte levels in the morning

## 2025-06-16 NOTE — ASSESSMENT & PLAN NOTE
Patient has been on vancomycin and Flagyl as outpatient  We will continue IV vancomycin daily, pharmacy consult for dosing  Continue Flagyl  Consult infectious disease

## 2025-06-17 ENCOUNTER — TELEPHONE (OUTPATIENT)
Dept: PSYCHIATRY | Facility: CLINIC | Age: 75
End: 2025-06-17

## 2025-06-17 ENCOUNTER — APPOINTMENT (INPATIENT)
Dept: CT IMAGING | Facility: HOSPITAL | Age: 75
DRG: 559 | End: 2025-06-17
Payer: MEDICARE

## 2025-06-17 PROBLEM — R94.31 QT PROLONGATION: Status: ACTIVE | Noted: 2025-06-17

## 2025-06-17 PROBLEM — F41.9 ANXIETY: Status: ACTIVE | Noted: 2025-06-17

## 2025-06-17 LAB
ALBUMIN SERPL BCG-MCNC: 2.7 G/DL (ref 3.5–5)
ALP SERPL-CCNC: 40 U/L (ref 34–104)
ALT SERPL W P-5'-P-CCNC: 6 U/L (ref 7–52)
ANION GAP SERPL CALCULATED.3IONS-SCNC: 6 MMOL/L (ref 4–13)
AST SERPL W P-5'-P-CCNC: 16 U/L (ref 13–39)
BILIRUB SERPL-MCNC: 0.4 MG/DL (ref 0.2–1)
BUN SERPL-MCNC: 11 MG/DL (ref 5–25)
CALCIUM ALBUM COR SERPL-MCNC: 8.8 MG/DL (ref 8.3–10.1)
CALCIUM SERPL-MCNC: 7.8 MG/DL (ref 8.4–10.2)
CHLORIDE SERPL-SCNC: 104 MMOL/L (ref 96–108)
CO2 SERPL-SCNC: 29 MMOL/L (ref 21–32)
CREAT SERPL-MCNC: 0.77 MG/DL (ref 0.6–1.3)
ERYTHROCYTE [DISTWIDTH] IN BLOOD BY AUTOMATED COUNT: 14.8 % (ref 11.6–15.1)
GFR SERPL CREATININE-BSD FRML MDRD: 75 ML/MIN/1.73SQ M
GLUCOSE SERPL-MCNC: 130 MG/DL (ref 65–140)
GLUCOSE SERPL-MCNC: 96 MG/DL (ref 65–140)
GLUCOSE SERPL-MCNC: 96 MG/DL (ref 65–140)
HCT VFR BLD AUTO: 25.6 % (ref 34.8–46.1)
HGB BLD-MCNC: 8 G/DL (ref 11.5–15.4)
MAGNESIUM SERPL-MCNC: 1.9 MG/DL (ref 1.9–2.7)
MCH RBC QN AUTO: 28.6 PG (ref 26.8–34.3)
MCHC RBC AUTO-ENTMCNC: 31.3 G/DL (ref 31.4–37.4)
MCV RBC AUTO: 91 FL (ref 82–98)
PLATELET # BLD AUTO: 535 THOUSANDS/UL (ref 149–390)
PMV BLD AUTO: 8.7 FL (ref 8.9–12.7)
POTASSIUM SERPL-SCNC: 2.9 MMOL/L (ref 3.5–5.3)
PROT SERPL-MCNC: 5.3 G/DL (ref 6.4–8.4)
RBC # BLD AUTO: 2.8 MILLION/UL (ref 3.81–5.12)
SODIUM SERPL-SCNC: 139 MMOL/L (ref 135–147)
WBC # BLD AUTO: 3.58 THOUSAND/UL (ref 4.31–10.16)

## 2025-06-17 PROCEDURE — 93005 ELECTROCARDIOGRAM TRACING: CPT

## 2025-06-17 PROCEDURE — 36415 COLL VENOUS BLD VENIPUNCTURE: CPT | Performed by: INTERNAL MEDICINE

## 2025-06-17 PROCEDURE — 86140 C-REACTIVE PROTEIN: CPT

## 2025-06-17 PROCEDURE — 93970 EXTREMITY STUDY: CPT | Performed by: SURGERY

## 2025-06-17 PROCEDURE — 97167 OT EVAL HIGH COMPLEX 60 MIN: CPT

## 2025-06-17 PROCEDURE — 85027 COMPLETE CBC AUTOMATED: CPT | Performed by: INTERNAL MEDICINE

## 2025-06-17 PROCEDURE — 82948 REAGENT STRIP/BLOOD GLUCOSE: CPT

## 2025-06-17 PROCEDURE — 73701 CT LOWER EXTREMITY W/DYE: CPT

## 2025-06-17 PROCEDURE — 97163 PT EVAL HIGH COMPLEX 45 MIN: CPT

## 2025-06-17 PROCEDURE — 80053 COMPREHEN METABOLIC PANEL: CPT | Performed by: INTERNAL MEDICINE

## 2025-06-17 PROCEDURE — 99232 SBSQ HOSP IP/OBS MODERATE 35: CPT | Performed by: INTERNAL MEDICINE

## 2025-06-17 PROCEDURE — 99222 1ST HOSP IP/OBS MODERATE 55: CPT | Performed by: INTERNAL MEDICINE

## 2025-06-17 PROCEDURE — 83735 ASSAY OF MAGNESIUM: CPT | Performed by: INTERNAL MEDICINE

## 2025-06-17 RX ORDER — ONDANSETRON 2 MG/ML
4 INJECTION INTRAMUSCULAR; INTRAVENOUS EVERY 6 HOURS PRN
Status: CANCELLED | OUTPATIENT
Start: 2025-06-17

## 2025-06-17 RX ORDER — PRAVASTATIN SODIUM 40 MG
40 TABLET ORAL
Status: CANCELLED | OUTPATIENT
Start: 2025-06-18

## 2025-06-17 RX ORDER — VENLAFAXINE HYDROCHLORIDE 150 MG/1
150 CAPSULE, EXTENDED RELEASE ORAL DAILY
Status: CANCELLED | OUTPATIENT
Start: 2025-06-18

## 2025-06-17 RX ORDER — OXYCODONE HYDROCHLORIDE 5 MG/1
5 TABLET ORAL EVERY 6 HOURS PRN
Refills: 0 | Status: CANCELLED | OUTPATIENT
Start: 2025-06-17

## 2025-06-17 RX ORDER — ASCORBIC ACID 500 MG
500 TABLET ORAL DAILY
Status: CANCELLED | OUTPATIENT
Start: 2025-06-18

## 2025-06-17 RX ORDER — LORAZEPAM 1 MG/1
1 TABLET ORAL EVERY 8 HOURS PRN
Status: DISCONTINUED | OUTPATIENT
Start: 2025-06-17 | End: 2025-06-18 | Stop reason: HOSPADM

## 2025-06-17 RX ORDER — GABAPENTIN 600 MG/1
600 TABLET ORAL
Status: CANCELLED | OUTPATIENT
Start: 2025-06-18

## 2025-06-17 RX ORDER — POTASSIUM CHLORIDE 1500 MG/1
40 TABLET, EXTENDED RELEASE ORAL
Status: COMPLETED | OUTPATIENT
Start: 2025-06-17 | End: 2025-06-17

## 2025-06-17 RX ORDER — ACETAMINOPHEN 325 MG/1
650 TABLET ORAL EVERY 6 HOURS PRN
Status: CANCELLED | OUTPATIENT
Start: 2025-06-17

## 2025-06-17 RX ORDER — ENOXAPARIN SODIUM 100 MG/ML
40 INJECTION SUBCUTANEOUS DAILY
Status: CANCELLED | OUTPATIENT
Start: 2025-06-18

## 2025-06-17 RX ORDER — ASPIRIN 81 MG/1
81 TABLET, CHEWABLE ORAL 2 TIMES DAILY
Status: CANCELLED | OUTPATIENT
Start: 2025-06-18

## 2025-06-17 RX ORDER — PANTOPRAZOLE SODIUM 40 MG/1
40 TABLET, DELAYED RELEASE ORAL DAILY
Status: CANCELLED | OUTPATIENT
Start: 2025-06-18

## 2025-06-17 RX ORDER — OXYCODONE HYDROCHLORIDE 5 MG/1
5 TABLET ORAL EVERY 6 HOURS PRN
Refills: 0 | Status: DISCONTINUED | OUTPATIENT
Start: 2025-06-17 | End: 2025-06-18 | Stop reason: HOSPADM

## 2025-06-17 RX ORDER — METRONIDAZOLE 500 MG/1
500 TABLET ORAL EVERY 12 HOURS SCHEDULED
Status: CANCELLED | OUTPATIENT
Start: 2025-06-18

## 2025-06-17 RX ORDER — AMLODIPINE BESYLATE 10 MG/1
10 TABLET ORAL DAILY
Status: CANCELLED | OUTPATIENT
Start: 2025-06-18

## 2025-06-17 RX ORDER — FOLIC ACID 1 MG/1
1 TABLET ORAL DAILY
Status: CANCELLED | OUTPATIENT
Start: 2025-06-18

## 2025-06-17 RX ORDER — LORAZEPAM 1 MG/1
1 TABLET ORAL EVERY 8 HOURS PRN
Status: CANCELLED | OUTPATIENT
Start: 2025-06-17

## 2025-06-17 RX ADMIN — POTASSIUM CHLORIDE 40 MEQ: 1500 TABLET, EXTENDED RELEASE ORAL at 12:10

## 2025-06-17 RX ADMIN — LORAZEPAM 1 MG: 1 TABLET ORAL at 21:57

## 2025-06-17 RX ADMIN — PANTOPRAZOLE SODIUM 40 MG: 40 TABLET, DELAYED RELEASE ORAL at 08:58

## 2025-06-17 RX ADMIN — METRONIDAZOLE 500 MG: 500 TABLET ORAL at 09:18

## 2025-06-17 RX ADMIN — VANCOMYCIN HYDROCHLORIDE 1500 MG: 1 INJECTION, POWDER, LYOPHILIZED, FOR SOLUTION INTRAVENOUS at 22:37

## 2025-06-17 RX ADMIN — VENLAFAXINE HYDROCHLORIDE 150 MG: 150 CAPSULE, EXTENDED RELEASE ORAL at 09:17

## 2025-06-17 RX ADMIN — ASPIRIN 81 MG: 81 TABLET, CHEWABLE ORAL at 21:57

## 2025-06-17 RX ADMIN — ENOXAPARIN SODIUM 40 MG: 40 INJECTION SUBCUTANEOUS at 08:58

## 2025-06-17 RX ADMIN — GABAPENTIN 600 MG: 600 TABLET, FILM COATED ORAL at 21:57

## 2025-06-17 RX ADMIN — OXYCODONE HYDROCHLORIDE AND ACETAMINOPHEN 500 MG: 500 TABLET ORAL at 08:58

## 2025-06-17 RX ADMIN — FOLIC ACID 1 MG: 1 TABLET ORAL at 08:58

## 2025-06-17 RX ADMIN — POTASSIUM CHLORIDE 40 MEQ: 1500 TABLET, EXTENDED RELEASE ORAL at 08:57

## 2025-06-17 RX ADMIN — PRAVASTATIN SODIUM 40 MG: 40 TABLET ORAL at 21:57

## 2025-06-17 RX ADMIN — ASPIRIN 81 MG: 81 TABLET, CHEWABLE ORAL at 08:58

## 2025-06-17 RX ADMIN — METRONIDAZOLE 500 MG: 500 TABLET ORAL at 21:57

## 2025-06-17 RX ADMIN — Medication 2.5 MG: at 12:10

## 2025-06-17 RX ADMIN — OXYCODONE 5 MG: 5 TABLET ORAL at 19:44

## 2025-06-17 RX ADMIN — AMLODIPINE BESYLATE 10 MG: 5 TABLET ORAL at 08:58

## 2025-06-17 RX ADMIN — IOHEXOL 100 ML: 350 INJECTION, SOLUTION INTRAVENOUS at 18:17

## 2025-06-17 NOTE — PHYSICAL THERAPY NOTE
PHYSICAL THERAPY EVALUATION  NAME:  Sharon Vega  DATE: 06/17/25    AGE:   75 y.o.  Mrn:   [id number]  ADMIT DX:  Hypokalemia [E87.6]  Anxiety [F41.9]  Vomiting [R11.10]  Hip pain [M25.559]  Muscular deconditioning [R29.898]  Abscess of right hip [L02.415]  Encounter for screening involving social determinants of health (SDoH) [Z13.9]  Problem List: Problem List[1]    Past Medical History  Past Medical History[2]    Past Surgical History  Past Surgical History[3]    Length Of Stay: 1  Performed at least 2 patient identifiers during session: Name and Epic photo       06/17/25 0919   PT Last Visit   PT Visit Date 06/17/25   Note Type   Note type Evaluation   Pain Assessment   Pain Assessment Tool 0-10   Pain Score 3   Pain Location/Orientation Orientation: Right;Location: Hip;Location: Leg   Pain Onset/Description Onset: Gradual   Effect of Pain on Daily Activities mobility   Patient's Stated Pain Goal No pain   Hospital Pain Intervention(s) Medication (See MAR)   Multiple Pain Sites No   Restrictions/Precautions   Weight Bearing Precautions Per Order Yes   RLE Weight Bearing Per Order PWB   Other Precautions Bed Alarm;Chair Alarm;WBS;O2;Fall Risk;Pain  (posterior hip precautions)   Home Living   Type of Home House   Home Layout Two level;Stairs to enter with rails;1/2 bath on main level;Performs ADLs on one level  (2 eric w/hr, vs 4+1)   Bathroom Shower/Tub Tub/shower unit   Bathroom Toilet Raised   Bathroom Equipment Grab bars in shower   Bathroom Accessibility Accessible   Home Equipment Walker;Cane  (pt reports rw use at baseline)   Prior Function   Level of [place] Independent with ADLs;Independent with functional mobility;Independent with IADLS  (increased assistance withADLs)   Lives With Son  (daughter in law, and grandchild)   Receives Help From Family   IADLs Independent with driving;Independent with meal prep;Independent with medication management   Falls in the last 6 months 0   Vocational  "Retired   Cognition   Overall Cognitive Status WFL   Arousal/Participation Alert   Attention Difficulty attending to directions   Orientation Level Oriented X4   Memory Decreased recall of recent events;Decreased recall of precautions   Following Commands Follows one step commands without difficulty   Subjective   Subjective \"I need more assistance\"   RLE Assessment   RLE Assessment   (4-/5)   LLE Assessment   LLE Assessment WFL   Vision-Basic Assessment   Current Vision Wears glasses all the time   Coordination   Sensation WFL   Bed Mobility   Supine to Sit 4  Minimal assistance   Additional items Assist x 1;HOB elevated;Increased time required;Verbal cues;LE management   Sit to Supine 4  Minimal assistance   Additional items Assist x 1;Increased time required;Verbal cues   Additional Comments vc for bedrail utilization and proper body mechanics   Transfers   Sit to Stand 4  Minimal assistance   Additional items Assist x 2;Increased time required;Verbal cues;Armrests   Stand to Sit 4  Minimal assistance   Additional items Assist x 2;Increased time required;Verbal cues   Additional Comments rw used, vc for safe hand placement, and rw management   Ambulation/Elevation   Gait pattern Improper Weight shift;Antalgic;Decreased foot clearance;Forward Flexion   Gait Assistance 4  Minimal assist   Additional items Assist x 1;Verbal cues   Assistive Device Rolling walker   Distance 10 ft   Ambulation/Elevation Additional Comments verbal cues for rw management, verbal cues for weight bearing status   Balance   Static Sitting Good   Dynamic Sitting Fair +   Static Standing Fair   Dynamic Standing Fair -   Ambulatory Poor +   Activity Tolerance   Activity Tolerance Patient limited by fatigue;Patient limited by pain   Medical Staff Made Aware cm made aware   Nurse Made Aware rn cleared   Assessment   Prognosis Fair   Problem List Decreased strength;Decreased range of motion;Decreased endurance;Impaired balance;Pain;Orthopedic " restrictions   Assessment Pt is 75 y.o. female seen for PT evaluation s/p [facility] w/ Generalized weakness. PT consulted to assess pt's functional mobility and d/c needs. Order placed for PT eval and tx, w/ PWB RLE order. Pt agreeable to PT  session upon arrival, pt found supine in bed.  PTA, pt was independent w/ all functional mobility w/ rw and lives w/ spouse in 2 level home .  Pt to benefit from continued PT tx to address deficits and maximize level of functional independent mobility and consistency. Upon conclusion pt  supine in bed and with all needs in reach. Complexity: Comorbidities affecting pt's physical performance at time of assessment include:  anxiety, vomiting, hip pain, weakness, htn, infection of prosthetic joint . Personal factors affecting pt at time of IE include: advanced age, limited mobility, lives in multistory home, and ambulating with assistive device. Please find objective findings from PT assessment regarding body systems outlined above with impairments and limitations including weakness, impaired balance, gait deviations, pain, decreased activity tolerance, and decreased functional mobility tolerance.  Pt's clinical presentation is currently unstable/unpredictable seen in pt's presentation of tachycardia, abnormal sodium levels, abnormal calcium levels, and pain. The patient's AM-PAC Basic Mobility Inpatient Short Form Raw Score is 14 .  Based on patient presentations and impairments, pt would most appropriately benefit from Level 1 resource intensity upon discharge. A Raw score of less than or equal to 16 suggests the patient may benefit from discharge to post-acute rehabilitation services. Please also refer to the recommendation of the Physical Therapist for safe discharge planning. RN verbalized pt appropriate for PT session.   Goals   Patient Goals to go to rehab   LTG Expiration Date 06/27/25   Long Term Goal #1 Pt will: Perform bed mobility tasks to  modified I to increase Indep in home environment and improve ease of bed mobility. Perform transfers to modified I to increase Indep in home environment, decrease risk for falls, and improve ease of transfers. Perform ambulation with rw for 50 ft to  increase Indep in home environment, decrease risk for falls, improve activity tolerance, and improve gait quality. Increase dynamic standing balance to F+ to decrease fall risk.   Increase OOB activity tolerance to 10 minutes without s/s of exertion to decrease fall risk.   PT Treatment Day 0   Plan   Treatment/Interventions Functional transfer training;LE strengthening/ROM;Elevations;Therapeutic exercise;Endurance training;Gait training   PT Frequency 3-5x/wk   Discharge Recommendation   Rehab Resource Intensity Level, PT I (Maximum Resource Intensity)   Equipment Recommended Walker;Cane   AM-PAC Basic Mobility Inpatient   Turning in Flat Bed Without Bedrails 3   Lying on Back to Sitting on Edge of Flat Bed Without Bedrails 3   Moving Bed to Chair 2   Standing Up From Chair Using Arms 2   Walk in Room 2   Climb 3-5 Stairs With Railing 2   Basic Mobility Inpatient Raw Score 14   Basic Mobility Standardized Score 35.55   [facility] Highest Level Of Mobility   -HL Goal 4: Move to chair/commode   -HLM Achieved 6: Walk 10 steps or more     Pt seen as a co-eval with OT due to the patient's co-morbidities, clinically unstable presentation, and present impairments which are a regression from the patient's baseline.     Time In: 0919  Time Out: 0935  Total Evaluation Minutes: 16    Glenn Patten PT         [1]   Patient Active Problem List  Diagnosis    Lumbar degenerative disc disease    Lumbar spondylosis    Cervical radiculopathy    Cervical spondylosis    Rheumatoid arthritis involving both hands (HCC)    Spinal stenosis of lumbar region without neurogenic claudication    Primary hypertension    Chronic pain syndrome    Lumbar radiculopathy    Sacroiliitis (HCC)     Right hip pain    Sepsis without acute organ dysfunction (HCC)    Class 2 severe obesity due to excess calories with serious comorbidity and body mass index (BMI) of 35.0 to 35.9 in adult (HCC)    Abscess of right hip    History of hemiarthroplasty of right hip    Heart murmur    Thrombocytosis    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    Positive blood culture    Aortic stenosis    Constipation    Pre-diabetes    Generalized weakness    Electrolyte abnormality    Sacral wound    QT prolongation    Anxiety   [2]   Past Medical History:  Diagnosis Date    Anemia     Anxiety     Arthritis     Closed transcervical fracture of right femur (HCC) 07/01/2022    Colon polyp     Depression     Fracture of right wrist 07/01/2022    GERD (gastroesophageal reflux disease)     Hiatal hernia     Hyperlipidemia     Hypertension    [3]   Past Surgical History:  Procedure Laterality Date    APPENDECTOMY      CHOLECYSTECTOMY      COLONOSCOPY      FL INJECTION RIGHT HIP (NON ARTHROGRAM)  04/21/2025    FRACTURE SURGERY Right     arm with plate and pins    HAND SURGERY Bilateral     HIP ARTHROPLASTY Right 5/21/2025    Procedure: removal/debridement prothesis hip prostalac;  Surgeon: Kit Daley MD;  Location: [facility];  Service: Orthopedics    HYSTERECTOMY      IR ASPIRATION JOINT (SPECIFY LOCATION)  4/24/2025    IR DRAINAGE TUBE PLACEMENT  5/15/2025    JOINT REPLACEMENT Bilateral     knees    SD HEMIARTHROPLASTY HIP PARTIAL Right 07/02/2022    Procedure: HEMIARTHROPLASTY HIP (BIPOLAR), closed reduction with splinting right wrist;  Surgeon: Leo Roblero;  Location: [facility];  Service: Orthopedics    SD NEUROPLASTY &/TRANSPOSITION ULNAR NERVE ELBOW Right 02/08/2023    Procedure: RELEASE CUBITAL TUNNEL;  Surgeon: Cody Calles DO;  Location: [facility];  Service: Orthopedics    SHOULDER ARTHROSCOPY Left

## 2025-06-17 NOTE — ASSESSMENT & PLAN NOTE
Patient has been following with infectious disease for a right prosthetic hip joint infection. Has history of right hip hemiarthroplasty on 7/2/2022.  Most recently she was admitted on 5/13/2025 from rehab due to persistent pain in her right hip.  Patient had similar presentation in late April and is status post right hip IR aspiration which resulted with negative cultures.  Orthopedic surgery recommended outpatient follow-up for total right hip replacement.  Patient was undergoing workup for subsequent procedure when she obtained a right hip x-ray and a CT chest abdomen pelvis which revealed an organized collection of fluid and gas in the right iliac us muscle and similar collection surrounding the right arthroplasty in the gluteal muscles measuring up to 10 cm concerning for an abscess. 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  The patient was taken to IR 5/15 for abscess drain placement x 2 with drainage of 40 cc and 90 cc pus. The hip joint itself was not aspirated. The abscess fluid was unfortunately only sent for Synovasure not routine culture , which showed elevated alpha defense and but ID panel was negative.  Culture sent from the drain less than 24 hours after placement grew Finegoldia magna and Peptoniphilus harei which are skin lizzie, but suspected to be true pathogens since blood culture from 5/13 was also growing Finegoldia. She is now status post right hip hardware removal and antibiotic spacer placement 5/21. Operative cultures are also growing Peptoniphilus and Finegoldia. Patient was on IV vancomycin for several days before cultures were obtained, so it was continued due to polymicrobial infection and possibility that it suppressed the growth of sensitive organisms. Patient was discharged on IV vancomycin and PO flagyl to complete a 6 week course of antibiotics through 7/1/2025. Patient currently reports worsening right hip pain.   Continue IV vancomycin 1 g every 24  hours  Continue p.o. Flagyl 500 mg every 12 hours  Continue to treat for 6-week course through 7/1/2025  Obtain a CT of the right hip given worsening right hip pain  Pain management per primary team  Ongoing follow-up with orthopedic surgery. Will need to ensure clearance of infection prior to second stage revision arthroplasty. Consider evaluation while admitted given patient was unable to make her appointment in the outpatient setting.   PICC in place in the right upper extremity.  Will require removal after last dose of IV antibiotics.  Continue to follow CBCD and CMP with AM labs to monitor for potential antimicrobial toxicities and assess for clinical response to antibiotics.   Follow-up blood cultures

## 2025-06-17 NOTE — ASSESSMENT & PLAN NOTE
QTc on admission noted to be prolonged at 526.  Could affect antibiotic selection.  Will avoid Flouroquinolone antibiotics as able.

## 2025-06-17 NOTE — PLAN OF CARE
Problem: PAIN - ADULT  Goal: Verbalizes/displays adequate comfort level or baseline comfort level  Description: Interventions:  - Encourage patient to monitor pain and request assistance  - Assess pain using appropriate pain scale  - Administer analgesics as ordered based on type and severity of pain and evaluate response  - Implement non-pharmacological measures as appropriate and evaluate response  - Consider cultural and social influences on pain and pain management  - Notify physician/advanced practitioner if interventions unsuccessful or patient reports new pain  - Educate patient/family on pain management process including their role and importance of  reporting pain   - Provide non-pharmacologic/complimentary pain relief interventions  Outcome: Progressing     Problem: INFECTION - ADULT  Goal: Absence or prevention of progression during hospitalization  Description: INTERVENTIONS:  - Assess and monitor for signs and symptoms of infection  - Monitor lab/diagnostic results  - Monitor all insertion sites, i.e. indwelling lines, tubes, and drains  - Monitor endotracheal if appropriate and nasal secretions for changes in amount and color  - [place] appropriate cooling/warming therapies per order  - Administer medications as ordered  - Instruct and encourage patient and family to use good hand hygiene technique  - Identify and instruct in appropriate isolation precautions for identified infection/condition  Outcome: Progressing  Goal: Absence of fever/infection during neutropenic period  Description: INTERVENTIONS:  - Monitor WBC  - Perform strict hand hygiene  - Limit to healthy visitors only  - No plants, dried, fresh or silk flowers with feliciano in patient room  Outcome: Progressing     Problem: SAFETY ADULT  Goal: Patient will remain free of falls  Description: INTERVENTIONS:  - Educate patient/family on patient safety including physical limitations  - Instruct patient to call for assistance with activity   -  Consider consulting OT/PT to assist with strengthening/mobility based on AM PAC & JH-HLM score  - Consult OT/PT to assist with strengthening/mobility   - Keep Call bell within reach  - Keep bed low and locked with side rails adjusted as appropriate  - Keep care items and personal belongings within reach  - Initiate and maintain comfort rounds  - Make Fall Risk Sign visible to staff  - Offer Toileting every 2 Hours, in advance of need  - Initiate/Maintain bed alarm  - Obtain necessary fall risk management equipment: non slip socks   - Apply yellow socks and bracelet for high fall risk patients  - Consider moving patient to room near nurses station  Outcome: Progressing  Goal: Maintain or return to baseline ADL function  Description: INTERVENTIONS:  -  Assess patient's ability to carry out ADLs; assess patient's baseline for ADL function and identify physical deficits which impact ability to perform ADLs (bathing, care of mouth/teeth, toileting, grooming, dressing, etc.)  - Assess/evaluate cause of self-care deficits   - Assess range of motion  - Assess patient's mobility; develop plan if impaired  - Assess patient's need for assistive devices and provide as appropriate  - Encourage maximum independence but intervene and supervise when necessary  - Involve family in performance of ADLs  - Assess for home care needs following discharge   - Consider OT consult to assist with ADL evaluation and planning for discharge  - Provide patient education as appropriate  - Monitor functional capacity and physical performance, use of AM PAC & JH-HLM   - Monitor gait, balance and fatigue with ambulation    Outcome: Progressing  Goal: Maintains/Returns to pre admission functional level  Description: INTERVENTIONS:  - Perform AM-PAC 6 Click Basic Mobility/ Daily Activity assessment daily.  - Set and communicate daily mobility goal to care team and patient/family/caregiver.   - Collaborate with rehabilitation services on mobility goals  if consulted  - Perform Range of Motion 3 times a day.  - Reposition patient every 2 hours.  - Dangle patient 3 times a day  - Stand patient 3 times a day  - Ambulate patient 3 times a day  - Out of bed to chair 3 times a day   - Out of bed for meals 3 times a day  - Out of bed for toileting  - Record patient progress and toleration of activity level   Outcome: Progressing     Problem: DISCHARGE PLANNING  Goal: Discharge to home or other facility with appropriate resources  Description: INTERVENTIONS:  - Identify barriers to discharge w/patient and caregiver  - Arrange for needed discharge resources and transportation as appropriate  - Identify discharge learning needs (meds, wound care, etc.)  - Arrange for interpretive services to assist at discharge as needed  - Refer to Case Management Department for coordinating discharge planning if the patient needs post-hospital services based on physician/advanced practitioner order or complex needs related to functional status, cognitive ability, or social support system  Outcome: Progressing     Problem: Knowledge Deficit  Goal: Patient/family/caregiver demonstrates understanding of disease process, treatment plan, medications, and discharge instructions  Description: Complete learning assessment and assess knowledge base.  Interventions:  - Provide teaching at level of understanding  - Provide teaching via preferred learning methods  Outcome: Progressing     Problem: Prexisting or High Potential for Compromised Skin Integrity  Goal: Skin integrity is maintained or improved  Description: INTERVENTIONS:  - Identify patients at risk for skin breakdown  - Assess and monitor skin integrity including under and around medical devices   - Assess and monitor nutrition and hydration status  - Monitor labs  - Assess for incontinence   - Turn and reposition patient  - Assist with mobility/ambulation  - Relieve pressure over mane prominences   - Avoid friction and shearing  -  Provide appropriate hygiene as needed including keeping skin clean and dry  - Evaluate need for skin moisturizer/barrier cream  - Collaborate with interdisciplinary team  - Patient/family teaching  - Consider wound care consult    Assess:  - Review Gamaliel scale daily  - Clean and moisturize skin every shift   - Inspect skin when repositioning, toileting, and assisting with ADLS  - Assess under medical devices such as cushions  every shift   - Assess extremities for adequate circulation and sensation     Bed Management:  - Have minimal linens on bed & keep smooth, unwrinkled  - Change linens as needed when moist or perspiring  - Avoid sitting or lying in one position for more than 2 hours while in bed?Keep HOB at 30 degrees   - Toileting:  - Offer bedside commode  - Assess for incontinence every 2 hours   - Use incontinent care products after each incontinent episode such as barrier creams     Activity:  - Mobilize patient 3 times a day  - Encourage activity and walks on unit  - Encourage or provide ROM exercises   - Turn and reposition patient every 2 Hours  - Use appropriate equipment to lift or move patient in bed  - Instruct/ Assist with weight shifting every 2 hours  when out of bed in chair  - Consider limitation of chair time 2 hour intervals    Skin Care:  - Avoid use of baby powder, tape, friction and shearing, hot water or constrictive clothing  - Relieve pressure over bony prominences using cushions   - Do not massage red bony areas    Next Steps:  - Teach patient strategies to minimize risks such as falls   - Consider consults to  interdisciplinary teams such as pt and ot   Outcome: Progressing

## 2025-06-17 NOTE — ED NOTES
Unable to pull back blood from picc line. Recapped and oncoming nurse made aware.      oJel Nicole RN  06/16/25 [id number]

## 2025-06-17 NOTE — TELEMEDICINE
TeleConsultation - Behavioral Health   Name: Sharon Vega 75 y.o. female I MRN: [id number]  Unit/Bed#: ED 20 I Date of Admission: 6/16/2025   Date of Service: 6/17/2025 I Hospital Day: 1    Inpatient consult to Psychiatry  Consult performed by: Travis Khan MD  Consult ordered by: Tao Mariano MD        Physician Requesting Consult: Tao Mariano, *  Principal Problem:Generalized weakness  Reason for Consult: worsening anxiety and panic attacks     Assessment & Plan   Anxiety disorder unspecified  Unspecified depressive disorder    TREATMENT PLAN RECOMMENDATIONS:  Medications: Patient was recommended to discuss with outpatient provider regarding switching effexor to Zoloft.  Continue with Effexor while in the hospital and add lorazepam 1 mg every 8 hours as needed for anxiety (while patient is in the hospital).  Patient has abnormal EKG with QTc prolongation, consult with the cardiologist to rule out any cardiac reason of increased anxiety.  Avoid any antipsychotic medications.        Informed consent for the above medication has been obtained including discussion of the risks, benefits and alternatives: Yes    Disposition: The patient does not currently meet criteria for inpatient psychiatric hospitalization. They deny thoughts or plans for suicide and denies homicidal thoughts or intent. They are not unable to care for themselves due to a mental illness and/or acute psychosis. They are able to adequately participate in care planning with primary team. No criteria for an involuntary psychiatric commitment exists at this time.    Legal Status Recommendation: n/a    Multiple Antipsychotic Review: N/A    Psychotherapy/Psychoeducation: N/A to this case.    Other/Medical Work Up and/or treatment modality recommendations: N/A to this case.    Patient Caregiver/Family Education: N/A    Follow-up: Follow-up with outpatient psychiatrist    Report regarding the above Assessment and Treatment plan was  provided to: Dr. Mariano      History of Present Illness      Patient is a 75 y.o. female with a history of Unspecified Depressive Disorder and Anxiety Disorder who was presented to the hospital due to generalized weakness and hip pain. Psychiatric consultation was requested due to increased anxiety. Reportedly, patient has been compliant with medications and follow-up care . Over the past few weeks, patient has increased panic attacks, and reported at least few times while she is in the hospital.  She also have depressive symptoms with on and off depressed mood, decreased sleep , but denied any feeling of hopelessness and no suicidal ideation was reported. No recent aggressive behavior was reported.  No homicidal ideation was reported.  No recent manic and psychotic symptoms was reported. No substance abuse problem was reported.      Psychiatric Review Of Systems:     Sleep changes: yes  appetite changes: no  weight changes: no  anxiety/panic: yes  duong: no  guilty/hopeless: no  self injurious behavior/risky behavior: no    Historical Information     Past Psychiatric History:     Psychiatric Hospitalizations: No history of past inpatient psychiatric admissions Outpatient Treatment History: current treatment with psychiatrist/Advanced Practitioner  Suicide Attempts:  No History of Suicidal attempt reported History of self-harm: No History of self injurious behavior was reported Violence History: No History of physically aggressive  behavior was reported    Substance Abuse History:    E-Cigarette/Vaping    E-Cigarette Use Never User           Social History       Tobacco History       Smoking Status  Former Quit Date  1982 Smoking Tobacco Type  Cigarettes quit in 1982   Pack Year History     Packs/Day From To Years    0 1982  43.5    0.25   0.0      Smokeless Tobacco Use  Never              Alcohol History       Alcohol Use Status  Not Currently              Drug Use       Drug Use Status  Never              Sexual  Activity       Sexually Active  Not Currently              Other Factors    Not Asked                         Social History:    Social History       Social History     Socioeconomic History    Marital status: /Civil Union     Spouse name: Not on file    Number of children: Not on file    Years of education: Not on file    Highest education level: Not on file   Occupational History    Not on file   Tobacco Use    Smoking status: Former     Current packs/day: 0.00     Types: Cigarettes     Quit date:      Years since quittin.4    Smokeless tobacco: Never   Vaping Use    Vaping status: Never Used   Substance and Sexual Activity    Alcohol use: Not Currently    Drug use: Never    Sexual activity: Not Currently   Other Topics Concern    Not on file   Social History Narrative    Not on file     Social Drivers of Health     Financial Resource Strain: Not At Risk (2025)    Received from [facility]    Financial Insecurity     In the last 12 months did you skip medications to save money?: No     In the last 12 months was there a time when you needed to see a doctor but could not because of cost?: No   Food Insecurity: No Food Insecurity (2025)    Nursing - Inadequate Food Risk Classification     Worried About Running Out of Food in the Last Year: Not on file     Ran Out of Food in the Last Year: Not on file     Ran Out of Food in the Last Year: Never true   Transportation Needs: Unmet Transportation Needs (2025)    Nursing - Transportation Risk Classification     Lack of Transportation: Not on file     Lack of Transportation: Yes   Physical Activity: Not on file   Stress: Not on file   Social Connections: Socially Isolated (2025)    Received from [facility]    Social Connection     Do you often feel lonely?: Yes   Intimate Partner Violence: Patient Unable To Answer (2025)    Nursing IPS     Feels Physically and Emotionally Safe: Not on file      Physically Hurt by Someone: Not on file     Humiliated or Emotionally Abused by Someone: Not on file     Physically Hurt by Someone: Patient unable to answer     Hurt or Threatened by Someone: Patient unable to answer   Housing Stability: Unknown (6/16/2025)    Nursing: Inadequate Housing Risk Classification     Has Housing: Not on file     Worried About Losing Housing: Not on file     Unable to Get Utilities: Not on file     Unable to Pay for Housing in the Last Year: No     Has Housing: No             Past Medical History:    Past Medical History[1]       Meds/Allergies     Allergies[2]  all current active meds have been reviewed    Medical Review Of Systems:    Review of Systems      Objective     Vital signs in last 24 hours:  Temp:  [97.5 °F (36.4 °C)-98.1 °F (36.7 °C)] 98.1 °F (36.7 °C)  HR:  [] 94  BP: (107-144)/(55-99) 122/66  Resp:  [16-22] 18  SpO2:  [94 %-99 %] 95 %  O2 Device: None (Room air)  Nasal Cannula O2 Flow Rate (L/min):  [2 L/min] 2 L/min      Intake/Output Summary (Last 24 hours) at 6/17/2025 0858  Last data filed at 6/16/2025 1410  Gross per 24 hour   Intake 50 ml   Output --   Net 50 ml       Mental Status Evaluation::    Appearance age appropriate, casually dressed   Behavior cooperative, calm   Speech normal rate, normal volume, normal pitch   Mood depressed, anxious   Affect normal range and intensity, appropriate   Thought Processes organized, goal directed   Associations intact associations   Thought Content no overt delusions   Perceptual Disturbances: no auditory hallucinations, no visual hallucinations   Abnormal Thoughts  Risk Potential Suicidal ideation - None  Homicidal ideation - None  Potential for aggression - No   Orientation oriented to person, place, time/date, and situation   Memory recent and remote memory grossly intact   Consciousness alert and awake   Attention Span Concentration Span attention span and concentration are age appropriate   Intellect appears to be of  "average intelligence   Insight intact   Judgement intact   Muscle Strength and  Gait No assessed   Motor Activity no abnormal movements   Language no difficulty naming common objects, no difficulty repeating a phrase, no difficulty writing a sentence                   Lab Results:  I have reviewed the following lab results:   .     06/17/25  0532   WBC 3.58*   HGB 8.0*   HCT 25.6*   *   SODIUM 139   K 2.9*      CO2 29   BUN 11   CREATININE 0.77   GLUC 96   MG 1.9   AST 16   ALT 6*   ALB 2.7*   TBILI 0.40   ALKPHOS 40          Lipid Profile: No results found for: \"CHOLESTEROL\", \"HDL\", \"TRIG\", \"LDLCALC\", \"NONHDLC\"Thyroid Studies: No results found for: \"CDG2BUJGNGKX\", \"T3FREE\", \"FREET4\", \"M2HFSYL\", \"U4ACAJR\"  Ammonia: No results found for: \"AMMONIA\"  Drug Levels: No results found for: \"VALPROICTOT\", \"VALPROICACID\", \"LITHIUM\", \"CARBAMAZEPIN\", \"CLOZAPINE\", \"NCLOZIP\"    Imaging Results Review: No pertinent imaging studies reviewed.  Other Study Results Review: No additional pertinent studies reviewed.    Code Status: Level 1 - Full Code  Advance Directive and Living Will:      Power of :    POLST:      Screenings:    1. Nutrition Screening  Nutrition Assessment (completed by Staff):      2. Pain Screening  Pain Screening: Pain Assessment  Pain Assessment Tool: 0-10  Pain Score: 0  Patient's Stated Pain Goal: No pain    3. Suicide Screening                                                         [place]-SUICIDE SEVERITY RATING SCALE (C-SSRS)                            1. In the last month have you wished you were dead or wished you could go to sleep and not wake up? No  2. In the last month, have you actually had thoughts about killing yourself? No  6. Have you done anything, started to do anything, or prepared to do anything to end your life in the last 3 months? No  Suicide Risk Level : Low     Administrative Statements   VIRTUAL CARE DOCUMENTATION:     1. This service was provided via Telemedicine " using [employer]     2. Parties in the room with patient during teleconsult Patient only    3. Confidentiality My office door was closed     4. Participants No one else was in the room    5. Patient acknowledged consent and understanding of privacy and security of the  Telemedicine consult. I informed the patient that I have reviewed their record in Epic and presented the opportunity for them to ask any questions regarding the visit today.  The patient agreed to participate.    6. I have spent a total time of 45 minutes in caring for this patient on the day of the visit/encounter including Diagnostic results, Counseling / Coordination of care, Documenting in the medical record, Reviewing/placing orders in the medical record (including tests, medications, and/or procedures), Obtaining or reviewing history  , and Communicating with other healthcare professionals , not including the time spent for establishing the audio/video connection.             [1]   Past Medical History:  Diagnosis Date    Anemia     Anxiety     Arthritis     Closed transcervical fracture of right femur (HCC) 07/01/2022    Colon polyp     Depression     Fracture of right wrist 07/01/2022    GERD (gastroesophageal reflux disease)     Hiatal hernia     Hyperlipidemia     Hypertension    [2] No Known Allergies

## 2025-06-17 NOTE — ASSESSMENT & PLAN NOTE
Hemoglobin level lower than baseline  No signs of acute bleeding at this time  Check iron panel.  Check stool for occult blood  Monitor H&H, transfuse as needed

## 2025-06-17 NOTE — ASSESSMENT & PLAN NOTE
With component of depression as well  Psychiatry input appreciated.  No need for inpatient BHU  Continue Effexor.  Ativan as needed for increased anxiety

## 2025-06-17 NOTE — ASSESSMENT & PLAN NOTE
Patient with generalized weakness and fatigue over the last week  Currently being treated for recent hip infection with IV vancomycin and oral Flagyl  Will get PT/OT eval  Case management for discharge planning  Replace electrolytes  Lower extremity Doppler negative for DVT

## 2025-06-17 NOTE — OCCUPATIONAL THERAPY NOTE
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   Pain Assessment   Pain Assessment Tool 0-10   Pain Score 3  (0/10 at rest; 3/10 with movement)   Pain Location/Orientation Orientation: Right;Location: Hip   Pain Radiating Towards n/a   Pain Onset/Description Onset: Ongoing   Effect of Pain on Daily Activities mobility   Patient's Stated Pain Goal No pain   Hospital Pain Intervention(s) Medication (See MAR);Repositioned   Multiple Pain Sites No   Restrictions/Precautions   Weight Bearing Precautions Per Order Yes   RLE Weight Bearing Per Order PWB   Other Precautions Bed Alarm;WBS;THR;Fall Risk;Pain;Telemetry  (posterior hip precautions)   Home Living   Type of Home House   Home Layout Two level;Stairs to enter with rails;1/2 bath on main level;Performs ADLs on one level;Able to live on main level with bedroom/bathroom  (2 JOANNE w/ HR vs 4 + 1 JOANNE)   Bathroom Shower/Tub Tub/shower unit   Bathroom Toilet Raised   Bathroom Equipment Grab bars in shower   Bathroom Accessibility Accessible   Home Equipment Walker;Cane  (Pt reports RW used at baseline)   Prior Function   Level of [place] Independent with ADLs;Independent with functional mobility;Independent with IADLS  (increased assistance with ADLs post hip surgery)   Lives With Son  (son, DIL + grandchild)   Receives  "Help From Family   IADLs Independent with driving;Independent with meal prep;Independent with medication management   Falls in the last 6 months 0  (pt denies)   Vocational Retired   Lifestyle   Autonomy Pt resides in two level house w/ family; I with ADLs, mobility and IADLs - increased assistance upon discharge from rehab post recent hip surgery; +    Reciprocal Relationships supportive family   Service to Others retired   Intrinsic Gratification enjoys crossword puzzles   Subjective   Subjective \"I would like to get back to doing stuff for myself\"   ADL   Where Assessed Edge of bed   Eating Assistance 6  Modified independent   Grooming Assistance 6  Modified Independent   UB Bathing Assistance 5  Supervision/Setup   LB Bathing Assistance 4  Minimal Assistance   UB Dressing Assistance 5  Supervision/Setup   LB Dressing Assistance 3  Moderate Assistance   Toileting Assistance  3  Moderate Assistance   Additional Comments Given functional performance skills + medical complexity, therapist suspects via clinical judgement + skilled analysis; pt currently requires stated assist above to perform each area of ADLs d/t limitations including: functional mobility, functional activity tolerance, coordination, sitting/standing balance, functional transfers, decreased equilibrium reactions, pain, posterior hip precautions and overall cognition   Bed Mobility   Supine to Sit 4  Minimal assistance   Additional items Assist x 1;HOB elevated;Bedrails;Increased time required;Verbal cues;LE management   Sit to Supine 4  Minimal assistance   Additional items Assist x 1;Increased time required;Verbal cues   Additional Comments VC for bedrail utilization and proper body mechanics; denied dizziness with transitional movements   Transfers   Sit to Stand 4  Minimal assistance   Additional items Assist x 2;Increased time required;Verbal cues   Stand to Sit 4  Minimal assistance   Additional items Assist x 2;Increased time " required;Verbal cues   Additional Comments RW used; VC for safe hand placement, proper body mechanics and overall RW management during directional turns   Functional Mobility   Functional Mobility 4  Minimal assistance   Additional Comments Pt completed short distance ADL mobility around room at min A x1 w/ RW. No significant LOB observed, mild to moderate instability   Additional items Rolling walker   Balance   Static Sitting Good   Dynamic Sitting Fair +   Static Standing Fair   Dynamic Standing Fair -   Ambulatory Poor +   Activity Tolerance   Activity Tolerance Patient limited by fatigue;Patient limited by pain   Medical Staff Made Aware Yes, CM made aware of d/c recs   Nurse Made Aware Yes, nursing staff made aware of session outcomes   RUE Assessment   RUE Assessment WFL   RUE Strength   RUE Overall Strength   (3+/5)   LUE Assessment   LUE Assessment WFL   LUE Strength   LUE Overall Strength   (3+/5)   Hand Function   Gross Motor Coordination Functional   Fine Motor Coordination Functional   Sensation   Light Touch No apparent deficits   Sharp/Dull No apparent deficits   Vision-Basic Assessment   Current Vision Wears glasses all the time   Psychosocial   Psychosocial (WDL) WDL   Cognition   Overall Cognitive Status WFL   Arousal/Participation Alert;Responsive   Attention Difficulty dividing attention   Orientation Level Oriented X4   Memory Decreased recall of precautions;Decreased recall of recent events   Following Commands Follows one step commands without difficulty   Comments Pt agreeable to OT evaluation, pleasant   Assessment   Limitation Decreased ADL status;Decreased UE strength;Decreased Safe judgement during ADL;Decreased endurance;Decreased self-care trans;Decreased high-level ADLs   Prognosis Good   Assessment Pt is a 75 y.o. female seen for OT evaluation s/p admit to [facility] on 6/16/2025 w/ Generalized weakness.  Comorbidities affecting pt's functional performance at time of assessment  include: HTN, chronic pain syndrome, electrolyte abnormality, sacral wound, lumbar DDD, lumbar spondylosis, cervical radiculopathy, RA, spinal stenosis. Personal factors affecting pt at time of IE include:steps to enter environment, difficulty performing ADLS, difficulty performing IADLS , decreased initiation and engagement , health management , and environment. OT order placed to assess Pt's ADLs, cognitive status, and performance during functional tasks in order to maximize safety and independence while making most appropriate d/c recommendations. Prior to admission, pt was I with ADLs, mobility and IADLs. Pt reports increased need for assistance post hip surgery with ADLs d/t posterior hip precautions.  Upon evaluation: the following deficits impact occupational performance: weakness, decreased strength, decreased balance, decreased tolerance, impaired sequencing, impaired problem solving, decreased safety awareness, increased pain, and orthopedic restrictions. Pt's clinical presentation is currently evolving given new onset deficits that effect Pt's occupational performance and ability to safely return to PLOF including decrease activity tolerance, decrease standing balance, decrease sitting balance, decrease performance during ADL tasks, decrease safety awareness , decrease UB MS, decrease generalized strength, decrease activity engagement, decrease performance during functional transfers, steps to enter home, and high fall risk combined with medical complications of hypertension , poor blood pressure control, pain impacting overall mobility status, A-fib, abnormal CBC, wounds, decreased skin integrity, multiple readmissions, and need for input for mobility technique/safety.  Pt to benefit from continued skilled OT tx while in the hospital to address deficits as defined above and maximize level of functional independence w ADL's and functional mobility. Occupational Performance areas to address include:  grooming, bathing/shower, toilet hygiene, dressing, functional mobility, community mobility, and clothing management. From OT standpoint, recommendation at time of d/c would with moderate intensity OT resources.   Goals   Patient Goals to go to rehab   Plan   Treatment Interventions ADL retraining;Functional transfer training;UE strengthening/ROM;Endurance training;Patient/family training;Compensatory technique education;Energy conservation;Activityengagement   Goal Expiration Date 06/27/25   OT Treatment Day 0   OT Frequency 2-3x/wk   Discharge Recommendation   Rehab Resource Intensity Level, OT II (Moderate Resource Intensity)   Additional Comments  The patient's raw score on the AM-PAC Daily Activity inpatient short form is 17, standardized score is 37.26, less than 39.4. Patients at this level are likely to benefit from discharge with moderate intensity OT resources. Please refer to the recommendation of the Occupational Therapist for safe discharge planning.   AM-PAC Daily Activity Inpatient   Lower Body Dressing 2   Bathing 2   Toileting 3   Upper Body Dressing 3   Grooming 3   Eating 4   Daily Activity Raw Score 17   Daily Activity Standardized Score (Calc for Raw Score >=11) 37.26   AM-PAC Applied Cognition Inpatient   Following a Speech/Presentation 3   Understanding Ordinary Conversation 4   Taking Medications 4   Remembering Where Things Are Placed or Put Away 3   Remembering List of 4-5 Errands 3   Taking Care of Complicated Tasks 2   Applied Cognition Raw Score 19   Applied Cognition Standardized Score 39.77     GOALS  Pt will achieve the following within specified time frame: LTG  Pt will achieve the following goals within 10 days    *ADL transfers with (S) for inc'd independence with ADLs/purposeful tasks    *UB ADL with (I) for inc'd independence with self cares    *LB ADL with (S) using AE prn for inc'd independence with self cares    *Toileting with (S) for clothing management and hygiene for  return to PLOF with personal care    *Increase static stand balance and dyn stand balance to F+ for inc'd safety with standing purposeful tasks    *Increase stand tolerance x7 m for inc'd tolerance with standing purposeful tasks    *Bed mobility- (I) for inc'd independence to manage own comfort and initiate EOB & OOB purposeful tasks    *Participate in 10-15m UE therex to increase overall stamina/activity tolerance for purposeful tasks      Sandy Cantu MS, OTR/L              [1]   Past Medical History:  Diagnosis Date    Anemia     Anxiety     Arthritis     Closed transcervical fracture of right femur (HCC) 07/01/2022    Colon polyp     Depression     Fracture of right wrist 07/01/2022    GERD (gastroesophageal reflux disease)     Hiatal hernia     Hyperlipidemia     Hypertension    [2]   Past Surgical History:  Procedure Laterality Date    APPENDECTOMY      CHOLECYSTECTOMY      COLONOSCOPY      FL INJECTION RIGHT HIP (NON ARTHROGRAM)  04/21/2025    FRACTURE SURGERY Right     arm with plate and pins    HAND SURGERY Bilateral     HIP ARTHROPLASTY Right 5/21/2025    Procedure: removal/debridement prothesis hip prostalac;  Surgeon: Kit Daley MD;  Location:  MAIN OR;  Service: Orthopedics    HYSTERECTOMY      IR ASPIRATION JOINT (SPECIFY LOCATION)  4/24/2025    IR DRAINAGE TUBE PLACEMENT  5/15/2025    JOINT REPLACEMENT Bilateral     knees    OH HEMIARTHROPLASTY HIP PARTIAL Right 07/02/2022    Procedure: HEMIARTHROPLASTY HIP (BIPOLAR), closed reduction with splinting right wrist;  Surgeon: Leo Roblero;  Location: [facility];  Service: Orthopedics    OH NEUROPLASTY &/TRANSPOSITION ULNAR NERVE ELBOW Right 02/08/2023    Procedure: RELEASE CUBITAL TUNNEL;  Surgeon: Cody Calles DO;  Location: [facility];  Service: Orthopedics    SHOULDER ARTHROSCOPY Left

## 2025-06-17 NOTE — TELEPHONE ENCOUNTER
Consult placed on [facility] queue at 0811 to be seen by an [facility] psychiatrist.

## 2025-06-17 NOTE — ASSESSMENT & PLAN NOTE
Presented with generalized weakness and failure to thrive over the last week.  No clear evidence of new or worsening infection.  UA obtained unremarkable for infectious etiology.  Blood cultures obtained are pending.  Flu//RSV testing negative.  Patient remains afebrile and hemodynamically stable.  Found to have electrolyte abnormalities.  Management per primary team  Follow-up blood cultures for completeness  Serial examinations  Additional supportive care per prior team  Correction of electrolyte abnormalities per primary team

## 2025-06-17 NOTE — PROGRESS NOTES
LRX: 10/14/22  LOV: 9/15/22  NOV: 1/5/23  Last lab: 9/7/22 Progress Note - Hospitalist   Name: Sharon Vega 75 y.o. 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  Still with hypokalemia.  Will supplement oral potassium today and repeat levels tomorrow  Sacral wound  Present on admission.  Continue local wound care.   Anemia  Hemoglobin level lower than baseline  No signs of acute bleeding at this time  Check iron panel.  Check stool for occult blood  Monitor H&H, transfuse as needed  QT prolongation  Will monitor closely, avoid QT prolonging medications  Psychiatry input appreciated regarding anxiety/depression  Avoid antipsychotics.  Will utilize Ativan for anxiety.  Continue Effexor  Follow-up repeat EKG  Anxiety  With component of depression as well  Psychiatry input appreciated.  No need for inpatient BHU  Continue Effexor.  Ativan as needed for increased anxiety    VTE Pharmacologic Prophylaxis:   Moderate Risk (Score 3-4) - Pharmacological DVT Prophylaxis Ordered: enoxaparin (Lovenox).    Mobility:      -M Goal achieved. Continue to encourage appropriate mobility.    Patient Centered  Rounds: I performed bedside rounds with nursing staff today.   Discussions with Specialists or Other Care Team Provider: yes    Education and Discussions with Family / Patient: Updated  (son) via phone.    Current Length of Stay: 1 day(s)  Current Patient Status: Inpatient   Certification Statement: The patient will continue to require additional inpatient hospital stay due to pending placement  Discharge Plan: Anticipate discharge in 24-48 hrs to rehab facility.    Code Status: Level 1 - Full Code    Subjective   No overnight events noted    Objective :  Temp:  [97.5 °F (36.4 °C)-98.3 °F (36.8 °C)] 98.3 °F (36.8 °C)  HR:  [] 112  BP: (107-144)/(55-99) 120/75  Resp:  [16-22] 18  SpO2:  [94 %-99 %] 97 %  O2 Device: None (Room air)  Nasal Cannula O2 Flow Rate (L/min):  [2 L/min] 2 L/min    Body mass index is 32.84 kg/m².     Input and Output Summary (last 24 hours):     Intake/Output Summary (Last 24 hours) at 6/17/2025 1210  Last data filed at 6/16/2025 1410  Gross per 24 hour   Intake 50 ml   Output --   Net 50 ml       Physical Exam  Constitutional:       General: She is not in acute distress.     Appearance: She is obese.   HENT:      Head: Normocephalic and atraumatic.      Nose: Nose normal.      Mouth/Throat:      Mouth: Mucous membranes are moist.     Eyes:      Extraocular Movements: Extraocular movements intact.      Conjunctiva/sclera: Conjunctivae normal.       Cardiovascular:      Rate and Rhythm: Normal rate and regular rhythm.   Pulmonary:      Effort: Pulmonary effort is normal. No respiratory distress.   Abdominal:      Palpations: Abdomen is soft.      Tenderness: There is no abdominal tenderness.     Musculoskeletal:         General: Normal range of motion.      Cervical back: Normal range of motion and neck supple.      Comments: Generalized weakness     Skin:     General: Skin is warm and dry.     Neurological:      Mental Status: She is alert.      Comments: Patient awake and  alert.  Following simple commands.   Psychiatric:         Mood and Affect: Mood normal.         Behavior: Behavior normal.      Comments: Intermittent confusion           Lines/Drains:  Lines/Drains/Airways       Active Status       Name Placement date Placement time Site Days    PICC Line 05/22/25 Right Basilic 05/22/25  1422  Basilic  25                    Central Line:  Goal for removal: Will discontinue when meds requiring line are completed.         Telemetry:  Telemetry Orders (From admission, onward)               24 Hour Telemetry Monitoring  Continuous x 24 Hours (Telem)        Question:  Reason for 24 Hour Telemetry  Answer:  Decompensated CHF- and any one of the following: continuous diuretic infusion or total diuretic dose >200 mg daily, associated electrolyte derangement (I.e. K < 3.0), inotropic drip (continuous infusion), hx of ventricular arrhythmia, or new EF < 35%                     Telemetry Reviewed: Sinus Tachycardia  Indication for Continued Telemetry Use: Metabolic/electrolyte disturbance with high probability of dysrhythmia               Lab Results: I have reviewed the following results:   Results from last 7 days   Lab Units 06/17/25  0532 06/16/25  1218   WBC Thousand/uL 3.58* 3.22*   HEMOGLOBIN g/dL 8.0* 9.4*   HEMATOCRIT % 25.6* 29.4*   PLATELETS Thousands/uL 535* 656*   SEGS PCT %  --  27*   LYMPHO PCT %  --  43   MONO PCT %  --  29*   EOS PCT %  --  0     Results from last 7 days   Lab Units 06/17/25  0532   SODIUM mmol/L 139   POTASSIUM mmol/L 2.9*   CHLORIDE mmol/L 104   CO2 mmol/L 29   BUN mg/dL 11   CREATININE mg/dL 0.77   ANION GAP mmol/L 6   CALCIUM mg/dL 7.8*   ALBUMIN g/dL 2.7*   TOTAL BILIRUBIN mg/dL 0.40   ALK PHOS U/L 40   ALT U/L 6*   AST U/L 16   GLUCOSE RANDOM mg/dL 96     Results from last 7 days   Lab Units 06/16/25  1218   INR  1.19     Results from last 7 days   Lab Units 06/16/25  2148   POC GLUCOSE mg/dl 103         Results from last 7 days   Lab Units  06/16/25  1218   LACTIC ACID mmol/L 1.8   PROCALCITONIN ng/ml 0.15       Recent Cultures (last 7 days):   Results from last 7 days   Lab Units 06/16/25  1218   BLOOD CULTURE  Received in Microbiology Lab. Culture in Progress.  Received in Microbiology Lab. Culture in Progress.       Imaging Results Review: No pertinent imaging studies reviewed.  Other Study Results Review: No additional pertinent studies reviewed.    Last 24 Hours Medication List:     Current Facility-Administered Medications:     acetaminophen (TYLENOL) tablet 650 mg, Q6H PRN    amLODIPine (NORVASC) tablet 10 mg, Daily    ascorbic acid (VITAMIN C) tablet 500 mg, Daily    aspirin chewable tablet 81 mg, BID    enoxaparin (LOVENOX) subcutaneous injection 40 mg, Daily    folic acid (FOLVITE) tablet 1 mg, Daily    gabapentin (NEURONTIN) tablet 600 mg, HS    LORazepam (ATIVAN) tablet 1 mg, Q8H PRN    metroNIDAZOLE (FLAGYL) tablet 500 mg, Q12H FELIPA    ondansetron (ZOFRAN) injection 4 mg, Q6H PRN    oxyCODONE (ROXICODONE) IR tablet 5 mg, Q6H PRN    oxyCODONE (ROXICODONE) split tablet 2.5 mg, Q6H PRN    pantoprazole (PROTONIX) EC tablet 40 mg, Daily    potassium chloride (Klor-Con M20) CR tablet 40 mEq, TID With Meals    pravastatin (PRAVACHOL) tablet 40 mg, HS    vancomycin (VANCOCIN) 1500 mg in sodium chloride 0.9% 250 mL IVPB, Q24H, Last Rate: Stopped (06/17/25 0112)    venlafaxine (EFFEXOR-XR) 24 hr capsule 150 mg, Daily    Administrative Statements   Today, Patient Was Seen By: Tao Mariano MD      **Please Note: This note may have been constructed using a voice recognition system.**

## 2025-06-17 NOTE — ASSESSMENT & PLAN NOTE
Hypomagnesemia and hypokalemia noted in the ER  Supplemented with IV magnesium and oral potassium  Magnesium level has improved.  Still with hypokalemia.  Will supplement oral potassium today and repeat levels tomorrow

## 2025-06-17 NOTE — PLAN OF CARE
Problem: PHYSICAL THERAPY ADULT  Goal: Performs mobility at highest level of function for planned discharge setting.  See evaluation for individualized goals.  Description: Treatment/Interventions: Functional transfer training, LE strengthening/ROM, Elevations, Therapeutic exercise, Endurance training, Gait training  Equipment Recommended: Walker, Cane       See flowsheet documentation for full assessment, interventions and recommendations.  Outcome: Progressing  Note: Prognosis: Fair  Problem List: Decreased strength, Decreased range of motion, Decreased endurance, Impaired balance, Pain, Orthopedic restrictions  Assessment: Pt is 75 y.o. female seen for PT evaluation s/p admit to [facility] on 6/16/2025 w/ Generalized weakness. PT consulted to assess pt's functional mobility and d/c needs. Order placed for PT eval and tx, w/ PWB RLE order. Pt agreeable to PT  session upon arrival, pt found supine in bed.  PTA, pt was independent w/ all functional mobility w/ rw and lives w/ spouse in 2 level home .  Pt to benefit from continued PT tx to address deficits and maximize level of functional independent mobility and consistency. Upon conclusion pt  supine in bed and with all needs in reach. Complexity: Comorbidities affecting pt's physical performance at time of assessment include:  anxiety, vomiting, hip pain, weakness, htn, infection of prosthetic joint . Personal factors affecting pt at time of IE include: advanced age, limited mobility, lives in multistory home, and ambulating with assistive device. Please find objective findings from PT assessment regarding body systems outlined above with impairments and limitations including weakness, impaired balance, gait deviations, pain, decreased activity tolerance, and decreased functional mobility tolerance.  Pt's clinical presentation is currently unstable/unpredictable seen in pt's presentation of tachycardia, abnormal sodium levels, abnormal calcium levels, and  pain. The patient's AM-PAC Basic Mobility Inpatient Short Form Raw Score is 14 .  Based on patient presentations and impairments, pt would most appropriately benefit from Level 1 resource intensity upon discharge. A Raw score of less than or equal to 16 suggests the patient may benefit from discharge to post-acute rehabilitation services. Please also refer to the recommendation of the Physical Therapist for safe discharge planning. RN verbalized pt appropriate for PT session.        Rehab Resource Intensity Level, PT: I (Maximum Resource Intensity)    See flowsheet documentation for full assessment.

## 2025-06-17 NOTE — PLAN OF CARE
Problem: OCCUPATIONAL THERAPY ADULT  Goal: Performs self-care activities at highest level of function for planned discharge setting.  See evaluation for individualized goals.  Description: Treatment Interventions: ADL retraining, Functional transfer training, UE strengthening/ROM, Endurance training, Patient/family training, Compensatory technique education, Energy conservation, Activityengagement     See flowsheet documentation for full assessment, interventions and recommendations.   Note: Limitation: Decreased ADL status, Decreased UE strength, Decreased Safe judgement during ADL, Decreased endurance, Decreased self-care trans, Decreased high-level ADLs  Prognosis: Good  Assessment: Pt is a 75 y.o. female seen for OT evaluation s/p admit to [facility] on 6/16/2025 w/ Generalized weakness.  Comorbidities affecting pt's functional performance at time of assessment include: HTN, chronic pain syndrome, electrolyte abnormality, sacral wound, lumbar DDD, lumbar spondylosis, cervical radiculopathy, RA, spinal stenosis. Personal factors affecting pt at time of IE include:steps to enter environment, difficulty performing ADLS, difficulty performing IADLS , decreased initiation and engagement , health management , and environment. OT order placed to assess Pt's ADLs, cognitive status, and performance during functional tasks in order to maximize safety and independence while making most appropriate d/c recommendations. Prior to admission, pt was I with ADLs, mobility and IADLs. Pt reports increased need for assistance post hip surgery with ADLs d/t posterior hip precautions.  Upon evaluation: the following deficits impact occupational performance: weakness, decreased strength, decreased balance, decreased tolerance, impaired sequencing, impaired problem solving, decreased safety awareness, increased pain, and orthopedic restrictions. Pt's clinical presentation is currently evolving given new onset deficits that effect  Pt's occupational performance and ability to safely return to PLOF including decrease activity tolerance, decrease standing balance, decrease sitting balance, decrease performance during ADL tasks, decrease safety awareness , decrease UB MS, decrease generalized strength, decrease activity engagement, decrease performance during functional transfers, steps to enter home, and high fall risk combined with medical complications of hypertension , poor blood pressure control, pain impacting overall mobility status, A-fib, abnormal CBC, wounds, decreased skin integrity, multiple readmissions, and need for input for mobility technique/safety.  Pt to benefit from continued skilled OT tx while in the hospital to address deficits as defined above and maximize level of functional independence w ADL's and functional mobility. Occupational Performance areas to address include: grooming, bathing/shower, toilet hygiene, dressing, functional mobility, community mobility, and clothing management. From OT standpoint, recommendation at time of d/c would with moderate intensity OT resources.     Rehab Resource Intensity Level, OT: II (Moderate Resource Intensity)     Sandy Cantu OTR/L

## 2025-06-17 NOTE — PROGRESS NOTES
Sharon Vega is a 75 y.o. female who is currently ordered Vancomycin IV with management by the Pharmacy Consult service.  Relevant clinical data and objective / subjective history reviewed.  Vancomycin Assessment:  Indication and Goal AUC/Trough: Soft tissue (goal -600, trough >10)  Clinical Status: stable  Micro:     Renal Function:  SCr: 0.77 mg/dL  CrCl: 64.7 mL/min  Renal replacement: Not on dialysis  Days of Therapy: 34  Current Dose: 1500 mg iv q 24 hrs  Vancomycin Plan:  New Dosing: continue 1500 mg iv q 24 hrs  Estimated AUC: 524 mcg*hr/mL  Estimated Trough: 13.3 mcg/mL  Next Level: 6/19/25 @ 0600  Renal Function Monitoring: Daily BMP and UOP  Pharmacy will continue to follow closely for s/sx of nephrotoxicity, infusion reactions and appropriateness of therapy.  BMP and CBC will be ordered per protocol. We will continue to follow the patient’s culture results and clinical progress daily.    Kaylin Hernadez, Pharmacist

## 2025-06-17 NOTE — CONSULTS
Consultation - Infectious Disease   Name: Sharon Vega 75 y.o. female I MRN: [id number]  Unit/Bed#: -01 I Date of Admission: 6/16/2025   Date of Service: 6/17/2025 I Hospital Day: 1   Inpatient consult to Infectious Diseases  Consult performed by: Patti Cordova PA-C  Consult ordered by: Tao Mariano MD        VIRTUAL CARE DOCUMENTATION:     1. This service was provided via Telemedicine using [employer] Kit     2. Parties in the room with patient during teleconsult Patient only    3. Confidentiality My office door was closed     4. Participants No one else was in the room    5. Patient acknowledged consent and understanding of privacy and security of the  Telemedicine consult. I informed the patient that I have reviewed their record in Epic and presented the opportunity for them to ask any questions regarding the visit today.  The patient agreed to participate.    6. I have spent a total time of 60 minutes in caring for this patient on the day of the visit/encounter including Diagnostic results, Risks and benefits of tx options, Instructions for management, Patient and family education, Importance of tx compliance, Risk factor reductions, Impressions, Counseling / Coordination of care, Documenting in the medical record, Reviewing/placing orders in the medical record (including tests, medications, and/or procedures), Obtaining or reviewing history  , and Communicating with other healthcare professionals , not including the time spent for establishing the audio/video connection.          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 Has history of right hip hemiarthroplasty on 7/2/2022.  Most recently she was admitted on 5/13/2025 from rehab due to persistent  pain in her right hip.  Patient had similar presentation in late April and is status post right hip IR aspiration which resulted with negative cultures.  Orthopedic surgery recommended outpatient follow-up for total right hip replacement.  Patient was undergoing workup for subsequent procedure when she obtained a right hip x-ray and a CT chest abdomen pelvis which revealed an organized collection of fluid and gas in the right iliac us muscle and similar collection surrounding the right arthroplasty in the gluteal muscles measuring up to 10 cm concerning for an abscess. 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  The patient was taken to IR 5/15 for abscess drain placement x 2 with drainage of 40 cc and 90 cc pus. The hip joint itself was not aspirated. The abscess fluid was unfortunately only sent for Synovasure not routine culture , which showed elevated alpha defense and but ID panel was negative.  Culture sent from the drain less than 24 hours after placement grew Finegoldia magna and Peptoniphilus harei which are skin lizzie, but suspected to be true pathogens since blood culture from 5/13 was also growing Finegoldia. She is now status post right hip hardware removal and antibiotic spacer placement 5/21. Operative cultures are also growing Peptoniphilus and Finegoldia. Patient was on IV vancomycin for several days before cultures were obtained, so it was continued due to polymicrobial infection and possibility that it suppressed the growth of sensitive organisms. Patient was discharged on IV vancomycin and PO flagyl to complete a 6 week course of antibiotics through 7/1/2025. Patient currently reports worsening right hip pain.   Continue IV vancomycin 1 g every 24 hours  Continue p.o. Flagyl 500 mg every 12 hours  Continue to treat for 6-week course through 7/1/2025  Obtain a CT of the right hip given worsening right hip pain  Pain management per primary team  Ongoing follow-up with  orthopedic surgery. Will need to ensure clearance of infection prior to second stage revision arthroplasty. Consider evaluation while admitted given patient was unable to make her appointment in the outpatient setting.   PICC in place in the right upper extremity.  Will require removal after last dose of IV antibiotics.  Continue to follow CBCD and CMP with AM labs to monitor for potential antimicrobial toxicities and assess for clinical response to antibiotics.   Follow-up blood cultures    Generalized weakness  Presented with generalized weakness and failure to thrive over the last week.  No clear evidence of new or worsening infection.  UA obtained unremarkable for infectious etiology.  Blood cultures obtained are pending.  Flu//RSV testing negative.  Patient remains afebrile and hemodynamically stable.  Found to have electrolyte abnormalities.  Management per primary team  Follow-up blood cultures for completeness  Serial examinations  Additional supportive care per prior team  Correction of electrolyte abnormalities per primary team  Sacral wound  Not appear acutely infected.  Recommend continuing local wound care.  QT prolongation  QTc on admission noted to be prolonged at 526.  Could affect antibiotic selection.  Will avoid Flouroquinolone antibiotics as able.  Anxiety  Continue on home Effexor and Ativan as needed.    I have discussed the above management plan in detail with the primary service.   Infectious Disease service will follow.    Antibiotics:  Metronidazole  Vancomycin    History of Present Illness   Sharon Vega is a 75 y.o. year old female with pmhx of anxiety, depression, HTN, obesity, recent admission for right prosthetic joint infection on vancomycin/Flagyl for 6 week course, who presented to the ER on 6/16/2025 with generalized weakness.  Per patient's son, he reports she has what appears to be failure to thrive.  Patient reported she does not feel well, however was unable to verbalize  what is bothering her. Upon arrival to the ER, patient was afebrile, tachycardic, but hemodynamically stable and on room air.  Labs demonstrated white blood cell count of 3.2, elevated platelets to 656,000, serum creatinine within normal at 0.88, hypokalemia to 2.8, LFTs within normal limits.  Lactic acid and procalcitonin within normal limits.  Blood cultures were obtained and are pending.  Vancomycin trough was checked and noted to be 17.9.  A UA was obtained and was unremarkable for infection.  Patient underwent bilateral lower extremity Dopplers which showed no evidence of DVT.  Patient remains on IV vancomycin and p.o. Flagyl for right prosthetic hip joint infection through 7/1/2025.  ID is consulted for antibiotic management.    Per my discussion with the patient, she reports that she came to the hospital because she wasn't feeling any better and the pain in her hip was feeling worse. She reported she felt very frustrated and helpless because she didn't know what to do. She denies having any fevers, chills, N/V/D, CP, SOB, abdominal pain, urinary symptoms. Has been otherwise tolerating her IV vancomycin and PO flagyl. Currently living with her son and daughter-in-law.     A complete review of systems is negative other than that noted in the HPI.    Medical History Review: I have reviewed the patient's PMH, PSH, Social History, Family History, Meds, and Allergies   Historical Information   Past Medical History[1]  Past Surgical History[2]  Social History[3]  E-Cigarette/Vaping    E-Cigarette Use Never User      E-Cigarette/Vaping Substances    Nicotine No     THC No     CBD No     Flavoring No     Other No     Unknown No      Family History[4]  Social History[5]    Current Facility-Administered Medications:     acetaminophen (TYLENOL) tablet 650 mg, Q6H PRN    amLODIPine (NORVASC) tablet 10 mg, Daily    ascorbic acid (VITAMIN C) tablet 500 mg, Daily    aspirin chewable tablet 81 mg, BID    enoxaparin (LOVENOX)  subcutaneous injection 40 mg, Daily    folic acid (FOLVITE) tablet 1 mg, Daily    gabapentin (NEURONTIN) tablet 600 mg, HS    LORazepam (ATIVAN) tablet 1 mg, Q8H PRN    metroNIDAZOLE (FLAGYL) tablet 500 mg, Q12H FELIPA    ondansetron (ZOFRAN) injection 4 mg, Q6H PRN    oxyCODONE (ROXICODONE) IR tablet 5 mg, Q6H PRN    oxyCODONE (ROXICODONE) split tablet 2.5 mg, Q6H PRN    pantoprazole (PROTONIX) EC tablet 40 mg, Daily    pravastatin (PRAVACHOL) tablet 40 mg, HS    vancomycin (VANCOCIN) 1500 mg in sodium chloride 0.9% 250 mL IVPB, Q24H, Last Rate: Stopped (06/17/25 0112)    venlafaxine (EFFEXOR-XR) 24 hr capsule 150 mg, Daily  Prior to Admission Medications   Prescriptions Last Dose Informant Patient Reported? Taking?   Cholecalciferol (VITAMIN D3) 1,000 units tablet 6/16/2025 Morning  No Yes   Sig: Take 2 tablets (2,000 Units total) by mouth daily   Multiple Vitamins-Minerals (multivitamin with minerals) tablet 6/16/2025 Morning  No Yes   Sig: Take 1 tablet by mouth daily   Sodium Chloride Flush (Normal Saline Flush) 0.9 % SOLN   Yes No   Sig: Inject 10 mL into a catheter in a vein daily. 10ml flush prior to antibiotic administration and 10ml flush after antibiotic administration   acetaminophen (TYLENOL) 325 mg tablet   No Yes   Sig: Take 2 tablets (650 mg total) by mouth every 6 (six) hours as needed for mild pain   amLODIPine (NORVASC) 10 mg tablet 6/16/2025  Yes Yes   Sig: Take 10 mg by mouth in the morning.   ascorbic acid (VITAMIN C) 500 MG tablet 6/16/2025  No Yes   Sig: Take 1 tablet (500 mg total) by mouth 2 (two) times a day   aspirin 81 mg chewable tablet 6/16/2025 Morning  No Yes   Sig: Chew 1 tablet (81 mg total) in the morning and 1 tablet (81 mg total) before bedtime. Do all this for 22 days.   bisacodyl (DULCOLAX) 10 mg suppository Not Taking  No No   Sig: Insert 1 suppository (10 mg total) into the rectum daily as needed for constipation   Patient not taking: Reported on 6/16/2025   calcium carbonate  (OS-ALPESH) 600 MG tablet  Self Yes Yes   Sig: Take 600 mg by mouth as needed Taking 2 tablets (1200 mg) daily   cetirizine (ZyrTEC) 10 mg tablet 6/16/2025 Morning  Yes Yes   Sig: Take 10 mg by mouth in the morning.   docusate sodium (COLACE) 100 mg capsule  Family Member No Yes   Sig: Take 1 capsule (100 mg total) by mouth 2 (two) times a day   Patient taking differently: Take 100 mg by mouth daily at bedtime As needed   ferrous sulfate 324 (65 Fe) mg 6/16/2025  No Yes   Sig: Take 1 tablet (324 mg total) by mouth 2 (two) times a day before meals   folic acid (FOLVITE) 1 mg tablet 6/16/2025 Morning  No Yes   Sig: Take 1 tablet (1 mg total) by mouth daily   gabapentin (NEURONTIN) 600 MG tablet 6/15/2025 Bedtime  Yes Yes   Sig: Take 600 mg by mouth daily at bedtime   lidocaine (XYLOCAINE) 2 % topical gel   Yes No   Sig: Apply 1 Application topically as needed for mild pain not using 06/07/2025   metroNIDAZOLE (FLAGYL) 500 mg tablet 6/16/2025 Morning  No Yes   Sig: Take 1 tablet (500 mg total) by mouth every 12 (twelve) hours   miconazole (MICOTIN) 2 % powder   Yes Yes   Sig: Apply topically as needed for itching   omeprazole (PriLOSEC) 40 MG capsule Not Taking  Yes No   Sig: Take 40 mg by mouth daily   Patient not taking: Reported on 6/16/2025   oxyCODONE (ROXICODONE) 5 immediate release tablet   No Yes   Sig: Take 1 tablet (5 mg total) by mouth every 4 (four) hours as needed for moderate pain Max Daily Amount: 30 mg   pantoprazole (PROTONIX) 40 mg tablet   Yes Yes   Sig: Take 40 mg by mouth daily   polyethylene glycol (MIRALAX) 17 g packet  Family Member No Yes   Sig: Take 17 g by mouth daily   Patient taking differently: Take 17 g by mouth as needed   pravastatin (PRAVACHOL) 40 mg tablet 6/15/2025 Bedtime  Yes Yes   Sig: Take 40 mg by mouth daily at bedtime   senna (SENOKOT) 8.6 mg Not Taking  No No   Sig: Take 1 tablet (8.6 mg total) by mouth daily at bedtime   Patient not taking: Reported on 6/16/2025   sodium  chloride, PF, 0.9 %   No No   Sig: 10 mL by Intracatheter route daily for 120 doses Intracatheter flushing daily. May substitute prefilled syringe with normal saline 10 mL vials, 10 mL syringes, and 18 g blunt needles   traZODone (DESYREL) 100 mg tablet 6/15/2025 Bedtime  Yes Yes   Sig: Take 100 mg by mouth daily at bedtime   vancomycin (VANCOCIN)  Family Member No No   Sig: Inject 200 mL (1,000 mg total) into a catheter in a vein every 24 hours over 60 minutes at 200 mL/hr   Patient taking differently: Inject 1,500 mg into a catheter in a vein every 24 hours Infuse IV over 60 minutes every 24 hours. Flush Catheter with saline as directed. Dosage unit contains 1000mg Vancomycin . Infusion volume is 100ml   venlafaxine (EFFEXOR-XR) 150 mg 24 hr capsule 6/16/2025 Morning  Yes Yes   Sig: Take 150 mg by mouth in the morning.      Facility-Administered Medications: None     Patient has no known allergies.    Objective :  Temp:  [97.5 °F (36.4 °C)-98.3 °F (36.8 °C)] 98.3 °F (36.8 °C)  HR:  [] 112  BP: (107-144)/(55-99) 120/75  Resp:  [16-22] 18  SpO2:  [94 %-99 %] 97 %  O2 Device: None (Room air)  Nasal Cannula O2 Flow Rate (L/min):  [2 L/min] 2 L/min    Physical exam findings reported by bedside and primary medical team staff, also observed over TV kit:  General:  No acute distress, resting comfortably in bed, nontoxic appearing  Psychiatric:  Awake and alert, answers questions appropriately  Pulmonary:  Normal respiratory excursion without accessory muscle use, on room air  Heart: RRR  Abdomen:  Soft, nontender, does not appear visibly distended.   Extremities:  No edema  Skin:  No rashes noted to exposed skin. Unable to visualize right hip incision via telemedicine visit. Per media tab, sacral wound does not appear acutely infected      Lab Results: I have reviewed the following results:  Results from last 7 days   Lab Units 06/17/25  0532 06/16/25  1218   WBC Thousand/uL 3.58* 3.22*   HEMOGLOBIN g/dL 8.0* 9.4*    PLATELETS Thousands/uL 535* 656*     Results from last 7 days   Lab Units 06/17/25  0532 06/16/25  1218   SODIUM mmol/L 139 141   POTASSIUM mmol/L 2.9* 2.8*   CHLORIDE mmol/L 104 102   CO2 mmol/L 29 25   BUN mg/dL 11 14   CREATININE mg/dL 0.77 0.88   EGFR ml/min/1.73sq m 75 64   CALCIUM mg/dL 7.8* 8.8   AST U/L 16 17   ALT U/L 6* 8   ALK PHOS U/L 40 47   ALBUMIN g/dL 2.7* 3.2*     Results from last 7 days   Lab Units 06/16/25  1218   BLOOD CULTURE  Received in Microbiology Lab. Culture in Progress.  Received in Microbiology Lab. Culture in Progress.     Results from last 7 days   Lab Units 06/16/25  1218   PROCALCITONIN ng/ml 0.15                   Imaging Results Review: No pertinent imaging studies reviewed.  Other Study Results Review: Other studies reviewed include: Bilateral lower extremity Dopplers showed no evidence of DVT        Patti Cordova PA-C  [facility]          [1]   Past Medical History:  Diagnosis Date    Anemia     Anxiety     Arthritis     Closed transcervical fracture of right femur (HCC) 07/01/2022    Colon polyp     Depression     Fracture of right wrist 07/01/2022    GERD (gastroesophageal reflux disease)     Hiatal hernia     Hyperlipidemia     Hypertension    [2]   Past Surgical History:  Procedure Laterality Date    APPENDECTOMY      CHOLECYSTECTOMY      COLONOSCOPY      FL INJECTION RIGHT HIP (NON ARTHROGRAM)  04/21/2025    FRACTURE SURGERY Right     arm with plate and pins    HAND SURGERY Bilateral     HIP ARTHROPLASTY Right 5/21/2025    Procedure: removal/debridement prothesis hip prostalac;  Surgeon: Kit Daley MD;  Location: [facility];  Service: Orthopedics    HYSTERECTOMY      IR ASPIRATION JOINT (SPECIFY LOCATION)  4/24/2025    IR DRAINAGE TUBE PLACEMENT  5/15/2025    JOINT REPLACEMENT Bilateral     knees    [facility] HEMIARTHROPLASTY HIP PARTIAL Right 07/02/2022    Procedure: HEMIARTHROPLASTY HIP (BIPOLAR), closed reduction with splinting right wrist;   Surgeon: Leo Roblero;  Location: [facility];  Service: Orthopedics    SD NEUROPLASTY &/TRANSPOSITION ULNAR NERVE ELBOW Right 2023    Procedure: RELEASE CUBITAL TUNNEL;  Surgeon: Cody Calles DO;  Location: [facility];  Service: Orthopedics    SHOULDER ARTHROSCOPY Left    [3]   Social History  Tobacco Use    Smoking status: Former     Current packs/day: 0.00     Types: Cigarettes     Quit date:      Years since quittin.4    Smokeless tobacco: Never   Vaping Use    Vaping status: Never Used   Substance and Sexual Activity    Alcohol use: Not Currently    Drug use: Never    Sexual activity: Not Currently   [4]   Family History  Problem Relation Name Age of Onset    No Known Problems Mother     [5]   Social History  Tobacco Use    Smoking status: Former     Current packs/day: 0.00     Types: Cigarettes     Quit date:      Years since quittin.4    Smokeless tobacco: Never   Vaping Use    Vaping status: Never Used   Substance and Sexual Activity    Alcohol use: Not Currently    Drug use: Never    Sexual activity: Not Currently

## 2025-06-17 NOTE — ASSESSMENT & PLAN NOTE
Will monitor closely, avoid QT prolonging medications  Psychiatry input appreciated regarding anxiety/depression  Avoid antipsychotics.  Will utilize Ativan for anxiety.  Continue Effexor  Follow-up repeat EKG

## 2025-06-18 ENCOUNTER — APPOINTMENT (INPATIENT)
Dept: RADIOLOGY | Facility: HOSPITAL | Age: 75
DRG: 559 | End: 2025-06-18
Payer: MEDICARE

## 2025-06-18 ENCOUNTER — HOSPITAL ENCOUNTER (INPATIENT)
Facility: HOSPITAL | Age: 75
LOS: 7 days | Discharge: NURSING, REHAB OR LONG-TERM CARE | DRG: 559 | End: 2025-06-25
Attending: INTERNAL MEDICINE
Payer: MEDICARE

## 2025-06-18 VITALS
OXYGEN SATURATION: 92 % | BODY MASS INDEX: 32.85 KG/M2 | HEIGHT: 63 IN | HEART RATE: 104 BPM | DIASTOLIC BLOOD PRESSURE: 70 MMHG | WEIGHT: 185.41 LBS | SYSTOLIC BLOOD PRESSURE: 102 MMHG | TEMPERATURE: 98 F | RESPIRATION RATE: 18 BRPM

## 2025-06-18 DIAGNOSIS — M16.11 ARTHRITIS OF RIGHT HIP: ICD-10-CM

## 2025-06-18 DIAGNOSIS — L02.415 ABSCESS OF RIGHT HIP: Primary | ICD-10-CM

## 2025-06-18 DIAGNOSIS — K59.00 CONSTIPATION: ICD-10-CM

## 2025-06-18 DIAGNOSIS — D61.818 PANCYTOPENIA (HCC): ICD-10-CM

## 2025-06-18 PROBLEM — E87.6 HYPOKALEMIA: Status: ACTIVE | Noted: 2025-06-18

## 2025-06-18 LAB
ABO GROUP BLD: NORMAL
ANION GAP SERPL CALCULATED.3IONS-SCNC: 6 MMOL/L (ref 4–13)
ATRIAL RATE: 100 BPM
ATRIAL RATE: 103 BPM
ATRIAL RATE: 99 BPM
BLD GP AB SCN SERPL QL: NEGATIVE
BUN SERPL-MCNC: 15 MG/DL (ref 5–25)
CALCIUM SERPL-MCNC: 8.1 MG/DL (ref 8.4–10.2)
CHLORIDE SERPL-SCNC: 103 MMOL/L (ref 96–108)
CO2 SERPL-SCNC: 28 MMOL/L (ref 21–32)
CREAT SERPL-MCNC: 0.92 MG/DL (ref 0.6–1.3)
CRP SERPL QL: 89 MG/L
ERYTHROCYTE [DISTWIDTH] IN BLOOD BY AUTOMATED COUNT: 15.1 % (ref 11.6–15.1)
FERRITIN SERPL-MCNC: 594 NG/ML (ref 30–307)
GFR SERPL CREATININE-BSD FRML MDRD: 60 ML/MIN/1.73SQ M
GLUCOSE SERPL-MCNC: 102 MG/DL (ref 65–140)
HCT VFR BLD AUTO: 25.6 % (ref 34.8–46.1)
HGB BLD-MCNC: 7.7 G/DL (ref 11.5–15.4)
IRON SATN MFR SERPL: 15 % (ref 15–50)
IRON SERPL-MCNC: 23 UG/DL (ref 50–212)
MAGNESIUM SERPL-MCNC: 1.8 MG/DL (ref 1.9–2.7)
MCH RBC QN AUTO: 28.3 PG (ref 26.8–34.3)
MCHC RBC AUTO-ENTMCNC: 30.1 G/DL (ref 31.4–37.4)
MCV RBC AUTO: 94 FL (ref 82–98)
P AXIS: 36 DEGREES
P AXIS: 62 DEGREES
P AXIS: 81 DEGREES
PLATELET # BLD AUTO: 536 THOUSANDS/UL (ref 149–390)
PMV BLD AUTO: 9.3 FL (ref 8.9–12.7)
POTASSIUM SERPL-SCNC: 3.6 MMOL/L (ref 3.5–5.3)
PR INTERVAL: 176 MS
PR INTERVAL: 186 MS
PR INTERVAL: 196 MS
QRS AXIS: -43 DEGREES
QRS AXIS: -44 DEGREES
QRS AXIS: -59 DEGREES
QRSD INTERVAL: 102 MS
QRSD INTERVAL: 104 MS
QRSD INTERVAL: 106 MS
QT INTERVAL: 352 MS
QT INTERVAL: 352 MS
QT INTERVAL: 356 MS
QTC INTERVAL: 452 MS
QTC INTERVAL: 454 MS
QTC INTERVAL: 466 MS
RBC # BLD AUTO: 2.72 MILLION/UL (ref 3.81–5.12)
RH BLD: POSITIVE
SODIUM SERPL-SCNC: 137 MMOL/L (ref 135–147)
SPECIMEN EXPIRATION DATE: NORMAL
T WAVE AXIS: 48 DEGREES
T WAVE AXIS: 63 DEGREES
T WAVE AXIS: 64 DEGREES
TIBC SERPL-MCNC: 154 UG/DL (ref 250–450)
TRANSFERRIN SERPL-MCNC: 110 MG/DL (ref 203–362)
UIBC SERPL-MCNC: 131 UG/DL (ref 155–355)
VENTRICULAR RATE: 100 BPM
VENTRICULAR RATE: 103 BPM
VENTRICULAR RATE: 99 BPM
WBC # BLD AUTO: 4.33 THOUSAND/UL (ref 4.31–10.16)

## 2025-06-18 PROCEDURE — 83735 ASSAY OF MAGNESIUM: CPT | Performed by: INTERNAL MEDICINE

## 2025-06-18 PROCEDURE — 99024 POSTOP FOLLOW-UP VISIT: CPT | Performed by: ORTHOPAEDIC SURGERY

## 2025-06-18 PROCEDURE — 85027 COMPLETE CBC AUTOMATED: CPT | Performed by: INTERNAL MEDICINE

## 2025-06-18 PROCEDURE — 93005 ELECTROCARDIOGRAM TRACING: CPT

## 2025-06-18 PROCEDURE — 86850 RBC ANTIBODY SCREEN: CPT | Performed by: INTERNAL MEDICINE

## 2025-06-18 PROCEDURE — 82728 ASSAY OF FERRITIN: CPT | Performed by: INTERNAL MEDICINE

## 2025-06-18 PROCEDURE — 80048 BASIC METABOLIC PNL TOTAL CA: CPT | Performed by: INTERNAL MEDICINE

## 2025-06-18 PROCEDURE — 73502 X-RAY EXAM HIP UNI 2-3 VIEWS: CPT

## 2025-06-18 PROCEDURE — 83550 IRON BINDING TEST: CPT | Performed by: INTERNAL MEDICINE

## 2025-06-18 PROCEDURE — 93010 ELECTROCARDIOGRAM REPORT: CPT | Performed by: INTERNAL MEDICINE

## 2025-06-18 PROCEDURE — 83540 ASSAY OF IRON: CPT | Performed by: INTERNAL MEDICINE

## 2025-06-18 PROCEDURE — 86900 BLOOD TYPING SEROLOGIC ABO: CPT | Performed by: INTERNAL MEDICINE

## 2025-06-18 PROCEDURE — 86901 BLOOD TYPING SEROLOGIC RH(D): CPT | Performed by: INTERNAL MEDICINE

## 2025-06-18 PROCEDURE — 99223 1ST HOSP IP/OBS HIGH 75: CPT

## 2025-06-18 RX ORDER — ACETAMINOPHEN 325 MG/1
650 TABLET ORAL EVERY 6 HOURS PRN
Status: DISCONTINUED | OUTPATIENT
Start: 2025-06-18 | End: 2025-06-25 | Stop reason: HOSPADM

## 2025-06-18 RX ORDER — OXYCODONE HYDROCHLORIDE 5 MG/1
5 TABLET ORAL EVERY 6 HOURS PRN
Status: DISCONTINUED | OUTPATIENT
Start: 2025-06-18 | End: 2025-06-25 | Stop reason: HOSPADM

## 2025-06-18 RX ORDER — PANTOPRAZOLE SODIUM 40 MG/1
40 TABLET, DELAYED RELEASE ORAL DAILY
Status: DISCONTINUED | OUTPATIENT
Start: 2025-06-19 | End: 2025-06-18

## 2025-06-18 RX ORDER — FOLIC ACID 1 MG/1
1 TABLET ORAL DAILY
Status: DISCONTINUED | OUTPATIENT
Start: 2025-06-18 | End: 2025-06-25 | Stop reason: HOSPADM

## 2025-06-18 RX ORDER — AMLODIPINE BESYLATE 10 MG/1
10 TABLET ORAL DAILY
Status: DISCONTINUED | OUTPATIENT
Start: 2025-06-18 | End: 2025-06-25 | Stop reason: HOSPADM

## 2025-06-18 RX ORDER — PRAVASTATIN SODIUM 40 MG
40 TABLET ORAL
Status: DISCONTINUED | OUTPATIENT
Start: 2025-06-18 | End: 2025-06-25 | Stop reason: HOSPADM

## 2025-06-18 RX ORDER — ONDANSETRON 2 MG/ML
4 INJECTION INTRAMUSCULAR; INTRAVENOUS EVERY 6 HOURS PRN
Status: DISCONTINUED | OUTPATIENT
Start: 2025-06-18 | End: 2025-06-25 | Stop reason: HOSPADM

## 2025-06-18 RX ORDER — GABAPENTIN 300 MG/1
600 CAPSULE ORAL
Status: DISCONTINUED | OUTPATIENT
Start: 2025-06-18 | End: 2025-06-25 | Stop reason: HOSPADM

## 2025-06-18 RX ORDER — ASCORBIC ACID 500 MG
500 TABLET ORAL DAILY
Status: DISCONTINUED | OUTPATIENT
Start: 2025-06-18 | End: 2025-06-25 | Stop reason: HOSPADM

## 2025-06-18 RX ORDER — LORAZEPAM 1 MG/1
1 TABLET ORAL EVERY 8 HOURS PRN
Status: DISCONTINUED | OUTPATIENT
Start: 2025-06-18 | End: 2025-06-25 | Stop reason: HOSPADM

## 2025-06-18 RX ORDER — ASPIRIN 81 MG/1
81 TABLET, CHEWABLE ORAL 2 TIMES DAILY
Status: DISCONTINUED | OUTPATIENT
Start: 2025-06-18 | End: 2025-06-25

## 2025-06-18 RX ORDER — PANTOPRAZOLE SODIUM 40 MG/1
40 TABLET, DELAYED RELEASE ORAL DAILY
Status: DISCONTINUED | OUTPATIENT
Start: 2025-06-18 | End: 2025-06-25 | Stop reason: HOSPADM

## 2025-06-18 RX ORDER — METRONIDAZOLE 500 MG/1
500 TABLET ORAL EVERY 12 HOURS SCHEDULED
Status: DISCONTINUED | OUTPATIENT
Start: 2025-06-18 | End: 2025-06-25 | Stop reason: HOSPADM

## 2025-06-18 RX ORDER — VANCOMYCIN HYDROCHLORIDE 1 G/200ML
1000 INJECTION, SOLUTION INTRAVENOUS EVERY 24 HOURS
Status: DISCONTINUED | OUTPATIENT
Start: 2025-06-18 | End: 2025-06-20

## 2025-06-18 RX ORDER — ENOXAPARIN SODIUM 100 MG/ML
40 INJECTION SUBCUTANEOUS DAILY
Status: DISCONTINUED | OUTPATIENT
Start: 2025-06-18 | End: 2025-06-25

## 2025-06-18 RX ORDER — VENLAFAXINE HYDROCHLORIDE 150 MG/1
150 CAPSULE, EXTENDED RELEASE ORAL DAILY
Status: DISCONTINUED | OUTPATIENT
Start: 2025-06-18 | End: 2025-06-25 | Stop reason: HOSPADM

## 2025-06-18 RX ADMIN — OXYCODONE HYDROCHLORIDE 5 MG: 5 TABLET ORAL at 20:21

## 2025-06-18 RX ADMIN — METRONIDAZOLE 500 MG: 500 TABLET ORAL at 09:07

## 2025-06-18 RX ADMIN — PANTOPRAZOLE SODIUM 40 MG: 40 TABLET, DELAYED RELEASE ORAL at 09:08

## 2025-06-18 RX ADMIN — GABAPENTIN 600 MG: 300 CAPSULE ORAL at 21:07

## 2025-06-18 RX ADMIN — VANCOMYCIN HYDROCHLORIDE 1000 MG: 1 INJECTION, SOLUTION INTRAVENOUS at 22:51

## 2025-06-18 RX ADMIN — ASPIRIN 81 MG CHEWABLE TABLET 81 MG: 81 TABLET CHEWABLE at 21:07

## 2025-06-18 RX ADMIN — OXYCODONE HYDROCHLORIDE 5 MG: 5 TABLET ORAL at 13:08

## 2025-06-18 RX ADMIN — FOLIC ACID 1 MG: 1 TABLET ORAL at 09:07

## 2025-06-18 RX ADMIN — METRONIDAZOLE 500 MG: 500 TABLET ORAL at 21:07

## 2025-06-18 RX ADMIN — OXYCODONE HYDROCHLORIDE 5 MG: 5 TABLET ORAL at 09:08

## 2025-06-18 RX ADMIN — ALTEPLASE 2 MG: 2.2 INJECTION, POWDER, LYOPHILIZED, FOR SOLUTION INTRAVENOUS at 03:58

## 2025-06-18 RX ADMIN — ASPIRIN 81 MG CHEWABLE TABLET 81 MG: 81 TABLET CHEWABLE at 09:07

## 2025-06-18 RX ADMIN — OXYCODONE HYDROCHLORIDE AND ACETAMINOPHEN 500 MG: 500 TABLET ORAL at 09:08

## 2025-06-18 RX ADMIN — OXYCODONE 5 MG: 5 TABLET ORAL at 03:27

## 2025-06-18 RX ADMIN — ENOXAPARIN SODIUM 40 MG: 40 INJECTION SUBCUTANEOUS at 09:07

## 2025-06-18 RX ADMIN — AMLODIPINE BESYLATE 10 MG: 10 TABLET ORAL at 09:07

## 2025-06-18 RX ADMIN — PRAVASTATIN SODIUM 40 MG: 40 TABLET ORAL at 21:07

## 2025-06-18 NOTE — H&P
"H&P - Hospitalist   Name: Sharon Vega 75 y.o. female I MRN: [id number]  [facility] I Date of Admission: 6/18/2025   Date of Service: 6/18/2025 I Hospital Day: 0     Assessment & Plan  Abscess of right hip  Pertinent past medical history of prior right hip abscess and infected GINA, subsequently status post I&D and hardware extraction on 5/21 as well as has been on IV Vancomycin, po flagyl with plans for abx for 6 weeks through 7/1  Presented to the ER at [place] [facility] on 6/16 with R hip pain and generalized weakness  Underwent CT Hip on 6/17 with: \" A large peripherally enhancing fluid collection surrounding the proximal right femur, that extending along the right gluteus muscle body and upper musculature of the right thigh.  There are a few small foci of gas within this collection.  There is a separate, similar peripherally enhancing collection involving the right iliopsoas muscle bodies.  These findings are highly suspicious for abscesses\"  Afebrile, WBC 3.5K   Case was discussed by Daniel at [facility] with orthopedics who recommended transfer to [place] for their evaluation and likely need for IR for drain placement and drainage of the abscesses  Patient has been receiving vancomycin as well as Flagyl at [place]  Patient transferred to [place] on 6/18  Admit to medicine.  Keep n.p.o. except for medications.  Consult orthopedics.  Consult IR.  Consult infectious disease.  With regards to Anti-microbials, we will continue vancomycin as well as Flagyl and add cefepime with infectious disease consultation pending and appreciate their recommendations.  Pain control  Antiemetics  Primary hypertension  On pta amlodipine   Will considering Holding in setting of infection  Chronic pain syndrome  On PTA gabapentin at bedtime  Anemia  Hb 8.0; Baseline over last two months has been in the 9 range   No reported acute signs of bleeding at [facility]   Monitor H&H  Sacral wound  Present on arrival to [facility]   Wound care " consult  Anxiety  With component of depression as well   Psychiatry evaluated at [place] in setting of increased anxiety episodes; Denied any thoughts of SI or HI. No need for inpatient BHU from their standpoint   Continue effexor as well as has prn ativan for anxiety  Hypokalemia  Hypomagnesemia and hypokalemia noted in the ER at [place] on arrival  Supplemented with IV magnesium and oral potassium  Magnesium level has improved.  Still with hypokalemia (2.9 on AM of 6/17).  Received oral supplementation subsequent   Repeat BMP and Mg on arrival. Trial IV Kcl if still hypokalemic      VTE Pharmacologic Prophylaxis:   Moderate Risk (Score 3-4) - Pharmacological DVT Prophylaxis Ordered: enoxaparin (Lovenox).  Code Status: Level 1 - Full Code   Discussion with family: Updated  (son) at bedside.    Anticipated Length of Stay: Patient will be admitted on an inpatient basis with an anticipated length of stay of greater than 2 midnights secondary to prosthetic hip infection.    History of Present Illness   Chief Complaint: Right hip pain    Sharon Vega is a 75 y.o. female with a patient has past medical history of anxiety, depression, hypertension, obesity, right hip infection currently on vancomycin and Flagyl.  Initially presenting at [facility] orthopedics was on board as well as ID and patient was deemed fit for transfer to [place] for orthopedic workup.  Review of Systems   Constitutional:  Negative for chills and fever.   HENT:  Negative for ear pain and sore throat.    Eyes:  Negative for pain and visual disturbance.   Respiratory:  Negative for cough and shortness of breath.    Cardiovascular:  Negative for chest pain and palpitations.   Gastrointestinal:  Negative for abdominal pain and vomiting.   Genitourinary:  Negative for dysuria and hematuria.   Musculoskeletal:  Negative for arthralgias and back pain.   Skin:  Negative for color change and rash.   Neurological:  Negative for  seizures and syncope.   All other systems reviewed and are negative.      Historical Information   Past Medical History[1]  Past Surgical History[2]  Social History[3]  E-Cigarette/Vaping    E-Cigarette Use Never User      E-Cigarette/Vaping Substances    Nicotine No     THC No     CBD No     Flavoring No     Other No     Unknown No      Family history non-contributory    Meds/Allergies   I have reviewed home medications with patient personally.  Prior to Admission medications    Medication Sig Start Date End Date Taking? Authorizing Provider   acetaminophen (TYLENOL) 325 mg tablet Take 2 tablets (650 mg total) by mouth every 6 (six) hours as needed for mild pain 7/15/22  Yes Wilbert Ingram MD   amLODIPine (NORVASC) 10 mg tablet Take 10 mg by mouth in the morning.   Yes Historical Provider, MD   ascorbic acid (VITAMIN C) 500 MG tablet Take 1 tablet (500 mg total) by mouth 2 (two) times a day 5/2/25 7/1/25 Yes Osbaldo Strange PA-C   aspirin 81 mg chewable tablet Chew 1 tablet (81 mg total) in the morning and 1 tablet (81 mg total) before bedtime. Do all this for 22 days. 5/27/25 6/18/25 Yes Josie Molina PA-C   folic acid (FOLVITE) 1 mg tablet Take 1 tablet (1 mg total) by mouth daily 5/2/25 7/1/25 Yes Osbaldo Strange PA-C   metroNIDAZOLE (FLAGYL) 500 mg tablet Take 1 tablet (500 mg total) by mouth every 12 (twelve) hours 5/27/25 7/1/25 Yes Josie Molina PA-C   oxyCODONE (ROXICODONE) 5 immediate release tablet Take 1 tablet (5 mg total) by mouth every 4 (four) hours as needed for moderate pain Max Daily Amount: 30 mg 5/27/25  Yes Josie Molina PA-C   pantoprazole (PROTONIX) 40 mg tablet Take 40 mg by mouth daily   Yes Historical Provider, MD   pravastatin (PRAVACHOL) 40 mg tablet Take 40 mg by mouth daily at bedtime   Yes Historical Provider, MD   venlafaxine (EFFEXOR-XR) 150 mg 24 hr capsule Take 150 mg by mouth in the morning.   Yes Historical Provider, MD   bisacodyl (DULCOLAX) 10 mg suppository  Insert 1 suppository (10 mg total) into the rectum daily as needed for constipation  Patient not taking: Reported on 6/16/2025 5/27/25   Josie Molina PA-C   calcium carbonate (OS-ALPESH) 600 MG tablet Take 600 mg by mouth as needed Taking 2 tablets (1200 mg) daily    Historical Provider, MD   cetirizine (ZyrTEC) 10 mg tablet Take 10 mg by mouth in the morning.    Historical Provider, MD   Cholecalciferol (VITAMIN D3) 1,000 units tablet Take 2 tablets (2,000 Units total) by mouth daily 5/28/25   Josie Molina PA-C   docusate sodium (COLACE) 100 mg capsule Take 1 capsule (100 mg total) by mouth 2 (two) times a day  Patient taking differently: Take 100 mg by mouth daily at bedtime As needed 5/27/25   Josie Molina PA-C   ferrous sulfate 324 (65 Fe) mg Take 1 tablet (324 mg total) by mouth 2 (two) times a day before meals 5/2/25 7/1/25  Osbaldo Strange PA-C   gabapentin (NEURONTIN) 600 MG tablet Take 600 mg by mouth daily at bedtime    Historical Provider, MD   lidocaine (XYLOCAINE) 2 % topical gel Apply 1 Application topically as needed for mild pain not using 06/07/2025    Historical Provider, MD   miconazole (MICOTIN) 2 % powder Apply topically as needed for itching    Historical Provider, MD   Multiple Vitamins-Minerals (multivitamin with minerals) tablet Take 1 tablet by mouth daily 5/2/25   Osbaldo Strange PA-C   omeprazole (PriLOSEC) 40 MG capsule Take 40 mg by mouth daily  Patient not taking: Reported on 6/16/2025 4/24/25   Historical Provider, MD   polyethylene glycol (MIRALAX) 17 g packet Take 17 g by mouth daily  Patient taking differently: Take 17 g by mouth as needed 5/28/25   oJsie Molina PA-C   senna (SENOKOT) 8.6 mg Take 1 tablet (8.6 mg total) by mouth daily at bedtime  Patient not taking: Reported on 6/16/2025 5/27/25   Josie Molina PA-C   Sodium Chloride Flush (Normal Saline Flush) 0.9 % SOLN Inject 10 mL into a catheter in a vein daily. 10ml flush prior to antibiotic administration and  10ml flush after antibiotic administration    Historical Provider, MD   sodium chloride, PF, 0.9 % 10 mL by Intracatheter route daily for 120 doses Intracatheter flushing daily. May substitute prefilled syringe with normal saline 10 mL vials, 10 mL syringes, and 18 g blunt needles 5/15/25 9/12/25  Leatha Marroquin MD   traZODone (DESYREL) 100 mg tablet Take 100 mg by mouth daily at bedtime 2/25/25   Historical Provider, MD   vancomycin (VANCOCIN) Inject 200 mL (1,000 mg total) into a catheter in a vein every 24 hours over 60 minutes at 200 mL/hr  Patient taking differently: Inject 1,500 mg into a catheter in a vein every 24 hours Infuse IV over 60 minutes every 24 hours. Flush Catheter with saline as directed. Dosage unit contains 1000mg Vancomycin . Infusion volume is 100ml 5/28/25 7/1/25  Josie Molina PA-C     No Known Allergies    Objective :  Temp:  [98 °F (36.7 °C)-98.6 °F (37 °C)] 98.3 °F (36.8 °C)  HR:  [] 113  BP: ()/(60-70) 104/66  Resp:  [16-18] 16  SpO2:  [91 %-95 %] 93 %  O2 Device: None (Room air)    Physical Exam  Vitals and nursing note reviewed.   Constitutional:       General: She is not in acute distress.     Appearance: She is well-developed.   HENT:      Head: Normocephalic and atraumatic.     Eyes:      Conjunctiva/sclera: Conjunctivae normal.       Cardiovascular:      Rate and Rhythm: Normal rate and regular rhythm.      Heart sounds: No murmur heard.  Pulmonary:      Effort: Pulmonary effort is normal. No respiratory distress.      Breath sounds: Normal breath sounds.   Abdominal:      Palpations: Abdomen is soft.      Tenderness: There is no abdominal tenderness.     Musculoskeletal:         General: No swelling.      Cervical back: Neck supple.     Skin:     General: Skin is warm and dry.      Capillary Refill: Capillary refill takes less than 2 seconds.     Neurological:      Mental Status: She is alert.     Psychiatric:         Mood and Affect: Mood normal.           Lines/Drains:  Lines/Drains/Airways       Active Status       Name Placement date Placement time Site Days    PICC Line 05/22/25 Right Basilic 05/22/25  1422  Basilic  27                    Central Line:  Goal for removal: N/A - Chronic PICC             Lab Results: I have reviewed the following results:  Results from last 7 days   Lab Units 06/18/25  0622 06/17/25  0532 06/16/25  1218   WBC Thousand/uL 4.33   < > 3.22*   HEMOGLOBIN g/dL 7.7*   < > 9.4*   HEMATOCRIT % 25.6*   < > 29.4*   PLATELETS Thousands/uL 536*   < > 656*   SEGS PCT %  --   --  27*   LYMPHO PCT %  --   --  43   MONO PCT %  --   --  29*   EOS PCT %  --   --  0    < > = values in this interval not displayed.     Results from last 7 days   Lab Units 06/18/25  0622 06/17/25  0532   SODIUM mmol/L 137 139   POTASSIUM mmol/L 3.6 2.9*   CHLORIDE mmol/L 103 104   CO2 mmol/L 28 29   BUN mg/dL 15 11   CREATININE mg/dL 0.92 0.77   ANION GAP mmol/L 6 6   CALCIUM mg/dL 8.1* 7.8*   ALBUMIN g/dL  --  2.7*   TOTAL BILIRUBIN mg/dL  --  0.40   ALK PHOS U/L  --  40   ALT U/L  --  6*   AST U/L  --  16   GLUCOSE RANDOM mg/dL 102 96     Results from last 7 days   Lab Units 06/16/25  1218   INR  1.19     Results from last 7 days   Lab Units 06/17/25  2101 06/17/25  1605 06/16/25  2148   POC GLUCOSE mg/dl 130 96 103     Lab Results   Component Value Date    HGBA1C 6.3 (H) 05/26/2025     Results from last 7 days   Lab Units 06/16/25  1218   LACTIC ACID mmol/L 1.8   PROCALCITONIN ng/ml 0.15         Administrative Statements   I have spent a total time of 85 minutes in caring for this patient on the day of the visit/encounter including Diagnostic results, Prognosis, Risks and benefits of tx options, Instructions for management, Patient and family education, Importance of tx compliance, Risk factor reductions, Impressions, Counseling / Coordination of care, Documenting in the medical record, Reviewing/placing orders in the medical record (including tests, medications,  and/or procedures), Obtaining or reviewing history  , and Communicating with other healthcare professionals .    ** Please Note: This note has been constructed using a voice recognition system. **         [1]   Past Medical History:  Diagnosis Date    Anemia     Anxiety     Arthritis     Closed transcervical fracture of right femur (HCC) 2022    Colon polyp     Depression     Fracture of right wrist 2022    GERD (gastroesophageal reflux disease)     Hiatal hernia     Hyperlipidemia     Hypertension    [2]   Past Surgical History:  Procedure Laterality Date    APPENDECTOMY      CHOLECYSTECTOMY      COLONOSCOPY      FL INJECTION RIGHT HIP (NON ARTHROGRAM)  2025    FRACTURE SURGERY Right     arm with plate and pins    HAND SURGERY Bilateral     HIP ARTHROPLASTY Right 2025    Procedure: removal/debridement prothesis hip prostalac;  Surgeon: Kit Daley MD;  Location: [facility];  Service: Orthopedics    HYSTERECTOMY      IR ASPIRATION JOINT (SPECIFY LOCATION)  2025    IR DRAINAGE TUBE PLACEMENT  5/15/2025    JOINT REPLACEMENT Bilateral     knees    SC HEMIARTHROPLASTY HIP PARTIAL Right 2022    Procedure: HEMIARTHROPLASTY HIP (BIPOLAR), closed reduction with splinting right wrist;  Surgeon: Leo Roblero;  Location: [facility];  Service: Orthopedics    SC NEUROPLASTY &/TRANSPOSITION ULNAR NERVE ELBOW Right 2023    Procedure: RELEASE CUBITAL TUNNEL;  Surgeon: Cody Calles DO;  Location: [facility];  Service: Orthopedics    SHOULDER ARTHROSCOPY Left    [3]   Social History  Tobacco Use    Smoking status: Former     Current packs/day: 0.00     Types: Cigarettes     Quit date:      Years since quittin.4    Smokeless tobacco: Never   Vaping Use    Vaping status: Never Used   Substance and Sexual Activity    Alcohol use: Not Currently    Drug use: Never    Sexual activity: Not Currently

## 2025-06-18 NOTE — PROGRESS NOTES
Vancomycin IV Pharmacy-to-Dose Consultation    Sharon Vega is a 75 y.o. female who is currently ordered Vancomycin IV with management by the Pharmacy Consult service.    Relevant clinical data and objective / subjective history reviewed.    Vancomycin Assessment:    Indication and Goal AUC/Trough: infection of R prosthetic hip joint (goal -600, trough >10)    Clinical Status: worsening    Micro:    FLU/COVID rapid: negative   BC x 2: no growth at 24 hrs    Renal Function:  SCr: 0.92 mg/dL  CrCl: 48 mL/min  Renal replacement: none    Days of Therapy: 3    Current Dose: 1500 mg IV q24h    Vancomycin Plan:    New Dosin mg IV q24h    Estimated AUC: 433 mcg*hr/mL    Estimated Trough: 11.9 mcg/mL    Next Level:  at 0600    Renal Function Monitoring: Daily BMP and UOP    Pharmacy will continue to follow closely for s/sx of nephrotoxicity, infusion reactions and appropriateness of therapy.  BMP and CBC will be ordered per protocol. We will continue to follow the patient’s culture results and clinical progress daily.    [place], PharmD  Pharmacist

## 2025-06-18 NOTE — ASSESSMENT & PLAN NOTE
Hypomagnesemia and hypokalemia noted in the ER at [place] on arrival  Supplemented with IV magnesium and oral potassium  Magnesium level has improved.  Still with hypokalemia (2.9 on AM of 6/17).  Received oral supplementation subsequent   Repeat BMP and Mg on arrival. Trial IV Kcl if still hypokalemic

## 2025-06-18 NOTE — DISCHARGE SUMMARY
Discharge Summary - Hospitalist   Name: Sharon Vega 75 y.o. 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 Calles who was on-call for orthopedics, he recommended transfer to \[facility]\"" for potential IR drainage/Ortho washout  Patient transferred to \[facility]\"" this morning  Primary hypertension  BP currently stable  Continue amlodipine  Chronic pain syndrome  Continue gabapentin at bedtime.  Oxycodone added for acute pain  Electrolyte abnormality  Hypomagnesemia and hypokalemia noted in the ER  Supplemented with IV magnesium and oral potassium  Monitor levels and replace as needed  Sacral wound  Present on admission.  Continue local wound care.   Anemia  Hemoglobin level lower than baseline  No signs of acute bleeding at this time  Check iron panel.  Check stool for occult blood  Monitor H&H, transfuse as needed  QT prolongation  Will monitor closely, avoid QT prolonging medications  Psychiatry input appreciated regarding anxiety/depression  Avoid antipsychotics.  Will utilize Ativan for anxiety.  Continue Effexor  QTc improving on repeat EKG  Anxiety  With component of depression as well  Psychiatry input appreciated.  No need for inpatient BHU  Continue Effexor.  Ativan as needed for increased anxiety     Medical Problems       Resolved Problems  Date Reviewed: 6/4/2025   None       Discharging Physician / Practitioner: Tao Mariano MD  PCP: Danielito Antunez  DO  Admission Date:   Admission Orders (From admission, onward)       Ordered        06/16/25 1415  INPATIENT ADMISSION  Once                          Discharge Date: 06/18/25    Next Steps for Physician/AP Assuming Care:  Patient being transferred to [facility] for Ortho evaluation    Test Results Pending at Discharge (will require follow up):  Cultures    Medication Changes for Discharge & Rationale:   None  See after visit summary for reconciled discharge medications provided to patient and/or family.     Consultations During Hospital Stay:  Infectious disease    Procedures Performed:   None    Significant Findings / Test Results:   Right hip infection    Incidental Findings:   None    Hospital Course:   Sharon Vega is a 75 y.o. female patient who originally presented to the hospital on 6/16/2025 due to generalized weakness/fatigue.  Patient with recent right hip infection and being treated with infectious disease on oral and IV antibiotics.  Infectious disease was consulted.  CT scan of right hip performed.  Findings showed recurrent abscess.  Reviewed with orthopedic team who recommended transfer to [facility] for potential orthopedic washout versus IR drainage.  Patient was maintained on IV antibiotics per ID recommendation.  Cultures currently negative for 24 hours.  Patient transferred to [facility] for further treatment/evaluation    I did not see the patient on day of discharge as she was transferred prior to my evaluation    Please see above list of diagnoses and related plan for additional information.     Discharge Day Visit / Exam:   * Please refer to separate progress note for these details *    Discussion with Family: Updated  (son) via phone.    Discharge instructions/Information to patient and family:   See after visit summary for information provided to patient and family.      Provisions for Follow-Up Care:  See after visit summary for information related to follow-up care and any pertinent home  health orders.      Mobility at time of Discharge:   Basic Mobility Inpatient Raw Score: 17  JH-HLM Goal: 5: Stand one or more mins  JH-HLM Achieved: 6: Walk 10 steps or more  HLM Goal achieved. Continue to encourage appropriate mobility.     Disposition:   Acute Care Hospital Transfer to [facility]    Planned Readmission: Patient will be readmitted to [facility]    Administrative Statements   Discharge Statement:  I have spent a total time of 35 minutes in caring for this patient on the day of the visit/encounter. >30 minutes of time was spent on: Counseling / Coordination of care, Documenting in the medical record, Reviewing / ordering tests, medicine, procedures  , and Communicating with other healthcare professionals .    **Please Note: This note may have been constructed using a voice recognition system**

## 2025-06-18 NOTE — NURSING NOTE
Picc line noted to not have blood return, one dose of cathflo was given as ordered. Dwell time of 90 mins able to draw back 30 ml blood return wasted. Picc flushed and clamped, no further action taken.

## 2025-06-18 NOTE — ASSESSMENT & PLAN NOTE
"Pertinent past medical history of prior right hip abscess and infected GINA, subsequently status post I&D and hardware extraction on 5/21 as well as has been on IV Vancomycin, po flagyl with plans for abx for 6 weeks through 7/1  Presented to the ER at [place] on 6/16 with R hip pain and generalized weakness  Underwent CT Hip on 6/17 with: \" A large peripherally enhancing fluid collection surrounding the proximal right femur, that extending along the right gluteus muscle body and upper musculature of the right thigh.  There are a few small foci of gas within this collection.  There is a separate, similar peripherally enhancing collection involving the right iliopsoas muscle bodies.  These findings are highly suspicious for abscesses\"  Afebrile, WBC 3.5K   Case was discussed by Daniel at [place] with orthopedics who recommended transfer to [place] for their evaluation and likely need for IR for drain placement and drainage of the abscesses  Patient has been receiving vancomycin as well as Flagyl at [place]  Patient transferred to [place] on 6/18  Admit to medicine.  Keep n.p.o. except for medications.  Consult orthopedics.  Consult IR.  Consult infectious disease.  With regards to Anti-microbials, we will continue vancomycin as well as Flagyl and add cefepime with infectious disease consultation pending and appreciate their recommendations.  Pain control  Antiemetics  "

## 2025-06-18 NOTE — ASSESSMENT & PLAN NOTE
Hypomagnesemia and hypokalemia noted in the ER  Supplemented with IV magnesium and oral potassium  Monitor levels and replace as needed   Yes

## 2025-06-18 NOTE — ASSESSMENT & PLAN NOTE
With component of depression as well   Psychiatry evaluated at [place] in setting of increased anxiety episodes; Denied any thoughts of SI or HI. No need for inpatient BHU from their standpoint   Continue effexor as well as has prn ativan for anxiety

## 2025-06-18 NOTE — ASSESSMENT & PLAN NOTE
Will monitor closely, avoid QT prolonging medications  Psychiatry input appreciated regarding anxiety/depression  Avoid antipsychotics.  Will utilize Ativan for anxiety.  Continue Effexor  QTc improving on repeat EKG

## 2025-06-18 NOTE — ASSESSMENT & PLAN NOTE
75-year-old female with right prosthetic joint infection undergoing two-stage revision currently in stage I with antibiotic spacer placement and 6-week course of vancomycin and Flagyl per ID which is scheduled to end on 7/1/2025.  Patient reports subjective improvement in terms of pain and discomfort to her right hip status post antibiotic spacer placement, physical exam demonstrates weakness to right hip abductors, incision intact without palpable fluctuance or drainage, painless full passive range of motion at the hip, afebrile, decreased CRP, and normal WBC.  Blood cultures drawn at [place] demonstrate no growth to date.  Patient clinically stable with subjective improvement, would recommend continuing antibiotic course. No acute need for surgical intervention.      Plan  WBAT RLE  Recommend consult ID  Cont 6 wk abx course per ID  Staples removed and steri-strips placed  Monitor vitals  Monitor labs  PT/OT

## 2025-06-18 NOTE — QUICK NOTE
Patient was accepted in transfer to [facility] under the service of Dr. Augustin for orthopedic as well as IR and infectious disease evaluation.  Patient initially was post be transported at 0230 which was subsequently pushed back to 7 AM transport time.    Patient was informed of intent to transfer to [facility] I am at later updated as to the new transport time.  Additionally I spoke with patient's son Perfecto Vega at [phone] and told him of the plan for transfer to [place] as well as initial pickup time.  I did not reach back out to him as to the updated pickup time as with the last phone call I woke him up.  Plan is to attempt to contact him this morning to update when transportation arrives.

## 2025-06-18 NOTE — RESTORATIVE TECHNICIAN NOTE
Restorative Technician Note      Patient Name: Sharon Vega     Restorative Tech Visit Date: 06/18/25  Note Type: Mobility  Patient Position Upon Consult: Supine  Activity Performed: Ambulated  Assistive Device: Roller walker  Patient Position at End of Consult: Supine; All needs within reach; Bed/Chair alarm activated    Dionicio Crowley, Restorative Technician

## 2025-06-18 NOTE — ASSESSMENT & PLAN NOTE
Sepsis present on admission with leukopenia and tachycardia  Patient has been on vancomycin and Flagyl as outpatient  Vancomycin and Flagyl continued in the hospital  Infectious disease input appreciated  Patient had CT scan of right hip on 6/17/2025 which showed concern for continued/recurrent abscess  Reviewed with Dr. Calles who was on-call for orthopedics, he recommended transfer to [facility] for potential IR drainage/Ortho washout  Patient transferred to B this morning

## 2025-06-18 NOTE — ASSESSMENT & PLAN NOTE
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  The fluid collection may be amenable to interventional radiology drainage without open surgical intervention from the orthopedic team.  We will await the interventional radiology recommendations but will plan to continue IV antibiotics through her 6-week course.    Weightbearing as tolerated right lower extremity  IV antibiotics per infectious disease  Interventional radiology consult for possible drain placement  N.p.o. since midnight  Medicine admission for all other medical issues.  Appreciate recommendations and care from other teams.  This pill per medicine team

## 2025-06-18 NOTE — CONSULTS
Consultation - Orthopedics   Name: Sharon Vega 75 y.o. 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  Patient reports subjective improvement in terms of pain and discomfort to her right hip status post antibiotic spacer placement, physical exam demonstrates weakness to right hip abductors, incision intact without palpable fluctuance or drainage, painless full passive range of motion at the hip, afebrile, decreased CRP, and normal WBC.  Blood cultures drawn at [facility] demonstrate no growth to date.  Patient clinically stable with subjective improvement, would recommend continuing antibiotic course. No acute need for surgical intervention.      Plan  WBAT RLE  Recommend consult ID  Cont 6 wk abx course per ID  Staples removed and steri-strips placed  Monitor vitals  Monitor labs  PT/OT    Medical co-morbidities are being managed per primary team.    Please contact the [facility] role [facility] Ortho Floor for any questions/concerns.      History of Present Illness   HPI: Sharon Vega is a 75 y.o. year old female who presents as a transfer from [facility] for right hip pain.  he underwent I&D and hardware removal with placement of an antibiotic coated Prostalac for an infected right hip hemiarthroplasty on 5/21/2025 with Dr. Daley.  Cultures positive for Peptoniphilus and Finegoldia. She is currently receiving a six  week course of IV antibiotics with vancomycin and p.o. Flagyl which will is scheduled to end on 7/1/2025.     Today, patient describes pain to the lateral thigh when initiating ambulation with her walker. This pain has been present since placement of her antibiotic spacer. Character of pain is dull and non-radiating. It resolves overtime with ambulation and with NSAIDs. She has been compliant with her antibiotic therapy and following up outpatient with ID. Per ID, after documentation of increasing pain, a CT was obtained which demonstrated fluid collections to the iliopsoas, gluteus severiano, and vastus lateralis. Patient was transferred for evaluation by orthopedics, ID, and possible IR.     Denies fevers, chills, chest pain, shortness of breath, drainage for site of incision.     Review of Systems significant for findings described in the HPI.  Historical Information   Past Medical History[1]  Past Surgical History[2]  Social History[3]  E-Cigarette/Vaping    E-Cigarette Use Never User      E-Cigarette/Vaping Substances    Nicotine No     THC No     CBD No     Flavoring No     Other No     Unknown No      Family history non-contributory    Objective :  Temp:  [98 °F (36.7 °C)-98.6 °F (37 °C)] 98.3 °F (36.8 °C)  HR:  [] 113  BP: ()/(60-70) 104/66  Resp:  [16-18] 16  SpO2:  [91 %-95 %] 93 %  O2 Device: None (Room air)  Physical ExamOrtho Exam   Musculoskeletal: Right lower extremity  Skin warm, dry, incision intact without surrounding erythema, drainage, of palpable fluctuance. .  AROM of hip flexion to 40 degrees  Full painless PROM at hip  Hip abduction 3/5, hip flexion 4/5, knee flexion 4/5, knee extension 4/5, PF 5/5, DF 5/5, FHL 5/5, EHL 5/5  Sensation intact to saphenous, sural, tibial, superficial peroneal nerve, and deep peroneal  2+ DP pulse    Tertiary exam was negative for additional palpable stepoffs or additional bony tenderness to palpation.      Lab Results: I have reviewed the following  "results:   Recent Labs     06/16/25  1218 06/17/25  0532 06/18/25  0622   WBC 3.22* 3.58* 4.33   HGB 9.4* 8.0* 7.7*   HCT 29.4* 25.6* 25.6*   * 535* 536*   BUN 14 11 15   CREATININE 0.88 0.77 0.92   PTT 33  --   --    INR 1.19  --   --    CRP  --  89.0*  --      Blood Culture:   Lab Results   Component Value Date    BLOODCX No Growth at 24 hrs. 06/16/2025    BLOODCX No Growth at 24 hrs. 06/16/2025     Wound Culture: No results found for: \"WOUNDCULT\"    Imaging:  CT imaging of right lower extremity demonstrate fluid collections in the vastus lateralis, gluteus severiano, and iliopsoas.  Findings consistent with expected postoperative changes in setting of posterior surgical incision and prior history of drain placement and iliopsoas.         [1]   Past Medical History:  Diagnosis Date    Anemia     Anxiety     Arthritis     Closed transcervical fracture of right femur (HCC) 07/01/2022    Colon polyp     Depression     Fracture of right wrist 07/01/2022    GERD (gastroesophageal reflux disease)     Hiatal hernia     Hyperlipidemia     Hypertension    [2]   Past Surgical History:  Procedure Laterality Date    APPENDECTOMY      CHOLECYSTECTOMY      COLONOSCOPY      FL INJECTION RIGHT HIP (NON ARTHROGRAM)  04/21/2025    FRACTURE SURGERY Right     arm with plate and pins    HAND SURGERY Bilateral     HIP ARTHROPLASTY Right 5/21/2025    Procedure: removal/debridement prothesis hip prostalac;  Surgeon: Kit Daley MD;  Location: [facility];  Service: Orthopedics    HYSTERECTOMY      IR ASPIRATION JOINT (SPECIFY LOCATION)  4/24/2025    IR DRAINAGE TUBE PLACEMENT  5/15/2025    JOINT REPLACEMENT Bilateral     knees    RI HEMIARTHROPLASTY HIP PARTIAL Right 07/02/2022    Procedure: HEMIARTHROPLASTY HIP (BIPOLAR), closed reduction with splinting right wrist;  Surgeon: Leo Roblero;  Location: [facility];  Service: Orthopedics    RI NEUROPLASTY &/TRANSPOSITION ULNAR NERVE ELBOW Right 02/08/2023    Procedure: " RELEASE CUBITAL TUNNEL;  Surgeon: Cody Calles DO;  Location: [facility];  Service: Orthopedics    SHOULDER ARTHROSCOPY Left    [3]   Social History  Tobacco Use    Smoking status: Former     Current packs/day: 0.00     Types: Cigarettes     Quit date:      Years since quittin.4    Smokeless tobacco: Never   Vaping Use    Vaping status: Never Used   Substance and Sexual Activity    Alcohol use: Not Currently    Drug use: Never    Sexual activity: Not Currently

## 2025-06-18 NOTE — RESTORATIVE TECHNICIAN NOTE
Restorative Technician Note      Patient Name: Sharon Vega     Restorative Tech Visit Date: 06/18/25  Note Type: Mobility  Patient Position Upon Consult: Supine (On transport stretcher)  Activity Performed: Ambulated  Assistive Device: Roller walker  Patient Position at End of Consult: Supine; Bed/Chair alarm activated; All needs within reach    Dionicio Crowley, Restorative Technician

## 2025-06-18 NOTE — EMTALA/ACUTE CARE TRANSFER
[facility]  [address] [place] [id number]  Dept: [phone]      ACUTE CARE TRANSFER CONSENT    NAME Sharon BARNARD 1950                              MRN [id number]    I have been informed of my rights regarding examination, treatment, and transfer   by Dr. Tao Mariano, *    Benefits:  In-house orthopedic, interventional radiology and infectious disease evaluation    Risks:  Delays in transportation, traffic accidents, worsening of condition      Consent for Transfer:  I acknowledge that my medical condition has been evaluated and explained to me by the treating physician or other qualified medical person and/or my attending physician, who has recommended that I be transferred to the service of    at  . The above potential benefits of such transfer, the potential risks associated with such transfer, and the probable risks of not being transferred have been explained to me, and I fully understand them.  The doctor has explained that, in my case, the benefits of transfer outweigh the risks.  I agree to be transferred.    I authorize the performance of emergency medical procedures and treatments upon me in both transit and upon arrival at the receiving facility.  Additionally, I authorize the release of any and all medical records to the receiving facility and request they be transported with me, if possible.  I understand that the safest mode of transportation during a medical emergency is an ambulance and that the Hospital advocates the use of this mode of transport. Risks of traveling to the receiving facility by car, including absence of medical control, life sustaining equipment, such as oxygen, and medical personnel has been explained to me and I fully understand them.    (ALIE CORRECT BOX BELOW)  I consent to the stated transfer and to be transported by ambulance/helicopter.  I consent to the stated  transfer, but refuse transportation by ambulance and accept full responsibility for my transportation by car.  I understand the risks of non-ambulance transfers and I exonerate the Hospital and its staff from any deterioration in my condition that results from this refusal.    X___________________________________________    DATE  25  TIME________  Signature of patient or legally responsible individual signing on patient behalf           RELATIONSHIP TO PATIENT_________________________          Provider Certification    NAME Sharon Vega                                         1950                              MRN [id number]    A medical screening exam was performed on the above named patient.  Based on the examination:    Condition Necessitating Transfer infected right hip prosthetic    Patient Condition:  Stable    Reason for Transfer:  In-house orthopedics, infectious disease and IR evaluation    Transfer Requirements: Facility     Space available and qualified personnel available for treatment as acknowledged by    Agreed to accept transfer and to provide appropriate medical treatment as acknowledged by          Appropriate medical records of the examination and treatment of the patient are provided at the time of transfer   STAFF INITIAL WHEN COMPLETED _______  Transfer will be performed by qualified personnel from    and appropriate transfer equipment as required, including the use of necessary and appropriate life support measures.    Provider Certification: I have examined the patient and explained the following risks and benefits of being transferred/refusing transfer to the patient/family:         Based on these reasonable risks and benefits to the patient and/or the unborn child(felicia), and based upon the information available at the time of the patient’s examination, I certify that the medical benefits reasonably to be expected from the provision of appropriate medical treatments at another  medical facility outweigh the increasing risks, if any, to the individual’s medical condition, and in the case of labor to the unborn child, from effecting the transfer.    X____________________________________________ DATE 06/17/25        TIME_______      ORIGINAL - SEND TO MEDICAL RECORDS   COPY - SEND WITH PATIENT DURING TRANSFER

## 2025-06-18 NOTE — ASSESSMENT & PLAN NOTE
Hb 8.0; Baseline over last two months has been in the 9 range   No reported acute signs of bleeding at [facility]   Monitor H&H

## 2025-06-18 NOTE — PLAN OF CARE
Problem: PAIN - ADULT  Goal: Verbalizes/displays adequate comfort level or baseline comfort level  Description: Interventions:  - Encourage patient to monitor pain and request assistance  - Assess pain using appropriate pain scale  - Administer analgesics as ordered based on type and severity of pain and evaluate response  - Implement non-pharmacological measures as appropriate and evaluate response  - Consider cultural and social influences on pain and pain management  - Notify physician/advanced practitioner if interventions unsuccessful or patient reports new pain  - Educate patient/family on pain management process including their role and importance of  reporting pain   - Provide non-pharmacologic/complimentary pain relief interventions  Outcome: Progressing     Problem: INFECTION - ADULT  Goal: Absence of fever/infection during neutropenic period  Description: INTERVENTIONS:  - Monitor WBC  - Perform strict hand hygiene  - Limit to healthy visitors only  - No plants, dried, fresh or silk flowers with feliciano in patient room  Outcome: Progressing

## 2025-06-18 NOTE — CASE MANAGEMENT
Case Management Assessment & Discharge Planning Note    Patient name Sharon Vega  Location [facility] 918/[facility] 918-01 MRN [id number]  : 1950 Date 2025       Current Admission Date: 2025  Current Admission Diagnosis:Abscess of right hip   Patient Active Problem List    Diagnosis Date Noted    Hypokalemia 2025    QT prolongation 2025    Anxiety 2025    Generalized weakness 2025    Electrolyte abnormality 2025    Sacral wound 2025    Pre-diabetes 2025    Constipation 2025    Positive blood culture 2025    Aortic stenosis 2025    Anemia 2025    Hypomagnesemia 05/15/2025    Hyponatremia 05/15/2025    Abscess of right hip 2025    History of hemiarthroplasty of right hip 2025    Heart murmur 2025    Thrombocytosis 2025    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) 2025    Class 2 severe obesity due to excess calories with serious comorbidity and body mass index (BMI) of 35.0 to 35.9 in adult (HCC) 2025    Right hip pain 2025    Sepsis without acute organ dysfunction (HCC) 2025    Sacroiliitis ([facility]) 2025    Lumbar radiculopathy     Chronic pain syndrome 2023    Primary hypertension 2022    Lumbar degenerative disc disease 2022    Lumbar spondylosis 2022    Cervical radiculopathy 2022    Cervical spondylosis 2022    Rheumatoid arthritis involving both hands (HCC) 2022    Spinal stenosis of lumbar region without neurogenic claudication 2022      LOS (days): 0  Geometric Mean LOS (GMLOS) (days):   Days to GMLOS:     OBJECTIVE:  PATIENT READMITTED TO HOSPITAL  Risk of Unplanned Readmission Score: 24.45         Current admission status: Inpatient       Preferred Pharmacy:   [facility] [place] - [place] - [address]  [address]  [place] [address]  Phone: [phone] Fax: [phone]    [id number] - [place] - [address]  [address] [address]  Phone: [phone] Fax: [phone]    Primary Care Provider: Danielito Antunez DO    Primary Insurance: MEDICARE  Secondary Insurance: AARP    ASSESSMENT:  Active Health Care Proxies       Perfecto Vega Health Care Representative - Son   Primary Phone: [phone] (Mobile)                           Readmission Root Cause  30 Day Readmission: Yes  During your hospital stay, did someone (provider, nurse, ) explain your care to you in a way you could understand?: Yes  Did you feel medically stable to leave the hospital?: Yes  Were you able to pay for your medication at the pharmacy?: Yes  Did you have reliable transportation to take you to your appointments?: Yes  During previous admission, was a post-acute recommendation made?: Yes  What post-acute resources were offered?: STR  Patient was readmitted due to: hip prosthetic infection  Action Plan: IV ATB, IR drain abscess    Patient Information  Admitted from:: Facility  Mental Status: Alert                                   DISCHARGE DETAILS:    Discharge planning discussed with:: chaitanya-see assess 5/16-DC to [facility], recent DC with [facility]  Stephen of Choice: Yes                                                                                            Additional Comments: resides with son, was at [facility], DC with [facility]

## 2025-06-19 PROBLEM — D75.89 BICYTOPENIA: Status: ACTIVE | Noted: 2025-06-19

## 2025-06-19 LAB
ANION GAP SERPL CALCULATED.3IONS-SCNC: 6 MMOL/L (ref 4–13)
BASOPHILS # BLD AUTO: 0.04 THOUSANDS/ÂΜL (ref 0–0.1)
BASOPHILS NFR BLD AUTO: 1 % (ref 0–1)
BUN SERPL-MCNC: 18 MG/DL (ref 5–25)
CALCIUM SERPL-MCNC: 8 MG/DL (ref 8.4–10.2)
CHLORIDE SERPL-SCNC: 104 MMOL/L (ref 96–108)
CO2 SERPL-SCNC: 28 MMOL/L (ref 21–32)
CREAT SERPL-MCNC: 1.03 MG/DL (ref 0.6–1.3)
EOSINOPHIL # BLD AUTO: 0.19 THOUSAND/ÂΜL (ref 0–0.61)
EOSINOPHIL NFR BLD AUTO: 5 % (ref 0–6)
ERYTHROCYTE [DISTWIDTH] IN BLOOD BY AUTOMATED COUNT: 15.2 % (ref 11.6–15.1)
GFR SERPL CREATININE-BSD FRML MDRD: 52 ML/MIN/1.73SQ M
GLUCOSE SERPL-MCNC: 109 MG/DL (ref 65–140)
HCT VFR BLD AUTO: 25.3 % (ref 34.8–46.1)
HGB BLD-MCNC: 7.7 G/DL (ref 11.5–15.4)
IMM GRANULOCYTES # BLD AUTO: 0.03 THOUSAND/UL (ref 0–0.2)
IMM GRANULOCYTES NFR BLD AUTO: 1 % (ref 0–2)
LYMPHOCYTES # BLD AUTO: 1.15 THOUSANDS/ÂΜL (ref 0.6–4.47)
LYMPHOCYTES NFR BLD AUTO: 30 % (ref 14–44)
MCH RBC QN AUTO: 28.1 PG (ref 26.8–34.3)
MCHC RBC AUTO-ENTMCNC: 30.4 G/DL (ref 31.4–37.4)
MCV RBC AUTO: 92 FL (ref 82–98)
MONOCYTES # BLD AUTO: 0.9 THOUSAND/ÂΜL (ref 0.17–1.22)
MONOCYTES NFR BLD AUTO: 24 % (ref 4–12)
NEUTROPHILS # BLD AUTO: 1.52 THOUSANDS/ÂΜL (ref 1.85–7.62)
NEUTS SEG NFR BLD AUTO: 39 % (ref 43–75)
NRBC BLD AUTO-RTO: 0 /100 WBCS
PLATELET # BLD AUTO: 573 THOUSANDS/UL (ref 149–390)
PMV BLD AUTO: 9.3 FL (ref 8.9–12.7)
POTASSIUM SERPL-SCNC: 3.8 MMOL/L (ref 3.5–5.3)
RBC # BLD AUTO: 2.74 MILLION/UL (ref 3.81–5.12)
SODIUM SERPL-SCNC: 138 MMOL/L (ref 135–147)
VANCOMYCIN SERPL-MCNC: 22.9 UG/ML (ref 10–20)
WBC # BLD AUTO: 3.83 THOUSAND/UL (ref 4.31–10.16)

## 2025-06-19 PROCEDURE — 99232 SBSQ HOSP IP/OBS MODERATE 35: CPT

## 2025-06-19 PROCEDURE — 85025 COMPLETE CBC W/AUTO DIFF WBC: CPT

## 2025-06-19 PROCEDURE — 99223 1ST HOSP IP/OBS HIGH 75: CPT | Performed by: INTERNAL MEDICINE

## 2025-06-19 PROCEDURE — 97167 OT EVAL HIGH COMPLEX 60 MIN: CPT

## 2025-06-19 PROCEDURE — 80048 BASIC METABOLIC PNL TOTAL CA: CPT

## 2025-06-19 PROCEDURE — G0545 PR INHERENT VISIT TO INPT: HCPCS | Performed by: INTERNAL MEDICINE

## 2025-06-19 PROCEDURE — 97163 PT EVAL HIGH COMPLEX 45 MIN: CPT

## 2025-06-19 PROCEDURE — 80202 ASSAY OF VANCOMYCIN: CPT

## 2025-06-19 PROCEDURE — NC001 PR NO CHARGE: Performed by: ORTHOPAEDIC SURGERY

## 2025-06-19 RX ORDER — MICONAZOLE NITRATE 20 MG/G
CREAM TOPICAL 2 TIMES DAILY
Status: DISCONTINUED | OUTPATIENT
Start: 2025-06-19 | End: 2025-06-25 | Stop reason: HOSPADM

## 2025-06-19 RX ORDER — HYDROMORPHONE HCL IN WATER/PF 6 MG/30 ML
0.2 PATIENT CONTROLLED ANALGESIA SYRINGE INTRAVENOUS ONCE
Status: COMPLETED | OUTPATIENT
Start: 2025-06-19 | End: 2025-06-19

## 2025-06-19 RX ADMIN — GABAPENTIN 600 MG: 300 CAPSULE ORAL at 21:00

## 2025-06-19 RX ADMIN — ENOXAPARIN SODIUM 40 MG: 40 INJECTION SUBCUTANEOUS at 07:33

## 2025-06-19 RX ADMIN — HYDROMORPHONE HYDROCHLORIDE 0.2 MG: 0.2 INJECTION, SOLUTION INTRAMUSCULAR; INTRAVENOUS; SUBCUTANEOUS at 21:03

## 2025-06-19 RX ADMIN — OXYCODONE HYDROCHLORIDE 5 MG: 5 TABLET ORAL at 13:28

## 2025-06-19 RX ADMIN — AMLODIPINE BESYLATE 10 MG: 10 TABLET ORAL at 07:34

## 2025-06-19 RX ADMIN — VENLAFAXINE HYDROCHLORIDE 150 MG: 150 CAPSULE, EXTENDED RELEASE ORAL at 07:33

## 2025-06-19 RX ADMIN — ASPIRIN 81 MG CHEWABLE TABLET 81 MG: 81 TABLET CHEWABLE at 21:00

## 2025-06-19 RX ADMIN — MICONAZOLE NITRATE: 20 CREAM TOPICAL at 17:35

## 2025-06-19 RX ADMIN — METRONIDAZOLE 500 MG: 500 TABLET ORAL at 21:00

## 2025-06-19 RX ADMIN — OXYCODONE HYDROCHLORIDE 5 MG: 5 TABLET ORAL at 19:42

## 2025-06-19 RX ADMIN — FOLIC ACID 1 MG: 1 TABLET ORAL at 07:34

## 2025-06-19 RX ADMIN — OXYCODONE HYDROCHLORIDE AND ACETAMINOPHEN 500 MG: 500 TABLET ORAL at 07:33

## 2025-06-19 RX ADMIN — METRONIDAZOLE 500 MG: 500 TABLET ORAL at 07:34

## 2025-06-19 RX ADMIN — PANTOPRAZOLE SODIUM 40 MG: 40 TABLET, DELAYED RELEASE ORAL at 04:59

## 2025-06-19 RX ADMIN — MICONAZOLE NITRATE: 20 CREAM TOPICAL at 13:21

## 2025-06-19 RX ADMIN — ASPIRIN 81 MG CHEWABLE TABLET 81 MG: 81 TABLET CHEWABLE at 07:33

## 2025-06-19 RX ADMIN — PRAVASTATIN SODIUM 40 MG: 40 TABLET ORAL at 21:00

## 2025-06-19 RX ADMIN — VANCOMYCIN HYDROCHLORIDE 1000 MG: 1 INJECTION, SOLUTION INTRAVENOUS at 22:57

## 2025-06-19 NOTE — CASE MANAGEMENT
Case Management Discharge Planning Note    Patient name Sharon Vega  Location [facility] 918/[facility] 918-01 MRN [id number]  : 1950 Date 2025       Current Admission Date: 2025  Current Admission Diagnosis:Abscess of right hip   Patient Active Problem List    Diagnosis Date Noted    Hypokalemia 2025    QT prolongation 2025    Anxiety 2025    Generalized weakness 2025    Electrolyte abnormality 2025    Sacral wound 2025    Pre-diabetes 2025    Constipation 2025    Positive blood culture 2025    Aortic stenosis 2025    Anemia 2025    Hypomagnesemia 05/15/2025    Hyponatremia 05/15/2025    Abscess of right hip 2025    History of hemiarthroplasty of right hip 2025    Heart murmur 2025    Thrombocytosis 2025    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) 2025    Class 2 severe obesity due to excess calories with serious comorbidity and body mass index (BMI) of 35.0 to 35.9 in adult (HCC) 2025    Right hip pain 2025    Sepsis without acute organ dysfunction (HCC) 2025    Sacroiliitis ([facility]) 2025    Lumbar radiculopathy     Chronic pain syndrome 2023    Primary hypertension 2022    Lumbar degenerative disc disease 2022    Lumbar spondylosis 2022    Cervical radiculopathy 2022    Cervical spondylosis 2022    Rheumatoid arthritis involving both hands (HCC) 2022    Spinal stenosis of lumbar region without neurogenic claudication 2022      LOS (days): 1  Geometric Mean LOS (GMLOS) (days): 2.8  Days to GMLOS:1.5     OBJECTIVE:  Risk of Unplanned Readmission Score: 29.89         Current admission status: Inpatient   Preferred Pharmacy:   [facility] [place] - [place] - [address]  [address]  [place] [address]  Phone: [phone] Fax: [phone]    [facility] - [place] - [address] [address]  [facility] [address]  Phone: [phone] Fax: [phone]    Primary Care Provider: Danielito Antunez DO    Primary Insurance: MEDICARE  Secondary Insurance: [facility]    DISCHARGE DETAILS:    A post acute care recommendation was made by your care team for HHC.  Discussed Freedom of Choice with patient.  Choice is to return/continue services.   Referral  [facility]    Referral [facility], pt was on IV ATB at home, will need thru 7/1                                Requested Home Health Care         Is the patient interested in HHC at discharge?: Yes  Home Health Discipline requested:: Nursing, Physical Therapy, Occupational Therapy  Home Health Agency Name:: [facility]  Home Health Follow-Up Provider:: PCP  Home Health Services Needed:: Evaluate Functional Status and Safety, Gait/ADL Training, Administration of IV, IM or SC Medications  Homebound Criteria Met:: Requires the Assistance of Another Person for Safe Ambulation or to Leave the Home  Supporting Clincal Findings:: Limited Endurance

## 2025-06-19 NOTE — ASSESSMENT & PLAN NOTE
"Pertinent past medical history of prior right hip abscess and infected GINA, subsequently status post I&D and hardware extraction on 5/21 as well as has been on IV Vancomycin, po flagyl with plans for abx for 6 weeks through 7/1  Presented to the ER at [facility] on 6/16 with R hip pain and generalized weakness  Underwent CT Hip on 6/17 with: \" A large peripherally enhancing fluid collection surrounding the proximal right femur, that extending along the right gluteus muscle body and upper musculature of the right thigh.  There are a few small foci of gas within this collection.  There is a separate, similar peripherally enhancing collection involving the right iliopsoas muscle bodies.  These findings are highly suspicious for abscesses\"  Afebrile, WBC 3.5K   Case was discussed by Daniel at [facility] with orthopedics who recommended transfer to [facility] for their evaluation and likely need for IR for drain placement and drainage of the abscesses  Patient has been receiving vancomycin as well as Flagyl at [facility]  Patient transferred to [facility] on 6/18  N.p.o. at midnight  Plan for drainage insertion tomorrow  Consulted and discussed with ID and Ortho and IR  Continue cefepime and Flagyl    "

## 2025-06-19 NOTE — CONSULTS
Consultation - Infectious Disease   Name: Sharon Vega 75 y.o. 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  However the patient had been on vancomycin apprised to operative cultures found so perhaps the vancomycin suppressed other involved organisms.  The patient had been on intravenous vancomycin and Flagyl with a plan to continue through 7/1/2025 but now has progressive pain has been found on CT scan to have a large enhancing collection around the right hip that is complex with progression into the iliopsoas.  Consideration for the possibility of abscess formation.  -Continue intravenous vancomycin and oral Flagyl for now  -Pharmacy follow-up for vancomycin dose management  -Close orthopedics follow-up  -Await interventional radiology aspiration and drainage of the hip joint and follow-up cultures  -Recheck CBC with differential and BMP to make sure no developing toxicity  Sacral wound  Not overtly infected.  Bicytopenia  With significant anemia and leukopenia, with a neutrophil count that currently is above 1000.  Consideration for the possibility of a primary hematologic process with the abnormal differential.  - If persist, consider hematology oncology evaluation  - Recheck CBC with differential to make sure no worsening  Generalized weakness  Likely multifactorial including the acute infectious process, and metabolic derangements.  Doubt any new infectious etiology.  QT  prolongation  Limits antibiotic options.    I have discussed with the primary service the above plan to continue the intravenous vancomycin and Flagyl for now.  The primary service agrees with the plan.    Antibiotics:  Vancomycin  Flagyl  Postop day 28    History of Present Illness   Sharon Vega is a 75 y.o. year old female with obesity, hypertension, and recently diagnosed right prosthetic hip infection who we are asked to assist with management of worsening right hip pain and a new large collection.  Patient initially underwent right total hip arthroplasty in 2022.  The patient had been admitted to [facility] at the end of April 2025 with possible sepsis a mild leukocytosis tachycardia and found to have a collection on imaging.  She underwent IR aspiration of the collection that do not appear overtly infected and the cultures remain negative.  However she continued to have ongoing hip pain and therefore she was readmitted in mid May with the same issue this time with sepsis and what appeared to be a right hip intramuscular abscess.  She had blood cultures obtained and grew Finegoldia magna.  Aspiration of the hip grew Finegoldia magna and Peptoniphilus harei.  She was maintained on intravenous vancomycin and transferred to [facility] in [place] and underwent explantation of the hip on 5/21/2025 with spacer placement.  Eventually discharged home on intravenous vancomycin and Flagyl with a concern that the vancomycin may have suppressed other organisms as far as growth.  Over the past few weeks she has developed worsening right hip pain and therefore was readmitted to [facility] underwent CT of the right lower extremity that revealed a large complex collection around the hip and into the iliopsoas region.  She was transferred down to [facility] for additional management.  She has maintained on the vancomycin and Flagyl.  She has not any fever chills or sweats, no  nausea vomiting or diarrhea, no cough or shortness of breath.  She does continue to have significant right hip pain.    A complete review of systems is negative other than that noted in the HPI.    Medical History Review: I have reviewed the patient's PMH, PSH, Social History, Family History, Meds, and Allergies     Objective :  Temp:  [98.3 °F (36.8 °C)-99.3 °F (37.4 °C)] 98.3 °F (36.8 °C)  HR:  [] 97  BP: (103-118)/(65-73) 111/70  Resp:  [14-20] 17  SpO2:  [91 %-95 %] 95 %  O2 Device: None (Room air)    General:  No acute distress  Psychiatric:  Awake and alert  Pulmonary:  Normal respiratory excursion without accessory muscle use  Abdomen:  Soft, nontender  Extremities: Right hip incision with some galindo-incisional faint erythema but without drainage.  Skin:  No rashes      Lab Results: I have reviewed the following results:  Results from last 7 days   Lab Units 06/19/25  0621 06/18/25  0622 06/17/25  0532   WBC Thousand/uL 3.83* 4.33 3.58*   HEMOGLOBIN g/dL 7.7* 7.7* 8.0*   PLATELETS Thousands/uL 573* 536* 535*     Results from last 7 days   Lab Units 06/19/25  0455 06/18/25  0622 06/17/25  0532 06/16/25  1218   SODIUM mmol/L 138 137 139 141   POTASSIUM mmol/L 3.8 3.6 2.9* 2.8*   CHLORIDE mmol/L 104 103 104 102   CO2 mmol/L 28 28 29 25   BUN mg/dL 18 15 11 14   CREATININE mg/dL 1.03 0.92 0.77 0.88   EGFR ml/min/1.73sq m 52 60 75 64   CALCIUM mg/dL 8.0* 8.1* 7.8* 8.8   AST U/L  --   --  16 17   ALT U/L  --   --  6* 8   ALK PHOS U/L  --   --  40 47   ALBUMIN g/dL  --   --  2.7* 3.2*     Results from last 7 days   Lab Units 06/16/25  1218   BLOOD CULTURE  No Growth at 48 hrs.  No Growth at 48 hrs.     Results from last 7 days   Lab Units 06/16/25  1218   PROCALCITONIN ng/ml 0.15     Results from last 7 days   Lab Units 06/17/25  0532   CRP mg/L 89.0*     Results from last 7 days   Lab Units 06/18/25  0622   FERRITIN ng/mL 594*           Imaging Results Review: I personally reviewed the following image studies  in PACS and associated radiology reports: CT right lower extremity. My interpretation of the radiology images/reports is: Large enhancing right sided hip collection with extension into the right iliopsoas.

## 2025-06-19 NOTE — PROGRESS NOTES
Progress Note - Orthopedics   Name: Sharon Vega 75 y.o. female I MRN: [id number]  [facility] I Date of Admission: 6/18/2025   Date of Service: 6/18/2025 I Hospital Day: 0    Assessment & Plan  Abscess of right hip  75-year-old female with right prosthetic joint infection undergoing two-stage revision currently in stage I with antibiotic spacer placement and 6-week course of vancomycin and Flagyl per ID which is scheduled to end on 7/1/2025.  No pain with ambulation, clinically stable. Recommend continuing antibiotic course and monitoring labs. No acute need for surgical intervention.      Plan  WBAT RLE  Recommend consult ID  Cont 6 wk abx course per ID  Staples removed and steri-strips placed  Monitor vitals  Monitor labs  PT/OT      Medical co-morbidities are being managed per primary team.    Please contact the [employer] role BE Ortho Floor for any questions/concerns.      Subjective   75 y.o.female, afebrile and hemodynamically stable. No acute events, no new complaints. Denies pain with ambulation, fevers, chills, CP, SOB, N/V, numbness or tingling.  Blood cultures continue to show no growth to date at 48 hours.      Objective :  Temp:  [98 °F (36.7 °C)-99.3 °F (37.4 °C)] 99.3 °F (37.4 °C)  HR:  [] 104  BP: (101-118)/(60-73) 113/71  Resp:  [14-20] 16  SpO2:  [91 %-95 %] 94 %  O2 Device: None (Room air)    Physical Exam  Vitals reviewed.   Constitutional:       Appearance: Normal appearance.   HENT:      Head: Normocephalic.     Eyes:      Extraocular Movements: Extraocular movements intact.       Cardiovascular:      Rate and Rhythm: Tachycardia present.   Pulmonary:      Effort: Pulmonary effort is normal. No respiratory distress.      Breath sounds: No wheezing.   Abdominal:      Palpations: Abdomen is soft.     Skin:     General: Skin is warm.      Capillary Refill: Capillary refill takes less than 2 seconds.     Neurological:      General: No focal deficit present.      Mental Status:  "She is alert and oriented to person, place, and time.     Psychiatric:         Mood and Affect: Mood normal.         Behavior: Behavior normal.       Musculoskeletal: Right Lower Extremity  Skin intact without erythema or ecchymosis  Surgical incision well-approximated, no tenderness to palpation, no fluctuance, no drainage, no surrounding erythema  Sensation intact to light touch jc/saph/sp/dp/tib  Motor intact EHL/FHL, ankle DF/PF  Toes warm and well perfused        Lab Results: I have reviewed the following results:  Recent Labs     06/16/25  1218 06/17/25  0532 06/18/25  0622   WBC 3.22* 3.58* 4.33   HGB 9.4* 8.0* 7.7*   HCT 29.4* 25.6* 25.6*   * 535* 536*   BUN 14 11 15   CREATININE 0.88 0.77 0.92   PTT 33  --   --    INR 1.19  --   --    CRP  --  89.0*  --      Blood Culture:    Lab Results   Component Value Date    BLOODCX No Growth at 48 hrs. 06/16/2025    BLOODCX No Growth at 48 hrs. 06/16/2025     Wound Culture: No results found for: \"WOUNDCULT\"  "

## 2025-06-19 NOTE — ASSESSMENT & PLAN NOTE
Hypomagnesemia and hypokalemia noted in the ER at [place] on arrival  Supplemented with IV magnesium and oral potassium  Magnesium level has improved.  Still with hypokalemia (2.9 on AM of 6/17).  Received oral supplementation subsequent   Repeat BMP and Mg on arrival. Trial IV Kcl if still hypokalemic

## 2025-06-19 NOTE — PROGRESS NOTES
Vancomycin IV Pharmacy-to-Dose Consultation    Sharon Vega is a 75 y.o. female who is currently ordered Vancomycin IV with management by the Pharmacy Consult service.    Relevant clinical data and objective / subjective history reviewed.    Vancomycin Assessment:    Indication and Goal AUC/Trough: infection of R prosthetic hip joint (goal -600, trough >10)    Clinical Status: stable    Micro:   6/16 FLU/COVID rapid: negative  6/16 BC x 2: no growth at 48 hrs    Renal Function:  SCr: 1.03 mg/dL; stable, at baseline  CrCl: 43 mL/min  Renal replacement: none    Days of Therapy: 4    Current Dose: 1000 mg IV q24h    Vancomycin Plan:    Continue current dose; plan to continue for 6 weeks through 7/1 per ID    Estimated AUC: 429 mcg*hr/mL    Estimated Trough: 11.9 mcg/mL    Next Level: 6/26 at 0600    Renal Function Monitoring: Daily BMP and UOP    Pharmacy will continue to follow closely for s/sx of nephrotoxicity, infusion reactions and appropriateness of therapy.  BMP and CBC will be ordered per protocol. We will continue to follow the patient’s culture results and clinical progress daily.    [place], PharmD  Pharmacist

## 2025-06-19 NOTE — ASSESSMENT & PLAN NOTE
75-year-old female with right prosthetic joint infection undergoing two-stage revision currently in stage I with antibiotic spacer placement and 6-week course of vancomycin and Flagyl per ID which is scheduled to end on 7/1/2025.  No pain with ambulation, clinically stable. Recommend continuing antibiotic course and monitoring labs. No acute need for surgical intervention.      Plan  WBAT RLE  Recommend consult ID  Cont 6 wk abx course per ID  Staples removed and steri-strips placed  Monitor vitals  Monitor labs  PT/OT

## 2025-06-19 NOTE — ASSESSMENT & PLAN NOTE
With component of depression as well   Psychiatry evaluated at [place] in setting of increased anxiety episodes; Denied any thoughts of SI or HI. No need for inpatient BHU from their standpoint   Continue effexor as well as has prn ativan for anxiety

## 2025-06-19 NOTE — PLAN OF CARE
Problem: PHYSICAL THERAPY ADULT  Goal: Performs mobility at highest level of function for planned discharge setting.  See evaluation for individualized goals.  Description: Treatment/Interventions: ADL retraining, Functional transfer training, LE strengthening/ROM, Elevations, Therapeutic exercise, Endurance training, Equipment eval/education, Bed mobility, Gait training, Spoke to nursing, OT  Equipment Recommended: Walker (pending progress)       See flowsheet documentation for full assessment, interventions and recommendations.  Note: Prognosis: Fair  Problem List: Decreased strength, Decreased range of motion, Decreased endurance, Impaired balance, Decreased mobility, Obesity, Orthopedic restrictions  Assessment: This patient is a 75 y.o. female admitted to [facility] on 6/18/2025 due to abscess of R hip following THR on 5/21/25. Physical therapy evaluated this pt on 6/19/2025 with orders for PT eval, OOB to chair, and activity as tolerated. Pt has a pmh of anxiety, anemia, arthritis, GERD, hyperlipidemia, and HTN, prediabetes, and obesity. Due to the medical complexity and multiple comorbidities of this pt, PT performed a high complexity evaluation. Prior to admittance, pt was independent with ADLs, IADLs, and functional mobility. Upon evaluation, pt required min(A)x1 for bed mobility, transfers, and ambulation. Pt utilized RW and required VC for sequencing and direction. Pt ambulated 2x100' for a total of 200'. Pt was found supine in bed upon arrival and left sitting in bedside chair at end of session with chair alarm on and all needs within reach. Pt exhibits decreased ROM, impaired mobility, gait dysfunction, and activity intolerance due to THR precautions, RLE, and fatigue. Pt would benefit from skilled PT intervention to address these deficits. Next session should address stair navigation due to stair access to pt's house. The patient's AM-PAC Basic Mobility Inpatient Short Form  Raw Score is 18. A Raw score of greater than or equal to 16 suggests the patient may benefit from discharge to home. Please also refer to the recommendation of the Physical Therapist for safe discharge planning. Physical Therapy is recommending Level 3 Rehab Resource Intensity at this time.  Barriers to Discharge: Inaccessible home environment     Rehab Resource Intensity Level, PT: III (Minimum Resource Intensity)    See flowsheet documentation for full assessment.

## 2025-06-19 NOTE — ASSESSMENT & PLAN NOTE
With significant anemia and leukopenia, with a neutrophil count that currently is above 1000.  Consideration for the possibility of a primary hematologic process with the abnormal differential.  - If persist, consider hematology oncology evaluation  - Recheck CBC with differential to make sure no worsening

## 2025-06-19 NOTE — ASSESSMENT & PLAN NOTE
In a patient who is undergone I&D and hardware extraction on 5/21/2025 with cultures growing Finegoldia and Peptoniphilus.  However the patient had been on vancomycin apprised to operative cultures found so perhaps the vancomycin suppressed other involved organisms.  The patient had been on intravenous vancomycin and Flagyl with a plan to continue through 7/1/2025 but now has progressive pain has been found on CT scan to have a large enhancing collection around the right hip that is complex with progression into the iliopsoas.  Consideration for the possibility of abscess formation.  -Continue intravenous vancomycin and oral Flagyl for now  -Pharmacy follow-up for vancomycin dose management  -Close orthopedics follow-up  -Await interventional radiology aspiration and drainage of the hip joint and follow-up cultures  -Recheck CBC with differential and BMP to make sure no developing toxicity

## 2025-06-19 NOTE — WOUND OSTOMY CARE
Consult Note - Wound   Sharon Vega 75 y.o. female MRN: [id number]  Unit/Bed#: [facility] 918-01 Encounter: [id number]        History and Present Illness:  Patient is a 74 yo female that was admitted to [facility]  for treatment of abscess of right hip. Patient is alert and oriented and agreeable to assessment. Patient is assist of 1 for turning and repositioning. Patient is continent of bowel and bladder. Independent for meals.     Wound Care was consulted for sacral redness.     Assessment Findings:   B/L heels are dry intact and roz with no skin loss or wounds present. Recommend preventative Hydraguard Cream and proper offloading/ repositioning.      1.) B/L sacro-buttocks and bilateral inner thighs MASD with fungal component- beefy red/pink rash, irregular in shape with no skin loss or wounds present. Etiology likely due to moisture trapping between folds.     Bedside nurse aware of plan of care. See flow sheets for more detailed assessment findings.      Wound care will sign off at this time, Please re-consult if needed. Please follow skin care recommendations written as ordered for skin protection and skin prevention.     Skin care plans:  1-Apply MARCIANO to sacrum/buttocks/groin two times a day and as needed   2-Hydraguard to bilateral heel two times a day  3-Elevate heels to offload pressure  4-Ehob cushion when out of bed.  5-Turn/repoisiton every two hours for pressure re-distribution on skin.  6-Moisturize skin daily with skin nourishing cream     Wounds:  Wound 06/19/25 Buttocks (Active)   Wound Image   06/19/25 0825   Wound Description Beefy red;Pink 06/19/25 0825   Non-staged Wound Description Not applicable 06/19/25 0825   Wound Length (cm) 0 cm 06/19/25 0825   Wound Width (cm) 0 cm 06/19/25 0825   Wound Depth (cm) 0 cm 06/19/25 0825   Wound Surface Area (cm^2) 0 cm^2 06/19/25 0825   Wound Volume (cm^3) 0 cm^3 06/19/25 0825   Calculated Wound Volume (cm^3) 0 cm^3 06/19/25 0825   Alejandra-wound Assessment  Dry;Intact;Rash;Pink 06/19/25 0825   Treatments Cleansed;Site care 06/19/25 0825   Wound Site Closure None 06/19/25 0825   Dressing Moisture barrier 06/19/25 0825       Wound 06/19/25 Thigh Anterior;Bilateral (Active)   Wound Image   06/19/25 0826   Wound Description Beefy red;Pink;Intact 06/19/25 0826   Non-staged Wound Description Not applicable 06/19/25 0826   Wound Length (cm) 0 cm 06/19/25 0826   Wound Width (cm) 0 cm 06/19/25 0826   Wound Depth (cm) 0 cm 06/19/25 0826   Wound Surface Area (cm^2) 0 cm^2 06/19/25 0826   Wound Volume (cm^3) 0 cm^3 06/19/25 0826   Calculated Wound Volume (cm^3) 0 cm^3 06/19/25 0826   Drainage Amount None 06/19/25 0826   Alejandra-wound Assessment Intact;Rash;Pink 06/19/25 0826   Treatments Cleansed;Site care 06/19/25 0826   Dressing Moisture barrier 06/19/25 0826               Ivy Hutton RN, BSN,   Patient was assessed with Nimo Farrell RN, BSN, CWOCN

## 2025-06-19 NOTE — PROGRESS NOTES
"Progress Note - Hospitalist   Name: Sharon Vega 75 y.o. female I MRN: [id number]  Unit/Bed#: [facility] 918-01 I Date of Admission: 6/18/2025   Date of Service: 6/19/2025 I Hospital Day: 1     Assessment & Plan  Abscess of right hip  Pertinent past medical history of prior right hip abscess and infected GINA, subsequently status post I&D and hardware extraction on 5/21 as well as has been on IV Vancomycin, po flagyl with plans for abx for 6 weeks through 7/1  Presented to the ER at [place] on 6/16 with R hip pain and generalized weakness  Underwent CT Hip on 6/17 with: \" A large peripherally enhancing fluid collection surrounding the proximal right femur, that extending along the right gluteus muscle body and upper musculature of the right thigh.  There are a few small foci of gas within this collection.  There is a separate, similar peripherally enhancing collection involving the right iliopsoas muscle bodies.  These findings are highly suspicious for abscesses\"  Afebrile, WBC 3.5K   Case was discussed by Daniel at [place] with orthopedics who recommended transfer to [place] for their evaluation and likely need for IR for drain placement and drainage of the abscesses  Patient has been receiving vancomycin as well as Flagyl at [place]  Patient transferred to [place] on 6/18  N.p.o. at midnight  Plan for drainage insertion tomorrow  Consulted and discussed with ID and Ortho and IR  Continue cefepime and Flagyl    Primary hypertension  On pta amlodipine   Will considering Holding in setting of infection  Chronic pain syndrome  On PTA gabapentin at bedtime  Anemia  Hb 8.0; Baseline over last two months has been in the 9 range   No reported acute signs of bleeding at [place]   Monitor H&H  Sacral wound  Present on arrival to [place]   Wound care consult  Anxiety  With component of depression as well   Psychiatry evaluated at [place] in setting of increased anxiety episodes; Denied any thoughts of SI or HI. No need for " inpatient BHU from their standpoint   Continue effexor as well as has prn ativan for anxiety  Hypokalemia  Hypomagnesemia and hypokalemia noted in the ER at [place] on arrival  Supplemented with IV magnesium and oral potassium  Magnesium level has improved.  Still with hypokalemia (2.9 on AM of 6/17).  Received oral supplementation subsequent   Repeat BMP and Mg on arrival. Trial IV Kcl if still hypokalemic    VTE Pharmacologic Prophylaxis:   Moderate Risk (Score 3-4) - Pharmacological DVT Prophylaxis Ordered: heparin.    Mobility:   Basic Mobility Inpatient Raw Score: 18  JH-HLM Goal: 6: Walk 10 steps or more  JH-HLM Achieved: 7: Walk 25 feet or more  JH-HLM Goal achieved. Continue to encourage appropriate mobility.    Patient Centered Rounds: I performed bedside rounds with nursing staff today.   Discussions with Specialists or Other Care Team Provider: IR Ortho and ID    Education and Discussions with Family / Patient: Updated  (son) via phone.    Current Length of Stay: 1 day(s)  Current Patient Status: Inpatient   Certification Statement: The patient will continue to require additional inpatient hospital stay due to Right hip infection  Discharge Plan: Anticipate discharge in 24-48 hrs to home.    Code Status: Level 1 - Full Code    Subjective   Patient was noted to have a right hip infection and at this time is comfortable not in distress.  No other subjective complaints noted.    Objective :  Temp:  [98.3 °F (36.8 °C)-99.3 °F (37.4 °C)] 98.3 °F (36.8 °C)  HR:  [] 97  BP: (103-118)/(65-73) 111/70  Resp:  [14-20] 17  SpO2:  [91 %-95 %] 95 %  O2 Device: None (Room air)    Body mass index is 33.08 kg/m².     Input and Output Summary (last 24 hours):     Intake/Output Summary (Last 24 hours) at 6/19/2025 1507  Last data filed at 6/19/2025 0851  Gross per 24 hour   Intake 180 ml   Output --   Net 180 ml       Physical Exam  Vitals and nursing note reviewed.   Constitutional:       General: She is  not in acute distress.     Appearance: She is well-developed.   HENT:      Head: Normocephalic and atraumatic.     Eyes:      Conjunctiva/sclera: Conjunctivae normal.       Cardiovascular:      Rate and Rhythm: Normal rate and regular rhythm.      Heart sounds: No murmur heard.  Pulmonary:      Effort: Pulmonary effort is normal. No respiratory distress.      Breath sounds: Normal breath sounds.   Abdominal:      Palpations: Abdomen is soft.      Tenderness: There is no abdominal tenderness.     Musculoskeletal:         General: No swelling.      Cervical back: Neck supple.     Skin:     General: Skin is warm and dry.      Capillary Refill: Capillary refill takes less than 2 seconds.     Neurological:      Mental Status: She is alert.     Psychiatric:         Mood and Affect: Mood normal.           Lines/Drains:  Lines/Drains/Airways       Active Status       Name Placement date Placement time Site Days    PICC Line 05/22/25 Right Basilic 05/22/25  1422  Basilic  28                    Central Line:  Goal for removal: N/A - Chronic PICC               Lab Results: I have reviewed the following results:   Results from last 7 days   Lab Units 06/19/25  0621   WBC Thousand/uL 3.83*   HEMOGLOBIN g/dL 7.7*   HEMATOCRIT % 25.3*   PLATELETS Thousands/uL 573*   SEGS PCT % 39*   LYMPHO PCT % 30   MONO PCT % 24*   EOS PCT % 5     Results from last 7 days   Lab Units 06/19/25  0455 06/18/25  0622 06/17/25  0532   SODIUM mmol/L 138   < > 139   POTASSIUM mmol/L 3.8   < > 2.9*   CHLORIDE mmol/L 104   < > 104   CO2 mmol/L 28   < > 29   BUN mg/dL 18   < > 11   CREATININE mg/dL 1.03   < > 0.77   ANION GAP mmol/L 6   < > 6   CALCIUM mg/dL 8.0*   < > 7.8*   ALBUMIN g/dL  --   --  2.7*   TOTAL BILIRUBIN mg/dL  --   --  0.40   ALK PHOS U/L  --   --  40   ALT U/L  --   --  6*   AST U/L  --   --  16   GLUCOSE RANDOM mg/dL 109   < > 96    < > = values in this interval not displayed.     Results from last 7 days   Lab Units 06/16/25  1218    INR  1.19     Results from last 7 days   Lab Units 06/17/25  2101 06/17/25  1605 06/16/25  2148   POC GLUCOSE mg/dl 130 96 103         Results from last 7 days   Lab Units 06/16/25  1218   LACTIC ACID mmol/L 1.8   PROCALCITONIN ng/ml 0.15       Recent Cultures (last 7 days):   Results from last 7 days   Lab Units 06/16/25  1218   BLOOD CULTURE  No Growth at 48 hrs.  No Growth at 48 hrs.         Last 24 Hours Medication List:     Current Facility-Administered Medications:     acetaminophen (TYLENOL) tablet 650 mg, Q6H PRN    amLODIPine (NORVASC) tablet 10 mg, Daily    ascorbic acid (VITAMIN C) tablet 500 mg, Daily    aspirin chewable tablet 81 mg, BID    [Held by provider] enoxaparin (LOVENOX) subcutaneous injection 40 mg, Daily    folic acid (FOLVITE) tablet 1 mg, Daily    gabapentin (NEURONTIN) capsule 600 mg, HS    LORazepam (ATIVAN) tablet 1 mg, Q8H PRN    metroNIDAZOLE (FLAGYL) tablet 500 mg, Q12H FELIPA    moisture barrier miconazole 2% cream (aka MARCIANO MOISTURE BARRIER ANTIFUNGAL CREAM), BID    ondansetron (ZOFRAN) injection 4 mg, Q6H PRN    oxyCODONE (ROXICODONE) IR tablet 5 mg, Q6H PRN    oxyCODONE (ROXICODONE) split tablet 2.5 mg, Q6H PRN    pantoprazole (PROTONIX) EC tablet 40 mg, Daily    pravastatin (PRAVACHOL) tablet 40 mg, HS    vancomycin (VANCOCIN) IVPB (premix in dextrose) 1,000 mg 200 mL, Q24H, Last Rate: 1,000 mg (06/18/25 2251)    venlafaxine (EFFEXOR-XR) 24 hr capsule 150 mg, Daily    Administrative Statements   Today, Patient Was Seen By: Indra Camp MD  I have spent a total time of 40 minutes in caring for this patient on the day of the visit/encounter including Diagnostic results, Prognosis, Risks and benefits of tx options, Instructions for management, Patient and family education, Importance of tx compliance, Risk factor reductions, Impressions, Counseling / Coordination of care, Documenting in the medical record, Reviewing/placing orders in the medical record (including tests,  medications, and/or procedures), Obtaining or reviewing history  , and Communicating with other healthcare professionals .    **Please Note: This note may have been constructed using a voice recognition system.**

## 2025-06-19 NOTE — PHYSICAL THERAPY NOTE
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)    Anemia    Generalized weakness    Sacral wound    QT prolongation    Anxiety    Hypokalemia    Bicytopenia       Past Medical History  Past Medical History[1]     Past Surgical History  Past Surgical History[2]      06/19/25 0941   PT Last Visit   PT Visit Date 06/19/25   Note Type   Note type Evaluation   End of Consult   Patient Position at End of Consult Bedside chair;All needs within reach;Bed/Chair alarm activated   Pain Assessment   Pain Assessment Tool 0-10   Pain Score No Pain   Restrictions/Precautions   Weight Bearing Precautions Per Order Yes   RLE Weight Bearing Per Order (S)  WBAT   Other Precautions Chair Alarm;Bed Alarm;WBS;Fall Risk;THR  (posterior hip precautions)   Home Living   Type of Home House   Home Layout Two level;1/2 bath on main level;Bed/bath upstairs;Able to live on main level with bedroom/bathroom;Stairs to enter with rails  (4STE)   Bathroom Shower/Tub Tub/shower unit   Bathroom Toilet Raised   Bathroom Equipment Grab bars in shower;Commode   Bathroom Accessibility Accessible   Home Equipment Walker;Cane  (RW at baseline, SPC)   Prior Function   Level of [place] Independent with ADLs;Independent with functional mobility;Independent with IADLS   Lives With Son;Family  (son, daughter-in-law, grandson)   Receives Help From Family   IADLs Independent with driving;Independent with meal prep;Independent with medication management   Falls in the last 6 months 0   Vocational Retired   General   Family/Caregiver Present No   Cognition   Overall Cognitive Status WFL   Arousal/Participation Alert   Attention Within functional limits   Orientation Level Oriented X4   Memory  Within functional limits   Following Commands Follows one step commands without difficulty   Comments pt pleasant and cooperative   RUE Assessment   RUE Assessment WFL   LUE Assessment   LUE Assessment WFL   RLE Assessment   RLE Assessment X   RLE Overall AROM   R Hip Flexion limited due to THR precautions   R Hip ADduction limited due to THR precautions   Strength RLE   RLE Overall Strength   (grossly poor +, limited by pain)   LLE Assessment   LLE Assessment WFL   Bed Mobility   Rolling R 4  Minimal assistance   Additional items Assist x 1;Bedrails;Increased time required   Rolling L Unable to assess   Supine to Sit 4  Minimal assistance   Additional items Assist x 1;HOB elevated;Bedrails;Increased time required;Verbal cues   Sit to Supine Unable to assess   Additional Comments VC for sequencing, pt found supine in bed upon arrival and left sitting in bedside chair at end of session with chair alarm on and all needs within reach   Transfers   Sit to Stand 4  Minimal assistance   Additional items Assist x 1;Increased time required;Verbal cues   Stand to Sit 4  Minimal assistance   Additional items Assist x 1;Armrests;Increased time required;Verbal cues   Additional Comments w/ RW, VC for sequencing and hand placement   Ambulation/Elevation   Gait pattern Improper Weight shift;Forward Flexion;Decreased foot clearance;Decreased R stance;Short stride   Gait Assistance 4  Minimal assist   Additional items Assist x 1;Verbal cues   Assistive Device Rolling walker   Distance 2x100'   Stair Management Assistance Not tested   Ambulation/Elevation Additional Comments VC for sequencing and direction   Balance   Static Sitting Good   Dynamic Sitting Fair +   Static Standing Fair   Dynamic Standing Poor +   Ambulatory Poor +   Endurance Deficit   Endurance Deficit Yes   Endurance Deficit Description WB precautions, decreased ROM, fatigue, RLE weakness   Activity Tolerance   Activity Tolerance Patient limited by fatigue    Medical Staff Made Aware PT BABAK Suero OT Student Lee   Nurse Made Aware RN clearance prior to session   Assessment   Prognosis Fair   Problem List Decreased strength;Decreased range of motion;Decreased endurance;Impaired balance;Decreased mobility;Obesity;Orthopedic restrictions   Assessment This patient is a 75 y.o. female admitted [facility] on 6/18/2025 due to abscess of R hip following THR on 5/21/25. Physical therapy evaluated this pt on 6/19/2025 with orders for PT eval, OOB to chair, and activity as tolerated. Pt has a pmh of anxiety, anemia, arthritis, GERD, hyperlipidemia, and HTN, prediabetes, and obesity. Due to the medical complexity and multiple comorbidities of this pt, PT performed a high complexity evaluation. Prior to admittance, pt was independent with ADLs, IADLs, and functional mobility. Upon evaluation, pt required min(A)x1 for bed mobility, transfers, and ambulation. Pt utilized RW and required VC for sequencing and direction. Pt ambulated 2x100' for a total of 200'. Pt was found supine in bed upon arrival and left sitting in bedside chair at end of session with chair alarm on and all needs within reach. Pt exhibits decreased ROM, impaired mobility, gait dysfunction, and activity intolerance due to THR precautions, RLE, and fatigue. Pt would benefit from skilled PT intervention to address these deficits. Next session should address stair navigation due to stair access to pt's house. The patient's AM-PAC Basic Mobility Inpatient Short Form Raw Score is 18. A Raw score of greater than or equal to 16 suggests the patient may benefit from discharge to home. Please also refer to the recommendation of the Physical Therapist for safe discharge planning. Physical Therapy is recommending Level 3 Rehab Resource Intensity at this time.   Barriers to Discharge Inaccessible home environment   Goals   Patient Goals to walk   STG Expiration Date 07/03/25   Short  Term Goal #1 1) Pt will perform all bed mobility with independence within 2 weeks to improve functional mobility and return to baseline.  2) Pt will perform STS transfer with modified independence and least restrictive AD within 2 weeks to improve strength and ensure safe discharge.  3) Pt will ambulate at least 200' with modified independence and least restrictive AD within 2 weeks to  simulate community ambulation and return to baseline.  4) Pt will ascend and descend at least 4 stairs with supervision and least restrictive AD within 2 weeks to simulate home environment and ensure safe discharge.   PT Treatment Day 0   Plan   Treatment/Interventions ADL retraining;Functional transfer training;LE strengthening/ROM;Elevations;Therapeutic exercise;Endurance training;Equipment eval/education;Bed mobility;Gait training;Spoke to nursing;OT   PT Frequency 3-5x/wk   Discharge Recommendation   Rehab Resource Intensity Level, PT III (Minimum Resource Intensity)   Equipment Recommended Walker  (pending progress)   AM-PAC Basic Mobility Inpatient   Turning in Flat Bed Without Bedrails 3   Lying on Back to Sitting on Edge of Flat Bed Without Bedrails 3   Moving Bed to Chair 3   Standing Up From Chair Using Arms 3   Walk in Room 3   Climb 3-5 Stairs With Railing 3   Basic Mobility Inpatient Raw Score 18   Basic Mobility Standardized Score 41.05   [facility] Highest Level Of Mobility   -HLM Goal 6: Walk 10 steps or more   -HLM Achieved 7: Walk 25 feet or more   Modified Barnes Scale   Modified Barnes Scale 4   End of Consult   Patient Position at End of Consult Bedside chair;Bed/Chair alarm activated;All needs within reach     RONALD Almaraz         [1]   Past Medical History:  Diagnosis Date    Anemia     Anxiety     Arthritis     Closed transcervical fracture of right femur (HCC) 07/01/2022    Colon polyp     Depression     Fracture of right wrist 07/01/2022    GERD (gastroesophageal reflux disease)     Hiatal  hernia     Hyperlipidemia     Hypertension    [2]   Past Surgical History:  Procedure Laterality Date    APPENDECTOMY      CHOLECYSTECTOMY      COLONOSCOPY      FL INJECTION RIGHT HIP (NON ARTHROGRAM)  04/21/2025    FRACTURE SURGERY Right     arm with plate and pins    HAND SURGERY Bilateral     HIP ARTHROPLASTY Right 5/21/2025    Procedure: removal/debridement prothesis hip prostalac;  Surgeon: Kit aDley MD;  Location: [facility];  Service: Orthopedics    HYSTERECTOMY      IR ASPIRATION JOINT (SPECIFY LOCATION)  4/24/2025    IR DRAINAGE TUBE PLACEMENT  5/15/2025    JOINT REPLACEMENT Bilateral     knees    TN HEMIARTHROPLASTY HIP PARTIAL Right 07/02/2022    Procedure: HEMIARTHROPLASTY HIP (BIPOLAR), closed reduction with splinting right wrist;  Surgeon: Leo Roblero;  Location: [facility];  Service: Orthopedics    TN NEUROPLASTY &/TRANSPOSITION ULNAR NERVE ELBOW Right 02/08/2023    Procedure: RELEASE CUBITAL TUNNEL;  Surgeon: Cody Calles DO;  Location: [facility];  Service: Orthopedics    SHOULDER ARTHROSCOPY Left

## 2025-06-19 NOTE — PLAN OF CARE
Problem: PAIN - ADULT  Goal: Verbalizes/displays adequate comfort level or baseline comfort level  Description: Interventions:  - Encourage patient to monitor pain and request assistance  - Assess pain using appropriate pain scale  - Administer analgesics as ordered based on type and severity of pain and evaluate response  - Implement non-pharmacological measures as appropriate and evaluate response  - Consider cultural and social influences on pain and pain management  - Notify physician/advanced practitioner if interventions unsuccessful or patient reports new pain  - Educate patient/family on pain management process including their role and importance of  reporting pain   - Provide non-pharmacologic/complimentary pain relief interventions  6/19/2025 1035 by Keny Oviedo RN  Outcome: Progressing  6/19/2025 1034 by Keny Oviedo RN  Outcome: Progressing     Problem: INFECTION - ADULT  Goal: Absence of fever/infection during neutropenic period  Description: INTERVENTIONS:  - Monitor WBC  - Perform strict hand hygiene  - Limit to healthy visitors only  - No plants, dried, fresh or silk flowers with feliciano in patient room  6/19/2025 1035 by Keny Oviedo RN  Outcome: Progressing  6/19/2025 1034 by Keny Oviedo RN  Outcome: Progressing

## 2025-06-19 NOTE — OCCUPATIONAL THERAPY NOTE
"    Occupational Therapy Evaluation     Patient Name: Sharon Vega  Today's Date: 6/19/2025  Problem List  Principal Problem:    Abscess of right hip  Active Problems:    Primary hypertension    Chronic pain syndrome    Anemia    Sacral wound    Anxiety    Hypokalemia    Past Medical History  Past Medical History[1]  Past Surgical History  Past Surgical History[2]        06/19/25 0936   OT Last Visit   OT Visit Date 06/19/25   Note Type   Note type Evaluation   Pain Assessment   Pain Assessment Tool 0-10   Pain Score No Pain   Restrictions/Precautions   Weight Bearing Precautions Per Order Yes   RLE Weight Bearing Per Order WBAT   Other Precautions Chair Alarm;Bed Alarm;WBS;Fall Risk;Pain  (posterior hip precautions)   Home Living   Type of Home House   Home Layout Two level;1/2 bath on main level;Bed/bath upstairs  (2 JOANNE vs 4+1 JOANNE)   Bathroom Shower/Tub Tub/shower unit   Bathroom Toilet Raised   Bathroom Equipment Shower chair;Commode   Home Equipment Walker;Cane  (used RW PTA)   Prior Function   Level of [place] Independent with ADLs;Independent with functional mobility;Independent with IADLS  (Pt has been requiring increased physical assistance for ADLs and IADLs since GINA.)   Lives With Son  (+ daughter-in-law and grandchild)   Receives Help From Family   IADLs Independent with driving;Independent with meal prep;Independent with medication management   Falls in the last 6 months 0   Vocational Retired   Lifestyle   Autonomy Pt reports I w/ ADLs, IADLs, and fxnl mobility w/ RW at baseline; + driving. Pt has been requiring increased physical assistance for ADLs and IADLs since GINA.   Reciprocal Relationships Pt lives w/ her son, daughter-in-law, and grandcon.   Service to Others Pt is retired.   Intrinsic Gratification Pt enjoys crossword puzzles and reading.   General   Family/Caregiver Present No   Subjective   Subjective \"I'd love to!\" - re: fxnl mobility   ADL   Where Assessed Edge of bed   Eating " Assistance 7  Independent   Grooming Assistance 5  Supervision/Setup   UB Bathing Assistance 5  Supervision/Setup   LB Bathing Assistance 4  Minimal Assistance   UB Dressing Assistance 5  Supervision/Setup   LB Dressing Assistance 4  Minimal Assistance   Toileting Assistance  4  Minimal Assistance   Bed Mobility   Supine to Sit 4  Minimal Assistance   Additional items Assist x 1; HOB elevated;Bedrails;Increased time required;Verbal cues   Sit to Supine Unable to assess   Additional Comments Pt seated OOB in chair at end of OT evaluation w/ alarm activated and all needs within reach.   Transfers   Sit to Stand 4  Minimal assistance   Additional items Assist x 1;Increased time required;Verbal cues   Stand to Sit 4  Minimal assistance   Additional items Assist x 1;Increased time required;Verbal cues   Additional Comments w/ RW for support   Functional Mobility   Functional Mobility 5  Supervision   Additional Comments Pt ambulated household distance w/ S using RW for support.   Additional items Rolling walker   Balance   Static Sitting Fair +   Dynamic Sitting Fair   Static Standing Fair -   Dynamic Standing Poor +   Ambulatory Poor +   Activity Tolerance   Activity Tolerance Patient limited by fatigue   Medical Staff Made Aware PT, Nimo, and SPT, Tory, due to pt's medical complexity and multiple comorbidities   Nurse Made Aware RN clearance prior to session   RUE Assessment   RUE Assessment WFL   LUE Assessment   LUE Assessment WFL   Hand Function   Gross Motor Coordination Functional   Fine Motor Coordination Functional   Cognition   Overall Cognitive Status WFL   Arousal/Participation Alert;Responsive;Cooperative   Attention Within functional limits   Orientation Level Oriented X4   Memory Within functional limits   Following Commands Follows one step commands without difficulty   Comments Pt was identified by name and . Pt was pleasant, cooperative, and willing to participate in OT evaluation. Pt able to  recall 3/3 posterior hip precautions w/o cueing from therapist.   Assessment   Limitation Decreased ADL status;Decreased endurance;Decreased self-care trans;Decreased high-level ADLs   Assessment Pt is a 75 y.o. female seen for OT evaluation s/p admission to [facility] on 6/18/2025 due to generalized weakness. Pt diagnosed with Abscess of right hip. Pt has a significant PMH impacting occupational performance including: Anemia, Anxiety, Arthritis, Closed transcervical fracture of right femur (HCC), Colon polyp, Depression, Fracture of right wrist, GERD (gastroesophageal reflux disease), Hiatal hernia, Hyperlipidemia, and Hypertension. Pt with active OT evaluation and treatment orders and activity orders. PTA, pt living with her son, daughter-in-law, and grandchild in a 2 SH w/ 2 vs 4+1 JOANNE. Pt reports I w/ ADLs, IADLs, and fxnl mobility at baseline; + driving. Pt has been requiring increased physical assistance for ADLs and IADLs since GINA. Pt agreeable and willing to participate in OT evaluation. During evaluation, pt was I for eating, S for grooming and UB ADLs, and min A for LB ADLs and toileting. Pt also required min Ax1 for bed mobility and transfers and S for fxnl mobility w/ RW. Performance deficits that affect the pt’s occupational performance during the initial evaluation include decreased ADL status, decreased activity tolerance, decreased endurance, decreased standing tolerance, decreased standing balance, decreased transfer skills, decreased fxnl mobility, and posterior hip precautions . Based on pt’s functional performance and deficits the following occupations will be addressed in OT treatments in order to maximize pt’s independence and overall occupational performance: grooming, bathing/showering, toileting and toilet hygiene, dressing, and functional mobility. Goals are listed below.  From OT perspective, recommend Level III (Minimum Resource Intensity) upon d/c when pt medically stable to  d/c from acute care. Will continue to follow.   Goals   Patient Goals to walk in the hallway   [facility] Time Frame 10-14   Plan   Treatment Interventions ADL retraining;Functional transfer training;Endurance training;Patient/family training;Equipment evaluation/education;Compensatory technique education;Continued evaluation;Energy conservation;Activityengagement   Goal Expiration Date 07/03/25   OT Treatment Day 0   OT Frequency 2-3x/wk   Discharge Recommendation   Rehab Resource Intensity Level, OT III (Minimum Resource Intensity)   AM-PAC Daily Activity Inpatient   Lower Body Dressing 3   Bathing 3   Toileting 3   Upper Body Dressing 3   Grooming 3   Eating 4   Daily Activity Raw Score 19   Daily Activity Standardized Score (Calc for Raw Score >=11) 40.22   AM-PAC Applied Cognition Inpatient   Following a Speech/Presentation 4   Understanding Ordinary Conversation 4   Taking Medications 4   Remembering Where Things Are Placed or Put Away 4   Remembering List of 4-5 Errands 3   Taking Care of Complicated Tasks 3   Applied Cognition Raw Score 22   Applied Cognition Standardized Score 47.83       The patient's raw score on the AM-PAC Daily Activity Inpatient Short Form is 19. A raw score of greater than or equal to 19 suggests the patient may benefit from discharge to home. Please refer to the recommendation of the Occupational Therapist for safe discharge planning.    Goals:      - Pt will complete UB ADLs w/ mod I to maximize independence and return home.    - Pt will complete LB ADLs w/ mod I to maximize independence and return home.     - Pt will complete toileting routine (transfers, hygiene, and clothing management) w/ mod I to maximize independence and return to prior level of function.    - Pt will complete bed mobility supine >< sit w/ mod I to maximize independence and return home.    - Pt will transfer to bed, chair, and toilet w/ mod I using AD / DME as needed to maximize independence and reduce burden of  care.     - Pt will ambulate household distances w/ mod I using least restrictive device to maximize independence and return home.     - Pt will increase activity tolerance (and sitting tolerance) by eating all meals OOB in the chair.     - Pt will increase standing tolerance to 3-4 minutes to maximize independence w/ ADLs and/or leisure activities.      - Pt will tolerate therapeutic activities for greater than 30 minutes in order to increase tolerance for functional activities.     HERO Gary, OTR/L                  [1]   Past Medical History:  Diagnosis Date    Anemia     Anxiety     Arthritis     Closed transcervical fracture of right femur (HCC) 07/01/2022    Colon polyp     Depression     Fracture of right wrist 07/01/2022    GERD (gastroesophageal reflux disease)     Hiatal hernia     Hyperlipidemia     Hypertension    [2]   Past Surgical History:  Procedure Laterality Date    APPENDECTOMY      CHOLECYSTECTOMY      COLONOSCOPY      FL INJECTION RIGHT HIP (NON ARTHROGRAM)  04/21/2025    FRACTURE SURGERY Right     arm with plate and pins    HAND SURGERY Bilateral     HIP ARTHROPLASTY Right 5/21/2025    Procedure: removal/debridement prothesis hip prostalac;  Surgeon: Kit Daley MD;  Location:  MAIN OR;  Service: Orthopedics    HYSTERECTOMY      IR ASPIRATION JOINT (SPECIFY LOCATION)  4/24/2025    IR DRAINAGE TUBE PLACEMENT  5/15/2025    JOINT REPLACEMENT Bilateral     knees    FL HEMIARTHROPLASTY HIP PARTIAL Right 07/02/2022    Procedure: HEMIARTHROPLASTY HIP (BIPOLAR), closed reduction with splinting right wrist;  Surgeon: Leo Roblero;  Location: [facility];  Service: Orthopedics    FL NEUROPLASTY &/TRANSPOSITION ULNAR NERVE ELBOW Right 02/08/2023    Procedure: RELEASE CUBITAL TUNNEL;  Surgeon: Cody Calles DO;  Location: [facility];  Service: Orthopedics    SHOULDER ARTHROSCOPY Left

## 2025-06-19 NOTE — RESTORATIVE TECHNICIAN NOTE
Restorative Technician Note      Patient Name: Sharon Vega     Restorative Tech Visit Date: 06/19/25  Note Type: Mobility  Patient Position Upon Consult: Supine  Activity Performed: Ambulated  Assistive Device: Roller walker  Patient Position at End of Consult: Bedside chair; All needs within reach; Bed/Chair alarm activated    Dionicio Crowley, Restorative Technician

## 2025-06-19 NOTE — PLAN OF CARE
Problem: OCCUPATIONAL THERAPY ADULT  Goal: Performs self-care activities at highest level of function for planned discharge setting.  See evaluation for individualized goals.  Description: Treatment Interventions: ADL retraining, Functional transfer training, Endurance training, Patient/family training, Equipment evaluation/education, Compensatory technique education, Continued evaluation, Energy conservation, Activityengagement          See flowsheet documentation for full assessment, interventions and recommendations.   Note: Limitation: Decreased ADL status, Decreased endurance, Decreased self-care trans, Decreased high-level ADLs     Assessment: Pt is a 75 y.o. female seen for OT evaluation s/p admission to [facility] on 6/18/2025 due to generalized weakness. Pt diagnosed with Abscess of right hip. Pt has a significant PMH impacting occupational performance including: Anemia, Anxiety, Arthritis, Closed transcervical fracture of right femur (HCC), Colon polyp, Depression, Fracture of right wrist, GERD (gastroesophageal reflux disease), Hiatal hernia, Hyperlipidemia, and Hypertension. Pt with active OT evaluation and treatment orders and activity orders. PTA, pt living with her son, daughter-in-law, and grandchild in a 2 SH w/ 2 vs 4+1 JOANNE. Pt reports I w/ ADLs, IADLs, and fxnl mobility at baseline; + driving. Pt has been requiring increased physical assistance for ADLs and IADLs since GINA. Pt agreeable and willing to participate in OT evaluation. During evaluation, pt was I for eating, S for grooming and UB ADLs, and min A for LB ADLs and toileting. Pt also required min Ax1 for bed mobility and transfers and S for fxnl mobility w/ RW. Performance deficits that affect the pt’s occupational performance during the initial evaluation include decreased ADL status, decreased activity tolerance, decreased endurance, decreased standing tolerance, decreased standing balance, decreased transfer skills,  decreased fxnl mobility, and posterior hip precautions . Based on pt’s functional performance and deficits the following occupations will be addressed in OT treatments in order to maximize pt’s independence and overall occupational performance: grooming, bathing/showering, toileting and toilet hygiene, dressing, and functional mobility. Goals are listed below.  From OT perspective, recommend Level III (Minimum Resource Intensity) upon d/c when pt medically stable to d/c from acute care. Will continue to follow.     Rehab Resource Intensity Level, OT: III (Minimum Resource Intensity)

## 2025-06-19 NOTE — ASSESSMENT & PLAN NOTE
Likely multifactorial including the acute infectious process, and metabolic derangements.  Doubt any new infectious etiology.

## 2025-06-19 NOTE — CONSULTS
"e-Consult (IPC)  - Interventional Radiology  Sharon Vega 75 y.o. female MRN: [id number]  Unit/Bed#: [facility] 918-01 Encounter: [id number]      Interventional Radiology has been consulted to evaluate Sharon Vega    We were consulted by Internal Medicine concerning this patient with right hip abscess.    Inpatient Consult to IR  Consult performed by: LOTUS Montanez  Consult ordered by: Indra Camp MD        06/19/25    Assessment/Recommendation:     75-year-old female presenting initially to [facility] for persistent right hip pain and ambulatory dysfunction.    Past medical history includes HTN, Chronic back pain and prior right hemiarthroplasty (7/2022) and recent admission for septic arthritis s/p IR hip join aspiration 4/24/25.    On 5/15/2025 abscess drain x 2 placed to right hip.  Subsequently, drains removed 5/21/2025 and 5/22/2025.    Patient represented to the [place] emergency department 6/16/2025 with complaints of right hip pain and generalized weakness. Subsequently transferred to [facility] for orthopedic evaluation/management.    CT of hip with findings of large peripherally enhancing fluid collection surrounding the proximal right femur, extending along the right gluteus muscle body and upper musculature of the right thigh.    Interventional radiology has been consulted for patient evaluation and drainage of right hip fluid collection.    Visit Vitals  /70   Pulse 97   Temp 98.3 °F (36.8 °C)   Resp 17   Ht 5' 3\" (1.6 m)   Wt 84.7 kg (186 lb 11.7 oz)   SpO2 95%   BMI 33.08 kg/m²   OB Status Hysterectomy   Smoking Status Former   BSA 1.88 m²      Component      Latest Ref Rng 6/19/2025   WBC      4.31 - 10.16 Thousand/uL 3.83 (L)       Component      Latest Ref Rng 6/19/2025   Hemoglobin      11.5 - 15.4 g/dL 7.7 (L)       Component      Latest Ref Rng 6/19/2025   Platelet Count      149 - 390 Thousands/uL 573 (H)       Component      Latest Ref Rng 6/16/2025   POCT INR      " 0.85 - 1.19  1.19                Reviewed laboratory findings and diagnostic imaging  Discussed with Dr. Moreno   Plan for IR RIGHT hip and iliopsoas drain placement. Likely tomorrow 6/20/25. Time to be determined per IR availability   Hold a.m. dose of SQ Lovenox   NPO after MN   Remainder of care per primary care service team and ID  Please and hesitate to contact interventional radiology for questions/concerns     21-30 minutes, >50% of the total time devoted to medical consultative verbal/EMR discussion between providers. Written report will be generated in the EMR.     Thank you for allowing Interventional Radiology to participate in the care of Sharon Vega.     LOTUS Montanez

## 2025-06-20 ENCOUNTER — APPOINTMENT (INPATIENT)
Dept: RADIOLOGY | Facility: HOSPITAL | Age: 75
DRG: 559 | End: 2025-06-20
Attending: NURSE PRACTITIONER
Payer: MEDICARE

## 2025-06-20 LAB
ANION GAP SERPL CALCULATED.3IONS-SCNC: 5 MMOL/L (ref 4–13)
BASOPHILS # BLD AUTO: 0.03 THOUSANDS/ÂΜL (ref 0–0.1)
BASOPHILS NFR BLD AUTO: 1 % (ref 0–1)
BUN SERPL-MCNC: 17 MG/DL (ref 5–25)
CALCIUM SERPL-MCNC: 8 MG/DL (ref 8.4–10.2)
CHLORIDE SERPL-SCNC: 102 MMOL/L (ref 96–108)
CO2 SERPL-SCNC: 29 MMOL/L (ref 21–32)
CREAT SERPL-MCNC: 0.78 MG/DL (ref 0.6–1.3)
EOSINOPHIL # BLD AUTO: 0.17 THOUSAND/ÂΜL (ref 0–0.61)
EOSINOPHIL NFR BLD AUTO: 4 % (ref 0–6)
ERYTHROCYTE [DISTWIDTH] IN BLOOD BY AUTOMATED COUNT: 15.2 % (ref 11.6–15.1)
GFR SERPL CREATININE-BSD FRML MDRD: 74 ML/MIN/1.73SQ M
GLUCOSE SERPL-MCNC: 101 MG/DL (ref 65–140)
HCT VFR BLD AUTO: 24.3 % (ref 34.8–46.1)
HGB BLD-MCNC: 7.6 G/DL (ref 11.5–15.4)
IMM GRANULOCYTES # BLD AUTO: 0.04 THOUSAND/UL (ref 0–0.2)
IMM GRANULOCYTES NFR BLD AUTO: 1 % (ref 0–2)
LYMPHOCYTES # BLD AUTO: 1.29 THOUSANDS/ÂΜL (ref 0.6–4.47)
LYMPHOCYTES NFR BLD AUTO: 30 % (ref 14–44)
MAGNESIUM SERPL-MCNC: 1.7 MG/DL (ref 1.9–2.7)
MCH RBC QN AUTO: 28.5 PG (ref 26.8–34.3)
MCHC RBC AUTO-ENTMCNC: 31.3 G/DL (ref 31.4–37.4)
MCV RBC AUTO: 91 FL (ref 82–98)
MONOCYTES # BLD AUTO: 1.04 THOUSAND/ÂΜL (ref 0.17–1.22)
MONOCYTES NFR BLD AUTO: 25 % (ref 4–12)
NEUTROPHILS # BLD AUTO: 1.67 THOUSANDS/ÂΜL (ref 1.85–7.62)
NEUTS SEG NFR BLD AUTO: 39 % (ref 43–75)
NRBC BLD AUTO-RTO: 0 /100 WBCS
PLATELET # BLD AUTO: 590 THOUSANDS/UL (ref 149–390)
PMV BLD AUTO: 9.3 FL (ref 8.9–12.7)
POTASSIUM SERPL-SCNC: 4 MMOL/L (ref 3.5–5.3)
RBC # BLD AUTO: 2.67 MILLION/UL (ref 3.81–5.12)
SODIUM SERPL-SCNC: 136 MMOL/L (ref 135–147)
WBC # BLD AUTO: 4.24 THOUSAND/UL (ref 4.31–10.16)

## 2025-06-20 PROCEDURE — 83735 ASSAY OF MAGNESIUM: CPT

## 2025-06-20 PROCEDURE — NC001 PR NO CHARGE: Performed by: ORTHOPAEDIC SURGERY

## 2025-06-20 PROCEDURE — G0545 PR INHERENT VISIT TO INPT: HCPCS | Performed by: INTERNAL MEDICINE

## 2025-06-20 PROCEDURE — 99232 SBSQ HOSP IP/OBS MODERATE 35: CPT | Performed by: INTERNAL MEDICINE

## 2025-06-20 PROCEDURE — C1729 CATH, DRAINAGE: HCPCS

## 2025-06-20 PROCEDURE — 10030 IMG GID FLU COLL DRG SFT TIS: CPT

## 2025-06-20 PROCEDURE — 87070 CULTURE OTHR SPECIMN AEROBIC: CPT

## 2025-06-20 PROCEDURE — 99232 SBSQ HOSP IP/OBS MODERATE 35: CPT

## 2025-06-20 PROCEDURE — 87077 CULTURE AEROBIC IDENTIFY: CPT

## 2025-06-20 PROCEDURE — 87075 CULTR BACTERIA EXCEPT BLOOD: CPT

## 2025-06-20 PROCEDURE — 0K9S30Z DRAINAGE OF RIGHT LOWER LEG MUSCLE WITH DRAINAGE DEVICE, PERCUTANEOUS APPROACH: ICD-10-PCS | Performed by: RADIOLOGY

## 2025-06-20 PROCEDURE — C1769 GUIDE WIRE: HCPCS

## 2025-06-20 PROCEDURE — NC001 PR NO CHARGE: Performed by: RADIOLOGY

## 2025-06-20 PROCEDURE — 99152 MOD SED SAME PHYS/QHP 5/>YRS: CPT | Performed by: RADIOLOGY

## 2025-06-20 PROCEDURE — 80048 BASIC METABOLIC PNL TOTAL CA: CPT

## 2025-06-20 PROCEDURE — 87186 SC STD MICRODIL/AGAR DIL: CPT

## 2025-06-20 PROCEDURE — 87205 SMEAR GRAM STAIN: CPT

## 2025-06-20 PROCEDURE — 49406 IMAGE CATH FLUID PERI/RETRO: CPT | Performed by: RADIOLOGY

## 2025-06-20 PROCEDURE — 0K9N30Z DRAINAGE OF RIGHT HIP MUSCLE WITH DRAINAGE DEVICE, PERCUTANEOUS APPROACH: ICD-10-PCS | Performed by: RADIOLOGY

## 2025-06-20 PROCEDURE — 49406 IMAGE CATH FLUID PERI/RETRO: CPT

## 2025-06-20 PROCEDURE — 10030 IMG GID FLU COLL DRG SFT TIS: CPT | Performed by: RADIOLOGY

## 2025-06-20 PROCEDURE — 85025 COMPLETE CBC W/AUTO DIFF WBC: CPT

## 2025-06-20 RX ORDER — MAGNESIUM SULFATE HEPTAHYDRATE 40 MG/ML
2 INJECTION, SOLUTION INTRAVENOUS ONCE
Status: COMPLETED | OUTPATIENT
Start: 2025-06-20 | End: 2025-06-20

## 2025-06-20 RX ORDER — HYDROMORPHONE HCL/PF 1 MG/ML
0.5 SYRINGE (ML) INJECTION
Status: DISCONTINUED | OUTPATIENT
Start: 2025-06-20 | End: 2025-06-25 | Stop reason: HOSPADM

## 2025-06-20 RX ORDER — FENTANYL CITRATE 50 UG/ML
INJECTION, SOLUTION INTRAMUSCULAR; INTRAVENOUS AS NEEDED
Status: COMPLETED | OUTPATIENT
Start: 2025-06-20 | End: 2025-06-20

## 2025-06-20 RX ORDER — LIDOCAINE WITH 8.4% SOD BICARB 0.9%(10ML)
SYRINGE (ML) INJECTION AS NEEDED
Status: COMPLETED | OUTPATIENT
Start: 2025-06-20 | End: 2025-06-20

## 2025-06-20 RX ORDER — MIDAZOLAM HYDROCHLORIDE 2 MG/2ML
INJECTION, SOLUTION INTRAMUSCULAR; INTRAVENOUS AS NEEDED
Status: COMPLETED | OUTPATIENT
Start: 2025-06-20 | End: 2025-06-20

## 2025-06-20 RX ADMIN — METRONIDAZOLE 500 MG: 500 TABLET ORAL at 07:54

## 2025-06-20 RX ADMIN — PANTOPRAZOLE SODIUM 40 MG: 40 TABLET, DELAYED RELEASE ORAL at 05:23

## 2025-06-20 RX ADMIN — FOLIC ACID 1 MG: 1 TABLET ORAL at 07:54

## 2025-06-20 RX ADMIN — FENTANYL CITRATE 50 MCG: 50 INJECTION INTRAMUSCULAR; INTRAVENOUS at 16:25

## 2025-06-20 RX ADMIN — MAGNESIUM SULFATE HEPTAHYDRATE 2 G: 40 INJECTION, SOLUTION INTRAVENOUS at 14:10

## 2025-06-20 RX ADMIN — PRAVASTATIN SODIUM 40 MG: 40 TABLET ORAL at 21:30

## 2025-06-20 RX ADMIN — FENTANYL CITRATE 50 MCG: 50 INJECTION INTRAMUSCULAR; INTRAVENOUS at 16:14

## 2025-06-20 RX ADMIN — OXYCODONE HYDROCHLORIDE 5 MG: 5 TABLET ORAL at 02:08

## 2025-06-20 RX ADMIN — VENLAFAXINE HYDROCHLORIDE 150 MG: 150 CAPSULE, EXTENDED RELEASE ORAL at 07:56

## 2025-06-20 RX ADMIN — AMLODIPINE BESYLATE 10 MG: 10 TABLET ORAL at 07:56

## 2025-06-20 RX ADMIN — MICONAZOLE NITRATE: 20 CREAM TOPICAL at 18:51

## 2025-06-20 RX ADMIN — VANCOMYCIN HYDROCHLORIDE 1500 MG: 10 INJECTION, POWDER, LYOPHILIZED, FOR SOLUTION INTRAVENOUS at 14:10

## 2025-06-20 RX ADMIN — Medication 10 ML: at 16:24

## 2025-06-20 RX ADMIN — FENTANYL CITRATE 25 MCG: 50 INJECTION INTRAMUSCULAR; INTRAVENOUS at 15:52

## 2025-06-20 RX ADMIN — MIDAZOLAM 1 MG: 1 INJECTION INTRAMUSCULAR; INTRAVENOUS at 16:13

## 2025-06-20 RX ADMIN — METRONIDAZOLE 500 MG: 500 TABLET ORAL at 21:30

## 2025-06-20 RX ADMIN — FENTANYL CITRATE 25 MCG: 50 INJECTION INTRAMUSCULAR; INTRAVENOUS at 15:57

## 2025-06-20 RX ADMIN — MICONAZOLE NITRATE: 20 CREAM TOPICAL at 13:45

## 2025-06-20 RX ADMIN — ASPIRIN 81 MG CHEWABLE TABLET 81 MG: 81 TABLET CHEWABLE at 21:30

## 2025-06-20 RX ADMIN — Medication 10 ML: at 15:58

## 2025-06-20 RX ADMIN — MIDAZOLAM 0.5 MG: 1 INJECTION INTRAMUSCULAR; INTRAVENOUS at 15:52

## 2025-06-20 RX ADMIN — GABAPENTIN 600 MG: 300 CAPSULE ORAL at 21:30

## 2025-06-20 RX ADMIN — OXYCODONE HYDROCHLORIDE AND ACETAMINOPHEN 500 MG: 500 TABLET ORAL at 07:54

## 2025-06-20 RX ADMIN — MIDAZOLAM 0.5 MG: 1 INJECTION INTRAMUSCULAR; INTRAVENOUS at 15:57

## 2025-06-20 RX ADMIN — ASPIRIN 81 MG CHEWABLE TABLET 81 MG: 81 TABLET CHEWABLE at 07:54

## 2025-06-20 NOTE — PROGRESS NOTES
Progress Note - Infectious Disease   Name: Sharon Vega 75 y.o. 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  However the patient had been on vancomycin apprised to operative cultures found so perhaps the vancomycin suppressed other involved organisms.  The patient had been on intravenous vancomycin and Flagyl with a plan to continue through 7/1/2025 but now has progressive pain has been found on CT scan to have a large enhancing collection around the right hip that is complex with progression into the iliopsoas.  Consideration for the possibility of abscess formation.  -Continue intravenous vancomycin and oral Flagyl for now  -Pharmacy follow-up for vancomycin dose management  -Close orthopedics follow-up  -Await interventional radiology aspiration and possible drainage of the hip joint and follow-up cultures  -Recheck CBC with differential and BMP to make sure no developing toxicity  Sacral wound  Not overtly infected.  Bicytopenia  With significant anemia and leukopenia, with a neutrophil count that currently is above 1000.  Consideration for the possibility of a primary hematologic process with the abnormal differential.  - If persists, consider hematology oncology evaluation  - Recheck CBC with differential to make sure no worsening  Generalized weakness  Likely multifactorial including the acute infectious process, and metabolic derangements.  Doubt any new infectious etiology.  QT prolongation  Limits antibiotic options.    I have discussed with the primary service the above plan to continue the vancomycin and Flagyl awaiting IR aspiration and possible drainage.  The primary service agrees with the  plan.    Antibiotics:  Vancomycin  Flagyl  Postop day 29    Subjective   Patient has no fever, chills, sweats; no nausea, vomiting, diarrhea; no cough, shortness of breath; no increased pain. No new symptoms.    Objective :  Temp:  [98.1 °F (36.7 °C)] 98.1 °F (36.7 °C)  HR:  [] 95  BP: (101-119)/(51-77) 118/74  Resp:  [16-19] 19  SpO2:  [95 %-96 %] 95 %  O2 Device: None (Room air)    General:  No acute distress  Psychiatric:  Awake and alert  Pulmonary:  Normal respiratory excursion without accessory muscle use  Abdomen:  Soft, nontender  Extremities:  No edema.  Right hip incision without drainage but with faint erythema surrounding the incision.  Skin:  No rashes      Lab Results: I have reviewed the following results:  Results from last 7 days   Lab Units 06/20/25  0526 06/19/25  0621 06/18/25  0622   WBC Thousand/uL 4.24* 3.83* 4.33   HEMOGLOBIN g/dL 7.6* 7.7* 7.7*   PLATELETS Thousands/uL 590* 573* 536*     Results from last 7 days   Lab Units 06/20/25  0526 06/19/25  0455 06/18/25  0622 06/17/25  0532 06/16/25  1218   SODIUM mmol/L 136 138 137 139 141   POTASSIUM mmol/L 4.0 3.8 3.6 2.9* 2.8*   CHLORIDE mmol/L 102 104 103 104 102   CO2 mmol/L 29 28 28 29 25   BUN mg/dL 17 18 15 11 14   CREATININE mg/dL 0.78 1.03 0.92 0.77 0.88   EGFR ml/min/1.73sq m 74 52 60 75 64   CALCIUM mg/dL 8.0* 8.0* 8.1* 7.8* 8.8   AST U/L  --   --   --  16 17   ALT U/L  --   --   --  6* 8   ALK PHOS U/L  --   --   --  40 47   ALBUMIN g/dL  --   --   --  2.7* 3.2*     Results from last 7 days   Lab Units 06/16/25  1218   BLOOD CULTURE  No Growth at 72 hrs.  No Growth at 72 hrs.     Results from last 7 days   Lab Units 06/16/25  1218   PROCALCITONIN ng/ml 0.15     Results from last 7 days   Lab Units 06/17/25  0532   CRP mg/L 89.0*     Results from last 7 days   Lab Units 06/18/25  0622   FERRITIN ng/mL 594*

## 2025-06-20 NOTE — PROGRESS NOTES
"Progress Note - Hospitalist   Name: Sharon Vega 75 y.o. female I MRN: [id number]  [facility] I Date of Admission: 6/18/2025   Date of Service: 6/20/2025 I Hospital Day: 2     Assessment & Plan  Abscess of right hip  Pertinent past medical history of prior right hip abscess and infected GINA, subsequently status post I&D and hardware extraction on 5/21 as well as has been on IV Vancomycin, po flagyl with plans for abx for 6 weeks through 7/1  Presented to the ER at [place] on 6/16 with R hip pain and generalized weakness  Underwent CT Hip on 6/17 with: \" A large peripherally enhancing fluid collection surrounding the proximal right femur, that extending along the right gluteus muscle body and upper musculature of the right thigh.  There are a few small foci of gas within this collection.  There is a separate, similar peripherally enhancing collection involving the right iliopsoas muscle bodies.  These findings are highly suspicious for abscesses\"  Afebrile, WBC 3.5K   Case was discussed by Daniel at [place] with orthopedics who recommended transfer to [place] for their evaluation and likely need for IR for drain placement and drainage of the abscesses  Patient has been receiving vancomycin as well as Flagyl at [facility]  Patient transferred to [place] on 6/18  Will resume diet after procedure today  Continue IV antibiotics  Discussed with ID and we need to wait for culture data likely through Monday patient will most likely be here for the weekend.    Patient is a minimal resource per PT OT however son is worried because was not really able to function much at home prior to coming to the hospital.  Will need to reassess over the weekend.    Primary hypertension  On pta amlodipine   Will considering Holding in setting of infection  Chronic pain syndrome  On PTA gabapentin at bedtime  Anemia  Hb 8.0; Baseline over last two months has been in the 9 range   No reported acute signs of bleeding at [facility] "   Monitor H&H  Sacral wound  Present on arrival to [facility]   Wound care consult  Anxiety  With component of depression as well   Psychiatry evaluated at [facility] in setting of increased anxiety episodes; Denied any thoughts of SI or HI. No need for inpatient BHU from their standpoint   Continue effexor as well as has prn ativan for anxiety  Hypokalemia  Hypomagnesemia and hypokalemia noted in the ER at [facility] on arrival  Supplemented with IV magnesium and oral potassium  Magnesium level has improved.  Still with hypokalemia (2.9 on AM of 6/17).  Received oral supplementation subsequent   Repeat BMP and Mg on arrival. 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  Generalized weakness    QT prolongation    Bicytopenia      VTE Pharmacologic Prophylaxis:   Moderate Risk (Score 3-4) - Pharmacological DVT Prophylaxis Ordered: enoxaparin (Lovenox).    Mobility:   Basic Mobility Inpatient Raw Score: 18  JH-HLM Goal: 6: Walk 10 steps or more  JH-HLM Achieved: 6: Walk 10 steps or more  JH-HLM Goal achieved. Continue to encourage appropriate mobility.    Patient Centered Rounds: I performed bedside rounds with nursing staff today.   Discussions with Specialists or Other Care Team Provider: None    Education and Discussions with Family / Patient: Updated  (Son) at bedside.    Current Length of Stay: 2 day(s)  Current Patient Status: Inpatient   Certification Statement: The patient will continue to require additional inpatient hospital stay due to Abscess and culture data  Discharge Plan: Anticipate discharge in 24-48 hrs to home.    Code Status: Level 1 - Full Code    Subjective   Patient does not report and headaches, shortness of breath, chest pain, abdominal pain, nausea vomiting, lower leg edema. Also reports good sleep      Objective :  Temp:  [98.1 °F (36.7 °C)] 98.1 °F (36.7 °C)  HR:  [] 96  BP: (101-121)/(51-74) 109/59  Resp:  [9-32] 32  SpO2:  [95  %-99 %] 99 %  O2 Device: Nasal cannula  Nasal Cannula O2 Flow Rate (L/min):  [2 L/min] 2 L/min    Body mass index is 33.08 kg/m².     Input and Output Summary (last 24 hours):     Intake/Output Summary (Last 24 hours) at 6/20/2025 1609  Last data filed at 6/20/2025 1603  Gross per 24 hour   Intake 430 ml   Output 230 ml   Net 200 ml       Physical Exam  Vitals and nursing note reviewed.   Constitutional:       General: She is not in acute distress.     Appearance: She is well-developed.   HENT:      Head: Normocephalic and atraumatic.      Nose: Nose normal.      Mouth/Throat:      Mouth: Mucous membranes are moist.     Eyes:      Conjunctiva/sclera: Conjunctivae normal.       Cardiovascular:      Rate and Rhythm: Normal rate and regular rhythm.      Heart sounds: No murmur heard.  Pulmonary:      Effort: Pulmonary effort is normal. No respiratory distress.      Breath sounds: Normal breath sounds.   Abdominal:      Palpations: Abdomen is soft.      Tenderness: There is no abdominal tenderness.     Musculoskeletal:      Cervical back: Neck supple.      Right lower leg: No edema.      Left lower leg: No edema.     Skin:     General: Skin is warm and dry.      Capillary Refill: Capillary refill takes less than 2 seconds.     Neurological:      General: No focal deficit present.      Mental Status: She is alert and oriented to person, place, and time.     Psychiatric:         Mood and Affect: Mood normal.           Lines/Drains:  Lines/Drains/Airways       Active Status       Name Placement date Placement time Site Days    PICC Line 05/22/25 Right Basilic 05/22/25  1422  Basilic  29    Abscess Drain Thigh 06/20/25  1600  Thigh  less than 1                    Central Line:  Goal for removal: N/A - Chronic PICC               Lab Results: I have reviewed the following results:   Results from last 7 days   Lab Units 06/20/25  0526   WBC Thousand/uL 4.24*   HEMOGLOBIN g/dL 7.6*   HEMATOCRIT % 24.3*   PLATELETS Thousands/uL  590*   SEGS PCT % 39*   LYMPHO PCT % 30   MONO PCT % 25*   EOS PCT % 4     Results from last 7 days   Lab Units 06/20/25  0526 06/18/25  0622 06/17/25  0532   SODIUM mmol/L 136   < > 139   POTASSIUM mmol/L 4.0   < > 2.9*   CHLORIDE mmol/L 102   < > 104   CO2 mmol/L 29   < > 29   BUN mg/dL 17   < > 11   CREATININE mg/dL 0.78   < > 0.77   ANION GAP mmol/L 5   < > 6   CALCIUM mg/dL 8.0*   < > 7.8*   ALBUMIN g/dL  --   --  2.7*   TOTAL BILIRUBIN mg/dL  --   --  0.40   ALK PHOS U/L  --   --  40   ALT U/L  --   --  6*   AST U/L  --   --  16   GLUCOSE RANDOM mg/dL 101   < > 96    < > = values in this interval not displayed.     Results from last 7 days   Lab Units 06/16/25  1218   INR  1.19     Results from last 7 days   Lab Units 06/17/25  2101 06/17/25  1605 06/16/25  2148   POC GLUCOSE mg/dl 130 96 103         Results from last 7 days   Lab Units 06/16/25  1218   LACTIC ACID mmol/L 1.8   PROCALCITONIN ng/ml 0.15       Recent Cultures (last 7 days):   Results from last 7 days   Lab Units 06/16/25  1218   BLOOD CULTURE  No Growth at 72 hrs.  No Growth at 72 hrs.         Last 24 Hours Medication List:     Current Facility-Administered Medications:     acetaminophen (TYLENOL) tablet 650 mg, Q6H PRN    amLODIPine (NORVASC) tablet 10 mg, Daily    ascorbic acid (VITAMIN C) tablet 500 mg, Daily    aspirin chewable tablet 81 mg, BID    [Held by provider] enoxaparin (LOVENOX) subcutaneous injection 40 mg, Daily    fentaNYL injection, PRN    folic acid (FOLVITE) tablet 1 mg, Daily    gabapentin (NEURONTIN) capsule 600 mg, HS    lidocaine 1% buffered, PRN    LORazepam (ATIVAN) tablet 1 mg, Q8H PRN    magnesium sulfate 2 g/50 mL IVPB (premix) 2 g, Once, Last Rate: 2 g (06/20/25 1410)    metroNIDAZOLE (FLAGYL) tablet 500 mg, Q12H FELIPA    midazolam (VERSED) injection, PRN    moisture barrier miconazole 2% cream (aka MARCIANO MOISTURE BARRIER ANTIFUNGAL CREAM), BID    ondansetron (ZOFRAN) injection 4 mg, Q6H PRN    oxyCODONE (ROXICODONE)  IR tablet 5 mg, Q6H PRN    oxyCODONE (ROXICODONE) split tablet 2.5 mg, Q6H PRN    pantoprazole (PROTONIX) EC tablet 40 mg, Daily    pravastatin (PRAVACHOL) tablet 40 mg, HS    vancomycin (VANCOCIN) 1500 mg in sodium chloride 0.9% 250 mL IVPB, Q24H, Last Rate: Stopped (06/20/25 1540)    venlafaxine (EFFEXOR-XR) 24 hr capsule 150 mg, Daily    Administrative Statements   Today, Patient Was Seen By: Indra Camp MD  I have spent a total time of 40 minutes in caring for this patient on the day of the visit/encounter including Diagnostic results, Prognosis, Risks and benefits of tx options, Instructions for management, Patient and family education, Importance of tx compliance, Risk factor reductions, Impressions, Counseling / Coordination of care, Documenting in the medical record, Reviewing/placing orders in the medical record (including tests, medications, and/or procedures), Obtaining or reviewing history  , and Communicating with other healthcare professionals .    **Please Note: This note may have been constructed using a voice recognition system.**

## 2025-06-20 NOTE — ASSESSMENT & PLAN NOTE
With component of depression as well   Psychiatry evaluated at [place] in setting of increased anxiety episodes; Denied any thoughts of SI or HI. No need for inpatient BHU from their standpoint   Continue effexor as well as has prn ativan for anxiety

## 2025-06-20 NOTE — ASSESSMENT & PLAN NOTE
With significant anemia and leukopenia, with a neutrophil count that currently is above 1000.  Consideration for the possibility of a primary hematologic process with the abnormal differential.  - If persists, consider hematology oncology evaluation  - Recheck CBC with differential to make sure no worsening

## 2025-06-20 NOTE — ASSESSMENT & PLAN NOTE
75-year-old female with right prosthetic joint infection undergoing two-stage revision currently in stage I with antibiotic spacer placement and 6-week course of vancomycin and Flagyl per ID which is scheduled to end on 7/1/2025.  Continues to have no pain with ambulation, clinically stable. Recommend continuing antibiotic course and monitoring labs. No acute need for surgical intervention.      Plan  WBAT RLE  Recommend consult ID  Cont 6 wk abx course per ID  Staples removed and steri-strips placed  Monitor vitals  Monitor labs  PT/OT

## 2025-06-20 NOTE — RESTORATIVE TECHNICIAN NOTE
Restorative Technician Note      Patient Name: Sharon Vega     Restorative Tech Visit Date: 06/20/25  Note Type: Mobility  Patient Position Upon Consult: Supine  Activity Performed: Ambulated  Assistive Device: Roller walker  Patient Position at End of Consult: Supine; All needs within reach; Bed/Chair alarm activated    Dionicio Crowley, Restorative Technician

## 2025-06-20 NOTE — ASSESSMENT & PLAN NOTE
Hypomagnesemia and hypokalemia noted in the ER at [place] on arrival  Supplemented with IV magnesium and oral potassium  Magnesium level has improved.  Still with hypokalemia (2.9 on AM of 6/17).  Received oral supplementation subsequent   Repeat BMP and Mg on arrival. Trial IV Kcl if still hypokalemic

## 2025-06-20 NOTE — ASSESSMENT & PLAN NOTE
"Pertinent past medical history of prior right hip abscess and infected GINA, subsequently status post I&D and hardware extraction on 5/21 as well as has been on IV Vancomycin, po flagyl with plans for abx for 6 weeks through 7/1  Presented to the ER at [place] on 6/16 with R hip pain and generalized weakness  Underwent CT Hip on 6/17 with: \" A large peripherally enhancing fluid collection surrounding the proximal right femur, that extending along the right gluteus muscle body and upper musculature of the right thigh.  There are a few small foci of gas within this collection.  There is a separate, similar peripherally enhancing collection involving the right iliopsoas muscle bodies.  These findings are highly suspicious for abscesses\"  Afebrile, WBC 3.5K   Case was discussed by Daniel at [facility] with orthopedics who recommended transfer to [place] for their evaluation and likely need for IR for drain placement and drainage of the abscesses  Patient has been receiving vancomycin as well as Flagyl at [facility]  Patient transferred to [place] on 6/18  Will resume diet after procedure today  Continue IV antibiotics  Discussed with ID and we need to wait for culture data likely through Monday patient will most likely be here for the weekend.    Patient is a minimal resource per PT OT however son is worried because was not really able to function much at home prior to coming to the hospital.  Will need to reassess over the weekend.    "

## 2025-06-20 NOTE — PROGRESS NOTES
Sharon Vega is a 75 y.o. female who is currently ordered Vancomycin IV with management by the Pharmacy Consult service.  Relevant clinical data and objective / subjective history reviewed.  Vancomycin Assessment:  Indication and Goal AUC/Trough: Soft tissue (goal -600, trough >10), -600, trough >10  Clinical Status: stable  Micro:     Renal Function:  SCr: 0.78 mg/dL  CrCl: 63.3 mL/min  Renal replacement: Not on dialysis  Days of Therapy: 5  Current Dose: 1000 mg IV q24h  Vancomycin Plan:  New Dosin mg IV q24h  Estimated AUC: 471 mcg*hr/mL  Estimated Trough: 11.4 mcg/mL  Next Level:  with AM labs  Renal Function Monitoring: Daily BMP and UOP  Pharmacy will continue to follow closely for s/sx of nephrotoxicity, infusion reactions and appropriateness of therapy.  BMP and CBC will be ordered per protocol. We will continue to follow the patient’s culture results and clinical progress daily.    Edie Mariee, Pharmacist

## 2025-06-20 NOTE — BRIEF OP NOTE (RAD/CATH)
INTERVENTIONAL RADIOLOGY PROCEDURE NOTE    Date: 6/20/2025    Procedure:   Right thigh hematoma drain placement  Right iliopsoas abscess drain placement  Procedure Summary       Date:  Room / Location:     Anesthesia Start:  Anesthesia Stop:     Procedure:  Diagnosis:     Scheduled Providers:  Responsible Provider:     Anesthesia Type: Not recorded ASA Status: Not recorded            Preoperative diagnosis:   1. Abscess of right hip         Postoperative diagnosis: Same.    Surgeon: Shon Saunders MD     Assistant: None. No qualified resident was available.    Blood loss: 5 mL    Specimens:   120 mL bloody fluid aspirate from right thigh hematoma drain with sample sent to lab for cultures.  20 mL purulent fluid aspirate from right iliopsoas muscle abscess drain sent to lab for cultures.     Findings:   10 Fr right thigh hematoma drain placement.  10 Fr right iliopsoas abscess drain placement.    Complications: None immediate.    Anesthesia: conscious sedation and local

## 2025-06-20 NOTE — DISCHARGE INSTRUCTIONS
"     TUBE CARE INSTRUCTIONS    Care after your procedure:    Resume your normal diet. Small sips of flat soda will help with nausea.    1. The properly functioning catheter should be forward flushed once (1x) daily with 10ml of normal saline using clean technique. You will be given a prescription for flushes.   To flush the tube, clean both connections with alcohol swab.Twist off the drainage bag/ bulb  tubing and twist the saline syringe into the drainage tube and flush. Remove the syringe and twist the drainage bag / bulb tubing tubing back on.    2. The drainage bag/bulb may be emptied as necessary. Keep a record of the amount of fluid you drain from your tube. This should be done with clean technique as well.     3. A fresh dressing should be applied daily over the tube insertion site.     4. As the tube is secured to the skin with only a suture,try not to pull on your tube. Tub baths are not permitted. Showers are permitted if the patient's skin entry site is prevented from getting wet. Similarly, washcloth \"baths\" are acceptable.     Contact Interventional Radiology at [phone] ([place] PATIENTS: Contact Interventional Radiology at [phone]) ([place] PATIENTS: Contact Interventional Radiology at [phone]) if:    1. Leakage or large amounts of liquid around the catheter.    2. Fever of 101 degrees lasting several hours without other obvious cause (such as sore throat, flu, etc).    3. Persistent nausea or vomiting.    4. Diminished drainage, which may be associated with pressure or pain. Or when the     drainage from your tube is less than 10mls for 48 hours.    5. Catheter pulled back or falls out.      The following pharmacies carry the flush syringes.       [facility]                     [facility] [address]                     [address] [place] [address]                         [phone]  [place] " "[place]  Phone [phone]            Phone [phone]                                                                                                       [facility]   [facility]             [facility]                                [phone]  [address]                      [address] [facility]  [facility]  Phone [phone]            Phone [phone]                      [place]                                                                                                          [phone]  [facility]  [address]  [facility]  Phone [phone]     TUBE CARE INSTRUCTIONS    Care after your procedure:    Resume your normal diet. Small sips of flat soda will help with nausea.    1. The properly functioning catheter should be forward flushed once (1x) daily with 10ml of normal saline using clean technique. You will be given a prescription for flushes.   To flush the tube, clean both connections with alcohol swab.Twist off the drainage bag/ bulb  tubing and twist the saline syringe into the drainage tube and flush. Remove the syringe and twist the drainage bag / bulb tubing tubing back on.    2. The drainage bag/bulb may be emptied as necessary. Keep a record of the amount of fluid you drain from your tube. This should be done with clean technique as well.     3. A fresh dressing should be applied daily over the tube insertion site.     4. As the tube is secured to the skin with only a suture,try not to pull on your tube. Tub baths are not permitted. Showers are permitted if the patient's skin entry site is prevented from getting wet. Similarly, washcloth \"baths\" are acceptable.     Contact Interventional Radiology at [phone] ([place] PATIENTS: Contact Interventional " Radiology at [phone]) ([place] PATIENTS: Contact Interventional Radiology at [phone]) if:    1. Leakage or large amounts of liquid around the catheter.    2. Fever of 101 degrees lasting several hours without other obvious cause (such as sore throat, flu, etc).    3. Persistent nausea or vomiting.    4. Diminished drainage, which may be associated with pressure or pain. Or when the     drainage from your tube is less than 10mls for 48 hours.    5. Catheter pulled back or falls out.      The following pharmacies carry the flush syringes.       [facility]                     [facility]                             [facility]  [address]                     [address] [facility]                         [phone]  [place]                       [place]  Phone [phone]            Phone [phone]                                                                                                       [facility]   [facility]             [facility]                                [phone]  [address]                      [address] [facility] [place]  Phone [phone]            Phone [phone]                      [facility]                                                                                                          [phone]  [facility]  [address]  [place]                                                                               [facility]  Phone [phone]

## 2025-06-20 NOTE — RESTORATIVE TECHNICIAN NOTE
Restorative Technician Note      Patient Name: Sharon Vega     Restorative Tech Visit Date: 06/20/25  Note Type: Mobility  Patient Position Upon Consult: Supine  Activity Performed: Ambulated  Assistive Device: Roller walker  Patient Position at End of Consult: Supine; Bed/Chair alarm activated; All needs within reach    Dionicio Crowley, Restorative Technician

## 2025-06-20 NOTE — PROGRESS NOTES
Progress Note - Orthopedics   Name: Sharon Vega 75 y.o. female I MRN: [id number]  Unit/Bed#: [facility] 918-01 I Date of Admission: 6/18/2025   Date of Service: 6/20/2025 I Hospital Day: 2    Assessment & Plan  Abscess of right hip  75-year-old female with right prosthetic joint infection undergoing two-stage revision currently in stage I with antibiotic spacer placement and 6-week course of vancomycin and Flagyl per ID which is scheduled to end on 7/1/2025.  Continues to have no pain with ambulation, clinically stable. Recommend continuing antibiotic course and monitoring labs. No acute need for surgical intervention.      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    Please contact the Secure[facility] role BE Ortho Floor for any questions/concerns.      Subjective   Afebrile and hemodynamically stable.  No acute events, no new complaints.  No pain at baseline.  Continues to describe weakness and difficulty with balance when ambulating with walker.  Continues to be on vancomycin and Flagyl per ID.  Plan for IR aspiration of right hip today, per notes.    Objective :  Temp:  [98.1 °F (36.7 °C)-98.3 °F (36.8 °C)] 98.1 °F (36.7 °C)  HR:  [] 98  BP: (101-119)/(51-77) 101/51  Resp:  [16-19] 19  SpO2:  [95 %-96 %] 96 %  O2 Device: None (Room air)    Physical Exam  Musculoskeletal: Right Lower Extremity  Skin intact without erythema or ecchymosis  No induration, fluctuance, or drainage on examination of surgical incision  No focal TTP  Sensation intact to light touch cj/saph/sp/dp/tib  Motor intact EHL/FHL, ankle DF/PF  2+ DP pulse        Lab Results: I have reviewed the following results:  Recent Labs     06/17/25  0532 06/18/25  0622 06/19/25  0455 06/19/25  0621   WBC 3.58* 4.33  --  3.83*   HGB 8.0*  "7.7*  --  7.7*   HCT 25.6* 25.6*  --  25.3*   * 536*  --  573*   BUN 11 15 18  --    CREATININE 0.77 0.92 1.03  --    CRP 89.0*  --   --   --      Blood Culture:    Lab Results   Component Value Date    BLOODCX No Growth at 72 hrs. 06/16/2025    BLOODCX No Growth at 72 hrs. 06/16/2025     Wound Culture: No results found for: \"WOUNDCULT\"  "

## 2025-06-20 NOTE — ASSESSMENT & PLAN NOTE
In a patient who is undergone I&D and hardware extraction on 5/21/2025 with cultures growing Finegoldia and Peptoniphilus.  However the patient had been on vancomycin apprised to operative cultures found so perhaps the vancomycin suppressed other involved organisms.  The patient had been on intravenous vancomycin and Flagyl with a plan to continue through 7/1/2025 but now has progressive pain has been found on CT scan to have a large enhancing collection around the right hip that is complex with progression into the iliopsoas.  Consideration for the possibility of abscess formation.  -Continue intravenous vancomycin and oral Flagyl for now  -Pharmacy follow-up for vancomycin dose management  -Close orthopedics follow-up  -Await interventional radiology aspiration and possible drainage of the hip joint and follow-up cultures  -Recheck CBC with differential and BMP to make sure no developing toxicity

## 2025-06-21 ENCOUNTER — APPOINTMENT (INPATIENT)
Dept: RADIOLOGY | Facility: HOSPITAL | Age: 75
DRG: 559 | End: 2025-06-21
Payer: MEDICARE

## 2025-06-21 LAB
ANION GAP SERPL CALCULATED.3IONS-SCNC: 5 MMOL/L (ref 4–13)
BACTERIA BLD CULT: NORMAL
BACTERIA BLD CULT: NORMAL
BASOPHILS # BLD AUTO: 0.03 THOUSANDS/ÂΜL (ref 0–0.1)
BASOPHILS NFR BLD AUTO: 1 % (ref 0–1)
BUN SERPL-MCNC: 13 MG/DL (ref 5–25)
CALCIUM SERPL-MCNC: 8.1 MG/DL (ref 8.4–10.2)
CHLORIDE SERPL-SCNC: 103 MMOL/L (ref 96–108)
CO2 SERPL-SCNC: 30 MMOL/L (ref 21–32)
CREAT SERPL-MCNC: 0.77 MG/DL (ref 0.6–1.3)
EOSINOPHIL # BLD AUTO: 0.13 THOUSAND/ÂΜL (ref 0–0.61)
EOSINOPHIL NFR BLD AUTO: 4 % (ref 0–6)
ERYTHROCYTE [DISTWIDTH] IN BLOOD BY AUTOMATED COUNT: 15.5 % (ref 11.6–15.1)
GFR SERPL CREATININE-BSD FRML MDRD: 75 ML/MIN/1.73SQ M
GLUCOSE SERPL-MCNC: 95 MG/DL (ref 65–140)
HCT VFR BLD AUTO: 24.3 % (ref 34.8–46.1)
HGB BLD-MCNC: 7.6 G/DL (ref 11.5–15.4)
IMM GRANULOCYTES # BLD AUTO: 0.02 THOUSAND/UL (ref 0–0.2)
IMM GRANULOCYTES NFR BLD AUTO: 1 % (ref 0–2)
LYMPHOCYTES # BLD AUTO: 0.88 THOUSANDS/ÂΜL (ref 0.6–4.47)
LYMPHOCYTES NFR BLD AUTO: 28 % (ref 14–44)
MAGNESIUM SERPL-MCNC: 2.1 MG/DL (ref 1.9–2.7)
MCH RBC QN AUTO: 28.7 PG (ref 26.8–34.3)
MCHC RBC AUTO-ENTMCNC: 31.3 G/DL (ref 31.4–37.4)
MCV RBC AUTO: 92 FL (ref 82–98)
MONOCYTES # BLD AUTO: 0.89 THOUSAND/ÂΜL (ref 0.17–1.22)
MONOCYTES NFR BLD AUTO: 28 % (ref 4–12)
NEUTROPHILS # BLD AUTO: 1.25 THOUSANDS/ÂΜL (ref 1.85–7.62)
NEUTS SEG NFR BLD AUTO: 38 % (ref 43–75)
NRBC BLD AUTO-RTO: 0 /100 WBCS
PLATELET # BLD AUTO: 547 THOUSANDS/UL (ref 149–390)
PMV BLD AUTO: 9.5 FL (ref 8.9–12.7)
POTASSIUM SERPL-SCNC: 3.6 MMOL/L (ref 3.5–5.3)
RBC # BLD AUTO: 2.65 MILLION/UL (ref 3.81–5.12)
SODIUM SERPL-SCNC: 138 MMOL/L (ref 135–147)
WBC # BLD AUTO: 3.2 THOUSAND/UL (ref 4.31–10.16)

## 2025-06-21 PROCEDURE — 71045 X-RAY EXAM CHEST 1 VIEW: CPT

## 2025-06-21 PROCEDURE — 83735 ASSAY OF MAGNESIUM: CPT

## 2025-06-21 PROCEDURE — NC001 PR NO CHARGE: Performed by: ORTHOPAEDIC SURGERY

## 2025-06-21 PROCEDURE — 85025 COMPLETE CBC W/AUTO DIFF WBC: CPT

## 2025-06-21 PROCEDURE — 80048 BASIC METABOLIC PNL TOTAL CA: CPT

## 2025-06-21 PROCEDURE — 99232 SBSQ HOSP IP/OBS MODERATE 35: CPT

## 2025-06-21 RX ADMIN — METRONIDAZOLE 500 MG: 500 TABLET ORAL at 09:14

## 2025-06-21 RX ADMIN — OXYCODONE HYDROCHLORIDE AND ACETAMINOPHEN 500 MG: 500 TABLET ORAL at 09:14

## 2025-06-21 RX ADMIN — PANTOPRAZOLE SODIUM 40 MG: 40 TABLET, DELAYED RELEASE ORAL at 05:38

## 2025-06-21 RX ADMIN — MICONAZOLE NITRATE: 20 CREAM TOPICAL at 17:58

## 2025-06-21 RX ADMIN — PRAVASTATIN SODIUM 40 MG: 40 TABLET ORAL at 21:27

## 2025-06-21 RX ADMIN — VENLAFAXINE HYDROCHLORIDE 150 MG: 150 CAPSULE, EXTENDED RELEASE ORAL at 09:15

## 2025-06-21 RX ADMIN — HYDROMORPHONE HYDROCHLORIDE 0.5 MG: 1 INJECTION, SOLUTION INTRAMUSCULAR; INTRAVENOUS; SUBCUTANEOUS at 23:37

## 2025-06-21 RX ADMIN — ASPIRIN 81 MG CHEWABLE TABLET 81 MG: 81 TABLET CHEWABLE at 21:27

## 2025-06-21 RX ADMIN — ENOXAPARIN SODIUM 40 MG: 40 INJECTION SUBCUTANEOUS at 09:14

## 2025-06-21 RX ADMIN — AMLODIPINE BESYLATE 10 MG: 10 TABLET ORAL at 09:14

## 2025-06-21 RX ADMIN — METRONIDAZOLE 500 MG: 500 TABLET ORAL at 21:27

## 2025-06-21 RX ADMIN — VANCOMYCIN HYDROCHLORIDE 1500 MG: 10 INJECTION, POWDER, LYOPHILIZED, FOR SOLUTION INTRAVENOUS at 14:28

## 2025-06-21 RX ADMIN — OXYCODONE HYDROCHLORIDE 5 MG: 5 TABLET ORAL at 21:28

## 2025-06-21 RX ADMIN — GABAPENTIN 600 MG: 300 CAPSULE ORAL at 21:27

## 2025-06-21 RX ADMIN — OXYCODONE HYDROCHLORIDE 5 MG: 5 TABLET ORAL at 12:42

## 2025-06-21 RX ADMIN — ASPIRIN 81 MG CHEWABLE TABLET 81 MG: 81 TABLET CHEWABLE at 09:14

## 2025-06-21 RX ADMIN — FOLIC ACID 1 MG: 1 TABLET ORAL at 09:14

## 2025-06-21 RX ADMIN — MICONAZOLE NITRATE: 20 CREAM TOPICAL at 09:15

## 2025-06-21 NOTE — ASSESSMENT & PLAN NOTE
Hb 8.0; Baseline over last two months has been in the 9 range   No reported acute signs of bleeding at carbon   Monitor H&H   Pt calm and cooperative at present.  Sitter at bedside documenting Q15 min checks with pt in direct view.  Pt in NAD breathing even and unlabored.  Pt denies discomfort at present.  Will continue to monitor.

## 2025-06-21 NOTE — ASSESSMENT & PLAN NOTE
"Pertinent past medical history of prior right hip abscess and infected GINA, subsequently status post I&D and hardware extraction on 5/21 as well as has been on IV Vancomycin, po flagyl with plans for abx for 6 weeks through 7/1  Presented to the ER at [place] on 6/16 with R hip pain and generalized weakness  Underwent CT Hip on 6/17 with: \" A large peripherally enhancing fluid collection surrounding the proximal right femur, that extending along the right gluteus muscle body and upper musculature of the right thigh.  There are a few small foci of gas within this collection.  There is a separate, similar peripherally enhancing collection involving the right iliopsoas muscle bodies.  These findings are highly suspicious for abscesses\"  Afebrile, WBC 3.5K   Transferred over from [facility] for IR drainage.  Continue antibiotic biotics vancomycin and Flagyl.  Patient transferred to [place] on 6/18  Status post drain placement on the right hip.  Continue IV antibiotics  Discussed with ID and we need to wait for culture data likely through Monday patient will most likely be here for the weekend.    Her bloody discharge with the drainage noted today.  Patient did great with physical therapy.  Son was worried that patient was not very ambulatory at home however patient herself thinks that is because she was a little bit depressed.  No issues with walking around  Can do outpatient rehab    "

## 2025-06-21 NOTE — CASE MANAGEMENT
Case Management Discharge Planning Note    Patient name Sharon Vega  Location [facility] 918/[facility] 918-01 MRN [id number]  : 1950 Date 2025       Current Admission Date: 2025  Current Admission Diagnosis:Abscess of right hip   Patient Active Problem List    Diagnosis Date Noted    Bicytopenia 2025    Hypokalemia 2025    QT prolongation 2025    Anxiety 2025    Generalized weakness 2025    Electrolyte abnormality 2025    Sacral wound 2025    Pre-diabetes 2025    Constipation 2025    Positive blood culture 2025    Aortic stenosis 2025    Anemia 2025    Hypomagnesemia 05/15/2025    Hyponatremia 05/15/2025    Abscess of right hip 2025    History of hemiarthroplasty of right hip 2025    Heart murmur 2025    Thrombocytosis 2025    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) 2025    Class 2 severe obesity due to excess calories with serious comorbidity and body mass index (BMI) of 35.0 to 35.9 in adult (HCC) 2025    Right hip pain 2025    Sepsis without acute organ dysfunction (HCC) 2025    Sacroiliitis (HCC) 2025    Lumbar radiculopathy     Chronic pain syndrome 2023    Primary hypertension 2022    Lumbar degenerative disc disease 2022    Lumbar spondylosis 2022    Cervical radiculopathy 2022    Cervical spondylosis 2022    Rheumatoid arthritis involving both hands (HCC) 2022    Spinal stenosis of lumbar region without neurogenic claudication 2022      LOS (days): 3  Geometric Mean LOS (GMLOS) (days): 2.8  Days to GMLOS:-0.5     OBJECTIVE:  Risk of Unplanned Readmission Score: 32.25         Current admission status: Inpatient   Preferred Pharmacy:   [facility] - [place], [place] - [address]  [address]  [place] [address]  Phone: [phone] Fax: [phone]    [id number] - [facility]  [place] - [address]  [address] [facility] [address]  Phone: [phone] Fax: [phone]    Primary Care Provider: Danielito Antunez DO    Primary Insurance: MEDICARE  Secondary Insurance: AARP    DISCHARGE DETAILS:    Discharge planning discussed with:: Son-Bill  Freedom of Choice: Yes  Comments - Freedom of Choice: Agreeable to MEG for HHC with [facility], referral sent.  CM also discussed possible STR/SNF placement, CM sent blanket referral.  CM contacted family/caregiver?: Yes  Were Treatment Team discharge recommendations reviewed with patient/caregiver?: Yes  Did patient/caregiver verbalize understanding of patient care needs?: Yes  Were patient/caregiver advised of the risks associated with not following Treatment Team discharge recommendations?: Yes    Contacts  Patient Contacts: Son - Bill  Relationship to Patient:: Family  Contact Method: Phone  Phone Number: [phone]  Reason/Outcome: Referral, Discharge Planning, Emergency Contact    Requested Home Health Care         [facility] Health Agency Name:: [facility]         Other Referral/Resources/Interventions Provided:  Interventions: HHC, SNF  Referral Comments: CM sent blanket referral for possible SNF placement. CM also reviewed HHC referral with family, after discussion with family CM canceled referral to  [facility] and resereved [facility] for MEG at d/c. Pt will likely need new Rx for ABX, CM sent message to [facility] updating.  Culters will come back Sunday or Monday.

## 2025-06-21 NOTE — PROGRESS NOTES
"Progress Note - Hospitalist   Name: Sharon Vega 75 y.o. female I MRN: [id number]  [facility] I Date of Admission: 6/18/2025   Date of Service: 6/21/2025 I Hospital Day: 3     Assessment & Plan  Abscess of right hip  Pertinent past medical history of prior right hip abscess and infected GINA, subsequently status post I&D and hardware extraction on 5/21 as well as has been on IV Vancomycin, po flagyl with plans for abx for 6 weeks through 7/1  Presented to the ER at [place] on 6/16 with R hip pain and generalized weakness  Underwent CT Hip on 6/17 with: \" A large peripherally enhancing fluid collection surrounding the proximal right femur, that extending along the right gluteus muscle body and upper musculature of the right thigh.  There are a few small foci of gas within this collection.  There is a separate, similar peripherally enhancing collection involving the right iliopsoas muscle bodies.  These findings are highly suspicious for abscesses\"  Afebrile, WBC 3.5K   Transferred over from [facility] for IR drainage.  Continue antibiotic biotics vancomycin and Flagyl.  Patient transferred to [place] on 6/18  Status post drain placement on the right hip.  Continue IV antibiotics  Discussed with ID and we need to wait for culture data likely through Monday patient will most likely be here for the weekend.    Her bloody discharge with the drainage noted today.  Patient did great with physical therapy.  Son was worried that patient was not very ambulatory at home however patient herself thinks that is because she was a little bit depressed.  No issues with walking around  Can do outpatient rehab    Primary hypertension  On pta amlodipine   Will considering Holding in setting of infection  Chronic pain syndrome  On PTA gabapentin at bedtime  Anemia  Hb 8.0; Baseline over last two months has been in the 9 range   No reported acute signs of bleeding at carbon   Monitor H&H  Sacral wound  Present on " arrival to [place]   Wound care consult  Anxiety  With component of depression as well   Psychiatry evaluated at [place] in setting of increased anxiety episodes; Denied any thoughts of SI or HI. No need for inpatient BHU from their standpoint   Continue effexor as well as has prn ativan for anxiety  Hypokalemia  Hypomagnesemia and hypokalemia noted in the ER at [place] on arrival  Supplemented with IV magnesium and oral potassium  Magnesium level has improved.  Still with hypokalemia (2.9 on AM of 6/17).  Received oral supplementation subsequent   Repeat BMP and Mg on arrival. 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  Generalized weakness    QT prolongation    Bicytopenia      VTE Pharmacologic Prophylaxis:   Moderate Risk (Score 3-4) - Pharmacological DVT Prophylaxis Ordered: enoxaparin (Lovenox).    Mobility:   Basic Mobility Inpatient Raw Score: 18  JH-HLM Goal: 6: Walk 10 steps or more  JH-HLM Achieved: 4: Move to chair/commode  JH-HLM Goal achieved. Continue to encourage appropriate mobility.    Patient Centered Rounds: I performed bedside rounds with nursing staff today.   Discussions with Specialists or Other Care Team Provider: None    Education and Discussions with Family / Patient tried to call son but he would not  today.    Current Length of Stay: 3 day(s)  Current Patient Status: Inpatient   Certification Statement: The patient will continue to require additional inpatient hospital stay due to Abscess and culture data  Discharge Plan: Anticipate discharge in 24-48 hrs to home.    Code Status: Level 1 - Full Code    Subjective   Patient is doing well not in significant amount of pain.    Objective :  Temp:  [97.9 °F (36.6 °C)-98.3 °F (36.8 °C)] 97.9 °F (36.6 °C)  HR:  [] 93  BP: (107-121)/(55-69) 112/69  Resp:  [9-32] 16  SpO2:  [92 %-99 %] 95 %  O2 Device: None (Room air)  Nasal Cannula O2 Flow Rate (L/min):  [2 L/min-4 L/min] 4  L/min    Body mass index is 33.08 kg/m².     Input and Output Summary (last 24 hours):     Intake/Output Summary (Last 24 hours) at 6/21/2025 1512  Last data filed at 6/21/2025 1242  Gross per 24 hour   Intake 640 ml   Output 1505 ml   Net -865 ml       Physical Exam  Vitals and nursing note reviewed.   Constitutional:       General: She is not in acute distress.     Appearance: She is well-developed.   HENT:      Head: Normocephalic and atraumatic.      Nose: Nose normal.      Mouth/Throat:      Mouth: Mucous membranes are moist.     Eyes:      Conjunctiva/sclera: Conjunctivae normal.       Cardiovascular:      Rate and Rhythm: Normal rate and regular rhythm.      Heart sounds: No murmur heard.  Pulmonary:      Effort: Pulmonary effort is normal. No respiratory distress.      Breath sounds: Normal breath sounds.   Abdominal:      Palpations: Abdomen is soft.      Tenderness: There is no abdominal tenderness.     Musculoskeletal:      Cervical back: Neck supple.      Right lower leg: No edema.      Left lower leg: No edema.     Skin:     General: Skin is warm and dry.      Capillary Refill: Capillary refill takes less than 2 seconds.     Neurological:      General: No focal deficit present.      Mental Status: She is alert and oriented to person, place, and time.     Psychiatric:         Mood and Affect: Mood normal.           Lines/Drains:  Lines/Drains/Airways       Active Status       Name Placement date Placement time Site Days    PICC Line 05/22/25 Right Basilic 05/22/25  1422  Basilic  30    Abscess Drain Thigh 06/20/25  1600  Thigh  less than 1    Abscess Drain RLQ 06/20/25  1628  RLQ  less than 1                    Central Line:  Goal for removal: N/A - Chronic PICC               Lab Results: I have reviewed the following results:   Results from last 7 days   Lab Units 06/21/25  0545   WBC Thousand/uL 3.20*   HEMOGLOBIN g/dL 7.6*   HEMATOCRIT % 24.3*   PLATELETS Thousands/uL 547*   SEGS PCT % 38*   LYMPHO  PCT % 28   MONO PCT % 28*   EOS PCT % 4     Results from last 7 days   Lab Units 06/21/25  0545 06/18/25  0622 06/17/25  0532   SODIUM mmol/L 138   < > 139   POTASSIUM mmol/L 3.6   < > 2.9*   CHLORIDE mmol/L 103   < > 104   CO2 mmol/L 30   < > 29   BUN mg/dL 13   < > 11   CREATININE mg/dL 0.77   < > 0.77   ANION GAP mmol/L 5   < > 6   CALCIUM mg/dL 8.1*   < > 7.8*   ALBUMIN g/dL  --   --  2.7*   TOTAL BILIRUBIN mg/dL  --   --  0.40   ALK PHOS U/L  --   --  40   ALT U/L  --   --  6*   AST U/L  --   --  16   GLUCOSE RANDOM mg/dL 95   < > 96    < > = values in this interval not displayed.     Results from last 7 days   Lab Units 06/16/25  1218   INR  1.19     Results from last 7 days   Lab Units 06/17/25  2101 06/17/25  1605 06/16/25  2148   POC GLUCOSE mg/dl 130 96 103         Results from last 7 days   Lab Units 06/16/25  1218   LACTIC ACID mmol/L 1.8   PROCALCITONIN ng/ml 0.15       Recent Cultures (last 7 days):   Results from last 7 days   Lab Units 06/20/25  1645 06/20/25  1603 06/16/25  1218   BLOOD CULTURE   --   --  No Growth After 4 Days.  No Growth After 4 Days.   GRAM STAIN RESULT  4+ Polys  No bacteria seen No Polys or Bacteria seen  --    BODY FLUID CULTURE, STERILE  No growth No growth  --          Last 24 Hours Medication List:     Current Facility-Administered Medications:     acetaminophen (TYLENOL) tablet 650 mg, Q6H PRN    amLODIPine (NORVASC) tablet 10 mg, Daily    ascorbic acid (VITAMIN C) tablet 500 mg, Daily    aspirin chewable tablet 81 mg, BID    enoxaparin (LOVENOX) subcutaneous injection 40 mg, Daily    folic acid (FOLVITE) tablet 1 mg, Daily    gabapentin (NEURONTIN) capsule 600 mg, HS    HYDROmorphone (DILAUDID) injection 0.5 mg, Q3H PRN    LORazepam (ATIVAN) tablet 1 mg, Q8H PRN    metroNIDAZOLE (FLAGYL) tablet 500 mg, Q12H FELIPA    moisture barrier miconazole 2% cream (aka MARCIANO MOISTURE BARRIER ANTIFUNGAL CREAM), BID    ondansetron (ZOFRAN) injection 4 mg, Q6H PRN    oxyCODONE  (ROXICODONE) IR tablet 5 mg, Q6H PRN    oxyCODONE (ROXICODONE) split tablet 2.5 mg, Q6H PRN    pantoprazole (PROTONIX) EC tablet 40 mg, Daily    pravastatin (PRAVACHOL) tablet 40 mg, HS    vancomycin (VANCOCIN) 1500 mg in sodium chloride 0.9% 250 mL IVPB, Q24H, Last Rate: 1,500 mg (06/21/25 1428)    venlafaxine (EFFEXOR-XR) 24 hr capsule 150 mg, Daily    Administrative Statements   Today, Patient Was Seen By: Indra Camp MD  I have spent a total time of 40 minutes in caring for this patient on the day of the visit/encounter including Diagnostic results, Prognosis, Risks and benefits of tx options, Instructions for management, Patient and family education, Importance of tx compliance, Risk factor reductions, Impressions, Counseling / Coordination of care, Documenting in the medical record, Reviewing/placing orders in the medical record (including tests, medications, and/or procedures), Obtaining or reviewing history  , and Communicating with other healthcare professionals .    **Please Note: This note may have been constructed using a voice recognition system.**

## 2025-06-21 NOTE — ASSESSMENT & PLAN NOTE
With component of depression as well   Psychiatry evaluated at [place] in setting of increased anxiety episodes; Denied any thoughts of SI or HI. No need for inpatient BHU from their standpoint   Continue effexor as well as has prn ativan for anxiety

## 2025-06-21 NOTE — ASSESSMENT & PLAN NOTE
Hypomagnesemia and hypokalemia noted in the ER at [place] on arrival  Supplemented with IV magnesium and oral potassium  Magnesium level has improved.  Still with hypokalemia (2.9 on AM of 6/17).  Received oral supplementation subsequent   Repeat BMP and Mg on arrival. Trial IV Kcl if still hypokalemic

## 2025-06-21 NOTE — ASSESSMENT & PLAN NOTE
75-year-old female with right prosthetic joint infection undergoing two-stage revision currently in stage I with antibiotic spacer placement and 6-week course of vancomycin and Flagyl per ID which is scheduled to end on 7/1/2025.  Continues to have no pain with ambulation, clinically stable. Recommend continuing antibiotic course and monitoring labs. No acute need for surgical intervention. S/p IR drain placement yesterday, will follow up cultures     Plan  WBAT RLE  ID following, appreciate recs  Cont 6 wk abx course per ID  Staples removed and steri-strips placed  Monitor vitals  F/u cultures from IR aspiration   Monitor labs  PT/OT

## 2025-06-21 NOTE — PROGRESS NOTES
Progress Note - Orthopedics   Name: Sharon Vega 75 y.o. female I MRN: [id number]  Unit/Bed#: [facility] 918-01 I Date of Admission: 6/18/2025   Date of Service: 6/21/2025 I Hospital Day: 3    Assessment & Plan  Abscess of right hip  75-year-old female with right prosthetic joint infection undergoing two-stage revision currently in stage I with antibiotic spacer placement and 6-week course of vancomycin and Flagyl per ID which is scheduled to end on 7/1/2025.  Continues to have no pain with ambulation, clinically stable. Recommend continuing antibiotic course and monitoring labs. No acute need for surgical intervention. 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 No acute events, no new complaints. No pain at baseline. Continues to describe weakness and difficulty with balance when ambulating with walker.     Objective :  Temp:  [97.9 °F (36.6 °C)-98.3 °F (36.8 °C)] 98.3 °F (36.8 °C)  HR:  [] 85  BP: (107-121)/(55-74) 107/68  Resp:  [9-32] 16  SpO2:  [92 %-99 %] 98 %  O2 Device: None (Room air)  Nasal Cannula O2 Flow Rate (L/min):  [2 L/min-4 L/min] 4 L/min    Physical Exam  Musculoskeletal: Right Lower Extremity  Skin intact without erythema or ecchymosis  No induration, fluctuance, or drainage on examination of surgical incision  No focal TTP  2 10fr IR drains in place  Sensation intact to light touch cj/saph/sp/dp/tib  Motor intact EHL/FHL, ankle DF/PF  2+ DP pulse      Lab Results: I have reviewed the following results:  Recent Labs     06/18/25  0622 06/19/25  0455 06/19/25  0621 06/20/25  0526   WBC 4.33  --  3.83* 4.24*   HGB 7.7*  --  7.7* 7.6*   HCT 25.6*  --  25.3* 24.3*  "  *  --  573* 590*   BUN 15 18  --  17   CREATININE 0.92 1.03  --  0.78     Blood Culture:    Lab Results   Component Value Date    BLOODCX No Growth After 4 Days. 06/16/2025    BLOODCX No Growth After 4 Days. 06/16/2025     Wound Culture: No results found for: \"WOUNDCULT\"        "

## 2025-06-21 NOTE — PROGRESS NOTES
Patient:  JESS ALLRED    MRN:  [id number]    [place] Request ID:  [id number]    Level of care reserved:  Home Health Agency    Partner Reserved:  [facility], [facility], [place] [address] [phone]    Clinical needs requested:    Geography searched:  10 miles around [phone]    Start of Service:    Request sent:  3:28pm EDT on 6/21/2025 by Chapis Hobbs    Partner reserved:  3:33pm EDT on 6/21/2025 by Chapis Hobbs    Choice list shared:  3:33pm EDT on 6/21/2025 by Chapis Hobbs

## 2025-06-22 LAB
ANION GAP SERPL CALCULATED.3IONS-SCNC: 6 MMOL/L (ref 4–13)
BUN SERPL-MCNC: 12 MG/DL (ref 5–25)
CALCIUM SERPL-MCNC: 8 MG/DL (ref 8.4–10.2)
CHLORIDE SERPL-SCNC: 102 MMOL/L (ref 96–108)
CO2 SERPL-SCNC: 29 MMOL/L (ref 21–32)
CREAT SERPL-MCNC: 0.77 MG/DL (ref 0.6–1.3)
ERYTHROCYTE [DISTWIDTH] IN BLOOD BY AUTOMATED COUNT: 15.2 % (ref 11.6–15.1)
GFR SERPL CREATININE-BSD FRML MDRD: 75 ML/MIN/1.73SQ M
GLUCOSE SERPL-MCNC: 98 MG/DL (ref 65–140)
HCT VFR BLD AUTO: 24.6 % (ref 34.8–46.1)
HGB BLD-MCNC: 7.5 G/DL (ref 11.5–15.4)
MCH RBC QN AUTO: 28 PG (ref 26.8–34.3)
MCHC RBC AUTO-ENTMCNC: 30.5 G/DL (ref 31.4–37.4)
MCV RBC AUTO: 92 FL (ref 82–98)
PLATELET # BLD AUTO: 561 THOUSANDS/UL (ref 149–390)
PMV BLD AUTO: 9.5 FL (ref 8.9–12.7)
POTASSIUM SERPL-SCNC: 3.7 MMOL/L (ref 3.5–5.3)
RBC # BLD AUTO: 2.68 MILLION/UL (ref 3.81–5.12)
SODIUM SERPL-SCNC: 137 MMOL/L (ref 135–147)
WBC # BLD AUTO: 3 THOUSAND/UL (ref 4.31–10.16)

## 2025-06-22 PROCEDURE — 99232 SBSQ HOSP IP/OBS MODERATE 35: CPT

## 2025-06-22 PROCEDURE — NC001 PR NO CHARGE: Performed by: ORTHOPAEDIC SURGERY

## 2025-06-22 PROCEDURE — 80048 BASIC METABOLIC PNL TOTAL CA: CPT

## 2025-06-22 PROCEDURE — 85027 COMPLETE CBC AUTOMATED: CPT

## 2025-06-22 RX ADMIN — OXYCODONE HYDROCHLORIDE 5 MG: 5 TABLET ORAL at 21:10

## 2025-06-22 RX ADMIN — ASPIRIN 81 MG CHEWABLE TABLET 81 MG: 81 TABLET CHEWABLE at 21:09

## 2025-06-22 RX ADMIN — FOLIC ACID 1 MG: 1 TABLET ORAL at 08:58

## 2025-06-22 RX ADMIN — ENOXAPARIN SODIUM 40 MG: 40 INJECTION SUBCUTANEOUS at 08:59

## 2025-06-22 RX ADMIN — OXYCODONE HYDROCHLORIDE 5 MG: 5 TABLET ORAL at 08:58

## 2025-06-22 RX ADMIN — HYDROMORPHONE HYDROCHLORIDE 0.5 MG: 1 INJECTION, SOLUTION INTRAMUSCULAR; INTRAVENOUS; SUBCUTANEOUS at 14:14

## 2025-06-22 RX ADMIN — AMLODIPINE BESYLATE 10 MG: 10 TABLET ORAL at 08:58

## 2025-06-22 RX ADMIN — GABAPENTIN 600 MG: 300 CAPSULE ORAL at 21:09

## 2025-06-22 RX ADMIN — PRAVASTATIN SODIUM 40 MG: 40 TABLET ORAL at 21:09

## 2025-06-22 RX ADMIN — VANCOMYCIN HYDROCHLORIDE 1500 MG: 10 INJECTION, POWDER, LYOPHILIZED, FOR SOLUTION INTRAVENOUS at 14:15

## 2025-06-22 RX ADMIN — MICONAZOLE NITRATE: 20 CREAM TOPICAL at 08:59

## 2025-06-22 RX ADMIN — MICONAZOLE NITRATE: 20 CREAM TOPICAL at 17:05

## 2025-06-22 RX ADMIN — PANTOPRAZOLE SODIUM 40 MG: 40 TABLET, DELAYED RELEASE ORAL at 06:41

## 2025-06-22 RX ADMIN — METRONIDAZOLE 500 MG: 500 TABLET ORAL at 08:58

## 2025-06-22 RX ADMIN — METRONIDAZOLE 500 MG: 500 TABLET ORAL at 21:09

## 2025-06-22 RX ADMIN — ASPIRIN 81 MG CHEWABLE TABLET 81 MG: 81 TABLET CHEWABLE at 08:58

## 2025-06-22 RX ADMIN — OXYCODONE HYDROCHLORIDE AND ACETAMINOPHEN 500 MG: 500 TABLET ORAL at 08:58

## 2025-06-22 RX ADMIN — VENLAFAXINE HYDROCHLORIDE 150 MG: 150 CAPSULE, EXTENDED RELEASE ORAL at 08:58

## 2025-06-22 NOTE — PROGRESS NOTES
Progress Note - Orthopedics   Name: Sharon Vega 75 y.o. female I MRN: [id number]  [facility] I Date of Admission: 6/18/2025   Date of Service: 6/22/2025 I Hospital Day: 4    Assessment & Plan  Abscess of right hip  75-year-old female with right prosthetic joint infection undergoing two-stage revision currently in stage I with antibiotic spacer placement and 6-week course of vancomycin and Flagyl per ID which is scheduled to end on 7/1/2025.  Continues to have no pain with ambulation, clinically stable. Recommend continuing antibiotic course and monitoring labs. No acute need for surgical intervention. 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 No acute events, no new complaints. Pain well controlled. Denies fevers, chills, CP, SOB, N/V, numbness or tingling.   Objective :  Temp:  [97.9 °F (36.6 °C)-98.8 °F (37.1 °C)] 98.8 °F (37.1 °C)  HR:  [93-94] 94  BP: (110-112)/(69-71) 112/71  Resp:  [16-20] 20  SpO2:  [93 %-96 %] 93 %  O2 Device: None (Room air)    Physical Exam  Musculoskeletal: Right Lower Extremity  Skin intact without erythema or ecchymosis  No induration, fluctuance, or drainage on examination of surgical incision  No focal TTP  2 10fr IR drains in place  Sensation intact to light touch cj/saph/sp/dp/tib  Motor intact EHL/FHL, ankle DF/PF  2+ DP pulse      Lab Results: I have reviewed the following results:  Recent Labs     06/20/25  0526 06/21/25  0545 06/22/25  0439   WBC 4.24* 3.20* 3.00*   HGB 7.6* 7.6* 7.5*   HCT 24.3* 24.3* 24.6*   * 547* 561*   BUN 17 13  --    CREATININE 0.78 0.77  --      Blood Culture:    Lab Results   Component Value Date    BLOODCX No Growth After 5 Days. 06/16/2025    BLOODCX No Growth  "After 5 Days. 06/16/2025     Wound Culture: No results found for: \"WOUNDCULT\"        "

## 2025-06-22 NOTE — ASSESSMENT & PLAN NOTE
"Pertinent past medical history of prior right hip abscess and infected GINA, subsequently status post I&D and hardware extraction on 5/21 as well as has been on IV Vancomycin, po flagyl with plans for abx for 6 weeks through 7/1  Presented to the ER at [place] on 6/16 with R hip pain and generalized weakness  Underwent CT Hip on 6/17 with: \" A large peripherally enhancing fluid collection surrounding the proximal right femur, that extending along the right gluteus muscle body and upper musculature of the right thigh.  There are a few small foci of gas within this collection.  There is a separate, similar peripherally enhancing collection involving the right iliopsoas muscle bodies.  These findings are highly suspicious for abscesses\"  Afebrile, WBC 3.5K   Transferred over from [facility] for IR drainage.  Continue antibiotic biotics vancomycin and Flagyl.  Patient transferred to [place] on 6/18  Status post drain placement on the right hip.  Continue IV antibiotics  Discussed with ID and we need to wait for culture data likely through Monday patient will most likely be here for the weekend.    Can do outpatient rehab  Plan for discharge tomorrow    "

## 2025-06-22 NOTE — ASSESSMENT & PLAN NOTE
With component of depression as well   Psychiatry evaluated at [place] in setting of increased anxiety episodes; Denied any thoughts of SI or HI. No need for inpatient BHU from their standpoint   Continue effexor as well as has prn ativan for anxiety

## 2025-06-22 NOTE — PROGRESS NOTES
"Progress Note - Hospitalist   Name: Sharon Vega 75 y.o. female I MRN: [id number]  [facility] I Date of Admission: 6/18/2025   Date of Service: 6/22/2025 I Hospital Day: 4     Assessment & Plan  Abscess of right hip  Pertinent past medical history of prior right hip abscess and infected GINA, subsequently status post I&D and hardware extraction on 5/21 as well as has been on IV Vancomycin, po flagyl with plans for abx for 6 weeks through 7/1  Presented to the ER at [facility] on 6/16 with R hip pain and generalized weakness  Underwent CT Hip on 6/17 with: \" A large peripherally enhancing fluid collection surrounding the proximal right femur, that extending along the right gluteus muscle body and upper musculature of the right thigh.  There are a few small foci of gas within this collection.  There is a separate, similar peripherally enhancing collection involving the right iliopsoas muscle bodies.  These findings are highly suspicious for abscesses\"  Afebrile, WBC 3.5K   Transferred over from [facility] for IR drainage.  Continue antibiotic biotics vancomycin and Flagyl.  Patient transferred to [facility] on 6/18  Status post drain placement on the right hip.  Continue IV antibiotics  Discussed with ID and we need to wait for culture data likely through Monday patient will most likely be here for the weekend.    Can do outpatient rehab  Plan for discharge tomorrow    Primary hypertension  On pta amlodipine   Will considering Holding in setting of infection  Chronic pain syndrome  On PTA gabapentin at bedtime  Anemia  Hb 8.0; Baseline over last two months has been in the 9 range   No reported acute signs of bleeding at [facility]   Monitor H&H  Sacral wound  Present on arrival to [facility]   Wound care consult  Anxiety  With component of depression as well   Psychiatry evaluated at [facility] in setting of increased anxiety episodes; Denied any thoughts of SI or HI. No need for inpatient BHU from their " standpoint   Continue effexor as well as has prn ativan for anxiety  Hypokalemia  Hypomagnesemia and hypokalemia noted in the ER at [facility] on arrival  Supplemented with IV magnesium and oral potassium  Magnesium level has improved.  Still with hypokalemia (2.9 on AM of 6/17).  Received oral supplementation subsequent   Repeat BMP and Mg on arrival. 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  Generalized weakness    QT prolongation    Bicytopenia      VTE Pharmacologic Prophylaxis:   Moderate Risk (Score 3-4) - Pharmacological DVT Prophylaxis Ordered: enoxaparin (Lovenox).    Mobility:   Basic Mobility Inpatient Raw Score: 18  JH-HLM Goal: 6: Walk 10 steps or more  JH-HLM Achieved: 6: Walk 10 steps or more  JH-HLM Goal achieved. Continue to encourage appropriate mobility.    Patient Centered Rounds: I performed bedside rounds with nursing staff today.   Discussions with Specialists or Other Care Team Provider: None    Education and Discussions with Family / Patient tried to call son but he would not  today.    Current Length of Stay: 4 day(s)  Current Patient Status: Inpatient   Certification Statement: The patient will continue to require additional inpatient hospital stay due to Abscess and culture data  Discharge Plan: Anticipate discharge tomorrow to home.    Code Status: Level 1 - Full Code    Subjective   Patient has no complaints    Objective :  Temp:  [97.8 °F (36.6 °C)-98.8 °F (37.1 °C)] 98.4 °F (36.9 °C)  HR:  [91-94] 92  BP: (110-113)/(67-71) 112/67  Resp:  [16-20] 16  SpO2:  [88 %-95 %] 88 %  O2 Device: None (Room air)    Body mass index is 33.08 kg/m².     Input and Output Summary (last 24 hours):     Intake/Output Summary (Last 24 hours) at 6/22/2025 1838  Last data filed at 6/22/2025 1500  Gross per 24 hour   Intake 480 ml   Output 1210 ml   Net -730 ml       Physical Exam  Constitutional:       Appearance: She is not ill-appearing.    HENT:      Head: Atraumatic.     Cardiovascular:      Rate and Rhythm: Normal rate.   Pulmonary:      Effort: No respiratory distress.   Abdominal:      General: There is no distension.     Musculoskeletal:      Right lower leg: No edema.      Left lower leg: No edema.           Lines/Drains:  Lines/Drains/Airways       Active Status       Name Placement date Placement time Site Days    PICC Line 05/22/25 Right Basilic 05/22/25  1422  Basilic  31    Abscess Drain Thigh 06/20/25  1600  Thigh  2    Abscess Drain RLQ 06/20/25  1628  RLQ  2                    Central Line:  Goal for removal: N/A - Chronic PICC               Lab Results: I have reviewed the following results:   Results from last 7 days   Lab Units 06/22/25  0439 06/21/25  0545   WBC Thousand/uL 3.00* 3.20*   HEMOGLOBIN g/dL 7.5* 7.6*   HEMATOCRIT % 24.6* 24.3*   PLATELETS Thousands/uL 561* 547*   SEGS PCT %  --  38*   LYMPHO PCT %  --  28   MONO PCT %  --  28*   EOS PCT %  --  4     Results from last 7 days   Lab Units 06/22/25  0439 06/18/25  0622 06/17/25  0532   SODIUM mmol/L 137   < > 139   POTASSIUM mmol/L 3.7   < > 2.9*   CHLORIDE mmol/L 102   < > 104   CO2 mmol/L 29   < > 29   BUN mg/dL 12   < > 11   CREATININE mg/dL 0.77   < > 0.77   ANION GAP mmol/L 6   < > 6   CALCIUM mg/dL 8.0*   < > 7.8*   ALBUMIN g/dL  --   --  2.7*   TOTAL BILIRUBIN mg/dL  --   --  0.40   ALK PHOS U/L  --   --  40   ALT U/L  --   --  6*   AST U/L  --   --  16   GLUCOSE RANDOM mg/dL 98   < > 96    < > = values in this interval not displayed.     Results from last 7 days   Lab Units 06/16/25  1218   INR  1.19     Results from last 7 days   Lab Units 06/17/25  2101 06/17/25  1605 06/16/25  2148   POC GLUCOSE mg/dl 130 96 103         Results from last 7 days   Lab Units 06/16/25  1218   LACTIC ACID mmol/L 1.8   PROCALCITONIN ng/ml 0.15       Recent Cultures (last 7 days):   Results from last 7 days   Lab Units 06/20/25  1645 06/20/25  1603 06/16/25  1218   BLOOD CULTURE   --    --  No Growth After 5 Days.  No Growth After 5 Days.   GRAM STAIN RESULT  4+ Polys  No bacteria seen No Polys or Bacteria seen  --    BODY FLUID CULTURE, STERILE  No growth No growth  --          Last 24 Hours Medication List:     Current Facility-Administered Medications:     acetaminophen (TYLENOL) tablet 650 mg, Q6H PRN    amLODIPine (NORVASC) tablet 10 mg, Daily    ascorbic acid (VITAMIN C) tablet 500 mg, Daily    aspirin chewable tablet 81 mg, BID    enoxaparin (LOVENOX) subcutaneous injection 40 mg, Daily    folic acid (FOLVITE) tablet 1 mg, Daily    gabapentin (NEURONTIN) capsule 600 mg, HS    HYDROmorphone (DILAUDID) injection 0.5 mg, Q3H PRN    LORazepam (ATIVAN) tablet 1 mg, Q8H PRN    metroNIDAZOLE (FLAGYL) tablet 500 mg, Q12H FELIPA    moisture barrier miconazole 2% cream (aka MARCIANO MOISTURE BARRIER ANTIFUNGAL CREAM), BID    ondansetron (ZOFRAN) injection 4 mg, Q6H PRN    oxyCODONE (ROXICODONE) IR tablet 5 mg, Q6H PRN    oxyCODONE (ROXICODONE) split tablet 2.5 mg, Q6H PRN    pantoprazole (PROTONIX) EC tablet 40 mg, Daily    pravastatin (PRAVACHOL) tablet 40 mg, HS    vancomycin (VANCOCIN) 1500 mg in sodium chloride 0.9% 250 mL IVPB, Q24H, Last Rate: 1,500 mg (06/21/25 1428)    venlafaxine (EFFEXOR-XR) 24 hr capsule 150 mg, Daily    Administrative Statements   Today, Patient Was Seen By: Indra Camp MD  I have spent a total time of 35 minutes in caring for this patient on the day of the visit/encounter including Diagnostic results, Prognosis, Risks and benefits of tx options, Instructions for management, Patient and family education, Importance of tx compliance, Risk factor reductions, Impressions, Counseling / Coordination of care, Documenting in the medical record, Reviewing/placing orders in the medical record (including tests, medications, and/or procedures), Obtaining or reviewing history  , and Communicating with other healthcare professionals .    **Please Note: This note may have been  constructed using a voice recognition system.**

## 2025-06-22 NOTE — PROGRESS NOTES
Sharon Vega is a 75 y.o. female who is currently ordered Vancomycin IV with management by the Pharmacy Consult service.  Relevant clinical data and objective / subjective history reviewed.  Vancomycin Assessment:  Indication and Goal AUC/Trough: Soft tissue (goal -600, trough >10), -600, trough >10  Clinical Status: stable  Micro:     Renal Function:  SCr: 0.77 mg/dL  CrCl: 64.1 mL/min  Renal replacement: Not on dialysis  Days of Therapy: 7  Current Dose: 1500 mg IV Q24H  Vancomycin Plan:  New Dosin mg IV q24h  Estimated AUC: 472 mcg*hr/mL  Estimated Trough: 11.4 mcg/mL  Next Level:  with AM labs  Renal Function Monitoring: Daily BMP and UOP  Pharmacy will continue to follow closely for s/sx of nephrotoxicity, infusion reactions and appropriateness of therapy.  BMP and CBC will be ordered per protocol. We will continue to follow the patient’s culture results and clinical progress daily.    Edie Mariee, Pharmacist

## 2025-06-22 NOTE — ASSESSMENT & PLAN NOTE
75-year-old female with right prosthetic joint infection undergoing two-stage revision currently in stage I with antibiotic spacer placement and 6-week course of vancomycin and Flagyl per ID which is scheduled to end on 7/1/2025.  Continues to have no pain with ambulation, clinically stable. Recommend continuing antibiotic course and monitoring labs. No acute need for surgical intervention. S/p IR drain placement yesterday, will follow up cultures     Plan  WBAT RLE  ID following, appreciate recs  Cont 6 wk abx course per ID  Monitor vitals  F/u cultures from IR aspiration   Monitor labs  PT/OT

## 2025-06-22 NOTE — PLAN OF CARE
Problem: PAIN - ADULT  Goal: Verbalizes/displays adequate comfort level or baseline comfort level  Description: Interventions:  - Encourage patient to monitor pain and request assistance  - Assess pain using appropriate pain scale  - Administer analgesics as ordered based on type and severity of pain and evaluate response  - Implement non-pharmacological measures as appropriate and evaluate response  - Consider cultural and social influences on pain and pain management  - Notify physician/advanced practitioner if interventions unsuccessful or patient reports new pain  - Educate patient/family on pain management process including their role and importance of  reporting pain   - Provide non-pharmacologic/complimentary pain relief interventions  Outcome: Progressing     Problem: INFECTION - ADULT  Goal: Absence of fever/infection during neutropenic period  Description: INTERVENTIONS:  - Monitor WBC  - Perform strict hand hygiene  - Limit to healthy visitors only  - No plants, dried, fresh or silk flowers with fleiciano in patient room  Outcome: Progressing     Problem: Prexisting or High Potential for Compromised Skin Integrity  Goal: Skin integrity is maintained or improved  Description: INTERVENTIONS:  - Identify patients at risk for skin breakdown  - Assess and monitor skin integrity including under and around medical devices   - Assess and monitor nutrition and hydration status  - Monitor labs  - Assess for incontinence   - Turn and reposition patient  - Assist with mobility/ambulation  - Relieve pressure over mane prominences   - Avoid friction and shearing  - Provide appropriate hygiene as needed including keeping skin clean and dry  - Evaluate need for skin moisturizer/barrier cream  - Collaborate with interdisciplinary team  - Patient/family teaching  - Consider wound care consult    Assess:  - Review Gamaliel scale daily  - Clean and moisturize skin every   - Inspect skin when repositioning, toileting, and  assisting with ADLS  - Assess under medical devices such as  every   - Assess extremities for adequate circulation and sensation     Bed Management:  - Have minimal linens on bed & keep smooth, unwrinkled  - Change linens as needed when moist or perspiring  - Avoid sitting or lying in one position for more than  hours while in bed?Keep HOB at degrees   - Toileting:  - Offer bedside commode  - Assess for incontinence every   - Use incontinent care products after each incontinent episode such as     Activity:  - Mobilize patient  times a day  - Encourage activity and walks on unit  - Encourage or provide ROM exercises   - Turn and reposition patient every  Hours  - Use appropriate equipment to lift or move patient in bed  - Instruct/ Assist with weight shifting every  when out of bed in chair  - Consider limitation of chair time  hour intervals    Skin Care:  - Avoid use of baby powder, tape, friction and shearing, hot water or constrictive clothing  - Relieve pressure over bony prominences using   - Do not massage red bony areas    Next Steps:  - Teach patient strategies to minimize risks such as   - Consider consults to  interdisciplinary teams such as   Outcome: Progressing

## 2025-06-22 NOTE — ASSESSMENT & PLAN NOTE
Hypomagnesemia and hypokalemia noted in the ER at [place] on arrival  Supplemented with IV magnesium and oral potassium  Magnesium level has improved.  Still with hypokalemia (2.9 on AM of 6/17).  Received oral supplementation subsequent   Repeat BMP and Mg on arrival. Trial IV Kcl if still hypokalemic

## 2025-06-23 LAB
ANION GAP SERPL CALCULATED.3IONS-SCNC: 5 MMOL/L (ref 4–13)
BUN SERPL-MCNC: 11 MG/DL (ref 5–25)
CALCIUM SERPL-MCNC: 8.2 MG/DL (ref 8.4–10.2)
CHLORIDE SERPL-SCNC: 103 MMOL/L (ref 96–108)
CO2 SERPL-SCNC: 30 MMOL/L (ref 21–32)
CREAT SERPL-MCNC: 0.78 MG/DL (ref 0.6–1.3)
ERYTHROCYTE [DISTWIDTH] IN BLOOD BY AUTOMATED COUNT: 15.2 % (ref 11.6–15.1)
GFR SERPL CREATININE-BSD FRML MDRD: 74 ML/MIN/1.73SQ M
GLUCOSE SERPL-MCNC: 102 MG/DL (ref 65–140)
HCT VFR BLD AUTO: 25.3 % (ref 34.8–46.1)
HEMOCCULT STL QL IA: POSITIVE
HGB BLD-MCNC: 8 G/DL (ref 11.5–15.4)
MCH RBC QN AUTO: 28.6 PG (ref 26.8–34.3)
MCHC RBC AUTO-ENTMCNC: 31.6 G/DL (ref 31.4–37.4)
MCV RBC AUTO: 90 FL (ref 82–98)
PLATELET # BLD AUTO: 512 THOUSANDS/UL (ref 149–390)
PMV BLD AUTO: 8.8 FL (ref 8.9–12.7)
POTASSIUM SERPL-SCNC: 3.6 MMOL/L (ref 3.5–5.3)
RBC # BLD AUTO: 2.8 MILLION/UL (ref 3.81–5.12)
SODIUM SERPL-SCNC: 138 MMOL/L (ref 135–147)
WBC # BLD AUTO: 3.47 THOUSAND/UL (ref 4.31–10.16)

## 2025-06-23 PROCEDURE — 99232 SBSQ HOSP IP/OBS MODERATE 35: CPT | Performed by: NURSE PRACTITIONER

## 2025-06-23 PROCEDURE — 99232 SBSQ HOSP IP/OBS MODERATE 35: CPT | Performed by: INTERNAL MEDICINE

## 2025-06-23 PROCEDURE — 80048 BASIC METABOLIC PNL TOTAL CA: CPT

## 2025-06-23 PROCEDURE — 99232 SBSQ HOSP IP/OBS MODERATE 35: CPT

## 2025-06-23 PROCEDURE — 85027 COMPLETE CBC AUTOMATED: CPT

## 2025-06-23 PROCEDURE — G0328 FECAL BLOOD SCRN IMMUNOASSAY: HCPCS

## 2025-06-23 PROCEDURE — G0545 PR INHERENT VISIT TO INPT: HCPCS | Performed by: INTERNAL MEDICINE

## 2025-06-23 PROCEDURE — 97535 SELF CARE MNGMENT TRAINING: CPT

## 2025-06-23 RX ADMIN — ASPIRIN 81 MG CHEWABLE TABLET 81 MG: 81 TABLET CHEWABLE at 08:10

## 2025-06-23 RX ADMIN — VENLAFAXINE HYDROCHLORIDE 150 MG: 150 CAPSULE, EXTENDED RELEASE ORAL at 08:11

## 2025-06-23 RX ADMIN — PRAVASTATIN SODIUM 40 MG: 40 TABLET ORAL at 21:33

## 2025-06-23 RX ADMIN — MICONAZOLE NITRATE 1 APPLICATION: 20 CREAM TOPICAL at 17:09

## 2025-06-23 RX ADMIN — AMLODIPINE BESYLATE 10 MG: 10 TABLET ORAL at 08:10

## 2025-06-23 RX ADMIN — ALTEPLASE 2 MG: 2.2 INJECTION, POWDER, LYOPHILIZED, FOR SOLUTION INTRAVENOUS at 06:39

## 2025-06-23 RX ADMIN — VANCOMYCIN HYDROCHLORIDE 1500 MG: 10 INJECTION, POWDER, LYOPHILIZED, FOR SOLUTION INTRAVENOUS at 13:04

## 2025-06-23 RX ADMIN — GABAPENTIN 600 MG: 300 CAPSULE ORAL at 21:32

## 2025-06-23 RX ADMIN — ENOXAPARIN SODIUM 40 MG: 40 INJECTION SUBCUTANEOUS at 08:10

## 2025-06-23 RX ADMIN — METRONIDAZOLE 500 MG: 500 TABLET ORAL at 08:10

## 2025-06-23 RX ADMIN — PANTOPRAZOLE SODIUM 40 MG: 40 TABLET, DELAYED RELEASE ORAL at 05:34

## 2025-06-23 RX ADMIN — FOLIC ACID 1 MG: 1 TABLET ORAL at 08:10

## 2025-06-23 RX ADMIN — ASPIRIN 81 MG CHEWABLE TABLET 81 MG: 81 TABLET CHEWABLE at 21:33

## 2025-06-23 RX ADMIN — OXYCODONE HYDROCHLORIDE 5 MG: 5 TABLET ORAL at 08:11

## 2025-06-23 RX ADMIN — OXYCODONE HYDROCHLORIDE 5 MG: 5 TABLET ORAL at 21:33

## 2025-06-23 RX ADMIN — OXYCODONE HYDROCHLORIDE AND ACETAMINOPHEN 500 MG: 500 TABLET ORAL at 08:10

## 2025-06-23 RX ADMIN — METRONIDAZOLE 500 MG: 500 TABLET ORAL at 21:33

## 2025-06-23 NOTE — DISCHARGE INSTR - AVS FIRST PAGE
Discharge Instructions - Orthopedics  Sharon Vega 75 y.o. female MRN: [id number]  Unit/Bed#: [facility] 918-01    Weight Bearing Status:                                           You may bear weight as tolerated on the right lower extremity.     DVT prophylaxis:  Please take your aspirin 81mg twice a day for 28 days.    Pain:  Continue analgesics as directed    Dressing Instructions:   Please keep clean, dry and intact until follow up     Appt Instructions:   If you do not have your appointment, please call the clinic at [phone]  Otherwise follow up as scheduled.    Contact the office sooner if you experience any increased numbness/tingling in the extremities.      Infectious Disease Discharge Instructions  You are being discharged on an IV antibiotic called vancomycin and an oral antibiotic called Flagyl (metronidazole). The vancomycin infusion is to be given every 24 hours (once daily) through your PICC line. The oral Flagyl (metronidazole) is to be taken every 12 hours (twice daily) by mouth. Please continue on these antibiotics through 7/1/2025 as planned.     On 7/2/2025, you will start on two new oral antibiotics called Augmentin and Doxycycline. Both of these antibiotics should be taken every 12 hours (twice daily) by mouth. Plan to continue on these antibiotics at least through 7/18/25. You may need an antibiotic extension pending repeat CT scan of your right hip if it does not show complete resolution of the abscesses. This will be discussed during your outpatient infectious disease appointment.     Antibiotic (Doxycycline) administration instructions: take with a large 8oz glass of water and remain upright for at least 30 minutes after taking to avoid esophagitis. Space out from multivitamins, calcium, iron, magnesium, dairy products (cheese, milk, ice cream, yogurt, etc.), antacids (TUMS, Rolaids, Mylanta, etc.) By at least 2 hours. Can take with bland foods including toast, crackers, rice, chicken,  apples, etc. Avoid direct sunlight exposure. If planning to be in the sun, use sunscreen.     Potential antibiotic side effects: Augmentin- stomach upset (nausea, vomiting, diarrhea), increased risk of C diff, rashes, elevated LFTs  Doxycycline- stomach upset (nausea, vomiting, diarrhea), esophagitis (if not taken properly), increased risk of sunburn    You will be set up with an appointment in the outpatient Infectious Disease office. It is important for you to keep this appointment in order for us to monitor you while you are on IV antibiotics.  This will include evaluating your lab work, examining your PICC line, and making sure you are not having toxicities or side effects of the medication(s) you are receiving.     Weekly labs include: CBCd, BMP, and vancomycin trough    PICC line to be removed by RN at completion of IV antibiotics on 7/1/25    Please call the ID office ([phone]) with any questions or concerns such as antibiotic side effects including nausea, vomiting, diarrhea, rashes, etc. Any sustained fevers over 100.4F, please go to the ER for evaluation.     Sincerely,    Patti Cordova PA-C  [facility]

## 2025-06-23 NOTE — PROGRESS NOTES
"Progress Note - Hospitalist   Name: Sharon Vega 75 y.o. female I MRN: [id number]  [facility] I Date of Admission: 6/18/2025   Date of Service: 6/23/2025 I Hospital Day: 5     Assessment & Plan  Abscess of right hip  Pertinent past medical history of prior right hip abscess and infected GINA, subsequently status post I&D and hardware extraction on 5/21 as well as has been on IV Vancomycin, po flagyl with plans for abx for 6 weeks through 7/1  Presented to the ER at [facility] on 6/16 with R hip pain and generalized weakness  Underwent CT Hip on 6/17 with: \" A large peripherally enhancing fluid collection surrounding the proximal right femur, that extending along the right gluteus muscle body and upper musculature of the right thigh.  There are a few small foci of gas within this collection.  There is a separate, similar peripherally enhancing collection involving the right iliopsoas muscle bodies.  These findings are highly suspicious for abscesses\"  Patient had large abscess, and per IR note there was some purulence. No more pain after drainage. No fevers no chills.   Transferred over from [facility] for IR drainage.  Continue antibiotic biotics vancomycin and Flagyl.  Patient transferred to [place] on 6/18  Status post drain placement on the right hip.  So far cultures are negative  At this time plan is to do vancomycin q 24 hours and Flagyl oral through 7/1.  Will discharge the patient on 1 week of Augmentin twice daily and doxycycline 100 mg twice daily.  Will close follow-up with infectious disease  Close follow-up with orthopedics  Plan for discharge tomorrow to facility tomorrow if we have placement    Primary hypertension  On pta amlodipine   Will considering Holding in setting of infection  Chronic pain syndrome  On PTA gabapentin at bedtime  Anemia  Hb 8.0; Baseline over last two months has been in the 9 range   No reported acute signs of bleeding at [facility]   Monitor H&H  Sacral " wound  Present on arrival to [place]   Wound care consult  Anxiety  With component of depression as well   Psychiatry evaluated at [place] in setting of increased anxiety episodes; Denied any thoughts of SI or HI. No need for inpatient BHU from their standpoint   Continue effexor as well as has prn ativan for anxiety  Hypokalemia  Hypomagnesemia and hypokalemia noted in the ER at [place] on arrival  Supplemented with IV magnesium and oral potassium  Magnesium level has improved.  Still with hypokalemia (2.9 on AM of 6/17).  Received oral supplementation subsequent   Repeat BMP and Mg on arrival. 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      VTE Pharmacologic Prophylaxis:   Moderate Risk (Score 3-4) - Pharmacological DVT Prophylaxis Ordered: enoxaparin (Lovenox).    Mobility:   Basic Mobility Inpatient Raw Score: 18  JH-HLM Goal: 6: Walk 10 steps or more  JH-HLM Achieved: 7: Walk 25 feet or more  JH-HLM Goal achieved. Continue to encourage appropriate mobility.    Patient Centered Rounds: I performed bedside rounds with nursing staff today.   Discussions with Specialists or Other Care Team Provider: None    Education and Discussions with Family / Patient tried to call son but he would not  today.    Current Length of Stay: 5 day(s)  Current Patient Status: Inpatient   Certification Statement: The patient will continue to require additional inpatient hospital stay due to Abscess and culture data  Discharge Plan: Anticipate discharge in 24-48 hrs to rehab facility.    Code Status: Level 1 - Full Code    Subjective   Patient has no complaints    Objective :  Temp:  [97.8 °F (36.6 °C)-98.5 °F (36.9 °C)] 97.8 °F (36.6 °C)  HR:  [] 95  BP: (114-117)/(69-73) 114/69  Resp:  [16-17] 17  SpO2:  [92 %-95 %] 95 %  O2 Device: None (Room air)    Body mass index is 33.08 kg/m².     Input and Output Summary (last 24 hours):     Intake/Output Summary (Last 24 hours)  at 6/23/2025 1517  Last data filed at 6/23/2025 0900  Gross per 24 hour   Intake 0 ml   Output 770 ml   Net -770 ml       Physical Exam  Constitutional:       Appearance: She is not ill-appearing.   HENT:      Head: Atraumatic.     Cardiovascular:      Rate and Rhythm: Normal rate.   Pulmonary:      Effort: No respiratory distress.   Abdominal:      General: There is no distension.     Musculoskeletal:      Right lower leg: No edema.      Left lower leg: No edema.      Comments: Abdominal and hip drain in place           Lines/Drains:  Lines/Drains/Airways       Active Status       Name Placement date Placement time Site Days    PICC Line 05/22/25 Right Basilic 05/22/25  1422  Basilic  32    Abscess Drain Thigh 06/20/25  1600  Thigh  2    Abscess Drain RLQ 06/20/25  1628  RLQ  2                    Central Line:  Goal for removal: N/A - Chronic PICC               Lab Results: I have reviewed the following results:   Results from last 7 days   Lab Units 06/23/25  0815 06/22/25  0439 06/21/25  0545   WBC Thousand/uL 3.47*   < > 3.20*   HEMOGLOBIN g/dL 8.0*   < > 7.6*   HEMATOCRIT % 25.3*   < > 24.3*   PLATELETS Thousands/uL 512*   < > 547*   SEGS PCT %  --   --  38*   LYMPHO PCT %  --   --  28   MONO PCT %  --   --  28*   EOS PCT %  --   --  4    < > = values in this interval not displayed.     Results from last 7 days   Lab Units 06/23/25  0815 06/18/25  0622 06/17/25  0532   SODIUM mmol/L 138   < > 139   POTASSIUM mmol/L 3.6   < > 2.9*   CHLORIDE mmol/L 103   < > 104   CO2 mmol/L 30   < > 29   BUN mg/dL 11   < > 11   CREATININE mg/dL 0.78   < > 0.77   ANION GAP mmol/L 5   < > 6   CALCIUM mg/dL 8.2*   < > 7.8*   ALBUMIN g/dL  --   --  2.7*   TOTAL BILIRUBIN mg/dL  --   --  0.40   ALK PHOS U/L  --   --  40   ALT U/L  --   --  6*   AST U/L  --   --  16   GLUCOSE RANDOM mg/dL 102   < > 96    < > = values in this interval not displayed.           Results from last 7 days   Lab Units 06/17/25  2105 06/17/25  4414  06/16/25  2148   POC GLUCOSE mg/dl 130 96 103                 Recent Cultures (last 7 days):   Results from last 7 days   Lab Units 06/20/25  1645 06/20/25  1603   GRAM STAIN RESULT  4+ Polys  No bacteria seen No Polys or Bacteria seen   BODY FLUID CULTURE, STERILE  No growth No growth         Last 24 Hours Medication List:     Current Facility-Administered Medications:     acetaminophen (TYLENOL) tablet 650 mg, Q6H PRN    amLODIPine (NORVASC) tablet 10 mg, Daily    ascorbic acid (VITAMIN C) tablet 500 mg, Daily    aspirin chewable tablet 81 mg, BID    enoxaparin (LOVENOX) subcutaneous injection 40 mg, Daily    folic acid (FOLVITE) tablet 1 mg, Daily    gabapentin (NEURONTIN) capsule 600 mg, HS    HYDROmorphone (DILAUDID) injection 0.5 mg, Q3H PRN    LORazepam (ATIVAN) tablet 1 mg, Q8H PRN    metroNIDAZOLE (FLAGYL) tablet 500 mg, Q12H FELIPA    moisture barrier miconazole 2% cream (aka MARCIANO MOISTURE BARRIER ANTIFUNGAL CREAM), BID    ondansetron (ZOFRAN) injection 4 mg, Q6H PRN    oxyCODONE (ROXICODONE) IR tablet 5 mg, Q6H PRN    oxyCODONE (ROXICODONE) split tablet 2.5 mg, Q6H PRN    pantoprazole (PROTONIX) EC tablet 40 mg, Daily    pravastatin (PRAVACHOL) tablet 40 mg, HS    vancomycin (VANCOCIN) 1500 mg in sodium chloride 0.9% 250 mL IVPB, Q24H, Last Rate: 1,500 mg (06/21/25 1428)    venlafaxine (EFFEXOR-XR) 24 hr capsule 150 mg, Daily    Administrative Statements   Today, Patient Was Seen By: Indra Camp MD  I have spent a total time of 38 minutes in caring for this patient on the day of the visit/encounter including Diagnostic results, Prognosis, Risks and benefits of tx options, Instructions for management, Patient and family education, Importance of tx compliance, Risk factor reductions, Impressions, Counseling / Coordination of care, Documenting in the medical record, Reviewing/placing orders in the medical record (including tests, medications, and/or procedures), Obtaining or reviewing history  ,  and Communicating with other healthcare professionals .    **Please Note: This note may have been constructed using a voice recognition system.**

## 2025-06-23 NOTE — RESTORATIVE TECHNICIAN NOTE
Restorative Technician Note      Patient Name: Sharon Vega     Restorative Tech Visit Date: 06/23/25  Note Type: Mobility  Patient Position Upon Consult: Supine  Activity Performed: Repositioned  Assistive Device: Roller walker  Patient Position at End of Consult: Supine; All needs within reach; Bed/Chair alarm activated    Dionicio Crowley, Restorative Technician

## 2025-06-23 NOTE — PLAN OF CARE
Problem: OCCUPATIONAL THERAPY ADULT  Goal: Performs self-care activities at highest level of function for planned discharge setting.  See evaluation for individualized goals.  Description: Treatment Interventions: ADL retraining, Functional transfer training, Endurance training, Patient/family training, Equipment evaluation/education, Compensatory technique education, Continued evaluation, Energy conservation, Activityengagement          See flowsheet documentation for full assessment, interventions and recommendations.   Note: Limitation: Decreased ADL status, Decreased endurance, Decreased self-care trans, Decreased high-level ADLs     Assessment: Pt seen for skilled OT treatment session from 13:41 to 14:05 w/ interventions focusing on ADL participation, activity tolerance, sitting tolerance, sitting balance, standing tolerance, standing balance, bed mobility , transfer skills, and fxnl mobility. Pt was agreeable and willing to participate in session. Pt engaged in the following tasks: S for transfers and fxnl mobility w/ RW, min Ax1 in bed mobility, min A for toileting requiring A with perineal hygiene and mod A for lower body dressing using AE. In comparison to previous session, pt demonstrated improvements in sit to stand transfers and stand to sit transfers as she required less physical assistance for them today. Pt required verbal cues for safety and verbal cues for correct technique for LHAE use. Pt continues to be functioning below baseline level as occupational performance remains limited by decreased ADL status, decreased activity tolerance, decreased endurance, decreased sitting tolerance, decreased sitting balance, decreased standing tolerance, decreased standing balance, decreased transfer skills, decreased fxnl mobility, and posterior hip precautions. From OT standpoint, recommend Level III (Minimum Resource Intensity) at time of d/c. Pt will benefit from continued OT treatment while in acute care  to address deficits as defined above and maximize level of functional independence with ADLs and functional mobility. Pt supine in bed w/ bed alarm on and all needs within reach at end of session.     Rehab Resource Intensity Level, OT: III (Minimum Resource Intensity)

## 2025-06-23 NOTE — CASE MANAGEMENT
Case Management Discharge Planning Note    Patient name Sharon Vega  Location [facility] 918/[facility] 918-01 MRN [id number]  : 1950 Date 2025       Current Admission Date: 2025  Current Admission Diagnosis:Abscess of right hip   Patient Active Problem List    Diagnosis Date Noted    Bicytopenia 2025    Hypokalemia 2025    QT prolongation 2025    Anxiety 2025    Generalized weakness 2025    Electrolyte abnormality 2025    Sacral wound 2025    Pre-diabetes 2025    Constipation 2025    Positive blood culture 2025    Aortic stenosis 2025    Anemia 2025    Hypomagnesemia 05/15/2025    Hyponatremia 05/15/2025    Abscess of right hip 2025    History of hemiarthroplasty of right hip 2025    Heart murmur 2025    Thrombocytosis 2025    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) 2025    Class 2 severe obesity due to excess calories with serious comorbidity and body mass index (BMI) of 35.0 to 35.9 in adult (HCC) 2025    Right hip pain 2025    Sepsis without acute organ dysfunction (HCC) 2025    Sacroiliitis (HCC) 2025    Lumbar radiculopathy     Chronic pain syndrome 2023    Primary hypertension 2022    Lumbar degenerative disc disease 2022    Lumbar spondylosis 2022    Cervical radiculopathy 2022    Cervical spondylosis 2022    Rheumatoid arthritis involving both hands (HCC) 2022    Spinal stenosis of lumbar region without neurogenic claudication 2022      LOS (days): 5  Geometric Mean LOS (GMLOS) (days): 2.8  Days to GMLOS:-2.5     OBJECTIVE:  Risk of Unplanned Readmission Score: 31.92         Current admission status: Inpatient   Preferred Pharmacy:   [facility] - [place], [place] - [address]  [address]  [place] [address]  Phone: [phone] Fax: [phone]    [id number] - [facility]  [place] - [address]  [address] [address]  Phone: [phone] Fax: [phone]    Primary Care Provider: Danielito Antunez DO    Primary Insurance: MEDICARE  Secondary Insurance: AARP    DISCHARGE DETAILS:    Discharge planning discussed with:: pt at bedside and son Jose Armando via TC  Freedom of Choice: Yes     CM contacted family/caregiver?: Yes  Were Treatment Team discharge recommendations reviewed with patient/caregiver?: Yes  Did patient/caregiver verbalize understanding of patient care needs?: N/A- going to facility  Were patient/caregiver advised of the risks associated with not following Treatment Team discharge recommendations?: Yes    Contacts  Patient Contacts: Perfecto Vega (Son)  Relationship to Patient:: Family (Son)  Contact Method: Phone  Phone Number: [phone] (M)  Reason/Outcome: Discharge Planning    Other Referral/Resources/Interventions Provided:  Interventions: Short Term Rehab  Referral Comments: Per [facility] communication, referrals placed for STR and HHC/Infusion as backup. CM met with pt at bedside in order to discuss discharge plan. At this time pt prefers STR placement for PT/OT and SN: For IV abx duration. Accepting STR options reviewed with pt at bedside. Pt interested in placement at [facility]. [place] reserved in [facility]. TC to pts son Perfecto in order to update on same as requested by pt. Son also in agreement with discharge plan to [facility]. [facility] updated on same and informed no changes to current IV abx regimen, facility updated and continues to follow for medical stability. CM team will continue to follow.      Treatment Team Recommendation: Short Term Rehab  Expected Discharge Disposition: Inpatient Rehab Facility  Additional Discharge Dispositions: Inpatient Rehab Facility

## 2025-06-23 NOTE — PROGRESS NOTES
"Progress Note -Interventional Radiology NP  Sharon Vega 75 y.o. female MRN: [id number]  [facility] Encounter: [id number]    Assessment/Plan:  74 yo female with persistent right hip pain and ambulatory dysfunction who was transferred from [facility] to [facility] on 6/18/25 for Orthopedic evaluation and management.     In review of chart.   Patient with HTN, Chronic back pain, and h/o right hemiarthroplasty (7/2022) and prior admission for septic arthritis with IR hip joint aspiration on 4/24/25   -s/p IR abscess drain placement x 2 on 5/15/25.   -s/p I&D with hardware removal on 5/21/25 and IR drain removal. Cultures + Finegoldia and peptoniphilus.   -Represented to [facility] 6/16/25 with persistent right hip pain and weakness  -6/17/25 CT RLE with contrast demonstrating \"a large peripherally enhancing fluid collection surrounding the proximal right femur, extending along the right gluteus muscle body and upper musculature of the right thigh. There are a few small foci of gas within this collection. There is   a separate, similar peripherally enhancing collection involving the right iliopsoas muscle bodies. In the absence of more recent surgery (since 5/21/2025), these findings are highly suspicious for abscesses.\"    Patient now s/p IR drain placement x 2 on 6/20/25.  10F right thigh hematoma drain with 120 ml blood fluid aspirated and sent to lab and 10F right iliopsoas abscess drain with 20 ml purulent fluid aspirated and sent to lab.     Patient seen in follow up today.   Reviewed vital signs  Body fluid cultures: preliminary results with no growth.   Continued on antibiotics per ID recommendations  #1 IR right thigh drain:  Total output 30 ml of bloody appearing fluid in 24 hours  #2 IR RLQ abdominal drain: Total output 40 ml of milky appearing fluid in 24 hours  .    Plan:  Flush RLQ abdominal drain with 5 ml NSS daily and flush RLE thigh drain with 10 mL NSS daily. Please record output. Catheter to RICK " "bulb compression   Please notify IR for leaking around catheter, catheter damage, drain not maintaining suction, or possible infection at insertion site  If/when patient has less than 10 mL of drainage/day for 2 days, please reach out to IR   Plan for IR drain x 2 check/change in 2 weeks  Remainder of care per primary care service team     Subjective: Patient continues with RLE pain, cut its tolerable. She has no other new complaints at this time.       Problem List[1]       Objective:    Vitals:  /73   Pulse 91   Temp 98.3 °F (36.8 °C)   Resp 17   Ht 5' 3\" (1.6 m)   Wt 84.7 kg (186 lb 11.7 oz)   SpO2 92%   BMI 33.08 kg/m²   Body mass index is 33.08 kg/m².  Weight (last 2 days)       None            I/Os:    Intake/Output Summary (Last 24 hours) at 6/23/2025 0955  Last data filed at 6/23/2025 0900  Gross per 24 hour   Intake 240 ml   Output 1070 ml   Net -830 ml       Invasive Devices       Peripherally Inserted Central Catheter Line  Duration             PICC Line 05/22/25 Right Basilic 31 days              Drain  Duration             Abscess Drain RLQ 2 days    Abscess Drain Thigh 2 days                    Physical Exam:  Physical Exam  Vitals reviewed.   HENT:      Head: Normocephalic.     Eyes:      Extraocular Movements: Extraocular movements intact.      Conjunctiva/sclera: Conjunctivae normal.       Cardiovascular:      Rate and Rhythm: Normal rate.   Pulmonary:      Effort: Pulmonary effort is normal. No respiratory distress.   Abdominal:      Palpations: Abdomen is soft.      Comments:   -IR RLQ drain with small amount of tan, cloudy fluid present in RICK. Stopped flush at 5 ml NSS due to patient discomfort. RICK to bulb compression       Musculoskeletal:      Comments:   -IR RLE thigh drain with small amount of dark bloody appearing fluid in RICK. Flushed with 10 ml NSS without difficulty. RICK to bulb compression     Skin:     General: Skin is warm and dry.     Neurological:      Mental Status: She " "is alert. Mental status is at baseline.     Psychiatric:         Mood and Affect: Mood normal.         Behavior: Behavior normal.                      Lab Results and Cultures:   CBC with diff:   Lab Results   Component Value Date    WBC 3.47 (L) 06/23/2025    HGB 8.0 (L) 06/23/2025    HCT 25.3 (L) 06/23/2025    MCV 90 06/23/2025     (H) 06/23/2025    RBC 2.80 (L) 06/23/2025    MCH 28.6 06/23/2025    MCHC 31.6 06/23/2025    RDW 15.2 (H) 06/23/2025    MPV 8.8 (L) 06/23/2025    NRBC 0 06/21/2025      BMP/CMP:  Lab Results   Component Value Date    K 3.6 06/23/2025    K 4.1 03/18/2024     06/23/2025     03/18/2024    CO2 30 06/23/2025    CO2 26 03/18/2024    BUN 11 06/23/2025    BUN 23 03/18/2024    CREATININE 0.78 06/23/2025    CREATININE 1.01 06/09/2025    CALCIUM 8.2 (L) 06/23/2025    CALCIUM 9.6 03/18/2024    AST 16 06/17/2025    AST 19 03/18/2024    ALT 6 (L) 06/17/2025    ALT 16 03/18/2024    ALKPHOS 40 06/17/2025    ALKPHOS 36 03/18/2024    EGFR 74 06/23/2025    EGFR 58 (L) 06/09/2025    EGFR 58 (L) 10/20/2020   ,     Coags:   Lab Results   Component Value Date    PTT 33 06/16/2025    INR 1.19 06/16/2025   ,   Results from last 7 days   Lab Units 06/16/25  1218   PTT seconds 33   INR  1.19        Lipid Panel: No results found for: \"CHOL\"  No results found for: \"HDL\"  No results found for: \"HDL\"  No results found for: \"LDLCALC\"  No results found for: \"TRIG\"    HgbA1c:   Lab Results   Component Value Date    HGBA1C 6.3 (H) 05/26/2025       Blood Culture:   Lab Results   Component Value Date    BLOODCX No Growth After 5 Days. 06/16/2025    BLOODCX No Growth After 5 Days. 06/16/2025   ,   Urinalysis:   Lab Results   Component Value Date    COLORU [place] (A) 06/16/2025    CLARITYU Clear 06/16/2025    SPECGRAV 1.020 06/16/2025    PHUR 7.0 06/16/2025    LEUKOCYTESUR Negative 06/16/2025    NITRITE Negative 06/16/2025    GLUCOSEU Negative 06/16/2025    KETONESU 15 (1+) (A) 06/16/2025    BILIRUBINUR " "Negative 06/16/2025    BLOODU Negative 06/16/2025   ,   Urine Culture:   Lab Results   Component Value Date    URINECX >100,000 cfu/ml 05/13/2025   ,   Wound Culure:  No results found for: \"WOUNDCULT\"    VTE Pharmacologic Prophylaxis: Enoxaparin (Lovenox)    Total floor / unit time spent today 20 minutes.  Greater than 50% of total time was spent face to face with the patient and / or family counseling and / or coordination of care: Discussed with patient plan IR drain care and plan for drain check in two weeks. Coordination of care with RN    Thank you for allowing me to participate in the care of Sharon Vega. Please don't hesitate to call, text, email, or Message us with any questions.             [1]   Patient Active Problem List  Diagnosis    Lumbar degenerative disc disease    Lumbar spondylosis    Cervical radiculopathy    Cervical spondylosis    Rheumatoid arthritis involving both hands (HCC)    Spinal stenosis of lumbar region without neurogenic claudication    Primary hypertension    Chronic pain syndrome    Lumbar radiculopathy    Sacroiliitis (HCC)    Right hip pain    Sepsis without acute organ dysfunction (HCC)    Class 2 severe obesity due to excess calories with serious comorbidity and body mass index (BMI) of 35.0 to 35.9 in adult (HCC)    Abscess of right hip    History of hemiarthroplasty of right hip    Heart murmur    Thrombocytosis    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

## 2025-06-23 NOTE — ASSESSMENT & PLAN NOTE
"Pertinent past medical history of prior right hip abscess and infected GINA, subsequently status post I&D and hardware extraction on 5/21 as well as has been on IV Vancomycin, po flagyl with plans for abx for 6 weeks through 7/1  Presented to the ER at [facility] on 6/16 with R hip pain and generalized weakness  Underwent CT Hip on 6/17 with: \" A large peripherally enhancing fluid collection surrounding the proximal right femur, that extending along the right gluteus muscle body and upper musculature of the right thigh.  There are a few small foci of gas within this collection.  There is a separate, similar peripherally enhancing collection involving the right iliopsoas muscle bodies.  These findings are highly suspicious for abscesses\"  Patient had large abscess, and per IR note there was some purulence. No more pain after drainage. No fevers no chills.   Transferred over from [facility] for IR drainage.  Continue antibiotic biotics vancomycin and Flagyl.  Patient transferred to [place] on 6/18  Status post drain placement on the right hip.  So far cultures are negative  At this time plan is to do vancomycin q 24 hours and Flagyl oral through 7/1.  Will discharge the patient on 1 week of Augmentin twice daily and doxycycline 100 mg twice daily.  Will close follow-up with infectious disease  Close follow-up with orthopedics  Plan for discharge tomorrow to facility tomorrow if we have placement    "

## 2025-06-23 NOTE — PLAN OF CARE
Problem: PAIN - ADULT  Goal: Verbalizes/displays adequate comfort level or baseline comfort level  Description: Interventions:  - Encourage patient to monitor pain and request assistance  - Assess pain using appropriate pain scale  - Administer analgesics as ordered based on type and severity of pain and evaluate response  - Implement non-pharmacological measures as appropriate and evaluate response  - Consider cultural and social influences on pain and pain management  - Notify physician/advanced practitioner if interventions unsuccessful or patient reports new pain  - Educate patient/family on pain management process including their role and importance of  reporting pain   - Provide non-pharmacologic/complimentary pain relief interventions  Outcome: Progressing     Problem: INFECTION - ADULT  Goal: Absence of fever/infection during neutropenic period  Description: INTERVENTIONS:  - Monitor WBC  - Perform strict hand hygiene  - Limit to healthy visitors only  - No plants, dried, fresh or silk flowers with feliciano in patient room  Outcome: Progressing     Problem: Prexisting or High Potential for Compromised Skin Integrity  Goal: Skin integrity is maintained or improved  Description: INTERVENTIONS:  - Identify patients at risk for skin breakdown  - Assess and monitor skin integrity including under and around medical devices   - Assess and monitor nutrition and hydration status  - Monitor labs  - Assess for incontinence   - Turn and reposition patient  - Assist with mobility/ambulation  - Relieve pressure over mane prominences   - Avoid friction and shearing  - Provide appropriate hygiene as needed including keeping skin clean and dry  - Evaluate need for skin moisturizer/barrier cream  - Collaborate with interdisciplinary team  - Patient/family teaching  - Consider wound care consult    Assess:  - Review Gamaliel scale daily  - Clean and moisturize skin every day  - Inspect skin when repositioning, toileting, and  assisting with ADLS  - Assess under medical devices such as  every 2 hrs  - Assess extremities for adequate circulation and sensation     Bed Management:  - Have minimal linens on bed & keep smooth, unwrinkled  - Change linens as needed when moist or perspiring  - Avoid sitting or lying in one position for more than 2 hours while in bed?Keep HOB at 30degrees   - Toileting:  - Offer bedside commode  - Assess for incontinence every 2 hrs  - Use incontinent care products after each incontinent episode such as     Activity:  - Mobilize patient 3 times a day  - Encourage activity and walks on unit  - Encourage or provide ROM exercises   - Turn and reposition patient every 2 Hours  - Use appropriate equipment to lift or move patient in bed  - Instruct/ Assist with weight shifting every  when out of bed in chair  - Consider limitation of chair time 2 hour intervals    Skin Care:  - Avoid use of baby powder, tape, friction and shearing, hot water or constrictive clothing  - Relieve pressure over bony prominences using pads    - Do not massage red bony areas    Next Steps:  - Teach patient strategies to minimize risks such as   - Consider consults to  interdisciplinary teams such as   Outcome: Progressing

## 2025-06-23 NOTE — OCCUPATIONAL THERAPY NOTE
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 Pt has been requiring increased physical assistance for ADLs and IADLs since [facility].   Reciprocal Relationships Pt lives w/ her son, daughter-in-law, and grandcon.   Service to Others Pt is retired.   Intrinsic Gratification Pt enjoys crossword puzzles and reading.   ADL   Where Assessed Edge of bed  (+ toilet)   Equipment Provided Reacher;Sock aid   LB Dressing Assistance 3  Moderate Assistance   LB Dressing Deficit Verbal cueing;Increased time to complete;Don/doff R sock;Don/doff L sock;Thread RLE into underwear;Thread LLE into underwear;Pull up over hips;Use of adaptive equipment   LB Dressing Comments Pt able to thread don socks on L/R feet using sock aid requiring min Ax1 to pull socks over heels. Pt able to thread R/L LE through underwear but required mod Ax1 to pull the underwear up past the heel and up to knees.   Toileting Assistance  4  Minimal Assistance   Toileting Deficit Perineal hygiene;Grab bar use   Toileting Comments Pt required A w/ hygiene; able to transfer w/o physical assistance.   Bed Mobility   Supine to Sit 4  Minimal assistance   Additional items Assist x 1;HOB  "elevated;Bedrails;Increased time required;Verbal cues;LE management   Sit to Supine 4  Minimal assistance   Additional items HOB elevated;Bedrails;Increased time required;Verbal cues;LE management   Additional Comments Pt supine in bed upon therapist's arrival and at end of OT tx session with bed alarm on and all needs within reach.   Transfers   Sit to Stand 5  Supervision   Additional items Increased time required;Verbal cues   Stand to Sit 5  Supervision   Additional items Increased time required;Verbal cues   Toilet transfer 5  Supervision   Additional items Standard toilet;Verbal cues;Increased time required   Additional Comments w/ RW, VC for sequencing and hand placement   Functional Mobility   Functional Mobility 5  Supervision   Additional Comments Pt able to ambulate household distance from EOB >< bathroom with S using RW for support.   Additional items Rolling walker   Subjective   Subjective \"It is so hot in here!\"   Cognition   Overall Cognitive Status WFL   Arousal/Participation Alert;Responsive;Cooperative   Attention Within functional limits   Orientation Level Oriented X4   Memory Within functional limits   Following Commands Follows one step commands without difficulty   Comments Pt pleasant and cooperative   Activity Tolerance   Activity Tolerance Patient tolerated treatment well   Medical Staff Made Aware RN clearance prior to session   Assessment   Assessment Pt seen for skilled OT treatment session from 13:41 to 14:05 w/ interventions focusing on ADL participation, activity tolerance, sitting tolerance, sitting balance, standing tolerance, standing balance, bed mobility , transfer skills, and fxnl mobility. Pt was agreeable and willing to participate in session. Pt engaged in the following tasks: S for transfers and fxnl mobility w/ RW, min Ax1 in bed mobility, min A for toileting requiring A with perineal hygiene and mod A for lower body dressing using AE. In comparison to previous session, pt " demonstrated improvements in sit to stand transfers and stand to sit transfers as she required less physical assistance for them today. Pt required verbal cues for safety and verbal cues for correct technique for LHAE use. Pt continues to be functioning below baseline level as occupational performance remains limited by decreased ADL status, decreased activity tolerance, decreased endurance, decreased sitting tolerance, decreased sitting balance, decreased standing tolerance, decreased standing balance, decreased transfer skills, decreased fxnl mobility, and posterior hip precautions. From OT standpoint, recommend Level III (Minimum Resource Intensity) at time of d/c. Pt will benefit from continued OT treatment while in acute care to address deficits as defined above and maximize level of functional independence with ADLs and functional mobility. Pt supine in bed w/ bed alarm on and all needs within reach at end of session.   Plan   Treatment Interventions ADL retraining;Functional transfer training;Endurance training;Patient/family training;Equipment evaluation/education;Compensatory technique education;Continued evaluation;Energy conservation;Activityengagement   Goal Expiration Date 07/03/25   OT Treatment Day 1   OT Frequency 2-3x/wk   Discharge Recommendation   Rehab Resource Intensity Level, OT III (Minimum Resource Intensity)   AM-PAC Daily Activity Inpatient   Lower Body Dressing 2   Bathing 3   Toileting 3   Upper Body Dressing 4   Grooming 4   Eating 4   Daily Activity Raw Score 20   Daily Activity Standardized Score (Calc for Raw Score >=11) 42.03   AM-PAC Applied Cognition Inpatient   Following a Speech/Presentation 4   Understanding Ordinary Conversation 4   Taking Medications 4   Remembering Where Things Are Placed or Put Away 4   Remembering List of 4-5 Errands 3   Taking Care of Complicated Tasks 3   Applied Cognition Raw Score 22   Applied Cognition Standardized Score 47.83       The patient's raw  score on the AM-PAC Daily Activity Inpatient Short Form is 20. A raw score of greater than or equal to 19 suggests the patient may benefit from discharge to home. Please refer to the recommendation of the Occupational Therapist for safe discharge planning.    HERO Gray, OTR/L

## 2025-06-23 NOTE — PROGRESS NOTES
Progress Note - Infectious Disease   Name: Sharon Vega 75 y.o. 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  However the patient had been on vancomycin prior to operative cultures found so perhaps the vancomycin suppressed other involved organisms. The patient had been on intravenous vancomycin and Flagyl with a plan to continue through 7/1/2025. Course now complicated by progressive pain. Repeat CT scan demonstrated a large enhancing collection around the right hip that is complex with progression into the iliopsoas.  Consideration for the possibility of abscess formation. Patient is s/p IR drain placement on 6/20. Cultures are currently NGTD.   -Continue intravenous vancomycin 1500mg q24h and oral Flagyl 500mg q12h through 7/1/25 as planned  -Pharmacy follow-up for vancomycin dose management  -vancomycin goal trough 10-14.4   -Microbiology to hold cultures for 14d to assess for growth of indolent organisms  -Anticipate transitioning to PO antibiotic (Augmentin + Doxycycline) for 4 more additional weeks of antibiotic after completion of IV antibiotic course with plan for repeat CT of the RLE to ensure collections have resolved.   -Will touch base with orthopedics to ensure these collections didn't have any communication with the joint space  -Recheck CBC with differential and BMP to make sure no developing toxicity  -Will need outpatient ID follow-up in ~1-2 weeks  -Needs weekly CBCd, BMP, and vancomycin trough  -PICC to be removed at completion of IV antibiotic course  -Outpatient ID office staff aware of follow-up needs  -Ongoing follow-up with orthopedic surgery. Will need to ensure clearance of infection prior to second stage revision  arthroplasty     Sacral wound  Not overtly infected.  Bicytopenia  With significant anemia and leukopenia, with a neutrophil count that currently is above 1000.  Consideration for the possibility of a primary hematologic process with the abnormal differential.  - If persists, consider hematology oncology evaluation  - Recheck CBC with differential to make sure no worsening  Generalized weakness  Likely multifactorial including the acute infectious process, and metabolic derangements.  Doubt any new infectious etiology.  QT prolongation  Limits antibiotic options.    I have discussed the above management plan in detail with the primary service and orthopedic service.   Infectious Disease service will follow.    Antibiotics:  Vancomycin   Flagyl    Subjective   Patient states she is feeling much better. She has no pain at present. States she has been up and walking. She is also feeling like her mood has improved. Has no fever, chills, sweats; no nausea, vomiting, diarrhea; no cough, shortness of breath; no pain. No new symptoms. Tolerating IV vancomycin and PO flagyl    Objective :  Temp:  [97.8 °F (36.6 °C)-98.5 °F (36.9 °C)] 98.3 °F (36.8 °C)  HR:  [] 91  BP: (112-117)/(67-73) 114/73  Resp:  [16-17] 17  SpO2:  [88 %-95 %] 92 %  O2 Device: None (Room air)    General:  No acute distress, sitting upright in bed, eating lunch  Psychiatric:  Awake and alert, in better spirits, answers questions appropriately  Pulmonary:  Normal respiratory excursion without accessory muscle use, on room air  Heart: RRR, +murmur  Abdomen:  Soft, nontender, non-distended  Extremities:  No edema, right hip with two drains in place. One drain with a milky tan fluid, the other with bloody output. No significant tenderness with palpation.   Skin:  No rashes noted to exposed skin.       Lab Results: I have reviewed the following results:  Results from last 7 days   Lab Units 06/23/25  0815 06/22/25  0439 06/21/25  0545   WBC Thousand/uL  3.47* 3.00* 3.20*   HEMOGLOBIN g/dL 8.0* 7.5* 7.6*   PLATELETS Thousands/uL 512* 561* 547*     Results from last 7 days   Lab Units 06/23/25  0815 06/22/25  0439 06/21/25  0545 06/18/25  0622 06/17/25  0532   SODIUM mmol/L 138 137 138   < > 139   POTASSIUM mmol/L 3.6 3.7 3.6   < > 2.9*   CHLORIDE mmol/L 103 102 103   < > 104   CO2 mmol/L 30 29 30   < > 29   BUN mg/dL 11 12 13   < > 11   CREATININE mg/dL 0.78 0.77 0.77   < > 0.77   EGFR ml/min/1.73sq m 74 75 75   < > 75   CALCIUM mg/dL 8.2* 8.0* 8.1*   < > 7.8*   AST U/L  --   --   --   --  16   ALT U/L  --   --   --   --  6*   ALK PHOS U/L  --   --   --   --  40   ALBUMIN g/dL  --   --   --   --  2.7*    < > = values in this interval not displayed.     Results from last 7 days   Lab Units 06/20/25  1645 06/20/25  1603   GRAM STAIN RESULT  4+ Polys  No bacteria seen No Polys or Bacteria seen   BODY FLUID CULTURE, STERILE  No growth No growth         Results from last 7 days   Lab Units 06/17/25  0532   CRP mg/L 89.0*     Results from last 7 days   Lab Units 06/18/25  0622   FERRITIN ng/mL 594*         Imaging Results Review: No pertinent imaging studies reviewed.  Other Study Results Review: No additional pertinent studies reviewed.      Patti Cordova PA-C  [facility]

## 2025-06-23 NOTE — PLAN OF CARE
Problem: INFECTION - ADULT  Goal: Absence of fever/infection during neutropenic period  Description: INTERVENTIONS:  - Monitor WBC  - Perform strict hand hygiene  - Limit to healthy visitors only  - No plants, dried, fresh or silk flowers with feliciano in patient room  Outcome: Progressing     Problem: PAIN - ADULT  Goal: Verbalizes/displays adequate comfort level or baseline comfort level  Description: Interventions:  - Encourage patient to monitor pain and request assistance  - Assess pain using appropriate pain scale  - Administer analgesics as ordered based on type and severity of pain and evaluate response  - Implement non-pharmacological measures as appropriate and evaluate response  - Consider cultural and social influences on pain and pain management  - Notify physician/advanced practitioner if interventions unsuccessful or patient reports new pain  - Educate patient/family on pain management process including their role and importance of  reporting pain   - Provide non-pharmacologic/complimentary pain relief interventions  Outcome: Progressing

## 2025-06-23 NOTE — RESTORATIVE TECHNICIAN NOTE
Restorative Technician Note      Patient Name: Sharon Vega     Restorative Tech Visit Date: 06/23/25  Note Type: Mobility  Patient Position Upon Consult: Supine  Activity Performed: Ambulated  Assistive Device: Roller walker  Patient Position at End of Consult: Supine; All needs within reach; Bed/Chair alarm activated    Dionicio Crowley, Restorative Technician

## 2025-06-23 NOTE — ASSESSMENT & PLAN NOTE
Hypomagnesemia and hypokalemia noted in the ER at [place] on arrival  Supplemented with IV magnesium and oral potassium  Magnesium level has improved.  Still with hypokalemia (2.9 on AM of 6/17).  Received oral supplementation subsequent   Repeat BMP and Mg on arrival. Trial IV Kcl if still hypokalemic

## 2025-06-23 NOTE — PROGRESS NOTES
Sharon Vega is a 75 y.o. female who is currently ordered Vancomycin IV with management by the Pharmacy Consult service.  Relevant clinical data and objective / subjective history reviewed.  Vancomycin Assessment:  Indication and Goal AUC/Trough: Soft tissue (goal -600, trough >10), -600, trough >10  Clinical Status: stable  Micro:     Renal Function:  SCr: 0.78 mg/dL  CrCl: 63.3 mL/min  Renal replacement: Not on dialysis  Days of Therapy: 8  Current Dose: 1500 mg IV Q24H  Vancomycin Plan:  New Dosing: Continue 1500 mg IV q24h  Estimated AUC: 477 mcg*hr/mL  Estimated Trough: 11.6 mcg/mL  Next Level: 6/26 with AM labs  Renal Function Monitoring: Daily BMP and UOP  Pharmacy will continue to follow closely for s/sx of nephrotoxicity, infusion reactions and appropriateness of therapy.  BMP and CBC will be ordered per protocol. We will continue to follow the patient’s culture results and clinical progress daily.

## 2025-06-23 NOTE — ASSESSMENT & PLAN NOTE
In a patient who is undergone I&D and hardware extraction on 5/21/2025 with cultures growing Finegoldia and Peptoniphilus.  However the patient had been on vancomycin prior to operative cultures found so perhaps the vancomycin suppressed other involved organisms. The patient had been on intravenous vancomycin and Flagyl with a plan to continue through 7/1/2025. Course now complicated by progressive pain. Repeat CT scan demonstrated a large enhancing collection around the right hip that is complex with progression into the iliopsoas.  Consideration for the possibility of abscess formation. Patient is s/p IR drain placement on 6/20. Cultures are currently NGTD.   -Continue intravenous vancomycin 1500mg q24h and oral Flagyl 500mg q12h through 7/1/25 as planned  -Pharmacy follow-up for vancomycin dose management  -vancomycin goal trough 10-14.4   -Microbiology to hold cultures for 14d to assess for growth of indolent organisms  -Anticipate transitioning to PO antibiotic (Augmentin + Doxycycline) for 4 more additional weeks of antibiotic after completion of IV antibiotic course with plan for repeat CT of the RLE to ensure collections have resolved.   -Will touch base with orthopedics to ensure these collections didn't have any communication with the joint space  -Recheck CBC with differential and BMP to make sure no developing toxicity  -Will need outpatient ID follow-up in ~1-2 weeks  -Needs weekly CBCd, BMP, and vancomycin trough  -PICC to be removed at completion of IV antibiotic course  -Outpatient ID office staff aware of follow-up needs  -Ongoing follow-up with orthopedic surgery. Will need to ensure clearance of infection prior to second stage revision arthroplasty

## 2025-06-23 NOTE — ASSESSMENT & PLAN NOTE
With component of depression as well   Psychiatry evaluated at [place] in setting of increased anxiety episodes; Denied any thoughts of SI or HI. No need for inpatient BHU from their standpoint   Continue effexor as well as has prn ativan for anxiety

## 2025-06-24 LAB
ANION GAP SERPL CALCULATED.3IONS-SCNC: 8 MMOL/L (ref 4–13)
BUN SERPL-MCNC: 12 MG/DL (ref 5–25)
CALCIUM SERPL-MCNC: 8 MG/DL (ref 8.4–10.2)
CHLORIDE SERPL-SCNC: 102 MMOL/L (ref 96–108)
CO2 SERPL-SCNC: 28 MMOL/L (ref 21–32)
CREAT SERPL-MCNC: 0.73 MG/DL (ref 0.6–1.3)
ERYTHROCYTE [DISTWIDTH] IN BLOOD BY AUTOMATED COUNT: 15.4 % (ref 11.6–15.1)
GFR SERPL CREATININE-BSD FRML MDRD: 80 ML/MIN/1.73SQ M
GLUCOSE SERPL-MCNC: 90 MG/DL (ref 65–140)
HCT VFR BLD AUTO: 30.3 % (ref 34.8–46.1)
HGB BLD-MCNC: 9.3 G/DL (ref 11.5–15.4)
MCH RBC QN AUTO: 28.1 PG (ref 26.8–34.3)
MCHC RBC AUTO-ENTMCNC: 30.7 G/DL (ref 31.4–37.4)
MCV RBC AUTO: 92 FL (ref 82–98)
PLATELET # BLD AUTO: 488 THOUSANDS/UL (ref 149–390)
PMV BLD AUTO: 9.3 FL (ref 8.9–12.7)
POTASSIUM SERPL-SCNC: 3.4 MMOL/L (ref 3.5–5.3)
RBC # BLD AUTO: 3.31 MILLION/UL (ref 3.81–5.12)
SODIUM SERPL-SCNC: 138 MMOL/L (ref 135–147)
WBC # BLD AUTO: 2.9 THOUSAND/UL (ref 4.31–10.16)

## 2025-06-24 PROCEDURE — 80048 BASIC METABOLIC PNL TOTAL CA: CPT

## 2025-06-24 PROCEDURE — 99232 SBSQ HOSP IP/OBS MODERATE 35: CPT

## 2025-06-24 PROCEDURE — 85027 COMPLETE CBC AUTOMATED: CPT

## 2025-06-24 PROCEDURE — 97116 GAIT TRAINING THERAPY: CPT

## 2025-06-24 PROCEDURE — 99232 SBSQ HOSP IP/OBS MODERATE 35: CPT | Performed by: INTERNAL MEDICINE

## 2025-06-24 PROCEDURE — 97530 THERAPEUTIC ACTIVITIES: CPT

## 2025-06-24 PROCEDURE — G0545 PR INHERENT VISIT TO INPT: HCPCS | Performed by: INTERNAL MEDICINE

## 2025-06-24 RX ADMIN — OXYCODONE HYDROCHLORIDE 5 MG: 5 TABLET ORAL at 19:32

## 2025-06-24 RX ADMIN — PRAVASTATIN SODIUM 40 MG: 40 TABLET ORAL at 21:28

## 2025-06-24 RX ADMIN — FOLIC ACID 1 MG: 1 TABLET ORAL at 08:08

## 2025-06-24 RX ADMIN — VANCOMYCIN HYDROCHLORIDE 1500 MG: 10 INJECTION, POWDER, LYOPHILIZED, FOR SOLUTION INTRAVENOUS at 13:26

## 2025-06-24 RX ADMIN — AMLODIPINE BESYLATE 10 MG: 10 TABLET ORAL at 08:08

## 2025-06-24 RX ADMIN — Medication 2.5 MG: at 23:34

## 2025-06-24 RX ADMIN — OXYCODONE HYDROCHLORIDE AND ACETAMINOPHEN 500 MG: 500 TABLET ORAL at 08:08

## 2025-06-24 RX ADMIN — GABAPENTIN 600 MG: 300 CAPSULE ORAL at 21:28

## 2025-06-24 RX ADMIN — OXYCODONE HYDROCHLORIDE 5 MG: 5 TABLET ORAL at 07:39

## 2025-06-24 RX ADMIN — PANTOPRAZOLE SODIUM 40 MG: 40 TABLET, DELAYED RELEASE ORAL at 05:00

## 2025-06-24 RX ADMIN — METRONIDAZOLE 500 MG: 500 TABLET ORAL at 21:28

## 2025-06-24 RX ADMIN — ASPIRIN 81 MG CHEWABLE TABLET 81 MG: 81 TABLET CHEWABLE at 21:28

## 2025-06-24 RX ADMIN — MICONAZOLE NITRATE: 20 CREAM TOPICAL at 08:14

## 2025-06-24 RX ADMIN — ENOXAPARIN SODIUM 40 MG: 40 INJECTION SUBCUTANEOUS at 08:08

## 2025-06-24 RX ADMIN — METRONIDAZOLE 500 MG: 500 TABLET ORAL at 08:08

## 2025-06-24 RX ADMIN — ASPIRIN 81 MG CHEWABLE TABLET 81 MG: 81 TABLET CHEWABLE at 08:08

## 2025-06-24 RX ADMIN — VENLAFAXINE HYDROCHLORIDE 150 MG: 150 CAPSULE, EXTENDED RELEASE ORAL at 08:08

## 2025-06-24 NOTE — PROGRESS NOTES
Progress Note - Infectious Disease   Name: Sharon Vega 75 y.o. 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  However the patient had been on vancomycin prior to operative cultures found so perhaps the vancomycin suppressed other involved organisms. The patient had been on intravenous vancomycin and Flagyl with a plan to continue through 7/1/2025. Course now complicated by progressive pain. Repeat CT scan demonstrated a large enhancing collection around the right hip that is complex with progression into the iliopsoas.  Consideration for the possibility of abscess formation. Per discussion with orthopedics, these collections are suspected to communicate with the joint space. Patient is s/p IR drain placement on 6/20. Cultures are currently NGTD.   -Continue intravenous vancomycin 1500mg q24h and oral Flagyl 500mg q12h through 7/1/25 as planned  -Pharmacy follow-up for vancomycin dose management  -vancomycin goal trough 10-14.4   -Microbiology to hold cultures for 14d to assess for growth of indolent organisms  -Anticipate transitioning to PO antibiotic (Augmentin + Doxycycline) for 4 additional weeks of antibiotic after IR drain placement (through 7/18)  -Plan for repeat CT of the RLE to ensure collections have resolved near end of antibiotic course  -Recheck CBC with differential and BMP to make sure no developing toxicity  -Needs weekly CBCd, BMP, and vancomycin trough  -PICC to be removed at completion of IV antibiotic course  -Outpatient ID office staff aware of follow-up needs  -Ongoing follow-up with orthopedic surgery. Will need to ensure clearance of infection prior to second stage revision arthroplasty   -Outpatient ID follow-up  scheduled for 7/3  -Pending discharge to short term rehab    Sacral wound  Not overtly infected.  Bicytopenia  With significant anemia and leukopenia, with a neutrophil count that currently is above 1000.  Consideration for the possibility of a primary hematologic process with the abnormal differential.  - If persists, consider hematology oncology evaluation  - Recheck CBC with differential to make sure no worsening  Generalized weakness  Likely multifactorial including the acute infectious process, and metabolic derangements.  Doubt any new infectious etiology. Improved.   QT prolongation  Limits antibiotic options. Would avoid Fluoroquinolones and macrolides if able.     I have discussed the above management plan in detail with the primary service. They agree with the above antibiotic plan.   Infectious Disease service will follow. OK for discharge from ID standpoint.     Antibiotics:  Vancomycin   Flagyl    Subjective   Patient has no new complaints today. She is tolerating antibiotics without difficulty. No significant right hip pain. No fever, chills, sweats; no nausea, vomiting, diarrhea; no cough, shortness of breath; no pain. No new symptoms. Tolerating IV vancomycin and PO flagyl. Discussed transition to PO doxycycline and PO augmentin on 7/2 after completing IV antibiotic course. Discussed doxycycline administration instructions and provided them in discharge summary.     Objective :  Temp:  [97.8 °F (36.6 °C)-98.4 °F (36.9 °C)] 97.8 °F (36.6 °C)  HR:  [89-97] 89  BP: (114-121)/(69-73) 120/72  Resp:  [15-18] 15  SpO2:  [92 %-95 %] 93 %  O2 Device: None (Room air)    General:  No acute distress, sitting upright in bed, nontoxic appearing  Psychiatric:  Awake and alert, answers questions appropriately  Pulmonary:  Normal respiratory excursion without accessory muscle use, on room air  Heart: RRR, +murmur  Abdomen:  Soft, nontender, non-distended  Extremities:  No edema, right hip with two drains in place.  One drain with a milky tan fluid, the other with bloody output. No significant tenderness with palpation. No surrounding erythema noted.   Skin:  No rashes noted to exposed skin.       Lab Results: I have reviewed the following results:  Results from last 7 days   Lab Units 06/24/25  0454 06/23/25  0815 06/22/25  0439   WBC Thousand/uL 2.90* 3.47* 3.00*   HEMOGLOBIN g/dL 9.3* 8.0* 7.5*   PLATELETS Thousands/uL 488* 512* 561*     Results from last 7 days   Lab Units 06/24/25  0454 06/23/25  0815 06/22/25  0439   SODIUM mmol/L 138 138 137   POTASSIUM mmol/L 3.4* 3.6 3.7   CHLORIDE mmol/L 102 103 102   CO2 mmol/L 28 30 29   BUN mg/dL 12 11 12   CREATININE mg/dL 0.73 0.78 0.77   EGFR ml/min/1.73sq m 80 74 75   CALCIUM mg/dL 8.0* 8.2* 8.0*     Results from last 7 days   Lab Units 06/20/25  1645 06/20/25  1603   GRAM STAIN RESULT  4+ Polys  No bacteria seen No Polys or Bacteria seen   BODY FLUID CULTURE, STERILE  No growth No growth               Results from last 7 days   Lab Units 06/18/25  0622   FERRITIN ng/mL 594*         Imaging Results Review: No pertinent imaging studies reviewed.  Other Study Results Review: No additional pertinent studies reviewed.      Patti Cordova PA-C  [facility]

## 2025-06-24 NOTE — PROGRESS NOTES
"Progress Note - Hospitalist   Name: Sharon Vega 75 y.o. female I MRN: [id number]  [facility] I Date of Admission: 6/18/2025   Date of Service: 6/24/2025 I Hospital Day: 6     Assessment & Plan  Abscess of right hip  Pertinent past medical history of prior right hip abscess and infected GINA, subsequently status post I&D and hardware extraction on 5/21 as well as has been on IV Vancomycin, po flagyl with plans for abx for 6 weeks through 7/1  Presented to the ER at [place] on 6/16 with R hip pain and generalized weakness  Underwent CT Hip on 6/17 with: \" A large peripherally enhancing fluid collection surrounding the proximal right femur, that extending along the right gluteus muscle body and upper musculature of the right thigh.  There are a few small foci of gas within this collection.  There is a separate, similar peripherally enhancing collection involving the right iliopsoas muscle bodies.  These findings are highly suspicious for abscesses\"  Patient had large abscess, and per IR note there was some purulence. No more pain after drainage. No fevers no chills.   Transferred over from [facility] for IR drainage.  Continue antibiotic biotics vancomycin and Flagyl.  Patient transferred to [place] on 6/18  Status post drain placement on the right hip.  So far cultures are negative  At this time plan is to do vancomycin q 24 hours and Flagyl oral through 7/1.  Will discharge the patient on 1 week of Augmentin twice daily and doxycycline 100 mg twice daily.  Will close follow-up with infectious disease  Follow-up with orthopedics  Medically cleared for discharge  Pending placement    Bicytopenia  Patient has low WBC and low hemoglobin has been stable.  Also high platelets which has been now trending down  Will continue to monitor while inpatient  Did have a lengthy discussion with the son about this.  Did consult oncology and they thought that patient will need a bone marrow biopsy in the " future  For now we will need close follow-up in the outpatient  No active bleeding noted.  Positive fecal occult blood in the inpatient  Will need follow-up with GI.  Primary hypertension  On pta amlodipine   Will considering Holding in setting of infection  Chronic pain syndrome  On PTA gabapentin at bedtime  Anemia  Hb 8.0; Baseline over last two months has been in the 9 range   No reported acute signs of bleeding at [facility]   Monitor H&H  Sacral wound  Present on arrival to [facility]   Wound care consult  Anxiety  With component of depression as well   Psychiatry evaluated at [facility] in setting of increased anxiety episodes; Denied any thoughts of SI or HI. No need for inpatient BHU from their standpoint   Continue effexor as well as has prn ativan for anxiety  Hypokalemia  Hypomagnesemia and hypokalemia noted in the ER at [facility] on arrival  Supplemented with IV magnesium and oral potassium  Magnesium level has improved.  Still with hypokalemia (2.9 on AM of 6/17).  Received oral supplementation subsequent   Repeat BMP and Mg on arrival. 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      VTE Pharmacologic Prophylaxis:   Moderate Risk (Score 3-4) - Pharmacological DVT Prophylaxis Ordered: enoxaparin (Lovenox).    Mobility:   Basic Mobility Inpatient Raw Score: 18  JH-HLM Goal: 6: Walk 10 steps or more  JH-HLM Achieved: 8: Walk 250 feet ot more  JH-HLM Goal achieved. Continue to encourage appropriate mobility.    Patient Centered Rounds: I performed bedside rounds with nursing staff today.   Discussions with Specialists or Other Care Team Provider: None    Education and Discussions with Family / Patient DW son over telephone    Current Length of Stay: 6 day(s)  Current Patient Status: Inpatient   Certification Statement: The patient will continue to require additional inpatient hospital stay due to Abscess and culture data  Discharge Plan: Anticipate discharge in  24-48 hrs to rehab facility.    Code Status: Level 1 - Full Code    Subjective   No complaints    Objective :  Temp:  [97.8 °F (36.6 °C)-98.4 °F (36.9 °C)] 98.2 °F (36.8 °C)  HR:  [89-97] 95  BP: (104-121)/(62-73) 104/62  Resp:  [15-18] 17  SpO2:  [92 %-96 %] 96 %  O2 Device: None (Room air)    Body mass index is 33.08 kg/m².     Input and Output Summary (last 24 hours):     Intake/Output Summary (Last 24 hours) at 6/24/2025 1623  Last data filed at 6/24/2025 1619  Gross per 24 hour   Intake 680 ml   Output 445 ml   Net 235 ml       Physical Exam  Vitals and nursing note reviewed.   Constitutional:       General: She is not in acute distress.     Appearance: She is well-developed.   HENT:      Head: Normocephalic and atraumatic.     Eyes:      Conjunctiva/sclera: Conjunctivae normal.       Cardiovascular:      Rate and Rhythm: Normal rate and regular rhythm.      Heart sounds: No murmur heard.  Pulmonary:      Effort: Pulmonary effort is normal. No respiratory distress.      Breath sounds: Normal breath sounds.   Abdominal:      Palpations: Abdomen is soft.      Tenderness: There is no abdominal tenderness.     Musculoskeletal:         General: No swelling.      Cervical back: Neck supple.     Skin:     General: Skin is warm and dry.      Capillary Refill: Capillary refill takes less than 2 seconds.      Comments: Drain in place hip and rlq abdomen     Neurological:      Mental Status: She is alert.     Psychiatric:         Mood and Affect: Mood normal.           Lines/Drains:  Lines/Drains/Airways       Active Status       Name Placement date Placement time Site Days    PICC Line 05/22/25 Right Basilic 05/22/25  1422  Basilic  33    Abscess Drain Thigh 06/20/25  1600  Thigh  4    Abscess Drain RLQ 06/20/25  1628  RLQ  3                    Central Line:  Goal for removal: N/A - Chronic PICC               Lab Results: I have reviewed the following results:   Results from last 7 days   Lab Units 06/24/25  1935  06/22/25  0439 06/21/25  0545   WBC Thousand/uL 2.90*   < > 3.20*   HEMOGLOBIN g/dL 9.3*   < > 7.6*   HEMATOCRIT % 30.3*   < > 24.3*   PLATELETS Thousands/uL 488*   < > 547*   SEGS PCT %  --   --  38*   LYMPHO PCT %  --   --  28   MONO PCT %  --   --  28*   EOS PCT %  --   --  4    < > = values in this interval not displayed.     Results from last 7 days   Lab Units 06/24/25  0454   SODIUM mmol/L 138   POTASSIUM mmol/L 3.4*   CHLORIDE mmol/L 102   CO2 mmol/L 28   BUN mg/dL 12   CREATININE mg/dL 0.73   ANION GAP mmol/L 8   CALCIUM mg/dL 8.0*   GLUCOSE RANDOM mg/dL 90           Results from last 7 days   Lab Units 06/17/25  2101   POC GLUCOSE mg/dl 130                 Recent Cultures (last 7 days):   Results from last 7 days   Lab Units 06/20/25  1645 06/20/25  1603   GRAM STAIN RESULT  4+ Polys  No bacteria seen No Polys or Bacteria seen   BODY FLUID CULTURE, STERILE  No growth No growth         Last 24 Hours Medication List:     Current Facility-Administered Medications:     acetaminophen (TYLENOL) tablet 650 mg, Q6H PRN    amLODIPine (NORVASC) tablet 10 mg, Daily    ascorbic acid (VITAMIN C) tablet 500 mg, Daily    aspirin chewable tablet 81 mg, BID    enoxaparin (LOVENOX) subcutaneous injection 40 mg, Daily    folic acid (FOLVITE) tablet 1 mg, Daily    gabapentin (NEURONTIN) capsule 600 mg, HS    HYDROmorphone (DILAUDID) injection 0.5 mg, Q3H PRN    LORazepam (ATIVAN) tablet 1 mg, Q8H PRN    metroNIDAZOLE (FLAGYL) tablet 500 mg, Q12H FELIPA    moisture barrier miconazole 2% cream (aka MARCIANO MOISTURE BARRIER ANTIFUNGAL CREAM), BID    ondansetron (ZOFRAN) injection 4 mg, Q6H PRN    oxyCODONE (ROXICODONE) IR tablet 5 mg, Q6H PRN    oxyCODONE (ROXICODONE) split tablet 2.5 mg, Q6H PRN    pantoprazole (PROTONIX) EC tablet 40 mg, Daily    pravastatin (PRAVACHOL) tablet 40 mg, HS    vancomycin (VANCOCIN) 1500 mg in sodium chloride 0.9% 250 mL IVPB, Q24H, Last Rate: Stopped (06/24/25 1619)    venlafaxine (EFFEXOR-XR) 24 hr  capsule 150 mg, Daily    Administrative Statements   Today, Patient Was Seen By: Indra Camp MD  I have spent a total time of 40 minutes in caring for this patient on the day of the visit/encounter including Diagnostic results, Prognosis, Risks and benefits of tx options, Instructions for management, Patient and family education, Importance of tx compliance, Risk factor reductions, Impressions, Counseling / Coordination of care, Documenting in the medical record, Reviewing/placing orders in the medical record (including tests, medications, and/or procedures), Obtaining or reviewing history  , and Communicating with other healthcare professionals .    **Please Note: This note may have been constructed using a voice recognition system.**

## 2025-06-24 NOTE — RESTORATIVE TECHNICIAN NOTE
Restorative Technician Note      Patient Name: Sharon Vega     Restorative Tech Visit Date: 06/24/25  Note Type: Mobility  Patient Position Upon Consult: Supine  Activity Performed: Ambulated  Assistive Device: Roller walker  Patient Position at End of Consult: Supine; All needs within reach; Bed/Chair alarm activated    Dionicio Crowley, Restorative Technician

## 2025-06-24 NOTE — ASSESSMENT & PLAN NOTE
"Pertinent past medical history of prior right hip abscess and infected GINA, subsequently status post I&D and hardware extraction on 5/21 as well as has been on IV Vancomycin, po flagyl with plans for abx for 6 weeks through 7/1  Presented to the ER at [place] on 6/16 with R hip pain and generalized weakness  Underwent CT Hip on 6/17 with: \" A large peripherally enhancing fluid collection surrounding the proximal right femur, that extending along the right gluteus muscle body and upper musculature of the right thigh.  There are a few small foci of gas within this collection.  There is a separate, similar peripherally enhancing collection involving the right iliopsoas muscle bodies.  These findings are highly suspicious for abscesses\"  Patient had large abscess, and per IR note there was some purulence. No more pain after drainage. No fevers no chills.   Transferred over from [facility] for IR drainage.  Continue antibiotic biotics vancomycin and Flagyl.  Patient transferred to [place] on 6/18  Status post drain placement on the right hip.  So far cultures are negative  At this time plan is to do vancomycin q 24 hours and Flagyl oral through 7/1.  Will discharge the patient on 1 week of Augmentin twice daily and doxycycline 100 mg twice daily.  Will close follow-up with infectious disease  Follow-up with orthopedics  Medically cleared for discharge  Pending placement    "

## 2025-06-24 NOTE — CASE MANAGEMENT
Case Management Discharge Planning Note    Patient name Sharon Vega  Location [facility] 918/[facility] 918-01 MRN [id number]  : 1950 Date 2025       Current Admission Date: 2025  Current Admission Diagnosis:Abscess of right hip   Patient Active Problem List    Diagnosis Date Noted    Bicytopenia 2025    Hypokalemia 2025    QT prolongation 2025    Anxiety 2025    Generalized weakness 2025    Electrolyte abnormality 2025    Sacral wound 2025    Pre-diabetes 2025    Constipation 2025    Positive blood culture 2025    Aortic stenosis 2025    Anemia 2025    Hypomagnesemia 05/15/2025    Hyponatremia 05/15/2025    Abscess of right hip 2025    History of hemiarthroplasty of right hip 2025    Heart murmur 2025    Thrombocytosis 2025    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) 2025    Class 2 severe obesity due to excess calories with serious comorbidity and body mass index (BMI) of 35.0 to 35.9 in adult (HCC) 2025    Right hip pain 2025    Sepsis without acute organ dysfunction (HCC) 2025    Sacroiliitis (HCC) 2025    Lumbar radiculopathy     Chronic pain syndrome 2023    Primary hypertension 2022    Lumbar degenerative disc disease 2022    Lumbar spondylosis 2022    Cervical radiculopathy 2022    Cervical spondylosis 2022    Rheumatoid arthritis involving both hands (HCC) 2022    Spinal stenosis of lumbar region without neurogenic claudication 2022      LOS (days): 6  Geometric Mean LOS (GMLOS) (days): 2.8  Days to GMLOS:-3.4     OBJECTIVE:  Risk of Unplanned Readmission Score: 32.02         Current admission status: Inpatient   Preferred Pharmacy:   [facility] - [place], [place] - [address]  [address]  [place] [address]  Phone: [phone] Fax: [phone]    [id number] - [facility]  [place] - [address]  [address] [address]  Phone: [phone] Fax: [phone]    Primary Care Provider: Danielito Antunez DO    Primary Insurance: MEDICARE  Secondary Insurance: AARP    DISCHARGE DETAILS:    Discharge planning discussed with:: Pt at bedside and son Bill via TC  Freedom of Choice: Yes     CM contacted family/caregiver?: Yes  Were Treatment Team discharge recommendations reviewed with patient/caregiver?: Yes  Did patient/caregiver verbalize understanding of patient care needs?: N/A- going to facility  Were patient/caregiver advised of the risks associated with not following Treatment Team discharge recommendations?: Yes    Contacts  Patient Contacts: Perfecto Vega (Son)  Relationship to Patient:: Family (Son)  Contact Method: Phone  Phone Number: [phone] (M)  Reason/Outcome: Discharge Planning    Other Referral/Resources/Interventions Provided:  Interventions: Short Term Rehab  Referral Comments: Per communication with [facility], pt is  medically cleared for discharge [facility]. Outreach to [place] in order to confirm bed availability for today, awaiting response back at this time. Pt and son updated on same. Son reports concerns with patient's recent blood work and does not feel pt is ready for discharge. Son requesting call from [facility] provider to discuss and address concerns. IM informed of same and will call son to discuss. CM team to continue to follow and remain available.    Treatment Team Recommendation: Short Term Rehab  Expected Discharge Disposition: Short Term Rehab  Additional Discharge Dispositions: Inpatient Rehab Facility

## 2025-06-24 NOTE — PHYSICAL THERAPY NOTE
PHYSICAL THERAPY NOTE          Patient Name: Sharon Vega  Today's Date: 6/24/2025 06/24/25 1540   PT Last Visit   PT Visit Date 06/24/25   Note Type   Note Type Treatment   Pain Assessment   Pain Assessment Tool 0-10   Pain Score No Pain   Restrictions/Precautions   Weight Bearing Precautions Per Order Yes   RLE Weight Bearing Per Order WBAT   Other Precautions Cognitive;Chair Alarm;Bed Alarm;Multiple lines;Fall Risk;Pain;THR  (R posterior hip precuations)   General   Chart Reviewed Yes   Response to Previous Treatment Patient reporting fatigue but able to participate.   Family/Caregiver Present Yes  (DIL)   Cognition   Arousal/Participation Alert;Responsive;Cooperative   Attention Attends with cues to redirect   Orientation Level Oriented X4   Memory Decreased recall of precautions   Following Commands Follows one step commands without difficulty   Comments pleasant and cooperative, requires cues to recall hip precuaitons   Bed Mobility   Supine to Sit 4  Minimal assistance   Additional items Assist x 1;HOB elevated;Bedrails;Increased time required;Verbal cues;LE management   Sit to Supine 4  Minimal assistance   Additional items Assist x 1;HOB elevated;Bedrails;Increased time required;Verbal cues;LE management   Additional Comments pt found/ left supine in bed with all needs within reach - alarm on post session   Transfers   Sit to Stand 5  Supervision   Additional items Armrests;Increased time required;Verbal cues   Stand to Sit 5  Supervision   Additional items Armrests;Increased time required;Verbal cues   Additional Comments with RW  (completes 3x STS t/o session)   Ambulation/Elevation   Gait pattern Excessively slow;Short stride;Foward flexed;Inconsistent leonel   Gait Assistance 5  Supervision   Additional items Verbal cues   Assistive Device Rolling walker   Distance 80'+100' (seated rest break) 100'   Stair  Management Assistance 4  Minimal assist   Additional items Assist x 1;Verbal cues   Stair Management Technique One rail R;Step to pattern;Foreward   Number of Stairs 1   Balance   Static Sitting Fair +   Dynamic Sitting Fair   Static Standing Fair   Dynamic Standing Fair -   Ambulatory Fair -  (RW)   Endurance Deficit   Endurance Deficit Yes   Endurance Deficit Description generalized weakness, fatigue, decreased activity tolerance   Activity Tolerance   Activity Tolerance Patient tolerated treatment well;Patient limited by fatigue   Medical Staff Made Aware SPT Davi   Nurse Made Aware RN cleared   Assessment   Prognosis Good   Problem List Decreased strength;Decreased range of motion;Decreased endurance;Impaired balance;Decreased mobility;Obesity;Orthopedic restrictions   Assessment Pt seen for PT treatment session this date. Therapy session focused on bed mobility, functional transfers, gait training, stair training and endurance training in order to improve overall mobility and independence. Pt requires assist of 1 for all mobility performed this date. Pt making steady progress toward goals as demonstrated by ambulating increased distances with decreased assistance levels. Continues to require occasional cues for RW management and sequencing. Stair training initiated this date, pt requires MIN A to navigate 1x step, deferred further trial 2* stair well being hot as well as increasing fatigue. Pt does require 1x extended seated rest break d/t same. Pt provided with HEP including ankle pumps, glute sets, quad sets and LAQs. Pt was left supine in bed at the end of PT session with all needs in reach. Pt would benefit from continued PT services while in hospital to address remaining limitations. PT to continue roland james pt and recommends level 3. The patient's AM-PAC Basic Mobility Inpatient Short Form Raw Score is 18. A Raw score of greater than 16 suggests the patient may benefit from discharge to home. Please also  refer to the recommendation of the Physical Therapist for safe discharge planning.   Barriers to Discharge Inaccessible home environment   Goals   Patient Goals to get cooled off  (proivided cool wash cloth and ice packs)   STG Expiration Date 07/03/25   PT Treatment Day 1   Plan   Treatment/Interventions ADL retraining;Functional transfer training;LE strengthening/ROM;Elevations;Therapeutic exercise;Endurance training;Patient/family training;Equipment eval/education;Bed mobility;Gait training;Spoke to nursing;Spoke to case management;OT   Progress Progressing toward goals   PT Frequency 3-5x/wk   Discharge Recommendation   Rehab Resource Intensity Level, PT III (Minimum Resource Intensity)   Equipment Recommended Walker   AM-PAC Basic Mobility Inpatient   Turning in Flat Bed Without Bedrails 3   Lying on Back to Sitting on Edge of Flat Bed Without Bedrails 3   Moving Bed to Chair 3   Standing Up From Chair Using Arms 3   Walk in Room 3   Climb 3-5 Stairs With Railing 3   Basic Mobility Inpatient Raw Score 18   Basic Mobility Standardized Score 41.05   [facility] Highest Level Of Mobility   JH-HLM Goal 6: Walk 10 steps or more   JH-HLM Achieved 8: Walk 250 feet ot more   Education   Education Provided Mobility training;Assistive device;Precautions for total hip arthroplasty (GINA)   Patient Demonstrates verbal understanding   End of Consult   Patient Position at End of Consult Bed/Chair alarm activated;All needs within reach;Supine     Nimo Ingram, PT, DPT

## 2025-06-24 NOTE — PLAN OF CARE
Problem: PHYSICAL THERAPY ADULT  Goal: Performs mobility at highest level of function for planned discharge setting.  See evaluation for individualized goals.  Description: Treatment/Interventions: ADL retraining, Functional transfer training, LE strengthening/ROM, Elevations, Therapeutic exercise, Endurance training, Equipment eval/education, Bed mobility, Gait training, Spoke to nursing, OT  Equipment Recommended: Walker (pending progress)       See flowsheet documentation for full assessment, interventions and recommendations.  Outcome: Progressing  Note: Prognosis: Good  Problem List: Decreased strength, Decreased range of motion, Decreased endurance, Impaired balance, Decreased mobility, Obesity, Orthopedic restrictions  Assessment: Pt seen for PT treatment session this date. Therapy session focused on bed mobility, functional transfers, gait training, stair training and endurance training in order to improve overall mobility and independence. Pt requires assist of 1 for all mobility performed this date. Pt making steady progress toward goals as demonstrated by ambulating increased distances with decreased assistance levels. Continues to require occasional cues for RW management and sequencing. Stair training initiated this date, pt requires MIN A to navigate 1x step, deferred further trial 2* stair well being hot as well as increasing fatigue. Pt does require 1x extended seated rest break d/t same. Pt provided with HEP including ankle pumps, glute sets, quad sets and LAQs. Pt was left supine in bed at the end of PT session with all needs in reach. Pt would benefit from continued PT services while in hospital to address remaining limitations. PT to continue roland james pt and recommends level 3. The patient's AM-PAC Basic Mobility Inpatient Short Form Raw Score is 18. A Raw score of greater than 16 suggests the patient may benefit from discharge to home. Please also refer to the recommendation of the Physical  Therapist for safe discharge planning.  Barriers to Discharge: Inaccessible home environment     Rehab Resource Intensity Level, PT: III (Minimum Resource Intensity)    See flowsheet documentation for full assessment.

## 2025-06-24 NOTE — PROGRESS NOTES
Sharon Vega is a 75 y.o. female who is currently ordered Vancomycin IV with management by the Pharmacy Consult service.  Relevant clinical data and objective / subjective history reviewed.  Vancomycin Assessment:  Indication and Goal AUC/Trough: Soft tissue (goal -600, trough >10), -600, trough >10  Clinical Status: stable  Micro:     Renal Function:  SCr: 0.73 mg/dL  CrCl: 67 mL/min  Renal replacement: Not on dialysis  Days of Therapy: 9  Current Dose: 1500 mg IV Q24H  Vancomycin Plan:  New Dosin mg IV q24h  Estimated AUC: 448 mcg*hr/mL  Estimated Trough: 10.5 mcg/mL  Next Level:  am labs   Renal Function Monitoring: Daily BMP and UOP  Pharmacy will continue to follow closely for s/sx of nephrotoxicity, infusion reactions and appropriateness of therapy.  BMP and CBC will be ordered per protocol. We will continue to follow the patient’s culture results and clinical progress daily.    Joel Arce, Pharmacist

## 2025-06-24 NOTE — CASE MANAGEMENT
Case Management Discharge Planning Note    Patient name Sharon Vega  Location [facility] 918/[facility] 918-01 MRN [id number]  : 1950 Date 2025       Current Admission Date: 2025  Current Admission Diagnosis:Abscess of right hip   Patient Active Problem List    Diagnosis Date Noted    Bicytopenia 2025    Hypokalemia 2025    QT prolongation 2025    Anxiety 2025    Generalized weakness 2025    Electrolyte abnormality 2025    Sacral wound 2025    Pre-diabetes 2025    Constipation 2025    Positive blood culture 2025    Aortic stenosis 2025    Anemia 2025    Hypomagnesemia 05/15/2025    Hyponatremia 05/15/2025    Abscess of right hip 2025    History of hemiarthroplasty of right hip 2025    Heart murmur 2025    Thrombocytosis 2025    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) 2025    Class 2 severe obesity due to excess calories with serious comorbidity and body mass index (BMI) of 35.0 to 35.9 in adult (HCC) 2025    Right hip pain 2025    Sepsis without acute organ dysfunction (HCC) 2025    Sacroiliitis (HCC) 2025    Lumbar radiculopathy     Chronic pain syndrome 2023    Primary hypertension 2022    Lumbar degenerative disc disease 2022    Lumbar spondylosis 2022    Cervical radiculopathy 2022    Cervical spondylosis 2022    Rheumatoid arthritis involving both hands (HCC) 2022    Spinal stenosis of lumbar region without neurogenic claudication 2022      LOS (days): 6  Geometric Mean LOS (GMLOS) (days): 2.8  Days to GMLOS:-3.5     OBJECTIVE:  Risk of Unplanned Readmission Score: 32.02         Current admission status: Inpatient   Preferred Pharmacy:   [facility] [place] - [place] - [address]  [address]  [place] [address]  Phone: [phone] Fax: [phone]    [id number] - [facility]  [place] - [address]  [address] [address]  Phone: [phone] Fax: [phone]    Primary Care Provider: Danielito Antunez DO    Primary Insurance: MEDICARE  Secondary Insurance: AARP    DISCHARGE DETAILS:    Contacts  Patient Contacts: Perfecto Vega (Son)  Relationship to Patient:: Family (Son)  Contact Method: Phone  Phone Number: [phone] (M)  Reason/Outcome: Discharge Planning    Other Referral/Resources/Interventions Provided:  Interventions: Short Term Rehab  Referral Comments: TC to pt's son, Son reports he spoke with MISTY, concerns addressed and son now in agreement with discharge determination to SNF. Per [facility] communication [facility] unable to accept pt at this time due to lack of bed availability. [facility] requesting if pt would be open to admitting to [facility] sister facility [facility] untill a bed opens up at [facility]. Discussed preference with pt and son. [facility] is an [facility] away from family, family would prefer facility closer to family residence. Due to same family declined placement at sister [facility]. SNF referrals reopened and additional blanket referrals placed via [facility]IN per pt and son request. SLIM and bedside RN updated. CM team will await accepting STR options and review with pt and son once known.    Treatment Team Recommendation: Short Term Rehab  Expected Discharge Disposition: Short Term Rehab  Additional Discharge Dispositions: Inpatient Rehab Facility

## 2025-06-24 NOTE — ASSESSMENT & PLAN NOTE
In a patient who is undergone I&D and hardware extraction on 5/21/2025 with cultures growing Finegoldia and Peptoniphilus.  However the patient had been on vancomycin prior to operative cultures found so perhaps the vancomycin suppressed other involved organisms. The patient had been on intravenous vancomycin and Flagyl with a plan to continue through 7/1/2025. Course now complicated by progressive pain. Repeat CT scan demonstrated a large enhancing collection around the right hip that is complex with progression into the iliopsoas.  Consideration for the possibility of abscess formation. Per discussion with orthopedics, these collections are suspected to communicate with the joint space. Patient is s/p IR drain placement on 6/20. Cultures are currently NGTD.   -Continue intravenous vancomycin 1500mg q24h and oral Flagyl 500mg q12h through 7/1/25 as planned  -Pharmacy follow-up for vancomycin dose management  -vancomycin goal trough 10-14.4   -Microbiology to hold cultures for 14d to assess for growth of indolent organisms  -Anticipate transitioning to PO antibiotic (Augmentin + Doxycycline) for 4 additional weeks of antibiotic after IR drain placement (through 7/18)  -Plan for repeat CT of the RLE to ensure collections have resolved near end of antibiotic course  -Recheck CBC with differential and BMP to make sure no developing toxicity  -Needs weekly CBCd, BMP, and vancomycin trough  -PICC to be removed at completion of IV antibiotic course  -Outpatient ID office staff aware of follow-up needs  -Ongoing follow-up with orthopedic surgery. Will need to ensure clearance of infection prior to second stage revision arthroplasty   -Outpatient ID follow-up scheduled for 7/3  -Pending discharge to short term rehab

## 2025-06-24 NOTE — PROGRESS NOTES
Spoke with patient's granddaughter regarding tentative discharge on Tuesday.  Patient would like to have attending speak with son Perfecto regarding drop in hemoglobin and positive occult stool result.

## 2025-06-24 NOTE — ASSESSMENT & PLAN NOTE
Patient has low WBC and low hemoglobin has been stable.  Also high platelets which has been now trending down  Will continue to monitor while inpatient  Did have a lengthy discussion with the son about this.  Did consult oncology and they thought that patient will need a bone marrow biopsy in the future  For now we will need close follow-up in the outpatient  No active bleeding noted.  Positive fecal occult blood in the inpatient  Will need follow-up with GI.

## 2025-06-24 NOTE — ASSESSMENT & PLAN NOTE
Likely multifactorial including the acute infectious process, and metabolic derangements.  Doubt any new infectious etiology. Improved.

## 2025-06-24 NOTE — ASSESSMENT & PLAN NOTE
Hypomagnesemia and hypokalemia noted in the ER at [facility] on arrival  Supplemented with IV magnesium and oral potassium  Magnesium level has improved.  Still with hypokalemia (2.9 on AM of 6/17).  Received oral supplementation subsequent   Repeat BMP and Mg on arrival. Trial IV Kcl if still hypokalemic

## 2025-06-24 NOTE — ASSESSMENT & PLAN NOTE
With component of depression as well   Psychiatry evaluated at [facility] in setting of increased anxiety episodes; Denied any thoughts of SI or HI. No need for inpatient BHU from their standpoint   Continue effexor as well as has prn ativan for anxiety

## 2025-06-25 VITALS
BODY MASS INDEX: 33.09 KG/M2 | HEART RATE: 83 BPM | HEIGHT: 63 IN | WEIGHT: 186.73 LBS | OXYGEN SATURATION: 97 % | SYSTOLIC BLOOD PRESSURE: 116 MMHG | TEMPERATURE: 97.8 F | DIASTOLIC BLOOD PRESSURE: 65 MMHG | RESPIRATION RATE: 16 BRPM

## 2025-06-25 PROBLEM — E87.6 HYPOKALEMIA: Status: RESOLVED | Noted: 2025-06-18 | Resolved: 2025-06-25

## 2025-06-25 LAB
ANION GAP SERPL CALCULATED.3IONS-SCNC: 7 MMOL/L (ref 4–13)
BASOPHILS # BLD AUTO: 0.04 THOUSANDS/ÂΜL (ref 0–0.1)
BASOPHILS NFR BLD AUTO: 1 % (ref 0–1)
BUN SERPL-MCNC: 11 MG/DL (ref 5–25)
CALCIUM SERPL-MCNC: 8 MG/DL (ref 8.4–10.2)
CHLORIDE SERPL-SCNC: 101 MMOL/L (ref 96–108)
CO2 SERPL-SCNC: 29 MMOL/L (ref 21–32)
CREAT SERPL-MCNC: 0.8 MG/DL (ref 0.6–1.3)
EOSINOPHIL # BLD AUTO: 0.11 THOUSAND/ÂΜL (ref 0–0.61)
EOSINOPHIL NFR BLD AUTO: 3 % (ref 0–6)
ERYTHROCYTE [DISTWIDTH] IN BLOOD BY AUTOMATED COUNT: 15.4 % (ref 11.6–15.1)
GFR SERPL CREATININE-BSD FRML MDRD: 71 ML/MIN/1.73SQ M
GLUCOSE SERPL-MCNC: 99 MG/DL (ref 65–140)
HCT VFR BLD AUTO: 26 % (ref 34.8–46.1)
HGB BLD-MCNC: 7.9 G/DL (ref 11.5–15.4)
IMM GRANULOCYTES # BLD AUTO: 0.01 THOUSAND/UL (ref 0–0.2)
IMM GRANULOCYTES NFR BLD AUTO: 0 % (ref 0–2)
LYMPHOCYTES # BLD AUTO: 0.69 THOUSANDS/ÂΜL (ref 0.6–4.47)
LYMPHOCYTES NFR BLD AUTO: 20 % (ref 14–44)
MCH RBC QN AUTO: 28 PG (ref 26.8–34.3)
MCHC RBC AUTO-ENTMCNC: 30.4 G/DL (ref 31.4–37.4)
MCV RBC AUTO: 92 FL (ref 82–98)
MONOCYTES # BLD AUTO: 0.83 THOUSAND/ÂΜL (ref 0.17–1.22)
MONOCYTES NFR BLD AUTO: 24 % (ref 4–12)
NEUTROPHILS # BLD AUTO: 1.78 THOUSANDS/ÂΜL (ref 1.85–7.62)
NEUTS SEG NFR BLD AUTO: 52 % (ref 43–75)
NRBC BLD AUTO-RTO: 0 /100 WBCS
PLATELET # BLD AUTO: 512 THOUSANDS/UL (ref 149–390)
PMV BLD AUTO: 9 FL (ref 8.9–12.7)
POTASSIUM SERPL-SCNC: 3.5 MMOL/L (ref 3.5–5.3)
RBC # BLD AUTO: 2.82 MILLION/UL (ref 3.81–5.12)
SODIUM SERPL-SCNC: 137 MMOL/L (ref 135–147)
WBC # BLD AUTO: 3.46 THOUSAND/UL (ref 4.31–10.16)

## 2025-06-25 PROCEDURE — 80048 BASIC METABOLIC PNL TOTAL CA: CPT | Performed by: INTERNAL MEDICINE

## 2025-06-25 PROCEDURE — 99232 SBSQ HOSP IP/OBS MODERATE 35: CPT | Performed by: INTERNAL MEDICINE

## 2025-06-25 PROCEDURE — G0545 PR INHERENT VISIT TO INPT: HCPCS | Performed by: INTERNAL MEDICINE

## 2025-06-25 PROCEDURE — 85025 COMPLETE CBC W/AUTO DIFF WBC: CPT

## 2025-06-25 PROCEDURE — 99239 HOSP IP/OBS DSCHRG MGMT >30: CPT

## 2025-06-25 RX ORDER — DOXYCYCLINE 100 MG/1
100 TABLET ORAL 2 TIMES DAILY
Start: 2025-07-02 | End: 2025-07-18

## 2025-06-25 RX ORDER — METRONIDAZOLE 500 MG/1
500 TABLET ORAL EVERY 12 HOURS SCHEDULED
Start: 2025-06-25 | End: 2025-07-03 | Stop reason: ALTCHOICE

## 2025-06-25 RX ORDER — OXYCODONE HYDROCHLORIDE 5 MG/1
5 TABLET ORAL EVERY 4 HOURS PRN
Qty: 10 TABLET | Refills: 0 | Status: SHIPPED | OUTPATIENT
Start: 2025-06-25

## 2025-06-25 RX ORDER — DOCUSATE SODIUM 100 MG/1
100 CAPSULE, LIQUID FILLED ORAL
Start: 2025-06-25

## 2025-06-25 RX ORDER — FERROUS SULFATE 324(65)MG
324 TABLET, DELAYED RELEASE (ENTERIC COATED) ORAL EVERY OTHER DAY
Start: 2025-06-25 | End: 2025-10-23

## 2025-06-25 RX ADMIN — PANTOPRAZOLE SODIUM 40 MG: 40 TABLET, DELAYED RELEASE ORAL at 06:08

## 2025-06-25 RX ADMIN — ENOXAPARIN SODIUM 40 MG: 40 INJECTION SUBCUTANEOUS at 09:11

## 2025-06-25 RX ADMIN — AMLODIPINE BESYLATE 10 MG: 10 TABLET ORAL at 09:11

## 2025-06-25 RX ADMIN — VENLAFAXINE HYDROCHLORIDE 150 MG: 150 CAPSULE, EXTENDED RELEASE ORAL at 09:12

## 2025-06-25 RX ADMIN — OXYCODONE HYDROCHLORIDE 5 MG: 5 TABLET ORAL at 09:12

## 2025-06-25 RX ADMIN — MICONAZOLE NITRATE: 20 CREAM TOPICAL at 09:11

## 2025-06-25 RX ADMIN — VANCOMYCIN HYDROCHLORIDE 1500 MG: 10 INJECTION, POWDER, LYOPHILIZED, FOR SOLUTION INTRAVENOUS at 13:00

## 2025-06-25 RX ADMIN — ASPIRIN 81 MG CHEWABLE TABLET 81 MG: 81 TABLET CHEWABLE at 09:11

## 2025-06-25 RX ADMIN — FOLIC ACID 1 MG: 1 TABLET ORAL at 09:11

## 2025-06-25 RX ADMIN — OXYCODONE HYDROCHLORIDE AND ACETAMINOPHEN 500 MG: 500 TABLET ORAL at 09:12

## 2025-06-25 RX ADMIN — METRONIDAZOLE 500 MG: 500 TABLET ORAL at 09:11

## 2025-06-25 NOTE — CASE MANAGEMENT
Case Management Discharge Planning Note    Patient name Sharon Vega  Location [facility] 918/[facility] 918-01 MRN [id number]  : 1950 Date 2025       Current Admission Date: 2025  Current Admission Diagnosis:Abscess of right hip   Patient Active Problem List    Diagnosis Date Noted    Bicytopenia 2025    Hypokalemia 2025    QT prolongation 2025    Anxiety 2025    Generalized weakness 2025    Electrolyte abnormality 2025    Sacral wound 2025    Pre-diabetes 2025    Constipation 2025    Positive blood culture 2025    Aortic stenosis 2025    Anemia 2025    Hypomagnesemia 05/15/2025    Hyponatremia 05/15/2025    Abscess of right hip 2025    History of hemiarthroplasty of right hip 2025    Heart murmur 2025    Thrombocytosis 2025    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) 2025    Class 2 severe obesity due to excess calories with serious comorbidity and body mass index (BMI) of 35.0 to 35.9 in adult (HCC) 2025    Right hip pain 2025    Sepsis without acute organ dysfunction (HCC) 2025    Sacroiliitis (HCC) 2025    Lumbar radiculopathy     Chronic pain syndrome 2023    Primary hypertension 2022    Lumbar degenerative disc disease 2022    Lumbar spondylosis 2022    Cervical radiculopathy 2022    Cervical spondylosis 2022    Rheumatoid arthritis involving both hands (HCC) 2022    Spinal stenosis of lumbar region without neurogenic claudication 2022      LOS (days): 7  Geometric Mean LOS (GMLOS) (days): 2.8  Days to GMLOS:-4.3     OBJECTIVE:  Risk of Unplanned Readmission Score: 32.34         Current admission status: Inpatient   Preferred Pharmacy:   [facility] [place] - [place] - [address]  [address]  [place] [address]  Phone: [phone] Fax: [phone]    [id number] - [facility]  [place] - [address]  [address] [address]  Phone: [phone] Fax: [phone]    Primary Care Provider: Danielito Antunez DO    Primary Insurance: MEDICARE  Secondary Insurance: AARP    DISCHARGE DETAILS:    Discharge planning discussed with:: Pt at bedside and son Bill via TC    Contacts  Patient Contacts: Perfecto Vega (Son)  Relationship to Patient:: Family (Son)  Contact Method: Phone  Phone Number: [phone] (M)  Reason/Outcome: Discharge Planning    Other Referral/Resources/Interventions Provided:  Interventions: Short Term Rehab  Referral Comments: Per [facility] communication [facility] able to accept pt today for STR. BLS transport requested for 2pm, awaiting confirmed time. Bedside RN, SLIM, facility, pt, and pts son Bill informed. of same. Pt and son in agreement with discharge determination and plan for admission to [facility] today.    Treatment Team Recommendation: Short Term Rehab  Expected Discharge Disposition: Skilled Nursing Facility  Additional Discharge Dispositions: Skilled Nursing Facility  Transport at Discharge : BLS Ambulance    ETA of Transport (Date): 06/25/25  ETA of Transport (Time):  (request made for 2pm, CM will follow for confirme transport time.)     IMM Given (Date):: 06/25/25  IMM Given to:: Patient    Accepting Facility Name, City & State : [facility]  Receiving Facility/Agency Phone Number: [phone]  Facility/Agency Fax Number: [phone]

## 2025-06-25 NOTE — DISCHARGE SUMMARY
"Discharge Summary - Hospitalist   Name: Sharon Vega 75 y.o. female I MRN: [id number]  [facility] I Date of Admission: 6/18/2025   Date of Service: 6/25/2025 I Hospital Day: 7     Assessment & Plan  Abscess of right hip  Pertinent past medical history of prior right hip abscess and infected GINA, subsequently status post I&D and hardware extraction on 5/21 as well as has been on IV Vancomycin, po flagyl with plans for abx for 6 weeks through 7/1  Presented to the ER at [facility] on 6/16 with R hip pain and generalized weakness  Underwent CT Hip on 6/17 with: \" A large peripherally enhancing fluid collection surrounding the proximal right femur, that extending along the right gluteus muscle body and upper musculature of the right thigh.  There are a few small foci of gas within this collection.  There is a separate, similar peripherally enhancing collection involving the right iliopsoas muscle bodies.  These findings are highly suspicious for abscesses\"  Patient had large abscess, and per IR note there was some purulence. No more pain after drainage. No fevers no chills.   Continue antibiotic biotics vancomycin and Flagyl.  Through 7/1  After IV antibiotics will continue oral antibiotics to be started on 7/2 and per ID plan to do another 4 weeks of antibiotics through 7/18.  ID will set up follow-up at their clinic.  Will place consult.  p.o. doxycycline 100 mg twice daily and p.o. Augmentin 875 mg twice daily   Advised 2-week follow-up with IR for drain check  Follow-up with orthopedics in the outpatient setting follow-up with PCP in the outpatient setting  Follow-up with orthopedics  Cleared for discharge today.  Was able to discuss with the son.  Bicytopenia  Patient has low WBC and low hemoglobin has been stable.  Also high platelets which has been now trending down  Will continue to monitor while inpatient  Did have a lengthy discussion with the son about this.  Per oncology this could be due to the " chronic infection/chronic vancomycin however bone marrow problem cannot be ruled out  Did consult oncology and they thought that patient will need a bone marrow biopsy in the future after patient is cleared from this infection  For now we will need close follow-up in the outpatient  Will need follow-up with GI.  For possible colonoscopy patient did have some fecal occult blood positive and patient.  No bright red blood  Primary hypertension  On pta amlodipine   Chronic pain syndrome  On PTA gabapentin at bedtime  Anemia  Has been stable as noted above.  Sacral wound  Present on arrival to [place]   Wound care consult  Anxiety  With component of depression as well   Psychiatry evaluated at [place] in setting of increased anxiety episodes; Denied any thoughts of SI or HI. 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     Medical Problems       Resolved Problems  Date Reviewed: 6/4/2025          Resolved    Hypokalemia 6/25/2025     Resolved by  Indra Camp MD        Discharging Physician / Practitioner: Indra Camp MD  PCP: Danielito Antunez DO  Admission Date:   Admission Orders (From admission, onward)       Ordered        06/18/25 0827  INPATIENT ADMISSION  Once                          Discharge Date: 06/25/25    Next Steps for Physician/AP Assuming Care:  Continue antibiotic biotics vancomycin and Flagyl.  Through 7/1  After IV antibiotics will continue oral antibiotics to be started on 7/2 and per ID plan to do another 4 weeks of antibiotics through 7/18.    ID will set up follow-up at their clinic.  Will place consult.  p.o. doxycycline 100 mg twice daily and p.o. Augmentin 875 mg twice daily  starting 7/2  Advised 2-week follow-up with IR for drain check  Follow-up with orthopedics in the outpatient setting    follow-up with PCP in the outpatient setting  Patient has low WBC and low hemoglobin has been stable.  Also high platelets which has been now trending down  Repeat CBC in 3 days and also 1 week later  Did consult oncology here inpatient but they said They will follow outpatient instead.   Will need follow-up with GI.  For possible colonoscopy  Test Results Pending at Discharge (will require follow up):  None    Medication Changes for Discharge & Rationale:   Continue IV antibiotic  See after visit summary for reconciled discharge medications provided to patient and/or family.     Consultations During Hospital Stay:  ID< Ortho, IR    Procedures Performed:   IR drain placement    Significant Findings / Test Results:   XR chest portable  Result Date: 6/21/2025  Impression: No acute cardiopulmonary disease. Right PICC difficult to see with certainty but likely in the mid SVC. Moderate hiatal hernia. Workstation performed: [id number]     IR drainage tube placement  Result Date: 6/20/2025  Impression: 1. Percutaneous placement of a 10 Samoan drainage catheter into right thigh hematoma, yielding 120 mL of bloody fluid. 2. Percutaneous placement of a 10 Samoan Hastings-Stanley drainage catheter into right iliopsoas muscle abscess yielding 20 mL of purulent fluid. Plan: -Keep both catheters to bulb suction drainage -Flush each catheter with 10 mL normal saline daily -Return in 2 weeks for drain check Workstation performed: [id number]     XR hip/pelv 2-3 vws right if performed  Result Date: 6/18/2025  Impression: No acute osseous abnormality. No radiographic evidence of osteomyelitis. Computerized Assisted Algorithm (CAA) may have been used to analyze all applicable images. Workstation performed: [id number]       No Chest XR results available for this patient.       Incidental Findings:   None       Hospital Course:   Sharon Vega is a 75 y.o. female patient who originally presented to the hospital on 6/18/2025 due to  "worsening pain of the right hip.  Initially presented in [facility] due to worsening pain.  Imaging showed a right hip abscess.  ID, orthopedics and IR were involved and they advised putting a drain placement for infectious control and continuing IV antibiotics vancomycin and oral Flagyl..  Patient had marked improvement of the pain after drain placement and was afebrile has no signs and symptoms of fever  Did discuss with oncology due to low hemoglobin and low WBC and elevated platelets.  Cannot rule out a bone marrow deficiency however this could also be due to the chronic infection and chronic IV antibiotics.          Please see above list of diagnoses and related plan for additional information.     Discharge Day Visit / Exam:   Subjective: Patient is doing well symptomatic cleared for discharge agreeable for plan  Vitals: Blood Pressure: 116/65 (06/25/25 0740)  Pulse: 83 (06/25/25 0740)  Temperature: 97.8 °F (36.6 °C) (06/25/25 0740)  Temp Source: Oral (06/18/25 0805)  Respirations: 16 (06/25/25 0740)  Height: 5' 3\" (160 cm) (06/18/25 0805)  Weight - Scale: 84.7 kg (186 lb 11.7 oz) (06/18/25 0805)  SpO2: 97 % (06/25/25 0740)  Physical Exam  Vitals and nursing note reviewed.   Constitutional:       General: She is not in acute distress.     Appearance: She is well-developed.   HENT:      Head: Normocephalic and atraumatic.      Nose: Nose normal.      Mouth/Throat:      Mouth: Mucous membranes are moist.     Eyes:      Conjunctiva/sclera: Conjunctivae normal.       Cardiovascular:      Rate and Rhythm: Normal rate and regular rhythm.      Heart sounds: No murmur heard.  Pulmonary:      Effort: Pulmonary effort is normal. No respiratory distress.      Breath sounds: Normal breath sounds.   Abdominal:      Palpations: Abdomen is soft.      Tenderness: There is no abdominal tenderness.     Musculoskeletal:      Cervical back: Neck supple.      Right lower leg: No edema.      Left lower leg: No edema. "     Skin:     General: Skin is warm and dry.      Capillary Refill: Capillary refill takes less than 2 seconds.     Neurological:      General: No focal deficit present.      Mental Status: She is alert and oriented to person, place, and time.     Psychiatric:         Mood and Affect: Mood normal.          Discussion with Family: Updated  (son) via phone.    Discharge instructions/Information to patient and family:   See after visit summary for information provided to patient and family.      Provisions for Follow-Up Care:  See after visit summary for information related to follow-up care and any pertinent home health orders.      Mobility at time of Discharge:   Basic Mobility Inpatient Raw Score: 17  JH-HLM Goal: 5: Stand one or more mins  JH-HLM Achieved: 8: Walk 250 feet ot more  HLM Goal achieved. Continue to encourage appropriate mobility.     Disposition:   Home    Planned Readmission: none    Administrative Statements   Discharge Statement:  I have spent a total time of 40 minutes in caring for this patient on the day of the visit/encounter. >30 minutes of time was spent on: Diagnostic results, Prognosis, Risks and benefits of tx options, Instructions for management, Patient and family education, Importance of tx compliance, Risk factor reductions, Impressions, Counseling / Coordination of care, Documenting in the medical record, Reviewing / ordering tests, medicine, procedures  , and Communicating with other healthcare professionals .    **Please Note: This note may have been constructed using a voice recognition system**

## 2025-06-25 NOTE — ASSESSMENT & PLAN NOTE
"Pertinent past medical history of prior right hip abscess and infected GINA, subsequently status post I&D and hardware extraction on 5/21 as well as has been on IV Vancomycin, po flagyl with plans for abx for 6 weeks through 7/1  Presented to the ER at [place] on 6/16 with R hip pain and generalized weakness  Underwent CT Hip on 6/17 with: \" A large peripherally enhancing fluid collection surrounding the proximal right femur, that extending along the right gluteus muscle body and upper musculature of the right thigh.  There are a few small foci of gas within this collection.  There is a separate, similar peripherally enhancing collection involving the right iliopsoas muscle bodies.  These findings are highly suspicious for abscesses\"  Patient had large abscess, and per IR note there was some purulence. No more pain after drainage. No fevers no chills.   Continue antibiotic biotics vancomycin and Flagyl.  Through 7/1  After IV antibiotics will continue oral antibiotics to be started on 7/2 and per ID plan to do another 4 weeks of antibiotics through 7/18.  ID will set up follow-up at their clinic.  Will place consult.  p.o. doxycycline 100 mg twice daily and p.o. Augmentin 875 mg twice daily   Advised 2-week follow-up with IR for drain check  Follow-up with orthopedics in the outpatient setting follow-up with PCP in the outpatient setting  Follow-up with orthopedics  Cleared for discharge today.  Was able to discuss with the son.  "

## 2025-06-25 NOTE — PROGRESS NOTES
Sharon Vega is a 75 y.o. female who is currently ordered Vancomycin IV with management by the Pharmacy Consult service.  Relevant clinical data and objective / subjective history reviewed.  Vancomycin Assessment:  Indication and Goal AUC/Trough: Soft tissue (goal -600, trough >10), -600, trough >10  Clinical Status: stable  Micro:     Renal Function:  SCr: 0.8 mg/dL (was 0.73 mg/dL)  CrCl: 61.7 mL/min (was 67 mL/min0  Renal replacement: Not on dialysis  Days of Therapy: 10  Current Dose: 1500 mg IV Q24H  Vancomycin Plan: no change. Keep on vancomycin thru 7/1 then transition to doxycycline po. Current dose 1500 mg q 24 hr  New Dosing: keep on Vancomycin 1500 mg IV q24h  Estimated AUC: 491 mcg hr/mL  Estimated Trough: 12.1 mcg/mL  Next Level: 6/26 am labs   Renal Function Monitoring: Daily BMP and UOP  Pharmacy will continue to follow closely for s/sx of nephrotoxicity, infusion reactions and appropriateness of therapy.  BMP and CBC will be ordered per protocol. We will continue to follow the patient’s culture results and clinical progress daily.    Demetrio Cesar, R.Ph., Pharmacist

## 2025-06-25 NOTE — ASSESSMENT & PLAN NOTE
Patient has low WBC and low hemoglobin has been stable.  Also high platelets which has been now trending down  Will continue to monitor while inpatient  Did have a lengthy discussion with the son about this.  Per oncology this could be due to the chronic infection/chronic vancomycin however bone marrow problem cannot be ruled out  Did consult oncology and they thought that patient will need a bone marrow biopsy in the future after patient is cleared from this infection  For now we will need close follow-up in the outpatient  Will need follow-up with GI.  For possible colonoscopy patient did have some fecal occult blood positive and patient.  No bright red blood

## 2025-06-25 NOTE — PROGRESS NOTES
RN attempted to called report to [facility] at [phone] [phone] with no answer. Paperwork and oxy script signed and given to transport. Picc in place for dc.

## 2025-06-25 NOTE — CASE MANAGEMENT
Case Management Discharge Planning Note    Patient name Shaorn Vega  Location [facility] 918/[facility] 918-01 MRN [id number]  : 1950 Date 2025       Current Admission Date: 2025  Current Admission Diagnosis:Abscess of right hip   Patient Active Problem List    Diagnosis Date Noted    Bicytopenia 2025    QT prolongation 2025    Anxiety 2025    Generalized weakness 2025    Electrolyte abnormality 2025    Sacral wound 2025    Pre-diabetes 2025    Constipation 2025    Positive blood culture 2025    Aortic stenosis 2025    Anemia 2025    Hypomagnesemia 05/15/2025    Hyponatremia 05/15/2025    Abscess of right hip 2025    History of hemiarthroplasty of right hip 2025    Heart murmur 2025    Thrombocytosis 2025    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) 2025    Class 2 severe obesity due to excess calories with serious comorbidity and body mass index (BMI) of 35.0 to 35.9 in adult (HCC) 2025    Right hip pain 2025    Sepsis without acute organ dysfunction (HCC) 2025    Sacroiliitis ([facility]) 2025    Lumbar radiculopathy     Chronic pain syndrome 2023    Primary hypertension 2022    Lumbar degenerative disc disease 2022    Lumbar spondylosis 2022    Cervical radiculopathy 2022    Cervical spondylosis 2022    Rheumatoid arthritis involving both hands (HCC) 2022    Spinal stenosis of lumbar region without neurogenic claudication 2022      LOS (days): 7  Geometric Mean LOS (GMLOS) (days): 2.8  Days to GMLOS:-4.5     OBJECTIVE:  Risk of Unplanned Readmission Score: 30.16         Current admission status: Inpatient   Preferred Pharmacy:   [facility] [place] - [place] - [address]  [address]  [place] [address]  Phone: [phone] Fax: [phone]    [id number] - [place] - [address] [address]  [facility] [address]  Phone: [phone] Fax: [phone]    Primary Care Provider: Danielito Antunez DO    Primary Insurance: MEDICARE  Secondary Insurance: AARP    DISCHARGE DETAILS:    Contacts  Patient Contacts: Perfecto Vega (Son)  Relationship to Patient:: Family (son)  Contact Method: Phone  Phone Number: [phone] (M)  Reason/Outcome: Discharge Planning    Transported by (Company and Unit #): [id number]  ETA of Transport (Date): 06/25/25  ETA of Transport (Time): 1430    Additional Comments: pt/family, facility, and care team aware of transport time.

## 2025-06-25 NOTE — CASE MANAGEMENT
Case Management Discharge Planning Note    Patient name Sharon Vega  Location [facility] 918/[facility] 918-01 MRN [id number]  : 1950 Date 2025       Current Admission Date: 2025  Current Admission Diagnosis:Abscess of right hip   Patient Active Problem List    Diagnosis Date Noted    Bicytopenia 2025    Hypokalemia 2025    QT prolongation 2025    Anxiety 2025    Generalized weakness 2025    Electrolyte abnormality 2025    Sacral wound 2025    Pre-diabetes 2025    Constipation 2025    Positive blood culture 2025    Aortic stenosis 2025    Anemia 2025    Hypomagnesemia 05/15/2025    Hyponatremia 05/15/2025    Abscess of right hip 2025    History of hemiarthroplasty of right hip 2025    Heart murmur 2025    Thrombocytosis 2025    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) 2025    Class 2 severe obesity due to excess calories with serious comorbidity and body mass index (BMI) of 35.0 to 35.9 in adult (HCC) 2025    Right hip pain 2025    Sepsis without acute organ dysfunction (HCC) 2025    Sacroiliitis (HCC) 2025    Lumbar radiculopathy     Chronic pain syndrome 2023    Primary hypertension 2022    Lumbar degenerative disc disease 2022    Lumbar spondylosis 2022    Cervical radiculopathy 2022    Cervical spondylosis 2022    Rheumatoid arthritis involving both hands (HCC) 2022    Spinal stenosis of lumbar region without neurogenic claudication 2022      LOS (days): 7  Geometric Mean LOS (GMLOS) (days): 2.8  Days to GMLOS:-4.3     OBJECTIVE:  Risk of Unplanned Readmission Score: 32.34         Current admission status: Inpatient   Preferred Pharmacy:   [facility] [place] - [place] - [address]  [address]  [place] [address]  Phone: [phone] Fax: [phone]    [id number] - [facility]  [place] - [address]  [address] [facility] [address]  Phone: [phone] Fax: [phone]    Primary Care Provider: Danielito Antunez DO    Primary Insurance: MEDICARE  Secondary Insurance: AARP    DISCHARGE DETAILS:    Discharge planning discussed with:: Pt at bedside and son Bill via TC  Freedom of Choice: Yes  Comments - Freedom of Choice: Accepting STR options reviewed with pt and son, pt and son requesting to accept bed at [facility].  CM contacted family/caregiver?: Yes (Son Bill)  Were Treatment Team discharge recommendations reviewed with patient/caregiver?: Yes  Did patient/caregiver verbalize understanding of patient care needs?: N/A- going to facility  Were patient/caregiver advised of the risks associated with not following Treatment Team discharge recommendations?: Yes    Contacts  Patient Contacts: Perfecto Vega (Son)  Relationship to Patient:: Family (Son)  Contact Method: Phone  Phone Number: [phone] (M)  Reason/Outcome: Discharge Planning    Other Referral/Resources/Interventions Provided:  Referral Comments: Accepting STR options reviewed with pt and son. [place] now with current open bed for pt today. Discussed with pt and son, and also reviewed alternative accepting SNF options. Pt and son requesting placement at [facility] as preferred placement option due to location. [facility] reserved in [facility]. SLIM updated on same, pt remains medically ready for discharge. CM currently awaiting confirmed bed availability at this time. CM team will follow.    Treatment Team Recommendation: Short Term Rehab  Expected Discharge Disposition: Skilled Nursing Facility  Additional Discharge Dispositions: Skilled Nursing Facility

## 2025-06-25 NOTE — ASSESSMENT & PLAN NOTE
With component of depression as well   Psychiatry evaluated at [place] in setting of increased anxiety episodes; Denied any thoughts of SI or HI. No need for inpatient BHU from their standpoint   Continue effexor as well as has prn ativan for anxiety

## 2025-06-25 NOTE — PROGRESS NOTES
Progress Note - Infectious Disease   Name: Sharon Vega 75 y.o. 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  However the patient had been on vancomycin prior to operative cultures found so perhaps the vancomycin suppressed other involved organisms. The patient had been on intravenous vancomycin and Flagyl with a plan to continue through 7/1/2025. Course now complicated by progressive pain. Repeat CT scan demonstrated a large enhancing collection around the right hip that is complex with progression into the iliopsoas.  Consideration for the possibility of abscess formation. Per discussion with orthopedics, these collections are suspected to communicate with the joint space. Patient is s/p IR drain placement on 6/20. Cultures remain NGTD.   -Continue intravenous vancomycin 1500mg q24h and oral Flagyl 500mg q12h through 7/1/25 as planned  -Pharmacy follow-up for vancomycin dose management  -vancomycin goal trough 10-14.4   -Microbiology to hold cultures for 14d to assess for growth of indolent organisms  -Plan to transition to PO antibiotic (Augmentin + Doxycycline) for 4 additional weeks of antibiotic after IR drain placement (through 7/18)  -Plan for repeat CT of the RLE to ensure collections have resolved near end of antibiotic course  -Recheck CBC with differential and BMP to make sure no developing toxicity  -Needs weekly CBCd, BMP, and vancomycin trough  -PICC to be removed at completion of IV antibiotic course  -Outpatient ID office staff aware of follow-up needs  -Ongoing follow-up with orthopedic surgery. Will need to ensure clearance of infection prior to second stage revision arthroplasty   -Outpatient ID follow-up scheduled for  7/3  -Pending discharge to short term rehab    Sacral wound  Not overtly infected.  Bicytopenia  With significant anemia and leukopenia, with a neutrophil count that currently is above 1000.  Consideration for the possibility of a primary hematologic process with the abnormal differential.  - If persists, consider hematology oncology evaluation  - Recheck CBC with differential to make sure no worsening  Generalized weakness  Likely multifactorial including the acute infectious process, and metabolic derangements.  Doubt any new infectious etiology. Improved.   QT prolongation  Limits antibiotic options. Would avoid Fluoroquinolones and macrolides if able.     I have discussed the above management plan in detail with the primary service. They agree with the above antibiotic plan.   Infectious Disease service will follow. OK for discharge from ID standpoint.     Antibiotics:  Vancomycin   Flagyl    Subjective   Patient has no new complaints today. Denies fevers, chills, N/V/D, CP, SOB, abdominal pain. No significant right hip pain. Tolerating IV vancomycin and PO flagyl. Again discussed transition to PO doxycycline and PO augmentin on 7/2 after completing IV antibiotic course. Pending rehab placement.     Objective :  Temp:  [97.5 °F (36.4 °C)-98.8 °F (37.1 °C)] 97.8 °F (36.6 °C)  HR:  [83-96] 83  BP: (104-122)/(62-70) 116/65  Resp:  [16-19] 16  SpO2:  [88 %-97 %] 97 %  O2 Device: None (Room air)  Nasal Cannula O2 Flow Rate (L/min):  [2 L/min] 2 L/min    General:  No acute distress, sitting upright in bed, nontoxic appearing  Psychiatric:  Awake and alert, answers questions appropriately  Pulmonary:  Normal respiratory excursion without accessory muscle use, on room air  Heart: RRR, +murmur  Abdomen:  Soft, nontender, non-distended  Extremities:  No edema, right hip with two drains in place. One drain with a milky tan fluid, the other with bloody output. No surrounding erythema noted.   Skin:  No rashes noted to  exposed skin.       Lab Results: I have reviewed the following results:  Results from last 7 days   Lab Units 06/25/25  1010 06/24/25  0454 06/23/25  0815   WBC Thousand/uL 3.46* 2.90* 3.47*   HEMOGLOBIN g/dL 7.9* 9.3* 8.0*   PLATELETS Thousands/uL 512* 488* 512*     Results from last 7 days   Lab Units 06/25/25  0429 06/24/25  0454 06/23/25  0815   SODIUM mmol/L 137 138 138   POTASSIUM mmol/L 3.5 3.4* 3.6   CHLORIDE mmol/L 101 102 103   CO2 mmol/L 29 28 30   BUN mg/dL 11 12 11   CREATININE mg/dL 0.80 0.73 0.78   EGFR ml/min/1.73sq m 71 80 74   CALCIUM mg/dL 8.0* 8.0* 8.2*     Results from last 7 days   Lab Units 06/20/25  1645 06/20/25  1603   GRAM STAIN RESULT  4+ Polys  No bacteria seen No Polys or Bacteria seen   BODY FLUID CULTURE, STERILE  No growth No growth                         Imaging Results Review: No pertinent imaging studies reviewed.  Other Study Results Review: No additional pertinent studies reviewed.      Patti Cordova PA-C  [facility]

## 2025-06-25 NOTE — ASSESSMENT & PLAN NOTE
In a patient who is undergone I&D and hardware extraction on 5/21/2025 with cultures growing Finegoldia and Peptoniphilus.  However the patient had been on vancomycin prior to operative cultures found so perhaps the vancomycin suppressed other involved organisms. The patient had been on intravenous vancomycin and Flagyl with a plan to continue through 7/1/2025. Course now complicated by progressive pain. Repeat CT scan demonstrated a large enhancing collection around the right hip that is complex with progression into the iliopsoas.  Consideration for the possibility of abscess formation. Per discussion with orthopedics, these collections are suspected to communicate with the joint space. Patient is s/p IR drain placement on 6/20. Cultures remain NGTD.   -Continue intravenous vancomycin 1500mg q24h and oral Flagyl 500mg q12h through 7/1/25 as planned  -Pharmacy follow-up for vancomycin dose management  -vancomycin goal trough 10-14.4   -Microbiology to hold cultures for 14d to assess for growth of indolent organisms  -Plan to transition to PO antibiotic (Augmentin + Doxycycline) for 4 additional weeks of antibiotic after IR drain placement (through 7/18)  -Plan for repeat CT of the RLE to ensure collections have resolved near end of antibiotic course  -Recheck CBC with differential and BMP to make sure no developing toxicity  -Needs weekly CBCd, BMP, and vancomycin trough  -PICC to be removed at completion of IV antibiotic course  -Outpatient ID office staff aware of follow-up needs  -Ongoing follow-up with orthopedic surgery. Will need to ensure clearance of infection prior to second stage revision arthroplasty   -Outpatient ID follow-up scheduled for 7/3  -Pending discharge to short term rehab

## 2025-06-26 ENCOUNTER — TELEPHONE (OUTPATIENT)
Dept: INFECTIOUS DISEASES | Facility: CLINIC | Age: 75
End: 2025-06-26

## 2025-06-26 DIAGNOSIS — T84.51XD INFECTION ASSOCIATED WITH INTERNAL RIGHT HIP PROSTHESIS, SUBSEQUENT ENCOUNTER: ICD-10-CM

## 2025-06-26 DIAGNOSIS — L02.415 ABSCESS OF RIGHT HIP: Primary | ICD-10-CM

## 2025-06-26 DIAGNOSIS — Z79.2 LONG TERM (CURRENT) USE OF ANTIBIOTICS: ICD-10-CM

## 2025-06-26 DIAGNOSIS — R78.81 BACTEREMIA: ICD-10-CM

## 2025-06-26 NOTE — PROGRESS NOTES
OPAT NOTE    AP ONLY CAMPUSES ARE: [facility] and [facility].   In these cases, physician is only cosigning notes.    Supervising/Discharge provider: Kerri    Diagnosis: Infection of Rt Hip PJI    Drug:  Vancomycin 1500mg IV Q24, Flagyl 500mg PO Q12 through 7/1  On 7/2, switch to doxy 100mg PO BID and Augmentin 875 mg PO BID through tentative 7/18.    CT scan of hip before end date. Scheduled at [facility] 7/9 2:15 PM.    Labs/Frequency:  CBCD Vanco trough due 6/30 within the hour prior to patient's scheduled vancomycin dose.    End Date: Tentatively 7/18    Vanco Trough Range: 10-14.4    Infusion/VNA/SNF contact: No[facility]    Next appointment: 7/3 1:45 PM, Warren Too

## 2025-06-26 NOTE — TELEPHONE ENCOUNTER
Fax successfully sent.  Contacted Sun back to confirm understanding of plan and upcoming follow ups.   She confirms understanding of plan.   She confirms labs, follow up, CT scan, and she has made appointment for f/u after CT scan on 7/16 1245 with Parth.  She states that she will fax labs to us on Monday and does not have any questions at this time.

## 2025-06-26 NOTE — TELEPHONE ENCOUNTER
Reached out to Sun at [facility]. Confirmed fax of [phone]. Fax states busy, unsendable. Was provided alternate fax of [phone]. Informed Sun I would call her after I could get the OPAT to send to discuss abx plans, upcoming appointments.

## 2025-06-30 NOTE — PROGRESS NOTES
Name: Sharon Vega      : 1950      MRN: [id number]  Encounter Provider: Ravi Negron MD  Encounter Date: 7/3/2025   Encounter department: [facility]  :  Assessment & Plan  Infection associated with internal right hip prosthesis, subsequent encounter  Status post extraction of hardware 2025 with cultures previously growing Finegoldia and Peptoniphilus.  Had been on a treatment course with intravenous vancomycin and Flagyl with a plan to complete 6 weeks on 2025 but presented with recurrent hip pain and underwent IR drain placement of a right thigh hematoma and right iliopsoas collection felt to be consistent with abscess.  One of the IR drainage cultures grew Staphylococcus epidermidis in broth culture only when held for 14 days and this is of unclear significance as it may represent a contaminant.  Regardless the patient has been on adequate treatment.  Patient has now completed 6 weeks of intravenous vancomycin and oral Flagyl that completed on 2025.  Now transition to oral doxycyclin and Augmentin.  -Continue oral Augmentin and doxycycline through at least 2025 as the patient will complete an additional 4 weeks of treatment from the time of the IR drain placement.  -Discontinue PICC line  -Recheck CT scan of the hip to confirm adequate drainage scheduled for 2025.  -If collection adequately addressed/drained tentatively plan to discontinue all antibiotics.  -Check CBC with differential and CMP in 1 week to make sure no developing toxicities.  -Follow-up with infectious diseases on 2025       Bicytopenia  Persistent of unclear significance.  -Consult hematology oncology if persists       QT prolongation  Limits antibiotic options.  Would avoid quinolones and macrolides if able.           Antibiotics: Doxycycline and Augmentin    History of Present Illness   Patient here for follow-up of a prosthetic right hip infection status post explantation  and a 6-week course of intravenous vancomycin and Flagyl.  Complicated by right hip and iliopsoas collection status biased our IR drainage with culture essentially negative except for Staphylococcus epidermidis with delayed growth in broth culture only.  Patient has now been transitioned to oral Augmentin and doxycycline.  Patient has no fever, chills, sweats; no nausea, vomiting, diarrhea; no cough, shortness of breath; no pain. No new symptoms.    ROS:  A complete review of systems is negative other than that noted above in the HPI.         Objective   BP 98/60   Pulse 100   Temp (!) 96.8 °F (36 °C)   Wt 84.4 kg (186 lb)   SpO2 97%   BMI 32.95 kg/m²      General: Alert, interactive, appearing well, nontoxic, no acute distress.  Throat: Oropharynx moist without lesions.   Lungs: Clear to auscultation bilaterally; no audible wheezes, rhonchi or rales; respirations unlabored  Heart: RRR; no murmur, rub or gallop  Abdomen: Soft, non-tender, non-distended, positive bowel sounds.    Extremities: No clubbing, cyanosis or edema.  Right-sided RICK drain in place with some bloody output.  Upper extremity PICC line in place without erythema or drainage  Skin: No new rashes or lesions. No new draining wounds.    Lab Results: I have personally reviewed pertinent labs.  Lab Results   Component Value Date    K 3.5 06/25/2025     06/25/2025    CO2 29 06/25/2025    BUN 11 06/25/2025    CREATININE 0.80 06/25/2025    GLUF 116 (H) 04/25/2025    CALCIUM 8.0 (L) 06/25/2025    CORRECTEDCA 8.8 06/17/2025    AST 16 06/17/2025    ALT 6 (L) 06/17/2025    ALKPHOS 40 06/17/2025    EGFR 71 06/25/2025     Lab Results   Component Value Date    WBC 3.46 (L) 06/25/2025    HGB 7.9 (L) 06/25/2025    HCT 26.0 (L) 06/25/2025    MCV 92 06/25/2025     (H) 06/25/2025     Lab Results   Component Value Date    ESR >130 (H) 05/13/2025

## 2025-06-30 NOTE — ASSESSMENT & PLAN NOTE
Status post extraction of hardware 5/21/2025 with cultures previously growing Finegoldia and Peptoniphilus.  Had been on a treatment course with intravenous vancomycin and Flagyl with a plan to complete 6 weeks on 7/1/2025 but presented with recurrent hip pain and underwent IR drain placement of a right thigh hematoma and right iliopsoas collection felt to be consistent with abscess.  One of the IR drainage cultures grew Staphylococcus epidermidis in broth culture only when held for 14 days and this is of unclear significance as it may represent a contaminant.  Regardless the patient has been on adequate treatment.  Patient has now completed 6 weeks of intravenous vancomycin and oral Flagyl that completed on 7/1/2025.  Now transition to oral doxycyclin and Augmentin.  -Continue oral Augmentin and doxycycline through at least 7/18/2025 as the patient will complete an additional 4 weeks of treatment from the time of the IR drain placement.  -Discontinue PICC line  -Recheck CT scan of the hip to confirm adequate drainage scheduled for 7/9/2025.  -If collection adequately addressed/drained tentatively plan to discontinue all antibiotics.  -Check CBC with differential and CMP in 1 week to make sure no developing toxicities.  -Follow-up with infectious diseases on 7/18/2025

## 2025-07-01 ENCOUNTER — TELEPHONE (OUTPATIENT)
Age: 75
End: 2025-07-01

## 2025-07-01 LAB
BACTERIA SPEC BFLD CULT: ABNORMAL
GRAM STN SPEC: ABNORMAL
GRAM STN SPEC: ABNORMAL

## 2025-07-01 NOTE — TELEPHONE ENCOUNTER
Spoke with Aixa at [facility] to request labs from yesterday. She will fax the results to the office. She states the vanco trough is 22.7 and the creatinine level is 0.91.

## 2025-07-01 NOTE — TELEPHONE ENCOUNTER
Spoke with Sun at [facility]. She confirms. Requests that I send her an order to F: [phone] regarding the above information.    Faxed at her request.

## 2025-07-03 ENCOUNTER — OFFICE VISIT (OUTPATIENT)
Dept: INFECTIOUS DISEASES | Facility: CLINIC | Age: 75
End: 2025-07-03
Payer: MEDICARE

## 2025-07-03 VITALS
OXYGEN SATURATION: 97 % | BODY MASS INDEX: 32.95 KG/M2 | WEIGHT: 186 LBS | TEMPERATURE: 96.8 F | HEART RATE: 100 BPM | SYSTOLIC BLOOD PRESSURE: 98 MMHG | DIASTOLIC BLOOD PRESSURE: 60 MMHG

## 2025-07-03 DIAGNOSIS — T84.51XD INFECTION ASSOCIATED WITH INTERNAL RIGHT HIP PROSTHESIS, SUBSEQUENT ENCOUNTER: Primary | ICD-10-CM

## 2025-07-03 DIAGNOSIS — D75.89 BICYTOPENIA: ICD-10-CM

## 2025-07-03 DIAGNOSIS — R94.31 QT PROLONGATION: ICD-10-CM

## 2025-07-03 PROCEDURE — 99214 OFFICE O/P EST MOD 30 MIN: CPT | Performed by: INTERNAL MEDICINE

## 2025-07-03 RX ORDER — LORAZEPAM 1 MG/1
1 TABLET ORAL 2 TIMES DAILY
COMMUNITY

## 2025-07-03 NOTE — PATIENT INSTRUCTIONS
-PICC line discontinued today in office.  -Continue oral Augmentin and doxycycline through at least 7/18/2025 as the patient will complete an additional 4 weeks of treatment from the time of the IR drain placement.  -Recheck CT scan of the hip to confirm adequate drainage.  -Check CBC with differential and CMP to make sure no developing toxicities.  -ID follow up after CT scan on 7/18

## 2025-07-04 LAB
BACTERIA SPEC ANAEROBE CULT: NO GROWTH
BACTERIA SPEC ANAEROBE CULT: NO GROWTH
BACTERIA SPEC BFLD CULT: NO GROWTH
GRAM STN SPEC: NORMAL

## 2025-07-08 ENCOUNTER — TELEPHONE (OUTPATIENT)
Dept: INFECTIOUS DISEASES | Facility: CLINIC | Age: 75
End: 2025-07-08

## 2025-07-08 NOTE — TELEPHONE ENCOUNTER
Reached out to [facility] to obtain patient's lab results from this week. Spoke to Dayana. They will fax labs to me from 7/5 which is just a cbc w/o diff. They do not have a vanco level since 6-30.

## 2025-07-09 ENCOUNTER — HOSPITAL ENCOUNTER (OUTPATIENT)
Dept: CT IMAGING | Facility: HOSPITAL | Age: 75
Discharge: HOME | End: 2025-07-09
Attending: STUDENT IN AN ORGANIZED HEALTH CARE EDUCATION/TRAINING PROGRAM
Payer: MEDICARE

## 2025-07-09 DIAGNOSIS — T84.51XD INFECTION ASSOCIATED WITH INTERNAL RIGHT HIP PROSTHESIS, SUBSEQUENT ENCOUNTER: ICD-10-CM

## 2025-07-09 DIAGNOSIS — L02.415 ABSCESS OF RIGHT HIP: ICD-10-CM

## 2025-07-09 DIAGNOSIS — R78.81 BACTEREMIA: ICD-10-CM

## 2025-07-09 DIAGNOSIS — Z79.2 LONG TERM (CURRENT) USE OF ANTIBIOTICS: ICD-10-CM

## 2025-07-09 PROCEDURE — 73701 CT LOWER EXTREMITY W/DYE: CPT

## 2025-07-09 RX ADMIN — IOHEXOL 100 ML: 350 INJECTION, SOLUTION INTRAVENOUS at 15:12

## 2025-07-10 ENCOUNTER — TELEPHONE (OUTPATIENT)
Dept: INFECTIOUS DISEASES | Facility: CLINIC | Age: 75
End: 2025-07-10

## 2025-07-10 ENCOUNTER — OFFICE VISIT (OUTPATIENT)
Dept: OBGYN CLINIC | Facility: HOSPITAL | Age: 75
End: 2025-07-10

## 2025-07-10 VITALS — BODY MASS INDEX: 32.95 KG/M2 | HEIGHT: 63 IN

## 2025-07-10 DIAGNOSIS — L02.415 ABSCESS OF RIGHT HIP: ICD-10-CM

## 2025-07-10 DIAGNOSIS — D61.818 PANCYTOPENIA (HCC): ICD-10-CM

## 2025-07-10 PROCEDURE — 99024 POSTOP FOLLOW-UP VISIT: CPT | Performed by: ORTHOPAEDIC SURGERY

## 2025-07-10 RX ORDER — CELECOXIB 200 MG/1
200 CAPSULE ORAL EVERY 12 HOURS
COMMUNITY
Start: 2025-04-30

## 2025-07-10 RX ORDER — BISACODYL 10 MG
10 SUPPOSITORY, RECTAL RECTAL
COMMUNITY
Start: 2025-04-30

## 2025-07-10 RX ORDER — METHOCARBAMOL 500 MG/1
500 TABLET, FILM COATED ORAL EVERY 6 HOURS PRN
COMMUNITY
Start: 2025-04-29

## 2025-07-10 RX ORDER — VANCOMYCIN HYDROCHLORIDE 10 G/1
INJECTION, POWDER, LYOPHILIZED, FOR SOLUTION INTRAVENOUS
COMMUNITY
Start: 2025-06-10

## 2025-07-10 NOTE — TELEPHONE ENCOUNTER
Contacted [facility] to confirm patient's upcoming appointment and request labs be drawn prior to their visit.    Spoke with nurse, Brian. I provided Brian with patient's appointment details and requested she have CBCD and CMP drawn prior to patient's appointment on 7/18. She states she will have labs drawn 7/14, and they will arrive along with patient's medication list at the time of patient's appointment.

## 2025-07-10 NOTE — TELEPHONE ENCOUNTER
Contacted [facility] to request to have CT read prior to patient's ID appointment.    Spoke with radiology liaison, Jose. Jose states he will place order to have CT read ASAP.

## 2025-07-10 NOTE — PROGRESS NOTES
Name: Sharon Vega      : 1950       MRN: [id number]   Encounter Provider: Kit Daley MD   Encounter Date: 07/10/25  Encounter department: [facility]     ASSESSMENT & PLAN:  Assessment & Plan  Abscess of right hip    Orders:    Ambulatory Referral to Orthopedic Surgery       Patient is here 7 weeks status post I&D and insertion of antibiotic spacer to the right hip  At current time, patient is continuing with antibiotic regimen per ID  We discussed the patient that she may continue to use her antibiotic spacer as a permanent solution as long as she is not having any pain or new symptoms in the right hip joint, which she presently does not have  Patient and family were happy to continue antibiotic spacer treatments for now  We will see the patient back in approximately 3 months for follow-up visit and repeat x-rays at that time    To do next visit:  Return in about 3 months (around 10/10/2025).    _____________________________________________________  CHIEF COMPLAINT:  Chief Complaint   Patient presents with    Right Hip - Post-op         SUBJECTIVE:  Sharon Vega is a 75 y.o. female who presents for 7-week follow-up after right hip I&D and placement of antibiotic spacer to the right hip.  Patient has been doing well.  She has no pain in the hip.  She is continuing with antibiotics per ID regimen.  She has been ambulating with physical therapy and use of her walker.  She denies any new complaints in the right hip.  Incision is well-healed today and there is no dehiscence or drainage noted.  Patient's pain is under control.  She is here for regularly scheduled follow-up visit.2        PAST MEDICAL HISTORY:  Past Medical History[1]    PAST SURGICAL HISTORY:  Past Surgical History[2]    FAMILY HISTORY:  Family History[3]    SOCIAL HISTORY:  Social History[4]    MEDICATIONS:  Current Medications[5]    ALLERGIES:  Allergies[6]    LABS:  HgA1c:   Lab Results  "  Component Value Date    HGBA1C 6.3 (H) 05/26/2025     BMP:   Lab Results   Component Value Date    CALCIUM 8.0 (L) 06/25/2025    K 3.5 06/25/2025    CO2 29 06/25/2025     06/25/2025    BUN 11 06/25/2025    CREATININE 0.80 06/25/2025     CBC: No components found for: \"CBC\"    _____________________________________________________  PHYSICAL EXAMINATION:  Vital signs: Ht 5' 3\" (1.6 m)   BMI 32.95 kg/m²   General: No acute distress, awake and alert  Psychiatric: Mood and affect appear appropriate  HEENT: Trachea Midline, No torticollis, no apparent facial trauma  Cardiovascular: No audible murmurs; Extremities appear perfused  Pulmonary: No audible wheezing or stridor  Skin: No open lesions; see further details (if any) below    MUSCULOSKELETAL EXAMINATION:  Ortho Exam  Respiratory:   non-labored respirations    Lymphatics:  no palpable lymph nodes    Gait:   antalgic    Neurologic:   Alert and oriented times 3  Patient with normal sensation except as noted below  Deep tendon reflexes 2+ except as noted in MSK exam    Right Hip     Inspection: well healed incision    Range of Motion: full without pain    - Trendelenburg sign    Motor: 5/5 IP/Q/HS/TA/GS    Pulses: 2+ DP / 2+ PT    SILT DP/SP/S/S/TN    ___________________________________________________  STUDIES REVIEWED:  I personally reviewed the images obtained in office today and my independent interpretation is as follows:    None performed      PROCEDURES PERFORMED:    Procedures    None preformed       Ronal Simmons MD         [1]   Past Medical History:  Diagnosis Date    Anemia     Anxiety     Arthritis     Closed transcervical fracture of right femur (HCC) 07/01/2022    Colon polyp     Depression     Fracture of right wrist 07/01/2022    GERD (gastroesophageal reflux disease)     Hiatal hernia     Hyperlipidemia     Hypertension    [2]   Past Surgical History:  Procedure Laterality Date    APPENDECTOMY      CHOLECYSTECTOMY      COLONOSCOPY      FL " INJECTION RIGHT HIP (NON ARTHROGRAM)  2025    FRACTURE SURGERY Right     arm with plate and pins    HAND SURGERY Bilateral     HIP ARTHROPLASTY Right 2025    Procedure: removal/debridement prothesis hip prostalac;  Surgeon: Kit Daley MD;  Location: [facility];  Service: Orthopedics    HYSTERECTOMY      IR ASPIRATION JOINT (SPECIFY LOCATION)  2025    IR DRAINAGE TUBE PLACEMENT  5/15/2025    IR DRAINAGE TUBE PLACEMENT  2025    JOINT REPLACEMENT Bilateral     knees    ND HEMIARTHROPLASTY HIP PARTIAL Right 2022    Procedure: HEMIARTHROPLASTY HIP (BIPOLAR), closed reduction with splinting right wrist;  Surgeon: Leo Roblero;  Location: [facility];  Service: Orthopedics    ND NEUROPLASTY &/TRANSPOSITION ULNAR NERVE ELBOW Right 2023    Procedure: RELEASE CUBITAL TUNNEL;  Surgeon: Cody Calles DO;  Location: [facility];  Service: Orthopedics    SHOULDER ARTHROSCOPY Left    [3]   Family History  Problem Relation Name Age of Onset    No Known Problems Mother     [4]   Social History  Tobacco Use    Smoking status: Former     Current packs/day: 0.00     Types: Cigarettes     Quit date:      Years since quittin.5    Smokeless tobacco: Never   Vaping Use    Vaping status: Never Used   Substance Use Topics    Alcohol use: Not Currently    Drug use: Never   [5]   Current Outpatient Medications:     bisacodyl (DULCOLAX) 10 mg suppository, Insert 10 mg into the rectum, Disp: , Rfl:     celecoxib (CeleBREX) 200 mg capsule, Take 200 mg by mouth every 12 (twelve) hours, Disp: , Rfl:     methocarbamol (ROBAXIN) 500 mg tablet, Take 500 mg by mouth every 6 (six) hours as needed for muscle spasms, Disp: , Rfl:     Vancomycin HCl 10 g SOLR, , Disp: , Rfl:     acetaminophen (TYLENOL) 325 mg tablet, Take 2 tablets (650 mg total) by mouth every 6 (six) hours as needed for mild pain, Disp: , Rfl: 0    amLODIPine (NORVASC) 10 mg tablet, Take 10 mg by mouth in the morning., Disp: ,  Rfl:     amoxicillin-clavulanate (AUGMENTIN) 875-125 mg per tablet, Take 1 tablet by mouth every 12 (twelve) hours for 16 days Do not start before July 2, 2025., Disp: , Rfl:     ascorbic acid (VITAMIN C) 500 MG tablet, Take 1 tablet (500 mg total) by mouth 2 (two) times a day, Disp: 60 tablet, Rfl: 1    calcium carbonate (OS-ALPESH) 600 MG tablet, Take 600 mg by mouth as needed Taking 2 tablets (1200 mg) daily, Disp: , Rfl:     cetirizine (ZyrTEC) 10 mg tablet, Take 10 mg by mouth in the morning., Disp: , Rfl:     Cholecalciferol (VITAMIN D3) 1,000 units tablet, Take 2 tablets (2,000 Units total) by mouth daily (Patient not taking: Reported on 7/3/2025), Disp: , Rfl:     docusate sodium (COLACE) 100 mg capsule, Take 1 capsule (100 mg total) by mouth daily at bedtime As needed, Disp: , Rfl:     doxycycline (ADOXA) 100 MG tablet, Take 1 tablet (100 mg total) by mouth 2 (two) times a day for 16 days Do not start before July 2, 2025., Disp: , Rfl:     ferrous sulfate 324 (65 Fe) mg, Take 1 tablet (324 mg total) by mouth every other day, Disp: , Rfl:     folic acid (FOLVITE) 1 mg tablet, Take 1 tablet (1 mg total) by mouth daily, Disp: 30 tablet, Rfl: 1    gabapentin (NEURONTIN) 600 MG tablet, Take 600 mg by mouth daily at bedtime, Disp: , Rfl:     lidocaine (XYLOCAINE) 2 % topical gel, Apply 1 Application topically as needed for mild pain not using 06/07/2025, Disp: , Rfl:     LORazepam (ATIVAN) 1 mg tablet, Take 1 mg by mouth 2 (two) times a day, Disp: , Rfl:     Multiple Vitamins-Minerals (multivitamin with minerals) tablet, Take 1 tablet by mouth daily (Patient not taking: Reported on 7/3/2025), Disp: 30 tablet, Rfl: 1    oxyCODONE (ROXICODONE) 5 immediate release tablet, Take 1 tablet (5 mg total) by mouth every 4 (four) hours as needed for moderate pain for up to 10 doses Max Daily Amount: 30 mg, Disp: 10 tablet, Rfl: 0    pantoprazole (PROTONIX) 40 mg tablet, Take 40 mg by mouth daily, Disp: , Rfl:     polyethylene  glycol (MIRALAX) 17 g packet, Take 17 g by mouth daily, Disp: , Rfl:     pravastatin (PRAVACHOL) 40 mg tablet, Take 40 mg by mouth daily at bedtime, Disp: , Rfl:     Sodium Chloride Flush (Normal Saline Flush) 0.9 % SOLN, Inject 10 mL into a catheter in a vein daily. 10ml flush prior to antibiotic administration and 10ml flush after antibiotic administration, Disp: , Rfl:     sodium chloride, PF, 0.9 %, 10 mL by Intracatheter route daily for 120 doses Intracatheter flushing daily. May substitute prefilled syringe with normal saline 10 mL vials, 10 mL syringes, and 18 g blunt needles, Disp: 300 mL, Rfl: 3    venlafaxine (EFFEXOR-XR) 150 mg 24 hr capsule, Take 150 mg by mouth in the morning., Disp: , Rfl:   [6] No Known Allergies

## 2025-07-17 NOTE — TELEPHONE ENCOUNTER
Contacted [facility] to request patient's vitals/labs/MAR prior to their appointment tomorrow at 8am.     Spoke with nurse, Sun. She states she will be sending these documents with the patient tot heir visit.

## 2025-07-18 ENCOUNTER — OFFICE VISIT (OUTPATIENT)
Dept: INFECTIOUS DISEASES | Facility: CLINIC | Age: 75
End: 2025-07-18
Payer: MEDICARE

## 2025-07-18 ENCOUNTER — TELEPHONE (OUTPATIENT)
Dept: INFECTIOUS DISEASES | Facility: CLINIC | Age: 75
End: 2025-07-18

## 2025-07-18 VITALS
OXYGEN SATURATION: 96 % | HEART RATE: 111 BPM | TEMPERATURE: 97.5 F | SYSTOLIC BLOOD PRESSURE: 98 MMHG | DIASTOLIC BLOOD PRESSURE: 62 MMHG

## 2025-07-18 DIAGNOSIS — D75.89 BICYTOPENIA: ICD-10-CM

## 2025-07-18 DIAGNOSIS — D61.818 PANCYTOPENIA (HCC): ICD-10-CM

## 2025-07-18 DIAGNOSIS — T84.51XD INFECTION ASSOCIATED WITH INTERNAL RIGHT HIP PROSTHESIS, SUBSEQUENT ENCOUNTER: Primary | ICD-10-CM

## 2025-07-18 DIAGNOSIS — R94.31 ABNORMAL ECG: ICD-10-CM

## 2025-07-18 DIAGNOSIS — L02.415 ABSCESS OF RIGHT HIP: ICD-10-CM

## 2025-07-18 PROCEDURE — 99215 OFFICE O/P EST HI 40 MIN: CPT | Performed by: INTERNAL MEDICINE

## 2025-07-18 RX ORDER — OMEPRAZOLE 40 MG/1
40 CAPSULE, DELAYED RELEASE ORAL 2 TIMES DAILY
COMMUNITY

## 2025-07-18 RX ORDER — DOXYCYCLINE 100 MG/1
100 TABLET ORAL 2 TIMES DAILY
Qty: 66 TABLET | Refills: 0 | Status: SHIPPED | OUTPATIENT
Start: 2025-07-18 | End: 2025-08-20

## 2025-07-18 NOTE — ASSESSMENT & PLAN NOTE
Status post extraction of hardware 5/21/2025 with cultures previously growing Finegoldia and Peptoniphilus.  Had been on a treatment course with intravenous vancomycin and Flagyl with a plan to complete 6 weeks on 7/1/2025 but presented with recurrent hip pain and underwent IR drain placement of a right thigh hematoma and right iliopsoas collection felt to be consistent with abscess.  One of the IR drainage cultures grew Staphylococcus epidermidis in broth culture only when held for 14 days and this is of unclear significance as it may represent a contaminant.  Regardless the patient has been on adequate treatment.  Patient has now completed 6 weeks of intravenous vancomycin and oral Flagyl that completed on 7/1/2025 and was transition to oral doxycyclin and Augmentin.  Plan was to continue this through at least 7/18, and additional 4 weeks of treatment from time of IR drain placement.  Repeat CT scan on 7/9 reviewed; drainage catheter noted in previously multiloculated rim-enhancing fluid collection in the musculature surrounding the proximal femur.  The component of the collection centered in the vastus lateralis muscle has slightly decreased in size, still measuring 5.3 x 3 x 11.3 cm.  Component posterior to the femur also has decreased in size, however component more proximally at the level of the cerclage wire is not significantly changed.  The previous abscess in the iliopsoas muscle has resolved.  Labs reviewed from 7/14; hemoglobin 10.4, platelets 473, WBC 7.4, serum creatinine 0.69, AST and ALT normal, T. bili normal.  Patient is overall doing well clinically, denies any major pain, fevers or chills.  However given her persistent collections, some components may need ongoing drainage and thus would not discontinue to pocket.     -Patient needs IR follow-up for potential drainage catheter exchange/repositioning based on recent CT; this has been scheduled for 7/29  -Will repeat CT scan again in about 4 weeks  to reassess collections, scheduled for 8/15  -Patient will follow-up in ID clinic after CT scan, scheduled for 8/20  -Patient will continue taking doxycycline 100 mg twice daily and Augmentin 875 mg twice daily until we see her again in office  -Counseled patient on doxycycline use: take with a full glass of water and sit upright for 30 minutes, separate from multivitamins and antacids by 4 hours, and avoid prolonged sun exposure/wear sunscreen due to photosensitivity  - Check CBC with differential, CMP every other week while on antibiotic

## 2025-07-18 NOTE — TELEPHONE ENCOUNTER
Contacted and spoke with nurseSun. I requested they provide a fax number so I may fax over patient's office visit notes to their facility. I confirmed orders stated by provider with nurse, and nurse states they will ensure everything detailed by the provider is followed accordingly.

## 2025-07-18 NOTE — ASSESSMENT & PLAN NOTE
Patient has had anemia and leukopenia, though ANC has remained above 1000.  On most recent labs from 7/14, WBC is normal at 7.4.  Hemoglobin stable at 10.4.  -Continue to monitor CBC

## 2025-07-18 NOTE — PATIENT INSTRUCTIONS
Continue taking doxycycline and Augmentin until next follow up with ID.  Call and schedule to see IR ASAP [phone].  Repeat CT scan in 4 weeks.  ID follow up after.  See attached sheets for scheduled appointments.

## 2025-07-18 NOTE — PROGRESS NOTES
Name: Sharon Vega      : 1950      MRN: [id number]  Encounter Provider: Harley Joshua MD  Encounter Date: 2025   Encounter department: [facility]  :  Assessment & Plan  Infection associated with internal right hip prosthesis, subsequent encounter  Status post extraction of hardware 2025 with cultures previously growing Finegoldia and Peptoniphilus.  Had been on a treatment course with intravenous vancomycin and Flagyl with a plan to complete 6 weeks on 2025 but presented with recurrent hip pain and underwent IR drain placement of a right thigh hematoma and right iliopsoas collection felt to be consistent with abscess.  One of the IR drainage cultures grew Staphylococcus epidermidis in broth culture only when held for 14 days and this is of unclear significance as it may represent a contaminant.  Regardless the patient has been on adequate treatment.  Patient has now completed 6 weeks of intravenous vancomycin and oral Flagyl that completed on 2025 and was transition to oral doxycyclin and Augmentin.  Plan was to continue this through at least , and additional 4 weeks of treatment from time of IR drain placement.  Repeat CT scan on  reviewed; drainage catheter noted in previously multiloculated rim-enhancing fluid collection in the musculature surrounding the proximal femur.  The component of the collection centered in the vastus lateralis muscle has slightly decreased in size, still measuring 5.3 x 3 x 11.3 cm.  Component posterior to the femur also has decreased in size, however component more proximally at the level of the cerclage wire is not significantly changed.  The previous abscess in the iliopsoas muscle has resolved.  Labs reviewed from ; hemoglobin 10.4, platelets 473, WBC 7.4, serum creatinine 0.69, AST and ALT normal, T. bili normal.  Patient is overall doing well clinically, denies any major pain, fevers or chills.  However given  her persistent collections, some components may need ongoing drainage and thus would not discontinue to pocket.     -Patient needs IR follow-up for potential drainage catheter exchange/repositioning based on recent CT; this has been scheduled for 7/29  -Will repeat CT scan again in about 4 weeks to reassess collections, scheduled for 8/15  -Patient will follow-up in ID clinic after CT scan, scheduled for 8/20  -Patient will continue taking doxycycline 100 mg twice daily and Augmentin 875 mg twice daily until we see her again in office  -Counseled patient on doxycycline use: take with a full glass of water and sit upright for 30 minutes, separate from multivitamins and antacids by 4 hours, and avoid prolonged sun exposure/wear sunscreen due to photosensitivity  - Check CBC with differential, CMP every other week while on antibiotic  Bicytopenia  Patient has had anemia and leukopenia, though ANC has remained above 1000.  On most recent labs from 7/14, WBC is normal at 7.4.  Hemoglobin stable at 10.4.  -Continue to monitor CBC  Abnormal ECG  Qtc has been prolonged on multiple ECGs in the last several months.  Best to avoid use of Fluoroquinolones and Macrolides if able.      Antibiotics: Augmentin, doxycycline    History of Present Illness   Patient has been doing well on her oral antibiotics since discharge.  She denies any major side effects.  Denies nausea, vomiting, diarrhea.  Denies any major new leg or hip pain.  She does note that she did not see IR recently.  She otherwise feels pretty good and has no new symptoms.    ROS:  A complete review of systems is negative other than that noted above in the HPI.    Medications Ordered Prior to Encounter[1]   Social History[2]     Objective   There were no vitals taken for this visit.     General: Alert, interactive, appearing well, nontoxic, no acute distress.  Throat: Oropharynx moist without lesions.   Lungs: Clear to auscultation bilaterally; no audible wheezes,  rhonchi or rales; respirations unlabored  Heart: RRR  Abdomen: Soft, non-tender, non-distended, positive bowel sounds.    Extremities: No clubbing, cyanosis or edema  Skin: No new rashes or lesions. No new draining wounds.    Lab Results: I have personally reviewed pertinent labs.  Lab Results   Component Value Date    K 3.5 06/25/2025     06/25/2025    CO2 29 06/25/2025    BUN 11 06/25/2025    CREATININE 0.80 06/25/2025    GLUF 116 (H) 04/25/2025    CALCIUM 8.0 (L) 06/25/2025    CORRECTEDCA 8.8 06/17/2025    AST 16 06/17/2025    ALT 6 (L) 06/17/2025    ALKPHOS 40 06/17/2025    EGFR 71 06/25/2025     Lab Results   Component Value Date    WBC 3.46 (L) 06/25/2025    HGB 7.9 (L) 06/25/2025    HCT 26.0 (L) 06/25/2025    MCV 92 06/25/2025     (H) 06/25/2025     Lab Results   Component Value Date    ESR >130 (H) 05/13/2025       Radiology Results Review: I personally reviewed the following image studies in PACS and associated radiology reports: CT right lower extremity. My interpretation of the radiology images/reports is: Manage catheter present within rim-enhancing fluid collection in the muscle around proximal femur, multiple components of this collection remain undrained.         [1]   Current Outpatient Medications on File Prior to Visit   Medication Sig Dispense Refill    acetaminophen (TYLENOL) 325 mg tablet Take 2 tablets (650 mg total) by mouth every 6 (six) hours as needed for mild pain  0    amLODIPine (NORVASC) 10 mg tablet Take 10 mg by mouth in the morning.      ascorbic acid (VITAMIN C) 500 MG tablet Take 1 tablet (500 mg total) by mouth 2 (two) times a day 60 tablet 1    calcium carbonate (OS-ALPESH) 600 MG tablet Take 600 mg by mouth as needed Taking 2 tablets (1200 mg) daily      cetirizine (ZyrTEC) 10 mg tablet Take 10 mg by mouth in the morning.      Cholecalciferol (VITAMIN D3) 1,000 units tablet Take 2 tablets (2,000 Units total) by mouth daily      docusate sodium (COLACE) 100 mg capsule  Take 1 capsule (100 mg total) by mouth daily at bedtime As needed      ferrous sulfate 324 (65 Fe) mg Take 1 tablet (324 mg total) by mouth every other day      folic acid (FOLVITE) 1 mg tablet Take 1 tablet (1 mg total) by mouth daily 30 tablet 1    gabapentin (NEURONTIN) 600 MG tablet Take 600 mg by mouth daily at bedtime      lidocaine (XYLOCAINE) 2 % topical gel Apply 1 Application topically as needed for mild pain not using 06/07/2025      LORazepam (ATIVAN) 1 mg tablet Take 1 mg by mouth 2 (two) times a day      Multiple Vitamins-Minerals (multivitamin with minerals) tablet Take 1 tablet by mouth daily 30 tablet 1    omeprazole (PriLOSEC) 40 MG capsule Take 40 mg by mouth 2 (two) times a day      oxyCODONE (ROXICODONE) 5 immediate release tablet Take 1 tablet (5 mg total) by mouth every 4 (four) hours as needed for moderate pain for up to 10 doses Max Daily Amount: 30 mg 10 tablet 0    polyethylene glycol (MIRALAX) 17 g packet Take 17 g by mouth daily      pravastatin (PRAVACHOL) 40 mg tablet Take 40 mg by mouth daily at bedtime      sodium chloride, PF, 0.9 % 10 mL by Intracatheter route daily for 120 doses Intracatheter flushing daily. May substitute prefilled syringe with normal saline 10 mL vials, 10 mL syringes, and 18 g blunt needles 300 mL 3    venlafaxine (EFFEXOR-XR) 150 mg 24 hr capsule Take 150 mg by mouth in the morning.      [DISCONTINUED] amoxicillin-clavulanate (AUGMENTIN) 875-125 mg per tablet Take 1 tablet by mouth every 12 (twelve) hours for 16 days Do not start before July 2, 2025.      [DISCONTINUED] doxycycline (ADOXA) 100 MG tablet Take 1 tablet (100 mg total) by mouth 2 (two) times a day for 16 days Do not start before July 2, 2025.      bisacodyl (DULCOLAX) 10 mg suppository Insert 10 mg into the rectum      celecoxib (CeleBREX) 200 mg capsule Take 200 mg by mouth every 12 (twelve) hours (Patient not taking: Reported on 7/18/2025)      methocarbamol (ROBAXIN) 500 mg tablet Take 500 mg  by mouth every 6 (six) hours as needed for muscle spasms (Patient not taking: Reported on 2025)      pantoprazole (PROTONIX) 40 mg tablet Take 40 mg by mouth daily (Patient not taking: Reported on 2025)      Sodium Chloride Flush (Normal Saline Flush) 0.9 % SOLN Inject 10 mL into a catheter in a vein daily. 10ml flush prior to antibiotic administration and 10ml flush after antibiotic administration      Vancomycin HCl 10 g SOLR  (Patient not taking: Reported on 2025)       No current facility-administered medications on file prior to visit.   [2]   Social History  Tobacco Use    Smoking status: Former     Current packs/day: 0.00     Types: Cigarettes     Quit date: 1982     Years since quittin.5    Smokeless tobacco: Never   Vaping Use    Vaping status: Never Used   Substance and Sexual Activity    Alcohol use: Not Currently    Drug use: Never    Sexual activity: Not Currently

## 2025-07-22 NOTE — TELEPHONE ENCOUNTER
Contacted [facility] to confirm if patient's office visit notes were received as I've been unable tor each facility.    Spoke with , Alix. Alix states patient has been discharged from their facility as of 7/19.

## 2025-07-29 ENCOUNTER — HOSPITAL ENCOUNTER (OUTPATIENT)
Dept: RADIOLOGY | Facility: HOSPITAL | Age: 75
Discharge: HOME | End: 2025-07-29
Attending: RADIOLOGY
Payer: MEDICARE

## 2025-07-29 DIAGNOSIS — L02.415 ABSCESS OF RIGHT HIP: ICD-10-CM

## 2025-07-29 PROCEDURE — 75984 XRAY CONTROL CATHETER CHANGE: CPT | Performed by: RADIOLOGY

## 2025-07-29 PROCEDURE — C1769 GUIDE WIRE: HCPCS

## 2025-07-29 PROCEDURE — 75984 XRAY CONTROL CATHETER CHANGE: CPT

## 2025-07-29 PROCEDURE — 49424 ASSESS CYST CONTRAST INJECT: CPT | Performed by: RADIOLOGY

## 2025-07-29 PROCEDURE — 49423 EXCHANGE DRAINAGE CATHETER: CPT | Performed by: RADIOLOGY

## 2025-07-29 PROCEDURE — C1729 CATH, DRAINAGE: HCPCS

## 2025-07-29 PROCEDURE — 49423 EXCHANGE DRAINAGE CATHETER: CPT

## 2025-07-29 PROCEDURE — 76080 X-RAY EXAM OF FISTULA: CPT | Performed by: RADIOLOGY

## 2025-07-29 RX ADMIN — IOHEXOL 25 ML: 350 INJECTION, SOLUTION INTRAVENOUS at 12:18

## 2025-07-30 DIAGNOSIS — L02.415 ABSCESS OF RIGHT HIP: Primary | ICD-10-CM

## 2025-07-30 DIAGNOSIS — M25.551 RIGHT HIP PAIN: ICD-10-CM

## 2025-08-15 ENCOUNTER — TELEPHONE (OUTPATIENT)
Dept: INFECTIOUS DISEASES | Facility: CLINIC | Age: 75
End: 2025-08-15

## 2025-08-15 ENCOUNTER — HOSPITAL ENCOUNTER (OUTPATIENT)
Dept: CT IMAGING | Facility: HOSPITAL | Age: 75
Discharge: HOME | End: 2025-08-15
Attending: INTERNAL MEDICINE
Payer: MEDICARE

## 2025-08-18 ENCOUNTER — HOSPITAL ENCOUNTER (OUTPATIENT)
Dept: RADIOLOGY | Facility: HOSPITAL | Age: 75
Discharge: HOME | End: 2025-08-18
Attending: RADIOLOGY
Payer: MEDICARE

## 2025-08-18 ENCOUNTER — TELEPHONE (OUTPATIENT)
Dept: INFECTIOUS DISEASES | Facility: CLINIC | Age: 75
End: 2025-08-18

## 2025-08-18 DIAGNOSIS — L02.415 ABSCESS OF RIGHT HIP: ICD-10-CM

## 2025-08-18 PROCEDURE — 49424 ASSESS CYST CONTRAST INJECT: CPT | Performed by: RADIOLOGY

## 2025-08-18 PROCEDURE — 76080 X-RAY EXAM OF FISTULA: CPT | Performed by: RADIOLOGY

## 2025-08-18 PROCEDURE — 49424 ASSESS CYST CONTRAST INJECT: CPT

## 2025-08-18 PROCEDURE — 76080 X-RAY EXAM OF FISTULA: CPT

## 2025-08-18 RX ADMIN — IOHEXOL 2 ML: 350 INJECTION, SOLUTION INTRAVENOUS at 09:48

## 2025-08-20 ENCOUNTER — TELEMEDICINE (OUTPATIENT)
Dept: INFECTIOUS DISEASES | Facility: CLINIC | Age: 75
End: 2025-08-20
Payer: MEDICARE

## 2025-08-20 DIAGNOSIS — R94.31 QT PROLONGATION: ICD-10-CM

## 2025-08-20 DIAGNOSIS — T84.51XD INFECTION ASSOCIATED WITH INTERNAL RIGHT HIP PROSTHESIS, SUBSEQUENT ENCOUNTER: Primary | ICD-10-CM

## 2025-08-20 PROCEDURE — 99214 OFFICE O/P EST MOD 30 MIN: CPT | Performed by: PHYSICIAN ASSISTANT

## 2025-08-21 ENCOUNTER — TELEPHONE (OUTPATIENT)
Dept: INFECTIOUS DISEASES | Facility: CLINIC | Age: 75
End: 2025-08-21

## (undated) DEVICE — URETERAL CATHETER ADAPTOR TIP

## (undated) DEVICE — READY WET SKIN SCRUB TRAY-LF: Brand: MEDLINE INDUSTRIES, INC.

## (undated) DEVICE — ZIMMER® STERILE DISPOSABLE TOURNIQUET CUFF, DUAL PORT, SINGLE BLADDER, 18 IN. (46 CM)

## (undated) DEVICE — NEEDLE 18 G X 1 1/2 SAFETY

## (undated) DEVICE — HOOD WITH PEEL AWAY FACE SHIELD: Brand: T7PLUS

## (undated) DEVICE — PLUMEPEN PRO 10FT

## (undated) DEVICE — ACE WRAP 6 IN STERILE

## (undated) DEVICE — 4-PORT MANIFOLD: Brand: NEPTUNE 2

## (undated) DEVICE — OCCLUSIVE GAUZE STRIP,3% BISMUTH TRIBROMOPHENATE IN PETROLATUM BLEND: Brand: XEROFORM

## (undated) DEVICE — 3M™ COBAN™ NL STERILE NON-LATEX SELF-ADHERENT WRAP, 2084S, 4 IN X 5 YD (10 CM X 4,5 M), 18 ROLLS/CASE: Brand: 3M™ COBAN™

## (undated) DEVICE — SUT VICRYL 0 CT-1 27 IN J260H

## (undated) DEVICE — HOOD: Brand: T7PLUS

## (undated) DEVICE — C-ARM: Brand: UNBRANDED

## (undated) DEVICE — ABDUCTION PILLOW FOAM POSITIONER: Brand: CARDINAL HEALTH

## (undated) DEVICE — SYRINGE 30ML LL

## (undated) DEVICE — SPLINT COMFORT 4 X 30

## (undated) DEVICE — GLOVE INDICATOR PI UNDERGLOVE SZ 7.5 BLUE

## (undated) DEVICE — CAPIT HIP UNIPOLAR HEAD POR PRIMARY

## (undated) DEVICE — GLOVE INDICATOR PI UNDERGLOVE SZ 8 BLUE

## (undated) DEVICE — SYRINGE 20ML LL

## (undated) DEVICE — GLOVE SRG BIOGEL 8

## (undated) DEVICE — PREP SURGICAL PURPREP 26ML

## (undated) DEVICE — SUT VICRYL PLUS 1 CTB-1 36 IN VCPB947H

## (undated) DEVICE — ARM SLING: Brand: DEROYAL

## (undated) DEVICE — DRESSING MEPILEX AG BORDER 4 X 12 IN

## (undated) DEVICE — SUT VICRYL 2-0 CP-1 27 IN J266H

## (undated) DEVICE — CEMENTRALIZER STEM CENTRALIZER 8.5MM CEMENTED
Type: IMPLANTABLE DEVICE | Site: HIP | Status: NON-FUNCTIONAL
Brand: CEMENTRALIZER

## (undated) DEVICE — NEEDLE 25G X 1 1/2

## (undated) DEVICE — STOCKINETTE,IMPERVIOUS,12X48,STERILE: Brand: MEDLINE

## (undated) DEVICE — 3M™ IOBAN™ 2 ANTIMICROBIAL INCISE DRAPE 6650EZ: Brand: IOBAN™ 2

## (undated) DEVICE — HEAVY DUTY TABLE COVER: Brand: CONVERTORS

## (undated) DEVICE — BAG DECANTER

## (undated) DEVICE — ACE WRAP 4 IN STERILE

## (undated) DEVICE — PENCILETTE PUSH BUTTON COATED

## (undated) DEVICE — SUT VICRYL PLUS 2-0 CTB-1 27 IN VCPB259H

## (undated) DEVICE — NEEDLE SPINAL 18G X 3IN DISP

## (undated) DEVICE — STERILE PITCHER PACK: Brand: CARDINAL HEALTH

## (undated) DEVICE — 3.2MM THREE-FLUTED DRILL BIT QC/NEEDLE POINT/145MM

## (undated) DEVICE — TRAY FOLEY 16FR URIMETER SURESTEP

## (undated) DEVICE — BETHLEHEM TOTAL HIP, KIT: Brand: CARDINAL HEALTH

## (undated) DEVICE — GARMENT,MEDLINE,DVT,INT,CALF,FOAM,MED: Brand: MEDLINE

## (undated) DEVICE — GAUZE SPONGES,16 PLY: Brand: CURITY

## (undated) DEVICE — SKN PRP WNG SPNGE PVP SCRB STR: Brand: MEDLINE INDUSTRIES, INC.

## (undated) DEVICE — PADDING CAST 4 IN  COTTON STRL

## (undated) DEVICE — GLOVE SRG BIOGEL 7

## (undated) DEVICE — NEEDLE 21G X 1 SAFETY GLIDE

## (undated) DEVICE — GLOVE INDICATOR UNDERGLOVE SZ 7.5 GREEN

## (undated) DEVICE — CHLORAPREP HI-LITE 26ML ORANGE

## (undated) DEVICE — COBAN 6 IN STERILE

## (undated) DEVICE — BETHLEHEM UNIVERSAL  MIONR EXT: Brand: CARDINAL HEALTH

## (undated) DEVICE — POOLE SUCTION INSTRUMENT WITH REMOVABLE SHEATH AND PREATTACHED 6' (1.8 M) CLEAR PLASTIC TUBING: Brand: POOLE

## (undated) DEVICE — 3M™ STERI-DRAPE™ U-DRAPE 1015: Brand: STERI-DRAPE™

## (undated) DEVICE — GLOVE SRG BIOGEL 7.5

## (undated) DEVICE — X-RAY DETECTABLE SPONGES,16 PLY: Brand: VISTEC

## (undated) DEVICE — 10FR FRAZIER SUCTION HANDLE: Brand: CARDINAL HEALTH

## (undated) DEVICE — TOWEL SURG XR DETECT GREEN STRL RFD

## (undated) DEVICE — SPONGE LAP 18 X 18 IN STRL RFD

## (undated) DEVICE — TIBURON HIP DRAPE WITH POUCHES: Brand: CONVERTORS

## (undated) DEVICE — INTENDED FOR TISSUE SEPARATION, AND OTHER PROCEDURES THAT REQUIRE A SHARP SURGICAL BLADE TO PUNCTURE OR CUT.: Brand: BARD-PARKER ® CARBON RIB-BACK BLADES

## (undated) DEVICE — CAPIT HIP STEM CEMENT PRIMARY

## (undated) DEVICE — ASTOUND FABRIC REINFORCED SURGICAL GOWN: Brand: CONVERTORS

## (undated) DEVICE — HANDPIECE SET WITH RETRACTABLE COAXIAL FAN SPRAY TIP AND SUCTION TUBE: Brand: INTERPULSE

## (undated) DEVICE — VESSEL LOOP MAXI BLUE

## (undated) DEVICE — ACE WRAP 3 IN STERILE

## (undated) DEVICE — GLOVE INDICATOR PI UNDERGLOVE SZ 8.5 BLUE

## (undated) DEVICE — REM POLYHESIVE ADULT PATIENT RETURN ELECTRODE: Brand: VALLEYLAB

## (undated) DEVICE — STOCKINETTE: Brand: DEROYAL

## (undated) DEVICE — POV-IOD SOLUTION 4OZ BT

## (undated) DEVICE — DRESSING XEROFORM 5 X 9

## (undated) DEVICE — KIT BONE CEMENT PREP FEMORAL

## (undated) DEVICE — SUT ETHILON 4-0 PS-2 18 IN 1667H

## (undated) DEVICE — STRL PENROSE DRAIN 18" X 1/4": Brand: CARDINAL HEALTH

## (undated) DEVICE — SUT VICRYL 1 CP 27 IN J196H

## (undated) DEVICE — ADHESIVE SKIN HIGH VISCOSITY EXOFIN 1ML

## (undated) DEVICE — SUT VICRYL 0 CP-1 27 IN J267H

## (undated) DEVICE — COMFORT HOOD -75 EA/BX: Brand: CARDINAL HEALTH

## (undated) DEVICE — DRESSING MEPILEX AG BORDER POST-OP 4 X 12 IN